# Patient Record
Sex: FEMALE | Race: WHITE | NOT HISPANIC OR LATINO | Employment: OTHER | URBAN - METROPOLITAN AREA
[De-identification: names, ages, dates, MRNs, and addresses within clinical notes are randomized per-mention and may not be internally consistent; named-entity substitution may affect disease eponyms.]

---

## 2017-01-23 DIAGNOSIS — N95.1 SYMPTOMATIC MENOPAUSAL OR FEMALE CLIMACTERIC STATES: Primary | ICD-10-CM

## 2017-01-23 NOTE — TELEPHONE ENCOUNTER
Rx was denied for below medication/s:    Med Prescribed:  PREMPRO 28'S  Reason:  PLAN DOES NOT COVER MED  Recommendations from Insurance:  PA  Insurance Plan:     Patient ID:  [id number]  BIN/RX#:  [id number]  Phone:  [phone]  Fax:       On current med list:  YES    Would you like to change Rx or start PA. If you would like to start PA please include any pertinent information as to why it is needed, other medications taken and reasons why other medication were discontinued.      Please forward to your MA pool.      Thank you,  Salud Anaya RT (R)(M)

## 2017-01-25 NOTE — TELEPHONE ENCOUNTER
"Please review with Dr. James.  Prempro was started 7/2015 \"for short term use only.\"  Is this medication still appropriate? And should a PA be started or is there alternate therapy options?    "

## 2017-01-26 NOTE — TELEPHONE ENCOUNTER
Would like Catie to start tapering down this spring, to the lower dose 0.3/ prempro dose.  She may remain on this dose another 3 months.  Then taper to lower dose; which she can be on for ~6 months.  Dr. James

## 2017-01-27 NOTE — TELEPHONE ENCOUNTER
Triage nurse discussed with patient. Informed of the taper plan.  Due to the fact that the PA will not be completed on this Friday afternoon and patient is out of her medication from tomorrow she will ask pharmacist to give her some tablets to carry over until the PA is ready.      PA request sent to team.  Salud Elizalde RN

## 2017-01-30 NOTE — TELEPHONE ENCOUNTER
[facility] esteban sent PA paperwork. Filled out and faxed back.   Hortensia Boone RN-BSN

## 2017-02-01 ENCOUNTER — CARE COORDINATION (OUTPATIENT)
Dept: CARE COORDINATION | Facility: CLINIC | Age: 52
End: 2017-02-01

## 2017-02-01 RX ORDER — NORETHINDRONE ACETATE AND ETHINYL ESTRADIOL .5; 2.5 MG/1; UG/1
1 TABLET ORAL DAILY
Qty: 28 TABLET | Refills: 0 | Status: SHIPPED | OUTPATIENT
Start: 2017-02-01 | End: 2017-02-01

## 2017-02-01 RX ORDER — NORETHINDRONE ACETATE AND ETHINYL ESTRADIOL .5; 2.5 MG/1; UG/1
1 TABLET ORAL DAILY
Qty: 84 TABLET | Refills: 0 | Status: SHIPPED | OUTPATIENT
Start: 2017-02-01 | End: 2017-03-07 | Stop reason: DRUGHIGH

## 2017-02-01 NOTE — PROGRESS NOTES
Clinic Care Coordination Contact  Care Team Conversations      Dr James  RN CC assisted Catie in getting the PA for her medication pushed up to the pharmacy medical director who should be able to give an opinion regarding her HRT that she is currently on  [facility] PA requires a step therapy program now and her current hrt is not on the formulary  She would need a new order for one of the formulary listed meds shown here or a written letter for appeal which would be listed in the denial if they will not cover the current HRT  RN CC spent approx 60mins with several outreach calls to [facility], Direct [facility] PA dept and associated Pharmacist Frankie Haddad is currently experiencing hot flashes, nausea, irritability, wt loss, as she has been off the medication for 10 days      ESTROGEN/PROGESTINS   Oral EE/norethindrone acetate  FEMHRT    EE/norethindrone acetate - Jinteli    estradiol/norethindrone acetate  ACTIVELLA        If PA fails and she tries and fails the formulary approved med this is the process :  Per criteria she would have to fail all 3, BUT if she has an adverse reaction/side effect that we can attribute specifically to the Norethindrone acetate then she would only have to try 1 ( as all 3 alternatives have this progesterone).  If failure was due to lack of efficacy then this logic would not apply and she would need to try/fail all 3. Does this make sense?       Bert Benitez ?Pharmacist, Medicaid Prior Authorization  p [phone]  [facility] [place] ?[address] [place] [address], Mail Code 001      Please have Catie notified of any approval or denial    Thank you    Nathaly Rousseau RN Clinic Care Coordinator  [facility] [phone]

## 2017-02-01 NOTE — TELEPHONE ENCOUNTER
Spoke with pt on the phone. Explained to her that I spoke to someone at [facility] this morning. I re-faxed the paperwork marked Urgent. I informed her that I was told that it was in process and the lady I spoke with this morning said that it could take 24-48 hours to process it fully. I gave her the phone number to [facility] to call with other questions. I informed her as soon as we had an answer, we would let her know.   Hortensia Boone, RN-BSN

## 2017-02-01 NOTE — TELEPHONE ENCOUNTER
Pt called back. Informed her the PA was denied and LT sent a prescription for a different medication. Pt paid for the Prempro out of pocket since she was having side effects. Pt had many questions regarding the different medication sent to pharmacy. Dr James spoke to patient and answered questions.   Hortensia Boone, RN-BSN

## 2017-02-01 NOTE — TELEPHONE ENCOUNTER
Please inform patient that Rx has been sent to her pharmacy.  She may take one today and one tomorrow morning and night, then daily.   If she prefers this pill we can continue it through the next 6 months.  Dr. James

## 2017-02-01 NOTE — TELEPHONE ENCOUNTER
Spoke with Nathaly Rousseau RN Clinic Care Coordinator, at . The PA should be faxed back to us by 6pm today. If it is not approved, Dr James needs to send one of the following that is covered by her formulary:  Femhrt, Activella, or Jinteli to the [facility] on [address] (teed up). Pt is symptomatic with nausea, hot flashes, insomnia, hot flashes. Has been without meds since 1/22/17.   Hortensia Boone RN-BSN

## 2017-02-01 NOTE — TELEPHONE ENCOUNTER
"TC to [facility] to check on PA. It is \"pending\" so should have an answer within 24-48 hours. TC to patient. Left detailed message with this information.   Hortensia Boone RN-BSN    "

## 2017-02-01 NOTE — TELEPHONE ENCOUNTER
"PA received. The PA was denied. \"This service is denied because: formulary alternatives have not been tried. You must try similar drugs that are on your health plan formulary before we can approve this drug.\" Discussed with Dr James. She would like patient to try the prescription she sent to the pharmacy. If pt cannot see improvement after 3 weeks, we can send a new prescription for one of the medications listed below.   Hortensia Boone RN-BSN    "

## 2017-02-07 ENCOUNTER — TRANSFERRED RECORDS (OUTPATIENT)
Dept: HEALTH INFORMATION MANAGEMENT | Facility: CLINIC | Age: 52
End: 2017-02-07

## 2017-02-22 ENCOUNTER — TELEPHONE (OUTPATIENT)
Dept: OBGYN | Facility: CLINIC | Age: 52
End: 2017-02-22

## 2017-02-22 DIAGNOSIS — N95.1 SYMPTOMATIC MENOPAUSAL OR FEMALE CLIMACTERIC STATES: ICD-10-CM

## 2017-02-22 DIAGNOSIS — F41.1 GENERALIZED ANXIETY DISORDER: ICD-10-CM

## 2017-02-22 NOTE — TELEPHONE ENCOUNTER
Reason for Call:  Medication or medication refill:    Do you use a [facility]?  Name of the pharmacy and phone number for the current request:  [facility] [address]    Name of the medication requested: Xanax    Other request: Pt scheduled appt for 3/6 with Yasmin Velazquez. Pt requesting a refill so they do not run out of medication prior to this appt.    Can we leave a detailed message on this number? YES    Phone number patient can be reached at: Home number on file [phone] (home)    Best Time: Anytime    Call taken on 2/22/2017 at 3:03 PM by Maribeth Thakur

## 2017-02-22 NOTE — TELEPHONE ENCOUNTER
Pt called in stating that you had mentioned starting her at the lowest dose of the estradiol and then increasing the dose if it does not seem to be helping her symptoms. She states that it is not helping with her symptoms. Her pharmacy is cued up. Pt is aware that you will be back in clinic on 02/23/2017. Please advise. Nery Cortez RN

## 2017-02-23 RX ORDER — NORETHINDRONE ACETATE AND ETHINYL ESTRADIOL 1; 5 MG/1; UG/1
1 TABLET ORAL DAILY
Qty: 30 TABLET | Refills: 0 | Status: SHIPPED | OUTPATIENT
Start: 2017-02-23 | End: 2017-04-24

## 2017-02-23 NOTE — TELEPHONE ENCOUNTER
Routing refill request to provider for review/approval because:  Drug not on the [facility] refill protocol   She has a follow up appt on 3/6/2017

## 2017-02-23 NOTE — TELEPHONE ENCOUNTER
Please inform Catie that I have changed her Femhrt prescription to the higher dose.  She should not take the prempro or the lower femhrt with this.    She should follow up with me in one month in clinic.    Dr. James

## 2017-02-24 RX ORDER — ALPRAZOLAM 0.5 MG
0.5 TABLET ORAL 2 TIMES DAILY PRN
Qty: 10 TABLET | Refills: 0 | Status: SHIPPED | OUTPATIENT
Start: 2017-02-24 | End: 2017-03-07

## 2017-02-24 NOTE — TELEPHONE ENCOUNTER
Patient called back checking the status on this and concerned as Yasmin is out next week    Please advise

## 2017-02-24 NOTE — TELEPHONE ENCOUNTER
This is an early request. Her next prescription is due March 7.  I approved a 10 tablet supply to bridge her to return appointment with me. No additional refills until a face-to-face appointment are done.  Rx printed, signed, and forwarded to nursing.

## 2017-03-07 ENCOUNTER — OFFICE VISIT (OUTPATIENT)
Dept: FAMILY MEDICINE | Facility: CLINIC | Age: 52
End: 2017-03-07
Payer: COMMERCIAL

## 2017-03-07 VITALS
TEMPERATURE: 98.5 F | HEART RATE: 61 BPM | OXYGEN SATURATION: 98 % | SYSTOLIC BLOOD PRESSURE: 115 MMHG | BODY MASS INDEX: 18.33 KG/M2 | WEIGHT: 110 LBS | RESPIRATION RATE: 16 BRPM | DIASTOLIC BLOOD PRESSURE: 73 MMHG | HEIGHT: 65 IN

## 2017-03-07 DIAGNOSIS — F41.1 GENERALIZED ANXIETY DISORDER: ICD-10-CM

## 2017-03-07 DIAGNOSIS — G43.009 MIGRAINE WITHOUT AURA AND WITHOUT STATUS MIGRAINOSUS, NOT INTRACTABLE: ICD-10-CM

## 2017-03-07 PROCEDURE — 99214 OFFICE O/P EST MOD 30 MIN: CPT | Performed by: NURSE PRACTITIONER

## 2017-03-07 RX ORDER — SERTRALINE HYDROCHLORIDE 100 MG/1
150 TABLET, FILM COATED ORAL DAILY
Qty: 135 TABLET | Refills: 1 | Status: SHIPPED | OUTPATIENT
Start: 2017-03-07 | End: 2017-05-19

## 2017-03-07 RX ORDER — ALPRAZOLAM 0.5 MG
0.5 TABLET ORAL 2 TIMES DAILY PRN
Qty: 10 TABLET | Refills: 0 | Status: CANCELLED | OUTPATIENT
Start: 2017-03-07

## 2017-03-07 RX ORDER — TRAMADOL HYDROCHLORIDE 50 MG/1
50-100 TABLET ORAL EVERY 8 HOURS PRN
Refills: 0 | COMMUNITY
Start: 2017-03-07 | End: 2017-05-08

## 2017-03-07 RX ORDER — ALPRAZOLAM 0.5 MG
0.5 TABLET ORAL 2 TIMES DAILY PRN
Qty: 40 TABLET | Refills: 2 | Status: SHIPPED | OUTPATIENT
Start: 2017-03-07 | End: 2017-05-19

## 2017-03-07 NOTE — MR AVS SNAPSHOT
After Visit Summary   3/7/2017    Catie Nuñez    MRN: [id number]           Patient Information     Date Of Birth          1965        Visit Information        Provider Department      3/7/2017 7:40 AM Yasmin Velazquez APRN CNP [facility]        Today's Diagnoses     Generalized anxiety disorder        Migraine without aura and without status migrainosus, not intractable          Care Instructions    Continue your care with specialty.  Take good care of yourself!    Xanax: take sparingly, only as needed.  Return to the clinic to see me in 3 months for additional refills.          Follow-ups after your visit        Who to contact     If you have questions or need follow up information about today's clinic visit or your schedule please contact [facility] directly at [phone].  Normal or non-critical lab and imaging results will be communicated to you by MyChart, letter or phone within 4 business days after the clinic has received the results. If you do not hear from us within 7 days, please contact the clinic through [employer] or phone. If you have a critical or abnormal lab result, we will notify you by phone as soon as possible.  Submit refill requests through [employer] or call your pharmacy and they will forward the refill request to us. Please allow 3 business days for your refill to be completed.          Additional Information About Your Visit        MyChart Information     [employer] gives you secure access to your electronic health record. If you see a primary care provider, you can also send messages to your care team and make appointments. If you have questions, please call your primary care clinic.  If you do not have a primary care provider, please call [phone] and they will assist you.        Care EveryWhere ID     This is your Care EveryWhere ID. This could be used by other organizations to access your [facility]  "records  [id number]        Your Vitals Were     Pulse Temperature Respirations Height Last Period Pulse Oximetry    61 98.5  F (36.9  C) (Oral) 16 5' 4.5\" (1.638 m) 09/01/2016 98%    Breastfeeding? BMI (Body Mass Index)                No 18.59 kg/m2           Blood Pressure from Last 3 Encounters:   03/07/17 115/73   12/07/16 118/64   10/06/16 110/78    Weight from Last 3 Encounters:   03/07/17 110 lb (49.9 kg)   12/07/16 108 lb 12.8 oz (49.4 kg)   10/06/16 110 lb (49.9 kg)              Today, you had the following     No orders found for display         Today's Medication Changes          These changes are accurate as of: 3/7/17  8:29 AM.  If you have any questions, ask your nurse or doctor.               Start taking these medicines.        Dose/Directions    ALPRAZolam 0.5 MG tablet   Commonly known as:  XANAX   Used for:  Generalized anxiety disorder   Started by:  Yasmin Velazquez APRN CNP        Dose:  0.5 mg   Take 1 tablet (0.5 mg) by mouth 2 times daily as needed for anxiety 40 tablets to last 30 days.   Quantity:  40 tablet   Refills:  2         These medicines have changed or have updated prescriptions.        Dose/Directions    norethindrone-ethinyl estradiol 1-5 MG-MCG per tablet   Commonly known as:  FEMHRT 1/5   This may have changed:  Another medication with the same name was removed. Continue taking this medication, and follow the directions you see here.   Used for:  Symptomatic menopausal or female climacteric states   Changed by:  Emmie James MD        Dose:  1 tablet   Take 1 tablet by mouth daily   Quantity:  30 tablet   Refills:  0       traMADol 50 MG tablet   Commonly known as:  ULTRAM   This may have changed:  additional instructions   Used for:  Migraine without aura and without status migrainosus, not intractable   Changed by:  Yasmin Velazquez APRN CNP        Dose:   mg   Take 1-2 tablets ( mg) by mouth every 8 hours as needed for moderate pain . " Managed by Dr. Jennings, [facility]   Refills:  0         Stop taking these medicines if you haven't already. Please contact your care team if you have questions.     estrogen conj-medroxyPROGESTERone 0.45-1.5 MG per tablet   Commonly known as:  Prempro   Stopped by:  Yasmin Velazquez APRN CNP           order for DME   Stopped by:  Yasmin Velazquez APRN CNP                Where to get your medicines      These medications were sent to [facility] [address] [place] [address] AT [address] [place] [address] & [address] [id number]    Hours:  24-hours Phone:  [phone]     sertraline 100 MG tablet         Some of these will need a paper prescription and others can be bought over the counter.  Ask your nurse if you have questions.     Bring a paper prescription for each of these medications     ALPRAZolam 0.5 MG tablet                Primary Care Provider Office Phone # Fax #    JEREMY Gonzalez [phone]       [facility] [address] [place] [address]        Thank you!     Thank you for choosing [facility]  for your care. Our goal is always to provide you with excellent care. Hearing back from our patients is one way we can continue to improve our services. Please take a few minutes to complete the written survey that you may receive in the mail after your visit with us. Thank you!             Your Updated Medication List - Protect others around you: Learn how to safely use, store and throw away your medicines at www.disposemymeds.org.          This list is accurate as of: 3/7/17  8:29 AM.  Always use your most recent med list.                   Brand Name Dispense Instructions for use    ALPRAZolam 0.5 MG tablet    XANAX    40 tablet    Take 1 tablet (0.5 mg) by mouth 2 times daily as needed for anxiety 40 tablets to last 30 days.       EXCEDRIN PO      Take by mouth  as needed       fluticasone 50 MCG/ACT spray    FLONASE    16 g    Spray 1-2 sprays into both nostrils daily       MULTIVITAMIN PO          norethindrone-ethinyl estradiol 1-5 MG-MCG per tablet    FEMHRT 1/5    30 tablet    Take 1 tablet by mouth daily       sertraline 100 MG tablet    ZOLOFT    135 tablet    Take 1.5 tablets (150 mg) by mouth daily       traMADol 50 MG tablet    ULTRAM     Take 1-2 tablets ( mg) by mouth every 8 hours as needed for moderate pain . Managed by Dr. Jennings, [facility]

## 2017-03-07 NOTE — PATIENT INSTRUCTIONS
Continue your care with specialty.  Take good care of yourself!    Xanax: take sparingly, only as needed.  Return to the clinic to see me in 3 months for additional refills.

## 2017-03-07 NOTE — PROGRESS NOTES
"  SUBJECTIVE:                                                    Catie Nuñez is a 51 year old female who presents to clinic today for the following health issues:    History of Present Illness   Depression & Anxiety Follow-up:     Depression/Anxiety:  Depression & Anxiety    Status since last visit::  Stable    Other associated symptoms of depression and anxiety::  None    Significant life event::  No    Current substance use::  None    [facility] services, DBT with her daughter    Taking care of herself.  Exercising sometimes.  Eating healthy.  Walking often.    Sertraline:  Seems to be helping.  Taking her meds as prescribed.    Tramadol   Intermittently for lower back and hips, migraines.  Dr. Geronimo, for migraine management.   Dr. Jennings, [facility], for back pain.  Tramadol taking 3 tablets per day.  Scripts currently coming from Dr. Jennings.        Problem list and histories reviewed & adjusted, as indicated.  Additional history: as documented      Patient Active Problem List   Diagnosis     CARDIOVASCULAR SCREENING; LDL GOAL LESS THAN 160     Adhesive capsulitis of shoulder     Scoliosis     Shoulder impingement     Generalized anxiety disorder     Pulmonary nodule     Allergic rhinitis     Migraine without aura and without status migrainosus, not intractable     Controlled substance agreement signed     ASCUS Pap + high risk HPV, + 16, + \"other\"      History reviewed. No pertinent past surgical history.    Social History   Substance Use Topics     Smoking status: Never Smoker     Smokeless tobacco: Never Used     Alcohol use No     Family History   Problem Relation Age of Onset     HEART DISEASE Father      Asthma No family hx of      DIABETES No family hx of      Hypertension No family hx of      CEREBROVASCULAR DISEASE No family hx of      Breast Cancer No family hx of          Current Outpatient Prescriptions   Medication Sig Dispense Refill     ALPRAZolam (XANAX) 0.5 MG tablet Take 1 " "tablet (0.5 mg) by mouth 2 times daily as needed for anxiety 40 tablets to last 30 days. 10 tablet 0     norethindrone-ethinyl estradiol (FEMHRT 1/5) 1-5 MG-MCG per tablet Take 1 tablet by mouth daily 30 tablet 0     sertraline (ZOLOFT) 100 MG tablet Take 1.5 tablets (150 mg) by mouth daily 135 tablet 1     fluticasone (FLONASE) 50 MCG/ACT nasal spray Spray 1-2 sprays into both nostrils daily 16 g 11     traMADol (ULTRAM) 50 MG tablet Take 1-2 tablets ( mg) by mouth every 8 hours as needed for moderate pain 10 tablet 0     Multiple Vitamins-Minerals (MULTIVITAMIN PO)        Aspirin-Acetaminophen-Caffeine (EXCEDRIN PO) Take by mouth as needed       norethindrone-eth estradiol 0.5-2.5 MG-MCG TABS Take 1 tablet by mouth daily (Patient not taking: Reported on 3/7/2017) 84 tablet 0     No Known Allergies  Recent Labs   Lab Test  09/14/16   1102  02/19/15   1029  01/06/15   1120   LDL  122*   --   94   HDL  73   --   75   TRIG  111   --   63   ALT   --   20  14   CR   --   0.52  0.60   GFRESTIMATED   --   >90  Non  GFR Calc    >90  Non  GFR Calc     GFRESTBLACK   --   >90   GFR Calc    >90   GFR Calc     POTASSIUM   --   3.9  4.2   TSH   --    --   3.60      BP Readings from Last 3 Encounters:   03/07/17 115/73   12/07/16 118/64   10/06/16 110/78    Wt Readings from Last 3 Encounters:   03/07/17 110 lb (49.9 kg)   12/07/16 108 lb 12.8 oz (49.4 kg)   10/06/16 110 lb (49.9 kg)              Labs reviewed in EPIC    ROS:  Constitutional, HEENT, cardiovascular, pulmonary, gi and gu systems are negative, except as otherwise noted.    OBJECTIVE:                                                    /73  Pulse 61  Temp 98.5  F (36.9  C) (Oral)  Resp 16  Ht 5' 4.5\" (1.638 m)  Wt 110 lb (49.9 kg)  LMP 09/01/2016  SpO2 98%  Breastfeeding? No  BMI 18.59 kg/m2  Body mass index is 18.59 kg/(m^2).  GENERAL APPEARANCE: healthy, alert and no distress. Smiling. "   SKIN: warm and dry  PSYCH: mentation appears normal and affect normal/bright.  Good eye contact.       ASSESSMENT/PLAN:                                                      (F41.1) Generalized anxiety disorder  Comment:   Plan: sertraline (ZOLOFT) 100 MG tablet, ALPRAZolam         (XANAX) 0.5 MG tablet            (G43.009) Migraine without aura and without status migrainosus, not intractable  Comment:   Plan: traMADol (ULTRAM) 50 MG tablet    I discussed with krystin the appropriate use of tramadol and the current recommendations to take tramadol for short term only.  This has been managed by ortho for low back pain and she also takes it for headaches occasionally.  I did tell her that if she wants me to take over this rx, what that would mean with appointments, transfer of records, etc.  She verbalizes understanding.              Patient Instructions   Continue your care with specialty.  Take good care of yourself!    Xanax: take sparingly, only as needed.  Return to the clinic to see me in 3 months for additional refills.          Total: 30 min spent with the patient, >50% time spent face to face counseling regarding anxiety, zoloft, xanax, and controlled substances/controlled substance transfer of management of tramadol.       JEREMY Kent [facility]

## 2017-03-07 NOTE — NURSING NOTE
"Chief Complaint   Patient presents with     Recheck Medication       Initial /73  Pulse 61  Temp 98.5  F (36.9  C) (Oral)  Resp 16  Ht 5' 4.5\" (1.638 m)  Wt 110 lb (49.9 kg)  LMP 09/01/2016  SpO2 98%  Breastfeeding? No  BMI 18.59 kg/m2 Estimated body mass index is 18.59 kg/(m^2) as calculated from the following:    Height as of this encounter: 5' 4.5\" (1.638 m).    Weight as of this encounter: 110 lb (49.9 kg).  Medication Reconciliation: complete       Shoaib Martinez MA       "

## 2017-03-31 ENCOUNTER — TELEPHONE (OUTPATIENT)
Dept: FAMILY MEDICINE | Facility: CLINIC | Age: 52
End: 2017-03-31

## 2017-03-31 ENCOUNTER — OFFICE VISIT (OUTPATIENT)
Dept: URGENT CARE | Facility: URGENT CARE | Age: 52
End: 2017-03-31
Payer: COMMERCIAL

## 2017-03-31 VITALS
HEIGHT: 64 IN | WEIGHT: 108.5 LBS | SYSTOLIC BLOOD PRESSURE: 136 MMHG | BODY MASS INDEX: 18.52 KG/M2 | TEMPERATURE: 100.7 F | OXYGEN SATURATION: 98 % | DIASTOLIC BLOOD PRESSURE: 86 MMHG | HEART RATE: 91 BPM

## 2017-03-31 DIAGNOSIS — R05.9 COUGH: ICD-10-CM

## 2017-03-31 DIAGNOSIS — J10.1 INFLUENZA B: Primary | ICD-10-CM

## 2017-03-31 LAB
FLUAV+FLUBV AG SPEC QL: ABNORMAL
FLUAV+FLUBV AG SPEC QL: NEGATIVE
SPECIMEN SOURCE: ABNORMAL

## 2017-03-31 PROCEDURE — 99213 OFFICE O/P EST LOW 20 MIN: CPT | Performed by: FAMILY MEDICINE

## 2017-03-31 PROCEDURE — 87804 INFLUENZA ASSAY W/OPTIC: CPT | Performed by: FAMILY MEDICINE

## 2017-03-31 RX ORDER — AZITHROMYCIN 250 MG/1
TABLET, FILM COATED ORAL
Qty: 6 TABLET | Refills: 0 | Status: SHIPPED | OUTPATIENT
Start: 2017-03-31 | End: 2017-05-18

## 2017-03-31 RX ORDER — CODEINE PHOSPHATE AND GUAIFENESIN 10; 100 MG/5ML; MG/5ML
1-2 SOLUTION ORAL EVERY 4 HOURS PRN
Qty: 120 ML | Refills: 0 | Status: SHIPPED | OUTPATIENT
Start: 2017-03-31 | End: 2017-05-18

## 2017-03-31 NOTE — MR AVS SNAPSHOT
After Visit Summary   3/31/2017    Catie Nuñez    MRN: [id number]           Patient Information     Date Of Birth          1965        Visit Information        Provider Department      3/31/2017 7:30 PM Lauren Villagran DO [facility]        Today's Diagnoses     Influenza-like illness    -  1    Cough          Care Instructions    Start the antibiotic tonight.   If any fevers ( a temperature of 100.5 or higher) persist after you've been on the antibiotic more than 48 hours, recheck with your primary provider.      Do not drive, take at work, or drink alcohol when you are taking the codeine cough suppressant.           Follow-ups after your visit        Who to contact     If you have questions or need follow up information about today's clinic visit or your schedule please contact [facility] directly at [phone].  Normal or non-critical lab and imaging results will be communicated to you by MusicNowhart, letter or phone within 4 business days after the clinic has received the results. If you do not hear from us within 7 days, please contact the clinic through MusicNowhart or phone. If you have a critical or abnormal lab result, we will notify you by phone as soon as possible.  Submit refill requests through Rem[employer] or call your pharmacy and they will forward the refill request to us. Please allow 3 business days for your refill to be completed.          Additional Information About Your Visit        MyChart Information     Remedify gives you secure access to your electronic health record. If you see a primary care provider, you can also send messages to your care team and make appointments. If you have questions, please call your primary care clinic.  If you do not have a primary care provider, please call [phone] and they will assist you.        Care EveryWhere ID     This is your Care EveryWhere ID. This could be used by other organizations to  "access your [place] medical records  [id number]        Your Vitals Were     Pulse Temperature Height Last Period Pulse Oximetry BMI (Body Mass Index)    91 100.7  F (38.2  C) (Oral) 5' 4\" (1.626 m) 09/01/2016 98% 18.62 kg/m2       Blood Pressure from Last 3 Encounters:   03/31/17 136/86   03/07/17 115/73   12/07/16 118/64    Weight from Last 3 Encounters:   03/31/17 108 lb 8 oz (49.2 kg)   03/07/17 110 lb (49.9 kg)   12/07/16 108 lb 12.8 oz (49.4 kg)              We Performed the Following     Influenza A/B antigen          Today's Medication Changes          These changes are accurate as of: 3/31/17  8:56 PM.  If you have any questions, ask your nurse or doctor.               Start taking these medicines.        Dose/Directions    azithromycin 250 MG tablet   Commonly known as:  ZITHROMAX   Used for:  Influenza-like illness   Started by:  Lauren Villagran, DO        Two tablets first day, then one tablet daily for four days.   Quantity:  6 tablet   Refills:  0       guaiFENesin-codeine 100-10 MG/5ML Soln solution   Commonly known as:  ROBITUSSIN AC   Used for:  Cough   Started by:  Lauren Villagran DO        Dose:  1-2 tsp.   Take 5-10 mLs by mouth every 4 hours as needed   Quantity:  120 mL   Refills:  0            Where to get your medicines      [facility] [address] [place] [address] AT [address] [address]    Hours:  24-hours Phone:  [phone]     azithromycin 250 MG tablet         Some of these will need a paper prescription and others can be bought over the counter.  Ask your nurse if you have questions.     Bring a paper prescription for each of these medications     guaiFENesin-codeine 100-10 MG/5ML Soln solution                Primary Care Provider Office Phone # Fax #    JEREMY Gonzalez [phone]       [address]" [place]  [place] [address]        Thank you!     Thank you for choosing [facility]  for your care. Our goal is always to provide you with excellent care. Hearing back from our patients is one way we can continue to improve our services. Please take a few minutes to complete the written survey that you may receive in the mail after your visit with us. Thank you!             Your Updated Medication List - Protect others around you: Learn how to safely use, store and throw away your medicines at www.disposemymeds.org.          This list is accurate as of: 3/31/17  8:56 PM.  Always use your most recent med list.                   Brand Name Dispense Instructions for use    ALPRAZolam 0.5 MG tablet    XANAX    40 tablet    Take 1 tablet (0.5 mg) by mouth 2 times daily as needed for anxiety 40 tablets to last 30 days.       azithromycin 250 MG tablet    ZITHROMAX    6 tablet    Two tablets first day, then one tablet daily for four days.       EXCEDRIN PO      Take by mouth as needed       fluticasone 50 MCG/ACT spray    FLONASE    16 g    Spray 1-2 sprays into both nostrils daily       guaiFENesin-codeine 100-10 MG/5ML Soln solution    ROBITUSSIN AC    120 mL    Take 5-10 mLs by mouth every 4 hours as needed       MULTIVITAMIN PO          norethindrone-ethinyl estradiol 1-5 MG-MCG per tablet    FEMHRT 1/5    30 tablet    Take 1 tablet by mouth daily       sertraline 100 MG tablet    ZOLOFT    135 tablet    Take 1.5 tablets (150 mg) by mouth daily       traMADol 50 MG tablet    ULTRAM     Take 1-2 tablets ( mg) by mouth every 8 hours as needed for moderate pain . Managed by Dr. Jennings, [facility]

## 2017-03-31 NOTE — TELEPHONE ENCOUNTER
When did the symptoms start? 6 days ago    Where are the symptoms?  sore throat, congestion, post nasal drip, non productive cough, productive cough, achiness, chills    Other symptoms: none    Is this affecting your ADL's?  - Able to sleep ok? No  - Able to eat and drink ok? No    Have you been exposed to someone else who is ill? unknown    Aggravating factors? Cough, feeling fatigued    Relieving factors? none    Pt advised to call back or come in if symptoms increase or worsen, or follow up PRN or if not better in 72 hours.     No Known Allergies  Current Outpatient Prescriptions   Medication Sig Dispense Refill     sertraline (ZOLOFT) 100 MG tablet Take 1.5 tablets (150 mg) by mouth daily 135 tablet 1     traMADol (ULTRAM) 50 MG tablet Take 1-2 tablets ( mg) by mouth every 8 hours as needed for moderate pain . Managed by Dr. Jennings, [facility]  0     ALPRAZolam (XANAX) 0.5 MG tablet Take 1 tablet (0.5 mg) by mouth 2 times daily as needed for anxiety 40 tablets to last 30 days. 40 tablet 2     norethindrone-ethinyl estradiol (FEMHRT 1/5) 1-5 MG-MCG per tablet Take 1 tablet by mouth daily 30 tablet 0     fluticasone (FLONASE) 50 MCG/ACT nasal spray Spray 1-2 sprays into both nostrils daily 16 g 11     Multiple Vitamins-Minerals (MULTIVITAMIN PO)        Aspirin-Acetaminophen-Caffeine (EXCEDRIN PO) Take by mouth as needed          [facility]  [facility] [address] [place], [place] - [address] [facility] [address] [place], [place] - [address] AT [facility] & [address] [place] [address] Remedy Recommendations:  Hot liquids to sooth sore throat, Cool mist humidifier, Avoid smoke/allergens, Decrease intake of milk products , Wash hands frequently, Tylenol or Ibuprofen based on weight, Saline Nasal drops/spray, OTC cough that contains dextromethorphan based on weight, Decongestant if has post-nasal drip and Get plenty of rest.    Mandi Ely  RN

## 2017-04-01 NOTE — NURSING NOTE
"Chief Complaint   Patient presents with     Urgent Care     Breathing Problem     Sunday started to have chills, aching, and headache, chest congestion.  Now has nasal congestion, reports burning from her sinuses to her lungs.  Feels short of breath.     Initial /86 (BP Location: Right arm, Cuff Size: Adult Small)  Pulse 91  Temp 100.7  F (38.2  C) (Oral)  Ht 5' 4\" (1.626 m)  Wt 108 lb 8 oz (49.2 kg)  LMP 09/01/2016  SpO2 98%  BMI 18.62 kg/m2 Estimated body mass index is 18.62 kg/(m^2) as calculated from the following:    Height as of this encounter: 5' 4\" (1.626 m).    Weight as of this encounter: 108 lb 8 oz (49.2 kg)..  BP completed using cuff size: small regular  LILLY Rosenthal  "

## 2017-04-01 NOTE — PROGRESS NOTES
"SUBJECTIVE:   Catie Nuñez is a 51 year old female presenting with a chief complaint of Respiratory/ENT symptoms:  Symptoms started 3/25 with body aches.   Symptoms include: fevers, cough , headache, myalgias and malaise.  Chest congestion.  Symptoms feel like they have been worsening since onset.  Continuing to have fevers daily.    Predisposing factors include:  Non smoker but exposed to smoke in the household, no h/o asthma.   Would like a night time cough suppressant.    ROS:  5-Point Review of Systems Negative-- Except as stated above.    OBJECTIVE  /86 (BP Location: Right arm, Cuff Size: Adult Small)  Pulse 91  Temp 100.7  F (38.2  C) (Oral)  Ht 5' 4\" (1.626 m)  Wt 108 lb 8 oz (49.2 kg)  LMP 09/01/2016  SpO2 98%  BMI 18.62 kg/m2  GENERAL:  Awake, alert and interactive. No acute distress.  HEENT:   NC/AT, EOMI, clear conjunctiva.  Clear nasal discharge.  Oropharynx moist and clear.  TM's and EAC's benign.  NECK: supple and free of adenopathy  CHEST:  Lungs are clear, no rhonchi, wheezing or rales. Normal symmetric air entry throughout both lung fields.   HEART:  S1 and S2 normal, no murmurs, clicks, gallops or rubs. Regular rate and rhythm.    ASSESSMENT/PLAN    ICD-10-CM    1. Influenza B J10.1 Influenza A/B antigen     azithromycin (ZITHROMAX) 250 MG tablet   2. Cough R05 guaiFENesin-codeine (ROBITUSSIN AC) 100-10 MG/5ML SOLN solution     1 week into illness, no tamiflu started.  Discussed with patient.   Fevers persisting on day 7 and feeling worse, will cover for possible pneumonia today.   We discussed the expected course and symptomatic cares in detail, including return to care if symptoms not improving as expected, do not resolve completely, or if any new or worsening symptoms develop.  Patient Instructions   Start the antibiotic tonight.   If any fevers ( a temperature of 100.5 or higher) persist after you've been on the antibiotic more than 48 hours, recheck with your primary " provider.      Do not drive, take at work, or drink alcohol when you are taking the codeine cough suppressant.

## 2017-04-01 NOTE — PATIENT INSTRUCTIONS
Start the antibiotic tonight.   If any fevers ( a temperature of 100.5 or higher) persist after you've been on the antibiotic more than 48 hours, recheck with your primary provider.      Do not drive, take at work, or drink alcohol when you are taking the codeine cough suppressant.

## 2017-04-24 DIAGNOSIS — N95.1 SYMPTOMATIC MENOPAUSAL OR FEMALE CLIMACTERIC STATES: ICD-10-CM

## 2017-04-24 RX ORDER — NORETHINDRONE ACETATE AND ETHINYL ESTRADIOL 1; 5 MG/1; UG/1
1 TABLET ORAL DAILY
Qty: 30 TABLET | Refills: 0 | Status: SHIPPED | OUTPATIENT
Start: 2017-04-24 | End: 2017-05-18

## 2017-04-24 NOTE — TELEPHONE ENCOUNTER
Pt calling to get refill on Femhrt. Per note on 2/22, pt was to f/u in one month with LT. Scheduled appt on 5/10. Refill sent to pharmacy to bridge until appointment.   Hortensia Boone RN-BSN

## 2017-05-08 DIAGNOSIS — G43.009 MIGRAINE WITHOUT AURA AND WITHOUT STATUS MIGRAINOSUS, NOT INTRACTABLE: ICD-10-CM

## 2017-05-08 DIAGNOSIS — M75.00 ADHESIVE CAPSULITIS OF SHOULDER, UNSPECIFIED LATERALITY: Primary | ICD-10-CM

## 2017-05-08 NOTE — TELEPHONE ENCOUNTER
Routing refill request to provider for review/approval because:  Drug not on the [facility] refill protocol       Yasmin-Please review.  Would you like patient to schedule office visit?    Thank you!  ABIOLA SosaN, RN

## 2017-05-08 NOTE — TELEPHONE ENCOUNTER
Reason for Call:  Medication or medication refill:    Do you use a [facility]?  Name of the pharmacy and phone number for the current request:  [facility] [id number] [place] [address]    Name of the medication requested: traMADol (ULTRAM) 50 MG tablet    Other request: Patient is unsure who she can get this refill from. States that her orthopedics & Yasmin have both refilled it for her. She requested for a refill a week ago from her orthopedics but the denied it & just told her today. She ran out a few days ago and is requesting for a refill right away if possible. Please follow up, thank you!    Can we leave a detailed message on this number? YES    Phone number patient can be reached at: Home number on file [phone] (home)    Best Time: anytime    Call taken on 5/8/2017 at 2:19 PM by Hermelinda Reynolds

## 2017-05-10 RX ORDER — TRAMADOL HYDROCHLORIDE 50 MG/1
50-100 TABLET ORAL EVERY 8 HOURS PRN
Qty: 10 TABLET | Refills: 0 | Status: SHIPPED | OUTPATIENT
Start: 2017-05-10 | End: 2017-05-19

## 2017-05-10 NOTE — TELEPHONE ENCOUNTER
After the previous note was sent, I found a document in my consultation folder from Dr. Jennings.  His documentation and tramadol prescription information is on the record.  He approved 3 tablets of tramadol per day for this patient for chronic pain but wants her to follow up with primary care to continue this prescription.  Per my previous documentation, I do not feel comfortable with continuing tramadol for her.  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  She still needs an appointment with primary care for additional discussion regarding chronic pain management and additional care.  The document was forwarded to abstracting.

## 2017-05-10 NOTE — TELEPHONE ENCOUNTER
Patient calling to inquire about the status of the request below as she has heard no determination from PCP. Please follow up. Thanks!

## 2017-05-10 NOTE — TELEPHONE ENCOUNTER
Her ortho doctor said to get the tramadol from you, she has been out for 2 days now and is feeling it. She is taking 3 daily. Please sign off med

## 2017-05-10 NOTE — TELEPHONE ENCOUNTER
That is, unfortunately, an inappropriate determination for the orthopedist to prescribe/recommend tramadol 3 tablets per day and then push that forward to family practice.  I have had this conversation with Catie before.  I am sorry she is having pain, but the best I can do is okay 10 tramadol to get her through to an appointment where she can discuss continuing tramadol.  If she takes 3 per day, she would need to be a controlled substance contract for that as well as have a provider and documentation from her orthopedist that supports the necessity of chronic narcotic use.    Rx printed, signed, and forwarded to nursing.

## 2017-05-10 NOTE — TELEPHONE ENCOUNTER
Controlled Substance Refill Request for traMADol (ULTRAM) 50 MG tablet (Discontinued)  Problem List Complete:  Yes    Last Written Prescription Date:  6/29/2016 - another prescription is written 3/7/2017 - no dispense amount shown  Last Fill Quantity: 10,   # refills: 0    Last Office Visit with [facility] primary care provider: 3/7/2017    Clinic visit frequency required: unspecified     Future Office visit:   Next 5 appointments (look out 90 days)     May 18, 2017  2:00 PM CDT   SHORT with Emmie James MD   [facility] ([facility])    [address] [place] [id number] [phone]                  Controlled substance agreement on file: Yes:  Date 9/24/2015.     Processing:  may be called or faxed to pharmacy   checked in past 6 months?  No, route to RN Chronic pain not on problem list - please see office visit notes 3/7/2017 - how often do you want patient seen face to face?    Caroline - office visit notes 3/7/2017  (G43.009) Migraine without aura and without status migrainosus, not intractable  Comment:   Plan: traMADol (ULTRAM) 50 MG tablet     I discussed with krystin the appropriate use of tramadol and the current recommendations to take tramadol for short term only.  This has been managed by ortho for low back pain and she also takes it for headaches occasionally.  I did tell her that if she wants me to take over this rx, what that would mean with appointments, transfer of records, etc.  She verbalizes understanding.      Thank you,  Artemio Davidson RN

## 2017-05-11 NOTE — TELEPHONE ENCOUNTER
Faxed rx to pharmacy indicated in first message below  Left message on voicemail with msg's from Yasmin Velazquez CNP below and that rx faxed in.  Call back with questions or can call back and speak with a triage nurse.Clari Isbell RN

## 2017-05-18 ENCOUNTER — OFFICE VISIT (OUTPATIENT)
Dept: OBGYN | Facility: CLINIC | Age: 52
End: 2017-05-18
Payer: COMMERCIAL

## 2017-05-18 VITALS
TEMPERATURE: 98.7 F | BODY MASS INDEX: 19.05 KG/M2 | SYSTOLIC BLOOD PRESSURE: 125 MMHG | DIASTOLIC BLOOD PRESSURE: 88 MMHG | HEART RATE: 90 BPM | WEIGHT: 111 LBS

## 2017-05-18 DIAGNOSIS — N95.1 SYMPTOMATIC MENOPAUSAL OR FEMALE CLIMACTERIC STATES: Primary | ICD-10-CM

## 2017-05-18 PROCEDURE — 99213 OFFICE O/P EST LOW 20 MIN: CPT | Performed by: OBSTETRICS & GYNECOLOGY

## 2017-05-18 RX ORDER — NORETHINDRONE ACETATE AND ETHINYL ESTRADIOL 1; 5 MG/1; UG/1
1 TABLET ORAL DAILY
Qty: 90 TABLET | Refills: 3 | Status: SHIPPED | OUTPATIENT
Start: 2017-05-18 | End: 2017-12-19

## 2017-05-18 NOTE — MR AVS SNAPSHOT
After Visit Summary   5/18/2017    Catie Nuñez    MRN: [id number]           Patient Information     Date Of Birth          1965        Visit Information        Provider Department      5/18/2017 2:00 PM Emmie James MD [facility]        Today's Diagnoses     Symptomatic menopausal or female climacteric states    -  1       Follow-ups after your visit        Follow-up notes from your care team     Return in about 1 year (around 5/18/2018).      Your next 10 appointments already scheduled     May 19, 2017  2:20 PM CDT   Office Visit with JEREMY Gonzalez CNP   [facility] ([facility]    [address] [facility] [address] [phone]           Bring a current list of meds and any records pertaining to this visit.  For Physicals, please bring immunization records and any forms needing to be filled out.  Please arrive 10 minutes early to complete paperwork.              Who to contact     If you have questions or need follow up information about today's clinic visit or your schedule please contact [facility] directly at [phone].  Normal or non-critical lab and imaging results will be communicated to you by MyChart, letter or phone within 4 business days after the clinic has received the results. If you do not hear from us within 7 days, please contact the clinic through Stix Gameshart or phone. If you have a critical or abnormal lab result, we will notify you by phone as soon as possible.  Submit refill requests through [employer] or call your pharmacy and they will forward the refill request to us. Please allow 3 business days for your refill to be completed.          Additional Information About Your Visit        MyChart Information     [employer] gives you secure access to your electronic health record. If you see a primary care provider, you can also send messages to your care team and make appointments. If  you have questions, please call your primary care clinic.  If you do not have a primary care provider, please call [phone] and they will assist you.        Care EveryWhere ID     This is your Care EveryWhere ID. This could be used by other organizations to access your [place] medical records  [id number]        Your Vitals Were     Pulse Temperature Last Period BMI (Body Mass Index)          90 98.7  F (37.1  C) (Oral) 09/01/2016 19.05 kg/m2         Blood Pressure from Last 3 Encounters:   05/18/17 125/88   03/31/17 136/86   03/07/17 115/73    Weight from Last 3 Encounters:   05/18/17 111 lb (50.3 kg)   03/31/17 108 lb 8 oz (49.2 kg)   03/07/17 110 lb (49.9 kg)              Today, you had the following     No orders found for display         Today's Medication Changes          These changes are accurate as of: 5/18/17  4:46 PM.  If you have any questions, ask your nurse or doctor.               Start taking these medicines.        Dose/Directions    norethindrone-ethinyl estradiol 1-5 MG-MCG per tablet   Commonly known as:  FEMHRT 1/5   Used for:  Symptomatic menopausal or female climacteric states   Started by:  Emmie James MD        Dose:  1 tablet   Take 1 tablet by mouth daily   Quantity:  90 tablet   Refills:  3            Where to get your medicines      These medications were sent to [facility] [address] [place] [address] & [facility]  [address] [place] [address]     Phone:  [phone]     norethindrone-ethinyl estradiol 1-5 MG-MCG per tablet                Primary Care Provider Office Phone # Fax #    JEREMY Gonzalez [phone]       [facility] [address] [place] [address]        Thank you!     Thank you for choosing [facility]  for your care. Our goal is always to provide you with excellent care. Hearing back from our patients is one way we can continue to improve our  services. Please take a few minutes to complete the written survey that you may receive in the mail after your visit with us. Thank you!             Your Updated Medication List - Protect others around you: Learn how to safely use, store and throw away your medicines at www.disposemymeds.org.          This list is accurate as of: 5/18/17  4:46 PM.  Always use your most recent med list.                   Brand Name Dispense Instructions for use    ALPRAZolam 0.5 MG tablet    XANAX    40 tablet    Take 1 tablet (0.5 mg) by mouth 2 times daily as needed for anxiety 40 tablets to last 30 days.       EXCEDRIN PO      Take by mouth as needed       fluticasone 50 MCG/ACT spray    FLONASE    16 g    Spray 1-2 sprays into both nostrils daily       MULTIVITAMIN PO          norethindrone-ethinyl estradiol 1-5 MG-MCG per tablet    FEMHRT 1/5    90 tablet    Take 1 tablet by mouth daily       sertraline 100 MG tablet    ZOLOFT    135 tablet    Take 1.5 tablets (150 mg) by mouth daily       traMADol 50 MG tablet    ULTRAM    10 tablet    Take 1-2 tablets ( mg) by mouth every 8 hours as needed for moderate pain

## 2017-05-18 NOTE — NURSING NOTE
"Chief Complaint   Patient presents with     RECHECK       Initial /88  Pulse 90  Temp 98.7  F (37.1  C) (Oral)  Wt 111 lb (50.3 kg)  LMP 2016  BMI 19.05 kg/m2 Estimated body mass index is 19.05 kg/(m^2) as calculated from the following:    Height as of 3/31/17: 5' 4\" (1.626 m).    Weight as of this encounter: 111 lb (50.3 kg).  BP completed using cuff size: regular        The following HM Due: NONE      The following patient reported/Care Every where data was sent to:  P ABSTRACT QUALITY INITIATIVES [62663]       N/a    Antionette Pedraza MA               "

## 2017-05-18 NOTE — PROGRESS NOTES
"Catie Nuñez is a 51 year old    woman who presents for hormone replacement therapy follow up.  She's been on HRT for 2 years.  She was started for hot flashes, insomnia, vague but strong malaise.  She is much better.  She hasn't had a painful headache since December.  She is on a lower HRT dose now.  No vaginal bleeding.  She has seen a neurologist about HAs.  Since they start in the neck and are viselike, bilateral behind eyes, without visual disturbance (but with pain, sometimes nausea) they are not thought to be \"migrane\".     She is a single mother of 3 teenagers and has stress.  Doing well currently.      Patient Active Problem List   Diagnosis     CARDIOVASCULAR SCREENING; LDL GOAL LESS THAN 160     Adhesive capsulitis of shoulder     Scoliosis     Shoulder impingement     Generalized anxiety disorder     Pulmonary nodule     Allergic rhinitis     Migraine without aura and without status migrainosus, not intractable     Controlled substance agreement signed     ASCUS Pap + high risk HPV, + 16, + \"other\"      Past Medical History:   Diagnosis Date     Anxiety      ASCUS with positive high risk HPV 2015,16    atypia on colp, 16: ASCUS + HR HPV 16 and other.      Depression      Fracture of great toe 2014     History of scoliosis      Hx of colposcopy with cervical biopsy 10/06/16    10/06/16: [place] [facility] NIL.     MVP (mitral valve prolapse)      Unexplained endometrial cells on cervical Pap smear 2015    thin endometrium on US     Family History   Problem Relation Age of Onset     HEART DISEASE Father      Asthma No family hx of      DIABETES No family hx of      Hypertension No family hx of      CEREBROVASCULAR DISEASE No family hx of      Breast Cancer No family hx of      No past surgical history on file.  Social History   Substance Use Topics     Smoking status: Never Smoker     Smokeless tobacco: Never Used     Alcohol use No     Sulfa drugs      Current Outpatient Prescriptions: "      norethindrone-ethinyl estradiol (FEMHRT 1/5) 1-5 MG-MCG per tablet, Take 1 tablet by mouth daily, Disp: 90 tablet, Rfl: 3     traMADol (ULTRAM) 50 MG tablet, Take 1-2 tablets ( mg) by mouth every 8 hours as needed for moderate pain, Disp: 10 tablet, Rfl: 0     [DISCONTINUED] norethindrone-ethinyl estradiol (FEMHRT 1/5) 1-5 MG-MCG per tablet, Take 1 tablet by mouth daily, Disp: 30 tablet, Rfl: 0     sertraline (ZOLOFT) 100 MG tablet, Take 1.5 tablets (150 mg) by mouth daily, Disp: 135 tablet, Rfl: 1     ALPRAZolam (XANAX) 0.5 MG tablet, Take 1 tablet (0.5 mg) by mouth 2 times daily as needed for anxiety 40 tablets to last 30 days., Disp: 40 tablet, Rfl: 2     fluticasone (FLONASE) 50 MCG/ACT nasal spray, Spray 1-2 sprays into both nostrils daily, Disp: 16 g, Rfl: 11     Multiple Vitamins-Minerals (MULTIVITAMIN PO), , Disp: , Rfl:      Aspirin-Acetaminophen-Caffeine (EXCEDRIN PO), Take by mouth as needed, Disp: , Rfl:      ROS:    C: NEGATIVE for fever, chills, change in weight  I: NEGATIVE for worrisome rashes, moles or lesions  R: NEGATIVE for significant cough or SOB  B: NEGATIVE for masses, tenderness or discharge  CV: NEGATIVE for chest pain, palpitations or peripheral edema  GI: NEGATIVE for nausea, abdominal pain, heartburn, or change in bowel habits  : NEGATIVE for frequency, dysuria, or hematuria   female:no symptoms; she HPI   MUSCULOSKELETAL: no complaints  E: NEGATIVE for temperature intolerance, skin/hair changes  Neuro:  Negative for paresthesias, headaches  PSYCHIATRIC: no insomnia or mood complaints     OBJECTIVE:   /88  Pulse 90  Temp 98.7  F (37.1  C) (Oral)  Wt 111 lb (50.3 kg)  LMP 09/01/2016  BMI 19.05 kg/m2   BMI: Body mass index is 19.05 kg/(m^2).     EXAM:    Well developed, well-nourished woman who appears her stated age.  Mood, affect are normal, bright.          Office Visit on 03/31/2017   Component Date Value Ref Range Status     Influenza A/B Agn Specimen  03/31/2017 Nasopharyngeal   Final     Influenza A 03/31/2017 Negative  NEG Final     Influenza B 03/31/2017 * NEG Final                    Value:Positive   Test results must be correlated with clinical data. If necessary, results   should be confirmed by a molecular assay or viral culture.         Assessment: :  Catie Nuñez is a 51 year old female who returns for hormone replacement therapy follow up.    Chief Complaint   Patient presents with     RECHECK        Plan:      ICD-10-CM    1. Symptomatic menopausal or female climacteric states N95.1 norethindrone-ethinyl estradiol (FEMHRT 1/5) 1-5 MG-MCG per tablet       We will continue for 1 year, then stop.  Discussed risks and benefits. Discussed precautions, should she have a severe or unusual headache she should stop HRT immediately and notify clinic.    All of the patients questions were answered to her apparent satisfaction.  20 minutes spent with patient, all in face to face consultation of the risks and benefits of HRT and plan of management, precautions.

## 2017-05-19 ENCOUNTER — OFFICE VISIT (OUTPATIENT)
Dept: FAMILY MEDICINE | Facility: CLINIC | Age: 52
End: 2017-05-19
Payer: COMMERCIAL

## 2017-05-19 VITALS
WEIGHT: 112 LBS | HEART RATE: 67 BPM | SYSTOLIC BLOOD PRESSURE: 121 MMHG | OXYGEN SATURATION: 100 % | BODY MASS INDEX: 19.12 KG/M2 | DIASTOLIC BLOOD PRESSURE: 79 MMHG | HEIGHT: 64 IN | TEMPERATURE: 98 F

## 2017-05-19 DIAGNOSIS — Z02.9 ADMINISTRATIVE ENCOUNTER: ICD-10-CM

## 2017-05-19 DIAGNOSIS — F41.1 GENERALIZED ANXIETY DISORDER: ICD-10-CM

## 2017-05-19 DIAGNOSIS — M54.50 MIDLINE LOW BACK PAIN WITHOUT SCIATICA, UNSPECIFIED CHRONICITY: Primary | ICD-10-CM

## 2017-05-19 DIAGNOSIS — G43.009 MIGRAINE WITHOUT AURA AND WITHOUT STATUS MIGRAINOSUS, NOT INTRACTABLE: ICD-10-CM

## 2017-05-19 DIAGNOSIS — M75.00 ADHESIVE CAPSULITIS OF SHOULDER, UNSPECIFIED LATERALITY: ICD-10-CM

## 2017-05-19 PROCEDURE — 86765 RUBEOLA ANTIBODY: CPT | Performed by: NURSE PRACTITIONER

## 2017-05-19 PROCEDURE — 99214 OFFICE O/P EST MOD 30 MIN: CPT | Performed by: NURSE PRACTITIONER

## 2017-05-19 PROCEDURE — 86762 RUBELLA ANTIBODY: CPT | Performed by: NURSE PRACTITIONER

## 2017-05-19 PROCEDURE — 36415 COLL VENOUS BLD VENIPUNCTURE: CPT | Performed by: NURSE PRACTITIONER

## 2017-05-19 PROCEDURE — 86735 MUMPS ANTIBODY: CPT | Performed by: NURSE PRACTITIONER

## 2017-05-19 RX ORDER — TRAMADOL HYDROCHLORIDE 50 MG/1
TABLET ORAL
Qty: 60 TABLET | Refills: 0 | Status: SHIPPED | OUTPATIENT
Start: 2017-06-19 | End: 2017-06-27

## 2017-05-19 RX ORDER — ALPRAZOLAM 0.5 MG
0.5 TABLET ORAL 2 TIMES DAILY PRN
Qty: 40 TABLET | Refills: 2 | Status: SHIPPED | OUTPATIENT
Start: 2017-05-19 | End: 2017-07-24

## 2017-05-19 RX ORDER — SERTRALINE HYDROCHLORIDE 100 MG/1
150 TABLET, FILM COATED ORAL DAILY
Qty: 135 TABLET | Refills: 1 | Status: SHIPPED | OUTPATIENT
Start: 2017-05-19 | End: 2017-10-17

## 2017-05-19 RX ORDER — TRAMADOL HYDROCHLORIDE 50 MG/1
TABLET ORAL
Qty: 75 TABLET | Refills: 0 | Status: SHIPPED | OUTPATIENT
Start: 2017-05-19 | End: 2017-06-27

## 2017-05-19 ASSESSMENT — ANXIETY QUESTIONNAIRES
2. NOT BEING ABLE TO STOP OR CONTROL WORRYING: MORE THAN HALF THE DAYS
3. WORRYING TOO MUCH ABOUT DIFFERENT THINGS: NEARLY EVERY DAY
1. FEELING NERVOUS, ANXIOUS, OR ON EDGE: NEARLY EVERY DAY
5. BEING SO RESTLESS THAT IT IS HARD TO SIT STILL: NOT AT ALL
IF YOU CHECKED OFF ANY PROBLEMS ON THIS QUESTIONNAIRE, HOW DIFFICULT HAVE THESE PROBLEMS MADE IT FOR YOU TO DO YOUR WORK, TAKE CARE OF THINGS AT HOME, OR GET ALONG WITH OTHER PEOPLE: NOT DIFFICULT AT ALL
6. BECOMING EASILY ANNOYED OR IRRITABLE: MORE THAN HALF THE DAYS
7. FEELING AFRAID AS IF SOMETHING AWFUL MIGHT HAPPEN: NOT AT ALL
GAD7 TOTAL SCORE: 12

## 2017-05-19 ASSESSMENT — PATIENT HEALTH QUESTIONNAIRE - PHQ9: 5. POOR APPETITE OR OVEREATING: MORE THAN HALF THE DAYS

## 2017-05-19 NOTE — PROGRESS NOTES
"  SUBJECTIVE:                                                    Catie Nuñez is a 51 year old female who presents to clinic today for the following health issues:      Depression   Depression & Anxiety Follow-up:     Depression/Anxiety:  Depression & Anxiety    Status since last visit::  Stable    Other associated symptoms of depression and anxiety::  None    Significant life event::  YES    Current substance use::  None  History of Present Illness   Depression & Anxiety Follow-up:     Depression/Anxiety:  Depression & Anxiety    Status since last visit::  Stable    Other associated symptoms of depression and anxiety::  None    Significant life event::  YES    Current substance use::  None    Self care:  Walking 1 mile per day.    In DBT and family therapy.    Pain management:  Dr. Jennings has been prescribing tramadol 50mg tablets 3 per day for the last 1-2 years.  Catie works a retail job. After a long day of work her back pain is worse.  She has been taking her tramadol routinely.  She has felt that this is a very easy medications with no interactions and is fairly easy for her.  She has been getting these refills from her orthopedist who has now referred her back to me for refills.  Today, her low back pain is about the same.  She is not taking any other medications for the pain.  She denies radiation of pain to her buttock or lower extremities.  The pain is localized in her low back.    Problem list and histories reviewed & adjusted, as indicated.  Additional history: as documented    Patient Active Problem List   Diagnosis     CARDIOVASCULAR SCREENING; LDL GOAL LESS THAN 160     Adhesive capsulitis of shoulder     Scoliosis     Shoulder impingement     Generalized anxiety disorder     Pulmonary nodule     Allergic rhinitis     Migraine without aura and without status migrainosus, not intractable     Controlled substance agreement signed     ASCUS Pap + high risk HPV, + 16, + \"other\"      History " reviewed. No pertinent surgical history.    Social History   Substance Use Topics     Smoking status: Never Smoker     Smokeless tobacco: Never Used     Alcohol use No     Family History   Problem Relation Age of Onset     HEART DISEASE Father      Asthma No family hx of      DIABETES No family hx of      Hypertension No family hx of      CEREBROVASCULAR DISEASE No family hx of      Breast Cancer No family hx of          Current Outpatient Prescriptions   Medication Sig Dispense Refill     norethindrone-ethinyl estradiol (FEMHRT 1/5) 1-5 MG-MCG per tablet Take 1 tablet by mouth daily 90 tablet 3     traMADol (ULTRAM) 50 MG tablet Take 1-2 tablets ( mg) by mouth every 8 hours as needed for moderate pain 10 tablet 0     sertraline (ZOLOFT) 100 MG tablet Take 1.5 tablets (150 mg) by mouth daily 135 tablet 1     ALPRAZolam (XANAX) 0.5 MG tablet Take 1 tablet (0.5 mg) by mouth 2 times daily as needed for anxiety 40 tablets to last 30 days. 40 tablet 2     fluticasone (FLONASE) 50 MCG/ACT nasal spray Spray 1-2 sprays into both nostrils daily 16 g 11     Multiple Vitamins-Minerals (MULTIVITAMIN PO)        Aspirin-Acetaminophen-Caffeine (EXCEDRIN PO) Take by mouth as needed       Allergies   Allergen Reactions     Sulfa Drugs      Rash       Recent Labs   Lab Test  09/14/16   1102  02/19/15   1029  01/06/15   1120   LDL  122*   --   94   HDL  73   --   75   TRIG  111   --   63   ALT   --   20  14   CR   --   0.52  0.60   GFRESTIMATED   --   >90  Non  GFR Calc    >90  Non  GFR Calc     GFRESTBLACK   --   >90   GFR Calc    >90   GFR Calc     POTASSIUM   --   3.9  4.2   TSH   --    --   3.60      BP Readings from Last 3 Encounters:   05/19/17 121/79   05/18/17 125/88   03/31/17 136/86    Wt Readings from Last 3 Encounters:   05/19/17 112 lb (50.8 kg)   05/18/17 111 lb (50.3 kg)   03/31/17 108 lb 8 oz (49.2 kg)                  Labs reviewed in  "EPIC    ROS:  Constitutional, HEENT, cardiovascular, pulmonary, gi and gu systems are negative, except as otherwise noted.    OBJECTIVE:                                                    /79  Pulse 67  Temp 98  F (36.7  C) (Oral)  Ht 5' 4\" (1.626 m)  Wt 112 lb (50.8 kg)  LMP 09/01/2016  SpO2 100%  BMI 19.22 kg/m2  Body mass index is 19.22 kg/(m^2).  GENERAL APPEARANCE: healthy, alert and no distress. Smiling.   SKIN: warm and dry  MS: Ambulatory with a steady gait  PSYCH: mentation appears normal and affect normal/bright.  Good eye contact.       ASSESSMENT/PLAN:                                                    (M54.5) Midline low back pain without sciatica, unspecified chronicity  (primary encounter diagnosis)  Comment:   Plan: traMADol (ULTRAM) 50 MG tablet  I discussed with the patient my concern about her being on long-term tramadol.  As the patient stated before, she has felt that this is a very easy medication and she is curious as to why there is some much confusion about writing the prescriptions.  I discussed with Galina and reviewed with her the up-to-date information about tramadol.  We reviewed all the box warnings including addiction, abuse, misuse, respiratory suppression, etc.  Galina states she is shocked by these black box warnings.        I asked her to go ahead and start titrating down with the goal of titrating off of the tramadol.  Galina is willing to do this.  We will start slow.  I reduced her dosage to 2 and half tablets per day for the next month. After that, she will take 2 tablets per day.  I did discuss with her that she can take 1/2-1 tablet at a time but staying within her limits of the prescriptions.  She is to maintain other pain management such as ice or heat to her back, gentle stretching and range of motion, and regular exercise.  She is to return to the clinic for a face-to-face appointment in 2 months to evaluate how she is doing on her tramadol titration " down and off and to prescribe her next set of prescriptions. I anticipate that the next prescriptions would go down to 1-1/2 tablets per day, then 1 tablet per day, and then one half tablet per day, and then off each over 2-4 weeks timeframe.  I did tell her also that is okay to take ibuprofen or Tylenol intermittently/sparingly for breakthrough pain. She is to monitor for worsening symptoms and she is to follow-up with me sooner as needed.        (F41.1) Generalized anxiety disorder  Comment: controlled  Plan: ALPRAZolam (XANAX) 0.5 MG tablet, sertraline         (ZOLOFT) 100 MG tablet        Continue to use  alprazolam sparingly. I did review with her the interaction of alprazolam with tramadol. She is aware of the respiratory suppression potential problem and she has never had this problem before.    (G43.009) Migraine without aura and without status migrainosus, not intractable  Comment:   Plan: traMADol (ULTRAM) 50 MG tablet        As above    (M75.00) Adhesive capsulitis of shoulder, unspecified laterality  Comment:   Plan: traMADol (ULTRAM) 50 MG tablet            (Z02.9) Administrative encounter  Comment:   Plan: Rubeola Antibody IgG, Mumps Antibody IgG,         Rubella Antibody IgG Quantitative       Drawn per patient request      JEREMY Kent [facility]

## 2017-05-19 NOTE — MR AVS SNAPSHOT
After Visit Summary   5/19/2017    Catie Nuñez    MRN: [id number]           Patient Information     Date Of Birth          1965        Visit Information        Provider Department      5/19/2017 2:20 PM Yasmin Velazquez APRN CNP [facility]        Today's Diagnoses     Midline low back pain without sciatica, unspecified chronicity    -  1    Generalized anxiety disorder        Migraine without aura and without status migrainosus, not intractable        Adhesive capsulitis of shoulder, unspecified laterality        Administrative encounter           Follow-ups after your visit        Who to contact     If you have questions or need follow up information about today's clinic visit or your schedule please contact [facility] directly at [phone].  Normal or non-critical lab and imaging results will be communicated to you by MyChart, letter or phone within 4 business days after the clinic has received the results. If you do not hear from us within 7 days, please contact the clinic through [email or web address] or phone. If you have a critical or abnormal lab result, we will notify you by phone as soon as possible.  Submit refill requests through [employer] or call your pharmacy and they will forward the refill request to us. Please allow 3 business days for your refill to be completed.          Additional Information About Your Visit        MyChart Information     [employer] gives you secure access to your electronic health record. If you see a primary care provider, you can also send messages to your care team and make appointments. If you have questions, please call your primary care clinic.  If you do not have a primary care provider, please call [phone] and they will assist you.        Care EveryWhere ID     This is your Care EveryWhere ID. This could be used by other organizations to access your [facility] medical records  [id number]        Your Vitals Were   "   Pulse Temperature Height Last Period Pulse Oximetry BMI (Body Mass Index)    67 98  F (36.7  C) (Oral) 5' 4\" (1.626 m) 09/01/2016 100% 19.22 kg/m2       Blood Pressure from Last 3 Encounters:   05/19/17 121/79   05/18/17 125/88   03/31/17 136/86    Weight from Last 3 Encounters:   05/19/17 112 lb (50.8 kg)   05/18/17 111 lb (50.3 kg)   03/31/17 108 lb 8 oz (49.2 kg)              We Performed the Following     Mumps Antibody IgG     Rubella Antibody IgG Quantitative     Rubeola Antibody IgG          Today's Medication Changes          These changes are accurate as of: 5/19/17  3:15 PM.  If you have any questions, ask your nurse or doctor.               These medicines have changed or have updated prescriptions.        Dose/Directions    * traMADol 50 MG tablet   Commonly known as:  ULTRAM   This may have changed:    - how much to take  - how to take this  - when to take this  - reasons to take this  - additional instructions   Used for:  Migraine without aura and without status migrainosus, not intractable, Adhesive capsulitis of shoulder, unspecified laterality   Changed by:  Yasmin Velazquez APRN CNP        0.5-1 tablet 3 times a day, total max of 2.5 tablets per day.   Quantity:  75 tablet   Refills:  0       * traMADol 50 MG tablet   Commonly known as:  ULTRAM   This may have changed:  You were already taking a medication with the same name, and this prescription was added. Make sure you understand how and when to take each.   Used for:  Midline low back pain without sciatica, unspecified chronicity   Changed by:  Yasmin Velazquez APRN CNP        Start taking on:  6/19/2017   Take 1/2-1 tablet 3 times a day as needed for pain, maximum 2 tablets per day.   Quantity:  60 tablet   Refills:  0       * Notice:  This list has 2 medication(s) that are the same as other medications prescribed for you. Read the directions carefully, and ask your doctor or other care provider to review them with you.    "      Where to get your medicines      These medications were sent to [id number] - [facility] [address] AT [address]  [address] [address]     Phone:  [phone]     sertraline 100 MG tablet         Some of these will need a paper prescription and others can be bought over the counter.  Ask your nurse if you have questions.     Bring a paper prescription for each of these medications     ALPRAZolam 0.5 MG tablet    traMADol 50 MG tablet    traMADol 50 MG tablet                Primary Care Provider Office Phone # Fax #    Yasmin GRUBER JEREMY Velazquez [phone]       [facility] [address]  [place] [address]        Thank you!     Thank you for choosing [facility]  for your care. Our goal is always to provide you with excellent care. Hearing back from our patients is one way we can continue to improve our services. Please take a few minutes to complete the written survey that you may receive in the mail after your visit with us. Thank you!             Your Updated Medication List - Protect others around you: Learn how to safely use, store and throw away your medicines at www.disposemymeds.org.          This list is accurate as of: 5/19/17  3:15 PM.  Always use your most recent med list.                   Brand Name Dispense Instructions for use    ALPRAZolam 0.5 MG tablet    XANAX    40 tablet    Take 1 tablet (0.5 mg) by mouth 2 times daily as needed for anxiety 40 tablets to last 30 days.       EXCEDRIN PO      Take by mouth as needed       fluticasone 50 MCG/ACT spray    FLONASE    16 g    Spray 1-2 sprays into both nostrils daily       MULTIVITAMIN PO          norethindrone-ethinyl estradiol 1-5 MG-MCG per tablet    FEMHRT 1/5    90 tablet    Take 1 tablet by mouth daily       sertraline 100 MG tablet    ZOLOFT    135 tablet    Take 1.5 tablets (150 mg) by mouth daily       * traMADol 50 MG  tablet    ULTRAM    75 tablet    0.5-1 tablet 3 times a day, total max of 2.5 tablets per day.       * traMADol 50 MG tablet   Start taking on:  6/19/2017    ULTRAM    60 tablet    Take 1/2-1 tablet 3 times a day as needed for pain, maximum 2 tablets per day.       * Notice:  This list has 2 medication(s) that are the same as other medications prescribed for you. Read the directions carefully, and ask your doctor or other care provider to review them with you.

## 2017-05-19 NOTE — NURSING NOTE
"Chief Complaint   Patient presents with     Depression       Initial /79  Pulse 67  Temp 98  F (36.7  C) (Oral)  Ht 5' 4\" (1.626 m)  Wt 112 lb (50.8 kg)  LMP 09/01/2016  SpO2 100%  BMI 19.22 kg/m2 Estimated body mass index is 19.22 kg/(m^2) as calculated from the following:    Height as of this encounter: 5' 4\" (1.626 m).    Weight as of this encounter: 112 lb (50.8 kg).  Medication Reconciliation: complete         Shoaib Martinez MA       "

## 2017-05-20 ASSESSMENT — ANXIETY QUESTIONNAIRES: GAD7 TOTAL SCORE: 12

## 2017-05-20 ASSESSMENT — PATIENT HEALTH QUESTIONNAIRE - PHQ9: SUM OF ALL RESPONSES TO PHQ QUESTIONS 1-9: 5

## 2017-05-22 LAB
MEV IGG SER QL IA: 1.2 AI (ref 0–0.8)
MUV IGG SER QL IA: NORMAL AI (ref 0–0.8)
RUBV IGG SERPL IA-ACNC: 46 IU/ML

## 2017-05-23 NOTE — PROGRESS NOTES
Kade Haddad,    Here are the results of your MMR titre blood tests.    The tests came back showing that you are immune to measles and rubella. You are NOT immune to mumps. I would recommend that you come into the clinic for a MMR booster at your earliest convenience.    Yasmin LUNA CNP

## 2017-05-24 ENCOUNTER — TELEPHONE (OUTPATIENT)
Dept: FAMILY MEDICINE | Facility: CLINIC | Age: 52
End: 2017-05-24

## 2017-05-24 NOTE — TELEPHONE ENCOUNTER
I did call [facility].   They state she has come too soon for the prescription and she has a pattern of coming early consistently for the alprazolam.    She did  a 30 day supply on May 1. So if it is filled now for the rx written on 5/19 she is really too early as her rx then from May 1 did not last 30 days. They state they cannot fill it until 5/28.     Pharmacy wants to know if  Instructions should be changed , eliminating that it is to last 30 days.     What do you advise?  Consuelo Boone, RN

## 2017-05-24 NOTE — TELEPHONE ENCOUNTER
Reason for Call:  Other call back    Detailed comments: Patient states she was unable to fill her rx for ALPRAZolam (XANAX) 0.5 MG tablet. Pharmacy states they need a fax or verbal ok for her to receive this refill. Please advise, thank you!    Phone Number Patient can be reached at: Home number on file [phone] (home)    Best Time: anytime    Can we leave a detailed message on this number? YES    Call taken on 5/24/2017 at 1:47 PM by Hermelinda Reynolds

## 2017-05-25 NOTE — TELEPHONE ENCOUNTER
Called patient unable to leave message - no answer - called [facility] - advised pharmacist - Briana - no early refills    Artemio Davidson RN

## 2017-05-25 NOTE — TELEPHONE ENCOUNTER
The 30 day rule is not to be changed.  If krystin goes into the pharmacy early and they fill it early, [facility] is violating the rules of the prescription.  Krystin and I have the agreement for every 30 day fills.

## 2017-06-06 ENCOUNTER — TRANSFERRED RECORDS (OUTPATIENT)
Dept: HEALTH INFORMATION MANAGEMENT | Facility: CLINIC | Age: 52
End: 2017-06-06

## 2017-06-13 ENCOUNTER — TELEPHONE (OUTPATIENT)
Dept: FAMILY MEDICINE | Facility: CLINIC | Age: 52
End: 2017-06-13

## 2017-06-13 NOTE — TELEPHONE ENCOUNTER
I had given her a tramadol rx for June.  She may fill it early.  And then she should come into the clinic for her next prescription refill need.

## 2017-06-13 NOTE — TELEPHONE ENCOUNTER
Writer ADELIA regarding the response of provider Yasmin Velazquez CNP, below. No further action needed at this time, closing communication.     Thanks! Kera Salazar RN

## 2017-06-13 NOTE — TELEPHONE ENCOUNTER
"Patient called requesting Tramadol due to return of migraines for the last 2 weeks. Migraines have been painful. Orthopedist referred patient to [facility]io Geronimo MD and nerve block given to patient to manage pain.  Since nerve block patient has had continued naeck and back pain. Patient states she has \"not asked speicalist or orthopedist for pain medication. Patient states she wanted to talk to Yasmin Velazquez CNP first. Please advise.    Thanks! Kera Salazar RN    "

## 2017-06-14 NOTE — TELEPHONE ENCOUNTER
The patient called to request that Yasmin call her pharmacy with the approval for an early refill.  [facility] .

## 2017-06-14 NOTE — TELEPHONE ENCOUNTER
Below phone number had full mailbox.   I called [facility] at [phone] and relayed ok by Yasmin Velazquez CNP to fill the June 19 rx for tramadol today ( 6/14/2017)    ,Consuelo Boone RN, BSN

## 2017-06-21 DIAGNOSIS — N95.1 SYMPTOMATIC MENOPAUSAL OR FEMALE CLIMACTERIC STATES: Primary | ICD-10-CM

## 2017-06-21 NOTE — TELEPHONE ENCOUNTER
Started a PA for this pt for her estradiol. Received notification from her pharmacy that one is required. Faxed PA on 06/21/2017 to [facility]. Nery Cortez RN

## 2017-06-26 NOTE — TELEPHONE ENCOUNTER
[employer] insurance sent a fax requesting further information for the pt's PA for her Estradiol, as to what else the pt has tried in the past, such as generic estradiol or estopipate. TC to the pt to discuss, as it does not appear from her chart that she has. LMTC. Nery Cortez RN

## 2017-06-27 ENCOUNTER — OFFICE VISIT (OUTPATIENT)
Dept: FAMILY MEDICINE | Facility: CLINIC | Age: 52
End: 2017-06-27
Payer: COMMERCIAL

## 2017-06-27 ENCOUNTER — TELEPHONE (OUTPATIENT)
Dept: FAMILY MEDICINE | Facility: CLINIC | Age: 52
End: 2017-06-27

## 2017-06-27 VITALS
OXYGEN SATURATION: 99 % | DIASTOLIC BLOOD PRESSURE: 93 MMHG | HEART RATE: 79 BPM | TEMPERATURE: 98.6 F | BODY MASS INDEX: 19.02 KG/M2 | WEIGHT: 110.8 LBS | SYSTOLIC BLOOD PRESSURE: 138 MMHG | RESPIRATION RATE: 18 BRPM

## 2017-06-27 DIAGNOSIS — G89.29 CHRONIC LOW BACK PAIN, UNSPECIFIED BACK PAIN LATERALITY, WITH SCIATICA PRESENCE UNSPECIFIED: ICD-10-CM

## 2017-06-27 DIAGNOSIS — M54.5 CHRONIC LOW BACK PAIN, UNSPECIFIED BACK PAIN LATERALITY, WITH SCIATICA PRESENCE UNSPECIFIED: ICD-10-CM

## 2017-06-27 DIAGNOSIS — G43.009 MIGRAINE WITHOUT AURA AND WITHOUT STATUS MIGRAINOSUS, NOT INTRACTABLE: Primary | ICD-10-CM

## 2017-06-27 PROCEDURE — 99214 OFFICE O/P EST MOD 30 MIN: CPT | Performed by: NURSE PRACTITIONER

## 2017-06-27 RX ORDER — PROPRANOLOL HYDROCHLORIDE 20 MG/1
20 TABLET ORAL 2 TIMES DAILY
Qty: 60 TABLET | Refills: 5 | Status: SHIPPED | OUTPATIENT
Start: 2017-06-27 | End: 2017-10-17

## 2017-06-27 RX ORDER — TRAMADOL HYDROCHLORIDE 50 MG/1
50 TABLET ORAL EVERY 8 HOURS PRN
Qty: 90 TABLET | Refills: 0 | Status: SHIPPED | OUTPATIENT
Start: 2017-06-27 | End: 2017-07-24

## 2017-06-27 NOTE — TELEPHONE ENCOUNTER
Triage called pharmacist. Gave permission for them to fill the RX.(tramadol)  Must only take 3 a day or less and not more.   Called and left message with the patient.  Salud Elizalde RN

## 2017-06-27 NOTE — MR AVS SNAPSHOT
After Visit Summary   6/27/2017    Catie Nuñez    MRN: [id number]           Patient Information     Date Of Birth          1965        Visit Information        Provider Department      6/27/2017 10:30 AM Laurie Avelar NP [facility]        Today's Diagnoses     Migraine without aura and without status migrainosus, not intractable    -  1    Chronic low back pain, unspecified back pain laterality, with sciatica presence unspecified          Care Instructions    Start Propranolol twice daily.  Start Riboflavin and magnesium.   Schedule an appointment with Dr. Campbell (neurology).           Follow-ups after your visit        Additional Services     NEUROLOGY ADULT REFERRAL       Your provider has referred you to: [facility]: [facility] [phone]   [email or web address]    Reason for Referral: Consult    Please be aware that coverage of these services is subject to the terms and limitations of your health insurance plan.  Call member services at your health plan with any benefit or coverage questions.      Please bring the following with you to your appointment:    (1) Any X-Rays, CTs or MRIs which have been performed.  Contact the facility where they were done to arrange for  prior to your scheduled appointment.    (2) List of current medications  (3) This referral request   (4) Any documents/labs given to you for this referral                  Who to contact     If you have questions or need follow up information about today's clinic visit or your schedule please contact [facility] directly at [phone].  Normal or non-critical lab and imaging results will be communicated to you by MyChart, letter or phone within 4 business days after the clinic has received the results. If you do not hear from us within 7 days, please contact the clinic through MyChart or phone. If you have a  critical or abnormal lab result, we will notify you by phone as soon as possible.  Submit refill requests through Kynded or call your pharmacy and they will forward the refill request to us. Please allow 3 business days for your refill to be completed.          Additional Information About Your Visit        BestVendorhart Information     Kynded gives you secure access to your electronic health record. If you see a primary care provider, you can also send messages to your care team and make appointments. If you have questions, please call your primary care clinic.  If you do not have a primary care provider, please call [phone] and they will assist you.        Care EveryWhere ID     This is your Care EveryWhere ID. This could be used by other organizations to access your [place] medical records  [id number]        Your Vitals Were     Pulse Temperature Respirations Last Period Pulse Oximetry BMI (Body Mass Index)    79 98.6  F (37  C) (Oral) 18 09/01/2016 99% 19.02 kg/m2       Blood Pressure from Last 3 Encounters:   06/27/17 (!) 138/93   05/19/17 121/79   05/18/17 125/88    Weight from Last 3 Encounters:   06/27/17 110 lb 12.8 oz (50.3 kg)   05/19/17 112 lb (50.8 kg)   05/18/17 111 lb (50.3 kg)              We Performed the Following     NEUROLOGY ADULT REFERRAL          Today's Medication Changes          These changes are accurate as of: 6/27/17 11:42 AM.  If you have any questions, ask your nurse or doctor.               Start taking these medicines.        Dose/Directions    propranolol 20 MG tablet   Commonly known as:  INDERAL   Used for:  Migraine without aura and without status migrainosus, not intractable   Started by:  Laurie Avelar NP        Dose:  20 mg   Take 1 tablet (20 mg) by mouth 2 times daily   Quantity:  60 tablet   Refills:  5       traMADol 50 MG tablet   Commonly known as:  ULTRAM   Used for:  Migraine without aura and without status migrainosus, not intractable, Chronic low back pain,  unspecified back pain laterality, with sciatica presence unspecified   Started by:  Laurie Avelar NP        Dose:  50 mg   Take 1 tablet (50 mg) by mouth every 8 hours as needed for pain   Quantity:  90 tablet   Refills:  0            Where to get your medicines      These medications were sent to [facility] [address] [place], [place] - [address] AT [facility] [address]  [address] [address]     Phone:  [phone]     propranolol 20 MG tablet         Some of these will need a paper prescription and others can be bought over the counter.  Ask your nurse if you have questions.     Bring a paper prescription for each of these medications     traMADol 50 MG tablet                Primary Care Provider Office Phone # Fax #    JEREMY Gonzalez [phone]       [facility] [address] [place] [address]        Equal Access to Services     SHANNON SANTANA AH: Hadii aad ku hadasho Soomaali, waaxda luqadaha, qaybta kaalmada adeegyada, waxay idiin haysharin uzair grajeda . So [facility] [phone].    ATENCIÓN: Si habla español, tiene a weinberg disposición servicios gratuitos de asistencia lingüística. Jacque al [phone].    We comply with applicable federal civil rights laws and [place] laws. We do not discriminate on the basis of race, color, national origin, age, disability sex, sexual orientation or gender identity.            Thank you!     Thank you for choosing [facility]  for your care. Our goal is always to provide you with excellent care. Hearing back from our patients is one way we can continue to improve our services. Please take a few minutes to complete the written survey that you may receive in the mail after your visit with us. Thank you!             Your Updated Medication List - Protect others around you: Learn how to safely use, store and throw away your medicines at www.disposemymeds.org.           This list is accurate as of: 6/27/17 11:42 AM.  Always use your most recent med list.                   Brand Name Dispense Instructions for use Diagnosis    ALPRAZolam 0.5 MG tablet    XANAX    40 tablet    Take 1 tablet (0.5 mg) by mouth 2 times daily as needed for anxiety 40 tablets to last 30 days.    Generalized anxiety disorder       EXCEDRIN PO      Take by mouth as needed        fluticasone 50 MCG/ACT spray    FLONASE    16 g    Spray 1-2 sprays into both nostrils daily    Allergic rhinitis, unspecified allergic rhinitis type       MULTIVITAMIN PO           norethindrone-ethinyl estradiol 1-5 MG-MCG per tablet    FEMHRT 1/5    90 tablet    Take 1 tablet by mouth daily    Symptomatic menopausal or female climacteric states       propranolol 20 MG tablet    INDERAL    60 tablet    Take 1 tablet (20 mg) by mouth 2 times daily    Migraine without aura and without status migrainosus, not intractable       sertraline 100 MG tablet    ZOLOFT    135 tablet    Take 1.5 tablets (150 mg) by mouth daily    Generalized anxiety disorder       traMADol 50 MG tablet    ULTRAM    90 tablet    Take 1 tablet (50 mg) by mouth every 8 hours as needed for pain    Migraine without aura and without status migrainosus, not intractable, Chronic low back pain, unspecified back pain laterality, with sciatica presence unspecified

## 2017-06-27 NOTE — PATIENT INSTRUCTIONS
Start Propranolol twice daily.  Start Riboflavin and magnesium.   Schedule an appointment with Dr. Campbell (neurology).

## 2017-06-27 NOTE — TELEPHONE ENCOUNTER
She did tell me she was taking up to 5 per day.  I gave her a refill of a quantity that would be 3 per day, as discussed (I told her I was not comfortable with her taking 5 per day).

## 2017-06-27 NOTE — TELEPHONE ENCOUNTER
Brittni --     Pharmacy says patient is early on filling this because she takes more than prescribed, and pt says provider is aware. Pt says she takes 5 a day, and says this was discussed in appointment with you today.     Last several scripts do not reflect this dosing.    Please clarify -- did you intend to increase Tramadol dose?    Thank you  Rosemarie Turpin, ABIOLAN, RN  [facility]

## 2017-06-27 NOTE — NURSING NOTE
"Chief Complaint   Patient presents with     Recheck Medication       Initial BP (!) 138/93 (BP Location: Left arm, Patient Position: Chair, Cuff Size: Adult Regular)  Pulse 79  Temp 98.6  F (37  C) (Oral)  Resp 18  Wt 110 lb 12.8 oz (50.3 kg)  LMP 09/01/2016  SpO2 99%  BMI 19.02 kg/m2 Estimated body mass index is 19.02 kg/(m^2) as calculated from the following:    Height as of 5/19/17: 5' 4\" (1.626 m).    Weight as of this encounter: 110 lb 12.8 oz (50.3 kg).  Medication Reconciliation: unable or not appropriate to perform         Elver Del Toro MA      "

## 2017-06-27 NOTE — TELEPHONE ENCOUNTER
Patient called explaining she is at the pharmacy and they are unable to fill the Tramadol unless the directions are changed per insurance    The pharmacy need us to call them to clarification on the quantity stating patient takes up to 5 a day when has a migraine    Please call [phone] [facility]

## 2017-06-27 NOTE — PROGRESS NOTES
"  SUBJECTIVE:                                                    Catie Nuñez is a 52 year old female who presents to clinic today for the following health issues:      Medication Followup of tramadol     Taking Medication as prescribed: yes    Side Effects:  Drowsy     Medication Helping Symptoms:  Yes, not doing much but still better than nothing      Dr. Geronimo did a cervical nerve block recently.  She has a follow up appointment with him tomorrow.    She has a history of chronic low back pain and hip pain and was getting Tramadol in the past from her orthopedist, who then wanted her to get refills from her PCP.  She saw Yasmin recently and the plan was to try to taper off of Tramadol.  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  She has had three migraines since starting this new job and each one will last for at least 3 days.  Her migraines are \"debilitating\".  She has tried acupuncture, cupping, massage, chiropractic, has seen a neurologist.  She has not tried a preventive medication or Botox.  She took more of the Tramadol to try to manage the migraine pain.          Problem list and histories reviewed & adjusted, as indicated.  Additional history: as documented        Reviewed and updated as needed this visit by clinical staff       Reviewed and updated as needed this visit by Provider         ROS:  C: NEGATIVE for fever, chills, change in weight  E/M: NEGATIVE for ear, mouth and throat problems  R: NEGATIVE for significant cough or SOB  CV: NEGATIVE for chest pain, palpitations or peripheral edema  GI: NEGATIVE for nausea, abdominal pain, heartburn, or change in bowel habits  MUSCULOSKELETAL: neck pain  NEURO: NEGATIVE for weakness, dizziness or paresthesias  PSYCHIATRIC: NEGATIVE for changes in mood or affect    OBJECTIVE:     BP (!) 138/93 (BP Location: Left arm, Patient " Position: Chair, Cuff Size: Adult Regular)  Pulse 79  Temp 98.6  F (37  C) (Oral)  Resp 18  Wt 110 lb 12.8 oz (50.3 kg)  LMP 09/01/2016  SpO2 99%  BMI 19.02 kg/m2  Body mass index is 19.02 kg/(m^2).  GENERAL: healthy, alert and no distress  RESP: lungs clear to auscultation - no rales, rhonchi or wheezes  CV: regular rate and rhythm, normal S1 S2, no S3 or S4, no murmur, click or rub, no peripheral edema and peripheral pulses strong  MS: reduced ROM of the cervical spine  NEURO: Normal strength and tone, mentation intact and speech normal  PSYCH: mentation appears normal, affect teary at time        ASSESSMENT/PLAN:             1. Migraine without aura and without status migrainosus, not intractable  Patient Instructions   Start Propranolol twice daily.  Start Riboflavin and magnesium.   Schedule an appointment with Dr. Campbell (neurology).     Keep a headache diary.  See Dr. Geronimo tomorrow, ask about Botox as an option.  I did give her a refill of Tramadol until Yasmin is back in clinic and she can follow up with her.   - traMADol (ULTRAM) 50 MG tablet; Take 1 tablet (50 mg) by mouth every 8 hours as needed for pain  Dispense: 90 tablet; Refill: 0  - NEUROLOGY ADULT REFERRAL  - propranolol (INDERAL) 20 MG tablet; Take 1 tablet (20 mg) by mouth 2 times daily  Dispense: 60 tablet; Refill: 5    2. Chronic low back pain, unspecified back pain laterality, with sciatica presence unspecified    - traMADol (ULTRAM) 50 MG tablet; Take 1 tablet (50 mg) by mouth every 8 hours as needed for pain  Dispense: 90 tablet; Refill: 0        Laurie Avealr NP  [facility]

## 2017-06-29 ENCOUNTER — OFFICE VISIT (OUTPATIENT)
Dept: NEUROLOGY | Facility: CLINIC | Age: 52
End: 2017-06-29
Payer: COMMERCIAL

## 2017-06-29 VITALS
HEART RATE: 91 BPM | DIASTOLIC BLOOD PRESSURE: 76 MMHG | RESPIRATION RATE: 18 BRPM | HEIGHT: 64 IN | OXYGEN SATURATION: 97 % | WEIGHT: 110 LBS | BODY MASS INDEX: 18.78 KG/M2 | SYSTOLIC BLOOD PRESSURE: 124 MMHG

## 2017-06-29 DIAGNOSIS — G43.009 MIGRAINE WITHOUT AURA AND WITHOUT STATUS MIGRAINOSUS, NOT INTRACTABLE: Primary | ICD-10-CM

## 2017-06-29 DIAGNOSIS — G44.86 CERVICOGENIC HEADACHE: ICD-10-CM

## 2017-06-29 DIAGNOSIS — G44.209 TENSION HEADACHE: ICD-10-CM

## 2017-06-29 DIAGNOSIS — G44.40 MEDICATION OVERUSE HEADACHE: ICD-10-CM

## 2017-06-29 PROCEDURE — 99205 OFFICE O/P NEW HI 60 MIN: CPT | Performed by: PSYCHIATRY & NEUROLOGY

## 2017-06-29 NOTE — MR AVS SNAPSHOT
After Visit Summary   6/29/2017    Catie Nuñez    MRN: [id number]           Patient Information     Date Of Birth          1965        Visit Information        Provider Department      6/29/2017 3:30 PM Gelacio Campbell MD [facility]        Today's Diagnoses     Migraine without aura and without status migrainosus, not intractable    -  1    Tension headache        Cervicogenic headache        Medication overuse headache          Care Instructions    AFTER VISIT SUMMARY (AVS):    At today's visit we discussed various diagnostic possibilities for your symptoms, that are likely related to multifactorial headache disorder, including migraine, tension type, cervicogenic headaches, and medication overuse headaches. We also reviewed different available prophylactic and acute therapy options.    The following medications were recommended:  1. Riboflavin 400 MG CAPS: Take 400 mg by mouth every morning for the next 3 months for headache prevention.  2. Botox injection could also be tried, as we discussed.  3. For acute therapy you could continue using Excedrin Migraine or try Naprosyn 500 mg at the onset of the headache. Please, limite use of analgesics to less than 15 days per month. Take Naprosyn with food to avoid stomach irritation.    Discussed non-pharmacological measures include proper sleep hygiene, adequate hydration, avoidance of triggers, regular meals, and stress reduction techniques.    Please keep headache diary and bring it to that in next follow-up visit.    Preventive Neurology: Encouraged to stay physically and mentally active with particular emphasis on daily mentally stimulating activities of your choice (such as crosswords, puzzles, sudoku, etc.), stretching exercises, walking, and healthy eating.    Next follow-up appointment is in next 3 months or earlier if needed.    Please do not hesitate to call me with any questions or  "concerns.    Thanks.            Follow-ups after your visit        Follow-up notes from your care team     Return in about 3 months (around 9/29/2017).      Your next 10 appointments already scheduled     Sep 29, 2017  3:00 PM CDT   Return Visit with Gelacio Campbell MD   [facility] ([facility]    [address] [id number] [phone]              Who to contact     If you have questions or need follow up information about today's clinic visit or your schedule please contact [facility] directly at [phone].  Normal or non-critical lab and imaging results will be communicated to you by MyChart, letter or phone within 4 business days after the clinic has received the results. If you do not hear from us within 7 days, please contact the clinic through Bukupet or phone. If you have a critical or abnormal lab result, we will notify you by phone as soon as possible.  Submit refill requests through Piqqual or call your pharmacy and they will forward the refill request to us. Please allow 3 business days for your refill to be completed.          Additional Information About Your Visit        [employer] Information     Piqqual gives you secure access to your electronic health record. If you see a primary care provider, you can also send messages to your care team and make appointments. If you have questions, please call your primary care clinic.  If you do not have a primary care provider, please call [phone] and they will assist you.        Care EveryWhere ID     This is your Care EveryWhere ID. This could be used by other organizations to access your [place] medical records  [id number]        Your Vitals Were     Pulse Respirations Height Last Period Pulse Oximetry BMI (Body Mass Index)    91 18 1.626 m (5' 4\") 09/01/2016 97% 18.88 kg/m2       Blood Pressure from Last 3 Encounters:   06/29/17 124/76   06/27/17 (!) 138/93 "   05/19/17 121/79    Weight from Last 3 Encounters:   06/29/17 49.9 kg (110 lb)   06/27/17 50.3 kg (110 lb 12.8 oz)   05/19/17 50.8 kg (112 lb)              Today, you had the following     No orders found for display         Today's Medication Changes          These changes are accurate as of: 6/29/17  4:51 PM.  If you have any questions, ask your nurse or doctor.               Start taking these medicines.        Dose/Directions    riboflavin 400 MG Caps   Used for:  Migraine without aura and without status migrainosus, not intractable   Started by:  Gelacio Campbell MD        Dose:  400 mg   Take 400 mg by mouth every morning   Quantity:  30 capsule   Refills:  0            Where to get your medicines      Some of these will need a paper prescription and others can be bought over the counter.  Ask your nurse if you have questions.     You don't need a prescription for these medications     riboflavin 400 MG Caps                Primary Care Provider Office Phone # Fax #    JEREMY Gonzalez [facility] [phone]       [facility] [address] [place] [address]        Equal Access to Services     SHANNON SANTANA AH: Hadii aad ku hadasho Somirtaali, waaxda luqadaha, qaybta kaalmada adeegyada, vickey grajeda ah. So [facility] [phone].    ATENCIÓN: Si habla español, tiene a weinberg disposición servicios gratuitos de asistencia lingüística. Llame al [phone].    We comply with applicable federal civil rights laws and [place] laws. We do not discriminate on the basis of race, color, national origin, age, disability sex, sexual orientation or gender identity.            Thank you!     Thank you for choosing [facility]  for your care. Our goal is always to provide you with excellent care. Hearing back from our patients is one way we can continue to improve our services. Please take a few minutes to complete the written survey  that you may receive in the mail after your visit with us. Thank you!             Your Updated Medication List - Protect others around you: Learn how to safely use, store and throw away your medicines at www.disposemymeds.org.          This list is accurate as of: 6/29/17  4:51 PM.  Always use your most recent med list.                   Brand Name Dispense Instructions for use Diagnosis    ALPRAZolam 0.5 MG tablet    XANAX    40 tablet    Take 1 tablet (0.5 mg) by mouth 2 times daily as needed for anxiety 40 tablets to last 30 days.    Generalized anxiety disorder       EXCEDRIN PO      Take by mouth as needed        fluticasone 50 MCG/ACT spray    FLONASE    16 g    Spray 1-2 sprays into both nostrils daily    Allergic rhinitis, unspecified allergic rhinitis type       MULTIVITAMIN PO           norethindrone-ethinyl estradiol 1-5 MG-MCG per tablet    FEMHRT 1/5    90 tablet    Take 1 tablet by mouth daily    Symptomatic menopausal or female climacteric states       propranolol 20 MG tablet    INDERAL    60 tablet    Take 1 tablet (20 mg) by mouth 2 times daily    Migraine without aura and without status migrainosus, not intractable       riboflavin 400 MG Caps     30 capsule    Take 400 mg by mouth every morning    Migraine without aura and without status migrainosus, not intractable       sertraline 100 MG tablet    ZOLOFT    135 tablet    Take 1.5 tablets (150 mg) by mouth daily    Generalized anxiety disorder       traMADol 50 MG tablet    ULTRAM    90 tablet    Take 1 tablet (50 mg) by mouth every 8 hours as needed for pain    Migraine without aura and without status migrainosus, not intractable, Chronic low back pain, unspecified back pain laterality, with sciatica presence unspecified

## 2017-06-29 NOTE — NURSING NOTE
"COGNITIVE SCREEN  1) Repeat 3 items (Banana, Sunrise, Chair)    2) Clock draw: NORMAL  3) 3 item recall: Recalls 2 objects   Results: 2 items recalled, normal clock    Mini-CogTM Copyright S Thu. Licensed by the author for use in [facility]; reprinted with permission (chantal@[facility]). All rights reserved.        Chief Complaint   Patient presents with     Consult     migraine       Initial /76 (BP Location: Left arm, Patient Position: Chair, Cuff Size: Adult Small)  Pulse 91  Resp 18  Ht 1.626 m (5' 4\")  Wt 49.9 kg (110 lb)  LMP 09/01/2016  SpO2 97%  BMI 18.88 kg/m2 Estimated body mass index is 18.88 kg/(m^2) as calculated from the following:    Height as of this encounter: 1.626 m (5' 4\").    Weight as of this encounter: 49.9 kg (110 lb).  Medication Reconciliation: complete     Lisa Barrera CMA      "

## 2017-06-29 NOTE — TELEPHONE ENCOUNTER
Second attempt to reach pt. Insurance has sent multiple faxes requesting to know if the pt has tried generic estradiol or estropipate in the past. Does not appear as though she has in her chart? LMTC. Nery Cortez RN

## 2017-06-29 NOTE — PROGRESS NOTES
INITIAL NEUROLOGY CONSULTATION    DATE OF VISIT: 6/29/2017  CLINIC LOCATION: [facility]  MRN: [id number]  PATIENT NAME: Catie Nuñez  YOB: 1965    PRIMARY CARE PROVIDER: JEREMY Kent CNP     REASON FOR VISIT:   Chief Complaint   Patient presents with     Consult     migraine     HISTORY OF PRESENT ILLNESS:                                                    Ms. Catie Nuñez is 52 year old right handed female patient with past medical history of mitral valve prolapse, depression, anxiety, and migraine, who was seen in consultation today requested by JEREMY Mijares CNP, for chronic headache worsening.    Per patient's report and the review of her medical record, she has a long-standing history of headaches/migraines since age 12. Frequency and severity fluctuated throughout the years. Headaches significantly worsened over the last 2 years. They are more intense and have longer duration, but otherwise not significantly changed from her baseline headaches.    Currently, she has intermittent pounding/throbbing headache, that originates bilaterally at the base of her skull and spreads throughout entire head. Her right side is more affected/worse. Headache could reach up to 9/10 intensity and lasts for up to 3 days. Over the last 1.5 months she had 3 debilitating headaches. Each of them lasted for 3 days impairing her ability to function. She missed several days of work. Often, she wakes up with headache, if she has poor sleep during the night. Otherwise, headache could occur at any time.    Other associated symptoms include nausea, emesis, light and sound sensitivity, shoulders and neck muscle tightness, and intolerance of physical activity. She denies any associated autonomic and focal neurological symptoms. She also denies any preceding aura.    Headaches can be triggered by movements, increased stress, bright lights, and loud sounds. Currently, she reports  "increased level of stress due to her work. She was recently moved to a different department with different requirements, which was very stressful. She is planning to take some time off work to help her headaches. Raising 3 teenage children (as a single mom) is also a source of significant continuous stress.    She finds that retreating to the dark quiet room to rest and sleep is helpful. She also uses ice packs.    In the past, she tried acupuncture, cupping, massage, chiropractor care, nerve blocks and physical therapy.  Her last cervical block was performed 2-3 weeks ago by Dr. Geronimo. She did not try Botox injections.    Over the years, she used Imitrex, and possibly other triptans, but was not able to tolerate them due to severe nausea and other side effects. She also tried Topamax, Neurontin, Lyrica, and baclofen. None of these medications were helpful.    Currently, she takes Excedrin Migraine (over the last 1.5 months she used a bottle of it) and tramadol (up to 4-6 pills per day), though does not find them helpful much. In the past, she took Fioricet, which was very effective. The patient takes Xanax every day (\"to help me calm my nerves\"). She also uses Unisom for nausea.    She was recently seen in her primary care office. Riboflavin, magnesium, and propranolol were recommended as headache preventive therapies. She picked up propanolol, but did not start it yet due to concerns of the possible side effects (because she is very sensitive to medications). Has vitamin B complex pills at home. She is not aware of riboflavin dose in them. She did not get magnesium yet.    Over the years, she was seen by several neurologists in the past. Brain imaging was done several years ago, while she was still in [place] and reportedly was normal. She was also seen at [facility] by Dr. Mcleod in 2004 and Dr. Parker on 07/29/2016. I was able to review the latest neurology note. Tizanidine was recommended.    She " denies a history of recent head injury. Prior neurological history: negative for stroke, brain neoplasms, seizure disorders, multiple sclerosis, major head injuries, neurosurgical procedures, and CNS infections. 12 years ago, the patient had concussion without loss of consciousness. She developed postconcussion syndrome, that lasted for several months and eventually resolved.    Has positive history of migraine/headaches on both sides of her family.    Neurologic Review of Systems - no amaurosis, diplopia, abnormal speech, unilateral numbness or weakness, except mild intermittent right upper arm numbness/tingling, that she developed 2-3 weeks ago after the cervical nerve block. She will let me know later, if she wants any additional evaluation performed. She also endorses multiple chronic complaints on 14-point comprehensive review of systems of the Patient Health History Form, that were reviewed and will be scanned into her electronic medical record at the end of this encounter. Her primary care provider is aware and addressing all of them.    PAST MEDICAL/SURGICAL HISTORY:                                                    I personally reviewed patient's past medical and surgical history with the patient at today's visit.  Past Medical History:   Diagnosis Date     Anxiety      ASCUS with positive high risk HPV 6/2015,09/14/16    atypia on colp, 09/14/16: ASCUS + HR HPV 16 and other.      Depression      Fracture of great toe 2/20/2014     History of scoliosis      Hx of colposcopy with cervical biopsy 10/06/16    10/06/16: Anabel Bx NIL.     MVP (mitral valve prolapse)      Unexplained endometrial cells on cervical Pap smear 6/2015    thin endometrium on US     History reviewed. No pertinent surgical history.  MEDICATIONS:                                                    I personally reviewed patient's medications and allergies with the patient at today's visit.  Current Outpatient Prescriptions on File Prior to  Visit:  ALPRAZolam (XANAX) 0.5 MG tablet Take 1 tablet (0.5 mg) by mouth 2 times daily as needed for anxiety 40 tablets to last 30 days.   sertraline (ZOLOFT) 100 MG tablet Take 1.5 tablets (150 mg) by mouth daily   fluticasone (FLONASE) 50 MCG/ACT nasal spray Spray 1-2 sprays into both nostrils daily   Multiple Vitamins-Minerals (MULTIVITAMIN PO)    Aspirin-Acetaminophen-Caffeine (EXCEDRIN PO) Take by mouth as needed   traMADol (ULTRAM) 50 MG tablet Take 1 tablet (50 mg) by mouth every 8 hours as needed for pain   propranolol (INDERAL) 20 MG tablet Take 1 tablet (20 mg) by mouth 2 times daily (Patient not taking: Reported on 6/29/2017)   norethindrone-ethinyl estradiol (FEMHRT 1/5) 1-5 MG-MCG per tablet Take 1 tablet by mouth daily (Patient not taking: Reported on 6/29/2017)     ALLERGIES:                                                      Allergies   Allergen Reactions     Sulfa Drugs      Rash       FAMILY/SOCIAL HISTORY:                                                    Family and social history was reviewed with the patient at today's visit. Patient's mother, paternal and maternal grandmothers had history of migraine/headaches. No other family history of neurological disorders.   Problem (# of Occurrences) Relation (Name,Age of Onset)    HEART DISEASE (1) Father       Negative family history of: Asthma, DIABETES, Hypertension, CEREBROVASCULAR DISEASE, Breast Cancer        , raises 3 teenage children. Denies tobacco, alcohol, and recreational drug use. 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  Social History   Substance Use Topics     Smoking status: Never Smoker     Smokeless tobacco: Never Used     Alcohol use No     REVIEW OF SYSTEMS:                                                    Patient has completed a [facility] Patient Health History, including a 14-system review which was personally reviewed, and pertinent positives are listed in HPI. She denies any additional  "problems on the further questioning.    EXAM:                                                    VITAL SIGNS:   /76 (BP Location: Left arm, Patient Position: Chair, Cuff Size: Adult Small)  Pulse 91  Resp 18  Ht 1.626 m (5' 4\")  Wt 49.9 kg (110 lb)  LMP 09/01/2016  SpO2 97%  BMI 18.88 kg/m2    General: pt is in NAD, cooperative.  Skin: normal turgor, moist mucous membranes, no lesions/rashes noticed.  HEENT: ATNC, EOMI, PERRL, white sclera, normal conjunctiva, no nystagmus or ptosis. No carotid bruits bilaterally.  Respiratory: lung sounds clear to auscultation bilaterally, no crackles, wheezes, rhonchi. Symmetric lung excursion, no accessory respiratory muscle use.  Cardiovascular: normal S1/S2, no murmurs/rubs/gallops.   Abdomen: Not distended.  : deferred.    Neurological:  Mental: alert, follows commands, mini-cog is 4/5 with 2/3 on memory recall, no aphasia or dysarthria. Fund of knowledge is appropriate for age.  Cranial Nerves:  CN II: visual acuity - able to accurately count fingers with each eye. Visual fields intact, fundi: discs sharp, no papilledema and normal vessels bilaterally. The patient was told that she has b/l drusen. I was not able to see it today.  CN III, IV, VI: EOM intact, pupils equal and reactive  CN V: facial sensation nl  CN VII: face symmetric, no facial droop  CN VIII: hearing normal  CN IX: palate elevation symmetric, uvula at midline  CN XI SCM normal, shoulder shrug nl  CN XII: tongue midline  Motor: Strength: 5/5 in all major groups of all extremities. Normal tone. No abnormal movements. No pronator drift b/l.  Reflexes: Triceps, biceps, brachioradialis, patellar, and achilles reflexes normal and symmetric. No clonus noted. Toes are down-going b/l.   Sensory: temperature, light touch, pinprick, and vibration intact. Romberg: negative.  Coordination: FNF and heel-shin tests intact b/l. No dysdiadochokinesia with rapid alternating movements.  Gait:  Normal, able " tandem, toe, heel walk.    DATA:     LABS: I personally reviewed the following labs:  Office Visit on 05/19/2017   Component Date Value Ref Range Status     Rubeola (Measles) Antibody IgG 05/19/2017 1.2* 0.0 - 0.8 AI Final    Comment: Positive, suggests prev. exposure and probable immunity   Antibody index (AI) values reflect qualitative changes in antibody   concentration that cannot be directly associated with clinical condition or   disease state.       Mumps Antibody IgG 05/19/2017   0.0 - 0.8 AI Final                    Value:<0.2  Negative, suggests no immunologic exposure.   Antibody index (AI) values reflect qualitative changes in antibody   concentration that cannot be directly associated with clinical condition or   disease state.       Rubella Antibody IgG Quantitative 05/19/2017 46  IU/mL Final    Comment: Positive.  Suggests previous exposure or immunization and probable immunity   Reference Range:     Unvaccinated Negative 0-7 IU/mL   Vaccinated or previous exposure Positive 10 IU/ml or greater     Office Visit on 03/31/2017   Component Date Value Ref Range Status     Influenza A/B Agn Specimen 03/31/2017 Nasopharyngeal   Final     Influenza A 03/31/2017 Negative  NEG Final     Influenza B 03/31/2017 * NEG Final                    Value:Positive   Test results must be correlated with clinical data. If necessary, results   should be confirmed by a molecular assay or viral culture.       IMAGING:  The patient reports prior brain imaging, performed several years ago while in [place], which was normal. There are no scanned reports to review.  OTHER STUDIES: I reviewed scanned neurology note from [facility] from 07/29/2016 and other recent notes.    ASSESSMENT and PLAN:      ASSESSMENT: Catie Nuñez is a 52 year old female patient with past medical history of mitral valve prolapse, depression, anxiety, and migraine, who presents with worsening of her chronic headaches over the last 2 years.  There are no red flags to repeat brain imaging at this time.     Her clinical presentation is likely consistent with multifactorial chronic headache disorder. Main potential contributors include chronic migraines, tension type headaches, cervicogenic headaches, and medication overuse headaches.    We discussed in detail non-pharmacological and pharmacological headache treatment options. She was hesitant to start propranolol at the present time. I recommended to try riboflavin alone instead. We could add magnesium later. I also recommended the patient to gradually reduce her analgesics and  Tramadol use to address rebound headaches.    DIAGNOSES:    ICD-10-CM    1. Migraine without aura and without status migrainosus, not intractable G43.009 riboflavin 400 MG CAPS   2. Tension headache G44.209    3. Cervicogenic headache R51    4. Medication overuse headache G44.40        PLAN: At today's visit we thoroughly discussed various diagnostic possibilities for her symptoms, that are likely related to multifactorial headache disorder, including migraine, tension type, cervicogenic headaches, and medication overuse headaches. We also reviewed different available prophylactic and acute therapy options.    The following medications were recommended:  1. Riboflavin 400 MG CAPS: Take 400 mg by mouth every morning for the next 3 months for headache prevention.  2. Botox injection could also be tried, as we discussed.  3. For acute therapy I would continue Excedrin Migraine or try Naprosyn 500 mg at the onset of the headache. I counseled the patient to limit the use of analgesics to less than 15 days per month and to take Naprosyn with food to avoid stomach irritation.    Discussed non-pharmacological measures include proper sleep hygiene, adequate hydration, avoidance of triggers, regular meals, and stress reduction techniques.    I advised the patient to keep headache diary and bring it to the next follow-up visit.    Preventive  Neurology: Encouraged to stay physically and mentally active with particular emphasis on daily mentally stimulating activities of her choice (such as crosswords, puzzles, sudoku, etc.), stretching exercises, walking, and healthy eating.    Next follow-up appointment is in next 3 months or earlier if needed.    I encouraged the patient to call me with any questions or concerns.    Total Time:  85 minutes with > 50% spent counseling the patient on stated above assessment and recommendations, including nature of the diagnosis, needed w/u, proposed plan of treatment, and prognosis. Extra time was used to answer multiple appropriate questions and review the treatment options/plan.    Gelacio Campbell MD  / Neurology  [place]  (Chart documentation was completed in part with Dragon voice-recognition software. Even though reviewed, some grammatical, spelling, and word errors may remain.)

## 2017-06-29 NOTE — PATIENT INSTRUCTIONS
AFTER VISIT SUMMARY (AVS):    At today's visit we discussed various diagnostic possibilities for your symptoms, that are likely related to multifactorial headache disorder, including migraine, tension type, cervicogenic headaches, and medication overuse headaches. We also reviewed different available prophylactic and acute therapy options.    The following medications were recommended:  1. Riboflavin 400 MG CAPS: Take 400 mg by mouth every morning for the next 3 months for headache prevention.  2. Botox injection could also be tried, as we discussed.  3. For acute therapy you could continue using Excedrin Migraine or try Naprosyn 500 mg at the onset of the headache. Please, limit the use of analgesics to less than 15 days per month. Take Naprosyn with food to avoid stomach irritation.    Discussed non-pharmacological measures include proper sleep hygiene, adequate hydration, avoidance of triggers, regular meals, and stress reduction techniques.    Please keep headache diary and bring it to the next follow-up visit.    Preventive Neurology: Encouraged to stay physically and mentally active with particular emphasis on daily mentally stimulating activities of your choice (such as crosswords, puzzles, sudoku, etc.), stretching exercises, walking, and healthy eating.    Next follow-up appointment is in next 3 months or earlier if needed.    Please do not hesitate to call me with any questions or concerns.    Thanks.

## 2017-06-30 RX ORDER — ESTRADIOL 1 MG/1
0.5 TABLET ORAL DAILY
Qty: 90 TABLET | Refills: 1 | Status: SHIPPED | OUTPATIENT
Start: 2017-06-30 | End: 2018-03-29

## 2017-06-30 NOTE — TELEPHONE ENCOUNTER
Estradiol queued up.  Please complete rx and send to pt's pharmacy.  Thanks.  Mikayla Garcia RN

## 2017-06-30 NOTE — TELEPHONE ENCOUNTER
Pt's current medication is not on her pharmacy's formulary.  Do you want her to try a generic estradiol or estropipate?  Please order and send to pt's pharmacy. If there is a reason why she cannot try one of those medications, please let us know so we can let the insurance company know.  Mikayla Garcia RN

## 2017-07-24 ENCOUNTER — OFFICE VISIT (OUTPATIENT)
Dept: FAMILY MEDICINE | Facility: CLINIC | Age: 52
End: 2017-07-24
Payer: COMMERCIAL

## 2017-07-24 VITALS
SYSTOLIC BLOOD PRESSURE: 105 MMHG | TEMPERATURE: 98 F | OXYGEN SATURATION: 99 % | RESPIRATION RATE: 16 BRPM | HEART RATE: 70 BPM | HEIGHT: 64 IN | BODY MASS INDEX: 18.61 KG/M2 | WEIGHT: 109 LBS | DIASTOLIC BLOOD PRESSURE: 76 MMHG

## 2017-07-24 DIAGNOSIS — G89.29 CHRONIC LOW BACK PAIN, UNSPECIFIED BACK PAIN LATERALITY, WITH SCIATICA PRESENCE UNSPECIFIED: ICD-10-CM

## 2017-07-24 DIAGNOSIS — F41.1 GENERALIZED ANXIETY DISORDER: ICD-10-CM

## 2017-07-24 DIAGNOSIS — G43.009 MIGRAINE WITHOUT AURA AND WITHOUT STATUS MIGRAINOSUS, NOT INTRACTABLE: Primary | ICD-10-CM

## 2017-07-24 DIAGNOSIS — M54.5 CHRONIC LOW BACK PAIN, UNSPECIFIED BACK PAIN LATERALITY, WITH SCIATICA PRESENCE UNSPECIFIED: ICD-10-CM

## 2017-07-24 PROCEDURE — 80307 DRUG TEST PRSMV CHEM ANLYZR: CPT | Mod: 90 | Performed by: NURSE PRACTITIONER

## 2017-07-24 PROCEDURE — 99000 SPECIMEN HANDLING OFFICE-LAB: CPT | Performed by: NURSE PRACTITIONER

## 2017-07-24 PROCEDURE — 99214 OFFICE O/P EST MOD 30 MIN: CPT | Performed by: NURSE PRACTITIONER

## 2017-07-24 RX ORDER — TRAMADOL HYDROCHLORIDE 50 MG/1
50 TABLET ORAL EVERY 8 HOURS PRN
Qty: 75 TABLET | Refills: 0 | Status: SHIPPED | OUTPATIENT
Start: 2017-08-24 | End: 2017-07-24

## 2017-07-24 RX ORDER — TRAMADOL HYDROCHLORIDE 50 MG/1
50 TABLET ORAL EVERY 8 HOURS PRN
Qty: 90 TABLET | Refills: 0 | Status: SHIPPED | OUTPATIENT
Start: 2017-07-24 | End: 2017-10-24

## 2017-07-24 RX ORDER — TRAMADOL HYDROCHLORIDE 50 MG/1
50 TABLET ORAL EVERY 8 HOURS PRN
Qty: 75 TABLET | Refills: 0 | Status: SHIPPED | OUTPATIENT
Start: 2017-09-24 | End: 2018-01-09

## 2017-07-24 RX ORDER — TRAMADOL HYDROCHLORIDE 50 MG/1
50 TABLET ORAL EVERY 8 HOURS PRN
Qty: 75 TABLET | Refills: 0 | Status: SHIPPED | OUTPATIENT
Start: 2017-07-24 | End: 2017-10-17

## 2017-07-24 RX ORDER — ALPRAZOLAM 0.5 MG
0.5 TABLET ORAL 2 TIMES DAILY PRN
Qty: 40 TABLET | Refills: 2 | Status: SHIPPED | OUTPATIENT
Start: 2017-08-18 | End: 2017-10-17

## 2017-07-24 NOTE — PROGRESS NOTES
"  SUBJECTIVE:                                                    Catie Nuñez is a 52 year old female who presents to clinic today for the following health issues:      Medication Followup of tramadol     Taking Medication as prescribed: yes and no     Side Effects:  None    Medication Helping Symptoms:  Yes- helps a little      Migraines.  \"Chronic.\"  Having a lot of problems with pain and stiffness in her neck and upper shoulders as well.  Evaluated by 3 different neurologists.  When the migraines hit, \"They last for 3 days.\"  After the migraine resolves, \"it takes days to recover.\"    Recent evaluated by Dr. Montiel here at [facility].  He started her on vitamin B complexes.  Also: regular exercise, deep breathing.     Catie is started PT tomorrow.    She is considering a Botox injection.     Tramadol:  She is taking the tramadol 1-2 tablets per day for back and neck pain.  Will also increase the number of tablets to every 4 hours when her headaches are ramping up.      Going to a therapist regulalry.     Tramadol: last dose yesterday/last night.    Problem list and histories reviewed & adjusted, as indicated.  Additional history: as documented    Patient Active Problem List   Diagnosis     CARDIOVASCULAR SCREENING; LDL GOAL LESS THAN 160     Adhesive capsulitis of shoulder     Scoliosis     Shoulder impingement     Generalized anxiety disorder     Pulmonary nodule     Allergic rhinitis     Migraine without aura and without status migrainosus, not intractable     Controlled substance agreement signed     ASCUS Pap + high risk HPV, + 16, + \"other\"      History reviewed. No pertinent surgical history.    Social History   Substance Use Topics     Smoking status: Never Smoker     Smokeless tobacco: Never Used     Alcohol use No     Family History   Problem Relation Age of Onset     HEART DISEASE Father      Asthma No family hx of      DIABETES No family hx of      Hypertension No family hx of      CEREBROVASCULAR " DISEASE No family hx of      Breast Cancer No family hx of          Current Outpatient Prescriptions   Medication Sig Dispense Refill     vitamin B complex with vitamin C (VITAMIN  B COMPLEX) TABS tablet Take 1 tablet by mouth daily       estradiol (ESTRACE) 1 MG tablet Take 0.5 tablets (0.5 mg) by mouth daily 90 tablet 1     riboflavin 400 MG CAPS Take 400 mg by mouth every morning 30 capsule 0     traMADol (ULTRAM) 50 MG tablet Take 1 tablet (50 mg) by mouth every 8 hours as needed for pain 90 tablet 0     propranolol (INDERAL) 20 MG tablet Take 1 tablet (20 mg) by mouth 2 times daily 60 tablet 5     ALPRAZolam (XANAX) 0.5 MG tablet Take 1 tablet (0.5 mg) by mouth 2 times daily as needed for anxiety 40 tablets to last 30 days. 40 tablet 2     sertraline (ZOLOFT) 100 MG tablet Take 1.5 tablets (150 mg) by mouth daily 135 tablet 1     norethindrone-ethinyl estradiol (FEMHRT 1/5) 1-5 MG-MCG per tablet Take 1 tablet by mouth daily 90 tablet 3     fluticasone (FLONASE) 50 MCG/ACT nasal spray Spray 1-2 sprays into both nostrils daily 16 g 11     Multiple Vitamins-Minerals (MULTIVITAMIN PO)        Aspirin-Acetaminophen-Caffeine (EXCEDRIN PO) Take by mouth as needed       Allergies   Allergen Reactions     Sulfa Drugs      Rash       Recent Labs   Lab Test  09/14/16   1102  02/19/15   1029  01/06/15   1120   LDL  122*   --   94   HDL  73   --   75   TRIG  111   --   63   ALT   --   20  14   CR   --   0.52  0.60   GFRESTIMATED   --   >90  Non  GFR Calc    >90  Non  GFR Calc     GFRESTBLACK   --   >90   GFR Calc    >90   GFR Calc     POTASSIUM   --   3.9  4.2   TSH   --    --   3.60      BP Readings from Last 3 Encounters:   07/24/17 105/76   06/29/17 124/76   06/27/17 (!) 138/93    Wt Readings from Last 3 Encounters:   07/24/17 109 lb (49.4 kg)   06/29/17 110 lb (49.9 kg)   06/27/17 110 lb 12.8 oz (50.3 kg)         Labs reviewed in EPIC    Reviewed and updated  "as needed this visit by clinical staffTobacco  Allergies  Med Hx  Surg Hx  Fam Hx  Soc Hx      Reviewed and updated as needed this visit by Provider       ROS:  Constitutional, HEENT, cardiovascular, pulmonary, GI, , musculoskeletal, neuro, skin, endocrine and psych systems are negative, except as otherwise noted.    OBJECTIVE:   /76  Pulse 70  Temp 98  F (36.7  C) (Oral)  Resp 16  Ht 5' 4\" (1.626 m)  Wt 109 lb (49.4 kg)  LMP 09/01/2016  SpO2 99%  BMI 18.71 kg/m2  Body mass index is 18.71 kg/(m^2).  GENERAL APPEARANCE: healthy, alert and no distress. Smiling.   SKIN: warm and dry  PSYCH: mentation appears normal and affect normal/bright.  Good eye contact.    ASSESSMENT/PLAN:     (G43.009) Migraine without aura and without status migrainosus, not intractable  (primary encounter diagnosis)  Comment:   Plan: traMADol (ULTRAM) 50 MG tablet, Drug  Screen         Comprehensive , Urine with Reported Meds         ([facility]) (Pain Care Package), traMADol (ULTRAM)        50 MG tablet, traMADol (ULTRAM) 50 MG tablet,         DISCONTINUED: traMADol (ULTRAM) 50 MG tablet            (M54.5,  G89.29) Chronic low back pain, unspecified back pain laterality, with sciatica presence unspecified  Comment:   Plan: traMADol (ULTRAM) 50 MG tablet, Drug  Screen         Comprehensive , Urine with Reported Meds         (Med[facility]ox) (Pain Care Package), traMADol (ULTRAM)        50 MG tablet, traMADol (ULTRAM) 50 MG tablet,         DISCONTINUED: traMADol (ULTRAM) 50 MG tablet            (F41.1) Generalized anxiety disorder  Comment:   Plan: ALPRAZolam (XANAX) 0.5 MG tablet            I discussed and reviewed with the patient chronic pain management, tramadol, and benzodiazepine/Xanax.  Previously, Catie had been prescribed tramadol and she describes that she had been told that this is considered a fairly \"benign\" medication because it is not a narcotic. I have reviewed with her that it is considered a controlled substance, " has addictive properties, and it is very important that she wean down/wean off of this medication as soon as possible.  She had been working down on her number of tablets per month, but due to her recent exacerbation of migraines, she did increase her usage again. For this month, I did approve a 90 tablet supply of the tramadol.  For the next 2 months, August and September, I did decrease his tablets to 75 tablets per month. She is to continue other pain management such as lifestyle management, ice or heat, range of motion and stretching etc. she is going to discuss further with the neurologist or referral for Botox injection for headache management.  She is also to use the alprazolam sparingly as she is also aware that this is a controlled substance and also has addictive properties.  A urine drug screen was obtained today. She has been recently using tramadol and alprazolam and I expect these to be on her urine drug screen.    She will follow-up in the clinic with me for face-to-face appointment in 2-3 months for refills, sooner if problems.     I spent a total of 30 min with the patient, >50% time spent face to face counseling regarding the above issues, reviewing the controlled substance agreement, refilling her medications,, establishing a plan of care, and coordinating follow up care.     JEREMY Kent [facility]

## 2017-07-24 NOTE — MR AVS SNAPSHOT
After Visit Summary   7/24/2017    Catie Nuñez    MRN: [id number]           Patient Information     Date Of Birth          1965        Visit Information        Provider Department      7/24/2017 2:20 PM Yasmin Velazquez APRN CNP [facility]        Today's Diagnoses     Migraine without aura and without status migrainosus, not intractable    -  1    Chronic low back pain, unspecified back pain laterality, with sciatica presence unspecified        Generalized anxiety disorder           Follow-ups after your visit        Your next 10 appointments already scheduled     Sep 29, 2017  3:00 PM CDT   [facility] Campbell [facility]   [facility]    [address] [place] [address] [phone]              Who to contact     If you have questions or need follow up information about today's clinic visit or your schedule please contact [facility] directly at [phone].  Normal or non-critical lab and imaging results will be communicated to you by Trulioohart, letter or phone within 4 business days after the clinic has received the results. If you do not hear from us within 7 days, please contact the clinic through PicPrizest or phone. If you have a critical or abnormal lab result, we will notify you by phone as soon as possible.  Submit refill requests through AppLearn or call your pharmacy and they will forward the refill request to us. Please allow 3 business days for your refill to be completed.          Additional Information About Your Visit        Trulioohart Information     AppLearn gives you secure access to your electronic health record. If you see a primary care provider, you can also send messages to your care team and make appointments. If you have questions, please call your primary care clinic.  If you do not have a primary care provider, please call [phone] and they  "will assist you.        Care EveryWhere ID     This is your Care EveryWhere ID. This could be used by other organizations to access your [facility] medical records  [id number]        Your Vitals Were     Pulse Temperature Respirations Height Last Period Pulse Oximetry    70 98  F (36.7  C) (Oral) 16 5' 4\" (1.626 m) 09/01/2016 99%    BMI (Body Mass Index)                   18.71 kg/m2            Blood Pressure from Last 3 Encounters:   07/24/17 105/76   06/29/17 124/76   06/27/17 (!) 138/93    Weight from Last 3 Encounters:   07/24/17 109 lb (49.4 kg)   06/29/17 110 lb (49.9 kg)   06/27/17 110 lb 12.8 oz (50.3 kg)              We Performed the Following     Drug  Screen Comprehensive , Urine with Reported Meds (MedTox) (Pain Care Package)          Today's Medication Changes          These changes are accurate as of: 7/24/17 11:59 PM.  If you have any questions, ask your nurse or doctor.               These medicines have changed or have updated prescriptions.        Dose/Directions    * traMADol 50 MG tablet   Commonly known as:  ULTRAM   This may have changed:    - reasons to take this  - additional instructions   Used for:  Migraine without aura and without status migrainosus, not intractable, Chronic low back pain, unspecified back pain laterality, with sciatica presence unspecified   Changed by:  Yasmin Velazquez, JEREMY CNP        Dose:  50 mg   Take 1 tablet (50 mg) by mouth every 8 hours as needed for severe pain or pain . Limit 90 tablets per month.   Quantity:  90 tablet   Refills:  0       * traMADol 50 MG tablet   Commonly known as:  ULTRAM   This may have changed:  You were already taking a medication with the same name, and this prescription was added. Make sure you understand how and when to take each.   Used for:  Migraine without aura and without status migrainosus, not intractable, Chronic low back pain, unspecified back pain laterality, with sciatica presence unspecified   Changed by:  " Yasmin Velazquez APRN CNP        Dose:  50 mg   Take 1 tablet (50 mg) by mouth every 8 hours as needed for severe pain or pain . 75 tablets to last 30 days.   Quantity:  75 tablet   Refills:  0       * traMADol 50 MG tablet   Commonly known as:  ULTRAM   This may have changed:  You were already taking a medication with the same name, and this prescription was added. Make sure you understand how and when to take each.   Used for:  Migraine without aura and without status migrainosus, not intractable, Chronic low back pain, unspecified back pain laterality, with sciatica presence unspecified   Changed by:  Yasmin Velazquez APRN CNP        Dose:  50 mg   Start taking on:  9/24/2017   Take 1 tablet (50 mg) by mouth every 8 hours as needed for severe pain or pain . Limit 75 tablets to last 30 days.   Quantity:  75 tablet   Refills:  0       * Notice:  This list has 3 medication(s) that are the same as other medications prescribed for you. Read the directions carefully, and ask your doctor or other care provider to review them with you.         Where to get your medicines      Some of these will need a paper prescription and others can be bought over the counter.  Ask your nurse if you have questions.     Bring a paper prescription for each of these medications     ALPRAZolam 0.5 MG tablet    traMADol 50 MG tablet    traMADol 50 MG tablet    traMADol 50 MG tablet                Primary Care Provider Office Phone # Fax #    JEREMY Gonzalez [phone]       [facility] [address] [place] [address]        Equal Access to Services     [facility] AH: Hadii marcie campbell hadasho Soomaali, waaxda luqadaha, qaybta kaalmada adeegyada, waxay ricki kincaid. So [facility] [phone].    ATENCIÓN: Si habla español, tiene a weinberg disposición servicios gratuitos de asistencia lingüística. Llame al [phone].    We comply with applicable [facility] civil  rights laws and [place] laws. We do not discriminate on the basis of race, color, national origin, age, disability sex, sexual orientation or gender identity.            Thank you!     Thank you for choosing [facility]  for your care. Our goal is always to provide you with excellent care. Hearing back from our patients is one way we can continue to improve our services. Please take a few minutes to complete the written survey that you may receive in the mail after your visit with us. Thank you!             Your Updated Medication List - Protect others around you: Learn how to safely use, store and throw away your medicines at www.disposemymeds.org.          This list is accurate as of: 7/24/17 11:59 PM.  Always use your most recent med list.                   Brand Name Dispense Instructions for use Diagnosis    ALPRAZolam 0.5 MG tablet   Start taking on:  8/18/2017    XANAX    40 tablet    Take 1 tablet (0.5 mg) by mouth 2 times daily as needed for anxiety 40 tablets to last 30 days.    Generalized anxiety disorder       estradiol 1 MG tablet    ESTRACE    90 tablet    Take 0.5 tablets (0.5 mg) by mouth daily    Symptomatic menopausal or female climacteric states       EXCEDRIN PO      Take by mouth as needed        fluticasone 50 MCG/ACT spray    FLONASE    16 g    Spray 1-2 sprays into both nostrils daily    Allergic rhinitis, unspecified allergic rhinitis type       MULTIVITAMIN PO           norethindrone-ethinyl estradiol 1-5 MG-MCG per tablet    FEMHRT 1/5    90 tablet    Take 1 tablet by mouth daily    Symptomatic menopausal or female climacteric states       propranolol 20 MG tablet    INDERAL    60 tablet    Take 1 tablet (20 mg) by mouth 2 times daily    Migraine without aura and without status migrainosus, not intractable       riboflavin 400 MG Caps     30 capsule    Take 400 mg by mouth every morning    Migraine without aura and without status migrainosus, not intractable        sertraline 100 MG tablet    ZOLOFT    135 tablet    Take 1.5 tablets (150 mg) by mouth daily    Generalized anxiety disorder       * traMADol 50 MG tablet    ULTRAM    90 tablet    Take 1 tablet (50 mg) by mouth every 8 hours as needed for severe pain or pain . Limit 90 tablets per month.    Migraine without aura and without status migrainosus, not intractable, Chronic low back pain, unspecified back pain laterality, with sciatica presence unspecified       * traMADol 50 MG tablet    ULTRAM    75 tablet    Take 1 tablet (50 mg) by mouth every 8 hours as needed for severe pain or pain . 75 tablets to last 30 days.    Migraine without aura and without status migrainosus, not intractable, Chronic low back pain, unspecified back pain laterality, with sciatica presence unspecified       * traMADol 50 MG tablet   Start taking on:  9/24/2017    ULTRAM    75 tablet    Take 1 tablet (50 mg) by mouth every 8 hours as needed for severe pain or pain . Limit 75 tablets to last 30 days.    Migraine without aura and without status migrainosus, not intractable, Chronic low back pain, unspecified back pain laterality, with sciatica presence unspecified       vitamin B complex with vitamin C Tabs tablet      Take 1 tablet by mouth daily        * Notice:  This list has 3 medication(s) that are the same as other medications prescribed for you. Read the directions carefully, and ask your doctor or other care provider to review them with you.

## 2017-07-24 NOTE — NURSING NOTE
"Chief Complaint   Patient presents with     Recheck Medication       Initial /76  Pulse 70  Temp 98  F (36.7  C) (Oral)  Resp 16  Ht 5' 4\" (1.626 m)  Wt 109 lb (49.4 kg)  LMP 09/01/2016  SpO2 99%  BMI 18.71 kg/m2 Estimated body mass index is 18.71 kg/(m^2) as calculated from the following:    Height as of this encounter: 5' 4\" (1.626 m).    Weight as of this encounter: 109 lb (49.4 kg).  Medication Reconciliation: complete       Shoaib Martinez MA       "

## 2017-08-02 LAB — PAIN DRUG SCR UR W RPTD MEDS: NORMAL

## 2017-08-02 NOTE — PROGRESS NOTES
Kade Haddad,    Here is the result of your urine drug screen. Let me know if you have any questions.    Yasmin LUNA CNP

## 2017-10-17 ENCOUNTER — OFFICE VISIT (OUTPATIENT)
Dept: PALLIATIVE MEDICINE | Facility: CLINIC | Age: 52
End: 2017-10-17
Payer: COMMERCIAL

## 2017-10-17 ENCOUNTER — OFFICE VISIT (OUTPATIENT)
Dept: FAMILY MEDICINE | Facility: CLINIC | Age: 52
End: 2017-10-17
Payer: COMMERCIAL

## 2017-10-17 VITALS
HEIGHT: 64 IN | WEIGHT: 114 LBS | RESPIRATION RATE: 16 BRPM | TEMPERATURE: 98 F | DIASTOLIC BLOOD PRESSURE: 82 MMHG | SYSTOLIC BLOOD PRESSURE: 130 MMHG | BODY MASS INDEX: 19.46 KG/M2 | HEART RATE: 70 BPM

## 2017-10-17 VITALS
BODY MASS INDEX: 19.74 KG/M2 | WEIGHT: 115 LBS | OXYGEN SATURATION: 98 % | RESPIRATION RATE: 15 BRPM | SYSTOLIC BLOOD PRESSURE: 120 MMHG | DIASTOLIC BLOOD PRESSURE: 79 MMHG | HEART RATE: 77 BPM

## 2017-10-17 DIAGNOSIS — J31.0 CHRONIC RHINITIS, UNSPECIFIED TYPE: ICD-10-CM

## 2017-10-17 DIAGNOSIS — G43.009 MIGRAINE WITHOUT AURA AND WITHOUT STATUS MIGRAINOSUS, NOT INTRACTABLE: ICD-10-CM

## 2017-10-17 DIAGNOSIS — Z12.11 COLON CANCER SCREENING: ICD-10-CM

## 2017-10-17 DIAGNOSIS — G89.29 CHRONIC LOW BACK PAIN, UNSPECIFIED BACK PAIN LATERALITY, WITH SCIATICA PRESENCE UNSPECIFIED: Primary | ICD-10-CM

## 2017-10-17 DIAGNOSIS — M54.5 CHRONIC LOW BACK PAIN, UNSPECIFIED BACK PAIN LATERALITY, WITH SCIATICA PRESENCE UNSPECIFIED: Primary | ICD-10-CM

## 2017-10-17 DIAGNOSIS — M75.00 ADHESIVE CAPSULITIS OF SHOULDER, UNSPECIFIED LATERALITY: ICD-10-CM

## 2017-10-17 DIAGNOSIS — M54.50 MIDLINE LOW BACK PAIN WITHOUT SCIATICA, UNSPECIFIED CHRONICITY: ICD-10-CM

## 2017-10-17 DIAGNOSIS — F41.1 GENERALIZED ANXIETY DISORDER: ICD-10-CM

## 2017-10-17 DIAGNOSIS — G43.019 INTRACTABLE MIGRAINE WITHOUT AURA AND WITHOUT STATUS MIGRAINOSUS: Primary | ICD-10-CM

## 2017-10-17 PROCEDURE — 99214 OFFICE O/P EST MOD 30 MIN: CPT | Performed by: NURSE PRACTITIONER

## 2017-10-17 PROCEDURE — 99243 OFF/OP CNSLTJ NEW/EST LOW 30: CPT | Performed by: PSYCHIATRY & NEUROLOGY

## 2017-10-17 RX ORDER — SERTRALINE HYDROCHLORIDE 100 MG/1
150 TABLET, FILM COATED ORAL DAILY
Qty: 135 TABLET | Refills: 1 | Status: SHIPPED | OUTPATIENT
Start: 2017-10-17 | End: 2018-01-09

## 2017-10-17 RX ORDER — TRAMADOL HYDROCHLORIDE 50 MG/1
50 TABLET ORAL EVERY 8 HOURS PRN
Qty: 75 TABLET | Refills: 0 | Status: SHIPPED | OUTPATIENT
Start: 2017-11-17 | End: 2018-01-09

## 2017-10-17 RX ORDER — TRAMADOL HYDROCHLORIDE 50 MG/1
50 TABLET ORAL EVERY 8 HOURS PRN
Qty: 75 TABLET | Refills: 0 | Status: SHIPPED | OUTPATIENT
Start: 2017-10-17 | End: 2018-01-09

## 2017-10-17 RX ORDER — TRAMADOL HYDROCHLORIDE 50 MG/1
50 TABLET ORAL EVERY 8 HOURS PRN
Qty: 75 TABLET | Refills: 0 | Status: SHIPPED | OUTPATIENT
Start: 2017-12-17 | End: 2017-12-12

## 2017-10-17 RX ORDER — FLUTICASONE PROPIONATE 50 MCG
1-2 SPRAY, SUSPENSION (ML) NASAL DAILY
Qty: 16 G | Refills: 11 | Status: SHIPPED | OUTPATIENT
Start: 2017-10-17 | End: 2018-01-09

## 2017-10-17 RX ORDER — ALPRAZOLAM 0.5 MG
0.5 TABLET ORAL 2 TIMES DAILY PRN
Qty: 40 TABLET | Refills: 2 | Status: SHIPPED | OUTPATIENT
Start: 2017-10-17 | End: 2018-01-09

## 2017-10-17 ASSESSMENT — PAIN SCALES - GENERAL: PAINLEVEL: NO PAIN (0)

## 2017-10-17 NOTE — MR AVS SNAPSHOT
After Visit Summary   10/17/2017    Catie Nuñez    MRN: [id number]           Patient Information     Date Of Birth          1965        Visit Information        Provider Department      10/17/2017 3:00 PM Yumiko Singh MD [facility]        Today's Diagnoses     Intractable migraine without aura and without status migrainosus    -  1       Follow-ups after your visit        Your next 10 appointments already scheduled     Jan 05, 2018 11:00 AM CST   Office Visit with Emmie James MD   [address] [address]   [phone]           Bring a current list of meds and any records pertaining to this visit. For Physicals, please bring immunization records and any forms needing to be filled out. Please arrive 10 minutes early to complete paperwork.            Jan 09, 2018  1:15 PM CST   Office Visit with Laurie Avelar NP   [address] [address] [phone]           Bring a current list of meds and any records pertaining to this visit. For Physicals, please bring immunization records and any forms needing to be filled out. Please arrive 10 minutes early to complete paperwork.              Who to contact     If you have questions or need follow up information about today's clinic visit or your schedule please contact [facility] directly at [phone].  Normal or non-critical lab and imaging results will be communicated to you by MyChart, letter or phone within 4 business days after the clinic has received the results. If you do not hear from us within 7 days, please contact the clinic through MyChart or phone. If you have a critical or abnormal lab result, we will notify you by phone as soon as possible.  Submit refill requests through Hammerhead Navigation or call your pharmacy and  they will forward the refill request to us. Please allow 3 business days for your refill to be completed.          Additional Information About Your Visit        Efficient Power ConversionharPhotetica Information     Linki gives you secure access to your electronic health record. If you see a primary care provider, you can also send messages to your care team and make appointments. If you have questions, please call your primary care clinic.  If you do not have a primary care provider, please call [phone] and they will assist you.        Care EveryWhere ID     This is your Care EveryWhere ID. This could be used by other organizations to access your [facility] [place] medical records  [id number]        Your Vitals Were     Pulse Respirations Last Period Pulse Oximetry BMI (Body Mass Index)       77 15 09/01/2016 98% 19.74 kg/m2        Blood Pressure from Last 3 Encounters:   12/19/17 98/72   12/10/17 116/60   10/17/17 120/79    Weight from Last 3 Encounters:   12/19/17 51.3 kg (113 lb)   12/10/17 52.2 kg (115 lb)   10/17/17 52.2 kg (115 lb)              Today, you had the following     No orders found for display         Today's Medication Changes          These changes are accurate as of: 10/17/17 11:59 PM.  If you have any questions, ask your nurse or doctor.               These medicines have changed or have updated prescriptions.        Dose/Directions    * traMADol 50 MG tablet   Commonly known as:  ULTRAM   This may have changed:  Another medication with the same name was added. Make sure you understand how and when to take each.   Used for:  Migraine without aura and without status migrainosus, not intractable, Chronic low back pain, unspecified back pain laterality, with sciatica presence unspecified   Changed by:  Yasmin Velazquez, APRN CNP        Dose:  50 mg   Take 1 tablet (50 mg) by mouth every 8 hours as needed for severe pain or pain . Limit 90 tablets per month.   Quantity:  90 tablet   Refills:  0       * traMADol 50 MG  tablet   Commonly known as:  ULTRAM   This may have changed:  Another medication with the same name was added. Make sure you understand how and when to take each.   Used for:  Migraine without aura and without status migrainosus, not intractable, Chronic low back pain, unspecified back pain laterality, with sciatica presence unspecified   Changed by:  Yasmin Velazquez APRN CNP        Dose:  50 mg   Take 1 tablet (50 mg) by mouth every 8 hours as needed for severe pain or pain . Limit 75 tablets to last 30 days.   Quantity:  75 tablet   Refills:  0       * traMADol 50 MG tablet   Commonly known as:  ULTRAM   This may have changed:  Another medication with the same name was added. Make sure you understand how and when to take each.   Used for:  Migraine without aura and without status migrainosus, not intractable, Chronic low back pain, unspecified back pain laterality, with sciatica presence unspecified   Changed by:  Yasmin Velazquez APRN CNP        Dose:  50 mg   Take 1 tablet (50 mg) by mouth every 8 hours as needed for severe pain or pain . 75 tablets to last 30 days.   Quantity:  75 tablet   Refills:  0       * traMADol 50 MG tablet   Commonly known as:  ULTRAM   This may have changed:  You were already taking a medication with the same name, and this prescription was added. Make sure you understand how and when to take each.   Used for:  Midline low back pain without sciatica, unspecified chronicity   Changed by:  Yasmin Velazquez APRN CNP        Dose:  50 mg   Take 1 tablet (50 mg) by mouth every 8 hours as needed for moderate pain 75 tablets to last 30 days.   Quantity:  75 tablet   Refills:  0       * traMADol 50 MG tablet   Commonly known as:  ULTRAM   This may have changed:  You were already taking a medication with the same name, and this prescription was added. Make sure you understand how and when to take each.   Used for:  Migraine without aura and without status migrainosus,  not intractable, Adhesive capsulitis of shoulder, unspecified laterality   Changed by:  Yasmin Velazquez APRN CNP        Dose:  50 mg   Take 1 tablet (50 mg) by mouth every 8 hours as needed for moderate pain 75 tablets to last 30 days.   Quantity:  75 tablet   Refills:  0       * Notice:  This list has 5 medication(s) that are the same as other medications prescribed for you. Read the directions carefully, and ask your doctor or other care provider to review them with you.      Stop taking these medicines if you haven't already. Please contact your care team if you have questions.     propranolol 20 MG tablet   Commonly known as:  INDERAL   Stopped by:  Yumiko Singh MD           vitamin B complex with vitamin C Tabs tablet   Stopped by:  Yumiko Singh MD                Where to get your medicines      These medications were sent to [facility] [address] [place] [address] AT [facility] & [address] [place] [address]     Phone:  [phone]     fluticasone 50 MCG/ACT spray    sertraline 100 MG tablet         Some of these will need a paper prescription and others can be bought over the counter.  Ask your nurse if you have questions.     Bring a paper prescription for each of these medications     ALPRAZolam 0.5 MG tablet    traMADol 50 MG tablet    traMADol 50 MG tablet    traMADol 50 MG tablet                Primary Care Provider Office Phone # Fax #    JEREMY Gonzalez [phone]       [facility] [address] [place] [address]        Equal Access to Services     [facility] AH: Hadii marcie campbell hadvalerianoo Sokofi, waaxda luqadaha, qaybta kaalmada adeegyapetr, vickey grajeda . So [facility] [phone].    ATENCIÓN: Si habla español, tiene a weinberg disposición servicios gratuitos de asistencia lingüística. Llame al [phone].    We comply with applicable federal civil rights laws and  [place] laws. We do not discriminate on the basis of race, color, national origin, age, disability, sex, sexual orientation, or gender identity.            Thank you!     Thank you for choosing [facility]  for your care. Our goal is always to provide you with excellent care. Hearing back from our patients is one way we can continue to improve our services. Please take a few minutes to complete the written survey that you may receive in the mail after your visit with us. Thank you!             Your Updated Medication List - Protect others around you: Learn how to safely use, store and throw away your medicines at www.disposemymeds.org.          This list is accurate as of: 10/17/17 11:59 PM.  Always use your most recent med list.                   Brand Name Dispense Instructions for use Diagnosis    ALPRAZolam 0.5 MG tablet    XANAX    40 tablet    Take 1 tablet (0.5 mg) by mouth 2 times daily as needed for anxiety 40 tablets to last 30 days.    Generalized anxiety disorder       estradiol 1 MG tablet    ESTRACE    90 tablet    Take 0.5 tablets (0.5 mg) by mouth daily    Symptomatic menopausal or female climacteric states       EXCEDRIN PO      Take by mouth as needed        fluticasone 50 MCG/ACT spray    FLONASE    16 g    Spray 1-2 sprays into both nostrils daily    Chronic rhinitis, unspecified type       MULTIVITAMIN PO           riboflavin 400 MG Caps     30 capsule    Take 400 mg by mouth every morning    Migraine without aura and without status migrainosus, not intractable       sertraline 100 MG tablet    ZOLOFT    135 tablet    Take 1.5 tablets (150 mg) by mouth daily    Generalized anxiety disorder       * traMADol 50 MG tablet    ULTRAM    90 tablet    Take 1 tablet (50 mg) by mouth every 8 hours as needed for severe pain or pain . Limit 90 tablets per month.    Migraine without aura and without status migrainosus, not intractable, Chronic low back pain, unspecified back pain  laterality, with sciatica presence unspecified       * traMADol 50 MG tablet    ULTRAM    75 tablet    Take 1 tablet (50 mg) by mouth every 8 hours as needed for severe pain or pain . Limit 75 tablets to last 30 days.    Migraine without aura and without status migrainosus, not intractable, Chronic low back pain, unspecified back pain laterality, with sciatica presence unspecified       * traMADol 50 MG tablet    ULTRAM    75 tablet    Take 1 tablet (50 mg) by mouth every 8 hours as needed for severe pain or pain . 75 tablets to last 30 days.    Migraine without aura and without status migrainosus, not intractable, Chronic low back pain, unspecified back pain laterality, with sciatica presence unspecified       * traMADol 50 MG tablet    ULTRAM    75 tablet    Take 1 tablet (50 mg) by mouth every 8 hours as needed for moderate pain 75 tablets to last 30 days.    Midline low back pain without sciatica, unspecified chronicity       * traMADol 50 MG tablet    ULTRAM    75 tablet    Take 1 tablet (50 mg) by mouth every 8 hours as needed for moderate pain 75 tablets to last 30 days.    Migraine without aura and without status migrainosus, not intractable, Adhesive capsulitis of shoulder, unspecified laterality       * Notice:  This list has 5 medication(s) that are the same as other medications prescribed for you. Read the directions carefully, and ask your doctor or other care provider to review them with you.

## 2017-10-17 NOTE — PROGRESS NOTES
[facility] Botox Evaluation    Date of visit: 10/17/2017    Reason for consultation:    Catie Nuñez is a 52 year old female who is seen in consultation today at the request of her provider, Dr. Campbell.     Primary Care Provider is Yasmin Velazquez  .    Please see the [facility] health questionnaire which the patient completed and reviewed with me in detail.    Chief Complaint:    Chief Complaint   Patient presents with     Headache       Pain history:  Catie Nuñez is a 52 year old female who first started having problems with headaches when she was young.  She had a severe headache at age 12, and with her menses she would get headaches associated with that.    She has headaches on a daily basis, which often is aching in nature.  A few times per month, she will wake up with a more severe headache, that includes nausea.  This can be managed often by Excedrin but does last for several hours.  She will also get migraines that last for 3 days.  These progress over the course of the day, and they start in the lower neck, doing up into the head with trigger points, and these headaches include nausea, inability to sleep. She has sensitivity to lights and sounds. She has wanted to go to the ER on multiple times.  She feels that stressors include anxiety and lack of sleep.  She typically needs to go in a dark room for a few days to get them to go away, and she is tired and drained for a couple of days.  She will use Excedrin, xanax, unisom, and tramadol.  She will hydrate herself and stay hydrated.  No auras.  When working at a normal level, she was having 9-10 days per month.    Migraines run in the family.    She also has lower back and hip pain, as well as history of coccydynia.    Pain rating: 3/10 today- good day.      Current treatments include:  Tramadol- uses for arthritis for hips, headache.  #75/month  Excedrin- using up to  "daily  riboflavin    Previous medication treatments included:  imitrex- side effects     Other treatments have included:  Catie Nuñez has not been seen at a pain clinic in the past.    PT: no  Acupuncture  Injections: ONB  Massage, cupping, chiropractor    Past Medical History:  Past Medical History:   Diagnosis Date     Anxiety      ASCUS with positive high risk HPV 6/2015,09/14/16    atypia on colp, 09/14/16: ASCUS + HR HPV 16 and other.      Depression      Fracture of great toe 2/20/2014     History of scoliosis      Hx of colposcopy with cervical biopsy 10/06/16    10/06/16: [facility] Bx NIL.     MVP (mitral valve prolapse)      Unexplained endometrial cells on cervical Pap smear 6/2015    thin endometrium on US     Patient Active Problem List    Diagnosis Date Noted     ASCUS Pap + high risk HPV, + 16, + \"other\"  10/06/2016     Priority: Medium     6/2015: ASCUS, + HPV 16 & other HR HPV with unexplained endometrial cells. Needs colp and endo bx, US also planned. Tracking started.  7/7/15: [facility] - atypia, no endo bx needed as US showed thin endometrium. Plan cotest pap & HPV in 1 year  09/14/16: ASCUS + HR HPV 16 and other. Plan [facility] per provider  10/06/16: [facility] Bx NIL. Plan Cotest in 1 year per provider.  12/19/17 [facility]IL, +HPV 16 & other HR type. Plan: [facility] per guidelines         Controlled substance agreement signed 09/23/2015     Priority: Medium     Patient is followed by JENN BLOCK for ongoing prescription of benzodiazepines.  All refills should be approved by this provider, or covering partner.    Medication(s): xanax and tramadol.   Maximum quantity per month: xanax 40 tablets per month and tramadol 20 tablets per 3 months.  Clinic visit frequency required: Q 3 months     Controlled substance agreement on file: Yes       Date(s): 9/2015      Last [facility] website verification:  done on 1/5/2016   [email or web address]    **Catie has been compliant with her clinic appointments for refills, " but I would recommend that appointments are REQUIRED for her refills.       Migraine without aura and without status migrainosus, not intractable 09/16/2014     Priority: Medium     Problem list name updated by automated process. Provider to review       Pulmonary nodule 02/18/2014     Priority: Medium     Allergic rhinitis 02/18/2014     Priority: Medium     Problem list name updated by automated process. Provider to review       Generalized anxiety disorder 10/28/2013     Priority: Medium     Diagnosis updated by automated process. Provider to review and confirm.       CARDIOVASCULAR SCREENING; LDL GOAL LESS THAN 160 10/23/2013     Priority: Medium     Adhesive capsulitis of shoulder 09/21/2013     Priority: Medium     Scoliosis 09/21/2013     Priority: Medium     Shoulder impingement 09/21/2013     Priority: Medium       Past Surgical History:  No past surgical history on file.  Medications:  Current Outpatient Prescriptions   Medication Sig Dispense Refill     traMADol (ULTRAM) 50 MG tablet Take 1 tablet (50 mg) by mouth every 8 hours as needed for severe pain or pain . 75 tablets to last 30 days. 75 tablet 0     ALPRAZolam (XANAX) 0.5 MG tablet Take 1 tablet (0.5 mg) by mouth 2 times daily as needed for anxiety 40 tablets to last 30 days. 40 tablet 2     fluticasone (FLONASE) 50 MCG/ACT spray Spray 1-2 sprays into both nostrils daily 16 g 11     sertraline (ZOLOFT) 100 MG tablet Take 1.5 tablets (150 mg) by mouth daily 135 tablet 1     traMADol (ULTRAM) 50 MG tablet Take 1 tablet (50 mg) by mouth every 8 hours as needed for moderate pain 75 tablets to last 30 days. 75 tablet 0     traMADol (ULTRAM) 50 MG tablet Take 1 tablet (50 mg) by mouth every 8 hours as needed for severe pain or pain . Limit 75 tablets to last 30 days. 75 tablet 0     estradiol (ESTRACE) 1 MG tablet Take 0.5 tablets (0.5 mg) by mouth daily 90 tablet 1     riboflavin 400 MG CAPS Take 400 mg by mouth every morning 30 capsule 0     Multiple  Vitamins-Minerals (MULTIVITAMIN PO)        Aspirin-Acetaminophen-Caffeine (EXCEDRIN PO) Take by mouth as needed       norethindrone-ethinyl estradiol (FEMHRT 1/5) 1-5 MG-MCG per tablet Take 1 tablet by mouth daily 90 tablet 3     traMADol (ULTRAM) 50 MG tablet Take 1 tablet (50 mg) by mouth every 8 hours as needed for moderate pain 75 tablets to last 30 days. 75 tablet 0     fluticasone (FLONASE) 50 MCG/ACT spray Spray 1-2 sprays into both nostrils daily 16 g 0     albuterol (PROAIR HFA/PROVENTIL HFA/VENTOLIN HFA) 108 (90 BASE) MCG/ACT Inhaler Inhale 1-2 puffs into the lungs every 4 hours as needed for shortness of breath / dyspnea or wheezing 1 Inhaler 0     Allergies:     Allergies   Allergen Reactions     Sulfa Drugs      Rash       Social History:  Social History   Substance Use Topics     Smoking status: Never Smoker     Smokeless tobacco: Never Used     Alcohol use No         Family history:  Family History   Problem Relation Age of Onset     HEART DISEASE Father      Asthma No family hx of      DIABETES No family hx of      Hypertension No family hx of      CEREBROVASCULAR DISEASE No family hx of      Breast Cancer No family hx of      Family history of headaches: yes    Review of Systems:  Skin: negative  Eyes: negative  Ears/Nose/Throat: negative  Respiratory: No shortness of breath, dyspnea on exertion, cough, or hemoptysis  Cardiovascular: negative  Gastrointestinal: negative  Genitourinary: negative  Musculoskeletal: negative  Neurologic: as above  Psychiatric: negative  Hematologic/Lymphatic/Immunologic: negative  Endocrine: negative    Physical Exam:  Vitals:    10/17/17 1504   BP: 120/79   Pulse: 77   Resp: 15   SpO2: 98%   Weight: 52.2 kg (115 lb)     Exam:  Constitutional: healthy, alert and no distress  Head: normocephalic. Atraumatic.   Eyes: no redness or jaundice noted   ENT: oropharnx normal.  MMM.  Neck supple.    Cardiovascular: RRR no m/g/r   Respiratory: clear   Gastrointestinal: soft,  non-tender, normoactive bowel sounds   : deferred  Skin: no suspicious lesions or rashes  Psychiatric: mentation appears normal and affect normal/bright    Musculoskeletal exam:  Gait/Station/Posture: normal  Cervical spine:    Flex:  45 degrees   Ext: 45 degrees   Rotation to right: 45 degrees   Rotation to left: 45 degrees   No pain with extension/rotation     Thoracic spine:  Normal     Neurologic exam:  CN:  Cranial nerves 2-12 are normal  Motor:  5/5 UE and LE strength  Reflexes:     Symmetric and normal  Sensory:  (upper and lower extremities):   Light touch: normal    Allodynia: absent    Dysethesia: absent    Hyperalgesia: absent     Diagnostic tests:  No brain imaging available for review.  Per neurology note, was done in [place] and normal.      [place] Prescription Monitoring Program reviewe      Assessment:  1. Migraine headache, at times meeting criteria for chronic migraine      Plan:  Diagnosis reviewed, treatment option addressed, and risk/benefits discussed.  Self-care instructions given.    1. Discussed with patient that other preventative treatments have not been tried, she may need to use these in order to be covered for botox.  May be denied without trying these other options.  Will look at insurance options.        Total time spent was 30 minutes, and more than 50% of face to face time was spent in counseling and/or coordination of care regarding principles of medication management, and botox.      Brenda Singh MD

## 2017-10-17 NOTE — PROGRESS NOTES
SUBJECTIVE:   Catie Nuñez is a 52 year old female who presents to clinic today for the following health issues:  Chief Complaint   Patient presents with     Recheck Medication     Headache         Anxiety Follow-Up    Status since last visit: Worsened.    Other associated symptoms:None    Complicating factors:   Significant life event: Yes-  Work stress    Current substance abuse: None- wine one bottle per month   Depression symptoms: Yes-  Feels down-   DAYLIN-7 SCORE 9/23/2015 12/7/2016 5/19/2017   Total Score - - -   Total Score - - -   Total Score 10 8 12     Migraine Follow-Up    Headaches symptoms:  Improved - feels much better controlled. She is going to [facility] after this appointment today for Botox, which is helping.     Frequency:  Sparse     Duration of headaches:     Able to do normal daily activities/work with migraines: No -     Preventative medication:     Neurologic complications: No new stroke-like symptoms, loss of vision or speech, numbness or weakness    In the past 4 weeks, how often have you gone to Urgent Care or the emergency room because of your headaches?  0    Insomnia:  Getting worse.  Difficulty falling asleep.  Most of the nights she will doze intermittently.  Rarely she feels like she gets a full night of sleep.  She hs tried melatonin, unisom.  Still taking her zoloft 150mg per day.    Tramadol:  She has reduced her usage significantly.  Her low back and hip pains continue.    Xanax/alprazolam:  She continues to try to take her alprazolam less for her anxiety.  She is currently taking 1-2 tablets per day.  She is keeping within the 40 tablets per 30 day restrictions.  Her anxiety levels continue to escalate mostly related to her current money issues and feeling out of control.    Counseling:  Intermittently in her life.  Not recently.    Psychiatry:  She has not seen a psychiatrist in years.    Yesterday Catie was told that for her last 2 weeks of work she can't  "be paid because her payor doesn't have money.  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    She is currently feeling vulnerable, betrayed, a \"myriad of emotions right now.\"      Current Outpatient Prescriptions   Medication Sig Dispense Refill     vitamin B complex with vitamin C (VITAMIN  B COMPLEX) TABS tablet Take 1 tablet by mouth daily       traMADol (ULTRAM) 50 MG tablet Take 1 tablet (50 mg) by mouth every 8 hours as needed for severe pain or pain . Limit 90 tablets per month. 90 tablet 0     ALPRAZolam (XANAX) 0.5 MG tablet Take 1 tablet (0.5 mg) by mouth 2 times daily as needed for anxiety 40 tablets to last 30 days. 40 tablet 2     traMADol (ULTRAM) 50 MG tablet Take 1 tablet (50 mg) by mouth every 8 hours as needed for severe pain or pain . 75 tablets to last 30 days. 75 tablet 0     traMADol (ULTRAM) 50 MG tablet Take 1 tablet (50 mg) by mouth every 8 hours as needed for severe pain or pain . Limit 75 tablets to last 30 days. 75 tablet 0     estradiol (ESTRACE) 1 MG tablet Take 0.5 tablets (0.5 mg) by mouth daily 90 tablet 1     sertraline (ZOLOFT) 100 MG tablet Take 1.5 tablets (150 mg) by mouth daily 135 tablet 1     fluticasone (FLONASE) 50 MCG/ACT nasal spray Spray 1-2 sprays into both nostrils daily 16 g 11     Multiple Vitamins-Minerals (MULTIVITAMIN PO)        Aspirin-Acetaminophen-Caffeine (EXCEDRIN PO) Take by mouth as needed       riboflavin 400 MG CAPS Take 400 mg by mouth every morning (Patient not taking: Reported on 10/17/2017) 30 capsule 0     propranolol (INDERAL) 20 MG tablet Take 1 tablet (20 mg) by mouth 2 times daily (Patient not taking: Reported on 10/17/2017) 60 tablet 5     norethindrone-ethinyl estradiol (FEMHRT 1/5) 1-5 MG-MCG per tablet Take 1 tablet by mouth daily (Patient not taking: Reported on 10/17/2017) 90 tablet 3      Allergies   Allergen Reactions     Sulfa Drugs      Rash          OBJECTIVE:   Vital signs:/82  Pulse 70  " "Temp 98  F (36.7  C) (Oral)  Resp 16  Ht 5' 4\" (1.626 m)  Wt 114 lb (51.7 kg)  LMP 09/01/2016  BMI 19.57 kg/m2   GENERAL APPEARANCE: healthy, alert and no distress. Smiling.   SKIN: warm and dry  PSYCH: mentation appears normal and affect normal/bright.  Good eye contact.    ASSESSMENT:   (M54.5,  G89.29) Chronic low back pain, unspecified back pain laterality, with sciatica presence unspecified  (primary encounter diagnosis)  Comment: Chronic  Plan: traMADol (ULTRAM) 50 MG tablet        Refills were given today.  She is to continue her treatment plan.  I did discuss or review with her the importance of maintaining clinic face-to-face appointment for her refills. I reviewed the chronic pain agreement.    (G43.009) Migraine without aura and without status migrainosus, not intractable  Comment: Improved  Plan: traMADol (ULTRAM) 50 MG tablet, traMADol         (ULTRAM) 50 MG tablet        She is to continue care with neuro.    (F41.1) Generalized anxiety disorder  Comment: Worse  Plan: ALPRAZolam (XANAX) 0.5 MG tablet, sertraline         (ZOLOFT) 100 MG tablet, MENTAL HEALTH REFERRAL        I did go ahead and refill her Zoloft today.  She can use the alprazolam sparingly only as prescribed. Since her mental health status is worsening, I did encourage her to follow-up with specialty. She agrees and understands.    (J31.0) Chronic rhinitis, unspecified type  Comment: Controlled  Plan: fluticasone (FLONASE) 50 MCG/ACT spray        Refills given    (Z12.11) Colon cancer screening  Comment: Routine  Plan: Fecal colorectal cancer screen (FIT)        FIT at earliest convenience.  If negative for blood, next FIT test in one year.  If positive for blood, referral for colonoscopy.  Patient verbalizes understanding.    (M75.00) Adhesive capsulitis of shoulder, unspecified laterality  Comment: Chronic  Plan: traMADol (ULTRAM) 50 MG tablet        As above    (M54.5) Midline low back pain without sciatica, unspecified " chronicity  Comment: Chronic  Plan: traMADol (ULTRAM) 50 MG tablet        As above    I spent a total of 30 min with the patient, >50% time spent face to face counseling regarding the above issues, establishing a plan of care, and coordinating follow up care.

## 2017-10-17 NOTE — MR AVS SNAPSHOT
After Visit Summary   10/17/2017    Catie Nuñez    MRN: [id number]           Patient Information     Date Of Birth          1965        Visit Information        Provider Department      10/17/2017 1:00 PM Yasmin Velazquez APRN [facility]        Today's Diagnoses     Chronic low back pain, unspecified back pain laterality, with sciatica presence unspecified    -  1    Migraine without aura and without status migrainosus, not intractable        Generalized anxiety disorder        Chronic rhinitis, unspecified type        Colon cancer screening        Adhesive capsulitis of shoulder, unspecified laterality        Midline low back pain without sciatica, unspecified chronicity           Follow-ups after your visit        Additional Services     MENTAL HEALTH REFERRAL       Your provider has referred you to: [facility]: Psychiatry Clinic [facility] [phone]   [email or web address]    All scheduling is subject to the client's specific insurance plan & benefits, provider/location availability, and provider clinical specialities.  Please arrive 15 minutes early for your first appointment and bring your completed paperwork.    Please be aware that coverage of these services is subject to the terms and limitations of your health insurance plan.  Call member services at your health plan with any benefit or coverage questions.                  Your next 10 appointments already scheduled     Oct 17, 2017  3:00 PM CDT   PROCEDURE with Yumiko Singh MD   [facility] ([facility])    [address]  [address]  [place] [id number] [phone]              Future tests that were ordered for you today     Open Future Orders        Priority Expected Expires Ordered    Fecal colorectal cancer screen (FIT) Routine 11/7/2017 1/9/2018 10/17/2017            Who to contact     If you have questions or need  "follow up information about today's clinic visit or your schedule please contact [facility] directly at [phone].  Normal or non-critical lab and imaging results will be communicated to you by [employer], letter or phone within 4 business days after the clinic has received the results. If you do not hear from us within 7 days, please contact the clinic through [employer] or phone. If you have a critical or abnormal lab result, we will notify you by phone as soon as possible.  Submit refill requests through [employer] or call your pharmacy and they will forward the refill request to us. Please allow 3 business days for your refill to be completed.          Additional Information About Your Visit        [employer] Information     [employer] gives you secure access to your electronic health record. If you see a primary care provider, you can also send messages to your care team and make appointments. If you have questions, please call your primary care clinic.  If you do not have a primary care provider, please call [phone] and they will assist you.        Care EveryWhere ID     This is your Care EveryWhere ID. This could be used by other organizations to access your [place] medical records  [id number]        Your Vitals Were     Pulse Temperature Respirations Height Last Period BMI (Body Mass Index)    70 98  F (36.7  C) (Oral) 16 5' 4\" (1.626 m) 09/01/2016 19.57 kg/m2       Blood Pressure from Last 3 Encounters:   10/17/17 130/82   07/24/17 105/76   06/29/17 124/76    Weight from Last 3 Encounters:   10/17/17 114 lb (51.7 kg)   07/24/17 109 lb (49.4 kg)   06/29/17 110 lb (49.9 kg)              We Performed the Following     MENTAL HEALTH REFERRAL          Today's Medication Changes          These changes are accurate as of: 10/17/17  2:00 PM.  If you have any questions, ask your nurse or doctor.               These medicines have changed or have updated prescriptions.        Dose/Directions    * " traMADol 50 MG tablet   Commonly known as:  ULTRAM   This may have changed:  Another medication with the same name was added. Make sure you understand how and when to take each.   Used for:  Migraine without aura and without status migrainosus, not intractable, Chronic low back pain, unspecified back pain laterality, with sciatica presence unspecified   Changed by:  Yasmin Velazquez APRN CNP        Dose:  50 mg   Take 1 tablet (50 mg) by mouth every 8 hours as needed for severe pain or pain . Limit 90 tablets per month.   Quantity:  90 tablet   Refills:  0       * traMADol 50 MG tablet   Commonly known as:  ULTRAM   This may have changed:  Another medication with the same name was added. Make sure you understand how and when to take each.   Used for:  Migraine without aura and without status migrainosus, not intractable, Chronic low back pain, unspecified back pain laterality, with sciatica presence unspecified   Changed by:  Yasmin Velazquez APRN CNP        Dose:  50 mg   Take 1 tablet (50 mg) by mouth every 8 hours as needed for severe pain or pain . Limit 75 tablets to last 30 days.   Quantity:  75 tablet   Refills:  0       * traMADol 50 MG tablet   Commonly known as:  ULTRAM   This may have changed:  Another medication with the same name was added. Make sure you understand how and when to take each.   Used for:  Migraine without aura and without status migrainosus, not intractable, Chronic low back pain, unspecified back pain laterality, with sciatica presence unspecified   Changed by:  Yasmin Velazquez APRN CNP        Dose:  50 mg   Take 1 tablet (50 mg) by mouth every 8 hours as needed for severe pain or pain . 75 tablets to last 30 days.   Quantity:  75 tablet   Refills:  0       * traMADol 50 MG tablet   Commonly known as:  ULTRAM   This may have changed:  You were already taking a medication with the same name, and this prescription was added. Make sure you understand how and when  to take each.   Used for:  Midline low back pain without sciatica, unspecified chronicity   Changed by:  Yasmin Velazquez APRN CNP        Dose:  50 mg   Start taking on:  11/17/2017   Take 1 tablet (50 mg) by mouth every 8 hours as needed for moderate pain 75 tablets to last 30 days.   Quantity:  75 tablet   Refills:  0       * traMADol 50 MG tablet   Commonly known as:  ULTRAM   This may have changed:  You were already taking a medication with the same name, and this prescription was added. Make sure you understand how and when to take each.   Used for:  Migraine without aura and without status migrainosus, not intractable, Adhesive capsulitis of shoulder, unspecified laterality   Changed by:  Yasmin Velazquez APRN CNP        Dose:  50 mg   Start taking on:  12/17/2017   Take 1 tablet (50 mg) by mouth every 8 hours as needed for moderate pain 75 tablets to last 30 days.   Quantity:  75 tablet   Refills:  0       * Notice:  This list has 5 medication(s) that are the same as other medications prescribed for you. Read the directions carefully, and ask your doctor or other care provider to review them with you.         Where to get your medicines      These medications were sent to [facility] [address] [place] [address] AT [address] [address]    Hours:  24-hours Phone:  [phone]     fluticasone 50 MCG/ACT spray    sertraline 100 MG tablet         Some of these will need a paper prescription and others can be bought over the counter.  Ask your nurse if you have questions.     Bring a paper prescription for each of these medications     ALPRAZolam 0.5 MG tablet    traMADol 50 MG tablet    traMADol 50 MG tablet    traMADol 50 MG tablet                Primary Care Provider Office Phone # Fax #    JEREMY Gonzalez [phone] [address] [place] [address] [employer]  Access to Services     [facility]: Hadii aad ku hadvalerianojoshua Darrickali, wajudda luqadaha, qaybta kaalmavickey leone. So [facility] [phone].    ATENCIÓN: Si alexisla ranjit, tiene a weinberg disposición servicios gratuitos de asistencia lingüística. Llame al [phone].    We comply with applicable federal civil rights laws and [place] laws. We do not discriminate on the basis of race, color, national origin, age, disability, sex, sexual orientation, or gender identity.            Thank you!     Thank you for choosing [facility]  for your care. Our goal is always to provide you with excellent care. Hearing back from our patients is one way we can continue to improve our services. Please take a few minutes to complete the written survey that you may receive in the mail after your visit with us. Thank you!             Your Updated Medication List - Protect others around you: Learn how to safely use, store and throw away your medicines at www.disposemymeds.org.          This list is accurate as of: 10/17/17  2:00 PM.  Always use your most recent med list.                   Brand Name Dispense Instructions for use Diagnosis    ALPRAZolam 0.5 MG tablet    XANAX    40 tablet    Take 1 tablet (0.5 mg) by mouth 2 times daily as needed for anxiety 40 tablets to last 30 days.    Generalized anxiety disorder       estradiol 1 MG tablet    ESTRACE    90 tablet    Take 0.5 tablets (0.5 mg) by mouth daily    Symptomatic menopausal or female climacteric states       EXCEDRIN PO      Take by mouth as needed        fluticasone 50 MCG/ACT spray    FLONASE    16 g    Spray 1-2 sprays into both nostrils daily    Chronic rhinitis, unspecified type       MULTIVITAMIN PO           norethindrone-ethinyl estradiol 1-5 MG-MCG per tablet    FEMHRT 1/5    90 tablet    Take 1 tablet by mouth daily    Symptomatic menopausal or female climacteric states       propranolol 20 MG tablet    INDERAL     60 tablet    Take 1 tablet (20 mg) by mouth 2 times daily    Migraine without aura and without status migrainosus, not intractable       riboflavin 400 MG Caps     30 capsule    Take 400 mg by mouth every morning    Migraine without aura and without status migrainosus, not intractable       sertraline 100 MG tablet    ZOLOFT    135 tablet    Take 1.5 tablets (150 mg) by mouth daily    Generalized anxiety disorder       * traMADol 50 MG tablet    ULTRAM    90 tablet    Take 1 tablet (50 mg) by mouth every 8 hours as needed for severe pain or pain . Limit 90 tablets per month.    Migraine without aura and without status migrainosus, not intractable, Chronic low back pain, unspecified back pain laterality, with sciatica presence unspecified       * traMADol 50 MG tablet    ULTRAM    75 tablet    Take 1 tablet (50 mg) by mouth every 8 hours as needed for severe pain or pain . Limit 75 tablets to last 30 days.    Migraine without aura and without status migrainosus, not intractable, Chronic low back pain, unspecified back pain laterality, with sciatica presence unspecified       * traMADol 50 MG tablet    ULTRAM    75 tablet    Take 1 tablet (50 mg) by mouth every 8 hours as needed for severe pain or pain . 75 tablets to last 30 days.    Migraine without aura and without status migrainosus, not intractable, Chronic low back pain, unspecified back pain laterality, with sciatica presence unspecified       * traMADol 50 MG tablet   Start taking on:  11/17/2017    ULTRAM    75 tablet    Take 1 tablet (50 mg) by mouth every 8 hours as needed for moderate pain 75 tablets to last 30 days.    Midline low back pain without sciatica, unspecified chronicity       * traMADol 50 MG tablet   Start taking on:  12/17/2017    ULTRAM    75 tablet    Take 1 tablet (50 mg) by mouth every 8 hours as needed for moderate pain 75 tablets to last 30 days.    Migraine without aura and without status migrainosus, not intractable, Adhesive  capsulitis of shoulder, unspecified laterality       vitamin B complex with vitamin C Tabs tablet      Take 1 tablet by mouth daily        * Notice:  This list has 5 medication(s) that are the same as other medications prescribed for you. Read the directions carefully, and ask your doctor or other care provider to review them with you.

## 2017-11-11 ENCOUNTER — HEALTH MAINTENANCE LETTER (OUTPATIENT)
Age: 52
End: 2017-11-11

## 2017-12-07 DIAGNOSIS — M75.00 ADHESIVE CAPSULITIS OF SHOULDER, UNSPECIFIED LATERALITY: ICD-10-CM

## 2017-12-07 DIAGNOSIS — G43.009 MIGRAINE WITHOUT AURA AND WITHOUT STATUS MIGRAINOSUS, NOT INTRACTABLE: ICD-10-CM

## 2017-12-07 DIAGNOSIS — M54.5 CHRONIC LOW BACK PAIN, UNSPECIFIED BACK PAIN LATERALITY, WITH SCIATICA PRESENCE UNSPECIFIED: ICD-10-CM

## 2017-12-07 DIAGNOSIS — G89.29 CHRONIC LOW BACK PAIN, UNSPECIFIED BACK PAIN LATERALITY, WITH SCIATICA PRESENCE UNSPECIFIED: ICD-10-CM

## 2017-12-07 NOTE — TELEPHONE ENCOUNTER
Reason for Call:  Other prescription    Detailed comments: Pt is calling stating she lost her paper RX for tramadol and is hoping to get a duplicate. She stated this has never happened before. Can RN's check .    Phone Number Patient can be reached at: Home number on file [phone] (home)    Best Time: anytime    Can we leave a detailed message on this number? YES    Call taken on 12/7/2017 at 4:19 PM by Anisa Potts

## 2017-12-08 RX ORDER — TRAMADOL HYDROCHLORIDE 50 MG/1
50 TABLET ORAL EVERY 8 HOURS PRN
Qty: 75 TABLET | Refills: 0 | Status: CANCELLED | OUTPATIENT
Start: 2017-12-08

## 2017-12-08 NOTE — TELEPHONE ENCOUNTER
Per  last refill was 11/17/2017, are you willing to rewrite this script? It looks like she is not due for a refill until 12/17/2017

## 2017-12-10 ENCOUNTER — OFFICE VISIT (OUTPATIENT)
Dept: URGENT CARE | Facility: URGENT CARE | Age: 52
End: 2017-12-10
Payer: COMMERCIAL

## 2017-12-10 VITALS
DIASTOLIC BLOOD PRESSURE: 60 MMHG | HEIGHT: 64 IN | TEMPERATURE: 97.9 F | HEART RATE: 84 BPM | SYSTOLIC BLOOD PRESSURE: 116 MMHG | WEIGHT: 115 LBS | RESPIRATION RATE: 16 BRPM | BODY MASS INDEX: 19.63 KG/M2

## 2017-12-10 DIAGNOSIS — J20.9 ACUTE BRONCHITIS WITH COEXISTING CONDITION REQUIRING PROPHYLACTIC TREATMENT: Primary | ICD-10-CM

## 2017-12-10 DIAGNOSIS — J01.90 ACUTE SINUSITIS WITH COEXISTING CONDITION REQUIRING PROPHYLACTIC TREATMENT: ICD-10-CM

## 2017-12-10 PROCEDURE — 99214 OFFICE O/P EST MOD 30 MIN: CPT | Performed by: FAMILY MEDICINE

## 2017-12-10 RX ORDER — FLUTICASONE PROPIONATE 50 MCG
1-2 SPRAY, SUSPENSION (ML) NASAL DAILY
Qty: 16 G | Refills: 0 | Status: SHIPPED | OUTPATIENT
Start: 2017-12-10 | End: 2018-01-12

## 2017-12-10 RX ORDER — ALBUTEROL SULFATE 90 UG/1
1-2 AEROSOL, METERED RESPIRATORY (INHALATION) EVERY 4 HOURS PRN
Qty: 1 INHALER | Refills: 0 | Status: SHIPPED | OUTPATIENT
Start: 2017-12-10 | End: 2018-01-09

## 2017-12-10 RX ORDER — DOXYCYCLINE 100 MG/1
100 CAPSULE ORAL 2 TIMES DAILY
Qty: 20 CAPSULE | Refills: 0 | Status: SHIPPED | OUTPATIENT
Start: 2017-12-10 | End: 2017-12-20

## 2017-12-10 NOTE — PATIENT INSTRUCTIONS
Acute Bronchitis  Your healthcare provider has told you that you have acute bronchitis. Bronchitis is infection or inflammation of the bronchial tubes (airways in the lungs). Normally, air moves easily in and out of the airways. Bronchitis narrows the airways, making it harder for air to flow in and out of the lungs. This causes symptoms such as shortness of breath, coughing up yellow or green mucus, and wheezing. Bronchitis can be acute or chronic. Acute means the condition comes on quickly and goes away in a short time, usually within 3 to 10 days. Chronic means a condition lasts a long time and often comes back.    What causes acute bronchitis?  Acute bronchitis almost always starts as a viral respiratory infection, such as a cold or the flu. Certain factors make it more likely for a cold or flu to turn into bronchitis. These include being very young, being elderly, having a heart or lung problem, or having a weak immune system. Cigarette smoking also makes bronchitis more likely.  When bronchitis develops, the airways become swollen. The airways may also become infected with bacteria. This is known as a secondary infection.  Diagnosing acute bronchitis  Your healthcare provider will examine you and ask about your symptoms and health history. You may also have a sputum culture to test the fluid in your lungs. Chest X-rays may be done to look for infection in the lungs.  Treating acute bronchitis  Bronchitis usually clears up as the cold or flu goes away. You can help feel better faster by doing the following:    Take medicine as directed. You may be told to take ibuprofen or other over-the-counter medicines. These help relieve inflammation in your bronchial tubes. Your healthcare provider may prescribe an inhaler to help open up the bronchial tubes. Most of the time, acute bronchitis is caused by a viral infection. Antibiotics are usually not prescribed for viral infections.    Drink plenty of fluids, such as  water, juice, or warm soup. Fluids loosen mucus so that you can cough it up. This helps you breathe more easily. Fluids also prevent dehydration.    Make sure you get plenty of rest.    Do not smoke. Do not allow anyone else to smoke in your home.  Recovery and follow-up  Follow up with your doctor as you are told. You will likely feel better in a week or two. But a dry cough can linger beyond that time. Let your doctor know if you still have symptoms (other than a dry cough) after 2 weeks, or if you re prone to getting bronchial infections. Take steps to protect yourself from future infections. These steps include stopping smoking and avoiding tobacco smoke, washing your hands often, and getting a yearly flu shot.  When to call your healthcare provider  Call the healthcare provider if you have any of the following:    Fever of 100.4 F (38.0 C) or higher, or as advised    Symptoms that get worse, or new symptoms    Trouble breathing    Symptoms that don t start to improve within a week, or within 3 days of taking antibiotics   Date Last Reviewed: 12/1/2016 2000-2017 [employer]. [address] [place] [address]. All rights reserved. This information is not intended as a substitute for professional medical care. Always follow your healthcare professional's instructions.        Sinusitis (Antibiotic Treatment)    The sinuses are air-filled spaces within the bones of the face. They connect to the inside of the nose. Sinusitis is an inflammation of the tissue lining the sinus cavity. Sinus inflammation can occur during a cold. It can also be due to allergies to pollens and other particles in the air. Sinusitis can cause symptoms of sinus congestion and fullness. A sinus infection causes fever, headache and facial pain. There is often green or yellow drainage from the nose or into the back of the throat (post-nasal drip). You have been given antibiotics to treat this condition.  Home  care:    Take the full course of antibiotics as instructed. Do not stop taking them, even if you feel better.    Drink plenty of water, hot tea, and other liquids. This may help thin mucus. It also may promote sinus drainage.    Heat may help soothe painful areas of the face. Use a towel soaked in hot water. Or,  the shower and direct the hot spray onto your face. Using a vaporizer along with a menthol rub at night may also help.     An expectorant containing guaifenesin may help thin the mucus and promote drainage from the sinuses.    Over-the-counter decongestants may be used unless a similar medicine was prescribed. Nasal sprays work the fastest. Use one that contains phenylephrine or oxymetazoline. First blow the nose gently. Then use the spray. Do not use these medicines more often than directed on the label or symptoms may get worse. You may also use tablets containing pseudoephedrine. Avoid products that combine ingredients, because side effects may be increased. Read labels. You can also ask the pharmacist for help. (NOTE: Persons with high blood pressure should not use decongestants. They can raise blood pressure.)    Over-the-counter antihistamines may help if allergies contributed to your sinusitis.      Do not use nasal rinses or irrigation during an acute sinus infection, unless told to by your health care provider. Rinsing may spread the infection to other sinuses.    Use acetaminophen or ibuprofen to control pain, unless another pain medicine was prescribed. (If you have chronic liver or kidney disease or ever had a stomach ulcer, talk with your doctor before using these medicines. Aspirin should never be used in anyone under 18 years of age who is ill with a fever. It may cause severe liver damage.)    Don't smoke. This can worsen symptoms.  Follow-up care  Follow up with your healthcare provider or our staff if you are not improving within the next week.  When to seek medical advice  Call  your healthcare provider if any of these occur:    Facial pain or headache becoming more severe    Stiff neck    Unusual drowsiness or confusion    Swelling of the forehead or eyelids    Vision problems, including blurred or double vision    Fever of 100.4 F (38 C) or higher, or as directed by your healthcare provider    Seizure    Breathing problems    Symptoms not resolving within 10 days  Date Last Reviewed: 4/13/2015 2000-2017 [employer]. [address] [place] [address], [place] [address]. All rights reserved. This information is not intended as a substitute for professional medical care. Always follow your healthcare professional's instructions.

## 2017-12-10 NOTE — MR AVS SNAPSHOT
After Visit Summary   12/10/2017    Catie Nuñez    MRN: [id number]           Patient Information     Date Of Birth          1965        Visit Information        Provider Department      12/10/2017 5:05 PM Dena Nevarez MD [facility]        Today's Diagnoses     Acute bronchitis with coexisting condition requiring prophylactic treatment    -  1    Acute sinusitis with coexisting condition requiring prophylactic treatment          Care Instructions      Acute Bronchitis  Your healthcare provider has told you that you have acute bronchitis. Bronchitis is infection or inflammation of the bronchial tubes (airways in the lungs). Normally, air moves easily in and out of the airways. Bronchitis narrows the airways, making it harder for air to flow in and out of the lungs. This causes symptoms such as shortness of breath, coughing up yellow or green mucus, and wheezing. Bronchitis can be acute or chronic. Acute means the condition comes on quickly and goes away in a short time, usually within 3 to 10 days. Chronic means a condition lasts a long time and often comes back.    What causes acute bronchitis?  Acute bronchitis almost always starts as a viral respiratory infection, such as a cold or the flu. Certain factors make it more likely for a cold or flu to turn into bronchitis. These include being very young, being elderly, having a heart or lung problem, or having a weak immune system. Cigarette smoking also makes bronchitis more likely.  When bronchitis develops, the airways become swollen. The airways may also become infected with bacteria. This is known as a secondary infection.  Diagnosing acute bronchitis  Your healthcare provider will examine you and ask about your symptoms and health history. You may also have a sputum culture to test the fluid in your lungs. Chest X-rays may be done to look for infection in the lungs.  Treating acute bronchitis  Bronchitis  usually clears up as the cold or flu goes away. You can help feel better faster by doing the following:    Take medicine as directed. You may be told to take ibuprofen or other over-the-counter medicines. These help relieve inflammation in your bronchial tubes. Your healthcare provider may prescribe an inhaler to help open up the bronchial tubes. Most of the time, acute bronchitis is caused by a viral infection. Antibiotics are usually not prescribed for viral infections.    Drink plenty of fluids, such as water, juice, or warm soup. Fluids loosen mucus so that you can cough it up. This helps you breathe more easily. Fluids also prevent dehydration.    Make sure you get plenty of rest.    Do not smoke. Do not allow anyone else to smoke in your home.  Recovery and follow-up  Follow up with your doctor as you are told. You will likely feel better in a week or two. But a dry cough can linger beyond that time. Let your doctor know if you still have symptoms (other than a dry cough) after 2 weeks, or if you re prone to getting bronchial infections. Take steps to protect yourself from future infections. These steps include stopping smoking and avoiding tobacco smoke, washing your hands often, and getting a yearly flu shot.  When to call your healthcare provider  Call the healthcare provider if you have any of the following:    Fever of 100.4 F (38.0 C) or higher, or as advised    Symptoms that get worse, or new symptoms    Trouble breathing    Symptoms that don t start to improve within a week, or within 3 days of taking antibiotics   Date Last Reviewed: 12/1/2016 2000-2017 [employer]. [address] [place] [address], [place] [address]. All rights reserved. This information is not intended as a substitute for professional medical care. Always follow your healthcare professional's instructions.        Sinusitis (Antibiotic Treatment)    The sinuses are air-filled spaces within the bones of the face. They connect  to the inside of the nose. Sinusitis is an inflammation of the tissue lining the sinus cavity. Sinus inflammation can occur during a cold. It can also be due to allergies to pollens and other particles in the air. Sinusitis can cause symptoms of sinus congestion and fullness. A sinus infection causes fever, headache and facial pain. There is often green or yellow drainage from the nose or into the back of the throat (post-nasal drip). You have been given antibiotics to treat this condition.  Home care:    Take the full course of antibiotics as instructed. Do not stop taking them, even if you feel better.    Drink plenty of water, hot tea, and other liquids. This may help thin mucus. It also may promote sinus drainage.    Heat may help soothe painful areas of the face. Use a towel soaked in hot water. Or,  the shower and direct the hot spray onto your face. Using a vaporizer along with a menthol rub at night may also help.     An expectorant containing guaifenesin may help thin the mucus and promote drainage from the sinuses.    Over-the-counter decongestants may be used unless a similar medicine was prescribed. Nasal sprays work the fastest. Use one that contains phenylephrine or oxymetazoline. First blow the nose gently. Then use the spray. Do not use these medicines more often than directed on the label or symptoms may get worse. You may also use tablets containing pseudoephedrine. Avoid products that combine ingredients, because side effects may be increased. Read labels. You can also ask the pharmacist for help. (NOTE: Persons with high blood pressure should not use decongestants. They can raise blood pressure.)    Over-the-counter antihistamines may help if allergies contributed to your sinusitis.      Do not use nasal rinses or irrigation during an acute sinus infection, unless told to by your health care provider. Rinsing may spread the infection to other sinuses.    Use acetaminophen or ibuprofen to  control pain, unless another pain medicine was prescribed. (If you have chronic liver or kidney disease or ever had a stomach ulcer, talk with your doctor before using these medicines. Aspirin should never be used in anyone under 18 years of age who is ill with a fever. It may cause severe liver damage.)    Don't smoke. This can worsen symptoms.  Follow-up care  Follow up with your healthcare provider or our staff if you are not improving within the next week.  When to seek medical advice  Call your healthcare provider if any of these occur:    Facial pain or headache becoming more severe    Stiff neck    Unusual drowsiness or confusion    Swelling of the forehead or eyelids    Vision problems, including blurred or double vision    Fever of 100.4 F (38 C) or higher, or as directed by your healthcare provider    Seizure    Breathing problems    Symptoms not resolving within 10 days  Date Last Reviewed: 4/13/2015 2000-2017 [employer]. [address] [place] [address]. All rights reserved. This information is not intended as a substitute for professional medical care. Always follow your healthcare professional's instructions.                Follow-ups after your visit        Your next 10 appointments already scheduled     Dec 19, 2017  2:45 PM CST   SHORT with Emmie James MD   [facility] ([facility]    [address] [place] [id number] [phone]              Who to contact     If you have questions or need follow up information about today's clinic visit or your schedule please contact [facility] directly at [phone].  Normal or non-critical lab and imaging results will be communicated to you by MyChart, letter or phone within 4 business days after the clinic has received the results. If you do not hear from us within 7 days, please contact the clinic through MyChart or phone. If you have a critical or  "abnormal lab result, we will notify you by phone as soon as possible.  Submit refill requests through [employer] or call your pharmacy and they will forward the refill request to us. Please allow 3 business days for your refill to be completed.          Additional Information About Your Visit        [employer] Information     [employer] gives you secure access to your electronic health record. If you see a primary care provider, you can also send messages to your care team and make appointments. If you have questions, please call your primary care clinic.  If you do not have a primary care provider, please call [phone] and they will assist you.        Care EveryWhere ID     This is your Care EveryWhere ID. This could be used by other organizations to access your [place] medical records  [id number]        Your Vitals Were     Pulse Temperature Respirations Height Last Period BMI (Body Mass Index)    84 97.9  F (36.6  C) (Oral) 16 5' 4\" (1.626 m) 09/01/2016 19.74 kg/m2       Blood Pressure from Last 3 Encounters:   12/10/17 116/60   10/17/17 120/79   10/17/17 130/82    Weight from Last 3 Encounters:   12/10/17 115 lb (52.2 kg)   10/17/17 115 lb (52.2 kg)   10/17/17 114 lb (51.7 kg)              Today, you had the following     No orders found for display         Today's Medication Changes          These changes are accurate as of: 12/10/17  5:42 PM.  If you have any questions, ask your nurse or doctor.               Start taking these medicines.        Dose/Directions    albuterol 108 (90 BASE) MCG/ACT Inhaler   Commonly known as:  PROAIR HFA/PROVENTIL HFA/VENTOLIN HFA   Used for:  Acute bronchitis with coexisting condition requiring prophylactic treatment   Started by:  Dena Nevarez MD        Dose:  1-2 puff   Inhale 1-2 puffs into the lungs every 4 hours as needed for shortness of breath / dyspnea or wheezing   Quantity:  1 Inhaler   Refills:  0       doxycycline 100 MG capsule   Commonly known as:  VIBRAMYCIN "   Used for:  Acute bronchitis with coexisting condition requiring prophylactic treatment, Acute sinusitis with coexisting condition requiring prophylactic treatment   Started by:  Dena Nevarez MD        Dose:  100 mg   Take 1 capsule (100 mg) by mouth 2 times daily for 10 days   Quantity:  20 capsule   Refills:  0         These medicines have changed or have updated prescriptions.        Dose/Directions    * fluticasone 50 MCG/ACT spray   Commonly known as:  FLONASE   This may have changed:  Another medication with the same name was added. Make sure you understand how and when to take each.   Used for:  Chronic rhinitis, unspecified type   Changed by:  Yasmin Velazquez APRN CNP        Dose:  1-2 spray   Spray 1-2 sprays into both nostrils daily   Quantity:  16 g   Refills:  11       * fluticasone 50 MCG/ACT spray   Commonly known as:  FLONASE   This may have changed:  You were already taking a medication with the same name, and this prescription was added. Make sure you understand how and when to take each.   Used for:  Acute sinusitis with coexisting condition requiring prophylactic treatment, Acute bronchitis with coexisting condition requiring prophylactic treatment   Changed by:  Dena Nevarez MD        Dose:  1-2 spray   Spray 1-2 sprays into both nostrils daily   Quantity:  16 g   Refills:  0       * Notice:  This list has 2 medication(s) that are the same as other medications prescribed for you. Read the directions carefully, and ask your doctor or other care provider to review them with you.         Where to get your medicines      Some of these will need a paper prescription and others can be bought over the counter.  Ask your nurse if you have questions.     Bring a paper prescription for each of these medications     albuterol 108 (90 BASE) MCG/ACT Inhaler    doxycycline 100 MG capsule    fluticasone 50 MCG/ACT spray                Primary Care Provider Office Phone # Fax #    Yasmin  YASMANI Velazquez, APRN [phone]       [address] [place] [address]        Equal Access to Services     SHANNON SANTANA : Hadii aad ku hadvalerianojoshua Sokofi, waaxda luqadaha, qaybta kaalmada adeedmundo, vickey coryin hayaaalvino dunne brandon taras kincaid. So [facility] [phone].    ATENCIÓN: Si habla español, tiene a weinberg disposición servicios gratuitos de asistencia lingüística. Llame al [phone].    We comply with applicable federal civil rights laws and [place] laws. We do not discriminate on the basis of race, color, national origin, age, disability, sex, sexual orientation, or gender identity.            Thank you!     Thank you for choosing [facility]  for your care. Our goal is always to provide you with excellent care. Hearing back from our patients is one way we can continue to improve our services. Please take a few minutes to complete the written survey that you may receive in the mail after your visit with us. Thank you!             Your Updated Medication List - Protect others around you: Learn how to safely use, store and throw away your medicines at www.disposemymeds.org.          This list is accurate as of: 12/10/17  5:42 PM.  Always use your most recent med list.                   Brand Name Dispense Instructions for use Diagnosis    albuterol 108 (90 BASE) MCG/ACT Inhaler    PROAIR HFA/PROVENTIL HFA/VENTOLIN HFA    1 Inhaler    Inhale 1-2 puffs into the lungs every 4 hours as needed for shortness of breath / dyspnea or wheezing    Acute bronchitis with coexisting condition requiring prophylactic treatment       ALPRAZolam 0.5 MG tablet    XANAX    40 tablet    Take 1 tablet (0.5 mg) by mouth 2 times daily as needed for anxiety 40 tablets to last 30 days.    Generalized anxiety disorder       doxycycline 100 MG capsule    VIBRAMYCIN    20 capsule    Take 1 capsule (100 mg) by mouth 2 times daily for 10 days    Acute bronchitis with coexisting condition  requiring prophylactic treatment, Acute sinusitis with coexisting condition requiring prophylactic treatment       estradiol 1 MG tablet    ESTRACE    90 tablet    Take 0.5 tablets (0.5 mg) by mouth daily    Symptomatic menopausal or female climacteric states       EXCEDRIN PO      Take by mouth as needed        * fluticasone 50 MCG/ACT spray    FLONASE    16 g    Spray 1-2 sprays into both nostrils daily    Chronic rhinitis, unspecified type       * fluticasone 50 MCG/ACT spray    FLONASE    16 g    Spray 1-2 sprays into both nostrils daily    Acute sinusitis with coexisting condition requiring prophylactic treatment, Acute bronchitis with coexisting condition requiring prophylactic treatment       MULTIVITAMIN PO           norethindrone-ethinyl estradiol 1-5 MG-MCG per tablet    FEMHRT 1/5    90 tablet    Take 1 tablet by mouth daily    Symptomatic menopausal or female climacteric states       riboflavin 400 MG Caps     30 capsule    Take 400 mg by mouth every morning    Migraine without aura and without status migrainosus, not intractable       sertraline 100 MG tablet    ZOLOFT    135 tablet    Take 1.5 tablets (150 mg) by mouth daily    Generalized anxiety disorder       * traMADol 50 MG tablet    ULTRAM    75 tablet    Take 1 tablet (50 mg) by mouth every 8 hours as needed for severe pain or pain . Limit 75 tablets to last 30 days.    Migraine without aura and without status migrainosus, not intractable, Chronic low back pain, unspecified back pain laterality, with sciatica presence unspecified       * traMADol 50 MG tablet    ULTRAM    75 tablet    Take 1 tablet (50 mg) by mouth every 8 hours as needed for severe pain or pain . 75 tablets to last 30 days.    Migraine without aura and without status migrainosus, not intractable, Chronic low back pain, unspecified back pain laterality, with sciatica presence unspecified       * traMADol 50 MG tablet    ULTRAM    75 tablet    Take 1 tablet (50 mg) by mouth every  8 hours as needed for moderate pain 75 tablets to last 30 days.    Midline low back pain without sciatica, unspecified chronicity       * traMADol 50 MG tablet   Start taking on:  12/17/2017    ULTRAM    75 tablet    Take 1 tablet (50 mg) by mouth every 8 hours as needed for moderate pain 75 tablets to last 30 days.    Migraine without aura and without status migrainosus, not intractable, Adhesive capsulitis of shoulder, unspecified laterality       * Notice:  This list has 6 medication(s) that are the same as other medications prescribed for you. Read the directions carefully, and ask your doctor or other care provider to review them with you.

## 2017-12-10 NOTE — PROGRESS NOTES
SUBJECTIVE:  Chief Complaint   Patient presents with     Urgent Care     URI     sick x 8 days. aches, nasty cold symptoms. got worse last night, fever and chills. loss of voice and short of breath     Catie Nuñez is a 52 year old female here with concerns about  cough , shortness of breath. and central chest pain. and sinus pressure and pain.    Patient denies pleuritic chest pain and wheezing.   She states onset of symptoms were 1 week(s) ago.  Her cough is described as persistent, daytime, nightime and productive of yellow sputum.    The patient's respiratory symptoms are moderate and worsening.      The patient's respiratory symptoms are exacerbated by exercise    She has had maxillary, frontal pressure.   Course of the sinus symptoms  is worsening and the severity is moderate  Predisposing factors for developing sinus symptoms include recent illness.  Current and Associated symptoms:  nasal congestion, ear pain bilateral, facial pain/pressure and headache  Recent treatment has included: None      Past Medical History:   Diagnosis Date     Anxiety      ASCUS with positive high risk HPV 6/2015,09/14/16    atypia on colp, 09/14/16: ASCUS + HR HPV 16 and other.      Depression      Fracture of great toe 2/20/2014     History of scoliosis      Hx of colposcopy with cervical biopsy 10/06/16    10/06/16: [facility] NIL.     MVP (mitral valve prolapse)      Unexplained endometrial cells on cervical Pap smear 6/2015    thin endometrium on US       ALLERGIES:  Sulfa drugs      Current Outpatient Prescriptions on File Prior to Visit:  sertraline (ZOLOFT) 100 MG tablet Take 1.5 tablets (150 mg) by mouth daily   traMADol (ULTRAM) 50 MG tablet Take 1 tablet (50 mg) by mouth every 8 hours as needed for severe pain or pain . Limit 75 tablets to last 30 days.   estradiol (ESTRACE) 1 MG tablet Take 0.5 tablets (0.5 mg) by mouth daily   traMADol (ULTRAM) 50 MG tablet Take 1 tablet (50 mg) by mouth every 8 hours as needed for  "severe pain or pain . 75 tablets to last 30 days.   ALPRAZolam (XANAX) 0.5 MG tablet Take 1 tablet (0.5 mg) by mouth 2 times daily as needed for anxiety 40 tablets to last 30 days.   fluticasone (FLONASE) 50 MCG/ACT spray Spray 1-2 sprays into both nostrils daily   [START ON 12/17/2017] traMADol (ULTRAM) 50 MG tablet Take 1 tablet (50 mg) by mouth every 8 hours as needed for moderate pain 75 tablets to last 30 days.   traMADol (ULTRAM) 50 MG tablet Take 1 tablet (50 mg) by mouth every 8 hours as needed for moderate pain 75 tablets to last 30 days.   riboflavin 400 MG CAPS Take 400 mg by mouth every morning   norethindrone-ethinyl estradiol (FEMHRT 1/5) 1-5 MG-MCG per tablet Take 1 tablet by mouth daily   Multiple Vitamins-Minerals (MULTIVITAMIN PO)    Aspirin-Acetaminophen-Caffeine (EXCEDRIN PO) Take by mouth as needed     No current facility-administered medications on file prior to visit.     Social History   Substance Use Topics     Smoking status: Never Smoker     Smokeless tobacco: Never Used     Alcohol use No       Family History   Problem Relation Age of Onset     HEART DISEASE Father      Asthma No family hx of      DIABETES No family hx of      Hypertension No family hx of      CEREBROVASCULAR DISEASE No family hx of      Breast Cancer No family hx of        ROS:  CONSTITUTIONAL:NEGATIVE for fever, chills, change in weight  INTEGUMENTARY/SKIN: NEGATIVE for worrisome rashes, moles or lesions  EYES: NEGATIVE for vision changes or irritation  GI: NEGATIVE for nausea, abdominal pain, heartburn, or change in bowel habits    OBJECTIVE:  /60  Pulse 84  Temp 97.9  F (36.6  C) (Oral)  Resp 16  Ht 5' 4\" (1.626 m)  Wt 115 lb (52.2 kg)  LMP 09/01/2016  BMI 19.74 kg/m2  Exam:GENERAL APPEARANCE: alert, moderate distress and cooperative  Frequent congested cough  EYES: EOMI,  PERRL, conjunctiva clear  HENT: ear canals and TM's normal.  Nose and mouth without ulcers, erythema or lesions  HENT: frontal sinus " tenderness  and maxillary sinus tenderness   NECK: supple, nontender, no lymphadenopathy  RESP: rhonchi few bilateral  CV: regular rates and rhythm, normal S1 S2, no murmur noted  NEURO: Normal strength and tone, sensory exam grossly normal,  normal speech and mentation  SKIN: no suspicious lesions or rashes       ASSESSMENT:  Acute bronchitis with coexisting condition requiring prophylactic treatment     - doxycycline (VIBRAMYCIN) 100 MG capsule; Take 1 capsule (100 mg) by mouth 2 times daily for 10 days     - albuterol (PROAIR HFA/PROVENTIL HFA/VENTOLIN HFA) 108 (90 BASE) MCG/ACT Inhaler; Inhale 1-2 puffs into the lungs every 4 hours as needed for shortness of breath / dyspnea or wheezing         Symptomatic measures for cough and chest congestion are encouraged, humidified air, plenty of fluids.  Patient may consider OTC expectorant and/or cough suppressant to treat symptoms.         Acute sinusitis with coexisting condition requiring prophylactic treatment     - doxycycline (VIBRAMYCIN) 100 MG capsule; Take 1 capsule (100 mg) by mouth 2 times daily for 10 days  - fluticasone (FLONASE) 50 MCG/ACT spray; Spray 1-2 sprays into both nostrils daily         We discussed the primary importance of home cares to promote drainage and ventilation of the sinuses to decrease symptoms of sinus pressure and to eliminate infectious drainage from the sinuses.  I encouraged the use of saline nasal spray as needed to promote cleaning of the nasal passages and to promote drainage of the sinuses.  Allergy medications and steroid nasal spray help reduce swelling within the nasal tissue and may help open drainage/ ventilation passages to the sinuses.  Expectorants are recommended rather than decongestants to help promote sinus drainage.        Follow up with primary clinic if not improving

## 2017-12-10 NOTE — NURSING NOTE
"Chief Complaint   Patient presents with     Urgent Care     URI     sick x 8 days. aches, nasty cold symptoms. got worse last night, fever and chills. loss of voice and short of breath       Initial /60  Temp 97.9  F (36.6  C) (Oral)  Resp 16  Ht 5' 4\" (1.626 m)  Wt 115 lb (52.2 kg)  LMP 09/01/2016  BMI 19.74 kg/m2 Estimated body mass index is 19.74 kg/(m^2) as calculated from the following:    Height as of this encounter: 5' 4\" (1.626 m).    Weight as of this encounter: 115 lb (52.2 kg).  Medication Reconciliation: complete  "

## 2017-12-12 RX ORDER — TRAMADOL HYDROCHLORIDE 50 MG/1
50 TABLET ORAL EVERY 8 HOURS PRN
Qty: 75 TABLET | Refills: 0 | Status: SHIPPED | OUTPATIENT
Start: 2017-12-17 | End: 2018-01-09

## 2017-12-13 NOTE — TELEPHONE ENCOUNTER
Notified patient. She said someone called her and she is going to pick this up at .  It did not get documented yet.  Salud Elizalde RN

## 2017-12-13 NOTE — TELEPHONE ENCOUNTER
I called pt and she will  script at the  later this week. I placed Rx at the  for pick-up.     Theresa Barfield MA

## 2017-12-16 ENCOUNTER — HEALTH MAINTENANCE LETTER (OUTPATIENT)
Age: 52
End: 2017-12-16

## 2017-12-19 ENCOUNTER — OFFICE VISIT (OUTPATIENT)
Dept: OBGYN | Facility: CLINIC | Age: 52
End: 2017-12-19
Payer: COMMERCIAL

## 2017-12-19 VITALS — SYSTOLIC BLOOD PRESSURE: 98 MMHG | BODY MASS INDEX: 19.4 KG/M2 | DIASTOLIC BLOOD PRESSURE: 72 MMHG | WEIGHT: 113 LBS

## 2017-12-19 DIAGNOSIS — N95.1 SYMPTOMATIC MENOPAUSAL OR FEMALE CLIMACTERIC STATES: ICD-10-CM

## 2017-12-19 DIAGNOSIS — R87.610 PAPANICOLAOU SMEAR OF CERVIX WITH ATYPICAL SQUAMOUS CELLS OF UNDETERMINED SIGNIFICANCE (ASC-US): ICD-10-CM

## 2017-12-19 PROCEDURE — 99213 OFFICE O/P EST LOW 20 MIN: CPT | Performed by: OBSTETRICS & GYNECOLOGY

## 2017-12-19 PROCEDURE — 88141 CYTOPATH C/V INTERPRET: CPT | Performed by: OBSTETRICS & GYNECOLOGY

## 2017-12-19 PROCEDURE — 88175 CYTOPATH C/V AUTO FLUID REDO: CPT | Performed by: OBSTETRICS & GYNECOLOGY

## 2017-12-19 PROCEDURE — G0476 HPV COMBO ASSAY CA SCREEN: HCPCS | Performed by: OBSTETRICS & GYNECOLOGY

## 2017-12-19 RX ORDER — NORETHINDRONE ACETATE AND ETHINYL ESTRADIOL 1; 5 MG/1; UG/1
1 TABLET ORAL DAILY
Qty: 90 TABLET | Refills: 3 | Status: SHIPPED | OUTPATIENT
Start: 2017-12-19 | End: 2018-01-09

## 2017-12-19 NOTE — MR AVS SNAPSHOT
After Visit Summary   12/19/2017    Catie Nuñez    MRN: [id number]           Patient Information     Date Of Birth          1965        Visit Information        Provider Department      12/19/2017 2:45 PM Emmie James MD [facility]        Today's Diagnoses     Papanicolaou smear of cervix with atypical squamous cells of undetermined significance (ASC-US)        Symptomatic menopausal or female climacteric states           Follow-ups after your visit        Who to contact     If you have questions or need follow up information about today's clinic visit or your schedule please contact [facility] directly at [phone].  Normal or non-critical lab and imaging results will be communicated to you by End[employer] For [employer], letter or phone within 4 business days after the clinic has received the results. If you do not hear from us within 7 days, please contact the clinic through End[employer] or phone. If you have a critical or abnormal lab result, we will notify you by phone as soon as possible.  Submit refill requests through MakuCell or call your pharmacy and they will forward the refill request to us. Please allow 3 business days for your refill to be completed.          Additional Information About Your Visit        MyChart Information     MakuCell gives you secure access to your electronic health record. If you see a primary care provider, you can also send messages to your care team and make appointments. If you have questions, please call your primary care clinic.  If you do not have a primary care provider, please call [phone] and they will assist you.        Care EveryWhere ID     This is your Care EveryWhere ID. This could be used by other organizations to access your [facility] medical records  [id number]        Your Vitals Were     Last Period BMI (Body Mass Index)                09/01/2016 19.4 kg/m2           Blood Pressure from Last 3 Encounters:    12/19/17 98/72   12/10/17 116/60   10/17/17 120/79    Weight from Last 3 Encounters:   12/19/17 113 lb (51.3 kg)   12/10/17 115 lb (52.2 kg)   10/17/17 115 lb (52.2 kg)              We Performed the Following     HPV High Risk Types DNA Cervical     Pap imaged thin layer screen with HPV - recommended age 30 - 65 years (select HPV order below)          Where to get your medicines      These medications were sent to [facility] [address] [facility] [address] [address]     Phone:  [phone]     norethindrone-ethinyl estradiol 1-5 MG-MCG per tablet          Primary Care Provider Office Phone # Fax #    Yasmin JEREMY Schumacher [facility] [phone] [address] [place] [address]        Equal Access to Services     SHANNON SANTANA : Hadii marcie ku hadasho Soomaali, waaxda luqadaha, qaybta kaalmada adeegyada, waxay coryin haymyriam grajeda . So [facility] [phone].    ATENCIÓN: Si habla español, tiene a weinberg disposición servicios gratuitos de asistencia lingüística. Llame al [phone].    We comply with applicable federal civil rights laws and [place] laws. We do not discriminate on the basis of race, color, national origin, age, disability, sex, sexual orientation, or gender identity.            Thank you!     Thank you for choosing [facility]  for your care. Our goal is always to provide you with excellent care. Hearing back from our patients is one way we can continue to improve our services. Please take a few minutes to complete the written survey that you may receive in the mail after your visit with us. Thank you!             Your Updated Medication List - Protect others around you: Learn how to safely use, store and throw away your medicines at www.disposemymeds.org.          This list is accurate as of: 12/19/17  4:27 PM.  Always use your most recent med list.                    Brand Name Dispense Instructions for use Diagnosis    albuterol 108 (90 BASE) MCG/ACT Inhaler    PROAIR HFA/PROVENTIL HFA/VENTOLIN HFA    1 Inhaler    Inhale 1-2 puffs into the lungs every 4 hours as needed for shortness of breath / dyspnea or wheezing    Acute bronchitis with coexisting condition requiring prophylactic treatment       ALPRAZolam 0.5 MG tablet    XANAX    40 tablet    Take 1 tablet (0.5 mg) by mouth 2 times daily as needed for anxiety 40 tablets to last 30 days.    Generalized anxiety disorder       doxycycline 100 MG capsule    VIBRAMYCIN    20 capsule    Take 1 capsule (100 mg) by mouth 2 times daily for 10 days    Acute bronchitis with coexisting condition requiring prophylactic treatment, Acute sinusitis with coexisting condition requiring prophylactic treatment       estradiol 1 MG tablet    ESTRACE    90 tablet    Take 0.5 tablets (0.5 mg) by mouth daily    Symptomatic menopausal or female climacteric states       EXCEDRIN PO      Take by mouth as needed        * fluticasone 50 MCG/ACT spray    FLONASE    16 g    Spray 1-2 sprays into both nostrils daily    Chronic rhinitis, unspecified type       * fluticasone 50 MCG/ACT spray    FLONASE    16 g    Spray 1-2 sprays into both nostrils daily    Acute sinusitis with coexisting condition requiring prophylactic treatment, Acute bronchitis with coexisting condition requiring prophylactic treatment       MULTIVITAMIN PO           norethindrone-ethinyl estradiol 1-5 MG-MCG per tablet    FEMHRT 1/5    90 tablet    Take 1 tablet by mouth daily    Symptomatic menopausal or female climacteric states       riboflavin 400 MG Caps     30 capsule    Take 400 mg by mouth every morning    Migraine without aura and without status migrainosus, not intractable       sertraline 100 MG tablet    ZOLOFT    135 tablet    Take 1.5 tablets (150 mg) by mouth daily    Generalized anxiety disorder       * traMADol 50 MG tablet    ULTRAM    75 tablet    Take  1 tablet (50 mg) by mouth every 8 hours as needed for severe pain or pain . Limit 75 tablets to last 30 days.    Migraine without aura and without status migrainosus, not intractable, Chronic low back pain, unspecified back pain laterality, with sciatica presence unspecified       * traMADol 50 MG tablet    ULTRAM    75 tablet    Take 1 tablet (50 mg) by mouth every 8 hours as needed for severe pain or pain . 75 tablets to last 30 days.    Migraine without aura and without status migrainosus, not intractable, Chronic low back pain, unspecified back pain laterality, with sciatica presence unspecified       * traMADol 50 MG tablet    ULTRAM    75 tablet    Take 1 tablet (50 mg) by mouth every 8 hours as needed for moderate pain 75 tablets to last 30 days.    Midline low back pain without sciatica, unspecified chronicity       * traMADol 50 MG tablet    ULTRAM    75 tablet    Take 1 tablet (50 mg) by mouth every 8 hours as needed for moderate pain 75 tablets to last 30 days.    Migraine without aura and without status migrainosus, not intractable, Adhesive capsulitis of shoulder, unspecified laterality       * Notice:  This list has 6 medication(s) that are the same as other medications prescribed for you. Read the directions carefully, and ask your doctor or other care provider to review them with you.

## 2017-12-19 NOTE — NURSING NOTE
"Chief Complaint   Patient presents with     Consult     pap        Initial BP 98/72  Wt 113 lb (51.3 kg)  LMP 2016  BMI 19.4 kg/m2 Estimated body mass index is 19.4 kg/(m^2) as calculated from the following:    Height as of 12/10/17: 5' 4\" (1.626 m).    Weight as of this encounter: 113 lb (51.3 kg).  BP completed using cuff size: regular        The following HM Due: pap smear      The following patient reported/Care Every where data was sent to:  P ABSTRACT QUALITY INITIATIVES [[id number]]        N/a    Antionette Pedraza MA                "

## 2017-12-19 NOTE — PROGRESS NOTES
"Catie Nuñez is a 52 year old    woman who presents for Pap smear and management of symptomatic menopause (hot flashes).  She is much better (fewer, milder hot flushes, insomnia)  on HRT, FemHrt, norethindrone, estradiol.  She is noting no vaginal bleeding or discharge.          Patient Active Problem List   Diagnosis     CARDIOVASCULAR SCREENING; LDL GOAL LESS THAN 160     Adhesive capsulitis of shoulder     Scoliosis     Shoulder impingement     Generalized anxiety disorder     Pulmonary nodule     Allergic rhinitis     Migraine without aura and without status migrainosus, not intractable     Controlled substance agreement signed     ASCUS Pap + high risk HPV, + 16, + \"other\"      Past Medical History:   Diagnosis Date     Anxiety      ASCUS with positive high risk HPV 2015,16    atypia on colp, 16: ASCUS + HR HPV 16 and other.      Depression      Fracture of great toe 2014     History of scoliosis      Hx of colposcopy with cervical biopsy 10/06/16    10/06/16: [facility] NIL.     MVP (mitral valve prolapse)      Unexplained endometrial cells on cervical Pap smear 2015    thin endometrium on US     Family History   Problem Relation Age of Onset     HEART DISEASE Father      Asthma No family hx of      DIABETES No family hx of      Hypertension No family hx of      CEREBROVASCULAR DISEASE No family hx of      Breast Cancer No family hx of      No past surgical history on file.  Social History   Substance Use Topics     Smoking status: Never Smoker     Smokeless tobacco: Never Used     Alcohol use No     Sulfa drugs      Current Outpatient Prescriptions:      norethindrone-ethinyl estradiol (FEMHRT ) 1-5 MG-MCG per tablet, Take 1 tablet by mouth daily, Disp: 90 tablet, Rfl: 3     traMADol (ULTRAM) 50 MG tablet, Take 1 tablet (50 mg) by mouth every 8 hours as needed for moderate pain 75 tablets to last 30 days., Disp: 75 tablet, Rfl: 0     doxycycline (VIBRAMYCIN) 100 MG capsule, Take " 1 capsule (100 mg) by mouth 2 times daily for 10 days, Disp: 20 capsule, Rfl: 0     fluticasone (FLONASE) 50 MCG/ACT spray, Spray 1-2 sprays into both nostrils daily, Disp: 16 g, Rfl: 0     albuterol (PROAIR HFA/PROVENTIL HFA/VENTOLIN HFA) 108 (90 BASE) MCG/ACT Inhaler, Inhale 1-2 puffs into the lungs every 4 hours as needed for shortness of breath / dyspnea or wheezing, Disp: 1 Inhaler, Rfl: 0     traMADol (ULTRAM) 50 MG tablet, Take 1 tablet (50 mg) by mouth every 8 hours as needed for severe pain or pain . 75 tablets to last 30 days., Disp: 75 tablet, Rfl: 0     ALPRAZolam (XANAX) 0.5 MG tablet, Take 1 tablet (0.5 mg) by mouth 2 times daily as needed for anxiety 40 tablets to last 30 days., Disp: 40 tablet, Rfl: 2     fluticasone (FLONASE) 50 MCG/ACT spray, Spray 1-2 sprays into both nostrils daily, Disp: 16 g, Rfl: 11     sertraline (ZOLOFT) 100 MG tablet, Take 1.5 tablets (150 mg) by mouth daily, Disp: 135 tablet, Rfl: 1     traMADol (ULTRAM) 50 MG tablet, Take 1 tablet (50 mg) by mouth every 8 hours as needed for moderate pain 75 tablets to last 30 days., Disp: 75 tablet, Rfl: 0     traMADol (ULTRAM) 50 MG tablet, Take 1 tablet (50 mg) by mouth every 8 hours as needed for severe pain or pain . Limit 75 tablets to last 30 days., Disp: 75 tablet, Rfl: 0     estradiol (ESTRACE) 1 MG tablet, Take 0.5 tablets (0.5 mg) by mouth daily, Disp: 90 tablet, Rfl: 1     riboflavin 400 MG CAPS, Take 400 mg by mouth every morning, Disp: 30 capsule, Rfl: 0     [DISCONTINUED] norethindrone-ethinyl estradiol (FEMHRT 1/5) 1-5 MG-MCG per tablet, Take 1 tablet by mouth daily, Disp: 90 tablet, Rfl: 3     Multiple Vitamins-Minerals (MULTIVITAMIN PO), , Disp: , Rfl:      Aspirin-Acetaminophen-Caffeine (EXCEDRIN PO), Take by mouth as needed, Disp: , Rfl:      ROS:    C: NEGATIVE for fever, chills, change in weight  I: NEGATIVE for worrisome rashes, moles or lesions  R: NEGATIVE for significant cough or SOB  B: NEGATIVE for masses,  tenderness or discharge  CV: NEGATIVE for chest pain, palpitations or peripheral edema  GI: NEGATIVE for nausea, abdominal pain, heartburn, or change in bowel habits  : NEGATIVE for frequency, dysuria, or hematuria   female: no concerns   MUSCULOSKELETAL: no complaints  E: NEGATIVE for temperature intolerance, skin/hair changes  Neuro:  Negative for paresthesias, headaches  PSYCHIATRIC: no insomnia or mood complaints     OBJECTIVE:   BP 98/72  Wt 113 lb (51.3 kg)  LMP 09/01/2016  BMI 19.4 kg/m2   BMI: Body mass index is 19.4 kg/(m^2).     EXAM:    Well developed, well-nourished woman who appears her stated age.  Neck:  Thyroid normal, no adenopathy.    Abdomen: Abdomen is soft, non tender.  No masses or organomegaly.   Pelvic:  Normal external genitalia and BSU. Vagina normal;  no discharge is noted.  Cervix normal.    Uterus mobile, normal in size and shape without tenderness.  Adnexa normal in size without masses or tenderness.     Office Visit on 07/24/2017   Component Date Value Ref Range Status     Pain Drug SCR UR W RPTD Meds 07/24/2017    Final                    Value:FINAL  (Note)  ====================================================================  [facility] COMP DRUG ANALYSIS,UR  ====================================================================  Test                             Result       Flag       Units    Drug Present   Alpha-hydroxyalprazolam        318                     ng/mg creat    Alpha-hydroxyalprazolam is an expected metabolite of alprazolam.    Source of alprazolam is a scheduled prescription medication.     Tramadol                       PRESENT   O-Desmethyltramadol            PRESENT   N-Desmethyltramadol            PRESENT    Source of tramadol is a prescription medication.    O-desmethyltramadol and N-desmethyltramadol are expected    metabolites of tramadol.     Sertraline                     PRESENT   Desmethylsertraline            PRESENT    Desmethylsertraline is an  expected metabolite of sertraline.     Acetaminophen                  PRESENT   Salicylate                     PRESENT   Doxylamine                     [place]                          ESENT  ====================================================================  Test                      Result    Flag   Units      Ref Range   Creatinine              44               mg/dL      >=20  ====================================================================  Declared Medications:  Medication list was not provided.  ====================================================================  For clinical consultation, please call [phone].  ====================================================================  Analysis performed by [facility] [place] [address]         Assessment: :  Catie Nuñez is a 52 year old female who presents for f/u Pap smear and surveillance of HRT.    Chief Complaint   Patient presents with     Consult     pap         Plan:      ICD-10-CM    1. Papanicolaou smear of cervix with atypical squamous cells of undetermined significance (ASC-US) R87.610 Pap imaged thin layer screen with HPV - recommended age 30 - 65 years (select HPV order below)     HPV High Risk Types DNA Cervical   2. Symptomatic menopausal or female climacteric states N95.1 norethindrone-ethinyl estradiol (FEMHRT 1/5) 1-5 MG-MCG per tablet       We discussed risks and benefits of HRT.  Benefit outweighs risk in the patient.  She will call with any vaginal bleeding.  May continue on HRT for ~ 2 years total.   All of the patients questions were answered to her apparent satisfaction

## 2017-12-22 LAB
COPATH REPORT: ABNORMAL
PAP: ABNORMAL

## 2017-12-22 PROCEDURE — 82274 ASSAY TEST FOR BLOOD FECAL: CPT | Performed by: NURSE PRACTITIONER

## 2017-12-24 DIAGNOSIS — Z12.11 COLON CANCER SCREENING: ICD-10-CM

## 2017-12-24 LAB — HEMOCCULT STL QL IA: NEGATIVE

## 2017-12-27 LAB
FINAL DIAGNOSIS: ABNORMAL
HPV HR 12 DNA CVX QL NAA+PROBE: POSITIVE
HPV16 DNA SPEC QL NAA+PROBE: POSITIVE
HPV18 DNA SPEC QL NAA+PROBE: NEGATIVE
SPECIMEN DESCRIPTION: ABNORMAL

## 2018-01-05 ENCOUNTER — OFFICE VISIT (OUTPATIENT)
Dept: OBGYN | Facility: CLINIC | Age: 53
End: 2018-01-05
Payer: COMMERCIAL

## 2018-01-05 VITALS
DIASTOLIC BLOOD PRESSURE: 89 MMHG | SYSTOLIC BLOOD PRESSURE: 129 MMHG | WEIGHT: 112 LBS | BODY MASS INDEX: 19.22 KG/M2 | TEMPERATURE: 97.5 F | HEART RATE: 84 BPM

## 2018-01-05 DIAGNOSIS — R87.612 PAPANICOLAOU SMEAR OF CERVIX WITH LOW GRADE SQUAMOUS INTRAEPITHELIAL LESION (LGSIL): Primary | ICD-10-CM

## 2018-01-05 PROCEDURE — 57456 ENDOCERV CURETTAGE W/SCOPE: CPT | Performed by: OBSTETRICS & GYNECOLOGY

## 2018-01-05 PROCEDURE — 88305 TISSUE EXAM BY PATHOLOGIST: CPT | Performed by: OBSTETRICS & GYNECOLOGY

## 2018-01-05 NOTE — MR AVS SNAPSHOT
After Visit Summary   1/5/2018    Catie Nuñez    MRN: [id number]           Patient Information     Date Of Birth          1965        Visit Information        Provider Department      1/5/2018 11:00 AM Emmie James MD [facility]        Today's Diagnoses     Papanicolaou smear of cervix with low grade squamous intraepithelial lesion (LGSIL)    -  1       Follow-ups after your visit        Your next 10 appointments already scheduled     Jan 09, 2018  1:15 PM CST   Office Visit with Laurie Avelar NP   [facility]    [address] [place] [address] [id number] [phone]           Bring a current list of meds and any records pertaining to this visit. For Physicals, please bring immunization records and any forms needing to be filled out. Please arrive 10 minutes early to complete paperwork.              Who to contact     If you have questions or need follow up information about today's clinic visit or your schedule please contact [facility] directly at [phone].  Normal or non-critical lab and imaging results will be communicated to you by ReFlow Medicalhart, letter or phone within 4 business days after the clinic has received the results. If you do not hear from us within 7 days, please contact the clinic through ReFlow Medicalhart or phone. If you have a critical or abnormal lab result, we will notify you by phone as soon as possible.  Submit refill requests through ITegris or call your pharmacy and they will forward the refill request to us. Please allow 3 business days for your refill to be completed.          Additional Information About Your Visit        MyChart Information     ITegris gives you secure access to your electronic health record. If you see a primary care provider, you can also send messages to your care team and make appointments. If you have questions, please call your primary care clinic.   If you do not have a primary care provider, please call [phone] and they will assist you.        Care EveryWhere ID     This is your Care EveryWhere ID. This could be used by other organizations to access your [facility] medical records  [id number]        Your Vitals Were     Pulse Temperature Last Period BMI (Body Mass Index)          84 97.5  F (36.4  C) (Oral) 09/01/2016 19.22 kg/m2         Blood Pressure from Last 3 Encounters:   01/05/18 129/89   12/19/17 98/72   12/10/17 116/60    Weight from Last 3 Encounters:   01/05/18 112 lb (50.8 kg)   12/19/17 113 lb (51.3 kg)   12/10/17 115 lb (52.2 kg)              We Performed the Following     COLP CERVIX/UPPER VAGINA W ENDOCERV CURETT     Surgical pathology exam        Primary Care Provider Office Phone # Fax #    Yasmin GRUBER Caroline, APRN [phone]       [address] [place] [address]        Equal Access to Services     [facility]: Hadii aad ku hadasho Soomaali, waaxda luqadaha, qaybta kaalmada adeegyada, waxay idiin haymyriam grajeda . So [facility] [phone].    ATENCIÓN: Si habla español, tiene a weinberg disposición servicios gratuitos de asistencia lingüística. Llame al [phone].    We comply with applicable federal civil rights laws and [place] laws. We do not discriminate on the basis of race, color, national origin, age, disability, sex, sexual orientation, or gender identity.            Thank you!     Thank you for choosing [facility]  for your care. Our goal is always to provide you with excellent care. Hearing back from our patients is one way we can continue to improve our services. Please take a few minutes to complete the written survey that you may receive in the mail after your visit with us. Thank you!             Your Updated Medication List - Protect others around you: Learn how to safely use, store and throw away your medicines at www.disposemymeds.org.          This list  is accurate as of: 1/5/18 11:48 AM.  Always use your most recent med list.                   Brand Name Dispense Instructions for use Diagnosis    albuterol 108 (90 BASE) MCG/ACT Inhaler    PROAIR HFA/PROVENTIL HFA/VENTOLIN HFA    1 Inhaler    Inhale 1-2 puffs into the lungs every 4 hours as needed for shortness of breath / dyspnea or wheezing    Acute bronchitis with coexisting condition requiring prophylactic treatment       ALPRAZolam 0.5 MG tablet    XANAX    40 tablet    Take 1 tablet (0.5 mg) by mouth 2 times daily as needed for anxiety 40 tablets to last 30 days.    Generalized anxiety disorder       estradiol 1 MG tablet    ESTRACE    90 tablet    Take 0.5 tablets (0.5 mg) by mouth daily    Symptomatic menopausal or female climacteric states       EXCEDRIN PO      Take by mouth as needed        * fluticasone 50 MCG/ACT spray    FLONASE    16 g    Spray 1-2 sprays into both nostrils daily    Chronic rhinitis, unspecified type       * fluticasone 50 MCG/ACT spray    FLONASE    16 g    Spray 1-2 sprays into both nostrils daily    Acute sinusitis with coexisting condition requiring prophylactic treatment, Acute bronchitis with coexisting condition requiring prophylactic treatment       MULTIVITAMIN PO           norethindrone-ethinyl estradiol 1-5 MG-MCG per tablet    FEMHRT 1/5    90 tablet    Take 1 tablet by mouth daily    Symptomatic menopausal or female climacteric states       riboflavin 400 MG Caps     30 capsule    Take 400 mg by mouth every morning    Migraine without aura and without status migrainosus, not intractable       sertraline 100 MG tablet    ZOLOFT    135 tablet    Take 1.5 tablets (150 mg) by mouth daily    Generalized anxiety disorder       * traMADol 50 MG tablet    ULTRAM    75 tablet    Take 1 tablet (50 mg) by mouth every 8 hours as needed for severe pain or pain . Limit 75 tablets to last 30 days.    Migraine without aura and without status migrainosus, not intractable, Chronic low  back pain, unspecified back pain laterality, with sciatica presence unspecified       * traMADol 50 MG tablet    ULTRAM    75 tablet    Take 1 tablet (50 mg) by mouth every 8 hours as needed for severe pain or pain . 75 tablets to last 30 days.    Migraine without aura and without status migrainosus, not intractable, Chronic low back pain, unspecified back pain laterality, with sciatica presence unspecified       * traMADol 50 MG tablet    ULTRAM    75 tablet    Take 1 tablet (50 mg) by mouth every 8 hours as needed for moderate pain 75 tablets to last 30 days.    Midline low back pain without sciatica, unspecified chronicity       * traMADol 50 MG tablet    ULTRAM    75 tablet    Take 1 tablet (50 mg) by mouth every 8 hours as needed for moderate pain 75 tablets to last 30 days.    Migraine without aura and without status migrainosus, not intractable, Adhesive capsulitis of shoulder, unspecified laterality       * Notice:  This list has 6 medication(s) that are the same as other medications prescribed for you. Read the directions carefully, and ask your doctor or other care provider to review them with you.

## 2018-01-05 NOTE — PROGRESS NOTES
Catie Nuñez is a 52 year old female  who presents for a repeat colposcopy, referred by IVONNE Velazquez. Pap smear 1 months ago showed: LGSIL and with high risk HPV present: 16 and other. The prior pap showed ASCUS and with high risk HPV present: 16 and other.     Patient's last menstrual period was 2016.  UPT today is not done as patient is postmenopausal   Patient does not smoke  Type of contraception: none  Age at first sexual intercourse: 32  Number of sexual partners (lifetime): more than 6  Past GYN history: No STD history and HPV  Prior cervical/vaginal disease: Normal exam without visible pathology.  Prior cervical treatment: no treatment.    PROCEDURE:  Before the procedure, it was ensured that the patient was educated regarding the nature of her findings to date, the implications, and what was to be done. She has been made aware of the role of HPV, the natural history of infection, ways to minimize her future risk, the effect of HPV on the cervix, and treatment options available should they be indicated. The details of the   colposcopic procedure were reviewed. All questions were answered before proceeding, and informed consent was therefore obtained.  Speculum placed in vagina and excellent visualization of cervix   acheived, cervix swabbed x 3 with acetic acid solution.    FINDINGS:  Cervix: no visible lesions  Please refer to images section for details.  Pap repeated?: No  SCJ seen?: no   ECC done?: Yes  ECC performed with Kevorkian curette; good tissue and mucus obtained and sent per standard fashion   Lugol's solution used?: Yes  No nonstaining areas on cervix   Satisfactory examination?: no  ASSESSMENT: Normal exam without visible pathology.  PLAN: specimen labelled and sent to Pathology.  If no high grade dysplasia is found, per ASCCP guidelines plan is Pap and HPV in one year.   All of the patients questions were answered to her apparent satisfaction      Emmie James  MD

## 2018-01-05 NOTE — NURSING NOTE
"Chief Complaint   Patient presents with     Colposcopy       Initial /89  Pulse 84  Temp 97.5  F (36.4  C) (Oral)  Wt 112 lb (50.8 kg)  LMP 2016  BMI 19.22 kg/m2 Estimated body mass index is 19.22 kg/(m^2) as calculated from the following:    Height as of 12/10/17: 5' 4\" (1.626 m).    Weight as of this encounter: 112 lb (50.8 kg).  BP completed using cuff size: regular        The following HM Due: NONE      The following patient reported/Care Every where data was sent to:  P ABSTRACT QUALITY INITIATIVES [21804]        N/a      Antionette Pedraza MA                "

## 2018-01-08 ENCOUNTER — MYC MEDICAL ADVICE (OUTPATIENT)
Dept: PALLIATIVE MEDICINE | Facility: CLINIC | Age: 53
End: 2018-01-08

## 2018-01-08 LAB — COPATH REPORT: NORMAL

## 2018-01-09 ENCOUNTER — OFFICE VISIT (OUTPATIENT)
Dept: FAMILY MEDICINE | Facility: CLINIC | Age: 53
End: 2018-01-09
Payer: COMMERCIAL

## 2018-01-09 VITALS
DIASTOLIC BLOOD PRESSURE: 78 MMHG | SYSTOLIC BLOOD PRESSURE: 96 MMHG | OXYGEN SATURATION: 98 % | HEART RATE: 79 BPM | RESPIRATION RATE: 18 BRPM | WEIGHT: 110 LBS | TEMPERATURE: 98.5 F | BODY MASS INDEX: 18.78 KG/M2 | HEIGHT: 64 IN

## 2018-01-09 DIAGNOSIS — F41.1 GENERALIZED ANXIETY DISORDER: Primary | ICD-10-CM

## 2018-01-09 DIAGNOSIS — G43.009 MIGRAINE WITHOUT AURA AND WITHOUT STATUS MIGRAINOSUS, NOT INTRACTABLE: ICD-10-CM

## 2018-01-09 PROCEDURE — 99214 OFFICE O/P EST MOD 30 MIN: CPT | Performed by: NURSE PRACTITIONER

## 2018-01-09 RX ORDER — SERTRALINE HYDROCHLORIDE 100 MG/1
150 TABLET, FILM COATED ORAL DAILY
Qty: 135 TABLET | Refills: 1 | Status: SHIPPED | OUTPATIENT
Start: 2018-01-09 | End: 2018-03-29

## 2018-01-09 RX ORDER — ALPRAZOLAM 0.5 MG
0.5 TABLET ORAL 2 TIMES DAILY PRN
Qty: 40 TABLET | Refills: 2 | Status: SHIPPED | OUTPATIENT
Start: 2018-01-09 | End: 2018-03-29

## 2018-01-09 RX ORDER — TRAMADOL HYDROCHLORIDE 50 MG/1
50 TABLET ORAL EVERY 8 HOURS PRN
Qty: 75 TABLET | Refills: 2 | Status: SHIPPED | OUTPATIENT
Start: 2018-01-09 | End: 2018-03-29

## 2018-01-09 ASSESSMENT — PATIENT HEALTH QUESTIONNAIRE - PHQ9: SUM OF ALL RESPONSES TO PHQ QUESTIONS 1-9: 5

## 2018-01-09 NOTE — PROGRESS NOTES
"SUBJECTIVE:  Catie Nuñez, a 52 year old female scheduled an appointment to discuss the following issues:     Generalized anxiety disorder  Migraine without aura and without status migrainosus, not intractable     Sees Yasmin Velazquez who is off on an CLINT.  She is here for refills of her Tramadol for migraines.  She has tried acupuncture, cupping, massage, chiropractic, has seen a neurologist.  Was recently denied Botox injections due to not trying two classes of preventive medications.  She is on an antidepressant.  Propranolol was prescribed last summer.    Feels that her mood is very stable.  She is on Zoloft 150 mg daily.  She does take Xanax - one tablet daily.  She has been on this combination for years.  She has seen a therapist in the past and is thinking about going back again.  She is currently participating in a support group.        Medical, social, surgical, and family histories reviewed.    ROS:  C: NEGATIVE for fever, chills  E: NEGATIVE for vision changes   R: NEGATIVE for significant cough or SOB  CV: NEGATIVE for chest pain, palpitations   GI: NEGATIVE for nausea, abdominal pain, heartburn, or change in bowel habits  M: NEGATIVE for significant arthralgias or myalgia  NEURO: see HPI  P: NEGATIVE for changes in mood or affect    OBJECTIVE:  BP 96/78  Pulse 79  Temp 98.5  F (36.9  C) (Oral)  Resp 18  Ht 5' 3.5\" (1.613 m)  Wt 110 lb (49.9 kg)  LMP 09/01/2016  SpO2 98%  BMI 19.18 kg/m2  EXAM:  GENERAL APPEARANCE: healthy, alert and no distress  RESP: lungs clear to auscultation - no rales, rhonchi or wheezes  CV: regular rates and rhythm, normal S1 S2, no S3 or S4 and no murmur, click or rub -  NEURO: Normal strength and tone, sensory exam grossly normal, mentation intact and speech normal  PSYCH: mentation appears normal and affect normal/bright    ASSESSMENT/PLAN:  (F41.1) Generalized anxiety disorder  (primary encounter diagnosis)  Comment:   Plan: ALPRAZolam (XANAX) 0.5 MG tablet, " sertraline         (ZOLOFT) 100 MG tablet        Continue on current medications.  Encouraged her to follow up with a therapist.  She will follow up with Yasmin in 3 months.      (G43.009) Migraine without aura and without status migrainosus, not intractable  Comment:   Plan: PAIN MANAGEMENT REFERRAL, traMADol (ULTRAM) 50         MG tablet        Refill of Tramadol given.   She will re-try Propranolol.  She is very interested in Botox injections.  Will refer to [facility].

## 2018-01-09 NOTE — MR AVS SNAPSHOT
After Visit Summary   1/9/2018    Catie Nuñez    MRN: [id number]           Patient Information     Date Of Birth          1965        Visit Information        Provider Department      1/9/2018 1:15 PM Laurie Avelra NP [facility]        Today's Diagnoses     Generalized anxiety disorder    -  1    Migraine without aura and without status migrainosus, not intractable        Adhesive capsulitis of shoulder, unspecified laterality           Follow-ups after your visit        Additional Services     PAIN MANAGEMENT REFERRAL       Your provider has referred you to: N: [facility] [phone]   [email or web address]  - consult for Botox    Please call clinic directly to schedule appointment.    **ANY DIAGNOSTIC TESTS THAT ARE NOT IN EPIC SHOULD BE SENT TO THE PAIN CENTER**    REGARDING OPIOID MEDICATIONS:  We will always address appropriateness of opioid pain medications, but we generally will not automatically take on a prescribing role. When we do take on prescribing of opioids for chronic pain, it is in collaboration with the referring physician for an intermediate period of time (months), with an expectation that the primary physician or provider will assume the prescribing role if medications are effective at stable doses with demonstrated compliance.  Therefore, please do not assume that your prescribing responsibilities end on the day of pain clinic consultation.  Is this agreeable to you? YES    Please be aware that coverage of these services is subject to the terms and limitations of your health insurance plan.  Call member services at your health plan with any benefit or coverage questions.      Please bring the following with you to your appointment:    (1) Any X-Rays, CTs or MRIs which have been performed.  Contact the facility where they were done to  "arrange for  prior to your scheduled appointment.    (2) List of current medications   (3) This referral request   (4) Any documents/labs given to you for this referral                  Who to contact     If you have questions or need follow up information about today's clinic visit or your schedule please contact [facility] directly at [phone].  Normal or non-critical lab and imaging results will be communicated to you by MyChart, letter or phone within 4 business days after the clinic has received the results. If you do not hear from us within 7 days, please contact the clinic through [facility] or phone. If you have a critical or abnormal lab result, we will notify you by phone as soon as possible.  Submit refill requests through [employer] or call your pharmacy and they will forward the refill request to us. Please allow 3 business days for your refill to be completed.          Additional Information About Your Visit        [email or web address] Information     [employer] gives you secure access to your electronic health record. If you see a primary care provider, you can also send messages to your care team and make appointments. If you have questions, please call your primary care clinic.  If you do not have a primary care provider, please call [phone] and they will assist you.        Care EveryWhere ID     This is your Care EveryWhere ID. This could be used by other organizations to access your [place] medical records  [id number]        Your Vitals Were     Pulse Temperature Respirations Height Last Period Pulse Oximetry    79 98.5  F (36.9  C) (Oral) 18 5' 3.5\" (1.613 m) 09/01/2016 98%    BMI (Body Mass Index)                   19.18 kg/m2            Blood Pressure from Last 3 Encounters:   01/09/18 96/78   01/05/18 129/89   12/19/17 98/72    Weight from Last 3 Encounters:   01/09/18 110 lb (49.9 kg)   01/05/18 112 lb (50.8 kg)   12/19/17 113 lb (51.3 kg)              We Performed the " Following     PAIN MANAGEMENT REFERRAL          Where to get your medicines      These medications were sent to [facility] [id number] [place], [place] [address] AT [facility] & [address] [place], [place] [address] [address]     Phone:  [phone]     sertraline 100 MG tablet         Some of these will need a paper prescription and others can be bought over the counter.  Ask your nurse if you have questions.     Bring a paper prescription for each of these medications     ALPRAZolam 0.5 MG tablet    traMADol 50 MG tablet          Primary Care Provider Office Phone # Fax #    Yasmin GRUBER Caroline, APRN [facility] [phone] [address] [place] [address]        Equal Access to Services     SHANNON SANTANA : Stefan masono Sokofi, waaxda luqadaha, qaybta kaalmada adeegyada, vickey grajeda . So [facility] [phone].    ATENCIÓN: Si habla español, tiene a weinberg disposición servicios gratuitos de asistencia lingüística. Jacque al [phone].    We comply with applicable federal civil rights laws and [place] laws. We do not discriminate on the basis of race, color, national origin, age, disability, sex, sexual orientation, or gender identity.            Thank you!     Thank you for choosing [facility]  for your care. Our goal is always to provide you with excellent care. Hearing back from our patients is one way we can continue to improve our services. Please take a few minutes to complete the written survey that you may receive in the mail after your visit with us. Thank you!             Your Updated Medication List - Protect others around you: Learn how to safely use, store and throw away your medicines at www.disposemymeds.org.          This list is accurate as of: 1/9/18  2:17 PM.  Always use your most recent med list.                   Brand Name Dispense Instructions for use Diagnosis    ALPRAZolam 0.5 MG  tablet    XANAX    40 tablet    Take 1 tablet (0.5 mg) by mouth 2 times daily as needed for anxiety 40 tablets to last 30 days.    Generalized anxiety disorder       estradiol 1 MG tablet    ESTRACE    90 tablet    Take 0.5 tablets (0.5 mg) by mouth daily    Symptomatic menopausal or female climacteric states       EXCEDRIN PO      Take by mouth as needed        fluticasone 50 MCG/ACT spray    FLONASE    16 g    Spray 1-2 sprays into both nostrils daily    Acute sinusitis with coexisting condition requiring prophylactic treatment, Acute bronchitis with coexisting condition requiring prophylactic treatment       MULTIVITAMIN PO           riboflavin 400 MG Caps     30 capsule    Take 400 mg by mouth every morning    Migraine without aura and without status migrainosus, not intractable       sertraline 100 MG tablet    ZOLOFT    135 tablet    Take 1.5 tablets (150 mg) by mouth daily    Generalized anxiety disorder       traMADol 50 MG tablet    ULTRAM    75 tablet    Take 1 tablet (50 mg) by mouth every 8 hours as needed for moderate pain 75 tablets to last 30 days.    Migraine without aura and without status migrainosus, not intractable, Adhesive capsulitis of shoulder, unspecified laterality

## 2018-01-12 DIAGNOSIS — J01.90 ACUTE SINUSITIS WITH COEXISTING CONDITION REQUIRING PROPHYLACTIC TREATMENT: ICD-10-CM

## 2018-01-12 DIAGNOSIS — J20.9 ACUTE BRONCHITIS WITH COEXISTING CONDITION REQUIRING PROPHYLACTIC TREATMENT: ICD-10-CM

## 2018-01-15 RX ORDER — FLUTICASONE PROPIONATE 50 MCG
1-2 SPRAY, SUSPENSION (ML) NASAL DAILY
Qty: 16 G | Refills: 0 | Status: SHIPPED | OUTPATIENT
Start: 2018-01-15 | End: 2018-03-29

## 2018-01-17 RX ORDER — IBUPROFEN 600 MG/1
600 TABLET, FILM COATED ORAL
COMMUNITY
Start: 2014-08-03 | End: 2021-11-01

## 2018-01-17 RX ORDER — CODEINE PHOSPHATE AND GUAIFENESIN 10; 100 MG/5ML; MG/5ML
LIQUID ORAL
Refills: 0 | COMMUNITY
Start: 2017-03-31 | End: 2018-03-29

## 2018-01-17 RX ORDER — HYDROCODONE BITARTRATE AND ACETAMINOPHEN 5; 325 MG/1; MG/1
1 TABLET ORAL
COMMUNITY
Start: 2015-02-02 | End: 2018-03-29

## 2018-01-17 RX ORDER — TRAZODONE HYDROCHLORIDE 50 MG/1
50 TABLET, FILM COATED ORAL
COMMUNITY
Start: 2013-09-11 | End: 2018-03-29

## 2018-01-17 RX ORDER — HYDROCODONE BITARTRATE AND ACETAMINOPHEN 5; 325 MG/1; MG/1
TABLET ORAL
COMMUNITY
Start: 2013-09-18 | End: 2018-03-29

## 2018-01-17 RX ORDER — PROPRANOLOL HYDROCHLORIDE 20 MG/1
TABLET ORAL
Refills: 5 | COMMUNITY
Start: 2017-06-27 | End: 2018-03-29

## 2018-01-17 RX ORDER — HYDROCODONE BITARTRATE AND ACETAMINOPHEN 5; 325 MG/1; MG/1
1 TABLET ORAL
COMMUNITY
Start: 2014-08-03 | End: 2018-03-29

## 2018-01-17 RX ORDER — NORETHINDRONE ACETATE AND ETHINYL ESTRADIOL 1; 5 MG/1; UG/1
TABLET ORAL
Refills: 3 | COMMUNITY
Start: 2017-05-19 | End: 2018-09-11

## 2018-01-17 RX ORDER — AZITHROMYCIN 250 MG/1
TABLET, FILM COATED ORAL
Refills: 0 | COMMUNITY
Start: 2017-03-31 | End: 2018-03-29

## 2018-02-02 ENCOUNTER — DOCUMENTATION ONLY (OUTPATIENT)
Dept: FAMILY MEDICINE | Facility: OTHER | Age: 53
End: 2018-02-02

## 2018-03-29 ENCOUNTER — OFFICE VISIT (OUTPATIENT)
Dept: FAMILY MEDICINE | Facility: CLINIC | Age: 53
End: 2018-03-29
Payer: COMMERCIAL

## 2018-03-29 VITALS
OXYGEN SATURATION: 97 % | DIASTOLIC BLOOD PRESSURE: 71 MMHG | TEMPERATURE: 98.7 F | SYSTOLIC BLOOD PRESSURE: 112 MMHG | BODY MASS INDEX: 20.09 KG/M2 | HEART RATE: 72 BPM | WEIGHT: 115.2 LBS | RESPIRATION RATE: 19 BRPM

## 2018-03-29 DIAGNOSIS — F41.1 GENERALIZED ANXIETY DISORDER: Primary | ICD-10-CM

## 2018-03-29 DIAGNOSIS — J30.2 ACUTE SEASONAL ALLERGIC RHINITIS, UNSPECIFIED TRIGGER: ICD-10-CM

## 2018-03-29 DIAGNOSIS — G43.009 MIGRAINE WITHOUT AURA AND WITHOUT STATUS MIGRAINOSUS, NOT INTRACTABLE: ICD-10-CM

## 2018-03-29 PROCEDURE — 99214 OFFICE O/P EST MOD 30 MIN: CPT | Performed by: NURSE PRACTITIONER

## 2018-03-29 RX ORDER — SERTRALINE HYDROCHLORIDE 100 MG/1
150 TABLET, FILM COATED ORAL DAILY
Qty: 135 TABLET | Refills: 1 | Status: SHIPPED | OUTPATIENT
Start: 2018-03-29 | End: 2018-09-25

## 2018-03-29 RX ORDER — ALPRAZOLAM 0.5 MG
0.5 TABLET ORAL 2 TIMES DAILY PRN
Qty: 40 TABLET | Refills: 2 | Status: SHIPPED | OUTPATIENT
Start: 2018-03-29 | End: 2018-05-18

## 2018-03-29 RX ORDER — FLUTICASONE PROPIONATE 50 MCG
1-2 SPRAY, SUSPENSION (ML) NASAL DAILY
Qty: 3 BOTTLE | Refills: 3 | Status: SHIPPED | OUTPATIENT
Start: 2018-03-29 | End: 2018-06-18

## 2018-03-29 RX ORDER — TRAMADOL HYDROCHLORIDE 50 MG/1
50 TABLET ORAL 2 TIMES DAILY PRN
Qty: 60 TABLET | Refills: 2 | Status: SHIPPED | OUTPATIENT
Start: 2018-03-29 | End: 2018-06-18

## 2018-03-29 ASSESSMENT — PATIENT HEALTH QUESTIONNAIRE - PHQ9: 5. POOR APPETITE OR OVEREATING: NEARLY EVERY DAY

## 2018-03-29 ASSESSMENT — ANXIETY QUESTIONNAIRES
6. BECOMING EASILY ANNOYED OR IRRITABLE: MORE THAN HALF THE DAYS
7. FEELING AFRAID AS IF SOMETHING AWFUL MIGHT HAPPEN: NOT AT ALL
5. BEING SO RESTLESS THAT IT IS HARD TO SIT STILL: NOT AT ALL
3. WORRYING TOO MUCH ABOUT DIFFERENT THINGS: NEARLY EVERY DAY
1. FEELING NERVOUS, ANXIOUS, OR ON EDGE: NEARLY EVERY DAY
GAD7 TOTAL SCORE: 11
IF YOU CHECKED OFF ANY PROBLEMS ON THIS QUESTIONNAIRE, HOW DIFFICULT HAVE THESE PROBLEMS MADE IT FOR YOU TO DO YOUR WORK, TAKE CARE OF THINGS AT HOME, OR GET ALONG WITH OTHER PEOPLE: SOMEWHAT DIFFICULT
2. NOT BEING ABLE TO STOP OR CONTROL WORRYING: NOT AT ALL

## 2018-03-29 NOTE — PROGRESS NOTES
"  SUBJECTIVE:   Catie Nuñez is a 52 year old female who presents to clinic today for the following health issues:  Chief Complaint   Patient presents with     Recheck Medication       Medication Followup of All active medication     Taking Medication as prescribed: yes    Side Effects:  None    Medication Helping Symptoms:  yes     Mood:  Kimmie has a long-standing history of anxiety.  She also has a history of trauma in her life.  She is in the clinic today feeling hopeful about her anxiety status.  She continues to take her Zoloft as prescribed.  She plans to proceed with both psychology for talk therapy and psychiatry for med management.  She feels that her traumatic history is the biggest reason for her anxiety.  She is sleeping okay at night.  She is now going to Yoga.  That is making her feel better.  She states that her home life is going okay right now.    Tramadol:  She has been taking 2-3 tramadol tablets per day for quite a while.  She has a history of chronic pain.  These have been helpful, but she is ready to start cutting down on that dosing as well.  Migraines have been more manageable and less frequent.    Flonase.  For seasonal allergies.  It is helpful.  She is requesting a refill today.    Problem list and histories reviewed & adjusted, as indicated.  Additional history: as documented    Patient Active Problem List   Diagnosis     CARDIOVASCULAR SCREENING; LDL GOAL LESS THAN 160     Adhesive capsulitis of shoulder     Scoliosis     Shoulder impingement     Generalized anxiety disorder     Pulmonary nodule     Allergic rhinitis     Migraine without aura and without status migrainosus, not intractable     Controlled substance agreement signed     ASCUS Pap + high risk HPV, + 16, + \"other\"      Papanicolaou smear of cervix with low grade squamous intraepithelial lesion (LGSIL)     History reviewed. No pertinent surgical history.    Social History   Substance Use Topics     Smoking status: Never " Smoker     Smokeless tobacco: Never Used     Alcohol use No     Family History   Problem Relation Age of Onset     HEART DISEASE Father      Asthma No family hx of      DIABETES No family hx of      Hypertension No family hx of      CEREBROVASCULAR DISEASE No family hx of      Breast Cancer No family hx of          Current Outpatient Prescriptions   Medication Sig Dispense Refill     ibuprofen (ADVIL/MOTRIN) 600 MG tablet Take 600 mg by mouth Take 1 tablet by mouth 4 times daily if needed. Maximum of 3200 mg in 24 hours.       norethindrone-ethinyl estradiol (FEMHRT 1/5) 1-5 MG-MCG per tablet TK 1 T PO D  3     JEVANTIQUE LO 0.5-2.5 MG-MCG TABS TK 1 T PO D  0     fluticasone (FLONASE) 50 MCG/ACT spray Spray 1-2 sprays into both nostrils daily 16 g 0     traMADol (ULTRAM) 50 MG tablet Take 1 tablet (50 mg) by mouth every 8 hours as needed for moderate pain 75 tablets to last 30 days. 75 tablet 2     ALPRAZolam (XANAX) 0.5 MG tablet Take 1 tablet (0.5 mg) by mouth 2 times daily as needed for anxiety 40 tablets to last 30 days. 40 tablet 2     sertraline (ZOLOFT) 100 MG tablet Take 1.5 tablets (150 mg) by mouth daily 135 tablet 1     riboflavin 400 MG CAPS Take 400 mg by mouth every morning 30 capsule 0     Multiple Vitamins-Minerals (MULTIVITAMIN PO)        Aspirin-Acetaminophen-Caffeine (EXCEDRIN PO) Take by mouth as needed       Allergies   Allergen Reactions     Ondansetron Nausea and Vomiting     Sulfa Drugs Hives     Rash       Recent Labs   Lab Test  09/14/16   1102  02/19/15   1029  01/06/15   1120   LDL  122*   --   94   HDL  73   --   75   TRIG  111   --   63   ALT   --   20  14   CR   --   0.52  0.60   GFRESTIMATED   --   >90  Non  GFR Calc    >90  Non  GFR Calc     GFRESTBLACK   --   >90   GFR Calc    >90   GFR Calc     POTASSIUM   --   3.9  4.2   TSH   --    --   3.60      BP Readings from Last 3 Encounters:   03/29/18 112/71   01/09/18 96/78    01/05/18 129/89    Wt Readings from Last 3 Encounters:   03/29/18 115 lb 3.2 oz (52.3 kg)   01/09/18 110 lb (49.9 kg)   01/05/18 112 lb (50.8 kg)                  Labs reviewed in EPIC    Reviewed and updated as needed this visit by clinical staff  Tobacco  Allergies  Med Hx  Surg Hx  Fam Hx  Soc Hx      Reviewed and updated as needed this visit by Provider         ROS:  Constitutional, HEENT, cardiovascular, pulmonary, gi and gu systems are negative, except as otherwise noted.    OBJECTIVE:     /71  Pulse 72  Temp 98.7  F (37.1  C) (Oral)  Resp 19  Wt 115 lb 3.2 oz (52.3 kg)  LMP 09/01/2016  SpO2 97%  BMI 20.09 kg/m2  Body mass index is 20.09 kg/(m^2).  GENERAL APPEARANCE: healthy, alert and no distress. Smiling.   SKIN: warm and dry  PSYCH: mentation appears normal and affect normal/bright.  Good eye contact.    ASSESSMENT/PLAN:     (F41.1) Generalized anxiety disorder  (primary encounter diagnosis)  Comment: Stable  Plan: sertraline (ZOLOFT) 100 MG tablet, ALPRAZolam         (XANAX) 0.5 MG tablet            (G43.009) Migraine without aura and without status migrainosus, not intractable  Comment:   Plan: traMADol (ULTRAM) 50 MG tablet            (J30.2) Acute seasonal allergic rhinitis, unspecified trigger  Comment:   Plan: fluticasone (FLONASE) 50 MCG/ACT spray          I recommended to Galina that she proceed with psychotherapy and psychiatry evaluation.  In the meantime she is to take her medications as prescribed.  We did review her tramadol. She has been on 2-3 tablets a day for a while. I did cut this down today to 1 tablet twice a day as needed. I gave her a 3 month supply. I would recommend that after the 3 months/which returns the clinic, I would like to cut this down even further. She agrees and understands.  She has been compliant with her alprazolam in taking it only as prescribed.  She was appreciative.    I spent a total of 30 min with the patient, >50% time spent face to face  counseling regarding the above issues, establishing a plan of care, and coordinating follow up care.       JEREMY Kent [facility]

## 2018-03-29 NOTE — MR AVS SNAPSHOT
After Visit Summary   3/29/2018    Catie Nuñez    MRN: [id number]           Patient Information     Date Of Birth          1965        Visit Information        Provider Department      3/29/2018 1:40 PM Yasmin Velazquez APRN CNP [facility]        Today's Diagnoses     Generalized anxiety disorder    -  1    Migraine without aura and without status migrainosus, not intractable        Acute seasonal allergic rhinitis, unspecified trigger           Follow-ups after your visit        Who to contact     If you have questions or need follow up information about today's clinic visit or your schedule please contact [facility] directly at [phone].  Normal or non-critical lab and imaging results will be communicated to you by MyChart, letter or phone within 4 business days after the clinic has received the results. If you do not hear from us within 7 days, please contact the clinic through [employer] or phone. If you have a critical or abnormal lab result, we will notify you by phone as soon as possible.  Submit refill requests through [facility] or call your pharmacy and they will forward the refill request to us. Please allow 3 business days for your refill to be completed.          Additional Information About Your Visit        MyChart Information     [facility] gives you secure access to your electronic health record. If you see a primary care provider, you can also send messages to your care team and make appointments. If you have questions, please call your primary care clinic.  If you do not have a primary care provider, please call [phone] and they will assist you.        Care EveryWhere ID     This is your Care EveryWhere ID. This could be used by other organizations to access your [place] medical records  [id number]        Your Vitals Were     Pulse Temperature Respirations Last Period Pulse Oximetry BMI (Body Mass Index)    72 98.7  F (37.1   C) (Oral) 19 09/01/2016 97% 20.09 kg/m2       Blood Pressure from Last 3 Encounters:   03/29/18 112/71   01/09/18 96/78   01/05/18 129/89    Weight from Last 3 Encounters:   03/29/18 115 lb 3.2 oz (52.3 kg)   01/09/18 110 lb (49.9 kg)   01/05/18 112 lb (50.8 kg)              Today, you had the following     No orders found for display         Today's Medication Changes          These changes are accurate as of 3/29/18  4:02 PM.  If you have any questions, ask your nurse or doctor.               These medicines have changed or have updated prescriptions.        Dose/Directions    traMADol 50 MG tablet   Commonly known as:  ULTRAM   This may have changed:    - when to take this  - additional instructions   Used for:  Migraine without aura and without status migrainosus, not intractable   Changed by:  Yasmin Velazquez APRN CNP        Dose:  50 mg   Take 1 tablet (50 mg) by mouth 2 times daily as needed for moderate pain 60 tablets to last 30 days.   Quantity:  60 tablet   Refills:  2            Where to get your medicines      [facility] [address] [place] [address] AT [address] [address]     Phone:  [phone]     fluticasone 50 MCG/ACT spray    sertraline 100 MG tablet         Some of these will need a paper prescription and others can be bought over the counter.  Ask your nurse if you have questions.     Bring a paper prescription for each of these medications     ALPRAZolam 0.5 MG tablet    traMADol 50 MG tablet                Primary Care Provider Office Phone # Fax #    JEREMY Gonzalez [phone]       [facility] [place] [address]        Equal Access to Services     SHANNON SANTANA AH: Stefan carpenter Sokofi, waaxda luqadaha, qaybta kaalmada adeegyada, vickey kincaid. So [facility] [phone].    ATENCIÓN: Alisha lainez  español, tiene a weinberg disposición servicios gratuitos de asistencia lingüística. [facility] [phone].    We comply with applicable federal civil rights laws and [place] laws. We do not discriminate on the basis of race, color, national origin, age, disability, sex, sexual orientation, or gender identity.            Thank you!     Thank you for choosing [facility]  for your care. Our goal is always to provide you with excellent care. Hearing back from our patients is one way we can continue to improve our services. Please take a few minutes to complete the written survey that you may receive in the mail after your visit with us. Thank you!             Your Updated Medication List - Protect others around you: Learn how to safely use, store and throw away your medicines at www.disposemymeds.org.          This list is accurate as of 3/29/18  4:02 PM.  Always use your most recent med list.                   Brand Name Dispense Instructions for use Diagnosis    ALPRAZolam 0.5 MG tablet    XANAX    40 tablet    Take 1 tablet (0.5 mg) by mouth 2 times daily as needed for anxiety 40 tablets to last 30 days.    Generalized anxiety disorder       EXCEDRIN PO      Take by mouth as needed        fluticasone 50 MCG/ACT spray    FLONASE    3 Bottle    Spray 1-2 sprays into both nostrils daily    Acute seasonal allergic rhinitis, unspecified trigger       ibuprofen 600 MG tablet    ADVIL/MOTRIN     Take 600 mg by mouth Take 1 tablet by mouth 4 times daily if needed. Maximum of 3200 mg in 24 hours.        MULTIVITAMIN PO           norethindrone-ethinyl estradiol 1-5 MG-MCG per tablet    FEMHRT 1/5     TK 1 T PO D        riboflavin 400 MG Caps     30 capsule    Take 400 mg by mouth every morning    Migraine without aura and without status migrainosus, not intractable       sertraline 100 MG tablet    ZOLOFT    135 tablet    Take 1.5 tablets (150 mg) by mouth daily    Generalized anxiety disorder       traMADol  50 MG tablet    ULTRAM    60 tablet    Take 1 tablet (50 mg) by mouth 2 times daily as needed for moderate pain 60 tablets to last 30 days.    Migraine without aura and without status migrainosus, not intractable

## 2018-03-30 ASSESSMENT — ANXIETY QUESTIONNAIRES: GAD7 TOTAL SCORE: 11

## 2018-04-19 ENCOUNTER — TELEPHONE (OUTPATIENT)
Dept: FAMILY MEDICINE | Facility: CLINIC | Age: 53
End: 2018-04-19

## 2018-04-19 ENCOUNTER — E-VISIT (OUTPATIENT)
Dept: FAMILY MEDICINE | Facility: CLINIC | Age: 53
End: 2018-04-19
Payer: COMMERCIAL

## 2018-04-19 DIAGNOSIS — G44.209 TENSION HEADACHE: ICD-10-CM

## 2018-04-19 DIAGNOSIS — G43.009 MIGRAINE WITHOUT AURA AND WITHOUT STATUS MIGRAINOSUS, NOT INTRACTABLE: Primary | ICD-10-CM

## 2018-04-19 DIAGNOSIS — Z86.59 HISTORY OF ANXIETY: ICD-10-CM

## 2018-04-19 DIAGNOSIS — M54.81 BILATERAL OCCIPITAL NEURALGIA: Primary | ICD-10-CM

## 2018-04-19 PROCEDURE — 98969 ZZC NONPHYSICIAN ONLINE ASSESSMENT AND MANAGEMENT: CPT | Performed by: NURSE PRACTITIONER

## 2018-04-19 NOTE — TELEPHONE ENCOUNTER
Caroline  1. Dr. Geronimo - Orthopedic records placed on your desk  2. Patient wants a referral to see if she can get in for an appointment to get a nerve block at another orthopedics location - she called [facility] orthopedics and they do not have providers at that location that perform this procedure    Thank you,  Artemio Davidson RN

## 2018-04-19 NOTE — TELEPHONE ENCOUNTER
Pt advised of message below and given numbers to call. She also wanted stronger pain meds for her pain  Advised to do an evisit.  Pt in agreement with the plan.  Clari Isbell RN

## 2018-04-19 NOTE — TELEPHONE ENCOUNTER
I cannot order for her nerve block.  I can refer her to orthopedics for a consultation to determine if nerve block is appropriate and the orthopedist will then order the nerve block.  I do not see any medical records in her chart from [facility] orthopedics.  Could you please get her old records for my review so that I can link her current referral request to her previous referral?    What does she want me to do about her current migraine?

## 2018-04-19 NOTE — TELEPHONE ENCOUNTER
"Routing to provider - Caroline - please review and advise as appropriate  1. Referral Request:  Nerve Block - Orthopedic - are you aware of a specific location?  What diagnosis would you like to use?  2. Migraine - 4 days - has a hx - she describes it as - \"pinched occipital nerve\" - having shooting migraine pain from base of skull - has been ongoing for 4 years, nausea - speaking in full sentences, no one sided weakness, denies worst headache of life, vomiting, fainting, weakness, fever  3. Previously has seen Dr. Geronimo - [facility] - no other providers offer this service at this location  4. Last office visit 3/29/2018 - writer notes tramadol cut down to 1 tablet twice a day as needed    Thank you,  Artemio Davidson RN        "

## 2018-04-19 NOTE — TELEPHONE ENCOUNTER
Karyn with Caroline LANGLEY for referral - diagnostic code per Dr. Geronimo's office visit notes - try [facility] [place] location or neurology Dr. Campbell in office today    Hudzeny with Neuro MA - provider schedule full this week - patient has not been seen - will not be able to fit patient into schedule till next week    Writer placed orthopedics referral -     Patient has a Hold on her account - reason unknown - needs to speak with business department -     [facility] [phone] - [place]   Fax #: [phone]  Scheduling #:  [phone]    Multiple attempts to call patient - no answer - voicemail box full       Thank you,  Artemio Davidson RN

## 2018-04-19 NOTE — TELEPHONE ENCOUNTER
Clinic Action Needed: Please call patient with information regarding a new referral for neurology for patient.  Per patient, the neurologist she was referred to does not do the injections.  She is still in pain.  Routed to: Care Team    Yessenia Mireles RN  [facility]  [phone]

## 2018-04-20 NOTE — TELEPHONE ENCOUNTER
I advised her to go to the ER yesterday.  This is too confusing having this encounter going along with the evisit.  Please see the evisit for additional information from me.

## 2018-04-20 NOTE — TELEPHONE ENCOUNTER
Yasmin Velazquez CNP    I dont see that she ever went to the ER    Also, the referral that was placed, per pt, they donot do injections. Please advise on new referral.    Thanks!     Clari Isbell RN

## 2018-04-20 NOTE — TELEPHONE ENCOUNTER
Morena review:  This message is related to an E visit that you are doing and seems to still be in progress.      Let triage know what we can do.  Salud Elizalde RN

## 2018-04-24 NOTE — TELEPHONE ENCOUNTER
Please refer to the Evisit dated 4/19/2018 as so much of this information is overlapping and getting confused with the evisit.  Thank you.

## 2018-04-25 RX ORDER — TRAMADOL HYDROCHLORIDE 50 MG/1
50 TABLET ORAL EVERY 8 HOURS
Qty: 15 TABLET | Refills: 0 | Status: SHIPPED | OUTPATIENT
Start: 2018-04-25 | End: 2018-06-18

## 2018-05-16 ENCOUNTER — TELEPHONE (OUTPATIENT)
Dept: FAMILY MEDICINE | Facility: CLINIC | Age: 53
End: 2018-05-16

## 2018-05-16 ENCOUNTER — OFFICE VISIT (OUTPATIENT)
Dept: NEUROLOGY | Facility: CLINIC | Age: 53
End: 2018-05-16
Payer: COMMERCIAL

## 2018-05-16 VITALS
DIASTOLIC BLOOD PRESSURE: 66 MMHG | TEMPERATURE: 98.6 F | WEIGHT: 117.5 LBS | SYSTOLIC BLOOD PRESSURE: 106 MMHG | RESPIRATION RATE: 16 BRPM | BODY MASS INDEX: 20.49 KG/M2 | HEART RATE: 78 BPM

## 2018-05-16 DIAGNOSIS — G43.009 MIGRAINE WITHOUT AURA AND WITHOUT STATUS MIGRAINOSUS, NOT INTRACTABLE: ICD-10-CM

## 2018-05-16 DIAGNOSIS — M54.81 OCCIPITAL NEURALGIA OF RIGHT SIDE: Primary | ICD-10-CM

## 2018-05-16 DIAGNOSIS — G44.86 CERVICOGENIC HEADACHE: ICD-10-CM

## 2018-05-16 DIAGNOSIS — G44.40 MEDICATION OVERUSE HEADACHE: ICD-10-CM

## 2018-05-16 DIAGNOSIS — G44.229 CHRONIC TENSION-TYPE HEADACHE, NOT INTRACTABLE: ICD-10-CM

## 2018-05-16 PROCEDURE — 99215 OFFICE O/P EST HI 40 MIN: CPT | Performed by: PSYCHIATRY & NEUROLOGY

## 2018-05-16 NOTE — MR AVS SNAPSHOT
After Visit Summary   5/16/2018    Catie Nuñez    MRN: [id number]           Patient Information     Date Of Birth          1965        Visit Information        Provider Department      5/16/2018 3:30 PM Gelacio Campbell MD [facility]        Today's Diagnoses     Occipital neuralgia of right side    -  1    Cervicogenic headache        Migraine without aura and without status migrainosus, not intractable        Medication overuse headache        Chronic tension-type headache, not intractable          Care Instructions    AFTER VISIT SUMMARY (AVS):    At today's visit we discussed current symptoms, available treatment options, and the plan, which includes:  Orders Placed This Encounter   Procedures     PAIN MANAGEMENT REFERRAL     The following medications were discussed:  For headache prevention:  1.  Continue riboflavin without changes.  2.  Discuss with your psychiatrist if your Zoloft could be changed to Celexa, Effexor, or nortriptyline, as if these medications could be used for headache prevention.  3.  Please review the information about Cefaly.   For acute therapy:  1.  Excedrin could be used but should be limited to less than 15 days per month.  2.  Please taper off tramadol as it could be a source of rebound headaches.  3.  Right occipital nerve block (pain clinic).    Please keep the headache diary and bring it to the next follow-up visit.    Next follow-up appointment is in the next 4 weeks or earlier if needed.    Please do not hesitate to call me with any questions or concerns.    Thanks.            Follow-ups after your visit        Additional Services     PAIN MANAGEMENT REFERRAL       Your provider has referred you to: [facility]: [facility] -    Reason for Referral: Procedure Order Peripheral nerve block: Occipital      What is your diagnosis for the patient's pain?  Right occipital neuralgia/cervicogenic headache      For any  questions, contact the [facility] at [phone].     **ANY DIAGNOSTIC TESTS THAT ARE NOT IN EPIC SHOULD BE SENT TO THE PAIN CENTER**    REGARDING OPIOID MEDICATIONS:  The discussion of opioids management, appropriateness of therapy, and dosing will be discussed in patients being seen for evaluation.  The pain management clinics are not long-term prescribing clinics, with transition of prescribing of medications ultimately going back to the referring provider/PCP.  If prescribing is taken over at the pain clinic, it is in actively involved patients whom are appropriate for opioids, urine drug screening is completed, and long-term prescribing plan has been determined.  Therefore, we will not be automatically taking over prescribing at the patient's first visit.       Please be aware that coverage of these services is subject to the terms and limitations of your health insurance plan.  Call member services at your health plan with any benefit or coverage questions.      Please bring the following with you to your appointment:    (1) Any X-Rays, CTs or MRIs which have been performed.  Contact the facility where they were done to arrange for  prior to your scheduled appointment.    (2) List of current medications   (3) This referral request   (4) Any documents/labs given to you for this referral                  Follow-up notes from your care team     Return in about 4 weeks (around 6/13/2018).      Your next 10 appointments already scheduled     May 18, 2018 10:45 AM CDT   Adult General Eval with JEREMY Vargas CNS   Psychiatry Clinic ([facility])    [address]  [address] [address]   [phone]            Jun 08, 2018  3:30 PM CDT   Return Visit with Gelacio Campbell MD   [facility] ([facility]    [address] [place] [id number] [phone]               Who to contact     If you have questions or need follow up information about today's clinic visit or your schedule please contact [facility] directly at [phone].  Normal or non-critical lab and imaging results will be communicated to you by MyChart, letter or phone within 4 business days after the clinic has received the results. If you do not hear from us within 7 days, please contact the clinic through MyChart or phone. If you have a critical or abnormal lab result, we will notify you by phone as soon as possible.  Submit refill requests through [employer] or call your pharmacy and they will forward the refill request to us. Please allow 3 business days for your refill to be completed.          Additional Information About Your Visit        [employer] Information     [employer] gives you secure access to your electronic health record. If you see a primary care provider, you can also send messages to your care team and make appointments. If you have questions, please call your primary care clinic.  If you do not have a primary care provider, please call [phone] and they will assist you.        Care EveryWhere ID     This is your Care EveryWhere ID. This could be used by other organizations to access your [place] medical records  [id number]        Your Vitals Were     Pulse Temperature Respirations Last Period BMI (Body Mass Index)       78 98.6  F (37  C) (Oral) 16 09/01/2016 20.49 kg/m2        Blood Pressure from Last 3 Encounters:   05/16/18 106/66   03/29/18 112/71   01/09/18 96/78    Weight from Last 3 Encounters:   05/16/18 53.3 kg (117 lb 8 oz)   03/29/18 52.3 kg (115 lb 3.2 oz)   01/09/18 49.9 kg (110 lb)              We Performed the Following     PAIN MANAGEMENT REFERRAL        Primary Care Provider Office Phone # Fax #    JEREMY Gonzalez [facility] [phone] [address] [place] [address]        Equal Access to Services     SHANNON SANTANA  AH: Hadii marcie hodgesneha Hollingsworthali, waaxda luqadaha, qaybta kaalmada rolo, vickey coryin hayaaalvino brinkderian taylor taras kincaid. So [facility] [phone].    ATENCIÓN: Si alexisla ranjit, tiene a weinberg disposición servicios gratuitos de asistencia lingüística. Llame al [phone].    We comply with applicable federal civil rights laws and [place] laws. We do not discriminate on the basis of race, color, national origin, age, disability, sex, sexual orientation, or gender identity.            Thank you!     Thank you for choosing [facility]  for your care. Our goal is always to provide you with excellent care. Hearing back from our patients is one way we can continue to improve our services. Please take a few minutes to complete the written survey that you may receive in the mail after your visit with us. Thank you!             Your Updated Medication List - Protect others around you: Learn how to safely use, store and throw away your medicines at www.disposemymeds.org.          This list is accurate as of 5/16/18  4:28 PM.  Always use your most recent med list.                   Brand Name Dispense Instructions for use Diagnosis    ALPRAZolam 0.5 MG tablet    XANAX    40 tablet    Take 1 tablet (0.5 mg) by mouth 2 times daily as needed for anxiety 40 tablets to last 30 days.    Generalized anxiety disorder       EXCEDRIN PO      Take by mouth as needed        fluticasone 50 MCG/ACT spray    FLONASE    3 Bottle    Spray 1-2 sprays into both nostrils daily    Acute seasonal allergic rhinitis, unspecified trigger       ibuprofen 600 MG tablet    ADVIL/MOTRIN     Take 600 mg by mouth Take 1 tablet by mouth 4 times daily if needed. Maximum of 3200 mg in 24 hours.        MULTIVITAMIN PO           norethindrone-ethinyl estradiol 1-5 MG-MCG per tablet    FEMHRT 1/5     TK 1 T PO D        riboflavin 400 MG Caps     30 capsule    Take 400 mg by mouth every morning    Migraine without aura and without status migrainosus,  not intractable       sertraline 100 MG tablet    ZOLOFT    135 tablet    Take 1.5 tablets (150 mg) by mouth daily    Generalized anxiety disorder       * traMADol 50 MG tablet    ULTRAM    60 tablet    Take 1 tablet (50 mg) by mouth 2 times daily as needed for moderate pain 60 tablets to last 30 days.    Migraine without aura and without status migrainosus, not intractable       * traMADol 50 MG tablet    ULTRAM    15 tablet    Take 1 tablet (50 mg) by mouth every 8 hours . Okay to fill today.    Migraine without aura and without status migrainosus, not intractable       * Notice:  This list has 2 medication(s) that are the same as other medications prescribed for you. Read the directions carefully, and ask your doctor or other care provider to review them with you.

## 2018-05-16 NOTE — NURSING NOTE
/66 (BP Location: Left arm, Patient Position: Sitting, Cuff Size: Adult Regular)  Pulse 78  Temp 98.6  F (37  C) (Oral)  Resp 16  Wt 53.3 kg (117 lb 8 oz)  LMP 09/01/2016  BMI 20.49 kg/m2       Ata Del Toro MA

## 2018-05-16 NOTE — PROGRESS NOTES
ESTABLISHED PATIENT NEUROLOGY NOTE    DATE OF VISIT: 5/16/2018  CLINIC LOCATION: [facility]  MRN: [id number]  PATIENT NAME: Catie Nuñez  YOB: 1965    PCP: JEREMY Kent CNP    REASON FOR VISIT:   Chief Complaint   Patient presents with     Follow Up For     Migraine F/U     SUBJECTIVE:                                                      HISTORY OF PRESENT ILLNESS: Patient is here for follow up regarding multifactorial headache. Please refer to my initial note for further information.    Since the last visit, the patient reports some interval worsening of her headaches (increased in intensity).  She did not keep the diary as recommended.  She experiences daily aching dull headaches.  In addition, approximately once per month she has pounding/throbbing headache that originates in the right occipital area and spreads up to her scalp, at times reaching to her right eye.  Also, she experiences stabbing, electric shock-like sensations in the same area.  It could last up to 3 days impairing her ability to function/work.  She tried Imitrex in the past, but it was not effective.  Excedrin works most of the time.  She also uses tramadol.  She tried naproxen, but it did not help headache and caused upset stomach.  Currently, she takes Excedrin and tramadol every day.  She is on preventative riboflavin without noticeable effect on her headaches.  Her insurance denied Botox injections.  She denies interval development of new focal neurological symptoms.    On review of systems, patient endorses worsening of the anxiety (will see psychiatrist this Friday), but no new active complaints. Medications, allergies, family and social history were also reviewed. There are no changes reported by patient.    CURRENT MEDICATIONS:   Current Outpatient Prescriptions   Medication     ALPRAZolam (XANAX) 0.5 MG tablet     Aspirin-Acetaminophen-Caffeine (EXCEDRIN PO)     fluticasone (FLONASE) 50  MCG/ACT spray     ibuprofen (ADVIL/MOTRIN) 600 MG tablet     Multiple Vitamins-Minerals (MULTIVITAMIN PO)     norethindrone-ethinyl estradiol (FEMHRT 1/5) 1-5 MG-MCG per tablet     riboflavin 400 MG CAPS     sertraline (ZOLOFT) 100 MG tablet     traMADol (ULTRAM) 50 MG tablet     traMADol (ULTRAM) 50 MG tablet     REVIEW OF SYSTEMS:                                                    10-system review was completed. Pertinent positives are included in HPI. The remainder of ROS is negative.  EXAM:                                                    Physical Exam:   Vitals: /66 (BP Location: Left arm, Patient Position: Sitting, Cuff Size: Adult Regular)  Pulse 78  Temp 98.6  F (37  C) (Oral)  Resp 16  Wt 53.3 kg (117 lb 8 oz)  LMP 09/01/2016  BMI 20.49 kg/m2    General: pt is in NAD, cooperative.  Skin: normal turgor, moist mucous membranes, no lesions/rashes noticed.  HEENT: ATNC, white sclera, normal conjunctiva.  Respiratory: Symmetric lung excursion, no accessory respiratory muscle use.  Abdomen: Non distended.  Neurological: awake, cooperative, follows commands, no aphasia or dysarthria noted, cranial nerves II-XII: no ptosis, extraocular motility is full, face is symmetric, tongue is midline, strength is 5 out of 5 in all major groups of all extremities, light touch sensation is intact bilaterally, no dysmetria bilaterally, casual gait is normal.  There is tenderness to palpation over the right occipital nerves.  ASSESSMENT and PLAN:                                                    Assessment: 52-year-old female patient with chronic multifactorial headaches presents for follow-up.  She reports worsening of headache intensity without additional changes of headache characteristics.  She continues to use Excedrin and tramadol every day for acute therapy.  She is on preventative riboflavin without significant impact on her headaches.  She tried several other prophylactic options in the past.  Was hesitant  to try propranolol due to concerns of hypotension side effects.  Botox was denied by her insurance.    We had a detailed discussion with the patient regarding her current symptoms.  The most prominent current headache component is likely related to right occipital neuralgia.  I ordered right occipital nerve block via pain clinic for acute treatment.  We also discussed that continued excessive use of acute medications is a source of medication overuse headache, and gradual taper of them is required to eliminate this component.  Imitrex was tried in the past.  Other triptans could also be considered, once she is done with the taper.  The rest of our prolonged discussion and the plan are summarized below.    Diagnoses:    ICD-10-CM    1. Occipital neuralgia of right side M54.81 PAIN MANAGEMENT REFERRAL   2. Cervicogenic headache R51    3. Migraine without aura and without status migrainosus, not intractable G43.009    4. Medication overuse headache G44.40    5. Chronic tension-type headache, not intractable G44.229      Plan: At today's visit we thoroughly discussed current symptoms, available treatment options, and the plan, which includes:  Orders Placed This Encounter   Procedures     PAIN MANAGEMENT REFERRAL     The following medications were discussed:  For headache prevention:  1.  I advised the patient to continue riboflavin without changes.  2.  I recommended the patient to discuss with her psychiatrist if her Zoloft could be changed to Celexa, Effexor, or nortriptyline, as these medications could be used for headache prevention.  3.  She will review the information about Cefaly, neurostimulator used for migraine prophylaxis.   For acute therapy:  1.  Excedrin could be used but should be limited to less than 15 days per month.  2.  I recommended the patient to taper off tramadol as it could be a source of rebound headaches.  3.  Right occipital nerve block (via pain clinic).    I instructed the patient to keep  the headache diary and bring it to the next follow-up visit.    Next follow-up appointment is in the next 4 weeks or earlier if needed.    I advised the patient to call me with any questions or concerns.    Total Time: 42 minutes with > 50% spent counseling the patient on stated above assessment and recommendations, including nature of the diagnosis, review of current symptoms, and proposed plan of treatment.  Additional time was needed to answer numerous patient's questions regarding her symptoms and the plan.    Gelacio Campbell MD  / Neurology

## 2018-05-16 NOTE — PATIENT INSTRUCTIONS
AFTER VISIT SUMMARY (AVS):    At today's visit we discussed current symptoms, available treatment options, and the plan, which includes:  Orders Placed This Encounter   Procedures     PAIN MANAGEMENT REFERRAL     The following medications were discussed:  For headache prevention:  1.  Continue riboflavin without changes.  2.  Discuss with your psychiatrist if your Zoloft could be changed to Celexa, Effexor, or nortriptyline, as these medications could be used for headache prevention.  3.  Please review the information about Cefaly.   For acute therapy:  1.  Excedrin could be used but should be limited to less than 15 days per month.  2.  Please taper off tramadol as it could be a source of rebound headaches.  3.  Right occipital nerve block (pain clinic).    Please keep the headache diary and bring it to the next follow-up visit.    Next follow-up appointment is in the next 4 weeks or earlier if needed.    Please do not hesitate to call me with any questions or concerns.    Thanks.

## 2018-05-16 NOTE — TELEPHONE ENCOUNTER
Yasmin Velazquez CNP  Please review pt message below and advise if evisit or office visit is needed for this increase in xanax  Thanks!     Clari Isbell RN

## 2018-05-16 NOTE — TELEPHONE ENCOUNTER
Reason for Call:  Medication or medication refill:    Do you use a [facility]?  Name of the pharmacy and phone number for the current request:  [facility] [id number] [place], [place] [address] AVE AT [address]    Name of the medication requested: ALPRAZolam (XANAX) 0.5 MG tablet    Other request: Patient is requesting an increased amount of the medication above. States she takes 2 x day which doesn't quite allow her a month supply and with her increased migraines comes increased anxiety and she will need more than what was prescribed to last her the rest of the month. Please assist. Thanks!    Can we leave a detailed message on this number? YES    Phone number patient can be reached at: Home number on file [phone] (home)    Best Time: Any    Call taken on 5/16/2018 at 5:00 PM by Rosetta Greene

## 2018-05-17 ENCOUNTER — TELEPHONE (OUTPATIENT)
Dept: PALLIATIVE MEDICINE | Facility: CLINIC | Age: 53
End: 2018-05-17

## 2018-05-17 ENCOUNTER — MYC MEDICAL ADVICE (OUTPATIENT)
Dept: FAMILY MEDICINE | Facility: CLINIC | Age: 53
End: 2018-05-17

## 2018-05-17 DIAGNOSIS — G43.009 MIGRAINE WITHOUT AURA AND WITHOUT STATUS MIGRAINOSUS, NOT INTRACTABLE: Primary | ICD-10-CM

## 2018-05-17 DIAGNOSIS — F41.1 GENERALIZED ANXIETY DISORDER: ICD-10-CM

## 2018-05-17 NOTE — TELEPHONE ENCOUNTER
I sent the patient a message asking for additional information regarding this request. See the [employer] messge 5/17/2018.

## 2018-05-18 RX ORDER — ALPRAZOLAM 0.5 MG
0.5 TABLET ORAL 2 TIMES DAILY PRN
Qty: 50 TABLET | Refills: 0 | Status: SHIPPED | OUTPATIENT
Start: 2018-05-18 | End: 2018-06-18

## 2018-05-18 NOTE — TELEPHONE ENCOUNTER
VM is full, [employer] message has been sent.      Emely BOLTON    [facility]

## 2018-05-23 ENCOUNTER — MYC MEDICAL ADVICE (OUTPATIENT)
Dept: FAMILY MEDICINE | Facility: CLINIC | Age: 53
End: 2018-05-23

## 2018-05-23 DIAGNOSIS — G43.009 MIGRAINE WITHOUT AURA AND WITHOUT STATUS MIGRAINOSUS, NOT INTRACTABLE: ICD-10-CM

## 2018-05-24 RX ORDER — TRAMADOL HYDROCHLORIDE 50 MG/1
50 TABLET ORAL 2 TIMES DAILY PRN
Qty: 60 TABLET | Refills: 0 | Status: CANCELLED | OUTPATIENT
Start: 2018-05-24

## 2018-05-24 NOTE — TELEPHONE ENCOUNTER
[facility] is where the RX currently is.  Triage spoke with pharmacist and they will plan for an early refill on Saturday kelly 5/26 as OK'd by SYBIL Velazquez.  Salud Elizalde RN

## 2018-05-24 NOTE — TELEPHONE ENCOUNTER
I called the pharmacy and the last script sent 4/25/2018 was already filled. And I asked about the refills on the 3/29/2018 script and they said they do not have a script from that date. You will need to sign off on a new script.

## 2018-06-12 ENCOUNTER — OFFICE VISIT (OUTPATIENT)
Dept: PALLIATIVE MEDICINE | Facility: CLINIC | Age: 53
End: 2018-06-12
Payer: COMMERCIAL

## 2018-06-12 VITALS
OXYGEN SATURATION: 100 % | SYSTOLIC BLOOD PRESSURE: 117 MMHG | WEIGHT: 118 LBS | RESPIRATION RATE: 16 BRPM | HEART RATE: 77 BPM | DIASTOLIC BLOOD PRESSURE: 76 MMHG | BODY MASS INDEX: 20.57 KG/M2

## 2018-06-12 DIAGNOSIS — M54.81 BILATERAL OCCIPITAL NEURALGIA: Primary | ICD-10-CM

## 2018-06-12 PROCEDURE — 64405 NJX AA&/STRD GR OCPL NRV: CPT | Mod: 50 | Performed by: PSYCHIATRY & NEUROLOGY

## 2018-06-12 ASSESSMENT — PAIN SCALES - GENERAL: PAINLEVEL: MODERATE PAIN (4)

## 2018-06-12 NOTE — PROGRESS NOTES
Pre procedure Diagnosis: occipital neuralgia   Post procedure Diagnosis: Same  Procedure performed: bilateral occipital nerve blocks  Anesthesia: none  Complications: none  Operators: Brenda Singh MD     Indications:   Catie Nuñez is a 52 year old female with a history of migraine headaches, as well as posterior head pain.  Exam shows bilateral greater occipital nerve tenderness  and they have tried conservative treatment including medications.    Options/alternatives, benefits and risks were discussed with the patient including bleeding, infection, hematoma, nerve damage, stroke, and spinal cord injury.  Questions were answered to her satisfaction and she agrees to proceed. Voluntary informed consent was obtained and signed.     Vitals were reviewed: Yes  Allergies were reviewed:  Yes   Medications were reviewed:  Yes   Pre-procedure pain score: 4/10    Procedure:  After getting informed consent, a Pause for the Cause was performed.    The occipital ridge, occipital protuberance, and mastoid process were palpated on the right, then left sides.  The location of maximal tenderness which was consistent with the location of the greater occipital nerve was palpated.  The area was cleaned.    Palpation for a pulse was completed, with no pulse noted at the site of the injection.  A 25G, 1.5 inch needle was introduced, aimed cephalad, in the superficial tissues at this area of tenderness.  The injection was completed at this location, fanning in 2 different directions.  Aspiration for heme was negative before all injections.    In total, 2.5ml of 0.5% bupivacaine, 2.5ml of 1% lidocaine was injected.     The patient tolerated the procedure well, hemostasis was achieved.      Post-procedure pain score: 4/10  Follow-up includes:   -f/u with me  -schedule prn, can be double booked    Brenda Singh MD  [facility]

## 2018-06-12 NOTE — MR AVS SNAPSHOT
After Visit Summary   6/12/2018    Catie Nuñez    MRN: [id number]           Patient Information     Date Of Birth          1965        Visit Information        Provider Department      6/12/2018 1:30 PM Yumiko Singh MD [facility]        Care Instructions    [facility] [place]   Post Procedure Instructions    Today you had:   occipital nerve block      Medications used:  lidocaine   bupivicaine           Go to the emergency room if you develop any shortness of breath    Monitor the injection sites for signs and symptoms of infection-fever, chills, redness, swelling, warmth, or drainage to areas.    You may have soreness at injection sites for up to 24 hours.    You may apply ice to the painful areas to help minimize the discomfort of the needle pokes.    Do not apply heat to sites for at least 12 hours.    You may use anti-inflammatory medications or Tylenol for pain control if necessary    Pain Clinic phone number during work hours Monday-Friday:  [phone]    After hours provider line: [phone]                Follow-ups after your visit        Your next 10 appointments already scheduled     Jun 28, 2018  3:30 PM CDT   Adult General Eval with JEREMY An [facility]   Psychiatry Clinic ([facility])    [place] [id number] [address] [phone]              Who to contact     If you have questions or need follow up information about today's clinic visit or your schedule please contact [facility] directly at [phone].  Normal or non-critical lab and imaging results will be communicated to you by MyChart, letter or phone within 4 business days after the clinic has received the results. If you do not hear from us within 7 days, please contact the clinic through MyChart or phone. If you have a critical or abnormal lab result, we will notify you by phone  as soon as possible.  Submit refill requests through [employer] or call your pharmacy and they will forward the refill request to us. Please allow 3 business days for your refill to be completed.          Additional Information About Your Visit        [employer] Information     [employer] gives you secure access to your electronic health record. If you see a primary care provider, you can also send messages to your care team and make appointments. If you have questions, please call your primary care clinic.  If you do not have a primary care provider, please call [phone] and they will assist you.        Care EveryWhere ID     This is your Care EveryWhere ID. This could be used by other organizations to access your [place] medical records  [id number]        Your Vitals Were     Pulse Respirations Last Period Pulse Oximetry BMI (Body Mass Index)       77 16 09/01/2016 100% 20.57 kg/m2        Blood Pressure from Last 3 Encounters:   06/12/18 117/76   05/16/18 106/66   03/29/18 112/71    Weight from Last 3 Encounters:   06/12/18 53.5 kg (118 lb)   05/16/18 53.3 kg (117 lb 8 oz)   03/29/18 52.3 kg (115 lb 3.2 oz)              Today, you had the following     No orders found for display       Primary Care Provider Office Phone # Fax #    JEREMY Gonzalez [phone] [address] [place] [address]        Equal Access to Services     [facility]: Hadii aad ku hadasho Soomaali, waaxda luqadaha, qaybta kaalmada adeegyada, vickey grajeda . So [facility] [phone].    ATENCIÓN: Si habla español, tiene a weinberg disposición servicios gratuitos de asistencia lingüística. Llame al [phone].    We comply with applicable federal civil rights laws and [place] laws. We do not discriminate on the basis of race, color, national origin, age, disability, sex, sexual orientation, or gender identity.            Thank you!     Thank you for choosing [facility]  for your care. Our goal is always to provide you with excellent care. Hearing back from our patients is one way we can continue to improve our services. Please take a few minutes to complete the written survey that you may receive in the mail after your visit with us. Thank you!             Your Updated Medication List - Protect others around you: Learn how to safely use, store and throw away your medicines at www.disposemymeds.org.          This list is accurate as of 6/12/18  2:00 PM.  Always use your most recent med list.                   Brand Name Dispense Instructions for use Diagnosis    ALPRAZolam 0.5 MG tablet    XANAX    50 tablet    Take 1 tablet (0.5 mg) by mouth 2 times daily as needed for anxiety . 50 tablets to last 30 days.    Generalized anxiety disorder       EXCEDRIN PO      Take by mouth as needed        fluticasone 50 MCG/ACT spray    FLONASE    3 Bottle    Spray 1-2 sprays into both nostrils daily    Acute seasonal allergic rhinitis, unspecified trigger       ibuprofen 600 MG tablet    ADVIL/MOTRIN     Take 600 mg by mouth Take 1 tablet by mouth 4 times daily if needed. Maximum of 3200 mg in 24 hours.        MULTIVITAMIN PO           norethindrone-ethinyl estradiol 1-5 MG-MCG per tablet    FEMHRT 1/5     TK 1 T PO D        riboflavin 400 MG Caps     30 capsule    Take 400 mg by mouth every morning    Migraine without aura and without status migrainosus, not intractable       sertraline 100 MG tablet    ZOLOFT    135 tablet    Take 1.5 tablets (150 mg) by mouth daily    Generalized anxiety disorder       * traMADol 50 MG tablet    ULTRAM    60 tablet    Take 1 tablet (50 mg) by mouth 2 times daily as needed for moderate pain 60 tablets to last 30 days.    Migraine without aura and without status migrainosus, not intractable       * traMADol 50 MG tablet    ULTRAM    15 tablet    Take 1 tablet (50 mg) by mouth every 8 hours . Okay to fill today.    Migraine without aura and  without status migrainosus, not intractable       * Notice:  This list has 2 medication(s) that are the same as other medications prescribed for you. Read the directions carefully, and ask your doctor or other care provider to review them with you.

## 2018-06-12 NOTE — PATIENT INSTRUCTIONS
[facility]   Post Procedure Instructions    Today you had:   occipital nerve block      Medications used:  lidocaine   bupivicaine           Go to the emergency room if you develop any shortness of breath    Monitor the injection sites for signs and symptoms of infection-fever, chills, redness, swelling, warmth, or drainage to areas.    You may have soreness at injection sites for up to 24 hours.    You may apply ice to the painful areas to help minimize the discomfort of the needle pokes.    Do not apply heat to sites for at least 12 hours.    You may use anti-inflammatory medications or Tylenol for pain control if necessary    Pain Clinic phone number during work hours Monday-Friday:  [phone]    After hours provider line: [phone]

## 2018-06-18 ENCOUNTER — OFFICE VISIT (OUTPATIENT)
Dept: FAMILY MEDICINE | Facility: CLINIC | Age: 53
End: 2018-06-18
Payer: COMMERCIAL

## 2018-06-18 VITALS
SYSTOLIC BLOOD PRESSURE: 129 MMHG | RESPIRATION RATE: 16 BRPM | OXYGEN SATURATION: 97 % | TEMPERATURE: 97.6 F | DIASTOLIC BLOOD PRESSURE: 75 MMHG | HEART RATE: 59 BPM | BODY MASS INDEX: 20.44 KG/M2 | WEIGHT: 117.2 LBS

## 2018-06-18 DIAGNOSIS — M25.551 HIP PAIN, RIGHT: Primary | ICD-10-CM

## 2018-06-18 DIAGNOSIS — J30.2 ACUTE SEASONAL ALLERGIC RHINITIS, UNSPECIFIED TRIGGER: ICD-10-CM

## 2018-06-18 DIAGNOSIS — F41.1 GENERALIZED ANXIETY DISORDER: ICD-10-CM

## 2018-06-18 DIAGNOSIS — M54.50 MIDLINE LOW BACK PAIN WITHOUT SCIATICA, UNSPECIFIED CHRONICITY: ICD-10-CM

## 2018-06-18 DIAGNOSIS — G89.29 CHRONIC INTRACTABLE HEADACHE, UNSPECIFIED HEADACHE TYPE: ICD-10-CM

## 2018-06-18 DIAGNOSIS — G43.009 MIGRAINE WITHOUT AURA AND WITHOUT STATUS MIGRAINOSUS, NOT INTRACTABLE: ICD-10-CM

## 2018-06-18 DIAGNOSIS — R51.9 CHRONIC INTRACTABLE HEADACHE, UNSPECIFIED HEADACHE TYPE: ICD-10-CM

## 2018-06-18 LAB
CRP SERPL-MCNC: <2.9 MG/L (ref 0–8)
ERYTHROCYTE [SEDIMENTATION RATE] IN BLOOD BY WESTERGREN METHOD: 8 MM/H (ref 0–30)

## 2018-06-18 PROCEDURE — 86039 ANTINUCLEAR ANTIBODIES (ANA): CPT | Performed by: FAMILY MEDICINE

## 2018-06-18 PROCEDURE — 85652 RBC SED RATE AUTOMATED: CPT | Performed by: FAMILY MEDICINE

## 2018-06-18 PROCEDURE — 86618 LYME DISEASE ANTIBODY: CPT | Performed by: FAMILY MEDICINE

## 2018-06-18 PROCEDURE — 86431 RHEUMATOID FACTOR QUANT: CPT | Performed by: FAMILY MEDICINE

## 2018-06-18 PROCEDURE — 99214 OFFICE O/P EST MOD 30 MIN: CPT | Performed by: FAMILY MEDICINE

## 2018-06-18 PROCEDURE — 86038 ANTINUCLEAR ANTIBODIES: CPT | Performed by: FAMILY MEDICINE

## 2018-06-18 PROCEDURE — 36415 COLL VENOUS BLD VENIPUNCTURE: CPT | Performed by: FAMILY MEDICINE

## 2018-06-18 PROCEDURE — 80053 COMPREHEN METABOLIC PANEL: CPT | Performed by: FAMILY MEDICINE

## 2018-06-18 PROCEDURE — 86140 C-REACTIVE PROTEIN: CPT | Performed by: FAMILY MEDICINE

## 2018-06-18 RX ORDER — FLUTICASONE PROPIONATE 50 MCG
1-2 SPRAY, SUSPENSION (ML) NASAL DAILY
Qty: 3 BOTTLE | Refills: 3 | Status: SHIPPED | OUTPATIENT
Start: 2018-06-18 | End: 2019-04-25

## 2018-06-18 RX ORDER — ALPRAZOLAM 0.5 MG
0.5 TABLET ORAL 2 TIMES DAILY PRN
Qty: 50 TABLET | Refills: 0 | Status: SHIPPED | OUTPATIENT
Start: 2018-06-18 | End: 2018-07-11

## 2018-06-18 RX ORDER — TRAMADOL HYDROCHLORIDE 50 MG/1
50 TABLET ORAL 2 TIMES DAILY PRN
Qty: 60 TABLET | Refills: 0 | Status: SHIPPED | OUTPATIENT
Start: 2018-06-18 | End: 2018-07-23

## 2018-06-18 ASSESSMENT — PAIN SCALES - GENERAL: PAINLEVEL: MODERATE PAIN (4)

## 2018-06-18 NOTE — MR AVS SNAPSHOT
After Visit Summary   6/18/2018    Catie Nuñez    MRN: [id number]           Patient Information     Date Of Birth          1965        Visit Information        Provider Department      6/18/2018 4:00 PM Camacho Alfonso MD [facility]        Today's Diagnoses     Hip pain, right    -  1    Generalized anxiety disorder        Acute seasonal allergic rhinitis, unspecified trigger        Migraine without aura and without status migrainosus, not intractable        Midline low back pain without sciatica, unspecified chronicity        Chronic intractable headache, unspecified headache type          Care Instructions    May consider treatment for lyme's disease pending lab results and symptoms. I feel it is even reasonable at this point to consider empiric therapy, but prefer to have some more information for test results first.    May consider a trial off of flonase. OK to continue or restart as needed.    OK to continue alprazolam, but I am supportive of the idea of checking in with psychiatry to explore other treatments. Generally I recommend not using this medication more than 3-4 times in a week. They can help manage decision regarding zoloft also.          Follow-ups after your visit        Your next 10 appointments already scheduled     Jun 28, 2018  3:30 PM CDT   Adult General Eval with JEREMY An CNP   Psychiatry Clinic ([facility])    [place] [id number] [place] [id number] [phone]              Who to contact     If you have questions or need follow up information about today's clinic visit or your schedule please contact [facility] directly at [phone].  Normal or non-critical lab and imaging results will be communicated to you by MyChart, letter or phone within 4 business days after the clinic has received the results. If you do not hear from us within 7 days,  please contact the clinic through [employer] or phone. If you have a critical or abnormal lab result, we will notify you by phone as soon as possible.  Submit refill requests through [employer] or call your pharmacy and they will forward the refill request to us. Please allow 3 business days for your refill to be completed.          Additional Information About Your Visit        [employer] Information     [employer] gives you secure access to your electronic health record. If you see a primary care provider, you can also send messages to your care team and make appointments. If you have questions, please call your primary care clinic.  If you do not have a primary care provider, please call [phone] and they will assist you.        Care EveryWhere ID     This is your Care EveryWhere ID. This could be used by other organizations to access your [place] medical records  [id number]        Your Vitals Were     Pulse Temperature Respirations Last Period Pulse Oximetry BMI (Body Mass Index)    59 97.6  F (36.4  C) (Tympanic) 16 09/01/2016 97% 20.44 kg/m2       Blood Pressure from Last 3 Encounters:   06/18/18 129/75   06/12/18 117/76   05/16/18 106/66    Weight from Last 3 Encounters:   06/18/18 117 lb 3.2 oz (53.2 kg)   06/12/18 118 lb (53.5 kg)   05/16/18 117 lb 8 oz (53.3 kg)              We Performed the Following     Anti Nuclear Viviana IgG by IFA with Reflex     Comprehensive metabolic panel (BMP + Alb, Alk Phos, ALT, AST, Total. Bili, TP)     CRP, inflammation     ESR: Erythrocyte sedimentation rate     Lyme Disease Viviana with reflex to WB Serum     Rheumatoid factor          Where to get your medicines      These medications were sent to [facility] [address] [facility] [address] AT [address] [address]    Hours:  24-hours Phone:  [phone]     fluticasone 50 MCG/ACT spray         Some of these will need a paper prescription and others can be  bought over the counter.  Ask your nurse if you have questions.     Bring a paper prescription for each of these medications     ALPRAZolam 0.5 MG tablet    traMADol 50 MG tablet         Information about OPIOIDS     PRESCRIPTION OPIOIDS: WHAT YOU NEED TO KNOW   We gave you an opioid (narcotic) pain medicine. It is important to manage your pain, but opioids are not always the best choice. You should first try all the other options your care team gave you. Take this medicine for as short a time (and as few doses) as possible.     These medicines have risks:    DO NOT drive when on new or higher doses of pain medicine. These medicines can affect your alertness and reaction times, and you could be arrested for driving under the influence (DUI). If you need to use opioids long-term, talk to your care team about driving.    DO NOT operate heave machinery    DO NOT do any other dangerous activities while taking these medicines.     DO NOT drink any alcohol while taking these medicines.      If the opioid prescribed includes acetaminophen, DO NOT take with any other medicines that contain acetaminophen. Read all labels carefully. Look for the word  acetaminophen  or  Tylenol.  Ask your pharmacist if you have questions or are unsure.    You can get addicted to pain medicines, especially if you have a history of addiction (chemical, alcohol or substance dependence). Talk to your care team about ways to reduce this risk.    Store your pills in a secure place, locked if possible. We will not replace any lost or stolen medicine. If you don t finish your medicine, please throw away (dispose) as directed by your pharmacist. The [place] [employer]ution Control Agency has more information about safe disposal: https://www.pca.state.[place]/living-green/managing-unwanted-medications.     All opioids tend to cause constipation. Drink plenty of water and eat foods that have a lot of fiber, such as fruits, vegetables, prune juice, apple juice  and high-fiber cereal. Take a laxative (Miralax, milk of magnesia, Colace, Senna) if you don t move your bowels at least every other day.          Primary Care Provider Office Phone # Fax #    Yasmin GRUBER JEREMY Velazquez [phone] [address] [place] [address]        Equal Access to Services     SHANNON SANTANA : Hadii aad ku hadasho Soomaali, waaxda luqadaha, qaybta kaalmada adeegyada, waxay idiin hayaan adeeg kharash la'aan . So [facility] [phone].    ATENCIÓN: Si habla ranjit, tiene a weinberg disposición servicios gratuitos de asistencia lingüística. Llame al [phone].    We comply with applicable federal civil rights laws and [place] laws. We do not discriminate on the basis of race, color, national origin, age, disability, sex, sexual orientation, or gender identity.            Thank you!     Thank you for choosing [facility]  for your care. Our goal is always to provide you with excellent care. Hearing back from our patients is one way we can continue to improve our services. Please take a few minutes to complete the written survey that you may receive in the mail after your visit with us. Thank you!             Your Updated Medication List - Protect others around you: Learn how to safely use, store and throw away your medicines at www.disposemymeds.org.          This list is accurate as of 6/18/18  4:50 PM.  Always use your most recent med list.                   Brand Name Dispense Instructions for use Diagnosis    ALPRAZolam 0.5 MG tablet    XANAX    50 tablet    Take 1 tablet (0.5 mg) by mouth 2 times daily as needed for anxiety . 50 tablets to last 30 days.    Generalized anxiety disorder       EXCEDRIN PO      Take by mouth as needed        fluticasone 50 MCG/ACT spray    FLONASE    3 Bottle    Spray 1-2 sprays into both nostrils daily    Acute seasonal allergic rhinitis, unspecified trigger       ibuprofen 600 MG tablet    ADVIL/MOTRIN      Take 600 mg by mouth Take 1 tablet by mouth 4 times daily if needed. Maximum of 3200 mg in 24 hours.        MULTIVITAMIN PO           norethindrone-ethinyl estradiol 1-5 MG-MCG per tablet    FEMHRT 1/5     TK 1 T PO D        riboflavin 400 MG Caps     30 capsule    Take 400 mg by mouth every morning    Migraine without aura and without status migrainosus, not intractable       sertraline 100 MG tablet    ZOLOFT    135 tablet    Take 1.5 tablets (150 mg) by mouth daily    Generalized anxiety disorder       traMADol 50 MG tablet    ULTRAM    60 tablet    Take 1 tablet (50 mg) by mouth 2 times daily as needed for moderate pain 60 tablets to last 30 days.    Hip pain, right, Midline low back pain without sciatica, unspecified chronicity

## 2018-06-18 NOTE — PATIENT INSTRUCTIONS
May consider treatment for lyme's disease pending lab results and symptoms. I feel it is even reasonable at this point to consider empiric therapy, but prefer to have some more information for test results first.    May consider a trial off of flonase. OK to continue or restart as needed.    OK to continue alprazolam, but I am supportive of the idea of checking in with psychiatry to explore other treatments. Generally I recommend not using this medication more than 3-4 times in a week. They can help manage decision regarding zoloft also.

## 2018-06-18 NOTE — PROGRESS NOTES
SUBJECTIVE:   Catie Nuñez is a 53 year old female who presents to clinic today for the following health issues:    Medication Followup of Xanax, Flonase, Tramadol    Taking Medication as prescribed: yes    Side Effects:  None    Medication Helping Symptoms:  Yes    Needs refills soon           She is going to see a psychiatrist at the end of this week. She is most interested in finding an alternative medication for her anxiety.    She notes swelling around the occipital nerve/neuralgia. They did try a nerve block. This was done by pain management. She has had steroid which helped in the short term.    She takes tramadol for lower back pain and headache/occipital nerve pain/neuralgia. She takes this now for both of these. She does take it one pill twice daily. Occasionally just once a day. Between shoulder, neck pain, and lower back pain she really feels it. She also takes excedrin and ibuprofen as needed. Symptoms have been for the past 3 years.     Symptoms began in the fall of 2013.    Also had right hip pain that began around that time.    Takes flonase for seasonal allergies - symptoms include nasal congestion.    Running low on tramadol as well. Takes this for back and neck pain.    ROS  No aura with headaches - different than headaches as a kid  Notes fatigue and general malaise for several days when symptoms occur    EXAM:  /75 (BP Location: Left arm, Patient Position: Chair, Cuff Size: Adult Regular)  Pulse 59  Temp 97.6  F (36.4  C) (Tympanic)  Resp 16  Wt 117 lb 3.2 oz (53.2 kg)  LMP 09/01/2016  SpO2 97%  BMI 20.44 kg/m2  Constitutional: anxious  Cardiovascular: RRR. No murmurs   Respiratory: Clear to auscultation   Musculoskeletal: good shoulder ROM, some discomfort at base of neck/right SCM.    ASSESSMENT    ICD-10-CM    1. Hip pain, right M25.551 ESR: Erythrocyte sedimentation rate     CRP, inflammation     Lyme Disease Viviana with reflex to WB Serum     Comprehensive metabolic panel  (BMP + Alb, Alk Phos, ALT, AST, Total. Bili, TP)     Rheumatoid factor     Anti Nuclear Viviana IgG by IFA with Reflex     traMADol (ULTRAM) 50 MG tablet   2. Generalized anxiety disorder F41.1 ALPRAZolam (XANAX) 0.5 MG tablet   3. Acute seasonal allergic rhinitis, unspecified trigger J30.2 fluticasone (FLONASE) 50 MCG/ACT spray   4. Migraine without aura and without status migrainosus, not intractable G43.009    5. Midline low back pain without sciatica, unspecified chronicity M54.5 ESR: Erythrocyte sedimentation rate     CRP, inflammation     Lyme Disease Viviana with reflex to WB Serum     Comprehensive metabolic panel (BMP + Alb, Alk Phos, ALT, AST, Total. Bili, TP)     Rheumatoid factor     Anti Nuclear Viviana IgG by IFA with Reflex     traMADol (ULTRAM) 50 MG tablet   6. Chronic intractable headache, unspecified headache type R51 ESR: Erythrocyte sedimentation rate     CRP, inflammation     Lyme Disease Viviana with reflex to WB Serum     Comprehensive metabolic panel (BMP + Alb, Alk Phos, ALT, AST, Total. Bili, TP)     Rheumatoid factor     Anti Nuclear Viviana IgG by IFA with Reflex      Plan:  Patient Instructions   May consider treatment for lyme's disease pending lab results and symptoms. I feel it is even reasonable at this point to consider empiric therapy, but prefer to have some more information for test results first.    May consider a trial off of flonase. OK to continue or restart as needed.    OK to continue alprazolam, but I am supportive of the idea of checking in with psychiatry to explore other treatments. Generally I recommend not using this medication more than 3-4 times in a week. They can help manage decision regarding zoloft also.     Camacho Ang MD  Family Medicine Physician

## 2018-06-19 LAB
ALBUMIN SERPL-MCNC: 4.3 G/DL (ref 3.4–5)
ALP SERPL-CCNC: 67 U/L (ref 40–150)
ALT SERPL W P-5'-P-CCNC: 21 U/L (ref 0–50)
ANION GAP SERPL CALCULATED.3IONS-SCNC: 8 MMOL/L (ref 3–14)
AST SERPL W P-5'-P-CCNC: 26 U/L (ref 0–45)
BILIRUB SERPL-MCNC: 0.3 MG/DL (ref 0.2–1.3)
BUN SERPL-MCNC: 8 MG/DL (ref 7–30)
CALCIUM SERPL-MCNC: 9.5 MG/DL (ref 8.5–10.1)
CHLORIDE SERPL-SCNC: 107 MMOL/L (ref 94–109)
CO2 SERPL-SCNC: 27 MMOL/L (ref 20–32)
CREAT SERPL-MCNC: 0.64 MG/DL (ref 0.52–1.04)
GFR SERPL CREATININE-BSD FRML MDRD: >90 ML/MIN/1.7M2
GLUCOSE SERPL-MCNC: 83 MG/DL (ref 70–99)
POTASSIUM SERPL-SCNC: 4.5 MMOL/L (ref 3.4–5.3)
PROT SERPL-MCNC: 7.5 G/DL (ref 6.8–8.8)
SODIUM SERPL-SCNC: 142 MMOL/L (ref 133–144)

## 2018-06-20 LAB
ANA PAT SER IF-IMP: ABNORMAL
ANA SER QL IF: ABNORMAL
ANA TITR SER IF: ABNORMAL {TITER}
B BURGDOR IGG+IGM SER QL: 0.1 (ref 0–0.89)
RHEUMATOID FACT SER NEPH-ACNC: <20 IU/ML (ref 0–20)

## 2018-07-08 ENCOUNTER — MYC MEDICAL ADVICE (OUTPATIENT)
Dept: FAMILY MEDICINE | Facility: CLINIC | Age: 53
End: 2018-07-08

## 2018-07-08 DIAGNOSIS — F41.1 GENERALIZED ANXIETY DISORDER: ICD-10-CM

## 2018-07-09 NOTE — TELEPHONE ENCOUNTER
Requested Prescriptions   Pending Prescriptions Disp Refills   ALPRAZOLAM 0.5MG TABLETS        Last Written Prescription Date:  6/18/2018  Last Fill Quantity: 50 tablet,   # refills: 0  Last Office Visit: 6/18/2018 [facility]  Future Office visit:       Routing refill request to provider for review/approval because:  Drug not on the [facility], P or [facility] refill protocol or controlled substance

## 2018-07-10 RX ORDER — ALPRAZOLAM 0.5 MG
TABLET ORAL
Qty: 50 TABLET | Refills: 0 | OUTPATIENT
Start: 2018-07-10

## 2018-07-10 NOTE — TELEPHONE ENCOUNTER
Since Dr. Solis Ang saw her last, I will ask him to consider a refill at this time (I am uncertain how that appointment went but it appears that their agreement was 50 alprazolam tablets to last 30 days).  If necessary, he may request an evisit.

## 2018-07-10 NOTE — TELEPHONE ENCOUNTER
This is the 2nd open request for the same medication refill.  Please see the mychart encounter from 7/8/2018.   This encounter will be closed to alleviate duplicate request.

## 2018-07-11 RX ORDER — ALPRAZOLAM 0.5 MG
0.5 TABLET ORAL 2 TIMES DAILY PRN
Qty: 50 TABLET | Refills: 0 | Status: SHIPPED | OUTPATIENT
Start: 2018-07-11 | End: 2018-08-01

## 2018-07-11 NOTE — TELEPHONE ENCOUNTER
Yasmin, please come talk to me if you are not comfortable responding to her request.    Camacho Ang MD  Family Medicine Physician

## 2018-07-11 NOTE — TELEPHONE ENCOUNTER
Patient called to inquire about the status of this request as she has heard nothing further. States PCP usually fills for 3 months. Patient is currently out. Please follow up. Thanks!

## 2018-07-12 ENCOUNTER — TELEPHONE (OUTPATIENT)
Dept: FAMILY MEDICINE | Facility: CLINIC | Age: 53
End: 2018-07-12

## 2018-07-12 ENCOUNTER — NURSE TRIAGE (OUTPATIENT)
Dept: NURSING | Facility: CLINIC | Age: 53
End: 2018-07-12

## 2018-07-12 NOTE — TELEPHONE ENCOUNTER
Clinic Action Needed:  FNA Triage Call  Presenting Problem:    Pharmacist from [facility] in [place] off of [address] is requesting a call from Yasmin LUNA CNP verifying that she is aware that patient picked up her last xanax prescription on 6/21/18 and that it is ok to fill patient's current Xanax prescription a week early. Please call pharmacist at [phone].      Routed to:LILLY Vázquez RN/[facility]

## 2018-07-12 NOTE — TELEPHONE ENCOUNTER
"Patient called reporting that the [facility] will not fill patient's prescription for Xanax (see below) without verification from someone from [facility] that it is a valid prescription due to it being too early to fill from previous prescription. This writer reported she will call the pharmacy and verify prescription. Patient thanked this writer and did not have any further questions.    This writer called [facility] to verify that the prescribing provider is aware that the prescription is being filled early (Per note in Epic). The pharmacist reported that she needs the prescribing provider to call them and report that she is aware that patient last picked up her prescription for Xanax on 6/21/18 and that the prescription is being filled a week early. This writer reported she will send a message to the provider.    This writer attempted to call patient x2 to inform her of what the pharmacist said. Unable to leave a message due to her mailbox being full.      ALPRAZolam (XANAX) 0.5 MG tablet 50 tablet 0 7/11/2018  No   Sig - Route: Take 1 tablet (0.5 mg) by mouth 2 times daily as needed for anxiety . 50 tablets to last 30 days. - Oral      \"Kade Haddad,     When you were in the clinic to see Dr. Solis Ang in June, he prescribed for you 50 ativan tablets with the instructions that they should last 30 days. According to that, your refill request is more than a week early. Nonetheless, I will approve the 50 tablet prescription and again tell you to make it last 30 days. I hope this helps!     Yasmin\"Mckenzie Vázquez RN/[facility]    Additional Information    Negative: Drug overdose and nurse unable to answer question    Negative: Caller requesting information not related to medicine    Negative: Caller requesting a prescription for Strep throat and has a positive culture result    Negative: Rash while taking a medication or within 3 days of stopping it    Negative: Immunization reaction " "suspected    Negative: [1] Asthma and [2] having symptoms of asthma (cough, wheezing, etc)    Negative: MORE THAN A DOUBLE DOSE of a prescription or over-the-counter (OTC) drug    Negative: [1] DOUBLE DOSE (an extra dose or lesser amount) of over-the-counter (OTC) drug AND [2] any symptoms (e.g., dizziness, nausea, pain, sleepiness)    Negative: [1] DOUBLE DOSE (an extra dose or lesser amount) of prescription drug AND [2] any symptoms (e.g., dizziness, nausea, pain, sleepiness)    Negative: Took another person's prescription drug    Negative: [1] DOUBLE DOSE (an extra dose or lesser amount) of prescription drug AND [2] NO symptoms (Exception: a double dose of antibiotics)    Negative: Diabetes drug error or overdose (e.g., insulin or extra dose)    Negative: [1] Request for URGENT new prescription or refill of \"essential\" medication (i.e., likelihood of harm to patient if not taken) AND [2] triager unable to fill per unit policy    Negative: [1] Prescription not at pharmacy AND [2] was prescribed today by PCP    Negative: Pharmacy calling with prescription questions and triager unable to answer question    Negative: Caller has URGENT medication question about med that PCP prescribed and triager unable to answer question    Negative: Caller has NON-URGENT medication question about med that PCP prescribed and triager unable to answer question    Negative: Caller requesting a NON-URGENT new prescription or refill and triager unable to refill per unit policy    Negative: Caller has medication question about med not prescribed by PCP and triager unable to answer question (e.g., compatibility with other med, storage)    Negative: [1] DOUBLE DOSE (an extra dose or lesser amount) of over-the-counter (OTC) drug AND [2] NO symptoms (all triage questions negative)    Negative: [1] DOUBLE DOSE (an extra dose or lesser amount) of antibiotic drug AND [2] NO symptoms (all triage questions negative)    Caller has medication " question only, adult not sick, and triager answers question    Protocols used: MEDICATION QUESTION CALL-ADULT-AH

## 2018-07-12 NOTE — TELEPHONE ENCOUNTER
Sent mychart message to pt. What pharmacy should we send to?      In [facility] nurse basket.    Shoaib Martinez MA

## 2018-07-13 NOTE — TELEPHONE ENCOUNTER
[facility] calling to confirm Rx can be filled. Reviewed following note:  Yasmin Velazquez APRN CNP   to Catie Nuñez           4:27 PM   Oops--that was 50 alprazolam tablets. :)      Last read by Catie Nuñez at 5:06 PM on 7/13/2018.    Yasmin Velazquez APRN CNP   to Catie Nuñez           4:26 PM   Kade Haddad,     When you were in the clinic to see Dr. Solis Ang in June, he prescribed for you 50 ativan tablets with the instructions that they should last 30 days. According to that, your refill request is more than a week early. Nonetheless, I will approve the 50 tablet prescription and again tell you to make it last 30 days. I hope this helps!     Yasmin      Last read by Catie Nuñez at 5:08 PM on 7/13/2018.

## 2018-07-13 NOTE — TELEPHONE ENCOUNTER
Patient called back requesting Yasmin Velazquez to actually call the [facility] off [facility] to verify Xanax prescription due to patient not being close to the [facility] off [facility]. That [facility] number is [phone].

## 2018-07-13 NOTE — TELEPHONE ENCOUNTER
Verbal order called to [facility] on [address]  Also called and cancelled at [facility]  Tried to notify pt but her vm was full.   Thanks!     Clari Isbell RN

## 2018-07-23 DIAGNOSIS — N95.1 SYMPTOMATIC MENOPAUSAL OR FEMALE CLIMACTERIC STATES: ICD-10-CM

## 2018-07-23 DIAGNOSIS — M54.50 MIDLINE LOW BACK PAIN WITHOUT SCIATICA, UNSPECIFIED CHRONICITY: ICD-10-CM

## 2018-07-23 DIAGNOSIS — M25.551 HIP PAIN, RIGHT: ICD-10-CM

## 2018-07-23 RX ORDER — ESTRADIOL 1 MG/1
0.5 TABLET ORAL DAILY
Qty: 90 TABLET | Refills: 1 | OUTPATIENT
Start: 2018-07-23

## 2018-07-23 NOTE — TELEPHONE ENCOUNTER
Pending Prescriptions:                       Disp   Refills    estradiol (ESTRACE) 1 MG tablet           90 tab*1            Sig: Take 0.5 tablets (0.5 mg) by mouth daily    Rx was discontinued. Last seen 1/2018. Okay to refill?

## 2018-07-23 NOTE — TELEPHONE ENCOUNTER
Patient has uterus so should not take unopposed estrogen.  She now has a more recent prescription for FemHrt (estrogen/progestin).  Dated 12/2017.

## 2018-07-24 RX ORDER — TRAMADOL HYDROCHLORIDE 50 MG/1
50 TABLET ORAL 2 TIMES DAILY PRN
Qty: 50 TABLET | Refills: 0 | Status: SHIPPED | OUTPATIENT
Start: 2018-07-24 | End: 2018-08-17

## 2018-07-24 NOTE — TELEPHONE ENCOUNTER
Prescription printed, signed, and forwarded to nursing for 50 tablets for 1 month supply.  Please asked the patient to schedule a face-to-face appointment with me for her next refills.

## 2018-07-25 NOTE — TELEPHONE ENCOUNTER
Unable to locate script. Called Pharm, and left refill prescription info on vm.       Shoaib Martinez MA

## 2018-08-01 ENCOUNTER — OFFICE VISIT (OUTPATIENT)
Dept: PSYCHIATRY | Facility: CLINIC | Age: 53
End: 2018-08-01
Attending: NURSE PRACTITIONER
Payer: COMMERCIAL

## 2018-08-01 VITALS
SYSTOLIC BLOOD PRESSURE: 116 MMHG | DIASTOLIC BLOOD PRESSURE: 79 MMHG | WEIGHT: 117.8 LBS | BODY MASS INDEX: 20.54 KG/M2 | HEART RATE: 67 BPM

## 2018-08-01 DIAGNOSIS — F41.1 GENERALIZED ANXIETY DISORDER: ICD-10-CM

## 2018-08-01 PROCEDURE — G0463 HOSPITAL OUTPT CLINIC VISIT: HCPCS | Mod: ZF

## 2018-08-01 RX ORDER — ALPRAZOLAM 0.5 MG
TABLET ORAL
Qty: 45 TABLET | Refills: 0 | Status: SHIPPED | OUTPATIENT
Start: 2018-08-01 | End: 2018-08-29

## 2018-08-01 RX ORDER — PROPRANOLOL HYDROCHLORIDE 10 MG/1
TABLET ORAL
Qty: 90 TABLET | Refills: 0 | Status: SHIPPED | OUTPATIENT
Start: 2018-08-01 | End: 2018-08-29

## 2018-08-01 RX ORDER — BUSPIRONE HYDROCHLORIDE 15 MG/1
TABLET ORAL
Qty: 60 TABLET | Refills: 0 | Status: SHIPPED | OUTPATIENT
Start: 2018-08-01 | End: 2018-08-29

## 2018-08-01 ASSESSMENT — PAIN SCALES - GENERAL: PAINLEVEL: MODERATE PAIN (5)

## 2018-08-01 NOTE — MR AVS SNAPSHOT
After Visit Summary   8/1/2018    Catie Nuñez    MRN: [id number]           Patient Information     Date Of Birth          1965        Visit Information        Provider Department      8/1/2018 10:00 AM Nicholas Zee APRN CNP Psychiatry Clinic        Today's Diagnoses     Generalized anxiety disorder           Follow-ups after your visit        Your next 10 appointments already scheduled     Aug 29, 2018  2:30 PM CDT   Adult Med Follow UP with JEREMY An CNP   Psychiatry Clinic ([facility])    [address]  [address] [id number] [phone]              Who to contact     Please call your clinic at [phone] to:    Ask questions about your health    Make or cancel appointments    Discuss your medicines    Learn about your test results    Speak to your doctor            Additional Information About Your Visit        MyChart Information     [employer] gives you secure access to your electronic health record. If you see a primary care provider, you can also send messages to your care team and make appointments. If you have questions, please call your primary care clinic.  If you do not have a primary care provider, please call [phone] and they will assist you.      [employer] is an electronic gateway that provides easy, online access to your medical records. With [employer], you can request a clinic appointment, read your test results, renew a prescription or communicate with your care team.     To access your existing account, please contact your [facility] or call [phone] for assistance.        Care EveryWhere ID     This is your Care EveryWhere ID. This could be used by other organizations to access your [facility] medical records  [id number]        Your Vitals Were     Pulse Last Period BMI (Body Mass Index)             67 09/01/2016 20.54 kg/m2          Blood Pressure from Last 3  Encounters:   08/17/18 108/67   08/01/18 116/79   06/18/18 129/75    Weight from Last 3 Encounters:   08/17/18 52.5 kg (115 lb 12.8 oz)   08/01/18 53.4 kg (117 lb 12.8 oz)   06/18/18 53.2 kg (117 lb 3.2 oz)              Today, you had the following     No orders found for display         Today's Medication Changes          These changes are accurate as of 8/1/18 11:59 PM.  If you have any questions, ask your nurse or doctor.               Start taking these medicines.        Dose/Directions    busPIRone 15 MG tablet   Commonly known as:  BUSPAR   Used for:  Generalized anxiety disorder   Started by:  Nicholas Zee APRN CNP        Take 1/2 tablet (7.5mg) twice per day for 1 week then increase to 1 tablet (15mg) twice per day   Quantity:  60 tablet   Refills:  0       propranolol 10 MG tablet   Commonly known as:  INDERAL   Used for:  Generalized anxiety disorder   Started by:  Nicholas Zee APRN CNP        Take 1 tablet (10mg) up to three times per day as needed for anxiety   Quantity:  90 tablet   Refills:  0         These medicines have changed or have updated prescriptions.        Dose/Directions    ALPRAZolam 0.5 MG tablet   Commonly known as:  XANAX   This may have changed:    - how much to take  - how to take this  - when to take this  - reasons to take this  - additional instructions   Used for:  Generalized anxiety disorder   Changed by:  Nicholas Zee APRN CNP        Take 1 (0.5mg) in the morning and 1/2 tablet (0.25mg) in the evening   Quantity:  45 tablet   Refills:  0            Where to get your medicines      These medications were sent to [facility] [id number] [place] [address] [address] [place] [address] AT [address] [address]    Hours:  24-hours Phone:  [phone]     busPIRone 15 MG tablet    propranolol 10 MG tablet         Some of these will need a paper prescription and others can be bought over the counter.  Ask your nurse if you  have questions.     Bring a paper prescription for each of these medications     ALPRAZolam 0.5 MG tablet                Primary Care Provider Office Phone # Fax #    JEREMY Gonzalez [facility] [phone] [address] [place] [address]        Equal Access to Services     SHANNON SANTANA : Hadii aad ku hadasho Soomaali, waaxda luqadaha, qaybta kaalmada adeegyada, waxay idiin hayaan adeeg hollyfransicosh taras . So [facility] [phone].    ATENCIÓN: Si habla español, tiene a weinberg disposición servicios gratuitos de asistencia lingüística. [facility] [phone].    We comply with applicable federal civil rights laws and [place] laws. We do not discriminate on the basis of race, color, national origin, age, disability, sex, sexual orientation, or gender identity.            Thank you!     Thank you for choosing PSYCHIATRY CLINIC  for your care. Our goal is always to provide you with excellent care. Hearing back from our patients is one way we can continue to improve our services. Please take a few minutes to complete the written survey that you may receive in the mail after your visit with us. Thank you!             Your Updated Medication List - Protect others around you: Learn how to safely use, store and throw away your medicines at www.disposemymeds.org.          This list is accurate as of 8/1/18 11:59 PM.  Always use your most recent med list.                   Brand Name Dispense Instructions for use Diagnosis    ALPRAZolam 0.5 MG tablet    XANAX    45 tablet    Take 1 (0.5mg) in the morning and 1/2 tablet (0.25mg) in the evening    Generalized anxiety disorder       busPIRone 15 MG tablet    BUSPAR    60 tablet    Take 1/2 tablet (7.5mg) twice per day for 1 week then increase to 1 tablet (15mg) twice per day    Generalized anxiety disorder       EXCEDRIN PO      Take by mouth as needed        fluticasone 50 MCG/ACT spray    FLONASE    3 Bottle    Spray 1-2 sprays into both nostrils  daily    Acute seasonal allergic rhinitis, unspecified trigger       ibuprofen 600 MG tablet    ADVIL/MOTRIN     Take 600 mg by mouth Take 1 tablet by mouth 4 times daily if needed. Maximum of 3200 mg in 24 hours.        MULTIVITAMIN PO           norethindrone-ethinyl estradiol 1-5 MG-MCG per tablet    FEMHRT 1/5     TK 1 T PO D        propranolol 10 MG tablet    INDERAL    90 tablet    Take 1 tablet (10mg) up to three times per day as needed for anxiety    Generalized anxiety disorder       riboflavin 400 MG Caps     30 capsule    Take 400 mg by mouth every morning    Migraine without aura and without status migrainosus, not intractable       sertraline 100 MG tablet    ZOLOFT    135 tablet    Take 1.5 tablets (150 mg) by mouth daily    Generalized anxiety disorder
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
## 2018-08-01 NOTE — NURSING NOTE
Chief Complaint   Patient presents with     Eval/Assessment     Generalized anxiety disorder

## 2018-08-04 ENCOUNTER — TELEPHONE (OUTPATIENT)
Dept: BEHAVIORAL HEALTH | Facility: CLINIC | Age: 53
End: 2018-08-04

## 2018-08-04 NOTE — TELEPHONE ENCOUNTER
S: 6:33pm Pt requesting call back from resident in regards to not being able to fill a prescription that she needs.    R:6:40pm Resident given call back number for pt [phone]

## 2018-08-05 NOTE — TELEPHONE ENCOUNTER
-----------------------------------------------------------------------------------------------------------  Brief Psychiatry Phone note      Catie Nuñez MRN# [id number]   Age: 53 year old   Clinic Provider: Mr. Zee YOB: 1965   PCP: Yasmin Velazquez            Phone note:   Writer was notified that Catie Nuñez has called and requested to speak with a resident regarding refill of Xanax. Writer called Catie Nuñez at this number : [phone] at 8:45 PM. She reported that she was out of Xanax and that her paper refill was not allowed to fill until 8/10. Her provider, Mr. Zee gave her a paper script on 8/1. Her last dose was yesterday evening.     Called her pharmacy to try to assist ([facility], [phone]). They said that they are unable to fill her medication due to her using more than her prescribed amount. She was prescribed a 30 day supply on 7/14 that she should still have if she was using it as prescribed. Patient admits she has been using more than prescribed due to migraines and is in agreement with the plan to taper.    Informed patient that will be unable to fill at this time. Explained withdrawal signs of Xanax and that she should go to the ED if her symptoms are severe enough. She is on a low enough dose with enough still in her system that she should be able to make it until Monday without difficulty. Explained how symptoms will of anxiety will likely rebound off of medication.    Patient was alert and conversational. Speech was clear with regular rate and rhythm. Thought process was logical and linear. Thought content was negative for safety concerns. Fund of knowledge was appropriate. Memory and attention were without gross deficit. Insight and judgment intact.          Assessment and Plan:   Catie Nuñez is a 53 year old female with a history of depression, anxiety, and migraines who contacted us regarding refill of Xanax. Unable to fill script until  8/10 due to using too much of her previous prescription. Patient informed to call provider on Monday to determine if there is any way around this pharmacy preclusion to prevent withdrawal.      Patient is not currently suicidal or homicidal. She is aware to call 911 or go to the nearest ER if safety concern or urgent symptoms arise.    - Patient to wait until Monday or 8/10 for refill of Xanax. None provided at this time with    pharmacy called and not allowing until 8/10 due to previous prescription supposed to      last until this point.  - Go to ER if have symptoms of severe withdrawal, although not anticipated     =============================================================================  Manny Woodward DO, MA  PGY-2 Psychiatry Resident  Pager: [phone]

## 2018-08-05 NOTE — TELEPHONE ENCOUNTER
Pt called requesting to speak to on call resident regarding Alprazolam prescription; states she is out and would like to inquire about getting this medication refilled (pt is followed by the [facility])  Spoke with Manny, on call resident, who will follow up with pt at

## 2018-08-17 ENCOUNTER — OFFICE VISIT (OUTPATIENT)
Dept: FAMILY MEDICINE | Facility: CLINIC | Age: 53
End: 2018-08-17
Payer: COMMERCIAL

## 2018-08-17 VITALS
SYSTOLIC BLOOD PRESSURE: 108 MMHG | OXYGEN SATURATION: 98 % | WEIGHT: 115.8 LBS | DIASTOLIC BLOOD PRESSURE: 67 MMHG | HEART RATE: 67 BPM | TEMPERATURE: 98 F | BODY MASS INDEX: 19.77 KG/M2 | HEIGHT: 64 IN

## 2018-08-17 DIAGNOSIS — M54.50 MIDLINE LOW BACK PAIN WITHOUT SCIATICA, UNSPECIFIED CHRONICITY: Primary | ICD-10-CM

## 2018-08-17 DIAGNOSIS — M25.551 HIP PAIN, RIGHT: ICD-10-CM

## 2018-08-17 DIAGNOSIS — G43.009 MIGRAINE WITHOUT AURA AND WITHOUT STATUS MIGRAINOSUS, NOT INTRACTABLE: ICD-10-CM

## 2018-08-17 PROCEDURE — 99214 OFFICE O/P EST MOD 30 MIN: CPT | Performed by: NURSE PRACTITIONER

## 2018-08-17 RX ORDER — TRAMADOL HYDROCHLORIDE 50 MG/1
50 TABLET ORAL 2 TIMES DAILY PRN
Qty: 45 TABLET | Refills: 0 | Status: SHIPPED | OUTPATIENT
Start: 2018-10-17 | End: 2018-10-11

## 2018-08-17 RX ORDER — TRAMADOL HYDROCHLORIDE 50 MG/1
50 TABLET ORAL 2 TIMES DAILY PRN
Qty: 45 TABLET | Refills: 0 | Status: SHIPPED | OUTPATIENT
Start: 2018-08-17 | End: 2019-01-02

## 2018-08-17 RX ORDER — TRAMADOL HYDROCHLORIDE 50 MG/1
50 TABLET ORAL 2 TIMES DAILY PRN
Qty: 45 TABLET | Status: SHIPPED | OUTPATIENT
Start: 2018-08-17 | End: 2018-08-17

## 2018-08-17 RX ORDER — TRAMADOL HYDROCHLORIDE 50 MG/1
50 TABLET ORAL 2 TIMES DAILY PRN
Qty: 45 TABLET | Refills: 0 | Status: SHIPPED | OUTPATIENT
Start: 2018-09-17 | End: 2018-10-11

## 2018-08-17 NOTE — PROGRESS NOTES
"  SUBJECTIVE:   Catie Nuñez is a 53 year old female who presents to clinic today for the following health issues:    Medication Followup of Tramadol     Taking Medication as prescribed: yes    Side Effects:  None    Medication Helping Symptoms:  Yes, She is actually having a lot more issues in her back at the moment instead of her neck but overall it is helping.     Migraines:  \"nothing else.\"  She does take tramadol, which will take the edge off.  \"I have pain every day.\"  Neck pain every day.       Needs:  Chiropractor  Exercise    Tramadol:  50 tablets per month.  She is ready \"to wean down.\"    Alprazolam:  Taking 1/2 tablet twice a day as needed.  45 tablets will last a month.  Working with psychiatrist, Rocco Zee.    Propranolol:   was not helpful.        Problem list and histories reviewed & adjusted, as indicated.  Additional history: as documented    Patient Active Problem List   Diagnosis     CARDIOVASCULAR SCREENING; LDL GOAL LESS THAN 160     Adhesive capsulitis of shoulder     Scoliosis     Shoulder impingement     Generalized anxiety disorder     Pulmonary nodule     Migraine without aura and without status migrainosus, not intractable     Controlled substance agreement signed     ASCUS Pap + high risk HPV, + 16, + \"other\"      Papanicolaou smear of cervix with low grade squamous intraepithelial lesion (LGSIL)     Acute seasonal allergic rhinitis, unspecified trigger     History reviewed. No pertinent surgical history.    Social History   Substance Use Topics     Smoking status: Never Smoker     Smokeless tobacco: Never Used     Alcohol use 0.0 oz/week     0 Standard drinks or equivalent per week      Comment: occ, rare      Family History   Problem Relation Age of Onset     HEART DISEASE Father      Asthma No family hx of      Diabetes No family hx of      Hypertension No family hx of      Cerebrovascular Disease No family hx of      Breast Cancer No family hx of          Current Outpatient " Prescriptions   Medication Sig Dispense Refill     ALPRAZolam (XANAX) 0.5 MG tablet Take 1 (0.5mg) in the morning and 1/2 tablet (0.25mg) in the evening 45 tablet 0     Aspirin-Acetaminophen-Caffeine (EXCEDRIN PO) Take by mouth as needed       busPIRone (BUSPAR) 15 MG tablet Take 1/2 tablet (7.5mg) twice per day for 1 week then increase to 1 tablet (15mg) twice per day 60 tablet 0     fluticasone (FLONASE) 50 MCG/ACT spray Spray 1-2 sprays into both nostrils daily 3 Bottle 3     ibuprofen (ADVIL/MOTRIN) 600 MG tablet Take 600 mg by mouth Take 1 tablet by mouth 4 times daily if needed. Maximum of 3200 mg in 24 hours.       Multiple Vitamins-Minerals (MULTIVITAMIN PO)        norethindrone-ethinyl estradiol (FEMHRT 1/5) 1-5 MG-MCG per tablet TK 1 T PO D  3     riboflavin 400 MG CAPS Take 400 mg by mouth every morning 30 capsule 0     sertraline (ZOLOFT) 100 MG tablet Take 1.5 tablets (150 mg) by mouth daily 135 tablet 1     traMADol (ULTRAM) 50 MG tablet Take 1 tablet (50 mg) by mouth 2 times daily as needed for severe pain . 50 tablets to last 30 days. 50 tablet 0     propranolol (INDERAL) 10 MG tablet Take 1 tablet (10mg) up to three times per day as needed for anxiety (Patient not taking: Reported on 8/17/2018) 90 tablet 0     Allergies   Allergen Reactions     Ondansetron Nausea and Vomiting     Sulfa Drugs Hives     Rash       Recent Labs   Lab Test  06/18/18   1656  09/14/16   1102  02/19/15   1029  01/06/15   1120   LDL   --   122*   --   94   HDL   --   73   --   75   TRIG   --   111   --   63   ALT  21   --   20  14   CR  0.64   --   0.52  0.60   GFRESTIMATED  >90   --   >90  Non  GFR Calc    >90  Non  GFR Calc     GFRESTBLACK  >90   --   >90   GFR Calc    >90   GFR Calc     POTASSIUM  4.5   --   3.9  4.2   TSH   --    --    --   3.60      BP Readings from Last 3 Encounters:   08/17/18 108/67   08/01/18 116/79   06/18/18 129/75    Wt Readings from  "Last 3 Encounters:   08/17/18 115 lb 12.8 oz (52.5 kg)   08/01/18 117 lb 12.8 oz (53.4 kg)   06/18/18 117 lb 3.2 oz (53.2 kg)            Labs reviewed in EPIC    Reviewed and updated as needed this visit by clinical staff       Reviewed and updated as needed this visit by Provider         ROS:  Constitutional, HEENT, cardiovascular, pulmonary, gi and gu systems are negative, except as otherwise noted.    OBJECTIVE:     /67  Pulse 67  Temp 98  F (36.7  C) (Oral)  Ht 5' 3.5\" (1.613 m)  Wt 115 lb 12.8 oz (52.5 kg)  LMP 09/01/2016  SpO2 98%  BMI 20.19 kg/m2  Body mass index is 20.19 kg/(m^2).  GENERAL APPEARANCE: healthy, alert and no distress. Smiling.  MS: ROM of neck intact. Ambulatory with a steady gait.    SKIN: warm and dry  PSYCH: mentation appears normal and affect normal/bright.  Good eye contact.      ASSESSMENT/PLAN:     (M54.5) Midline low back pain without sciatica, unspecified chronicity  (primary encounter diagnosis)  Comment:   Plan: traMADol (ULTRAM) 50 MG tablet, traMADol         (ULTRAM) 50 MG tablet, traMADol (ULTRAM) 50 MG         tablet, DISCONTINUED: traMADol (ULTRAM) 50 MG         tablet        Today, did go ahead and cut her tramadol down from 50 tablets a month to 45.  We will plan that she will take 45 tablets per month for the next 3 months and at that time she will return to the clinic for follow-up appointment.  I did tell her that I would like her to continue to wean down with the goal of weaning off tramadol completely.  I do think this would have the trickle down effect of actually helping her chronic pain issues.  She agrees and understands and will return to clinic to see me in 3 months.    Continue care with psychiatry for alprazolam prescriptions.      (M25.551) Hip pain, right  Comment:   Plan: DISCONTINUED: traMADol (ULTRAM) 50 MG tablet            (G43.009) Migraine without aura and without status migrainosus, not intractable  Comment:   Plan: Continue care with " neurology.          JEREMY Kent [facility]

## 2018-08-17 NOTE — MR AVS SNAPSHOT
After Visit Summary   8/17/2018    Catie Nuñez    MRN: [id number]           Patient Information     Date Of Birth          1965        Visit Information        Provider Department      8/17/2018 2:20 PM Yasmin Velazquez APRN CNP [facility]        Today's Diagnoses     Midline low back pain without sciatica, unspecified chronicity    -  1    Hip pain, right        Migraine without aura and without status migrainosus, not intractable           Follow-ups after your visit        Your next 10 appointments already scheduled     Aug 29, 2018  2:30 PM CDT   Adult Med Follow UP with JEREMY An CNP   Psychiatry Clinic ([facility])    [place] [id number] [address] [address] [phone]              Who to contact     If you have questions or need follow up information about today's clinic visit or your schedule please contact [facility] directly at [phone].  Normal or non-critical lab and imaging results will be communicated to you by [facility] [employer], letter or phone within 4 business days after the clinic has received the results. If you do not hear from us within 7 days, please contact the clinic through [employer] or phone. If you have a critical or abnormal lab result, we will notify you by phone as soon as possible.  Submit refill requests through [employer] or call your pharmacy and they will forward the refill request to us. Please allow 3 business days for your refill to be completed.          Additional Information About Your Visit        [facility] [employer] Information     [employer] gives you secure access to your electronic health record. If you see a primary care provider, you can also send messages to your care team and make appointments. If you have questions, please call your primary care clinic.  If you do not have a primary care provider, please call [phone] and they will assist you.       "  Care EveryWhere ID     This is your Care EveryWhere ID. This could be used by other organizations to access your [facility] medical records  [id number]        Your Vitals Were     Pulse Temperature Height Last Period Pulse Oximetry BMI (Body Mass Index)    67 98  F (36.7  C) (Oral) 5' 3.5\" (1.613 m) 09/01/2016 98% 20.19 kg/m2       Blood Pressure from Last 3 Encounters:   08/17/18 108/67   08/01/18 116/79   06/18/18 129/75    Weight from Last 3 Encounters:   08/17/18 115 lb 12.8 oz (52.5 kg)   08/01/18 117 lb 12.8 oz (53.4 kg)   06/18/18 117 lb 3.2 oz (53.2 kg)              Today, you had the following     No orders found for display         Today's Medication Changes          These changes are accurate as of 8/17/18  3:02 PM.  If you have any questions, ask your nurse or doctor.               These medicines have changed or have updated prescriptions.        Dose/Directions    * traMADol 50 MG tablet   Commonly known as:  ULTRAM   This may have changed:  additional instructions   Used for:  Midline low back pain without sciatica, unspecified chronicity   Changed by:  Yasmin Velazquez APRN CNP        Dose:  50 mg   Take 1 tablet (50 mg) by mouth 2 times daily as needed for severe pain . 45 tablets to las 30 days.   Quantity:  45 tablet   Refills:  0       * traMADol 50 MG tablet   Commonly known as:  ULTRAM   This may have changed:  You were already taking a medication with the same name, and this prescription was added. Make sure you understand how and when to take each.   Used for:  Midline low back pain without sciatica, unspecified chronicity   Changed by:  Yasmin Velazquez APRN CNP        Dose:  50 mg   Start taking on:  9/17/2018   Take 1 tablet (50 mg) by mouth 2 times daily as needed for severe pain . 45 tablets to last 30 days.   Quantity:  45 tablet   Refills:  0       * traMADol 50 MG tablet   Commonly known as:  ULTRAM   This may have changed:  You were already taking a medication with " the same name, and this prescription was added. Make sure you understand how and when to take each.   Used for:  Midline low back pain without sciatica, unspecified chronicity   Changed by:  Yasmin Velazquez APRN CNP        Dose:  50 mg   Start taking on:  10/17/2018   Take 1 tablet (50 mg) by mouth 2 times daily as needed for severe pain . 45 tablets to last 30 days.   Quantity:  45 tablet   Refills:  0       * Notice:  This list has 3 medication(s) that are the same as other medications prescribed for you. Read the directions carefully, and ask your doctor or other care provider to review them with you.         Where to get your medicines      Some of these will need a paper prescription and others can be bought over the counter.  Ask your nurse if you have questions.     Bring a paper prescription for each of these medications     traMADol 50 MG tablet    traMADol 50 MG tablet    traMADol 50 MG tablet               Information about OPIOIDS     PRESCRIPTION OPIOIDS: WHAT YOU NEED TO KNOW   We gave you an opioid (narcotic) pain medicine. It is important to manage your pain, but opioids are not always the best choice. You should first try all the other options your care team gave you. Take this medicine for as short a time (and as few doses) as possible.    Some activities can increase your pain, such as bandage changes or therapy sessions. It may help to take your pain medicine 30 to 60 minutes before these activities. Reduce your stress by getting enough sleep, working on hobbies you enjoy and practicing relaxation or meditation. Talk to your care team about ways to manage your pain beyond prescription opioids.    These medicines have risks:    DO NOT drive when on new or higher doses of pain medicine. These medicines can affect your alertness and reaction times, and you could be arrested for driving under the influence (DUI). If you need to use opioids long-term, talk to your care team about  driving.    DO NOT operate heavy machinery    DO NOT do any other dangerous activities while taking these medicines.    DO NOT drink any alcohol while taking these medicines.     If the opioid prescribed includes acetaminophen, DO NOT take with any other medicines that contain acetaminophen. Read all labels carefully. Look for the word  acetaminophen  or  Tylenol.  Ask your pharmacist if you have questions or are unsure.    You can get addicted to pain medicines, especially if you have a history of addiction (chemical, alcohol or substance dependence). Talk to your care team about ways to reduce this risk.    All opioids tend to cause constipation. Drink plenty of water and eat foods that have a lot of fiber, such as fruits, vegetables, prune juice, apple juice and high-fiber cereal. Take a laxative (Miralax, milk of magnesia, Colace, Senna) if you don t move your bowels at least every other day. Other side effects include upset stomach, sleepiness, dizziness, throwing up, tolerance (needing more of the medicine to have the same effect), physical dependence and slowed breathing.    Store your pills in a secure place, locked if possible. We will not replace any lost or stolen medicine. If you don t finish your medicine, please throw away (dispose) as directed by your pharmacist. The [place] [employer]ution [employer] has more information about safe disposal: [email or web address]://www.pca.[email or web address]/[email or web address]-medications         Primary Care Provider Office Phone # Fax #    JEREMY Gonzalez [facility] [phone] [address] [place] [address]        Equal Access to Services     SHANNON SANTANA : Hadii aad ku hadasho Soomaali, waaxda luqadaha, qaybta kaalmada adeegyada, vickey grajeda . So [facility] [phone].    ATENCIÓN: Si habla español, tiene a weinberg disposición servicios gratuitos de asistencia lingüística. Llame al [phone].    We comply with  applicable federal civil rights laws and [place] laws. We do not discriminate on the basis of race, color, national origin, age, disability, sex, sexual orientation, or gender identity.            Thank you!     Thank you for choosing [facility]  for your care. Our goal is always to provide you with excellent care. Hearing back from our patients is one way we can continue to improve our services. Please take a few minutes to complete the written survey that you may receive in the mail after your visit with us. Thank you!             Your Updated Medication List - Protect others around you: Learn how to safely use, store and throw away your medicines at www.disposemymeds.org.          This list is accurate as of 8/17/18  3:02 PM.  Always use your most recent med list.                   Brand Name Dispense Instructions for use Diagnosis    ALPRAZolam 0.5 MG tablet    XANAX    45 tablet    Take 1 (0.5mg) in the morning and 1/2 tablet (0.25mg) in the evening    Generalized anxiety disorder       busPIRone 15 MG tablet    BUSPAR    60 tablet    Take 1/2 tablet (7.5mg) twice per day for 1 week then increase to 1 tablet (15mg) twice per day    Generalized anxiety disorder       EXCEDRIN PO      Take by mouth as needed        fluticasone 50 MCG/ACT spray    FLONASE    3 Bottle    Spray 1-2 sprays into both nostrils daily    Acute seasonal allergic rhinitis, unspecified trigger       ibuprofen 600 MG tablet    ADVIL/MOTRIN     Take 600 mg by mouth Take 1 tablet by mouth 4 times daily if needed. Maximum of 3200 mg in 24 hours.        MULTIVITAMIN PO           norethindrone-ethinyl estradiol 1-5 MG-MCG per tablet    FEMHRT 1/5     TK 1 T PO D        propranolol 10 MG tablet    INDERAL    90 tablet    Take 1 tablet (10mg) up to three times per day as needed for anxiety    Generalized anxiety disorder       riboflavin 400 MG Caps     30 capsule    Take 400 mg by mouth every morning    Migraine without aura  and without status migrainosus, not intractable       sertraline 100 MG tablet    ZOLOFT    135 tablet    Take 1.5 tablets (150 mg) by mouth daily    Generalized anxiety disorder       * traMADol 50 MG tablet    ULTRAM    45 tablet    Take 1 tablet (50 mg) by mouth 2 times daily as needed for severe pain . 45 tablets to las 30 days.    Midline low back pain without sciatica, unspecified chronicity       * traMADol 50 MG tablet   Start taking on:  9/17/2018    ULTRAM    45 tablet    Take 1 tablet (50 mg) by mouth 2 times daily as needed for severe pain . 45 tablets to last 30 days.    Midline low back pain without sciatica, unspecified chronicity       * traMADol 50 MG tablet   Start taking on:  10/17/2018    ULTRAM    45 tablet    Take 1 tablet (50 mg) by mouth 2 times daily as needed for severe pain . 45 tablets to last 30 days.    Midline low back pain without sciatica, unspecified chronicity       * Notice:  This list has 3 medication(s) that are the same as other medications prescribed for you. Read the directions carefully, and ask your doctor or other care provider to review them with you.

## 2018-08-29 ENCOUNTER — TELEPHONE (OUTPATIENT)
Dept: PSYCHIATRY | Facility: CLINIC | Age: 53
End: 2018-08-29

## 2018-08-29 ENCOUNTER — OFFICE VISIT (OUTPATIENT)
Dept: PSYCHIATRY | Facility: CLINIC | Age: 53
End: 2018-08-29
Attending: NURSE PRACTITIONER
Payer: COMMERCIAL

## 2018-08-29 VITALS
BODY MASS INDEX: 20.12 KG/M2 | DIASTOLIC BLOOD PRESSURE: 71 MMHG | SYSTOLIC BLOOD PRESSURE: 107 MMHG | HEART RATE: 68 BPM | WEIGHT: 115.4 LBS

## 2018-08-29 DIAGNOSIS — F41.1 GENERALIZED ANXIETY DISORDER: ICD-10-CM

## 2018-08-29 PROCEDURE — G0463 HOSPITAL OUTPT CLINIC VISIT: HCPCS | Mod: ZF

## 2018-08-29 RX ORDER — ALPRAZOLAM 0.5 MG
TABLET ORAL
Qty: 30 TABLET | Refills: 0 | Status: SHIPPED | OUTPATIENT
Start: 2018-08-29 | End: 2018-08-29

## 2018-08-29 RX ORDER — ALPRAZOLAM 0.5 MG
TABLET ORAL
Qty: 30 TABLET | Refills: 0 | Status: SHIPPED | OUTPATIENT
Start: 2018-08-29 | End: 2018-09-25

## 2018-08-29 RX ORDER — GABAPENTIN 100 MG/1
100 CAPSULE ORAL 3 TIMES DAILY
Qty: 90 CAPSULE | Refills: 0 | Status: SHIPPED | OUTPATIENT
Start: 2018-08-29 | End: 2018-10-19

## 2018-08-29 ASSESSMENT — PAIN SCALES - GENERAL: PAINLEVEL: MODERATE PAIN (4)

## 2018-08-29 NOTE — MR AVS SNAPSHOT
After Visit Summary   8/29/2018    Catie Nuñez    MRN: [id number]           Patient Information     Date Of Birth          1965        Visit Information        Provider Department      8/29/2018 2:30 PM Nicholas Zee APRN CNP Psychiatry Clinic        Today's Diagnoses     Generalized anxiety disorder           Follow-ups after your visit        Your next 10 appointments already scheduled     Sep 25, 2018  3:00 PM CDT   Adult Med Follow UP with JEREMY An CNP   Psychiatry Clinic ([facility])    [place] [id number]  [address] [id number] [phone]              Who to contact     Please call your clinic at [phone] to:    Ask questions about your health    Make or cancel appointments    Discuss your medicines    Learn about your test results    Speak to your doctor            Additional Information About Your Visit        MyChart Information     [facility] gives you secure access to your electronic health record. If you see a primary care provider, you can also send messages to your care team and make appointments. If you have questions, please call your primary care clinic.  If you do not have a primary care provider, please call [phone] and they will assist you.      [facility] is an electronic gateway that provides easy, online access to your medical records. With [facility], you can request a clinic appointment, read your test results, renew a prescription or communicate with your care team.     To access your existing account, please contact your [facility] or call [phone] for assistance.        Care EveryWhere ID     This is your Care EveryWhere ID. This could be used by other organizations to access your [facility] medical records  [id number]        Your Vitals Were     Pulse Last Period BMI (Body Mass Index)             68 09/01/2016 20.12 kg/m2          Blood Pressure from Last 3  Encounters:   08/29/18 107/71   08/17/18 108/67   08/01/18 116/79    Weight from Last 3 Encounters:   08/29/18 52.3 kg (115 lb 6.4 oz)   08/17/18 52.5 kg (115 lb 12.8 oz)   08/01/18 53.4 kg (117 lb 12.8 oz)              Today, you had the following     No orders found for display         Today's Medication Changes          These changes are accurate as of 8/29/18 11:59 PM.  If you have any questions, ask your nurse or doctor.               Start taking these medicines.        Dose/Directions    ALPRAZolam 0.5 MG tablet   Commonly known as:  XANAX   Used for:  Generalized anxiety disorder   Started by:  Alix Carpenter RN        Take 1/2 tablet (0.25mg) in the morning and 1/2 tablet (0.25mg) in the evening   Quantity:  30 tablet   Refills:  0       gabapentin 100 MG capsule   Commonly known as:  NEURONTIN   Used for:  Generalized anxiety disorder   Started by:  Nicholas Zee APRN CNP        Dose:  100 mg   Take 1 capsule (100 mg) by mouth 3 times daily   Quantity:  90 capsule   Refills:  0            Where to get your medicines      These medications were sent to [facility] [address] [place] [id number]    Hours:  24-hours Phone:  [phone]     gabapentin 100 MG capsule         Some of these will need a paper prescription and others can be bought over the counter.  Ask your nurse if you have questions.     Bring a paper prescription for each of these medications     ALPRAZolam 0.5 MG tablet                Primary Care Provider Office Phone # Fax #    JEREMY Gonzalez [phone] [address] [place] [address]        Equal Access to Services     [facility] MAX AH: Stefan Weber, wajanak luqarielle, qaybta kaalmada rolo, vickey kincaid. [facility] [phone].    ATENCIÓN: Si habla español, tiene a weinberg disposición servicios  mitzy de asistencia lingüística. [facility] [phone].    We comply with applicable federal civil rights laws and [place] laws. We do not discriminate on the basis of race, color, national origin, age, disability, sex, sexual orientation, or gender identity.            Thank you!     Thank you for choosing PSYCHIATRY CLINIC  for your care. Our goal is always to provide you with excellent care. Hearing back from our patients is one way we can continue to improve our services. Please take a few minutes to complete the written survey that you may receive in the mail after your visit with us. Thank you!             Your Updated Medication List - Protect others around you: Learn how to safely use, store and throw away your medicines at www.disposemymeds.org.          This list is accurate as of 8/29/18 11:59 PM.  Always use your most recent med list.                   Brand Name Dispense Instructions for use Diagnosis    ALPRAZolam 0.5 MG tablet    XANAX    30 tablet    Take 1/2 tablet (0.25mg) in the morning and 1/2 tablet (0.25mg) in the evening    Generalized anxiety disorder       EXCEDRIN PO      Take by mouth as needed        fluticasone 50 MCG/ACT spray    FLONASE    3 Bottle    Spray 1-2 sprays into both nostrils daily    Acute seasonal allergic rhinitis, unspecified trigger       gabapentin 100 MG capsule    NEURONTIN    90 capsule    Take 1 capsule (100 mg) by mouth 3 times daily    Generalized anxiety disorder       ibuprofen 600 MG tablet    ADVIL/MOTRIN     Take 600 mg by mouth Take 1 tablet by mouth 4 times daily if needed. Maximum of 3200 mg in 24 hours.        MULTIVITAMIN PO           riboflavin 400 MG Caps     30 capsule    Take 400 mg by mouth every morning    Migraine without aura and without status migrainosus, not intractable       sertraline 100 MG tablet    ZOLOFT    135 tablet    Take 1.5 tablets (150 mg) by mouth daily    Generalized anxiety disorder       * traMADol 50 MG tablet     ULTRAM    45 tablet    Take 1 tablet (50 mg) by mouth 2 times daily as needed for severe pain . 45 tablets to las 30 days.    Midline low back pain without sciatica, unspecified chronicity       * traMADol 50 MG tablet    ULTRAM    45 tablet    Take 1 tablet (50 mg) by mouth 2 times daily as needed for severe pain . 45 tablets to last 30 days.    Midline low back pain without sciatica, unspecified chronicity       * traMADol 50 MG tablet   Start taking on:  10/17/2018    ULTRAM    45 tablet    Take 1 tablet (50 mg) by mouth 2 times daily as needed for severe pain . 45 tablets to last 30 days.    Midline low back pain without sciatica, unspecified chronicity       * Notice:  This list has 3 medication(s) that are the same as other medications prescribed for you. Read the directions carefully, and ask your doctor or other care provider to review them with you.

## 2018-08-29 NOTE — TELEPHONE ENCOUNTER
-Provider requested that writer print of script for Xanax 0.5 mg. Script printed and given to provider to give to the pt.

## 2018-08-29 NOTE — PROGRESS NOTES
"  Psychiatry Clinic Progress Note                                                                   Catie Nuñez is a 53 year old female who returns to the clinic for follow-up care  Therapist: None  PCP: Yasmin Velazquez  Other Providers: None    Pertinent Background:  See previous notes.  Psych critical item history includes trauma hx and using Xanax for past 4 years.     Interim History                                                                                                        4, 4     The patient is a fair historian, reports good treatment adherence and was last seen 8/1/18.  Since the last visit Catie stopped taking Buspar due to nausea, lightheadedness, and feeling \"foggy/medicated.\"  Propranolol was stopped due to being ineffective.  Both trials lasted approximately a week. Not interested in higher dose.  Catie reports her generalized anxiety is well managed with Sertraline.  Her periods of intense anxiety stems from headaches and occipital nerve neuralgia.  No concerns with depression.      Catie reports she is exercising regularly, doing yoga, and focusing on diet as means to manage anxiety.      Does not need reorder of Sertraline today    Recent Symptoms:   Depression:  insomnia  Elevated:  none  Psychosis:  none  Anxiety:  excessive worry, feeling fearful and nervous/overwhelmed  Panic Attack:  none  Trauma Related:  intrusive memories, nightmares, avoidance, trauma trigger psychological / physiological response, negative beliefs / emotions, startle response and hypervigilance     Recent Substance Use:  No changes reported        Social/ Family History                                  [per patient report]                                 1ea,1ea   FINANCIAL SUPPORT- working     Artist (illustrator, lopez) and retail  FEELS SAFE AT HOME- Yes    Medical / Surgical History                                                                                                              " "    Patient Active Problem List   Diagnosis     CARDIOVASCULAR SCREENING; LDL GOAL LESS THAN 160     Adhesive capsulitis of shoulder     Scoliosis     Shoulder impingement     Generalized anxiety disorder     Pulmonary nodule     Migraine without aura and without status migrainosus, not intractable     Controlled substance agreement signed     ASCUS Pap + high risk HPV, + 16, + \"other\"      Papanicolaou smear of cervix with low grade squamous intraepithelial lesion (LGSIL)     Acute seasonal allergic rhinitis, unspecified trigger       No past surgical history on file.     Medical Review of Systems                                                                                                    2,10   The remainder of the review of systems is noncontributory  Migraine pain  Allergy                                Ondansetron and Sulfa drugs  Current Medications                                                                                                       Current Outpatient Prescriptions   Medication Sig Dispense Refill     ALPRAZolam (XANAX) 0.5 MG tablet Take 1 (0.5mg) in the morning and 1/2 tablet (0.25mg) in the evening 45 tablet 0     Aspirin-Acetaminophen-Caffeine (EXCEDRIN PO) Take by mouth as needed       fluticasone (FLONASE) 50 MCG/ACT spray Spray 1-2 sprays into both nostrils daily 3 Bottle 3     ibuprofen (ADVIL/MOTRIN) 600 MG tablet Take 600 mg by mouth Take 1 tablet by mouth 4 times daily if needed. Maximum of 3200 mg in 24 hours.       Multiple Vitamins-Minerals (MULTIVITAMIN PO)        norethindrone-ethinyl estradiol (FEMHRT 1/5) 1-5 MG-MCG per tablet TK 1 T PO D  3     riboflavin 400 MG CAPS Take 400 mg by mouth every morning 30 capsule 0     sertraline (ZOLOFT) 100 MG tablet Take 1.5 tablets (150 mg) by mouth daily 135 tablet 1     traMADol (ULTRAM) 50 MG tablet Take 1 tablet (50 mg) by mouth 2 times daily as needed for severe pain . 45 tablets to las 30 days. 45 tablet 0     [START ON " "9/17/2018] traMADol (ULTRAM) 50 MG tablet Take 1 tablet (50 mg) by mouth 2 times daily as needed for severe pain . 45 tablets to last 30 days. 45 tablet 0     [START ON 10/17/2018] traMADol (ULTRAM) 50 MG tablet Take 1 tablet (50 mg) by mouth 2 times daily as needed for severe pain . 45 tablets to last 30 days. 45 tablet 0     busPIRone (BUSPAR) 15 MG tablet Take 1/2 tablet (7.5mg) twice per day for 1 week then increase to 1 tablet (15mg) twice per day (Patient not taking: Reported on 8/29/2018) 60 tablet 0     propranolol (INDERAL) 10 MG tablet Take 1 tablet (10mg) up to three times per day as needed for anxiety (Patient not taking: Reported on 8/17/2018) 90 tablet 0     Vitals                                                                                                                       3, 3   /71  Pulse 68  Wt 52.3 kg (115 lb 6.4 oz)  LMP 09/01/2016  BMI 20.12 kg/m2   Mental Status Exam                                                                                    9, 14 cog gs     Alertness: alert  and oriented  Appearance: casually groomed  Behavior/Demeanor: cooperative, pleasant and calm, with good  eye contact   Speech: regular rate and rhythm  Language: no obvious problem  Psychomotor: normal or unremarkable  Mood: \"ok\"  Affect: appropriate; was congruent to mood; was congruent to content  Thought Process/Associations: unremarkable  Thought Content:  Reports none;  Denies suicidal ideation and violent ideation  Perception:  Reports none;  Denies auditory hallucinations and visual hallucinations  Insight: adequate  Judgment: adequate for safety  Cognition: (6) does  appear grossly intact; formal cognitive testing was not done  Gait/Station and/or Muscle Strength/Tone: unremarkable    Labs and Data                                                                                                                 Rating Scales:    PHQ9    PHQ9 Today:  Unable to score due to form being " incomplete  PHQ-9 SCORE 5/19/2017 1/9/2018 8/1/2018   Total Score 5 5 8         Diagnosis and Assessment                                                                             m2, h3     Today the following issues were addressed:    1) Generalized Anxiety Disorder  2) PTSD     [place] Prescription Monitoring Program [] was checked today:  indicates filling both Xanax and Tramadol on monthly basis for past year.  At times it does appear that she is filling earlier than 30 days..     PSYCHOTROPIC DRUG INTERACTIONS: Tramadol-Xanax: Concurrent use of TRAMADOL and CNS DEPRESSANTS may result in increased risk of respiratory and CNS depression.  Buspar-Tramadol:  Concurrent use of TRAMADOL and SEROTONERGIC CNS DEPRESSANTS may result in increased risk of serotonin syndrome; increased risk of respiratory and CNS depression. FEMHRT-Xanax: Concurrent use of ALPRAZOLAM and CONTRACEPTIVES, COMBINATION may result in an increased risk of alprazolam toxicity (CNS depression, hypotension). .    Plan                                                                                                                    m2, h3      1) Medication Management  Continue Xanax taper:  Take 0.25mg in the morning and 0.25mg in the evening  Continue Sertraline 150mg daily  Start Gabapentin 100mg TID  Discontinue Buspar  Discontinue Propranolol    2) Therapy  Individual psychotherapy focusing on past trauma and current anxiety is highly recommended      RTC: 1 month    CRISIS NUMBERS:   Provided routinely in AVS.    Treatment Risk Statement:  The patient understands the risks, benefits, adverse effects and alternatives. Agrees to treatment with the capacity to do so. No medical contraindications to treatment. Agrees to call clinic for any problems. The patient understands to call 911 or go to the nearest ED if life threatening or urgent symptoms occur.      PROVIDER:  JEREMY Ibarra CNP

## 2018-08-30 ASSESSMENT — PATIENT HEALTH QUESTIONNAIRE - PHQ9: SUM OF ALL RESPONSES TO PHQ QUESTIONS 1-9: 8

## 2018-09-11 ENCOUNTER — TELEPHONE (OUTPATIENT)
Dept: OBGYN | Facility: CLINIC | Age: 53
End: 2018-09-11

## 2018-09-11 DIAGNOSIS — N95.1 SYMPTOMATIC MENOPAUSAL OR FEMALE CLIMACTERIC STATES: ICD-10-CM

## 2018-09-11 DIAGNOSIS — N95.1 SYMPTOMATIC MENOPAUSAL OR FEMALE CLIMACTERIC STATES: Primary | ICD-10-CM

## 2018-09-11 RX ORDER — ESTRADIOL 1 MG/1
0.5 TABLET ORAL DAILY
Qty: 90 TABLET | Refills: 1 | Status: SHIPPED | OUTPATIENT
Start: 2018-09-11 | End: 2019-02-12

## 2018-09-11 NOTE — TELEPHONE ENCOUNTER
Pt is calling for refill of estradiol.  She's been on this medication since last summer when her insurance was no longer covering the femhrt.  Based on the notes from her refill request in July, it appears that she should not only be taking estradiol.  Do you want to order something in addition to the estradiol for her to take?  Mikayla Garcia RN      Kimmie should start Provera 2.5 mg po daily to protect the endometrium; as long as she is taking estradiol.  I sent a Rx to her pharmacy.   Please notify patient.     LT

## 2018-09-12 NOTE — TELEPHONE ENCOUNTER
"Requested Prescriptions   Pending Prescriptions Disp Refills     norethindrone-ethinyl estradiol (FEMHRT 1/5) 1-5 MG-MCG per tablet 28 tablet 3    Hormone Replacement Therapy Failed    9/11/2018  4:28 PM       Failed - Patient has mammogram in past 2 years on file if age 50-75       Passed - Blood pressure under 140/90 in past 12 months    BP Readings from Last 3 Encounters:   08/17/18 108/67   06/18/18 129/75   06/12/18 117/76                Passed - Recent (12 mo) or future (30 days) visit within the authorizing provider's specialty    Patient had office visit in the last 12 months or has a visit in the next 30 days with authorizing provider or within the authorizing provider's specialty.  See \"Patient Info\" tab in inbasket, or \"Choose Columns\" in Meds & Orders section of the refill encounter.           Passed - Patient is 18 years of age or older       Passed - No active pregnancy on record       Passed - No positive pregnancy test on record in past 12 months        Routing refill request to provider for review/approval because:  Failed protocol d/t mammogram recommendation. Last completed on 9/2016    Medication is reported/historical    Britney GAMA RN, BSN, PHN  Rogerio Quiroz RN        "

## 2018-09-13 RX ORDER — MEDROXYPROGESTERONE ACETATE 2.5 MG/1
2.5 TABLET ORAL DAILY
Qty: 90 TABLET | Refills: 3 | Status: SHIPPED | OUTPATIENT
Start: 2018-09-13 | End: 2019-09-19

## 2018-09-14 RX ORDER — NORETHINDRONE ACETATE AND ETHINYL ESTRADIOL 1; 5 MG/1; UG/1
TABLET ORAL
Qty: 28 TABLET | Refills: 1 | Status: SHIPPED | OUTPATIENT
Start: 2018-09-14 | End: 2018-09-14 | Stop reason: ALTCHOICE

## 2018-09-14 NOTE — TELEPHONE ENCOUNTER
Pt is not taking this medication.  She is taking provera (recently started) and estrace.  Femhrt was not covered by her insurance so it was changed last year.  Mikayla Garcia RN

## 2018-09-25 ENCOUNTER — OFFICE VISIT (OUTPATIENT)
Dept: PSYCHIATRY | Facility: CLINIC | Age: 53
End: 2018-09-25
Attending: NURSE PRACTITIONER
Payer: COMMERCIAL

## 2018-09-25 VITALS
BODY MASS INDEX: 20.02 KG/M2 | SYSTOLIC BLOOD PRESSURE: 124 MMHG | DIASTOLIC BLOOD PRESSURE: 85 MMHG | HEART RATE: 84 BPM | WEIGHT: 114.8 LBS

## 2018-09-25 DIAGNOSIS — F41.1 GENERALIZED ANXIETY DISORDER: ICD-10-CM

## 2018-09-25 RX ORDER — SERTRALINE HYDROCHLORIDE 100 MG/1
150 TABLET, FILM COATED ORAL DAILY
Qty: 90 TABLET | Refills: 0 | Status: SHIPPED | OUTPATIENT
Start: 2018-09-25 | End: 2018-10-19

## 2018-09-25 RX ORDER — ALPRAZOLAM 0.5 MG
TABLET ORAL
Qty: 30 TABLET | Refills: 0 | Status: SHIPPED | OUTPATIENT
Start: 2018-09-25 | End: 2018-10-19

## 2018-09-25 ASSESSMENT — PAIN SCALES - GENERAL: PAINLEVEL: MODERATE PAIN (5)

## 2018-09-25 NOTE — MR AVS SNAPSHOT
After Visit Summary   9/25/2018    Catie Nuñez    MRN: [id number]           Patient Information     Date Of Birth          1965        Visit Information        Provider Department      9/25/2018 3:00 PM Nicholas Zee APRN CNP Psychiatry Clinic        Today's Diagnoses     Generalized anxiety disorder           Follow-ups after your visit        Your next 10 appointments already scheduled     Oct 23, 2018  3:00 PM CDT   Adult Med Follow UP with JEREMY An CNP   Psychiatry Clinic ([facility])    [place] [id number]  [address] [id number] [phone]              Who to contact     Please call your clinic at [phone] to:    Ask questions about your health    Make or cancel appointments    Discuss your medicines    Learn about your test results    Speak to your doctor            Additional Information About Your Visit        MyChart Information     [employer] gives you secure access to your electronic health record. If you see a primary care provider, you can also send messages to your care team and make appointments. If you have questions, please call your primary care clinic.  If you do not have a primary care provider, please call [phone] and they will assist you.      [employer] is an electronic gateway that provides easy, online access to your medical records. With [employer], you can request a clinic appointment, read your test results, renew a prescription or communicate with your care team.     To access your existing account, please contact your [facility] or call [phone] for assistance.        Care EveryWhere ID     This is your Care EveryWhere ID. This could be used by other organizations to access your [facility] [place] medical records  [id number]        Your Vitals Were     Pulse Last Period BMI (Body Mass Index)             84 09/01/2016 20.02 kg/m2          Blood Pressure from Last 3  Encounters:   10/11/18 123/82   09/25/18 124/85   08/29/18 107/71    Weight from Last 3 Encounters:   10/11/18 51.3 kg (113 lb)   09/25/18 52.1 kg (114 lb 12.8 oz)   08/29/18 52.3 kg (115 lb 6.4 oz)              Today, you had the following     No orders found for display         Where to get your medicines      These medications were sent to [facility] [address] [place], [place] [address]A AVE AT [address] [address]     Phone:  [phone]     sertraline 100 MG tablet         Some of these will need a paper prescription and others can be bought over the counter.  Ask your nurse if you have questions.     Bring a paper prescription for each of these medications     ALPRAZolam 0.5 MG tablet          Primary Care Provider Office Phone # Fax #    Yasmin Velazquez, JEREMY [facility] [phone] [address] [place] [address]        Equal Access to Services     MAHSA SANTANA : Hadii marcie ku hadasho Soomaali, waaxda luqadaha, qaybta kaalmada adeegyada, vickey grajeda . So [facility] [place] [phone].    ATENCIÓN: Si habla español, tiene a weinberg disposición servicios gratuitos de asistencia lingüística. [facility] [phone].    We comply with applicable federal civil rights laws and [place] laws. We do not discriminate on the basis of race, color, national origin, age, disability, sex, sexual orientation, or gender identity.            Thank you!     Thank you for choosing PSYCHIATRY CLINIC  for your care. Our goal is always to provide you with excellent care. Hearing back from our patients is one way we can continue to improve our services. Please take a few minutes to complete the written survey that you may receive in the mail after your visit with us. Thank you!             Your Updated Medication List - Protect others around you: Learn how to safely use, store and throw away your medicines at  www.disposemymeds.org.          This list is accurate as of 9/25/18 11:59 PM.  Always use your most recent med list.                   Brand Name Dispense Instructions for use Diagnosis    ALPRAZolam 0.5 MG tablet    XANAX    30 tablet    Take 1/2 tablet (0.25mg) in the morning and 1/2 tablet (0.25mg) in the evening    Generalized anxiety disorder       estradiol 1 MG tablet    ESTRACE    90 tablet    Take 0.5 tablets (0.5 mg) by mouth daily    Symptomatic menopausal or female climacteric states       EXCEDRIN PO      Take by mouth as needed        fluticasone 50 MCG/ACT spray    FLONASE    3 Bottle    Spray 1-2 sprays into both nostrils daily    Acute seasonal allergic rhinitis, unspecified trigger       gabapentin 100 MG capsule    NEURONTIN    90 capsule    Take 1 capsule (100 mg) by mouth 3 times daily    Generalized anxiety disorder       ibuprofen 600 MG tablet    ADVIL/MOTRIN     Take 600 mg by mouth Take 1 tablet by mouth 4 times daily if needed. Maximum of 3200 mg in 24 hours.        medroxyPROGESTERone 2.5 MG tablet    PROVERA    90 tablet    Take 1 tablet (2.5 mg) by mouth daily    Symptomatic menopausal or female climacteric states       MULTIVITAMIN PO           riboflavin 400 MG Caps     30 capsule    Take 400 mg by mouth every morning    Migraine without aura and without status migrainosus, not intractable       sertraline 100 MG tablet    ZOLOFT    90 tablet    Take 1.5 tablets (150 mg) by mouth daily    Generalized anxiety disorder       traMADol 50 MG tablet    ULTRAM    45 tablet    Take 1 tablet (50 mg) by mouth 2 times daily as needed for severe pain . 45 tablets to las 30 days.    Midline low back pain without sciatica, unspecified chronicity

## 2018-10-11 ENCOUNTER — RADIANT APPOINTMENT (OUTPATIENT)
Dept: GENERAL RADIOLOGY | Facility: CLINIC | Age: 53
End: 2018-10-11
Attending: NURSE PRACTITIONER
Payer: COMMERCIAL

## 2018-10-11 ENCOUNTER — OFFICE VISIT (OUTPATIENT)
Dept: FAMILY MEDICINE | Facility: CLINIC | Age: 53
End: 2018-10-11
Payer: COMMERCIAL

## 2018-10-11 VITALS
DIASTOLIC BLOOD PRESSURE: 82 MMHG | HEART RATE: 68 BPM | RESPIRATION RATE: 20 BRPM | WEIGHT: 113 LBS | SYSTOLIC BLOOD PRESSURE: 123 MMHG | TEMPERATURE: 98 F | BODY MASS INDEX: 19.7 KG/M2 | OXYGEN SATURATION: 99 %

## 2018-10-11 DIAGNOSIS — M54.50 MIDLINE LOW BACK PAIN WITHOUT SCIATICA, UNSPECIFIED CHRONICITY: ICD-10-CM

## 2018-10-11 DIAGNOSIS — M54.2 NECK PAIN: ICD-10-CM

## 2018-10-11 DIAGNOSIS — M54.2 NECK PAIN: Primary | ICD-10-CM

## 2018-10-11 PROCEDURE — 99213 OFFICE O/P EST LOW 20 MIN: CPT | Performed by: NURSE PRACTITIONER

## 2018-10-11 PROCEDURE — 72040 X-RAY EXAM NECK SPINE 2-3 VW: CPT

## 2018-10-11 RX ORDER — TRAMADOL HYDROCHLORIDE 50 MG/1
50 TABLET ORAL 2 TIMES DAILY PRN
Qty: 45 TABLET | Refills: 0 | Status: SHIPPED | OUTPATIENT
Start: 2018-10-11 | End: 2019-01-02

## 2018-10-11 RX ORDER — TRAMADOL HYDROCHLORIDE 50 MG/1
50 TABLET ORAL 2 TIMES DAILY PRN
Qty: 45 TABLET | Refills: 0 | Status: SHIPPED | OUTPATIENT
Start: 2018-11-11 | End: 2018-11-16

## 2018-10-11 NOTE — MR AVS SNAPSHOT
After Visit Summary   10/11/2018    Catie Nuñez    MRN: [id number]           Patient Information     Date Of Birth          1965        Visit Information        Provider Department      10/11/2018 3:40 PM Yasmin Velazquez APRN CNP [facility]        Today's Diagnoses     Neck pain    -  1    Midline low back pain without sciatica, unspecified chronicity           Follow-ups after your visit        Additional Services     ORTHO  REFERRAL       [facility] is referring you to the Orthopedic  Services at [facility].       The  Representative will assist you in the coordination of your Orthopedic and Musculoskeletal Care as prescribed by your physician.    The  Representative will call you within 1 business day to help schedule your appointment, or you may contact the [facility] Representative at:    All areas ~ [phone]     Type of Referral : Spine: Cervical / Thoracic: Medical Spine/Non-Surgical Specialist        Timeframe requested: Routine  Coverage of these services is subject to the terms and limitations of your health insurance plan.  Please call member services at your health plan with any benefit or coverage questions.      If X-rays, CT or MRI's have been performed, please contact the facility where they were done to arrange for , prior to your scheduled appointment.  Please bring this referral request to your appointment and present it to your specialist.                  Your next 10 appointments already scheduled     Oct 23, 2018  3:00 PM CDT   Adult Med Follow UP with JEREMY An CNP   Psychiatry Clinic ([facility])    [id number] [address] [phone]              Who to contact     If you have questions or need follow up information about today's clinic visit or your schedule please  contact [facility] directly at [phone].  Normal or non-critical lab and imaging results will be communicated to you by MyChart, letter or phone within 4 business days after the clinic has received the results. If you do not hear from us within 7 days, please contact the clinic through Custorahart or phone. If you have a critical or abnormal lab result, we will notify you by phone as soon as possible.  Submit refill requests through [employer]ageIn or call your pharmacy and they will forward the refill request to us. Please allow 3 business days for your refill to be completed.          Additional Information About Your Visit        [facility]ustorahar[facility]ak Information     G[employer]In gives you secure access to your electronic health record. If you see a primary care provider, you can also send messages to your care team and make appointments. If you have questions, please call your primary care clinic.  If you do not have a primary care provider, please call [phone] and they will assist you.        Care EveryWhere ID     This is your Care EveryWhere ID. This could be used by other organizations to access your [place] medical records  [id number]        Your Vitals Were     Pulse Temperature Respirations Last Period Pulse Oximetry BMI (Body Mass Index)    68 98  F (36.7  C) (Oral) 20 09/01/2016 99% 19.7 kg/m2       Blood Pressure from Last 3 Encounters:   10/11/18 123/82   09/25/18 124/85   08/29/18 107/71    Weight from Last 3 Encounters:   10/11/18 113 lb (51.3 kg)   09/25/18 114 lb 12.8 oz (52.1 kg)   08/29/18 115 lb 6.4 oz (52.3 kg)              We Performed the Following     ORTHO  REFERRAL          Today's Medication Changes          These changes are accurate as of 10/11/18  4:36 PM.  If you have any questions, ask your nurse or doctor.               These medicines have changed or have updated prescriptions.        Dose/Directions    * traMADol 50 MG tablet   Commonly known as:  ULTRAM   This may have  changed:  Another medication with the same name was changed. Make sure you understand how and when to take each.   Used for:  Midline low back pain without sciatica, unspecified chronicity   Changed by:  Yasmin Velazquez APRN CNP        Dose:  50 mg   Take 1 tablet (50 mg) by mouth 2 times daily as needed for severe pain . 45 tablets to las 30 days.   Quantity:  45 tablet   Refills:  0       * traMADol 50 MG tablet   Commonly known as:  ULTRAM   This may have changed:  additional instructions   Used for:  Midline low back pain without sciatica, unspecified chronicity   Changed by:  Yasmin Velazquez APRN CNP        Dose:  50 mg   Take 1 tablet (50 mg) by mouth 2 times daily as needed for severe pain . 45 tablets to last 30 days. Okay to fill today.   Quantity:  45 tablet   Refills:  0       * traMADol 50 MG tablet   Commonly known as:  ULTRAM   This may have changed:  These instructions start on 11/11/2018. If you are unsure what to do until then, ask your doctor or other care provider.   Used for:  Midline low back pain without sciatica, unspecified chronicity   Changed by:  Yasmin Velazquez APRN CNP        Dose:  50 mg   Start taking on:  11/11/2018   Take 1 tablet (50 mg) by mouth 2 times daily as needed for severe pain . 45 tablets to last 30 days.   Quantity:  45 tablet   Refills:  0       * Notice:  This list has 3 medication(s) that are the same as other medications prescribed for you. Read the directions carefully, and ask your doctor or other care provider to review them with you.         Where to get your medicines      Some of these will need a paper prescription and others can be bought over the counter.  Ask your nurse if you have questions.     Bring a paper prescription for each of these medications     traMADol 50 MG tablet    traMADol 50 MG tablet               Information about OPIOIDS     PRESCRIPTION OPIOIDS: WHAT YOU NEED TO KNOW   We gave you an opioid (narcotic) pain  medicine. It is important to manage your pain, but opioids are not always the best choice. You should first try all the other options your care team gave you. Take this medicine for as short a time (and as few doses) as possible.    Some activities can increase your pain, such as bandage changes or therapy sessions. It may help to take your pain medicine 30 to 60 minutes before these activities. Reduce your stress by getting enough sleep, working on hobbies you enjoy and practicing relaxation or meditation. Talk to your care team about ways to manage your pain beyond prescription opioids.    These medicines have risks:    DO NOT drive when on new or higher doses of pain medicine. These medicines can affect your alertness and reaction times, and you could be arrested for driving under the influence (DUI). If you need to use opioids long-term, talk to your care team about driving.    DO NOT operate heavy machinery    DO NOT do any other dangerous activities while taking these medicines.    DO NOT drink any alcohol while taking these medicines.     If the opioid prescribed includes acetaminophen, DO NOT take with any other medicines that contain acetaminophen. Read all labels carefully. Look for the word  acetaminophen  or  Tylenol.  Ask your pharmacist if you have questions or are unsure.    You can get addicted to pain medicines, especially if you have a history of addiction (chemical, alcohol or substance dependence). Talk to your care team about ways to reduce this risk.    All opioids tend to cause constipation. Drink plenty of water and eat foods that have a lot of fiber, such as fruits, vegetables, prune juice, apple juice and high-fiber cereal. Take a laxative (Miralax, milk of magnesia, Colace, Senna) if you don t move your bowels at least every other day. Other side effects include upset stomach, sleepiness, dizziness, throwing up, tolerance (needing more of the medicine to have the same effect), physical  dependence and slowed breathing.    Store your pills in a secure place, locked if possible. We will not replace any lost or stolen medicine. If you don t finish your medicine, please throw away (dispose) as directed by your pharmacist. The [place] Pollution Control Agency has more information about safe disposal: https://www.pca.[facility].[place].us/living-green/managing-unwanted-medications         Primary Care Provider Office Phone # Fax #    Yasmin GRUBER Caroline, JEREMY [facility] [phone] [address] [place] [address]        Equal Access to Services     [facility] CenterSANDIP : Hadii aad ku hadasho Soomaali, waaxda luqadaha, qaybta kaalmada adeegyada, vickey grajeda . So [facility] [phone].    ATENCIÓN: Si habla español, tiene a weinberg disposición servicios gratuitos de asistencia lingüística. Jacque al [phone].    We comply with applicable federal civil rights laws and [place] laws. We do not discriminate on the basis of race, color, national origin, age, disability, sex, sexual orientation, or gender identity.            Thank you!     Thank you for choosing [facility]  for your care. Our goal is always to provide you with excellent care. Hearing back from our patients is one way we can continue to improve our services. Please take a few minutes to complete the written survey that you may receive in the mail after your visit with us. Thank you!             Your Updated Medication List - Protect others around you: Learn how to safely use, store and throw away your medicines at www.disposemymeds.org.          This list is accurate as of 10/11/18  4:36 PM.  Always use your most recent med list.                   Brand Name Dispense Instructions for use Diagnosis    ALPRAZolam 0.5 MG tablet    XANAX    30 tablet    Take 1/2 tablet (0.25mg) in the morning and 1/2 tablet (0.25mg) in the evening    Generalized anxiety disorder       estradiol 1 MG tablet     ESTRACE    90 tablet    Take 0.5 tablets (0.5 mg) by mouth daily    Symptomatic menopausal or female climacteric states       EXCEDRIN PO      Take by mouth as needed        fluticasone 50 MCG/ACT spray    FLONASE    3 Bottle    Spray 1-2 sprays into both nostrils daily    Acute seasonal allergic rhinitis, unspecified trigger       gabapentin 100 MG capsule    NEURONTIN    90 capsule    Take 1 capsule (100 mg) by mouth 3 times daily    Generalized anxiety disorder       ibuprofen 600 MG tablet    ADVIL/MOTRIN     Take 600 mg by mouth Take 1 tablet by mouth 4 times daily if needed. Maximum of 3200 mg in 24 hours.        medroxyPROGESTERone 2.5 MG tablet    PROVERA    90 tablet    Take 1 tablet (2.5 mg) by mouth daily    Symptomatic menopausal or female climacteric states       MULTIVITAMIN PO           riboflavin 400 MG Caps     30 capsule    Take 400 mg by mouth every morning    Migraine without aura and without status migrainosus, not intractable       sertraline 100 MG tablet    ZOLOFT    90 tablet    Take 1.5 tablets (150 mg) by mouth daily    Generalized anxiety disorder       * traMADol 50 MG tablet    ULTRAM    45 tablet    Take 1 tablet (50 mg) by mouth 2 times daily as needed for severe pain . 45 tablets to las 30 days.    Midline low back pain without sciatica, unspecified chronicity       * traMADol 50 MG tablet    ULTRAM    45 tablet    Take 1 tablet (50 mg) by mouth 2 times daily as needed for severe pain . 45 tablets to last 30 days. Okay to fill today.    Midline low back pain without sciatica, unspecified chronicity       * traMADol 50 MG tablet   Start taking on:  11/11/2018    ULTRAM    45 tablet    Take 1 tablet (50 mg) by mouth 2 times daily as needed for severe pain . 45 tablets to last 30 days.    Midline low back pain without sciatica, unspecified chronicity       * Notice:  This list has 3 medication(s) that are the same as other medications prescribed for you. Read the directions carefully,  and ask your doctor or other care provider to review them with you.

## 2018-10-15 ENCOUNTER — TELEPHONE (OUTPATIENT)
Dept: PALLIATIVE MEDICINE | Facility: CLINIC | Age: 53
End: 2018-10-15

## 2018-10-15 NOTE — TELEPHONE ENCOUNTER
After speaking with provider, patient allowed to double book for 1:30 pm tomorrow at [facility].   LM for patient to schedule.         Yenifer SANTOYO    [facility] [place]

## 2018-10-15 NOTE — TELEPHONE ENCOUNTER
Patient is requesting to have a repeat ONB done (PRN per last OV note).   Patient is in too much pain to drive to [place] and would like to be seen ASAP.     Routing to care team for review.       Yenifer SANTOYO    [facility] [place]

## 2018-10-16 ASSESSMENT — PATIENT HEALTH QUESTIONNAIRE - PHQ9: SUM OF ALL RESPONSES TO PHQ QUESTIONS 1-9: 2

## 2018-10-19 ENCOUNTER — OFFICE VISIT (OUTPATIENT)
Dept: PSYCHIATRY | Facility: CLINIC | Age: 53
End: 2018-10-19
Attending: NURSE PRACTITIONER
Payer: COMMERCIAL

## 2018-10-19 VITALS
BODY MASS INDEX: 20.26 KG/M2 | SYSTOLIC BLOOD PRESSURE: 114 MMHG | DIASTOLIC BLOOD PRESSURE: 77 MMHG | HEART RATE: 71 BPM | WEIGHT: 116.2 LBS

## 2018-10-19 DIAGNOSIS — F41.1 GENERALIZED ANXIETY DISORDER: ICD-10-CM

## 2018-10-19 PROCEDURE — G0463 HOSPITAL OUTPT CLINIC VISIT: HCPCS | Mod: ZF

## 2018-10-19 RX ORDER — SERTRALINE HYDROCHLORIDE 100 MG/1
200 TABLET, FILM COATED ORAL DAILY
Qty: 60 TABLET | Refills: 1 | Status: SHIPPED | OUTPATIENT
Start: 2018-10-19 | End: 2018-11-16

## 2018-10-19 RX ORDER — GABAPENTIN 100 MG/1
100 CAPSULE ORAL 3 TIMES DAILY
Qty: 90 CAPSULE | Refills: 1 | Status: SHIPPED | OUTPATIENT
Start: 2018-10-19 | End: 2018-11-16

## 2018-10-19 RX ORDER — ALPRAZOLAM 0.5 MG
TABLET ORAL
Qty: 30 TABLET | Refills: 0 | Status: SHIPPED | OUTPATIENT
Start: 2018-10-25 | End: 2018-11-16

## 2018-10-19 RX ORDER — ALPRAZOLAM 0.5 MG
TABLET ORAL
Qty: 30 TABLET | Refills: 0 | Status: SHIPPED | OUTPATIENT
Start: 2018-10-19 | End: 2018-10-19

## 2018-10-19 ASSESSMENT — PAIN SCALES - GENERAL: PAINLEVEL: MODERATE PAIN (4)

## 2018-10-19 NOTE — PROGRESS NOTES
"  Psychiatry Clinic Progress Note                                                                   Catie Nuñez is a 53 year old female who returns to the clinic for follow-up care.  Patient was 15 minutes late for appointment.    Therapist: None  PCP: Yasmin Velazquez  Other Providers: None    Pertinent Background:  See previous notes.  Psych critical item history includes trauma hx and using Xanax for past 4 years.     Interim History                                                                                                        4, 4     The patient is a fair historian, reports poor treatment adherence and was last seen 9/25/18.  Since the last visit, Kimmie reports having a significant migraine that lasted 3 days with several days of recuperation.   She also met with PCP recently and it seems Kimmie is more willing to try other treatments to better manage pain including physical therapy and acupuncture.  Appointment on 10/29 for further assessment and treatment options for pain management.      Periodic anxiety continues.  Kimmie states \"some days are better than others.\"  Reports stressors stemming from work, raising children as single parent, and financial concerns.      Continues to endorse some trauma triggers most of which happen in the morning.  Describes the triggers as \"random.\"      Reports she has taken Gabapentin for past 10 days.  Was initially concerned about size of dose but informed her that it is typical starting dose.  Does report some initial issues with sedation.  It appears that Kimmie is not being honest in regards to taking Gabapentin.   According to [place]  which was checked today, she has not filled prescription as of 10/19/18.  Provider called pharmacy and confirmed that medication was never filled.      9/25/18: Catie reports she continues to experience migraine and neck pain.  She reports she is concerned that there is not a plan in place to help her manage her chronic pain.  She " "stated she did not take Gabapentin since last appointment due misunderstanding indication of medication.  Did successfully titrate Xanax to 0.25mg BID without significant increase in anxiety.  Continues to report need for xanax is due to panic she experiences in regards to possible future pain or headaches.  Notified Catie that being late to appointments does not allow for more in-depth conversation of possible pharmacological and non-pharmacological interventions    8/29/18:  Catie stopped taking Buspar due to nausea, lightheadedness, and feeling \"foggy/medicated.\"  Propranolol was stopped due to being ineffective.  Both trials lasted approximately a week. Not interested in higher dose.  Catie reports her generalized anxiety is well managed with Sertraline.  Her periods of intense anxiety stems from headaches and occipital nerve neuralgia.  No concerns with depression.      Catie reports she is exercising regularly, doing yoga, and focusing on diet as means to manage anxiety.      Does not need reorder of Sertraline today    Recent Symptoms:   Depression:  depressed mood, low energy and insomnia.  Elevated:  none  Psychosis:  none  Anxiety:  excessive worry, feeling fearful and nervous/overwhelmed  Panic Attack:  none  Trauma Related:  intrusive memories, nightmares, avoidance, trauma trigger psychological / physiological response, negative beliefs / emotions, startle response and hypervigilance     Recent Substance Use:  No changes reported        Social/ Family History                                  [per patient report]                                 1ea,1ea   FINANCIAL SUPPORT- working     Artist (illustrator, lopez) and retail  FEELS SAFE AT HOME- Yes    Medical / Surgical History                                                                                                                  Patient Active Problem List   Diagnosis     CARDIOVASCULAR SCREENING; LDL GOAL LESS THAN 160     Adhesive " "capsulitis of shoulder     Scoliosis     Shoulder impingement     Generalized anxiety disorder     Pulmonary nodule     Migraine without aura and without status migrainosus, not intractable     Controlled substance agreement signed     ASCUS Pap + high risk HPV, + 16, + \"other\"      Papanicolaou smear of cervix with low grade squamous intraepithelial lesion (LGSIL)     Acute seasonal allergic rhinitis, unspecified trigger       No past surgical history on file.     Medical Review of Systems                                                                                                    2,10   The remainder of the review of systems is noncontributory  Migraine pain  Allergy                                Ondansetron and Sulfa drugs  Current Medications                                                                                                       Current Outpatient Prescriptions   Medication Sig Dispense Refill     ALPRAZolam (XANAX) 0.5 MG tablet Take 1/2 tablet (0.25mg) in the morning and 1/2 tablet (0.25mg) in the evening 30 tablet 0     Aspirin-Acetaminophen-Caffeine (EXCEDRIN PO) Take by mouth as needed       estradiol (ESTRACE) 1 MG tablet Take 0.5 tablets (0.5 mg) by mouth daily 90 tablet 1     fluticasone (FLONASE) 50 MCG/ACT spray Spray 1-2 sprays into both nostrils daily 3 Bottle 3     gabapentin (NEURONTIN) 100 MG capsule Take 1 capsule (100 mg) by mouth 3 times daily 90 capsule 0     ibuprofen (ADVIL/MOTRIN) 600 MG tablet Take 600 mg by mouth Take 1 tablet by mouth 4 times daily if needed. Maximum of 3200 mg in 24 hours.       medroxyPROGESTERone (PROVERA) 2.5 MG tablet Take 1 tablet (2.5 mg) by mouth daily 90 tablet 3     Multiple Vitamins-Minerals (MULTIVITAMIN PO)        riboflavin 400 MG CAPS Take 400 mg by mouth every morning 30 capsule 0     sertraline (ZOLOFT) 100 MG tablet Take 1.5 tablets (150 mg) by mouth daily 90 tablet 0     traMADol (ULTRAM) 50 MG tablet Take 1 tablet (50 mg) by mouth " "2 times daily as needed for severe pain . 45 tablets to last 30 days. Okay to fill today. 45 tablet 0     [START ON 11/11/2018] traMADol (ULTRAM) 50 MG tablet Take 1 tablet (50 mg) by mouth 2 times daily as needed for severe pain . 45 tablets to last 30 days. 45 tablet 0     traMADol (ULTRAM) 50 MG tablet Take 1 tablet (50 mg) by mouth 2 times daily as needed for severe pain . 45 tablets to las 30 days. 45 tablet 0     Vitals                                                                                                                       3, 3   /77  Pulse 71  Wt 52.7 kg (116 lb 3.2 oz)  LMP 09/01/2016  BMI 20.26 kg/m2   Mental Status Exam                                                                                    9, 14 cog gs     Alertness: alert  and oriented  Appearance: casually groomed  Behavior/Demeanor: cooperative, pleasant and calm, with good  eye contact   Speech: regular rate and rhythm  Language: no obvious problem  Psychomotor: normal or unremarkable  Mood: \"ok\"  Affect: appropriate; was congruent to mood; was congruent to content  Thought Process/Associations: unremarkable  Thought Content:  Reports none;  Denies suicidal ideation and violent ideation  Perception:  Reports none;  Denies auditory hallucinations and visual hallucinations  Insight: adequate  Judgment: fair  Cognition: (6) does  appear grossly intact; formal cognitive testing was not done  Gait/Station and/or Muscle Strength/Tone: unremarkable    Labs and Data                                                                                                                 Rating Scales:    PHQ9    PHQ9 Today:  3  PHQ-9 SCORE 1/9/2018 8/1/2018 9/25/2018   Total Score 5 8 2         Diagnosis and Assessment                                                                             m2, h3     Today the following issues were addressed:    1) Generalized Anxiety Disorder  2) PTSD     [place] Prescription Monitoring Program [] " was checked today:  indicates filling both Xanax and Tramadol on monthly basis for past year.  At times it does appear that she is filling earlier than 30 days..     PSYCHOTROPIC DRUG INTERACTIONS: Tramadol-Xanax: Concurrent use of TRAMADOL and CNS DEPRESSANTS may result in increased risk of respiratory and CNS depression.  Buspar-Tramadol:  Concurrent use of TRAMADOL and SEROTONERGIC CNS DEPRESSANTS may result in increased risk of serotonin syndrome; increased risk of respiratory and CNS depression. FEMHRT-Xanax: Concurrent use of ALPRAZOLAM and CONTRACEPTIVES, COMBINATION may result in an increased risk of alprazolam toxicity (CNS depression, hypotension). .    Plan                                                                                                                    m2, h3      1) Medication Management  Continue Xanax taper:  Take 0.25mg in the morning and 0.25mg in the evening  Continue Sertraline 150mg daily  Start Gabapentin 100mg TID    Kimmie reports she is taking Gabapentin but according to pharmacy, prescription has yet to be filled.  Will discuss with Kimmie that in order for future refills of Xanax she must follow recommendations. Will communicate concern with PCP      2) Therapy  Individual psychotherapy focusing on past trauma and current anxiety is highly recommended      RTC: 1 month    CRISIS NUMBERS:   Provided routinely in AVS.    Treatment Risk Statement:  The patient understands the risks, benefits, adverse effects and alternatives. Agrees to treatment with the capacity to do so. No medical contraindications to treatment. Agrees to call clinic for any problems. The patient understands to call 911 or go to the nearest ED if life threatening or urgent symptoms occur.      PROVIDER:  JEREMY Ibarra CNP

## 2018-10-19 NOTE — MR AVS SNAPSHOT
After Visit Summary   10/19/2018    Catie Nuñez    MRN: [id number]           Patient Information     Date Of Birth          1965        Visit Information        Provider Department      10/19/2018 12:30 PM Nicholas Zee, JEREMY [facility]        Today's Diagnoses     Generalized anxiety disorder           Follow-ups after your visit        Who to contact     Please call your clinic at [phone] to:    Ask questions about your health    Make or cancel appointments    Discuss your medicines    Learn about your test results    Speak to your doctor            Additional Information About Your Visit        MyChart Information     [employer] gives you secure access to your electronic health record. If you see a primary care provider, you can also send messages to your care team and make appointments. If you have questions, please call your primary care clinic.  If you do not have a primary care provider, please call [phone] and they will assist you.      [employer] is an electronic gateway that provides easy, online access to your medical records. With [employer], you can request a clinic appointment, read your test results, renew a prescription or communicate with your care team.     To access your existing account, please contact your [facility] or call [phone] for assistance.        Care EveryWhere ID     This is your Care EveryWhere ID. This could be used by other organizations to access your [facility] medical records  [id number]        Your Vitals Were     Pulse Last Period BMI (Body Mass Index)             71 09/01/2016 20.26 kg/m2          Blood Pressure from Last 3 Encounters:   11/08/18 121/89   10/29/18 117/81   10/19/18 114/77    Weight from Last 3 Encounters:   11/08/18 52.2 kg (115 lb)   10/29/18 52.3 kg (115 lb 3.2 oz)   10/19/18 52.7 kg (116 lb 3.2 oz)              Today, you had the following     No orders found for display          Today's Medication Changes          These changes are accurate as of 10/19/18 11:59 PM.  If you have any questions, ask your nurse or doctor.               Start taking these medicines.        Dose/Directions    ALPRAZolam 0.5 MG tablet   Commonly known as:  XANAX   Used for:  Generalized anxiety disorder   Started by:  Nicholas Zee APRN CNP        Take 1/2 tablet (0.25mg) in the morning and 1/2 tablet (0.25mg) in the evening   Quantity:  30 tablet   Refills:  0         These medicines have changed or have updated prescriptions.        Dose/Directions    sertraline 100 MG tablet   Commonly known as:  ZOLOFT   This may have changed:  how much to take   Used for:  Generalized anxiety disorder   Changed by:  Nicholas Zee APRN CNP        Dose:  200 mg   Take 2 tablets (200 mg) by mouth daily   Quantity:  60 tablet   Refills:  1            Where to get your medicines      These medications were sent to [facility] [address] [place] [id number]     Phone:  [phone]     gabapentin 100 MG capsule    sertraline 100 MG tablet         Some of these will need a paper prescription and others can be bought over the counter.  Ask your nurse if you have questions.     Bring a paper prescription for each of these medications     ALPRAZolam 0.5 MG tablet                Primary Care Provider Office Phone # Fax #    JEREMY Gonzalez [phone]       [facility] [address] [place] [address]        Equal Access to Services     MAHSA SANTANA : Hadii marcie masono Sokofi, waaxda luqadaha, qaybta kaalmada adeegyada, vickey kincaid. So [facility] [phone].    ATENCIÓN: Si habla español, tiene a weinberg disposición servicios gratuitos de asistencia lingüística. Llame al [phone].    We comply with applicable federal civil rights laws and [place] laws. We do not  discriminate on the basis of race, color, national origin, age, disability, sex, sexual orientation, or gender identity.            Thank you!     Thank you for choosing [facility]  for your care. Our goal is always to provide you with excellent care. Hearing back from our patients is one way we can continue to improve our services. Please take a few minutes to complete the written survey that you may receive in the mail after your visit with us. Thank you!             Your Updated Medication List - Protect others around you: Learn how to safely use, store and throw away your medicines at www.disposemymeds.org.          This list is accurate as of 10/19/18 11:59 PM.  Always use your most recent med list.                   Brand Name Dispense Instructions for use Diagnosis    ALPRAZolam 0.5 MG tablet    XANAX    30 tablet    Take 1/2 tablet (0.25mg) in the morning and 1/2 tablet (0.25mg) in the evening    Generalized anxiety disorder       estradiol 1 MG tablet    ESTRACE    90 tablet    Take 0.5 tablets (0.5 mg) by mouth daily    Symptomatic menopausal or female climacteric states       EXCEDRIN PO      Take by mouth as needed        fluticasone 50 MCG/ACT spray    FLONASE    3 Bottle    Spray 1-2 sprays into both nostrils daily    Acute seasonal allergic rhinitis, unspecified trigger       gabapentin 100 MG capsule    NEURONTIN    90 capsule    Take 1 capsule (100 mg) by mouth 3 times daily    Generalized anxiety disorder       ibuprofen 600 MG tablet    ADVIL/MOTRIN     Take 600 mg by mouth Take 1 tablet by mouth 4 times daily if needed. Maximum of 3200 mg in 24 hours.        medroxyPROGESTERone 2.5 MG tablet    PROVERA    90 tablet    Take 1 tablet (2.5 mg) by mouth daily    Symptomatic menopausal or female climacteric states       MULTIVITAMIN PO           riboflavin 400 MG Caps     30 capsule    Take 400 mg by mouth every morning    Migraine without aura and without status migrainosus, not intractable        sertraline 100 MG tablet    ZOLOFT    60 tablet    Take 2 tablets (200 mg) by mouth daily    Generalized anxiety disorder       * traMADol 50 MG tablet    ULTRAM    45 tablet    Take 1 tablet (50 mg) by mouth 2 times daily as needed for severe pain . 45 tablets to las 30 days.    Midline low back pain without sciatica, unspecified chronicity       * traMADol 50 MG tablet    ULTRAM    45 tablet    Take 1 tablet (50 mg) by mouth 2 times daily as needed for severe pain . 45 tablets to last 30 days. Okay to fill today.    Midline low back pain without sciatica, unspecified chronicity       * traMADol 50 MG tablet    ULTRAM    45 tablet    Take 1 tablet (50 mg) by mouth 2 times daily as needed for severe pain . 45 tablets to last 30 days.    Midline low back pain without sciatica, unspecified chronicity       * Notice:  This list has 3 medication(s) that are the same as other medications prescribed for you. Read the directions carefully, and ask your doctor or other care provider to review them with you.

## 2018-10-19 NOTE — PROGRESS NOTES
Kade Haddad,    This note is to let you know that the radiologist's final review of your neck x-ray show that you do have degenerative changes  In the lower levels of your neck spine, C5-C7. Please take care of yourself and let me know if there is anything else I can do for you.    Yasmin LUNA CNP

## 2018-10-22 ENCOUNTER — MYC MEDICAL ADVICE (OUTPATIENT)
Dept: FAMILY MEDICINE | Facility: CLINIC | Age: 53
End: 2018-10-22

## 2018-10-29 ENCOUNTER — OFFICE VISIT (OUTPATIENT)
Dept: NEUROSURGERY | Facility: CLINIC | Age: 53
End: 2018-10-29
Attending: NEUROLOGICAL SURGERY
Payer: COMMERCIAL

## 2018-10-29 VITALS
SYSTOLIC BLOOD PRESSURE: 117 MMHG | HEART RATE: 75 BPM | WEIGHT: 115.2 LBS | OXYGEN SATURATION: 100 % | HEIGHT: 63 IN | DIASTOLIC BLOOD PRESSURE: 81 MMHG | BODY MASS INDEX: 20.41 KG/M2

## 2018-10-29 DIAGNOSIS — R51.9 OCCIPITAL HEADACHE: ICD-10-CM

## 2018-10-29 DIAGNOSIS — M54.2 CERVICALGIA: Primary | ICD-10-CM

## 2018-10-29 PROCEDURE — G0463 HOSPITAL OUTPT CLINIC VISIT: HCPCS

## 2018-10-29 PROCEDURE — 99243 OFF/OP CNSLTJ NEW/EST LOW 30: CPT | Performed by: PHYSICIAN ASSISTANT

## 2018-10-29 RX ORDER — DIAZEPAM 10 MG
10 TABLET ORAL EVERY 6 HOURS PRN
Qty: 2 TABLET | Refills: 0 | Status: SHIPPED | OUTPATIENT
Start: 2018-10-29 | End: 2018-11-16

## 2018-10-29 ASSESSMENT — PAIN SCALES - GENERAL: PAINLEVEL: MODERATE PAIN (4)

## 2018-10-29 NOTE — MR AVS SNAPSHOT
After Visit Summary   10/29/2018    Catie Nuñez    MRN: [id number]           Patient Information     Date Of Birth          1965        Visit Information        Provider Department      10/29/2018 1:00 PM Samantha Samaniego PA-C [facility]        Today's Diagnoses     Cervicalgia    -  1    Occipital headache          Care Instructions    Cervical MRI ordered - I will call you with results    Physical therapy ordered - they will call you to schedule          Follow-ups after your visit        Additional Services     [facility] PT, HAND, AND CHIROPRACTIC REFERRAL       Physical Therapy, Hand Therapy and Chiropractic Care are available through:  *[facility]  *Hand Therapy (Occupational Therapy or Physical Therapy)  *[facility]    Call one number to schedule at any of the above locations: [phone].    Physical therapy, Hand therapy and/or Chiropractic care has been recommended by your physician as an excellent treatment option to reduce pain and help people return to normal activities, including sports.  Therapy and/or chiropractic care services are a great complement or alternative to expensive and invasive surgery, injections, or long-term use of prescription medications. The primary goal is to identify the underlying problem and provide you the tools to manage your condition on your own.     Please be aware that coverage of these services is subject to the terms and limitations of your health insurance plan.  Call member services at your health plan with any benefit or coverage questions.      Please bring the following to your appointment:  *Your personal calendar for scheduling future appointments  *Comfortable clothing                  Future tests that were ordered for you today     Open Future Orders        Priority Expected Expires Ordered    MR Cervical Spine w/o Contrast Routine  10/29/2019 10/29/2018    [facility] PT,  "HAND, AND CHIROPRACTIC REFERRAL Routine  10/29/2019 10/29/2018            Who to contact     If you have questions or need follow up information about today's clinic visit or your schedule please contact [facility] directly at [phone].  Normal or non-critical lab and imaging results will be communicated to you by [employer], letter or phone within 4 business days after the clinic has received the results. If you do not hear from us within 7 days, please contact the clinic through [employer]opl[employer] or phone. If you have a critical or abnormal lab result, we will notify you by phone as soon as possible.  Submit refill requests through [employer] or call your pharmacy and they will forward the refill request to us. Please allow 3 business days for your refill to be completed.          Additional Information About Your Visit        [employer] Information     [employer] gives you secure access to your electronic health record. If you see a primary care provider, you can also send messages to your care team and make appointments. If you have questions, please call your primary care clinic.  If you do not have a primary care provider, please call [phone] and they will assist you.        Care EveryWhere ID     This is your Care EveryWhere ID. This could be used by other organizations to access your [facility] medical records  [id number]        Your Vitals Were     Pulse Height Last Period Pulse Oximetry BMI (Body Mass Index)       75 5' 3.39\" (1.61 m) 09/01/2016 100% 20.16 kg/m2        Blood Pressure from Last 3 Encounters:   10/29/18 117/81   10/11/18 123/82   08/17/18 108/67    Weight from Last 3 Encounters:   10/29/18 115 lb 3.2 oz (52.3 kg)   10/11/18 113 lb (51.3 kg)   08/17/18 115 lb 12.8 oz (52.5 kg)                 Today's Medication Changes          These changes are accurate as of 10/29/18  1:47 PM.  If you have any questions, ask your nurse or doctor.               Start taking these medicines.  "       Dose/Directions    diazepam 10 MG tablet   Commonly known as:  VALIUM   Used for:  Cervicalgia, Occipital headache   Started by:  Samantha Samaniego PA-C        Dose:  10 mg   Take 1 tablet (10 mg) by mouth every 6 hours as needed for anxiety Take 30-60 minutes before procedure.  Do not operate a vehicle after taking this medication.   Quantity:  2 tablet   Refills:  0            Where to get your medicines      Some of these will need a paper prescription and others can be bought over the counter.  Ask your nurse if you have questions.     Bring a paper prescription for each of these medications     diazepam 10 MG tablet                Primary Care Provider Office Phone # Fax #    Yasmin GRUBER Caroline, JEREMY [facility] [phone] [address] [place] [address]        Equal Access to Services     SHANNON SANTANA : Stefan carpenter Sokofi, waaxda luqadaha, qaybta kaalmada adeegyada, vickey grajeda . So [facility] [phone].    ATENCIÓN: Si habla español, tiene a weinberg disposición servicios gratuitos de asistencia lingüística. Llame al [phone].    We comply with applicable federal civil rights laws and [place] laws. We do not discriminate on the basis of race, color, national origin, age, disability, sex, sexual orientation, or gender identity.            Thank you!     Thank you for choosing [facility]  for your care. Our goal is always to provide you with excellent care. Hearing back from our patients is one way we can continue to improve our services. Please take a few minutes to complete the written survey that you may receive in the mail after your visit with us. Thank you!             Your Updated Medication List - Protect others around you: Learn how to safely use, store and throw away your medicines at www.disposemymeds.org.          This list is accurate as of 10/29/18  1:47 PM.  Always use your most recent med list.                    Brand Name Dispense Instructions for use Diagnosis    ALPRAZolam 0.5 MG tablet    XANAX    30 tablet    Take 1/2 tablet (0.25mg) in the morning and 1/2 tablet (0.25mg) in the evening    Generalized anxiety disorder       diazepam 10 MG tablet    VALIUM    2 tablet    Take 1 tablet (10 mg) by mouth every 6 hours as needed for anxiety Take 30-60 minutes before procedure.  Do not operate a vehicle after taking this medication.    Cervicalgia, Occipital headache       estradiol 1 MG tablet    ESTRACE    90 tablet    Take 0.5 tablets (0.5 mg) by mouth daily    Symptomatic menopausal or female climacteric states       EXCEDRIN PO      Take by mouth as needed        fluticasone 50 MCG/ACT spray    FLONASE    3 Bottle    Spray 1-2 sprays into both nostrils daily    Acute seasonal allergic rhinitis, unspecified trigger       gabapentin 100 MG capsule    NEURONTIN    90 capsule    Take 1 capsule (100 mg) by mouth 3 times daily    Generalized anxiety disorder       ibuprofen 600 MG tablet    ADVIL/MOTRIN     Take 600 mg by mouth Take 1 tablet by mouth 4 times daily if needed. Maximum of 3200 mg in 24 hours.        medroxyPROGESTERone 2.5 MG tablet    PROVERA    90 tablet    Take 1 tablet (2.5 mg) by mouth daily    Symptomatic menopausal or female climacteric states       MULTIVITAMIN PO           riboflavin 400 MG Caps     30 capsule    Take 400 mg by mouth every morning    Migraine without aura and without status migrainosus, not intractable       sertraline 100 MG tablet    ZOLOFT    60 tablet    Take 2 tablets (200 mg) by mouth daily    Generalized anxiety disorder       * traMADol 50 MG tablet    ULTRAM    45 tablet    Take 1 tablet (50 mg) by mouth 2 times daily as needed for severe pain . 45 tablets to las 30 days.    Midline low back pain without sciatica, unspecified chronicity       * traMADol 50 MG tablet    ULTRAM    45 tablet    Take 1 tablet (50 mg) by mouth 2 times daily as needed for severe  pain . 45 tablets to last 30 days. Okay to fill today.    Midline low back pain without sciatica, unspecified chronicity       * traMADol 50 MG tablet   Start taking on:  11/11/2018    ULTRAM    45 tablet    Take 1 tablet (50 mg) by mouth 2 times daily as needed for severe pain . 45 tablets to last 30 days.    Midline low back pain without sciatica, unspecified chronicity       * Notice:  This list has 3 medication(s) that are the same as other medications prescribed for you. Read the directions carefully, and ask your doctor or other care provider to review them with you.

## 2018-10-29 NOTE — PATIENT INSTRUCTIONS
Cervical MRI ordered - I will call you with results    Physical therapy ordered - they will call you to schedule

## 2018-10-29 NOTE — NURSING NOTE
"Catie Nuñez is a 53 year old female who presents for:  Chief Complaint   Patient presents with     Neurologic Problem     Neck and the base of the skull hurting all the time        Vitals:    Vitals:    10/29/18 1327   BP: 117/81   BP Location: Right arm   Patient Position: Sitting   Cuff Size: Adult Regular   Pulse: 75   SpO2: 100%   Weight: 115 lb 3.2 oz (52.3 kg)   Height: 5' 3.39\" (1.61 m)       BMI:  Estimated body mass index is 20.16 kg/(m^2) as calculated from the following:    Height as of this encounter: 5' 3.39\" (1.61 m).    Weight as of this encounter: 115 lb 3.2 oz (52.3 kg).    Pain Score:  Moderate Pain (4)      Do you feel safe in your environment?  Yes      Cynthia Leonard          "

## 2018-10-29 NOTE — PROGRESS NOTES
"Dr. Fuad Chavira  [facility]  Neurosurgery Clinic Visit      CC: Neck pain    Primary care Provider: Yasmin Velazquez      Reason For Visit:   I was asked by IVONNE Velazquez  to consult on the patient for neck pain.      HPI: Catie Nuñez is a 53 year old female who presents for evaluation of neck pain x 3 years. Pain is located in bilateral neck and radiates up to occipital region \"behind eye\" R>L. Describes the pain as a constant achy, sharp, throbbing. Pain is worsened with her increased anxiety and tension. Pain is alleviated with ice and alprazolam when she is realxed. She has been taking tramadol and excedrin, without relief. No recent physical therapy. She has had nerve blocks in the past with Dr. Geronimo and through pain management, which did not provide lasting relief. She states she does have known history of scoliosis since she was 12 and has been seeing a chiropractor regularly since this time. She has seen 3 different neurologists which have been trying to treat her neck/headaches. No recent MRI. Denies radicular pain to the extremities, paresthesias, or weakness.    Current pain: 4/10 At worst: 10/10    Past Medical History:   Diagnosis Date     Anxiety      ASCUS with positive high risk HPV 6/2015,09/14/16    atypia on colp, 09/14/16: ASCUS + HR HPV 16 and other.      Depression      Fracture of great toe 2/20/2014     History of scoliosis      Hx of colposcopy with cervical biopsy 10/06/16    10/06/16: [facility] NIL.     MVP (mitral valve prolapse)      Unexplained endometrial cells on cervical Pap smear 6/2015    thin endometrium on US       Past Medical History reviewed with patient during visit.    No past surgical history on file.  Past Surgical History reviewed with patient during visit.    Current Outpatient Prescriptions   Medication     ALPRAZolam (XANAX) 0.5 MG tablet     Aspirin-Acetaminophen-Caffeine (EXCEDRIN PO)     estradiol (ESTRACE) 1 MG tablet     " fluticasone (FLONASE) 50 MCG/ACT spray     gabapentin (NEURONTIN) 100 MG capsule     ibuprofen (ADVIL/MOTRIN) 600 MG tablet     medroxyPROGESTERone (PROVERA) 2.5 MG tablet     Multiple Vitamins-Minerals (MULTIVITAMIN PO)     riboflavin 400 MG CAPS     sertraline (ZOLOFT) 100 MG tablet     traMADol (ULTRAM) 50 MG tablet     [START ON 11/11/2018] traMADol (ULTRAM) 50 MG tablet     traMADol (ULTRAM) 50 MG tablet     No current facility-administered medications for this visit.        Allergies   Allergen Reactions     Ondansetron Nausea and Vomiting     Sulfa Drugs Hives     Rash         Social History     Social History     Marital status: Single     Spouse name: N/A     Number of children: N/A     Years of education: N/A     Occupational History     retail sales [employer]     Social History Main Topics     Smoking status: Never Smoker     Smokeless tobacco: Never Used     Alcohol use 0.0 oz/week     0 Standard drinks or equivalent per week      Comment: occ, rare      Drug use: No     Sexual activity: Yes     Partners: Male     Other Topics Concern     Parent/Sibling W/ Cabg, Mi Or Angioplasty Before 65f 55m? Yes     Social History Narrative    ** Merged History Encounter **            Family History   Problem Relation Age of Onset     HEART DISEASE Father      Asthma No family hx of      Diabetes No family hx of      Hypertension No family hx of      Cerebrovascular Disease No family hx of      Breast Cancer No family hx of           ROS: 10 point ROS neg other than the symptoms noted above in the HPI.    Vital Signs: [facility] 09/01/2016    Examination:  Constitutional:  Alert, well nourished, NAD.  HEENT: Normocephalic, atraumatic.   Pulmonary:  Without shortness of breath, normal effort.   Lymph: no lymphadenopathy to low back or LE.   Integumentary: Skin is free of rashes or lesions.   Cardiovascular:  No pitting edema of BLE.      Neurological:  Awake  Alert  Oriented x 3  Speech clear  Cranial nerves II -  "XII grossly intact  PERRL  EOMI  Face symmetric  Tongue midline  Motor exam   Shoulder Abduction:  Right:  5/5   Left:  5/5  Biceps:                      Right:  5/5   Left:  5/5  Triceps:                     Right:  5/5   Left:  5/5  Wrist Extensors:       Right:  5/5   Left:  5/5  Wrist Flexors:           Right:  5/5   Left:  5/5  Intrinsics:                   Right:  5/5   Left:  5/5     Sensation normal to bilateral upper and lower extremities.    Reflexes are 2+ in the brachial radialis and triceps. Negative Genesis sign bilaterally.  Musculoskeletal:  Gait: Able to stand from a seated position. Normal non-antalgic, non-myelopathic gait.  Able to heel/toe walk without loss of balance  Cervical examination reveals good range of motion, but patient does endorse \"grinding sensation\".  Mild tenderness to palpation of the cervical spine and paraspinous muscles bilaterally.    Imaging:   XR of the cervical spine from 10/11/2018 was reviewed in the office today. Reveals degenerative changes at C5-7. No fracture or other acute changes.    Assessment/Plan:   Catei Nuñez is a 53 year old female who presents for evaluation of neck pain x 3 years. Pain is located in bilateral neck and radiates up to occipital region \"behind eye\" R>L. Describes the pain as a constant achy, sharp, throbbing. XR reviewed in office today. Given her ongoing radicular symptoms occipital region and shoulders, failure with alleviation in pain with nerve blocks, and multiple pain/migraine medication, I have ordered her a cervical MRI for further evaluation and will call her with the results. I have also placed referral for physical therapy. Patient voiced understanding and agreement.          Samantha Samaniego PA-C  [facility]  [address] [address]    Tel [phone]  Pager [phone]    "

## 2018-10-29 NOTE — LETTER
"    10/29/2018         RE: Catie Nuñez  [address]  [facility] [address]        Dear Colleague,    Thank you for referring your patient, Catie Nuñez, to the [facility]. Please see a copy of my visit note below.    Dr. Fuad Chavira  [facility]  Neurosurgery Clinic Visit      CC: Neck pain    Primary care Provider: Yasmin Velazquez      Reason For Visit:   I was asked by IVONNE Velazquez  to consult on the patient for neck pain.      HPI: Catie Nuñez is a 53 year old female who presents for evaluation of neck pain x 3 years. Pain is located in bilateral neck and radiates up to occipital region \"behind eye\" R>L. Describes the pain as a constant achy, sharp, throbbing. Pain is worsened with her increased anxiety and tension. Pain is alleviated with ice and alprazolam when she is realxed. She has been taking tramadol and excedrin, without relief. No recent physical therapy. She has had nerve blocks in the past with Dr. Geronimo and through pain management, which did not provide lasting relief. She states she does have known history of scoliosis since she was 12 and has been seeing a chiropractor regularly since this time. She has seen 3 different neurologists which have been trying to treat her neck/headaches. No recent MRI. Denies radicular pain to the extremities, paresthesias, or weakness.    Current pain: 4/10 At worst: 10/10    Past Medical History:   Diagnosis Date     Anxiety      ASCUS with positive high risk HPV 6/2015,09/14/16    atypia on colp, 09/14/16: ASCUS + HR HPV 16 and other.      Depression      Fracture of great toe 2/20/2014     History of scoliosis      Hx of colposcopy with cervical biopsy 10/06/16    10/06/16: [facility] NIL.     MVP (mitral valve prolapse)      Unexplained endometrial cells on cervical Pap smear 6/2015    thin endometrium on US       Past Medical History reviewed with patient during visit.    No " past surgical history on file.  Past Surgical History reviewed with patient during visit.    Current Outpatient Prescriptions   Medication     ALPRAZolam (XANAX) 0.5 MG tablet     Aspirin-Acetaminophen-Caffeine (EXCEDRIN PO)     estradiol (ESTRACE) 1 MG tablet     fluticasone (FLONASE) 50 MCG/ACT spray     gabapentin (NEURONTIN) 100 MG capsule     ibuprofen (ADVIL/MOTRIN) 600 MG tablet     medroxyPROGESTERone (PROVERA) 2.5 MG tablet     Multiple Vitamins-Minerals (MULTIVITAMIN PO)     riboflavin 400 MG CAPS     sertraline (ZOLOFT) 100 MG tablet     traMADol (ULTRAM) 50 MG tablet     [START ON 11/11/2018] traMADol (ULTRAM) 50 MG tablet     traMADol (ULTRAM) 50 MG tablet     No current facility-administered medications for this visit.        Allergies   Allergen Reactions     Ondansetron Nausea and Vomiting     Sulfa Drugs Hives     Rash         Social History     Social History     Marital status: Single     Spouse name: N/A     Number of children: N/A     Years of education: N/A     Occupational History     retail sales [employer]     Social History Main Topics     Smoking status: Never Smoker     Smokeless tobacco: Never Used     Alcohol use 0.0 oz/week     0 Standard drinks or equivalent per week      Comment: occ, rare      Drug use: No     Sexual activity: Yes     Partners: Male     Other Topics Concern     Parent/Sibling W/ Cabg, Mi Or Angioplasty Before 65f 55m? Yes     Social History Narrative    ** Merged History Encounter **            Family History   Problem Relation Age of Onset     HEART DISEASE Father      Asthma No family hx of      Diabetes No family hx of      Hypertension No family hx of      Cerebrovascular Disease No family hx of      Breast Cancer No family hx of           ROS: 10 point ROS neg other than the symptoms noted above in the HPI.    Vital Signs: LMP 09/01/2016    Examination:  Constitutional:  Alert, well nourished, NAD.  HEENT: Normocephalic, atraumatic.   Pulmonary:   "Without shortness of breath, normal effort.   Lymph: no lymphadenopathy to low back or LE.   Integumentary: Skin is free of rashes or lesions.   Cardiovascular:  No pitting edema of BLE.      Neurological:  Awake  Alert  Oriented x 3  Speech clear  Cranial nerves II - XII grossly intact  PERRL  EOMI  Face symmetric  Tongue midline  Motor exam   Shoulder Abduction:  Right:  5/5   Left:  5/5  Biceps:                      Right:  5/5   Left:  5/5  Triceps:                     Right:  5/5   Left:  5/5  Wrist Extensors:       Right:  5/5   Left:  5/5  Wrist Flexors:           Right:  5/5   Left:  5/5  Intrinsics:                   Right:  5/5   Left:  5/5     Sensation normal to bilateral upper and lower extremities.    Reflexes are 2+ in the brachial radialis and triceps. Negative Genesis sign bilaterally.  Musculoskeletal:  Gait: Able to stand from a seated position. Normal non-antalgic, non-myelopathic gait.  Able to heel/toe walk without loss of balance  Cervical examination reveals good range of motion, but patient does endorse \"grinding sensation\".  Mild tenderness to palpation of the cervical spine and paraspinous muscles bilaterally.    Imaging:   XR of the cervical spine from 10/11/2018 was reviewed in the office today. Reveals degenerative changes at C5-7. No fracture or other acute changes.    Assessment/Plan:   Catie Nuñez is a 53 year old female who presents for evaluation of neck pain x 3 years. Pain is located in bilateral neck and radiates up to occipital region \"behind eye\" R>L. Describes the pain as a constant achy, sharp, throbbing. XR reviewed in office today. Given her ongoing radicular symptoms occipital region and shoulders, failure with alleviation in pain with nerve blocks, and multiple pain/migraine medication, I have ordered her a cervical MRI for further evaluation and will call her with the results. I have also placed referral for physical therapy. Patient voiced understanding and " agreement.          Samantha Samaniego PA-C  [facility]  [address] [address]    Tel [phone]  Pager [phone]      Again, thank you for allowing me to participate in the care of your patient.        Sincerely,        Samantha Samaniego PA-C

## 2018-11-08 ENCOUNTER — OFFICE VISIT (OUTPATIENT)
Dept: URGENT CARE | Facility: URGENT CARE | Age: 53
End: 2018-11-08
Payer: COMMERCIAL

## 2018-11-08 VITALS
WEIGHT: 115 LBS | SYSTOLIC BLOOD PRESSURE: 121 MMHG | BODY MASS INDEX: 20.38 KG/M2 | TEMPERATURE: 98.9 F | OXYGEN SATURATION: 97 % | HEART RATE: 69 BPM | HEIGHT: 63 IN | DIASTOLIC BLOOD PRESSURE: 89 MMHG

## 2018-11-08 DIAGNOSIS — R82.90 NONSPECIFIC FINDING ON EXAMINATION OF URINE: ICD-10-CM

## 2018-11-08 DIAGNOSIS — N30.00 ACUTE CYSTITIS WITHOUT HEMATURIA: Primary | ICD-10-CM

## 2018-11-08 DIAGNOSIS — R30.0 DYSURIA: ICD-10-CM

## 2018-11-08 LAB
ALBUMIN UR-MCNC: 30 MG/DL
APPEARANCE UR: ABNORMAL
BACTERIA #/AREA URNS HPF: ABNORMAL /HPF
BILIRUB UR QL STRIP: NEGATIVE
COLOR UR AUTO: YELLOW
GLUCOSE UR STRIP-MCNC: NEGATIVE MG/DL
HGB UR QL STRIP: ABNORMAL
KETONES UR STRIP-MCNC: NEGATIVE MG/DL
LEUKOCYTE ESTERASE UR QL STRIP: ABNORMAL
NITRATE UR QL: NEGATIVE
PH UR STRIP: 7 PH (ref 5–7)
RBC #/AREA URNS AUTO: ABNORMAL /HPF
SOURCE: ABNORMAL
SP GR UR STRIP: 1.01 (ref 1–1.03)
UROBILINOGEN UR STRIP-ACNC: 0.2 EU/DL (ref 0.2–1)
WBC #/AREA URNS AUTO: >100 /HPF

## 2018-11-08 PROCEDURE — 87088 URINE BACTERIA CULTURE: CPT | Performed by: INTERNAL MEDICINE

## 2018-11-08 PROCEDURE — 99213 OFFICE O/P EST LOW 20 MIN: CPT | Performed by: INTERNAL MEDICINE

## 2018-11-08 PROCEDURE — 81001 URINALYSIS AUTO W/SCOPE: CPT | Performed by: INTERNAL MEDICINE

## 2018-11-08 PROCEDURE — 87086 URINE CULTURE/COLONY COUNT: CPT | Performed by: INTERNAL MEDICINE

## 2018-11-08 PROCEDURE — 87186 SC STD MICRODIL/AGAR DIL: CPT | Performed by: INTERNAL MEDICINE

## 2018-11-08 RX ORDER — CIPROFLOXACIN 250 MG/1
250 TABLET, FILM COATED ORAL 2 TIMES DAILY
Qty: 10 TABLET | Refills: 0 | Status: SHIPPED | OUTPATIENT
Start: 2018-11-08 | End: 2019-02-10

## 2018-11-08 NOTE — MR AVS SNAPSHOT
"              After Visit Summary   11/8/2018    Caite Nuñez    MRN: [id number]           Patient Information     Date Of Birth          1965        Visit Information        Provider Department      11/8/2018 5:00 PM Diana Bose MD [facility]        Today's Diagnoses     Acute cystitis without hematuria    -  1    Dysuria           Follow-ups after your visit        Who to contact     If you have questions or need follow up information about today's clinic visit or your schedule please contact [facility] directly at [phone].  Normal or non-critical lab and imaging results will be communicated to you by Rackwisehart, letter or phone within 4 business days after the clinic has received the results. If you do not hear from us within 7 days, please contact the clinic through Rackwisehart or phone. If you have a critical or abnormal lab result, we will notify you by phone as soon as possible.  Submit refill requests through Triptrotting or call your pharmacy and they will forward the refill request to us. Please allow 3 business days for your refill to be completed.          Additional Information About Your Visit        MyChart Information     Triptrotting gives you secure access to your electronic health record. If you see a primary care provider, you can also send messages to your care team and make appointments. If you have questions, please call your primary care clinic.  If you do not have a primary care provider, please call [phone] and they will assist you.        Care EveryWhere ID     This is your Care EveryWhere ID. This could be used by other organizations to access your [place] medical records  [id number]        Your Vitals Were     Pulse Temperature Height Last Period Pulse Oximetry BMI (Body Mass Index)    69 98.9  F (37.2  C) (Temporal) 5' 3.39\" (1.61 m) 09/01/2016 97% 20.12 kg/m2       Blood Pressure from Last 3 Encounters:   11/08/18 121/89 "   10/29/18 117/81   10/11/18 123/82    Weight from Last 3 Encounters:   11/08/18 115 lb (52.2 kg)   10/29/18 115 lb 3.2 oz (52.3 kg)   10/11/18 113 lb (51.3 kg)              We Performed the Following     *UA reflex to Microscopic and Culture ([place] and [facility] (except [place] and [place])     Urine Microscopic          Today's Medication Changes          These changes are accurate as of 11/8/18  6:15 PM.  If you have any questions, ask your nurse or doctor.               Start taking these medicines.        Dose/Directions    ciprofloxacin 250 MG tablet   Commonly known as:  CIPRO   Used for:  Acute cystitis without hematuria   Started by:  Diana Bose MD        Dose:  250 mg   Take 1 tablet (250 mg) by mouth 2 times daily for 5 days   Quantity:  10 tablet   Refills:  0            Where to get your medicines      These medications were sent to [facility] [address] [place] [address] AT [address] [address]     Phone:  [phone]     ciprofloxacin 250 MG tablet                Primary Care Provider Office Phone # Fax #    JEREMY Gonzalez [facility] [phone]       [facility] [place] [address]        Equal Access to Services     SHANNON SANTANA AH: Hadii marcie ku hadasho Soomaali, waaxda luqadaha, qaybta kaalmada adeegyada, vickey kincaid. So [facility] [phone].    ATENCIÓN: Si habla español, tiene a weinberg disposición servicios gratuitos de asistencia lingüística. Jacque al [phone].    We comply with applicable federal civil rights laws and [place] laws. We do not discriminate on the basis of race, color, national origin, age, disability, sex, sexual orientation, or gender identity.            Thank you!     Thank you for choosing [facility]  for your care. Our goal is always to provide you with excellent care. Hearing back from  our patients is one way we can continue to improve our services. Please take a few minutes to complete the written survey that you may receive in the mail after your visit with us. Thank you!             Your Updated Medication List - Protect others around you: Learn how to safely use, store and throw away your medicines at www.disposemymeds.org.          This list is accurate as of 11/8/18  6:15 PM.  Always use your most recent med list.                   Brand Name Dispense Instructions for use Diagnosis    ALPRAZolam 0.5 MG tablet    XANAX    30 tablet    Take 1/2 tablet (0.25mg) in the morning and 1/2 tablet (0.25mg) in the evening    Generalized anxiety disorder       ciprofloxacin 250 MG tablet    CIPRO    10 tablet    Take 1 tablet (250 mg) by mouth 2 times daily for 5 days    Acute cystitis without hematuria       diazepam 10 MG tablet    VALIUM    2 tablet    Take 1 tablet (10 mg) by mouth every 6 hours as needed for anxiety Take 30-60 minutes before procedure.  Do not operate a vehicle after taking this medication.    Cervicalgia, Occipital headache       estradiol 1 MG tablet    ESTRACE    90 tablet    Take 0.5 tablets (0.5 mg) by mouth daily    Symptomatic menopausal or female climacteric states       EXCEDRIN PO      Take by mouth as needed        fluticasone 50 MCG/ACT spray    FLONASE    3 Bottle    Spray 1-2 sprays into both nostrils daily    Acute seasonal allergic rhinitis, unspecified trigger       gabapentin 100 MG capsule    NEURONTIN    90 capsule    Take 1 capsule (100 mg) by mouth 3 times daily    Generalized anxiety disorder       ibuprofen 600 MG tablet    ADVIL/MOTRIN     Take 600 mg by mouth Take 1 tablet by mouth 4 times daily if needed. Maximum of 3200 mg in 24 hours.        medroxyPROGESTERone 2.5 MG tablet    PROVERA    90 tablet    Take 1 tablet (2.5 mg) by mouth daily    Symptomatic menopausal or female climacteric states       MULTIVITAMIN PO           riboflavin 400 MG Caps     30  capsule    Take 400 mg by mouth every morning    Migraine without aura and without status migrainosus, not intractable       sertraline 100 MG tablet    ZOLOFT    60 tablet    Take 2 tablets (200 mg) by mouth daily    Generalized anxiety disorder       * traMADol 50 MG tablet    ULTRAM    45 tablet    Take 1 tablet (50 mg) by mouth 2 times daily as needed for severe pain . 45 tablets to las 30 days.    Midline low back pain without sciatica, unspecified chronicity       * traMADol 50 MG tablet    ULTRAM    45 tablet    Take 1 tablet (50 mg) by mouth 2 times daily as needed for severe pain . 45 tablets to last 30 days. Okay to fill today.    Midline low back pain without sciatica, unspecified chronicity       * traMADol 50 MG tablet   Start taking on:  11/11/2018    ULTRAM    45 tablet    Take 1 tablet (50 mg) by mouth 2 times daily as needed for severe pain . 45 tablets to last 30 days.    Midline low back pain without sciatica, unspecified chronicity       * Notice:  This list has 3 medication(s) that are the same as other medications prescribed for you. Read the directions carefully, and ask your doctor or other care provider to review them with you.

## 2018-11-08 NOTE — PROGRESS NOTES
SUBJECTIVE:   Catie Nuñez is a 53 year old female presenting with a chief complaint of   Chief Complaint   Patient presents with     Urgent Care     UTI     c/o dysuria for 1 day       She is an established patient of [facility].    UTI    Onset of symptoms was 1day(s).    Current and associated symptoms frequency, urgency, burning and suprapubic pain and pressure  Treatment and measures tried None  Predisposing factors include history of frequent UTI's  Patient denies flank pain and temperature > 101 degrees F.    Hx pyelo in past    In [place], swam in red tide (high bacteria count)    Review of Systems    Past Medical History:   Diagnosis Date     Anxiety      ASCUS with positive high risk HPV 6/2015,09/14/16    atypia on colp, 09/14/16: ASCUS + HR HPV 16 and other.      Depression      Fracture of great toe 2/20/2014     History of scoliosis      Hx of colposcopy with cervical biopsy 10/06/16    10/06/16: [facility] Bx NIL.     MVP (mitral valve prolapse)      Unexplained endometrial cells on cervical Pap smear 6/2015    thin endometrium on US     Family History   Problem Relation Age of Onset     HEART DISEASE Father      Asthma No family hx of      Diabetes No family hx of      Hypertension No family hx of      Cerebrovascular Disease No family hx of      Breast Cancer No family hx of      Current Outpatient Prescriptions   Medication Sig Dispense Refill     ALPRAZolam (XANAX) 0.5 MG tablet Take 1/2 tablet (0.25mg) in the morning and 1/2 tablet (0.25mg) in the evening 30 tablet 0     Aspirin-Acetaminophen-Caffeine (EXCEDRIN PO) Take by mouth as needed       ciprofloxacin (CIPRO) 250 MG tablet Take 1 tablet (250 mg) by mouth 2 times daily for 5 days 10 tablet 0     diazepam (VALIUM) 10 MG tablet Take 1 tablet (10 mg) by mouth every 6 hours as needed for anxiety Take 30-60 minutes before procedure.  Do not operate a vehicle after taking this medication. 2 tablet 0     estradiol (ESTRACE) 1 MG tablet Take 0.5 tablets  "(0.5 mg) by mouth daily 90 tablet 1     fluticasone (FLONASE) 50 MCG/ACT spray Spray 1-2 sprays into both nostrils daily 3 Bottle 3     gabapentin (NEURONTIN) 100 MG capsule Take 1 capsule (100 mg) by mouth 3 times daily 90 capsule 1     ibuprofen (ADVIL/MOTRIN) 600 MG tablet Take 600 mg by mouth Take 1 tablet by mouth 4 times daily if needed. Maximum of 3200 mg in 24 hours.       medroxyPROGESTERone (PROVERA) 2.5 MG tablet Take 1 tablet (2.5 mg) by mouth daily 90 tablet 3     Multiple Vitamins-Minerals (MULTIVITAMIN PO)        riboflavin 400 MG CAPS Take 400 mg by mouth every morning 30 capsule 0     sertraline (ZOLOFT) 100 MG tablet Take 2 tablets (200 mg) by mouth daily 60 tablet 1     traMADol (ULTRAM) 50 MG tablet Take 1 tablet (50 mg) by mouth 2 times daily as needed for severe pain . 45 tablets to last 30 days. Okay to fill today. 45 tablet 0     [START ON 11/11/2018] traMADol (ULTRAM) 50 MG tablet Take 1 tablet (50 mg) by mouth 2 times daily as needed for severe pain . 45 tablets to last 30 days. 45 tablet 0     traMADol (ULTRAM) 50 MG tablet Take 1 tablet (50 mg) by mouth 2 times daily as needed for severe pain . 45 tablets to las 30 days. 45 tablet 0     Social History   Substance Use Topics     Smoking status: Never Smoker     Smokeless tobacco: Never Used     Alcohol use 0.0 oz/week     0 Standard drinks or equivalent per week      Comment: occ, rare        OBJECTIVE  /89  Pulse 69  Temp 98.9  F (37.2  C) (Temporal)  Ht 5' 3.39\" (1.61 m)  Wt 115 lb (52.2 kg)  LMP 09/01/2016  SpO2 97%  BMI 20.12 kg/m2    Physical Exam   Constitutional: She appears well-developed and well-nourished.   Abdominal: Soft.   Mild suprapubic tenderness   Musculoskeletal:   No CVA tenderness   Vitals reviewed.      Labs:  Results for orders placed or performed in visit on 11/08/18 (from the past 24 hour(s))   *UA reflex to Microscopic and Culture ([place] [facility] (except [place] and [place])   Result " Value Ref Range    Color Urine Yellow     Appearance Urine Cloudy     Glucose Urine Negative NEG^Negative mg/dL    Bilirubin Urine Negative NEG^Negative    Ketones Urine Negative NEG^Negative mg/dL    Specific Gravity Urine 1.015 1.003 - 1.035    Blood Urine Large (A) NEG^Negative    pH Urine 7.0 5.0 - 7.0 pH    Protein Albumin Urine 30 (A) NEG^Negative mg/dL    Urobilinogen Urine 0.2 0.2 - 1.0 EU/dL    Nitrite Urine Negative NEG^Negative    Leukocyte Esterase Urine Large (A) NEG^Negative    Source Midstream Urine            ASSESSMENT:      ICD-10-CM    1. Acute cystitis without hematuria N30.00 ciprofloxacin (CIPRO) 250 MG tablet   2. Dysuria R30.0 *UA reflex to Microscopic and Culture ([place] [facility] (except [place] and [place])     Urine Microscopic        Medical Decision Making:      PLAN:    UTI Adult:  SEPTRA (SULFA) ALLERGY:    Ciprofloxacin 250mg twice daily for 5 days    Followup:    If not improving or if condition worsens, follow up with your Primary Care Provider

## 2018-11-10 LAB
BACTERIA SPEC CULT: ABNORMAL
SPECIMEN SOURCE: ABNORMAL

## 2018-11-12 ASSESSMENT — PATIENT HEALTH QUESTIONNAIRE - PHQ9: SUM OF ALL RESPONSES TO PHQ QUESTIONS 1-9: 3

## 2018-11-15 ENCOUNTER — HOSPITAL ENCOUNTER (OUTPATIENT)
Dept: MRI IMAGING | Facility: CLINIC | Age: 53
Discharge: HOME | End: 2018-11-15
Attending: PHYSICIAN ASSISTANT | Admitting: PHYSICIAN ASSISTANT
Payer: COMMERCIAL

## 2018-11-15 DIAGNOSIS — R51.9 OCCIPITAL HEADACHE: ICD-10-CM

## 2018-11-15 DIAGNOSIS — M54.2 CERVICALGIA: ICD-10-CM

## 2018-11-15 PROCEDURE — 72141 MRI NECK SPINE W/O DYE: CPT

## 2018-11-16 ENCOUNTER — OFFICE VISIT (OUTPATIENT)
Dept: FAMILY MEDICINE | Facility: CLINIC | Age: 53
End: 2018-11-16
Payer: COMMERCIAL

## 2018-11-16 VITALS
SYSTOLIC BLOOD PRESSURE: 120 MMHG | OXYGEN SATURATION: 100 % | BODY MASS INDEX: 20.4 KG/M2 | HEART RATE: 60 BPM | TEMPERATURE: 98.1 F | WEIGHT: 116.6 LBS | RESPIRATION RATE: 16 BRPM | DIASTOLIC BLOOD PRESSURE: 83 MMHG

## 2018-11-16 DIAGNOSIS — F41.1 GENERALIZED ANXIETY DISORDER: ICD-10-CM

## 2018-11-16 DIAGNOSIS — M54.50 MIDLINE LOW BACK PAIN WITHOUT SCIATICA, UNSPECIFIED CHRONICITY: ICD-10-CM

## 2018-11-16 DIAGNOSIS — G89.4 CHRONIC PAIN SYNDROME: Primary | ICD-10-CM

## 2018-11-16 PROCEDURE — 80307 DRUG TEST PRSMV CHEM ANLYZR: CPT | Mod: 90 | Performed by: NURSE PRACTITIONER

## 2018-11-16 PROCEDURE — 99000 SPECIMEN HANDLING OFFICE-LAB: CPT | Performed by: NURSE PRACTITIONER

## 2018-11-16 PROCEDURE — 99214 OFFICE O/P EST MOD 30 MIN: CPT | Performed by: NURSE PRACTITIONER

## 2018-11-16 RX ORDER — ALPRAZOLAM 0.5 MG
TABLET ORAL
Qty: 30 TABLET | Refills: 0 | Status: SHIPPED | OUTPATIENT
Start: 2018-11-25 | End: 2019-01-02

## 2018-11-16 RX ORDER — TRAMADOL HYDROCHLORIDE 50 MG/1
50 TABLET ORAL 2 TIMES DAILY PRN
Qty: 45 TABLET | Refills: 0 | Status: SHIPPED | OUTPATIENT
Start: 2018-11-16 | End: 2018-11-16

## 2018-11-16 RX ORDER — SERTRALINE HYDROCHLORIDE 100 MG/1
200 TABLET, FILM COATED ORAL DAILY
Qty: 60 TABLET | Refills: 1 | Status: SHIPPED | OUTPATIENT
Start: 2018-11-16 | End: 2019-01-02

## 2018-11-16 RX ORDER — ALPRAZOLAM 0.5 MG
TABLET ORAL
Qty: 30 TABLET | Refills: 0 | Status: SHIPPED | OUTPATIENT
Start: 2018-11-16 | End: 2018-11-16

## 2018-11-16 RX ORDER — ALPRAZOLAM 0.5 MG
TABLET ORAL
Qty: 30 TABLET | Refills: 0 | Status: SHIPPED | OUTPATIENT
Start: 2018-12-25 | End: 2019-01-02

## 2018-11-16 RX ORDER — TRAMADOL HYDROCHLORIDE 50 MG/1
50 TABLET ORAL 2 TIMES DAILY PRN
Qty: 45 TABLET | Refills: 0 | Status: SHIPPED | OUTPATIENT
Start: 2018-12-11 | End: 2019-01-02

## 2018-11-16 ASSESSMENT — ANXIETY QUESTIONNAIRES
GAD7 TOTAL SCORE: 7
7. FEELING AFRAID AS IF SOMETHING AWFUL MIGHT HAPPEN: NOT AT ALL
3. WORRYING TOO MUCH ABOUT DIFFERENT THINGS: MORE THAN HALF THE DAYS
1. FEELING NERVOUS, ANXIOUS, OR ON EDGE: MORE THAN HALF THE DAYS
2. NOT BEING ABLE TO STOP OR CONTROL WORRYING: SEVERAL DAYS
6. BECOMING EASILY ANNOYED OR IRRITABLE: SEVERAL DAYS
5. BEING SO RESTLESS THAT IT IS HARD TO SIT STILL: NOT AT ALL

## 2018-11-16 ASSESSMENT — PATIENT HEALTH QUESTIONNAIRE - PHQ9: 5. POOR APPETITE OR OVEREATING: SEVERAL DAYS

## 2018-11-16 NOTE — PATIENT INSTRUCTIONS
Tramadol:  Continue to take the tramadol per our agreement, sparingly, 45 tablets to last 30 days.  Wean down/off if possible.  Since the last rx was prescribed 11/11/2018, I have given you a prescription for 12/11/2018.     Alprazolam:  Take sparingly.  Continue to cut down/titrate down on usage.    Urine drug screen:  Results pending.    Return to the clinic in 2 months for a faxe to face appointment for next refills.

## 2018-11-16 NOTE — PROGRESS NOTES
"  SUBJECTIVE:   Catie Nuñez is a 53 year old female who presents to clinic today for the following health issues:        Anxiety Follow-Up    Status since last visit: No change    Other associated symptoms:None    Complicating factors:   Significant life event: No   Current substance abuse: None  Depression symptoms: No  DAYLIN-7 SCORE 12/7/2016 5/19/2017 3/29/2018   Total Score - - -   Total Score - - -   Total Score 8 12 11     She reports her anxiety stays about the same.  Her person life is improving.  Dating, stable, going well.  Starting a small business.  The anxiety is manageable.  \"things are okay. Things are going in a good direction.\"      Psychiatry/xanax:  Last appointment with Nicholas Yayo was 9/25/2018.  Her zoloft was increased at that time.    Neck MRI:  Done yesterday.  Ordered by Samantha HENDERSON and Dr. Cain's ofice at [facility].  Constant pain in right neck, grating sensation with movement.  She will be going back to the specialist for the MRI results. Her plan is to talk with neuro about pain management (get off of tramadol) options.     Meds:    Xanax:  Currently she gets 30 tablets per month.  She usually will take 1/2 a tablet 1-2 times a day as needed.  She has had days when she has been able to skip it.  \"I'm not ready to go down more.\"  Last dose 1/2 tablet yesterday afternoon.    Tramadol:  45 tablets to last 30 days.  \"i'm taking it sparingly when I need it.\"  \"I do experience a lot of pain.\"  Neck, shoulder, head pain.  Sometimes the pain will be shooting.  Last dose last night.     Gabapentin:  \"those didn't work for me.\"  She felt claustrophobic.  She felt like physically it was slowing her down but her brain was still going full speed.  \"it wasn't a comfortable feeling.\"  Last dose a few weeks ago.      Problem list and histories reviewed & adjusted, as indicated.  Additional history: as documented    Patient Active Problem List   Diagnosis     CARDIOVASCULAR " "SCREENING; LDL GOAL LESS THAN 160     Adhesive capsulitis of shoulder     Scoliosis     Shoulder impingement     Generalized anxiety disorder     Pulmonary nodule     Migraine without aura and without status migrainosus, not intractable     Controlled substance agreement signed     ASCUS Pap + high risk HPV, + 16, + \"other\"      Papanicolaou smear of cervix with low grade squamous intraepithelial lesion (LGSIL)     Acute seasonal allergic rhinitis, unspecified trigger     History reviewed. No pertinent surgical history.    Social History   Substance Use Topics     Smoking status: Never Smoker     Smokeless tobacco: Never Used     Alcohol use 0.0 oz/week     0 Standard drinks or equivalent per week      Comment: occ, rare      Family History   Problem Relation Age of Onset     HEART DISEASE Father      Asthma No family hx of      Diabetes No family hx of      Hypertension No family hx of      Cerebrovascular Disease No family hx of      Breast Cancer No family hx of          Current Outpatient Prescriptions   Medication Sig Dispense Refill     ALPRAZolam (XANAX) 0.5 MG tablet Take 1/2 tablet (0.25mg) in the morning and 1/2 tablet (0.25mg) in the evening 30 tablet 0     Aspirin-Acetaminophen-Caffeine (EXCEDRIN PO) Take by mouth as needed       estradiol (ESTRACE) 1 MG tablet Take 0.5 tablets (0.5 mg) by mouth daily 90 tablet 1     fluticasone (FLONASE) 50 MCG/ACT spray Spray 1-2 sprays into both nostrils daily 3 Bottle 3     gabapentin (NEURONTIN) 100 MG capsule Take 1 capsule (100 mg) by mouth 3 times daily 90 capsule 1     ibuprofen (ADVIL/MOTRIN) 600 MG tablet Take 600 mg by mouth Take 1 tablet by mouth 4 times daily if needed. Maximum of 3200 mg in 24 hours.       medroxyPROGESTERone (PROVERA) 2.5 MG tablet Take 1 tablet (2.5 mg) by mouth daily 90 tablet 3     Multiple Vitamins-Minerals (MULTIVITAMIN PO)        riboflavin 400 MG CAPS Take 400 mg by mouth every morning 30 capsule 0     sertraline (ZOLOFT) 100 MG " tablet Take 2 tablets (200 mg) by mouth daily 60 tablet 1     traMADol (ULTRAM) 50 MG tablet Take 1 tablet (50 mg) by mouth 2 times daily as needed for severe pain . 45 tablets to last 30 days. Okay to fill today. 45 tablet 0     traMADol (ULTRAM) 50 MG tablet Take 1 tablet (50 mg) by mouth 2 times daily as needed for severe pain . 45 tablets to last 30 days. (Patient not taking: Reported on 11/16/2018) 45 tablet 0     traMADol (ULTRAM) 50 MG tablet Take 1 tablet (50 mg) by mouth 2 times daily as needed for severe pain . 45 tablets to las 30 days. 45 tablet 0     Allergies   Allergen Reactions     Ondansetron Nausea and Vomiting     Sulfa Drugs Hives     Rash       Recent Labs   Lab Test  06/18/18   1656  09/14/16   1102  02/19/15   1029  01/06/15   1120   LDL   --   122*   --   94   HDL   --   73   --   75   TRIG   --   111   --   63   ALT  21   --   20  14   CR  0.64   --   0.52  0.60   GFRESTIMATED  >90   --   >90  Non  GFR Calc    >90  Non  GFR Calc     GFRESTBLACK  >90   --   >90   GFR Calc    >90   GFR Calc     POTASSIUM  4.5   --   3.9  4.2   TSH   --    --    --   3.60      BP Readings from Last 3 Encounters:   11/16/18 120/83   11/08/18 121/89   10/29/18 117/81    Wt Readings from Last 3 Encounters:   11/16/18 116 lb 9.6 oz (52.9 kg)   11/08/18 115 lb (52.2 kg)   10/29/18 115 lb 3.2 oz (52.3 kg)                  Labs reviewed in EPIC    Reviewed and updated as needed this visit by clinical staff       Reviewed and updated as needed this visit by Provider         ROS:  Constitutional, HEENT, cardiovascular, pulmonary, gi and gu systems are negative, except as otherwise noted.    OBJECTIVE:     /83  Pulse 60  Temp 98.1  F (36.7  C) (Oral)  Resp 16  Wt 116 lb 9.6 oz (52.9 kg)  LMP 09/01/2016  SpO2 100%  BMI 20.4 kg/m2  Body mass index is 20.4 kg/(m^2).  GENERAL APPEARANCE: healthy, alert and no distress. Smiling.   SKIN: warm and  dry  PSYCH: mentation appears normal and affect normal/bright.  Good eye contact.      ASSESSMENT/PLAN:     (G89.4) Chronic pain syndrome  (primary encounter diagnosis)  Comment: chronic  Plan: controlled substance agreement re-signed today.    (F41.1) Generalized anxiety disorder  Comment:  Plan: sertraline (ZOLOFT) 100 MG tablet, Pain Drug         Scr UR W Rptd Meds, ALPRAZolam (XANAX) 0.5 MG         tablet, ALPRAZolam (XANAX) 0.5 MG tablet,         DISCONTINUED: ALPRAZolam (XANAX) 0.5 MG tablet        Return to the clinic in 2-3 months for a face to face appointment for refills.     (M54.5) Midline low back pain without sciatica, unspecified chronicity  Comment:  Plan: Pain Drug Scr UR W Rptd Meds, traMADol (ULTRAM)        50 MG tablet, DISCONTINUED: traMADol (ULTRAM)         50 MG tablet        Urine drug screen pending.  I anticipate the meds on her drug screen will be only the medications on her med list today.    Patient Instructions   Tramadol:  Continue to take the tramadol per our agreement, sparingly, 45 tablets to last 30 days.  Wean down/off if possible.  Since the last rx was prescribed 11/11/2018, I have given you a prescription for 12/11/2018.     Alprazolam:  Take sparingly.  Continue to cut down/titrate down on usage.    Urine drug screen:  Results pending.    Return to the clinic in 2 months for a faxe to face appointment for next refills.     I spent a total of 30 min with the patient, >50% time spent face to face counseling regarding the above issues, establishing a plan of care, and coordinating follow up care.   JEREMY Kent [facility]

## 2018-11-16 NOTE — LETTER
[facility]    11/16/18    Patient: Catie Nuñez  YOB: 1965  Medical Record Number: [id number]                                                                  Controlled Substance Agreement  I understand that my care provider has prescribed controlled substances (narcotics, tranquilizers, and/or stimulants) to help manage my condition(s).  I am taking this medicine to help me function or work.  I know that this is strong medicine.  It could have serious side effects and even cause a dependency on the drug.  If I stop these medicines suddenly, I could have severe withdrawal symptoms.    The risks, benefits, and side effects of these medication(s) were explained to me.  I agree that:  1. I will take part in other treatments as advised by my provider.  This may be psychiatry or counseling, physical therapy, behavioral therapy, group treatment, or a referral to a pain clinic.  I will reduce or stop my medicine when my provider tells me to do so.   2. I will take my medicines as prescribed.  I will not change the dose or schedule unless my provider tells me to.  There will be no refills if I  run out early.   I may be contacted at any time without warning and asked to complete a drug test or pill count.   3. I will keep all my appointments at the clinic.  If I miss appointments or fail to follow instructions, my provider may stop my medicine.  4. I will not ask other providers to prescribe controlled substances. And I will not accept controlled substances from other people. If I need another prescribed controlled substance for a new reason, I will notify my provider within one business day.  5. If I enroll in the [place] Medical Marijuana program, I will tell my provider.  I will also sign an agreement to share my medical records with my provider.  6. I will use one pharmacy to fill all of my controlled substance prescriptions.  If my prescription is mailed to my pharmacy, it may  take 5 to 7 days for my medicine to be ready.  7. I understand that my provider, clinic care team, and pharmacy can track controlled substance prescriptions from other providers through a central database (prescription monitoring program).  8. I will bring in my list of medications (or my medicine bottles) each time I come to the clinic.  [id number] REV-  07/2018                                                                                                                                   Page 1 of 2      [facility]    11/16/18    Patient: Catie Nuñez  YOB: 1965  Medical Record Number: [id number]    9. Refills of controlled substances will be made only during office hours.  It is up to me to make sure that I do not run out of my medicines on weekends or holidays.    10. I am responsible for my prescriptions.  If the medicine/prescription is lost or stolen, it will not be replaced.   I also agree not to share these medicines with anyone.  11. I agree to not use ANY illegal or recreational drugs.  This includes marijuana, cocaine, bath salts or other drugs.  I agree not to use alcohol unless my provider says I may.  I agree to give urine samples whenever asked.  If I fail to give a urine sample, the provider may stop my medicine.     12. I will tell my nurse or provider right away if I become pregnant or have a new medical problem treated outside of [facility].  13. I understand that this medicine can affect my thinking and judgment.  It may be unsafe for me to drive, use machinery and do dangerous tasks.  I will not do any of these things until I know how the medicine affects me.  If my dose changes, I will wait to see how it affects me.  I will contact my provider if I have concerns about medicine side effects.  I understand that if I do not follow any of the conditions above, my prescriptions or treatment may be stopped.    I agree that my provider, clinic care team, and  pharmacy may work with any city, state or federal law enforcement agency that investigates the misuse, sale, or other diversion of my controlled medicine. I will allow my provider to discuss my care with or share a copy of this agreement with any other treating provider, pharmacy or emergency room where I receive care.  I agree to give up (waive) any right of privacy or confidentiality with respect to these authorizations.   I have read this agreement and have asked questions about anything I did not understand.   ___________________________________    ___________________________  Patient Signature                                                           Date and Time  ___________________________________     ____________________________  Witness                                                                            Date and Time  ___________________________________  JEREMY Kent CNP  [id number] REV-  07/2018                                                                                                                                                   Page 2 of 2

## 2018-11-16 NOTE — MR AVS SNAPSHOT
After Visit Summary   11/16/2018    Catie Nuñez    MRN: [id number]           Patient Information     Date Of Birth          1965        Visit Information        Provider Department      11/16/2018 9:40 AM Yasmin Velazquez APRN CNP [facility]        Today's Diagnoses     Chronic pain syndrome    -  1    Generalized anxiety disorder        Midline low back pain without sciatica, unspecified chronicity          Care Instructions    Tramadol:  Continue to take the tramadol per our agreement, sparingly, 45 tablets to last 30 days.  Wean down/off if possible.  Since the last rx was prescribed 11/11/2018, I have given you a prescription for 12/11/2018.     Alprazolam:  Take sparingly.  Continue to cut down/titrate down on usage.    Urine drug screen:  Results pending.    Return to the clinic in 2 months for a faxe to face appointment for next refills.           Follow-ups after your visit        Who to contact     If you have questions or need follow up information about today's clinic visit or your schedule please contact [facility] directly at [phone].  Normal or non-critical lab and imaging results will be communicated to you by GeoGraffitihart, letter or phone within 4 business days after the clinic has received the results. If you do not hear from us within 7 days, please contact the clinic through Soteirat or phone. If you have a critical or abnormal lab result, we will notify you by phone as soon as possible.  Submit refill requests through Scancell or call your pharmacy and they will forward the refill request to us. Please allow 3 business days for your refill to be completed.          Additional Information About Your Visit        MyChart Information     Scancell gives you secure access to your electronic health record. If you see a primary care provider, you can also send messages to your care team and make appointments. If you have questions,  please call your primary care clinic.  If you do not have a primary care provider, please call [phone] and they will assist you.        Care EveryWhere ID     This is your Care EveryWhere ID. This could be used by other organizations to access your [facility] medical records  [id number]        Your Vitals Were     Pulse Temperature Respirations Last Period Pulse Oximetry BMI (Body Mass Index)    60 98.1  F (36.7  C) (Oral) 16 09/01/2016 100% 20.4 kg/m2       Blood Pressure from Last 3 Encounters:   11/16/18 120/83   11/08/18 121/89   10/29/18 117/81    Weight from Last 3 Encounters:   11/16/18 116 lb 9.6 oz (52.9 kg)   11/08/18 115 lb (52.2 kg)   10/29/18 115 lb 3.2 oz (52.3 kg)              We Performed the Following     Pain Drug Scr UR W Rptd Meds          Today's Medication Changes          These changes are accurate as of 11/16/18 11:59 PM.  If you have any questions, ask your nurse or doctor.               Start taking these medicines.        Dose/Directions    * ALPRAZolam 0.5 MG tablet   Commonly known as:  XANAX   Used for:  Generalized anxiety disorder   Started by:  Yasmin Velazquez APRN CNP        Start taking on:  11/25/2018   Take 1/2 tablet (0.25mg) in the morning and 1/2 tablet (0.25mg) in the evening prn. 30 tablets to last 30 days.   Quantity:  30 tablet   Refills:  0       * ALPRAZolam 0.5 MG tablet   Commonly known as:  XANAX   Used for:  Generalized anxiety disorder   Started by:  Yasmin Velazquez APRN CNP        Start taking on:  12/25/2018   Take 1/2 tablet (0.25mg) in the morning and 1/2 tablet (0.25mg) in the evening prn. 30 tablets to last 30 days.   Quantity:  30 tablet   Refills:  0       * Notice:  This list has 2 medication(s) that are the same as other medications prescribed for you. Read the directions carefully, and ask your doctor or other care provider to review them with you.      These medicines have changed or have updated prescriptions.         Dose/Directions    * traMADol 50 MG tablet   Commonly known as:  ULTRAM   This may have changed:  Another medication with the same name was added. Make sure you understand how and when to take each.   Used for:  Midline low back pain without sciatica, unspecified chronicity   Changed by:  Yasmin Velazquez APRN CNP        Dose:  50 mg   Take 1 tablet (50 mg) by mouth 2 times daily as needed for severe pain . 45 tablets to las 30 days.   Quantity:  45 tablet   Refills:  0       * traMADol 50 MG tablet   Commonly known as:  ULTRAM   This may have changed:  Another medication with the same name was added. Make sure you understand how and when to take each.   Used for:  Midline low back pain without sciatica, unspecified chronicity   Changed by:  Yasmin Velazquez APRN CNP        Dose:  50 mg   Take 1 tablet (50 mg) by mouth 2 times daily as needed for severe pain . 45 tablets to last 30 days. Okay to fill today.   Quantity:  45 tablet   Refills:  0       * traMADol 50 MG tablet   Commonly known as:  ULTRAM   This may have changed:  You were already taking a medication with the same name, and this prescription was added. Make sure you understand how and when to take each.   Used for:  Midline low back pain without sciatica, unspecified chronicity   Changed by:  Yasmin Velazquez APRN CNP        Dose:  50 mg   Start taking on:  12/11/2018   Take 1 tablet (50 mg) by mouth 2 times daily as needed for severe pain . 45 tablets to last 30 days.   Quantity:  45 tablet   Refills:  0       * Notice:  This list has 3 medication(s) that are the same as other medications prescribed for you. Read the directions carefully, and ask your doctor or other care provider to review them with you.         Where to get your medicines      [id number]  [place], [place] - [address] AT [address] [place] [address] & [facility]  [address] [place] [address] [place] [address]    Hours:  24-hours Phone:   [phone]     sertraline 100 MG tablet         Some of these will need a paper prescription and others can be bought over the counter.  Ask your nurse if you have questions.     Bring a paper prescription for each of these medications     ALPRAZolam 0.5 MG tablet    ALPRAZolam 0.5 MG tablet    traMADol 50 MG tablet               Information about OPIOIDS     PRESCRIPTION OPIOIDS: WHAT YOU NEED TO KNOW   We gave you an opioid (narcotic) pain medicine. It is important to manage your pain, but opioids are not always the best choice. You should first try all the other options your care team gave you. Take this medicine for as short a time (and as few doses) as possible.    Some activities can increase your pain, such as bandage changes or therapy sessions. It may help to take your pain medicine 30 to 60 minutes before these activities. Reduce your stress by getting enough sleep, working on hobbies you enjoy and practicing relaxation or meditation. Talk to your care team about ways to manage your pain beyond prescription opioids.    These medicines have risks:    DO NOT drive when on new or higher doses of pain medicine. These medicines can affect your alertness and reaction times, and you could be arrested for driving under the influence (DUI). If you need to use opioids long-term, talk to your care team about driving.    DO NOT operate heavy machinery    DO NOT do any other dangerous activities while taking these medicines.    DO NOT drink any alcohol while taking these medicines.     If the opioid prescribed includes acetaminophen, DO NOT take with any other medicines that contain acetaminophen. Read all labels carefully. Look for the word  acetaminophen  or  Tylenol.  Ask your pharmacist if you have questions or are unsure.    You can get addicted to pain medicines, especially if you have a history of addiction (chemical, alcohol or substance dependence). Talk to your care team about ways to reduce this  risk.    All opioids tend to cause constipation. Drink plenty of water and eat foods that have a lot of fiber, such as fruits, vegetables, prune juice, apple juice and high-fiber cereal. Take a laxative (Miralax, milk of magnesia, Colace, Senna) if you don t move your bowels at least every other day. Other side effects include upset stomach, sleepiness, dizziness, throwing up, tolerance (needing more of the medicine to have the same effect), physical dependence and slowed breathing.    Store your pills in a secure place, locked if possible. We will not replace any lost or stolen medicine. If you don t finish your medicine, please throw away (dispose) as directed by your pharmacist. The [place] Pollution Control Agency has more information about safe disposal: [email or web address]://[email or web address].[email or web address].us/living-green/managing-unwanted-medications         Primary Care Provider Office Phone # Fax #    JEREMY Gonzalez [facility] [phone]       [facility] [place] [address]        Equal Access to Services     [facility]: Hadii marcie ku hadasho Soomaali, waaxda luqadaha, qaybta kaalmada adederianyapetr, vickey grajeda . So [facility] [place] [phone].    ATENCIÓN: Si habla español, tiene a weinberg disposición servicios gratuitos de asistencia lingüística. Jacque al [phone].    We comply with applicable federal civil rights laws and [place] laws. We do not discriminate on the basis of race, color, national origin, age, disability, sex, sexual orientation, or gender identity.            Thank you!     Thank you for choosing [facility]  for your care. Our goal is always to provide you with excellent care. Hearing back from our patients is one way we can continue to improve our services. Please take a few minutes to complete the written survey that you may receive in the mail after your visit with us. Thank you!             Your Updated Medication List - Protect others  around you: Learn how to safely use, store and throw away your medicines at www.disposemymeds.org.          This list is accurate as of 11/16/18 11:59 PM.  Always use your most recent med list.                   Brand Name Dispense Instructions for use Diagnosis    * ALPRAZolam 0.5 MG tablet   Start taking on:  11/25/2018    XANAX    30 tablet    Take 1/2 tablet (0.25mg) in the morning and 1/2 tablet (0.25mg) in the evening prn. 30 tablets to last 30 days.    Generalized anxiety disorder       * ALPRAZolam 0.5 MG tablet   Start taking on:  12/25/2018    XANAX    30 tablet    Take 1/2 tablet (0.25mg) in the morning and 1/2 tablet (0.25mg) in the evening prn. 30 tablets to last 30 days.    Generalized anxiety disorder       estradiol 1 MG tablet    ESTRACE    90 tablet    Take 0.5 tablets (0.5 mg) by mouth daily    Symptomatic menopausal or female climacteric states       EXCEDRIN PO      Take by mouth as needed        fluticasone 50 MCG/ACT spray    FLONASE    3 Bottle    Spray 1-2 sprays into both nostrils daily    Acute seasonal allergic rhinitis, unspecified trigger       ibuprofen 600 MG tablet    ADVIL/MOTRIN     Take 600 mg by mouth Take 1 tablet by mouth 4 times daily if needed. Maximum of 3200 mg in 24 hours.        medroxyPROGESTERone 2.5 MG tablet    PROVERA    90 tablet    Take 1 tablet (2.5 mg) by mouth daily    Symptomatic menopausal or female climacteric states       MULTIVITAMIN PO           riboflavin 400 MG Caps     30 capsule    Take 400 mg by mouth every morning    Migraine without aura and without status migrainosus, not intractable       sertraline 100 MG tablet    ZOLOFT    60 tablet    Take 2 tablets (200 mg) by mouth daily    Generalized anxiety disorder       * traMADol 50 MG tablet    ULTRAM    45 tablet    Take 1 tablet (50 mg) by mouth 2 times daily as needed for severe pain . 45 tablets to las 30 days.    Midline low back pain without sciatica, unspecified chronicity       * traMADol 50  MG tablet    ULTRAM    45 tablet    Take 1 tablet (50 mg) by mouth 2 times daily as needed for severe pain . 45 tablets to last 30 days. Okay to fill today.    Midline low back pain without sciatica, unspecified chronicity       * traMADol 50 MG tablet   Start taking on:  12/11/2018    ULTRAM    45 tablet    Take 1 tablet (50 mg) by mouth 2 times daily as needed for severe pain . 45 tablets to last 30 days.    Midline low back pain without sciatica, unspecified chronicity       * Notice:  This list has 5 medication(s) that are the same as other medications prescribed for you. Read the directions carefully, and ask your doctor or other care provider to review them with you.

## 2018-11-17 ASSESSMENT — ANXIETY QUESTIONNAIRES: GAD7 TOTAL SCORE: 7

## 2018-11-20 LAB — PAIN DRUG SCR UR W RPTD MEDS: NORMAL

## 2018-11-30 NOTE — PROGRESS NOTES
Kade Haddad,    Here the results of your urine drug screen.  Let me know if you have any questions.    Yasmin LUNA CNP

## 2018-12-03 ENCOUNTER — TELEPHONE (OUTPATIENT)
Dept: NEUROSURGERY | Facility: CLINIC | Age: 53
End: 2018-12-03

## 2018-12-03 DIAGNOSIS — M54.2 CERVICALGIA: Primary | ICD-10-CM

## 2018-12-04 NOTE — TELEPHONE ENCOUNTER
Returned call to patient relayed below information. Patient states she didn't receive much benefit from the occipital nerve block in the past. She would like to proceed with PT and ELICIA, orders placed.

## 2018-12-04 NOTE — TELEPHONE ENCOUNTER
LVM requesting a return call to discuss MRI result     Per Samantha RAMSAY.  degenerative changes throughout. No surgery recommended right now. Looks like she has done occipital nerve blocks in the past, and can try these again if they have been helpful. If not, can try ELICIA and PT.

## 2018-12-29 ENCOUNTER — OFFICE VISIT (OUTPATIENT)
Dept: URGENT CARE | Facility: URGENT CARE | Age: 53
End: 2018-12-29
Payer: COMMERCIAL

## 2018-12-29 VITALS
HEART RATE: 73 BPM | WEIGHT: 115 LBS | OXYGEN SATURATION: 98 % | DIASTOLIC BLOOD PRESSURE: 80 MMHG | BODY MASS INDEX: 19.63 KG/M2 | HEIGHT: 64 IN | RESPIRATION RATE: 16 BRPM | SYSTOLIC BLOOD PRESSURE: 114 MMHG | TEMPERATURE: 98.9 F

## 2018-12-29 DIAGNOSIS — J01.00 ACUTE NON-RECURRENT MAXILLARY SINUSITIS: Primary | ICD-10-CM

## 2018-12-29 PROCEDURE — 99213 OFFICE O/P EST LOW 20 MIN: CPT | Performed by: PEDIATRICS

## 2018-12-29 RX ORDER — GUAIFENESIN 200 MG/10ML
200 LIQUID ORAL EVERY 4 HOURS PRN
Qty: 473 ML | Refills: 0 | Status: SHIPPED | OUTPATIENT
Start: 2018-12-29 | End: 2019-10-29

## 2018-12-29 ASSESSMENT — MIFFLIN-ST. JEOR: SCORE: 1103.7

## 2018-12-30 NOTE — PROGRESS NOTES
"SUBJECTIVE:  Catie Nuñez is a 53 year old female here with concerns about sinus infection.  She states onset of symptoms were 6 day(s) ago.  She has had maxillary pressure. Course of illness is worsening. Severity moderate  Current and Associated symptoms: nasal congestion, rhinorrhea, cough , facial pain/pressure and post-nasal drainage  Predisposing factors include none. Recent treatment has included: Flonase    Past Medical History:   Diagnosis Date     Anxiety      ASCUS with positive high risk HPV 6/2015,09/14/16    atypia on colp, 09/14/16: ASCUS + HR HPV 16 and other.      Depression      Fracture of great toe 2/20/2014     History of scoliosis      Hx of colposcopy with cervical biopsy 10/06/16    10/06/16: [place] [facility] NIL.     MVP (mitral valve prolapse)      Unexplained endometrial cells on cervical Pap smear 6/2015    thin endometrium on US     Social History     Tobacco Use     Smoking status: Never Smoker     Smokeless tobacco: Never Used   Substance Use Topics     Alcohol use: Yes     Alcohol/week: 0.0 oz     Comment: occ, rare        ROS:  INTEGUMENTARY/SKIN: NEGATIVE for worrisome rashes, moles or lesions  EYES: NEGATIVE for vision changes or irritation  CV: NEGATIVE for chest pain, palpitations or peripheral edema  GI: NEGATIVE for nausea, abdominal pain, heartburn, or change in bowel habits  MUSCULOSKELETAL: NEGATIVE for significant arthralgias or myalgia  NEURO: NEGATIVE for weakness, dizziness or paresthesias    OBJECTIVE:  /80   Pulse 73   Temp 98.9  F (37.2  C) (Oral)   Resp 16   Ht 1.613 m (5' 3.5\")   Wt 52.2 kg (115 lb)   LMP 09/01/2016   SpO2 98%   BMI 20.05 kg/m    Exam:GENERAL APPEARANCE: tired-appearing, well-developed, well-dressed  EYES: EOMI,  PERRL, conjunctiva clear  RESP: lungs clear to auscultation - no rales, rhonchi or wheezes  CV: regular rates and rhythm, normal S1 S2, no murmur noted  ABDOMEN:  soft, nontender, no HSM or masses and bowel sounds normal  NEURO: " Normal strength and tone, sensory exam grossly normal,  normal speech and mentation  SKIN: no suspicious lesions or rashes    ASSESSMENT:  Sinusitis  See diagnosis    PLAN:  See orders  Follow up with primary clinic if not improving

## 2019-01-02 ENCOUNTER — OFFICE VISIT (OUTPATIENT)
Dept: FAMILY MEDICINE | Facility: CLINIC | Age: 54
End: 2019-01-02
Payer: COMMERCIAL

## 2019-01-02 VITALS
DIASTOLIC BLOOD PRESSURE: 72 MMHG | OXYGEN SATURATION: 96 % | RESPIRATION RATE: 16 BRPM | HEIGHT: 64 IN | WEIGHT: 116 LBS | SYSTOLIC BLOOD PRESSURE: 110 MMHG | HEART RATE: 62 BPM | BODY MASS INDEX: 19.81 KG/M2

## 2019-01-02 DIAGNOSIS — F41.1 GENERALIZED ANXIETY DISORDER: ICD-10-CM

## 2019-01-02 DIAGNOSIS — M54.2 CERVICALGIA: ICD-10-CM

## 2019-01-02 DIAGNOSIS — Z12.39 SCREENING FOR BREAST CANCER: ICD-10-CM

## 2019-01-02 DIAGNOSIS — M54.50 MIDLINE LOW BACK PAIN WITHOUT SCIATICA, UNSPECIFIED CHRONICITY: ICD-10-CM

## 2019-01-02 DIAGNOSIS — G89.4 CHRONIC PAIN SYNDROME: Primary | ICD-10-CM

## 2019-01-02 PROCEDURE — 99214 OFFICE O/P EST MOD 30 MIN: CPT | Performed by: NURSE PRACTITIONER

## 2019-01-02 RX ORDER — TRAMADOL HYDROCHLORIDE 50 MG/1
50 TABLET ORAL 2 TIMES DAILY PRN
Qty: 22 TABLET | Refills: 0 | Status: SHIPPED | OUTPATIENT
Start: 2019-01-02 | End: 2019-02-12

## 2019-01-02 RX ORDER — ALPRAZOLAM 0.5 MG
TABLET ORAL
Qty: 30 TABLET | Refills: 0 | Status: SHIPPED | OUTPATIENT
Start: 2019-01-24 | End: 2019-02-24

## 2019-01-02 RX ORDER — SERTRALINE HYDROCHLORIDE 100 MG/1
200 TABLET, FILM COATED ORAL DAILY
Qty: 60 TABLET | Refills: 5 | Status: SHIPPED | OUTPATIENT
Start: 2019-01-02 | End: 2019-04-25

## 2019-01-02 RX ORDER — TRAMADOL HYDROCHLORIDE 50 MG/1
50 TABLET ORAL EVERY 8 HOURS PRN
Qty: 45 TABLET | Refills: 0 | Status: SHIPPED | OUTPATIENT
Start: 2019-01-24 | End: 2019-02-12

## 2019-01-02 ASSESSMENT — MIFFLIN-ST. JEOR: SCORE: 1108.23

## 2019-01-02 NOTE — PROGRESS NOTES
"  SUBJECTIVE:   Catie Nuñez is a 53 year old female who presents to clinic today for the following health issues:  Chief Complaint   Patient presents with     Medication Request     Kimmie is in the clinic today for refills of her medications.  She Would like to get tramadol and alprazolam scripts coordinated for refills.     Neck:   Going to Dr. Samaniego, neurosurgery.  MRi was done on her neck.   \"I need physical therapy and maybe an epidural nerve block.\"  \"I am tring to figure out how aggressive I want to go with all of this.\"  She is planning to start physical therapy soon.  \"I want to try acupuncture again.\"    Tramadol:  Using 45 tablets per month.  \"I need to find some other ways to deal with the pain.\"  Sometimes taking 1 tablet per day.  Other days she will take 3 tablets per day \"If I am having a hard day.\"      Alprazolam:  She continues to use the aprazolam daily.  she reports she is trying to cut down on her medication needs but as of right now she just cannot do it because of her chronic pain.  She denies overuse or misuse.        Problem list and histories reviewed & adjusted, as indicated.  Additional history: as documented    Patient Active Problem List   Diagnosis     CARDIOVASCULAR SCREENING; LDL GOAL LESS THAN 160     Adhesive capsulitis of shoulder     Scoliosis     Shoulder impingement     Generalized anxiety disorder     Pulmonary nodule     Migraine without aura and without status migrainosus, not intractable     Controlled substance agreement signed     ASCUS Pap + high risk HPV, + 16, + \"other\"      Papanicolaou smear of cervix with low grade squamous intraepithelial lesion (LGSIL)     Acute seasonal allergic rhinitis, unspecified trigger     Chronic pain syndrome     History reviewed. No pertinent surgical history.    Social History     Tobacco Use     Smoking status: Never Smoker     Smokeless tobacco: Never Used   Substance Use Topics     Alcohol use: Yes     Alcohol/week: 0.0 oz     " Comment: occ, rare      Family History   Problem Relation Age of Onset     Heart Disease Father      Asthma No family hx of      Diabetes No family hx of      Hypertension No family hx of      Cerebrovascular Disease No family hx of      Breast Cancer No family hx of          Current Outpatient Medications   Medication Sig Dispense Refill     ALPRAZolam (XANAX) 0.5 MG tablet Take 1/2 tablet (0.25mg) in the morning and 1/2 tablet (0.25mg) in the evening prn. 30 tablets to last 30 days. 30 tablet 0     amoxicillin-clavulanate (AUGMENTIN) 875-125 MG tablet Take 1 tablet by mouth 2 times daily for 10 days 20 tablet 0     Aspirin-Acetaminophen-Caffeine (EXCEDRIN PO) Take by mouth as needed       estradiol (ESTRACE) 1 MG tablet Take 0.5 tablets (0.5 mg) by mouth daily 90 tablet 1     fluticasone (FLONASE) 50 MCG/ACT spray Spray 1-2 sprays into both nostrils daily 3 Bottle 3     guaiFENesin (ROBITUSSIN) 100 MG/5ML liquid Take 10 mLs (200 mg) by mouth every 4 hours as needed for cough 473 mL 0     ibuprofen (ADVIL/MOTRIN) 600 MG tablet Take 600 mg by mouth Take 1 tablet by mouth 4 times daily if needed. Maximum of 3200 mg in 24 hours.       medroxyPROGESTERone (PROVERA) 2.5 MG tablet Take 1 tablet (2.5 mg) by mouth daily 90 tablet 3     Multiple Vitamins-Minerals (MULTIVITAMIN PO)        riboflavin 400 MG CAPS Take 400 mg by mouth every morning 30 capsule 0     sertraline (ZOLOFT) 100 MG tablet Take 2 tablets (200 mg) by mouth daily 60 tablet 1     traMADol (ULTRAM) 50 MG tablet Take 1 tablet (50 mg) by mouth 2 times daily as needed for severe pain . 45 tablets to last 30 days. Okay to fill today. 45 tablet 0     traMADol (ULTRAM) 50 MG tablet Take 1 tablet (50 mg) by mouth 2 times daily as needed for severe pain . 45 tablets to las 30 days. 45 tablet 0     ALPRAZolam (XANAX) 0.5 MG tablet Take 1/2 tablet (0.25mg) in the morning and 1/2 tablet (0.25mg) in the evening prn. 30 tablets to last 30 days. (Patient not taking:  "Reported on 1/2/2019) 30 tablet 0     traMADol (ULTRAM) 50 MG tablet Take 1 tablet (50 mg) by mouth 2 times daily as needed for severe pain . 45 tablets to last 30 days. (Patient not taking: Reported on 1/2/2019) 45 tablet 0     Allergies   Allergen Reactions     Ondansetron Nausea and Vomiting     Sulfa Drugs Hives     Rash       Recent Labs   Lab Test 06/18/18  1656 09/14/16  1102 02/19/15  1029 01/06/15  1120   LDL  --  122*  --  94   HDL  --  73  --  75   TRIG  --  111  --  63   ALT 21  --  20 14   CR 0.64  --  0.52 0.60   GFRESTIMATED >90  --  >90  Non  GFR Calc   >90  Non  GFR Calc     GFRESTBLACK >90  --  >90   GFR Calc   >90   GFR Calc     POTASSIUM 4.5  --  3.9 4.2   TSH  --   --   --  3.60      BP Readings from Last 3 Encounters:   01/02/19 110/72   12/29/18 114/80   11/16/18 120/83    Wt Readings from Last 3 Encounters:   01/02/19 52.6 kg (116 lb)   12/29/18 52.2 kg (115 lb)   11/16/18 52.9 kg (116 lb 9.6 oz)                  Labs reviewed in EPIC    Reviewed and updated as needed this visit by clinical staff  Tobacco  Allergies  Meds  Med Hx  Surg Hx  Fam Hx  Soc Hx      Reviewed and updated as needed this visit by Provider         ROS:  Constitutional, HEENT, cardiovascular, pulmonary, GI, , musculoskeletal, neuro, skin, endocrine and psych systems are negative, except as otherwise noted.    OBJECTIVE:     /72   Pulse 62   Resp 16   Ht 1.613 m (5' 3.5\")   Wt 52.6 kg (116 lb)   LMP 09/01/2016   SpO2 96%   BMI 20.23 kg/m    Body mass index is 20.23 kg/m .  GENERAL APPEARANCE: healthy, alert and no distress. Smiling.   SKIN: warm and dry  MS: Ambulatory with a steady gait.   PSYCH: mentation appears normal and affect normal/bright.  Good eye contact.    ASSESSMENT/PLAN:     (G89.4) Chronic pain syndrome  (primary encounter diagnosis)  Comment: Stable  Plan: I did go ahead and refill her tramadol and alprazolam today.  I " did bridge her with a small supply of tramadol to get her through to the next day that her alprazolam prescription is due to be refilled.  This should line up her tramadol and alprazolam prescriptions.  She is to return to the clinic to see me in approximately 2 months for her full physical with Pap smear.  At that time I will also  Refill her medications.  We discussed and reviewed the chronic pain package, making sure to take medications as prescribed, following up with me etc.  Questions were answered.      (M54.2) Cervicalgia  Comment: Chronic  Plan: Per the patient, she does not want to do any invasive treatments for her neck pain.  I did tell her that until her neck pain underlying issues are fixed, she is going to continue to have pain.  From my perspective, I would be better to fix the painful process versus just bridge the pain with pain pills.  I did tell her that I think she is more comfortable with pain pills and I do not want her to be addicted to pain pills or the alprazolam.  The patient verbalized understanding.  She will continue care with the specialist, therapy etc.    (F41.1) Generalized anxiety disorder  Comment:   Plan: sertraline (ZOLOFT) 100 MG tablet, ALPRAZolam         (XANAX) 0.5 MG tablet        As above    (M54.5) Midline low back pain without sciatica, unspecified chronicity  Comment:   Plan: traMADol (ULTRAM) 50 MG tablet, traMADol         (ULTRAM) 50 MG tablet        As above    (Z12.31) Screening for breast cancer  Comment: Due now  Plan: *MA Screening Digital Bilateral        Referral given to have a mammogram done at her earliest convenience.    I spent a total of 30 min with the patient, >50% time spent face to face counseling regarding the above issues, establishing a plan of care, and coordinating follow up care.         JEREMY Kent [facility]

## 2019-01-29 ENCOUNTER — TELEPHONE (OUTPATIENT)
Dept: FAMILY MEDICINE | Facility: CLINIC | Age: 54
End: 2019-01-29

## 2019-01-29 NOTE — TELEPHONE ENCOUNTER
LM notifying pt that she is due for a mammogram and provided pt with Central Scheduling [phone] and if she had a mammogram in the past 2 years, please contact us so that we can update your medical record.     Hermelinda Quesada

## 2019-02-10 ENCOUNTER — ANCILLARY PROCEDURE (OUTPATIENT)
Dept: GENERAL RADIOLOGY | Facility: CLINIC | Age: 54
End: 2019-02-10
Attending: PHYSICIAN ASSISTANT
Payer: COMMERCIAL

## 2019-02-10 ENCOUNTER — OFFICE VISIT (OUTPATIENT)
Dept: URGENT CARE | Facility: URGENT CARE | Age: 54
End: 2019-02-10
Payer: COMMERCIAL

## 2019-02-10 VITALS
HEIGHT: 64 IN | SYSTOLIC BLOOD PRESSURE: 118 MMHG | WEIGHT: 116 LBS | RESPIRATION RATE: 14 BRPM | BODY MASS INDEX: 19.81 KG/M2 | DIASTOLIC BLOOD PRESSURE: 66 MMHG | TEMPERATURE: 98.1 F | HEART RATE: 70 BPM

## 2019-02-10 DIAGNOSIS — M53.3 COCCYDYNIA: ICD-10-CM

## 2019-02-10 DIAGNOSIS — M53.3 COCCYDYNIA: Primary | ICD-10-CM

## 2019-02-10 PROCEDURE — 72220 X-RAY EXAM SACRUM TAILBONE: CPT

## 2019-02-10 PROCEDURE — 99214 OFFICE O/P EST MOD 30 MIN: CPT | Performed by: PHYSICIAN ASSISTANT

## 2019-02-10 RX ORDER — TRAMADOL HYDROCHLORIDE 50 MG/1
50 TABLET ORAL EVERY 6 HOURS PRN
Qty: 12 TABLET | Refills: 0 | Status: SHIPPED | OUTPATIENT
Start: 2019-02-10 | End: 2019-02-12

## 2019-02-10 ASSESSMENT — MIFFLIN-ST. JEOR: SCORE: 1108.23
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
## 2019-02-12 ENCOUNTER — OFFICE VISIT (OUTPATIENT)
Dept: FAMILY MEDICINE | Facility: CLINIC | Age: 54
End: 2019-02-12
Payer: COMMERCIAL

## 2019-02-12 VITALS
TEMPERATURE: 97 F | DIASTOLIC BLOOD PRESSURE: 74 MMHG | BODY MASS INDEX: 19.5 KG/M2 | HEIGHT: 64 IN | OXYGEN SATURATION: 98 % | SYSTOLIC BLOOD PRESSURE: 113 MMHG | RESPIRATION RATE: 18 BRPM | WEIGHT: 114.2 LBS | HEART RATE: 55 BPM

## 2019-02-12 DIAGNOSIS — N95.1 SYMPTOMATIC MENOPAUSAL OR FEMALE CLIMACTERIC STATES: ICD-10-CM

## 2019-02-12 DIAGNOSIS — M54.50 MIDLINE LOW BACK PAIN WITHOUT SCIATICA, UNSPECIFIED CHRONICITY: ICD-10-CM

## 2019-02-12 DIAGNOSIS — Z00.00 ROUTINE GENERAL MEDICAL EXAMINATION AT A HEALTH CARE FACILITY: Primary | ICD-10-CM

## 2019-02-12 DIAGNOSIS — R87.610 PAPANICOLAOU SMEAR OF CERVIX WITH ATYPICAL SQUAMOUS CELLS OF UNDETERMINED SIGNIFICANCE (ASC-US): ICD-10-CM

## 2019-02-12 DIAGNOSIS — F41.1 GENERALIZED ANXIETY DISORDER: ICD-10-CM

## 2019-02-12 LAB — HGB BLD-MCNC: 11.7 G/DL (ref 11.7–15.7)

## 2019-02-12 PROCEDURE — 99213 OFFICE O/P EST LOW 20 MIN: CPT | Mod: 25 | Performed by: NURSE PRACTITIONER

## 2019-02-12 PROCEDURE — G0476 HPV COMBO ASSAY CA SCREEN: HCPCS | Performed by: NURSE PRACTITIONER

## 2019-02-12 PROCEDURE — 88175 CYTOPATH C/V AUTO FLUID REDO: CPT | Performed by: NURSE PRACTITIONER

## 2019-02-12 PROCEDURE — 99396 PREV VISIT EST AGE 40-64: CPT | Performed by: NURSE PRACTITIONER

## 2019-02-12 PROCEDURE — 80061 LIPID PANEL: CPT | Performed by: NURSE PRACTITIONER

## 2019-02-12 PROCEDURE — 36415 COLL VENOUS BLD VENIPUNCTURE: CPT | Performed by: NURSE PRACTITIONER

## 2019-02-12 PROCEDURE — 82947 ASSAY GLUCOSE BLOOD QUANT: CPT | Performed by: NURSE PRACTITIONER

## 2019-02-12 PROCEDURE — 85018 HEMOGLOBIN: CPT | Performed by: NURSE PRACTITIONER

## 2019-02-12 RX ORDER — TRAMADOL HYDROCHLORIDE 50 MG/1
50 TABLET ORAL 2 TIMES DAILY PRN
Qty: 45 TABLET | Refills: 0 | Status: SHIPPED | OUTPATIENT
Start: 2019-03-12 | End: 2019-04-25

## 2019-02-12 RX ORDER — ESTRADIOL 1 MG/1
0.5 TABLET ORAL DAILY
Qty: 90 TABLET | Refills: 1 | Status: SHIPPED | OUTPATIENT
Start: 2019-02-12 | End: 2019-09-19

## 2019-02-12 RX ORDER — ALPRAZOLAM 0.5 MG
TABLET ORAL
Qty: 30 TABLET | Refills: 0 | Status: SHIPPED | OUTPATIENT
Start: 2019-02-12 | End: 2019-04-25

## 2019-02-12 RX ORDER — ALPRAZOLAM 0.5 MG
TABLET ORAL
Qty: 30 TABLET | Refills: 0 | Status: SHIPPED | OUTPATIENT
Start: 2019-03-12 | End: 2019-04-25

## 2019-02-12 RX ORDER — TRAMADOL HYDROCHLORIDE 50 MG/1
50 TABLET ORAL EVERY 8 HOURS PRN
Qty: 45 TABLET | Refills: 0 | Status: SHIPPED | OUTPATIENT
Start: 2019-04-12 | End: 2019-03-11

## 2019-02-12 RX ORDER — TRAMADOL HYDROCHLORIDE 50 MG/1
50 TABLET ORAL 2 TIMES DAILY PRN
Qty: 45 TABLET | Refills: 0 | Status: SHIPPED | OUTPATIENT
Start: 2019-02-12 | End: 2019-04-25

## 2019-02-12 RX ORDER — ALPRAZOLAM 0.5 MG
TABLET ORAL
Qty: 30 TABLET | Refills: 0 | Status: SHIPPED | OUTPATIENT
Start: 2019-04-12 | End: 2019-04-25

## 2019-02-12 ASSESSMENT — ENCOUNTER SYMPTOMS
HEARTBURN: 0
CONSTIPATION: 0
FREQUENCY: 1
COUGH: 0
DIZZINESS: 0
JOINT SWELLING: 0
PARESTHESIAS: 0
DIARRHEA: 0
FEVER: 0
WEAKNESS: 0
HEMATURIA: 0
NERVOUS/ANXIOUS: 1
EYE PAIN: 0
MYALGIAS: 1
SHORTNESS OF BREATH: 0
SORE THROAT: 0
CHILLS: 0
DYSURIA: 0
ARTHRALGIAS: 0
HEADACHES: 1
BREAST MASS: 0
NAUSEA: 0
PALPITATIONS: 0
ABDOMINAL PAIN: 0
HEMATOCHEZIA: 0

## 2019-02-12 ASSESSMENT — MIFFLIN-ST. JEOR: SCORE: 1100.07

## 2019-02-12 NOTE — PROGRESS NOTES
"   SUBJECTIVE:   CC: Catie Nuñez is an 53 year old woman who presents for preventive health visit.     Physical   Annual:     Getting at least 3 servings of Calcium per day:  NO    Bi-annual eye exam:  Yes    Dental care twice a year:  Yes    Sleep apnea or symptoms of sleep apnea:  None    Diet:  Regular (no restrictions)    Frequency of exercise:  None    Taking medications regularly:  Yes    Medication side effects:  None    Additional concerns today:  No    PHQ-2 Total Score: 1      Today's PHQ-2 Score:   PHQ-2 ( 1999 Pf[employer]) 2/12/2019   Q1: Little interest or pleasure in doing things 0   Q2: Feeling down, depressed or hopeless 0   PHQ-2 Score 0   Q1: Little interest or pleasure in doing things Several days   Q2: Feeling down, depressed or hopeless Not at all   PHQ-2 Score 1         Social History     Tobacco Use     Smoking status: Never Smoker     Smokeless tobacco: Never Used   Substance Use Topics     Alcohol use: Yes     Alcohol/week: 0.0 oz     Comment: occ, rare      Alcohol Use 2/12/2019   If you drink alcohol do you typically have greater than 3 drinks per day OR greater than 7 drinks per week? No   No flowsheet data found.    Reviewed orders with patient.  Reviewed health maintenance and updated orders accordingly - Yes  Labs reviewed in EPIC  BP Readings from Last 3 Encounters:   02/12/19 113/74   02/10/19 118/66   01/02/19 110/72    Wt Readings from Last 3 Encounters:   02/12/19 51.8 kg (114 lb 3.2 oz)   02/10/19 52.6 kg (116 lb)   01/02/19 52.6 kg (116 lb)                  Patient Active Problem List   Diagnosis     CARDIOVASCULAR SCREENING; LDL GOAL LESS THAN 160     Adhesive capsulitis of shoulder     Scoliosis     Shoulder impingement     Cervicalgia     Generalized anxiety disorder     Pulmonary nodule     Migraine without aura and without status migrainosus, not intractable     Controlled substance agreement signed     ASCUS Pap + high risk HPV, + 16, + \"other\"      Papanicolaou smear of " cervix with low grade squamous intraepithelial lesion (LGSIL)     Acute seasonal allergic rhinitis, unspecified trigger     Chronic pain syndrome     History reviewed. No pertinent surgical history.    Social History     Tobacco Use     Smoking status: Never Smoker     Smokeless tobacco: Never Used   Substance Use Topics     Alcohol use: Yes     Alcohol/week: 0.0 oz     Comment: occ, rare      Family History   Problem Relation Age of Onset     Heart Disease Father      Asthma No family hx of      Diabetes No family hx of      Hypertension No family hx of      Cerebrovascular Disease No family hx of      Breast Cancer No family hx of          Current Outpatient Medications   Medication Sig Dispense Refill     ALPRAZolam (XANAX) 0.5 MG tablet Take 1/2 tablet (0.25mg) in the morning and 1/2 tablet (0.25mg) in the evening prn. 30 tablets to last 30 days. 30 tablet 0     Aspirin-Acetaminophen-Caffeine (EXCEDRIN PO) Take by mouth as needed       estradiol (ESTRACE) 1 MG tablet Take 0.5 tablets (0.5 mg) by mouth daily 90 tablet 1     fluticasone (FLONASE) 50 MCG/ACT spray Spray 1-2 sprays into both nostrils daily 3 Bottle 3     guaiFENesin (ROBITUSSIN) 100 MG/5ML liquid Take 10 mLs (200 mg) by mouth every 4 hours as needed for cough 473 mL 0     ibuprofen (ADVIL/MOTRIN) 600 MG tablet Take 600 mg by mouth Take 1 tablet by mouth 4 times daily if needed. Maximum of 3200 mg in 24 hours.       medroxyPROGESTERone (PROVERA) 2.5 MG tablet Take 1 tablet (2.5 mg) by mouth daily 90 tablet 3     Multiple Vitamins-Minerals (MULTIVITAMIN PO)        order for [facility] 1 each 0     riboflavin 400 MG CAPS Take 400 mg by mouth every morning 30 capsule 0     sertraline (ZOLOFT) 100 MG tablet Take 2 tablets (200 mg) by mouth daily 60 tablet 5     traMADol (ULTRAM) 50 MG tablet Take 1 tablet (50 mg) by mouth every 6 hours as needed for severe pain 12 tablet 0     traMADol (ULTRAM) 50 MG tablet Take 1 tablet (50 mg) by mouth 2 times  daily as needed for severe pain Okay to fill today, 1/2/2019. 22 tablet 0     traMADol (ULTRAM) 50 MG tablet Take 1 tablet (50 mg) by mouth every 8 hours as needed for severe pain . 45 tablets to last 30 days. 45 tablet 0     Allergies   Allergen Reactions     Ondansetron Nausea and Vomiting     Sulfa Drugs Hives     Rash       Recent Labs   Lab Test 06/18/18  1656 09/14/16  1102 02/19/15  1029 01/06/15  1120   LDL  --  122*  --  94   HDL  --  73  --  75   TRIG  --  111  --  63   ALT 21  --  20 14   CR 0.64  --  0.52 0.60   GFRESTIMATED >90  --  >90  Non  GFR Calc   >90  Non  GFR Calc     GFRESTBLACK >90  --  >90   GFR Calc   >90   GFR Calc     POTASSIUM 4.5  --  3.9 4.2   TSH  --   --   --  3.60        Mammogram Screening: Patient over age 50, mutual decision to screen reflected in health maintenance.    Pertinent mammograms are reviewed under the imaging tab.  History of abnormal Pap smear:   Last 3 Pap and HPV Results:   PAP / HPV Latest Ref Rng & Units 12/19/2017 9/14/2016 6/17/2015   PAP - LSIL(A) ASC-US(A) ASC-US(A)   HPV 16 DNA NEG:Negative Positive(A) Positive(A) Positive(A)   HPV 18 DNA NEG:Negative Negative Negative Negative   OTHER HR HPV NEG:Negative Positive(A) Positive(A) Positive(A)     PAP / HPV Latest Ref Rng & Units 12/19/2017 9/14/2016 6/17/2015   PAP - LSIL(A) ASC-US(A) ASC-US(A)   HPV 16 DNA NEG:Negative Positive(A) Positive(A) Positive(A)   HPV 18 DNA NEG:Negative Negative Negative Negative   OTHER HR HPV NEG:Negative Positive(A) Positive(A) Positive(A)     Reviewed and updated as needed this visit by clinical staff  Tobacco  Allergies  Med Hx  Surg Hx  Fam Hx  Soc Hx        Reviewed and updated as needed this visit by Provider        Tramadol and alprazolam refills due today     Review of Systems   Constitutional: Negative for chills and fever.   HENT: Negative for congestion, ear pain, hearing loss and sore throat.    Eyes:  "Negative for pain and visual disturbance.   Respiratory: Negative for cough and shortness of breath.    Cardiovascular: Negative for chest pain, palpitations and peripheral edema.   Gastrointestinal: Negative for abdominal pain, constipation, diarrhea, heartburn, hematochezia and nausea.   Breasts:  Negative for tenderness, breast mass and discharge.   Genitourinary: Positive for frequency, pelvic pain and urgency. Negative for dysuria, genital sores, hematuria, vaginal bleeding and vaginal discharge.   Musculoskeletal: Positive for myalgias. Negative for arthralgias and joint swelling.   Skin: Negative for rash.   Neurological: Positive for headaches. Negative for dizziness, weakness and paresthesias.   Psychiatric/Behavioral: Negative for mood changes. The patient is nervous/anxious.         OBJECTIVE:   /74   Pulse 55   Temp 97  F (36.1  C) (Oral)   Resp 18   Ht 1.613 m (5' 3.5\")   Wt 51.8 kg (114 lb 3.2 oz)   LMP 09/01/2016   SpO2 98%   BMI 19.91 kg/m    Physical Exam  GENERAL: healthy, alert and no distress  EYES: Eyes grossly normal to inspection, PERRL and conjunctivae and sclerae normal  HENT: ear canals and TM's normal, nose and mouth without ulcers or lesions  NECK: no adenopathy, no asymmetry, masses, or scars and thyroid normal to palpation  RESP: lungs clear to auscultation - no rales, rhonchi or wheezes  BREAST: normal without masses, tenderness or nipple discharge and no palpable axillary masses or adenopathy  CV: regular rate and rhythm, normal S1 S2, no S3 or S4, no murmur, click or rub, no peripheral edema and peripheral pulses strong  ABDOMEN: soft, nontender, no hepatosplenomegaly, no masses and bowel sounds normal   (female): normal female external genitalia, normal urethral meatus, vaginal mucosa pink, moist, well rugated, and normal cervix/adnexa/uterus without masses or discharge  MS: no gross musculoskeletal defects noted, no edema  SKIN: no suspicious lesions or " "rashes  NEURO: Normal strength and tone, mentation intact and speech normal  PSYCH: mentation appears normal, affect normal/bright    Diagnostic Test Results:  Results pending     ASSESSMENT/PLAN:   (Z00.00) Routine general medical examination at a health care facility  (primary encounter diagnosis)  Comment:   Plan: Lipid panel reflex to direct LDL Fasting,         Hemoglobin, Glucose            (R87.610) Papanicolaou smear of cervix with atypical squamous cells of undetermined significance (ASC-US)  Comment:   Plan: HPV High Risk Types DNA Cervical, Pap imaged         thin layer diagnostic with HPV (select HPV         order below), CANCELED: Pap imaged thin layer         screen with HPV - recommended age 30 - 65 years        (select HPV order below)        Done today    (N95.1) Symptomatic menopausal or female climacteric states  Comment:   Plan: estradiol (ESTRACE) 1 MG tablet        Refills given.  Follow up with Dr. James for additional recommendations.     (M54.5) Midline low back pain without sciatica, unspecified chronicity  Comment:   Plan: traMADol (ULTRAM) 50 MG tablet, traMADol         (ULTRAM) 50 MG tablet, traMADol (ULTRAM) 50 MG         tablet, OFFICE/OUTPT VISIT,EST,LEVL III        Refills given.    (F41.1) Generalized anxiety disorder  Comment:   Plan: ALPRAZolam (XANAX) 0.5 MG tablet, ALPRAZolam         (XANAX) 0.5 MG tablet, ALPRAZolam (XANAX) 0.5         MG tablet, OFFICE/OUTPT VISIT,EST,LEVL III        Refills given.      COUNSELING:  Reviewed preventive health counseling, as reflected in patient instructions    BP Readings from Last 1 Encounters:   02/12/19 113/74     Estimated body mass index is 19.91 kg/m  as calculated from the following:    Height as of this encounter: 1.613 m (5' 3.5\").    Weight as of this encounter: 51.8 kg (114 lb 3.2 oz).           reports that  has never smoked. she has never used smokeless tobacco.      Counseling Resources:  ATP IV Guidelines  Pooled Cohorts " Equation Calculator  Breast Cancer Risk Calculator  FRAX Risk Assessment  ICSI Preventive Guidelines  Dietary Guidelines for Americans, 2010  USDA's MyPlate  ASA Prophylaxis  Lung CA Screening    JEREMY Kent CNP  [facility]

## 2019-02-13 LAB
CHOLEST SERPL-MCNC: 183 MG/DL
GLUCOSE SERPL-MCNC: 79 MG/DL (ref 70–99)
HDLC SERPL-MCNC: 58 MG/DL
LDLC SERPL CALC-MCNC: 112 MG/DL
NONHDLC SERPL-MCNC: 125 MG/DL
TRIGL SERPL-MCNC: 66 MG/DL

## 2019-02-13 NOTE — RESULT ENCOUNTER NOTE
Kade Burks,    This note is to let you know that all of your blood test results look great.  Let me know if you have any questions.    Yasmin LUNA CNP

## 2019-02-14 LAB
COPATH REPORT: NORMAL
PAP: NORMAL

## 2019-02-15 ENCOUNTER — NURSE TRIAGE (OUTPATIENT)
Dept: NURSING | Facility: CLINIC | Age: 54
End: 2019-02-15

## 2019-02-15 ENCOUNTER — TELEPHONE (OUTPATIENT)
Dept: FAMILY MEDICINE | Facility: CLINIC | Age: 54
End: 2019-02-15

## 2019-02-16 NOTE — TELEPHONE ENCOUNTER
Clinic Action Needed: Yes medication refill  FNA Triage Call  Presenting Problem    :Patient called called very frustrated about not being able fill her Tramadol prescription for the last 2 days. Her prescription is dated 2/12/19, but after calling the pharmcy it was not filled until 1/24/19.  FNA then called patient back to explain the paper copy for her medication and how it works, and that the Dr has written it is to last 30 days .  She has been taking 4 a day due to a fall. She was seen in urgent care and prescribed Tramadol for the pain.  Patient still in pain, FNA suggested she go to urgent care to be seen if she is still in pain, did not want triage. Will route this message to PCP .    Patients prescription is for 1 to 2 tablets daily but written to dispense 45 tablets to last 30 days  Not enough to get through the end of the month if taking 2.    Dayana Gandhi RN/ [facility]      Routed to:  [facility] Triage

## 2019-02-16 NOTE — TELEPHONE ENCOUNTER
Patient called called very frustrated about not being able fill her Tramadol prescription for the last 2 days. Her prescription is dated 2/12/19, but after calling the pharmcy it was not filled until 1/24/19.  [facility] then called patient back to explain the paper copy for her medication and how it works, and that the Dr has written it is to last 30 days .  She has been taking 4 a day due to a fall. She was seen in urgent care and prescribed Tramadol for the pain.  Patient still in pain, [facility] suggested she go to urgent care to be seen if she is still in pain, did not want triage. Will route this message to PCP .    Dayana Gandhi RN/ [facility]        Reason for Disposition    Caller requesting a NON-URGENT new prescription or refill and triager unable to refill per unit policy    Protocols used: MEDICATION QUESTION CALL-ADULT-

## 2019-02-18 ENCOUNTER — NURSE TRIAGE (OUTPATIENT)
Dept: NURSING | Facility: CLINIC | Age: 54
End: 2019-02-18

## 2019-02-18 LAB
FINAL DIAGNOSIS: ABNORMAL
HPV HR 12 DNA CVX QL NAA+PROBE: NEGATIVE
HPV16 DNA SPEC QL NAA+PROBE: POSITIVE
HPV18 DNA SPEC QL NAA+PROBE: NEGATIVE
SPECIMEN DESCRIPTION: ABNORMAL
SPECIMEN SOURCE CVX/VAG CYTO: ABNORMAL

## 2019-02-18 NOTE — TELEPHONE ENCOUNTER
"Covering providers please review message below - request for pain medication     S-(situation): Phone call to patient - she is without pain medication and frustrated     B-(background): patient states she typically takes tramadol for pain - she had a fall and was given another rx for tramadol by urgent care provider and now she is unable to fill the rx of tramadol written by JEREMY Kent CNP    A-(assessment):   Patient states she is having pain in her tailbone area   She \"knows that something is broken or something is very wrong\"   Wondering if she should see ortho   Currently out of all pain medication   Patient feels she should have a different pain medication as she typically takes tramadol for a different reason     R-(recommendations): advised that JEREMY Kent CNP is out of the office today - returning tomorrow   Patient would like this reviewed by another provider today - and advise on recommendations     Edis Aguilera   [facility]       "

## 2019-02-19 NOTE — TELEPHONE ENCOUNTER
When the patient came into the clinic I tried to line up all of her prescriptions so that they could be filled at the same time.  The tramadol rx dated 2/12/2019 Is okay to fill now.  Our controlled substance agreement is that a 45 tablet supply of tramdol is to last 30 days and she needs to stay to that plan.  If my agreement with Catie is not satisfactory for her regarding her tramadol/controlled substance prescriptions, she could find an alternate provider who may be able to satisfy her request.    She also is going to neurosurg, neuro, etc. She is to continue those follos up appointments.

## 2019-02-19 NOTE — TELEPHONE ENCOUNTER
Patient calls with two concerns.  Patient reports recent injury and says pain is worse.  Patient says she has been waiting for call back to see what her next step is for the injury.   FNA advised message has been routed and waiting on response from a provider.  Also, re: Tramadol, she has two prescriptions because each was for a different reason.  FNA advised she could be seen either in  tonight due to increase in pain or she can see ortho.  FNA advised she would have to check with her insurance to see if she needs a referral for ortho or not and then request Yasmin put in referral.  Patient says she will call [facility] tomorrow to see if they would see her.          Reason for Disposition    [1] Follow-up call from patient regarding patient's clinical status AND [2] information NON-URGENT    Additional Information    Negative: Lab calling with strep throat test results and triager can call in prescription    Negative: Lab calling with urinalysis test results and triager can call in prescription    Negative: Medication questions    Negative: ED call to PCP    Negative: Physician call to PCP    Negative: Call about patient who is currently hospitalized    Negative: Lab or radiology calling with CRITICAL test results    Negative: [1] Prescription not at pharmacy AND [2] was prescribed today by PCP    Negative: [1] Follow-up call from patient regarding patient's clinical status AND [2] information urgent    Negative: [1] Caller requests to speak ONLY to PCP AND [2] URGENT question    Negative: [1] Caller requests to speak to PCP now AND [2] won't tell us reason for call  (Exception: if 10 pm to 6 am, caller must first discuss reason for the call)    Negative: Notification of hospital admission    Negative: Notification of death    Negative: Lab or radiology calling with test results    Negative: Caller requesting lab results    Protocols used: PCP CALL - NO TRIAGE-ADULT-

## 2019-02-25 ENCOUNTER — TRANSFERRED RECORDS (OUTPATIENT)
Dept: HEALTH INFORMATION MANAGEMENT | Facility: CLINIC | Age: 54
End: 2019-02-25

## 2019-03-07 DIAGNOSIS — M54.50 MIDLINE LOW BACK PAIN WITHOUT SCIATICA, UNSPECIFIED CHRONICITY: ICD-10-CM

## 2019-03-07 NOTE — TELEPHONE ENCOUNTER
Pending Prescriptions:                       Disp   Refills    traMADol (ULTRAM) 50 MG tablet            45 tab*0            Sig: Take 1 tablet (50 mg) by mouth every 8 hours as           needed for severe pain . 45 tablets to last 30           days.    Will send to MD to refill is appropriate.  Aleshia Washington

## 2019-03-11 RX ORDER — TRAMADOL HYDROCHLORIDE 50 MG/1
50 TABLET ORAL EVERY 8 HOURS PRN
Qty: 45 TABLET | Refills: 0 | Status: SHIPPED | OUTPATIENT
Start: 2019-04-12 | End: 2019-04-25

## 2019-03-25 ENCOUNTER — TRANSFERRED RECORDS (OUTPATIENT)
Dept: HEALTH INFORMATION MANAGEMENT | Facility: CLINIC | Age: 54
End: 2019-03-25

## 2019-04-03 ENCOUNTER — OFFICE VISIT (OUTPATIENT)
Dept: OBGYN | Facility: CLINIC | Age: 54
End: 2019-04-03
Payer: COMMERCIAL

## 2019-04-03 VITALS
DIASTOLIC BLOOD PRESSURE: 77 MMHG | HEART RATE: 83 BPM | BODY MASS INDEX: 19.7 KG/M2 | SYSTOLIC BLOOD PRESSURE: 114 MMHG | WEIGHT: 113 LBS

## 2019-04-03 DIAGNOSIS — R87.810 CERVICAL HIGH RISK HUMAN PAPILLOMAVIRUS (HPV) DNA TEST POSITIVE: Primary | ICD-10-CM

## 2019-04-03 DIAGNOSIS — R87.610 PAPANICOLAOU SMEAR OF CERVIX WITH ATYPICAL SQUAMOUS CELLS OF UNDETERMINED SIGNIFICANCE (ASC-US): ICD-10-CM

## 2019-04-03 PROCEDURE — 57456 ENDOCERV CURETTAGE W/SCOPE: CPT | Performed by: OBSTETRICS & GYNECOLOGY

## 2019-04-03 PROCEDURE — 88305 TISSUE EXAM BY PATHOLOGIST: CPT | Performed by: OBSTETRICS & GYNECOLOGY

## 2019-04-03 NOTE — PROGRESS NOTES
Catie Nuñez is a 53 year old female  who presents for a repeat colposcopy, referred by Yasmin Velazquez CNP of [facility]. Pap smear 2 months ago showed: Normal, with high risk HPV present: 16. The prior pap showed LGSIL and with high risk HPV present:  16 and other .     Patient's last menstrual period was 2016.  UPT today is not done  Patient does not smoke  Type of contraception: none  Age at first sexual intercourse: 32  Number of sexual partners (lifetime): 6   Past GYN history: No STD history and HPV  Prior cervical/vaginal disease: Normal exam without visible pathology.  Prior cervical treatment: no treatment.    PROCEDURE:  Before the procedure, it was ensured that the patient was educated regarding the nature of her findings to date, the implications, and what was to be done. She has been made aware of the role of HPV, the natural history of infection, ways to minimize her future risk, the effect of HPV on the cervix, and treatment options available should they be indicated. The details of the   colposcopic procedure were reviewed. All questions were answered before proceeding, and informed consent was therefore obtained.  Speculum placed in vagina and excellent visualization of cervix   acheived, cervix swabbed x 3 with acetic acid solution.    FINDINGS:  Cervix: no visible lesions  Please refer to images section for details.  Pap repeated?: No  SCJ seen?: yes   ECC done?: Yes    ECC was performed using a sterile Kevorkian curette.  Good tissue was obtained with curettage and patient tolerated the procedure well.  Tissue collected with sterile ring forceps and sent in formalin.    Lugol's solution used?: Yes Portio stained completely.    Satisfactory examination?: yes  ASSESSMENT: Normal exam without visible pathology.  PLAN: specimen labelled and sent to Pathology  If negative for high grade lesion/VLADIMIR 2 or greater, she will RTC one year for Pap and HPV testing.     All of  the patients questions were answered to her apparent satisfaction      Emmie James MD

## 2019-04-03 NOTE — NURSING NOTE
"Chief Complaint   Patient presents with     Colposcopy       Initial /77   Pulse 83   Wt 51.3 kg (113 lb)   LMP 2016   BMI 19.70 kg/m   Estimated body mass index is 19.7 kg/m  as calculated from the following:    Height as of 19: 1.613 m (5' 3.5\").    Weight as of this encounter: 51.3 kg (113 lb).  BP completed using cuff size: regular    Questioned patient about current smoking habits.  Pt. has never smoked.          The following HM Due: NONE      The following patient reported/Care Every where data was sent to:  P ABSTRACT QUALITY INITIATIVES [[id number]]        N/a      Antionette Pedraza MA                "

## 2019-04-08 LAB — COPATH REPORT: NORMAL

## 2019-04-25 ENCOUNTER — OFFICE VISIT (OUTPATIENT)
Dept: FAMILY MEDICINE | Facility: CLINIC | Age: 54
End: 2019-04-25
Payer: COMMERCIAL

## 2019-04-25 VITALS
DIASTOLIC BLOOD PRESSURE: 81 MMHG | HEART RATE: 71 BPM | SYSTOLIC BLOOD PRESSURE: 114 MMHG | HEIGHT: 64 IN | OXYGEN SATURATION: 98 % | BODY MASS INDEX: 19.12 KG/M2 | WEIGHT: 112 LBS | RESPIRATION RATE: 16 BRPM | TEMPERATURE: 98.7 F

## 2019-04-25 DIAGNOSIS — J30.2 ACUTE SEASONAL ALLERGIC RHINITIS: ICD-10-CM

## 2019-04-25 DIAGNOSIS — F41.1 GENERALIZED ANXIETY DISORDER: Primary | ICD-10-CM

## 2019-04-25 DIAGNOSIS — Z12.11 SCREENING FOR COLON CANCER: ICD-10-CM

## 2019-04-25 DIAGNOSIS — M54.50 MIDLINE LOW BACK PAIN WITHOUT SCIATICA, UNSPECIFIED CHRONICITY: ICD-10-CM

## 2019-04-25 PROCEDURE — 99214 OFFICE O/P EST MOD 30 MIN: CPT | Performed by: NURSE PRACTITIONER

## 2019-04-25 RX ORDER — ALPRAZOLAM 0.5 MG
TABLET ORAL
Qty: 30 TABLET | Refills: 0 | Status: SHIPPED | OUTPATIENT
Start: 2019-06-25 | End: 2019-07-17

## 2019-04-25 RX ORDER — TRAMADOL HYDROCHLORIDE 50 MG/1
50 TABLET ORAL 2 TIMES DAILY PRN
Qty: 45 TABLET | Refills: 0 | Status: SHIPPED | OUTPATIENT
Start: 2019-05-25 | End: 2019-07-17

## 2019-04-25 RX ORDER — SERTRALINE HYDROCHLORIDE 100 MG/1
200 TABLET, FILM COATED ORAL DAILY
Qty: 60 TABLET | Refills: 11 | Status: SHIPPED | OUTPATIENT
Start: 2019-04-25 | End: 2020-04-29

## 2019-04-25 RX ORDER — TRAMADOL HYDROCHLORIDE 50 MG/1
50 TABLET ORAL EVERY 8 HOURS PRN
Qty: 45 TABLET | Refills: 0 | Status: SHIPPED | OUTPATIENT
Start: 2019-06-25 | End: 2019-07-04

## 2019-04-25 RX ORDER — ALPRAZOLAM 0.5 MG
TABLET ORAL
Qty: 30 TABLET | Refills: 0 | Status: SHIPPED | OUTPATIENT
Start: 2019-04-25 | End: 2019-07-17

## 2019-04-25 RX ORDER — TRAMADOL HYDROCHLORIDE 50 MG/1
50 TABLET ORAL 2 TIMES DAILY PRN
Qty: 45 TABLET | Refills: 0 | Status: SHIPPED | OUTPATIENT
Start: 2019-04-25 | End: 2019-07-17

## 2019-04-25 RX ORDER — ALPRAZOLAM 0.5 MG
TABLET ORAL
Qty: 30 TABLET | Refills: 0 | Status: SHIPPED | OUTPATIENT
Start: 2019-05-25 | End: 2019-07-17

## 2019-04-25 RX ORDER — FLUTICASONE PROPIONATE 50 MCG
1-2 SPRAY, SUSPENSION (ML) NASAL DAILY
Qty: 18 G | Refills: 11 | Status: SHIPPED | OUTPATIENT
Start: 2019-04-25 | End: 2020-04-29

## 2019-04-25 ASSESSMENT — ANXIETY QUESTIONNAIRES
1. FEELING NERVOUS, ANXIOUS, OR ON EDGE: NEARLY EVERY DAY
3. WORRYING TOO MUCH ABOUT DIFFERENT THINGS: SEVERAL DAYS
GAD7 TOTAL SCORE: 7
7. FEELING AFRAID AS IF SOMETHING AWFUL MIGHT HAPPEN: NOT AT ALL
IF YOU CHECKED OFF ANY PROBLEMS ON THIS QUESTIONNAIRE, HOW DIFFICULT HAVE THESE PROBLEMS MADE IT FOR YOU TO DO YOUR WORK, TAKE CARE OF THINGS AT HOME, OR GET ALONG WITH OTHER PEOPLE: SOMEWHAT DIFFICULT
5. BEING SO RESTLESS THAT IT IS HARD TO SIT STILL: NOT AT ALL
6. BECOMING EASILY ANNOYED OR IRRITABLE: SEVERAL DAYS
2. NOT BEING ABLE TO STOP OR CONTROL WORRYING: SEVERAL DAYS

## 2019-04-25 ASSESSMENT — PATIENT HEALTH QUESTIONNAIRE - PHQ9: 5. POOR APPETITE OR OVEREATING: SEVERAL DAYS

## 2019-04-25 ASSESSMENT — MIFFLIN-ST. JEOR: SCORE: 1090.09

## 2019-04-25 NOTE — PROGRESS NOTES
"    SUBJECTIVE:   Catie Nuñez is a 53 year old female who presents to clinic today for the following   health issues:      Chief Complaint   Patient presents with     Recheck Medication     Anxiety:  Has been a little worse lately.  Taking alprazolam 1/2 tablet twice a day or 1 tablet daily.  Needing refills today.  She has more anxiety lately and she plans to restart psychotherapy.    Chronic pain:  Taking tramadol intermittently.  45 tablets to last 30 days.   Requesting refills today.       Reviewed  and updated as needed this visit by clinical staff  Tobacco  Allergies  Meds  Med Hx  Surg Hx  Fam Hx  Soc Hx        Reviewed and updated as needed this visit by Provider         Patient Active Problem List   Diagnosis     CARDIOVASCULAR SCREENING; LDL GOAL LESS THAN 160     Adhesive capsulitis of shoulder     Scoliosis     Shoulder impingement     Cervicalgia     Generalized anxiety disorder     Pulmonary nodule     Migraine without aura and without status migrainosus, not intractable     Controlled substance agreement signed     ASCUS Pap + high risk HPV, + 16, + \"other\"      Papanicolaou smear of cervix with low grade squamous intraepithelial lesion (LGSIL)     Acute seasonal allergic rhinitis, unspecified trigger     Chronic pain syndrome     Cervical high risk human papillomavirus (HPV 16) DNA test positive     History reviewed. No pertinent surgical history.    Social History     Tobacco Use     Smoking status: Never Smoker     Smokeless tobacco: Never Used   Substance Use Topics     Alcohol use: Yes     Alcohol/week: 0.0 oz     Comment: occ, rare      Family History   Problem Relation Age of Onset     Heart Disease Father      Asthma No family hx of      Diabetes No family hx of      Hypertension No family hx of      Cerebrovascular Disease No family hx of      Breast Cancer No family hx of          Current Outpatient Medications   Medication Sig Dispense Refill     ALPRAZolam (XANAX) 0.5 MG tablet " Take 1/2 tablet (0.25mg) in the morning and 1/2 tablet (0.25mg) in the evening 30 tablet 0     ALPRAZolam (XANAX) 0.5 MG tablet Take 1/2 tablet (0.25mg) in the morning and 1/2 tablet (0.25mg) in the evening prn. 30 tablets to last 30 days. 30 tablet 0     ALPRAZolam (XANAX) 0.5 MG tablet Take 1/2 tablet (0.25mg) in the morning and 1/2 tablet (0.25mg) in the evening prn. 30 tablets to last 30 days. 30 tablet 0     Aspirin-Acetaminophen-Caffeine (EXCEDRIN PO) Take by mouth as needed       estradiol (ESTRACE) 1 MG tablet Take 0.5 tablets (0.5 mg) by mouth daily 90 tablet 1     fluticasone (FLONASE) 50 MCG/ACT spray Spray 1-2 sprays into both nostrils daily 3 Bottle 3     guaiFENesin (ROBITUSSIN) 100 MG/5ML liquid Take 10 mLs (200 mg) by mouth every 4 hours as needed for cough 473 mL 0     ibuprofen (ADVIL/MOTRIN) 600 MG tablet Take 600 mg by mouth Take 1 tablet by mouth 4 times daily if needed. Maximum of 3200 mg in 24 hours.       medroxyPROGESTERone (PROVERA) 2.5 MG tablet Take 1 tablet (2.5 mg) by mouth daily 90 tablet 3     Multiple Vitamins-Minerals (MULTIVITAMIN PO)        order for DME Seat Donut 1 each 0     riboflavin 400 MG CAPS Take 400 mg by mouth every morning 30 capsule 0     sertraline (ZOLOFT) 100 MG tablet Take 2 tablets (200 mg) by mouth daily 60 tablet 5     traMADol (ULTRAM) 50 MG tablet Take 1 tablet (50 mg) by mouth every 8 hours as needed for severe pain . 45 tablets to last 30 days. 45 tablet 0     traMADol (ULTRAM) 50 MG tablet Take 1 tablet (50 mg) by mouth 2 times daily as needed for severe pain . 45 tablets to last 30 days. 45 tablet 0     traMADol (ULTRAM) 50 MG tablet Take 1 tablet (50 mg) by mouth 2 times daily as needed for severe pain . 45 tablets to last 30 days. 45 tablet 0     Allergies   Allergen Reactions     Ondansetron Nausea and Vomiting     Sulfa Drugs Hives     Rash       Recent Labs   Lab Test 02/12/19  1642 06/18/18  1656 09/14/16  1102 02/19/15  1029 01/06/15  1120   LDL  "112*  --  122*  --  94   HDL 58  --  73  --  75   TRIG 66  --  111  --  63   ALT  --  21  --  20 14   CR  --  0.64  --  0.52 0.60   GFRESTIMATED  --  >90  --  >90  Non  GFR Calc   >90  Non  GFR Calc     GFRESTBLACK  --  >90  --  >90   GFR Calc   >90   GFR Calc     POTASSIUM  --  4.5  --  3.9 4.2   TSH  --   --   --   --  3.60      BP Readings from Last 3 Encounters:   04/25/19 114/81   04/03/19 114/77   02/12/19 113/74    Wt Readings from Last 3 Encounters:   04/25/19 50.8 kg (112 lb)   04/03/19 51.3 kg (113 lb)   02/12/19 51.8 kg (114 lb 3.2 oz)            Labs reviewed in EPIC    ROS:  Constitutional, HEENT, cardiovascular, pulmonary, GI, , musculoskeletal, neuro, skin, endocrine and psych systems are negative, except as otherwise noted.    OBJECTIVE:     /81 (BP Location: Right arm, Patient Position: Sitting, Cuff Size: Adult Regular)   Pulse 71   Temp 98.7  F (37.1  C) (Oral)   Resp 16   Ht 1.613 m (5' 3.5\")   Wt 50.8 kg (112 lb)   LMP 09/01/2016   SpO2 98%   BMI 19.53 kg/m    Body mass index is 19.53 kg/m .  GENERAL APPEARANCE: healthy, alert and no distress. Smiling.   SKIN: warm and dry  PSYCH: mentation appears normal and affect normal/bright.  Good eye contact.      ASSESSMENT/PLAN:     (F41.1) Generalized anxiety disorder  (primary encounter diagnosis)  Comment: Stable  Plan: ALPRAZolam (XANAX) 0.5 MG tablet, ALPRAZolam         (XANAX) 0.5 MG tablet, ALPRAZolam (XANAX) 0.5         MG tablet, MENTAL HEALTH REFERRAL  - Adult;         Outpatient Treatment;         Individual/Couples/Family/Group Therapy/Health         Psychology; [facility]: [facility]         [phone]; We will contact you to schedule        the appointment or please call with any         questions, sertraline (ZOLOFT) 100 MG tablet       I did go ahead and refill her alprazolam and sertraline today.  She is to take her medications as prescribed.  " I did review with her reducing cutting down on the alprazolam if possible.  She also is aware of the controlled substance policy, do not lose the prescriptions etc.  She is to return to the clinic to see me in 3 months for a follow-up/refills, sooner if problems.    (M54.5) Midline low back pain without sciatica, unspecified chronicity  Comment: Chronic/stable  Plan: traMADol (ULTRAM) 50 MG tablet, traMADol         (ULTRAM) 50 MG tablet, traMADol (ULTRAM) 50 MG         tablet        I did go ahead and refill her Ultram today.  She is to use her medication was prescribed.  She is to cut down and reduce if possible.  She is to return to clinic in 3 months for recheck, sooner if needed.  She is aware of the controlled substance policy, do not lose prescriptions, do not misuse of scription's etc.    (Z12.11) Screening for colon cancer  Comment: Routine  Plan: Fecal colorectal cancer screen (FIT)        FIT at earliest convenience.  If negative for blood, next FIT test in one year.  If positive for blood, referral for colonoscopy.  Patient verbalizes understanding.    (J30.2) Acute seasonal allergic rhinitis  Comment:   Plan: fluticasone (FLONASE) 50 MCG/ACT nasal spray        Refills given per the patient's request.    JEREMY Kent [facility]

## 2019-04-26 ASSESSMENT — ANXIETY QUESTIONNAIRES: GAD7 TOTAL SCORE: 7

## 2019-05-01 ENCOUNTER — TELEPHONE (OUTPATIENT)
Dept: FAMILY MEDICINE | Facility: CLINIC | Age: 54
End: 2019-05-01

## 2019-05-01 NOTE — TELEPHONE ENCOUNTER
Reason for Call:  Other call back    Detailed comments: Rabia states pt is requesting a early refill on ALPRAZolam (XANAX) 0.5 MG tablet, said it was last filled 4.9.19 please advise.    Phone Number Patient can be reached at: Home number on file [phone]    Best Time: asap    Can we leave a detailed message on this number? YES    Call taken on 5/1/2019 at 12:11 PM by Lia Alcaraz

## 2019-05-02 NOTE — TELEPHONE ENCOUNTER
Pt had a 4 day migraine- after her last office visit with you  She took a 1/2 a tab xanax every 8 hours for the 4 days with her ultram    Please advise on her early fill.Clari Isbell RN

## 2019-05-02 NOTE — TELEPHONE ENCOUNTER
Please call this patient.  Why is she taking more alprazolam than we have agreed and why does she need it early?

## 2019-05-02 NOTE — TELEPHONE ENCOUNTER
LMOM asking her to call back to discuss with nurse why she is needing this earlier.  Salud Elizalde RN

## 2019-05-03 NOTE — TELEPHONE ENCOUNTER
"Pt was told Yasmin's message. She did get the rx picked up early yesterday  She is going to save it for when \"I have the occipital pain and the anxiety\"    Consuelo Boone, RN, BSN       "

## 2019-06-09 ENCOUNTER — NURSE TRIAGE (OUTPATIENT)
Dept: NURSING | Facility: CLINIC | Age: 54
End: 2019-06-09

## 2019-06-09 NOTE — TELEPHONE ENCOUNTER
"Patient reports shooting throbbing tooth pain that started yesterday.  Patient says pain level 7-8/10.   FNA offered number to [facility] but patient says she is in too much pain to drive there.  FNA advised ED if she needs pain relief.  FNA advised to call back for list of sliding fee scale/low cost dental places after she takes care of the pain.  Caller verbalizes understanding.        Reason for Disposition    Toothache present > 24 hours    Additional Information    Negative: Shock suspected (e.g., cold/pale/clammy skin, too weak to stand, low BP, rapid pulse)    Negative: [1] Similar pain previously AND [2] it was from \"heart attack\"    Negative: [1] Similar pain previously AND [2] it was from \"angina\" AND [3] not relieved by nitroglycerin    Negative: Sounds like a life-threatening emergency to the triager    Negative: Chest pain    Negative: Toothache followed tooth injury    Negative: Toothache or mouth pain after tooth extraction    Negative: Canker sore (i.e., aphthous ulcer)    Negative: Tongue is very swollen and tender    Negative: [1] Face is swollen AND [2] fever    Negative: Patient sounds very sick or weak to the triager    Negative: [1] SEVERE pain (e.g., excruciating, unable to do any normal activities) AND [2] not improved 2 hours after pain medicine    Negative: Face is very swollen    Negative: Fever    Negative: [1] Face is swollen AND [2] no fever    Protocols used: TOOTHACHE-A-AH      "

## 2019-06-09 NOTE — TELEPHONE ENCOUNTER
Patient was seen at ED earlier this date for tooth pain.  Is requesting early refill of tramadol in case she needs prior to tomorrow.  [facility] paged on call provider, Dr. DARCIE Sena, via Smart Web at 1:42PM to contact [facility] at [phone].  Dr. Sena returned call and said if patient was given Toradol at ED she should be okay until tomorrow.  [facility] contacted patient with above and advised to follow discharge instructions from ED and to follow up with dentist.

## 2019-07-04 DIAGNOSIS — M54.50 MIDLINE LOW BACK PAIN WITHOUT SCIATICA, UNSPECIFIED CHRONICITY: ICD-10-CM

## 2019-07-05 ENCOUNTER — NURSE TRIAGE (OUTPATIENT)
Dept: NURSING | Facility: CLINIC | Age: 54
End: 2019-07-05

## 2019-07-05 RX ORDER — TRAMADOL HYDROCHLORIDE 50 MG/1
TABLET ORAL
Qty: 45 TABLET | Refills: 0 | Status: SHIPPED | OUTPATIENT
Start: 2019-07-05 | End: 2019-07-17

## 2019-07-05 NOTE — TELEPHONE ENCOUNTER
Routing refill request to provider for review/approval because:  Drug not on the [facility] refill protocol   Last filled 5/25/2019 #45 no refills  Last visit 4/25/2019

## 2019-07-05 NOTE — TELEPHONE ENCOUNTER
Patient calling about her Tramadol refill. Her last refill was filled June 9 so she will have to wait until next week to  her new prescription.     Judi Reno RN  [facility]      Reason for Disposition    Caller has NON-URGENT medication question about med that PCP prescribed and triager unable to answer question    Protocols used: MEDICATION QUESTION CALL-A-AH

## 2019-07-05 NOTE — TELEPHONE ENCOUNTER
The pre-written script was not approved due to the new opiod law.  Patient is out of this medication.  Needs it filled today.  Please call when done or if you have any questions.  OK to LM on VM.

## 2019-07-05 NOTE — TELEPHONE ENCOUNTER
traMADol (ULTRAM) 50 MG tablet      Last Written Prescription Date:  6-25-19  Last Fill Quantity: 45 tab,   # refills: 0  Last Office Visit: 4-25-19  Future Office visit:       Routing refill request to provider for review/approval because:  Drug not on the [facility], [facility] or [facility] refill protocol or controlled substance

## 2019-07-17 ENCOUNTER — OFFICE VISIT (OUTPATIENT)
Dept: FAMILY MEDICINE | Facility: CLINIC | Age: 54
End: 2019-07-17
Payer: MEDICAID

## 2019-07-17 VITALS
OXYGEN SATURATION: 100 % | WEIGHT: 114 LBS | SYSTOLIC BLOOD PRESSURE: 118 MMHG | DIASTOLIC BLOOD PRESSURE: 77 MMHG | BODY MASS INDEX: 19.88 KG/M2 | TEMPERATURE: 98.4 F | RESPIRATION RATE: 18 BRPM | HEART RATE: 82 BPM

## 2019-07-17 DIAGNOSIS — M54.50 MIDLINE LOW BACK PAIN WITHOUT SCIATICA, UNSPECIFIED CHRONICITY: ICD-10-CM

## 2019-07-17 DIAGNOSIS — F41.1 GENERALIZED ANXIETY DISORDER: ICD-10-CM

## 2019-07-17 PROCEDURE — 99213 OFFICE O/P EST LOW 20 MIN: CPT | Performed by: NURSE PRACTITIONER

## 2019-07-17 RX ORDER — ALPRAZOLAM 0.5 MG
TABLET ORAL
Qty: 30 TABLET | Refills: 0 | Status: SHIPPED | OUTPATIENT
Start: 2019-09-17 | End: 2019-10-29

## 2019-07-17 RX ORDER — ALPRAZOLAM 0.5 MG
TABLET ORAL
Qty: 30 TABLET | Refills: 0 | Status: SHIPPED | OUTPATIENT
Start: 2019-07-17 | End: 2019-10-01

## 2019-07-17 RX ORDER — TRAMADOL HYDROCHLORIDE 50 MG/1
50 TABLET ORAL 2 TIMES DAILY PRN
Qty: 45 TABLET | Refills: 0 | Status: SHIPPED | OUTPATIENT
Start: 2019-07-17 | End: 2019-08-29

## 2019-07-17 RX ORDER — ALPRAZOLAM 0.5 MG
TABLET ORAL
Qty: 30 TABLET | Refills: 0 | Status: SHIPPED | OUTPATIENT
Start: 2019-08-17 | End: 2019-08-29

## 2019-07-17 NOTE — PROGRESS NOTES
"Subjective     Catie Nuñez is a 54 year old female who presents to clinic today for the following health issues:    HPI   Medication Followup of All Medications    Taking Medication as prescribed: yes    Side Effects:  None    Medication Helping Symptoms:  yes     Having a good summer.  Feeling better.     Insurance problems.  Currently Kimmie does not have insurance.  She has been working on that and she hopes today that her insurance has been reinstated.    Alprazolam:  For anxiety.  1/2 tablet BID.  30 tablets lasts 30 days.  She is taking the medication only as prescribed and she tries to cut down when she is able to.    Tramadol:  45 tablets lasts 30 days.  It works well for her back pain and intermittent headaches.  She is requesting refills today.        Patient Active Problem List   Diagnosis     CARDIOVASCULAR SCREENING; LDL GOAL LESS THAN 160     Adhesive capsulitis of shoulder     Scoliosis     Shoulder impingement     Cervicalgia     Generalized anxiety disorder     Pulmonary nodule     Migraine without aura and without status migrainosus, not intractable     Controlled substance agreement signed     ASCUS Pap + high risk HPV, + 16, + \"other\"      Papanicolaou smear of cervix with low grade squamous intraepithelial lesion (LGSIL)     Acute seasonal allergic rhinitis, unspecified trigger     Chronic pain syndrome     Cervical high risk human papillomavirus (HPV 16) DNA test positive     History reviewed. No pertinent surgical history.    Social History     Tobacco Use     Smoking status: Never Smoker     Smokeless tobacco: Never Used   Substance Use Topics     Alcohol use: Yes     Alcohol/week: 0.0 oz     Comment: occ, rare      Family History   Problem Relation Age of Onset     Heart Disease Father      Asthma No family hx of      Diabetes No family hx of      Hypertension No family hx of      Cerebrovascular Disease No family hx of      Breast Cancer No family hx of          Current Outpatient " Medications   Medication Sig Dispense Refill     [START ON 9/17/2019] ALPRAZolam (XANAX) 0.5 MG tablet Take 1/2 tablet (0.25mg) in the morning and 1/2 tablet (0.25mg) in the evening 30 tablet 0     [START ON 8/17/2019] ALPRAZolam (XANAX) 0.5 MG tablet Take 1/2 tablet (0.25mg) in the morning and 1/2 tablet (0.25mg) in the evening prn. 30 tablets to last 30 days. 30 tablet 0     ALPRAZolam (XANAX) 0.5 MG tablet Take 1/2 tablet (0.25mg) in the morning and 1/2 tablet (0.25mg) in the evening prn. 30 tablets to last 30 days. 30 tablet 0     Aspirin-Acetaminophen-Caffeine (EXCEDRIN PO) Take by mouth as needed       estradiol (ESTRACE) 1 MG tablet Take 0.5 tablets (0.5 mg) by mouth daily 90 tablet 1     fluticasone (FLONASE) 50 MCG/ACT nasal spray Spray 1-2 sprays into both nostrils daily 18 g 11     guaiFENesin (ROBITUSSIN) 100 MG/5ML liquid Take 10 mLs (200 mg) by mouth every 4 hours as needed for cough 473 mL 0     ibuprofen (ADVIL/MOTRIN) 600 MG tablet Take 600 mg by mouth Take 1 tablet by mouth 4 times daily if needed. Maximum of 3200 mg in 24 hours.       medroxyPROGESTERone (PROVERA) 2.5 MG tablet Take 1 tablet (2.5 mg) by mouth daily 90 tablet 3     Multiple Vitamins-Minerals (MULTIVITAMIN PO)        order for [facility] Se[place] 1 each 0     riboflavin 400 MG CAPS Take 400 mg by mouth every morning 30 capsule 0     sertraline (ZOLOFT) 100 MG tablet Take 2 tablets (200 mg) by mouth daily 60 tablet 11     traMADol (ULTRAM) 50 MG tablet Take 1 tablet (50 mg) by mouth 2 times daily as needed for severe pain . 45 tablets to last 30 days. 45 tablet 0     Allergies   Allergen Reactions     Ondansetron Nausea and Vomiting     Sulfa Drugs Hives     Rash       Recent Labs   Lab Test 02/12/19  1642 06/18/18  1656 09/14/16  1102 02/19/15  1029 01/06/15  1120   *  --  122*  --  94   HDL 58  --  73  --  75   TRIG 66  --  111  --  63   ALT  --  21  --  20 14   CR  --  0.64  --  0.52 0.60   GFRESTIMATED  --  >90  --  >90  Non   GFR Calc   >90  Non  GFR Calc     GFRESTBLACK  --  >90  --  >90   GFR Calc   >90   GFR Calc     POTASSIUM  --  4.5  --  3.9 4.2   TSH  --   --   --   --  3.60      BP Readings from Last 3 Encounters:   07/17/19 118/77   04/25/19 114/81   04/03/19 114/77    Wt Readings from Last 3 Encounters:   07/17/19 51.7 kg (114 lb)   04/25/19 50.8 kg (112 lb)   04/03/19 51.3 kg (113 lb)               Reviewed and updated as needed this visit by Provider  Tobacco  Allergies  Meds  Problems  Med Hx  Surg Hx  Fam Hx         Review of Systems   ROS COMP: Constitutional, HEENT, cardiovascular, pulmonary, GI, , musculoskeletal, neuro, skin, endocrine and psych systems are negative, except as otherwise noted.      Objective    /77 (BP Location: Right arm, Patient Position: Sitting, Cuff Size: Adult Regular)   Pulse 82   Temp 98.4  F (36.9  C) (Oral)   Resp 18   Wt 51.7 kg (114 lb)   LMP 09/01/2016   SpO2 100%   BMI 19.88 kg/m    Body mass index is 19.88 kg/m .  Physical Exam   GENERAL APPEARANCE: healthy, alert and no distress. Smiling.   SKIN: warm and dry  PSYCH: mentation appears normal and affect normal/bright.  Good eye contact.          Assessment & Plan     (F41.1) Generalized anxiety disorder  Comment: Chronic  Plan: ALPRAZolam (XANAX) 0.5 MG tablet, ALPRAZolam         (XANAX) 0.5 MG tablet, ALPRAZolam (XANAX) 0.5         MG tablet        I did go ahead and refill her alprazolam today.  I discussed reviewed with her the controlled substance policy, my recommendation to cut down cut out as much of the alprazolam use that she can, use other self calming techniques etc.  She is to return to clinic to see me in 3 months for med check/refills, sooner problems.    (M54.5) Midline low back pain without sciatica, unspecified chronicity  Comment: Chronic  Plan: traMADol (ULTRAM) 50 MG tablet        I did give her a 30-day supply of her tramadol day.   I discussed with her that I am not able to print out a 3-month supply this medication anymore due to new laws.  She is to call the clinic in 1 month for her next month of refills as I will give her tramadol for August and then I will do the same and September.  She is to return to clinic to see me for a face-to-face appointment in October, sooner problems.  I did discuss with her and encouraged her to cut down/cut  out as much of the tramadol as possible.  She agrees and understands.       See Patient Instructions    Return in about 3 months (around 10/17/2019) for Medication follow up.    JEREMY Kent [facility]

## 2019-08-21 DIAGNOSIS — F41.1 GENERALIZED ANXIETY DISORDER: ICD-10-CM

## 2019-08-21 NOTE — TELEPHONE ENCOUNTER
"Requested Prescriptions   Pending Prescriptions Disp Refills     sertraline (ZOLOFT) 100 MG tablet [Pharmacy Med Name: SERTRALINE 100MG TABLETS] 60 tablet 0     Sig: TAKE 2 TABLETS(200 MG) BY MOUTH DAILY      Last Written Prescription Date:  4/25/2019  Last Fill Quantity: 60 tablet     ,  # refills: 11   Last Office Visit: 7/17/2019   Future Office Visit:    Next 5 appointments (look out 90 days)    Sep 18, 2019  2:00 PM CDT  [facility] [address] [place] [id number]  [phone]              SSRIs Protocol Passed - 8/21/2019 12:59 PM        Passed - Recent (12 mo) or future (30 days) visit within the authorizing provider's specialty     Patient had office visit in the last 12 months or has a visit in the next 30 days with authorizing provider or within the authorizing provider's specialty.  See \"Patient Info\" tab in inbasket, or \"Choose Columns\" in Meds & Orders section of the refill encounter.          Passed - Medication is active on med list        Passed - Patient is age 18 or older        Passed - No active pregnancy on record        Passed - No positive pregnancy test in last 12 months        PHQ-9 SCORE 5/19/2017 1/9/2018   PHQ-9 Total Score 5 5   Some encounter information is confidential and restricted. Go to Review Flowsheets activity to see all data.     DAYLIN-7 SCORE 3/29/2018 11/16/2018 4/25/2019   Total Score - - -   Total Score - - -   Total Score 11 7 7           "

## 2019-08-23 RX ORDER — SERTRALINE HYDROCHLORIDE 100 MG/1
TABLET, FILM COATED ORAL
Qty: 60 TABLET | Refills: 0 | OUTPATIENT
Start: 2019-08-23

## 2019-08-29 ENCOUNTER — MYC REFILL (OUTPATIENT)
Dept: FAMILY MEDICINE | Facility: CLINIC | Age: 54
End: 2019-08-29

## 2019-08-29 DIAGNOSIS — M54.50 MIDLINE LOW BACK PAIN WITHOUT SCIATICA, UNSPECIFIED CHRONICITY: ICD-10-CM

## 2019-08-29 DIAGNOSIS — F41.1 GENERALIZED ANXIETY DISORDER: ICD-10-CM

## 2019-09-03 NOTE — TELEPHONE ENCOUNTER
Has been waiting for a call back since last week.  Is now out of med.  Please call asap.  Going out of town ([place]) tomorrow around 12:00pm.  OK to LM on VM

## 2019-09-04 RX ORDER — ALPRAZOLAM 0.5 MG
TABLET ORAL
Qty: 30 TABLET | Refills: 0 | Status: SHIPPED | OUTPATIENT
Start: 2019-09-04 | End: 2019-10-01

## 2019-09-04 RX ORDER — TRAMADOL HYDROCHLORIDE 50 MG/1
50 TABLET ORAL 2 TIMES DAILY PRN
Qty: 45 TABLET | Refills: 0 | Status: SHIPPED | OUTPATIENT
Start: 2019-09-04 | End: 2019-10-01

## 2019-09-04 NOTE — TELEPHONE ENCOUNTER
Yasmin Velazquez CNP;  Patient called clinic requesting update on refill requests    Routing refill request to provider for review/approval because:  Drug not on the [facility] refill protocol     - ALPRAZolam (XANAX) 0.5 MG tablet   : last dispensed: 08/13/2019 #30/30 day supply prescribed by Yasmin Velazquez CNP.   Patient comment: I called Monday to see if I could be seen in clinic before Wed, Sept. 3rd, but there was no availability.  My alprazolam will be gone as of Sept. 3, which I am aware is 10 days early.  It was last filled on August 13.  Wed I'm flying out to say goodbye to my 92 yr old dad who is dying, and I've had serious panic attacks coping with the emotional aspect of the fact I haven't seen my dad in 10 yrs.     - traMADol (ULTRAM) 50 MG tablet  : lat dispensed: 08/06/2019 #45/30 day supply prescribed by Yasmin Velazquez CNP.     HP Reception: please call patient. Per chart review, patient has a prescription for xanax with 09/17/2019 start date. Does patient still have this Rx?    Thank You!  Susana Mejias, RN  Triage Nurse

## 2019-09-05 NOTE — TELEPHONE ENCOUNTER
Pt informed via VM and mychart about refills.       Yenifer SANTOYO     [facility]

## 2019-09-19 DIAGNOSIS — N95.1 SYMPTOMATIC MENOPAUSAL OR FEMALE CLIMACTERIC STATES: ICD-10-CM

## 2019-09-19 RX ORDER — ESTRADIOL 1 MG/1
0.5 TABLET ORAL DAILY
Qty: 90 TABLET | Refills: 0 | Status: SHIPPED | OUTPATIENT
Start: 2019-09-19 | End: 2019-11-07

## 2019-09-19 RX ORDER — MEDROXYPROGESTERONE ACETATE 2.5 MG/1
2.5 TABLET ORAL DAILY
Qty: 90 TABLET | Refills: 0 | Status: SHIPPED | OUTPATIENT
Start: 2019-09-19 | End: 2019-11-07

## 2019-09-19 NOTE — TELEPHONE ENCOUNTER
Pharmacy sent refill request for medroxyprogesterone.  Pt due for AE.  Pt has appt scheduled for 9/27/19 with Dr. Sanchez as Dr. James was her previous provider but she is no longer here.  Will send short refill per protocol.  Mikayla Garcia RN

## 2019-09-27 DIAGNOSIS — M54.50 MIDLINE LOW BACK PAIN WITHOUT SCIATICA, UNSPECIFIED CHRONICITY: ICD-10-CM

## 2019-09-27 NOTE — TELEPHONE ENCOUNTER
Routing refill request to provider for review/approval because:  Drug not on the [facility] refill protocol     Last visit 7/17/2019  Last filled 9/4/2019 # 45 no refills

## 2019-09-27 NOTE — TELEPHONE ENCOUNTER
Reason for Call:  Medication or medication refill:    Do you use a [facility]?  Name of the pharmacy and phone number for the current request:  [facility][id number] [address] AT [facility]     Name of the medication requested: traMADol (ULTRAM) 50 MG tablet    Other request: Patient is requesting for a refill. Please advise, thank you!    Can we leave a detailed message on this number? YES    Phone number patient can be reached at: Home number on file [phone] (home)    Best Time: anytime    Call taken on 9/27/2019 at 10:23 AM by Hermelinda Reynolds

## 2019-09-30 RX ORDER — TRAMADOL HYDROCHLORIDE 50 MG/1
50 TABLET ORAL 2 TIMES DAILY PRN
Qty: 45 TABLET | Refills: 0 | OUTPATIENT
Start: 2019-09-30

## 2019-10-01 RX ORDER — TRAMADOL HYDROCHLORIDE 50 MG/1
50 TABLET ORAL 2 TIMES DAILY PRN
Qty: 45 TABLET | Refills: 0 | Status: SHIPPED | OUTPATIENT
Start: 2019-10-01 | End: 2019-10-29

## 2019-10-03 ENCOUNTER — TELEPHONE (OUTPATIENT)
Dept: FAMILY MEDICINE | Facility: CLINIC | Age: 54
End: 2019-10-03

## 2019-10-03 NOTE — TELEPHONE ENCOUNTER
Reason for Call:  Other prescription    Detailed comments: Pt is wondering if care team can approve her Tramadol medication one day early since she states last month it was approved on 9/4. pharmacy needs approval from us first.     Phone Number Patient can be reached at: Home number on file [phone] (home)    Best Time: anytime    Can we leave a detailed message on this number? YES    Call taken on 10/3/2019 at 1:02 PM by Yenifer Warren

## 2019-10-04 NOTE — TELEPHONE ENCOUNTER
Left message on voicemail that ok to  rx  And spoke with Selina the pharmacist and gave verbal ok to fill.  Clari Isbell RN

## 2019-10-04 NOTE — TELEPHONE ENCOUNTER
Yasmin Velazquez review:  Situation with the TRAMADOL is that it needed to be sent in early last month due to her going out of town to visit ill father. You wrote it on 9/4. She is wanting this filled today as she is out of it.    The pharmacist said she picked up last refill on 9/9 so they do not want to  fill until 10/9.  A bit confusing.  Do we need to re write this?          Tramadol sent in 10/1/19. Calling pharmacy to discuss.  Salud Elzialde RN

## 2019-10-08 DIAGNOSIS — F41.1 GENERALIZED ANXIETY DISORDER: ICD-10-CM

## 2019-10-09 NOTE — TELEPHONE ENCOUNTER
ALPRAZolam (XANAX) 0.5 MG tablet      Last Written Prescription Date:  9-17-19  Last Fill Quantity: 30 tab,   # refills: 0  Last Office Visit: 7-17-19  Future Office visit:    Next 5 appointments (look out 90 days)    Oct 10, 2019  2:00 PM CDT  Office Visit with Chio Sanchez MD  [facility] ([facility] [address] [place] [id number]  [phone]   Oct 18, 2019 11:00 AM CDT  Office Visit with JEREMY Gonzalez CNP  [facility] ([facility]) [address] [place] [id number]  [phone]           Routing refill request to provider for review/approval because:  Drug not on the [facility], [facility] or  Health refill protocol or controlled substance

## 2019-10-11 RX ORDER — ALPRAZOLAM 0.5 MG
TABLET ORAL
Qty: 30 TABLET | Refills: 0 | Status: SHIPPED | OUTPATIENT
Start: 2019-10-11 | End: 2019-10-29

## 2019-10-29 ENCOUNTER — OFFICE VISIT (OUTPATIENT)
Dept: FAMILY MEDICINE | Facility: CLINIC | Age: 54
End: 2019-10-29
Payer: COMMERCIAL

## 2019-10-29 VITALS
RESPIRATION RATE: 16 BRPM | SYSTOLIC BLOOD PRESSURE: 108 MMHG | BODY MASS INDEX: 18.95 KG/M2 | WEIGHT: 111 LBS | DIASTOLIC BLOOD PRESSURE: 76 MMHG | HEART RATE: 88 BPM | HEIGHT: 64 IN

## 2019-10-29 DIAGNOSIS — N39.0 ACUTE UTI: Primary | ICD-10-CM

## 2019-10-29 DIAGNOSIS — B96.89 BACTERIAL VAGINOSIS: ICD-10-CM

## 2019-10-29 DIAGNOSIS — Z23 NEED FOR PROPHYLACTIC VACCINATION AND INOCULATION AGAINST INFLUENZA: ICD-10-CM

## 2019-10-29 DIAGNOSIS — R82.90 NONSPECIFIC FINDING ON EXAMINATION OF URINE: ICD-10-CM

## 2019-10-29 DIAGNOSIS — F41.1 GENERALIZED ANXIETY DISORDER: ICD-10-CM

## 2019-10-29 DIAGNOSIS — M54.50 MIDLINE LOW BACK PAIN WITHOUT SCIATICA, UNSPECIFIED CHRONICITY: ICD-10-CM

## 2019-10-29 DIAGNOSIS — N76.0 BACTERIAL VAGINOSIS: ICD-10-CM

## 2019-10-29 LAB
ALBUMIN UR-MCNC: NEGATIVE MG/DL
APPEARANCE UR: CLEAR
BACTERIA #/AREA URNS HPF: ABNORMAL /HPF
BILIRUB UR QL STRIP: NEGATIVE
COLOR UR AUTO: YELLOW
GLUCOSE UR STRIP-MCNC: NEGATIVE MG/DL
HGB UR QL STRIP: NEGATIVE
KETONES UR STRIP-MCNC: NEGATIVE MG/DL
LEUKOCYTE ESTERASE UR QL STRIP: ABNORMAL
NITRATE UR QL: POSITIVE
NON-SQ EPI CELLS #/AREA URNS LPF: ABNORMAL /LPF
PH UR STRIP: 7 PH (ref 5–7)
RBC #/AREA URNS AUTO: ABNORMAL /HPF
SOURCE: ABNORMAL
SP GR UR STRIP: 1.01 (ref 1–1.03)
SPECIMEN SOURCE: ABNORMAL
UROBILINOGEN UR STRIP-ACNC: 0.2 EU/DL (ref 0.2–1)
WBC #/AREA URNS AUTO: ABNORMAL /HPF
WET PREP SPEC: ABNORMAL

## 2019-10-29 PROCEDURE — 90682 RIV4 VACC RECOMBINANT DNA IM: CPT | Performed by: NURSE PRACTITIONER

## 2019-10-29 PROCEDURE — 87088 URINE BACTERIA CULTURE: CPT | Performed by: NURSE PRACTITIONER

## 2019-10-29 PROCEDURE — 99214 OFFICE O/P EST MOD 30 MIN: CPT | Mod: 25 | Performed by: NURSE PRACTITIONER

## 2019-10-29 PROCEDURE — 81001 URINALYSIS AUTO W/SCOPE: CPT | Performed by: NURSE PRACTITIONER

## 2019-10-29 PROCEDURE — 87210 SMEAR WET MOUNT SALINE/INK: CPT | Performed by: NURSE PRACTITIONER

## 2019-10-29 PROCEDURE — 87086 URINE CULTURE/COLONY COUNT: CPT | Performed by: NURSE PRACTITIONER

## 2019-10-29 PROCEDURE — 90471 IMMUNIZATION ADMIN: CPT | Performed by: NURSE PRACTITIONER

## 2019-10-29 PROCEDURE — 87186 SC STD MICRODIL/AGAR DIL: CPT | Performed by: NURSE PRACTITIONER

## 2019-10-29 RX ORDER — ALPRAZOLAM 0.5 MG
TABLET ORAL
Qty: 30 TABLET | Refills: 2 | Status: SHIPPED | OUTPATIENT
Start: 2019-10-29 | End: 2019-11-05

## 2019-10-29 RX ORDER — TRAMADOL HYDROCHLORIDE 50 MG/1
50 TABLET ORAL 2 TIMES DAILY PRN
Qty: 45 TABLET | Refills: 0 | Status: SHIPPED | OUTPATIENT
Start: 2019-10-29 | End: 2019-11-21

## 2019-10-29 RX ORDER — METRONIDAZOLE 500 MG/1
500 TABLET ORAL 2 TIMES DAILY
Qty: 14 TABLET | Refills: 0 | Status: SHIPPED | OUTPATIENT
Start: 2019-10-29 | End: 2020-02-07

## 2019-10-29 RX ORDER — CIPROFLOXACIN 250 MG/1
250 TABLET, FILM COATED ORAL 2 TIMES DAILY
Qty: 6 TABLET | Refills: 0 | Status: SHIPPED | OUTPATIENT
Start: 2019-10-29 | End: 2020-04-01

## 2019-10-29 ASSESSMENT — ANXIETY QUESTIONNAIRES
IF YOU CHECKED OFF ANY PROBLEMS ON THIS QUESTIONNAIRE, HOW DIFFICULT HAVE THESE PROBLEMS MADE IT FOR YOU TO DO YOUR WORK, TAKE CARE OF THINGS AT HOME, OR GET ALONG WITH OTHER PEOPLE: SOMEWHAT DIFFICULT
7. FEELING AFRAID AS IF SOMETHING AWFUL MIGHT HAPPEN: NOT AT ALL
2. NOT BEING ABLE TO STOP OR CONTROL WORRYING: SEVERAL DAYS
GAD7 TOTAL SCORE: 9
1. FEELING NERVOUS, ANXIOUS, OR ON EDGE: NEARLY EVERY DAY
3. WORRYING TOO MUCH ABOUT DIFFERENT THINGS: MORE THAN HALF THE DAYS
6. BECOMING EASILY ANNOYED OR IRRITABLE: SEVERAL DAYS
5. BEING SO RESTLESS THAT IT IS HARD TO SIT STILL: NOT AT ALL

## 2019-10-29 ASSESSMENT — MIFFLIN-ST. JEOR: SCORE: 1080.55

## 2019-10-29 ASSESSMENT — PATIENT HEALTH QUESTIONNAIRE - PHQ9
SUM OF ALL RESPONSES TO PHQ QUESTIONS 1-9: 2
5. POOR APPETITE OR OVEREATING: MORE THAN HALF THE DAYS

## 2019-10-29 NOTE — PROGRESS NOTES
"Subjective     Catie Nuñez is a 54 year old female who presents to clinic today for the following health issues:      Chief Complaint   Patient presents with     Pain     needing refills of tramadol     Anxiety     needing refills of alprazolam     UTI     X1 day     Imm/Inj     flu shot needed       Tramadol:  Taking it daily.  45 tablets is lasting 30 days.  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  She takes it regularly.  \"I can't cut down yet.\"    Alprazolam:  0.5mg tablets.  30 tablets will last her 30 days.  She is anxious and getting ready to cut down on the alprazolam.  She is due refills today.   She has been going to therapy but she needs a new therapist now.     Bladder symptoms:  Some frequency, odor, and cloudy urine.  No fever, chills.  Vaginal itching, discharge, odor.    In therapy.  In a relationship.        Patient Active Problem List   Diagnosis     CARDIOVASCULAR SCREENING; LDL GOAL LESS THAN 160     Adhesive capsulitis of shoulder     Scoliosis     Shoulder impingement     Cervicalgia     Generalized anxiety disorder     Pulmonary nodule     Migraine without aura and without status migrainosus, not intractable     Controlled substance agreement signed     ASCUS Pap + high risk HPV, + 16, + \"other\"      Papanicolaou smear of cervix with low grade squamous intraepithelial lesion (LGSIL)     Acute seasonal allergic rhinitis, unspecified trigger     Chronic pain syndrome     Cervical high risk human papillomavirus (HPV 16) DNA test positive     History reviewed. No pertinent surgical history.    Social History     Tobacco Use     Smoking status: Never Smoker     Smokeless tobacco: Never Used   Substance Use Topics     Alcohol use: Yes     Alcohol/week: 0.0 standard drinks     Comment: occ, rare      Family History   Problem Relation Age of Onset     Heart Disease Father      Asthma No family hx of      Diabetes No family hx of      Hypertension No family hx " of      Cerebrovascular Disease No family hx of      Breast Cancer No family hx of          Current Outpatient Medications   Medication Sig Dispense Refill     ALPRAZolam (XANAX) 0.5 MG tablet TAKE 1/2 TABLET BY MOUTH TWICE DAILY AS NEEDED 30 tablet 2     Aspirin-Acetaminophen-Caffeine (EXCEDRIN PO) Take by mouth as needed       ciprofloxacin (CIPRO) 250 MG tablet Take 1 tablet (250 mg) by mouth 2 times daily 6 tablet 0     estradiol (ESTRACE) 1 MG tablet Take 0.5 tablets (0.5 mg) by mouth daily 90 tablet 0     fluticasone (FLONASE) 50 MCG/ACT nasal spray Spray 1-2 sprays into both nostrils daily 18 g 11     ibuprofen (ADVIL/MOTRIN) 600 MG tablet Take 600 mg by mouth Take 1 tablet by mouth 4 times daily if needed. Maximum of 3200 mg in 24 hours.       medroxyPROGESTERone (PROVERA) 2.5 MG tablet Take 1 tablet (2.5 mg) by mouth daily 90 tablet 0     metroNIDAZOLE (FLAGYL) 500 MG tablet Take 1 tablet (500 mg) by mouth 2 times daily for 7 days 14 tablet 0     Multiple Vitamins-Minerals (MULTIVITAMIN PO)        sertraline (ZOLOFT) 100 MG tablet Take 2 tablets (200 mg) by mouth daily 60 tablet 11     traMADol (ULTRAM) 50 MG tablet Take 1 tablet (50 mg) by mouth 2 times daily as needed for severe pain . 45 tablets to last 30 days. 45 tablet 0     Allergies   Allergen Reactions     Ondansetron Nausea and Vomiting     Sulfa Drugs Hives     Rash       Recent Labs   Lab Test 02/12/19  1642 06/18/18  1656 09/14/16  1102 02/19/15  1029 01/06/15  1120   *  --  122*  --  94   HDL 58  --  73  --  75   TRIG 66  --  111  --  63   ALT  --  21  --  20 14   CR  --  0.64  --  0.52 0.60   GFRESTIMATED  --  >90  --  >90  Non  GFR Calc   >90  Non  GFR Calc     GFRESTBLACK  --  >90  --  >90   GFR Calc   >90   GFR Calc     POTASSIUM  --  4.5  --  3.9 4.2   TSH  --   --   --   --  3.60      BP Readings from Last 3 Encounters:   10/29/19 108/76   07/17/19 118/77   04/25/19  "114/81    Wt Readings from Last 3 Encounters:   10/29/19 50.3 kg (111 lb)   07/17/19 51.7 kg (114 lb)   04/25/19 50.8 kg (112 lb)         Reviewed and updated as needed this visit by Provider         Review of Systems   ROS COMP: Constitutional, HEENT, cardiovascular, pulmonary, GI, , musculoskeletal, neuro, skin, endocrine and psych systems are negative, except as otherwise noted.      Objective    /76   Pulse 88   Resp 16   Ht 1.613 m (5' 3.5\")   Wt 50.3 kg (111 lb)   LMP 09/01/2016   Breastfeeding? No   BMI 19.35 kg/m    Body mass index is 19.35 kg/m .  Physical Exam   GENERAL APPEARANCE: healthy, alert and no distress. Smiling.   SKIN: warm and dry  PSYCH: mentation appears normal and affect normal/bright.  Good eye contact.      Diagnostic Test Results:  Labs reviewed in Epic  Results for orders placed or performed in visit on 10/29/19   *UA reflex to Microscopic and Culture ([facility] (except [place] and [place])     Status: Abnormal   Result Value Ref Range    Color Urine Yellow     Appearance Urine Clear     Glucose Urine Negative NEG^Negative mg/dL    Bilirubin Urine Negative NEG^Negative    Ketones Urine Negative NEG^Negative mg/dL    Specific Gravity Urine 1.015 1.003 - 1.035    Blood Urine Negative NEG^Negative    pH Urine 7.0 5.0 - 7.0 pH    Protein Albumin Urine Negative NEG^Negative mg/dL    Urobilinogen Urine 0.2 0.2 - 1.0 EU/dL    Nitrite Urine Positive (A) NEG^Negative    Leukocyte Esterase Urine Moderate (A) NEG^Negative    Source Midstream Urine    Urine Microscopic     Status: Abnormal   Result Value Ref Range    WBC Urine 10-25 (A) OTO5^0 - 5 /HPF    RBC Urine O - 2 OTO2^O - 2 /HPF    Squamous Epithelial /LPF Urine Few FEW^Few /LPF    Bacteria Urine Many (A) NEG^Negative /HPF   Wet prep     Status: Abnormal   Result Value Ref Range    Specimen Description Vagina     Wet Prep No Trichomonas seen     Wet Prep Few  Clue cells seen   (A)     Wet Prep No yeast " seen     Wet Prep Few  WBC'S seen      Urine Culture Aerobic Bacterial     Status: Abnormal   Result Value Ref Range    Specimen Description Midstream Urine     Culture Micro >100,000 colonies/mL  Escherichia coli   (A)        Susceptibility    Escherichia coli - RON     AMPICILLIN >=32 Resistant ug/mL     CEFAZOLIN* 16 Intermediate ug/mL      * Cefazolin RON breakpoints are for the treatment of uncomplicated urinary tract infections.  For the treatment of systemic infections, please contact the laboratory for additional testing.     CEFOXITIN <=4 Sensitive ug/mL     CEFTAZIDIME <=1 Sensitive ug/mL     CEFTRIAXONE <=1 Sensitive ug/mL     CIPROFLOXACIN <=0.25 Sensitive ug/mL     GENTAMICIN <=1 Sensitive ug/mL     LEVOFLOXACIN <=0.12 Sensitive ug/mL     NITROFURANTOIN <=16 Sensitive ug/mL     TOBRAMYCIN <=1 Sensitive ug/mL     Trimethoprim/Sulfa <=1/19 Sensitive ug/mL     AMPICILLIN/SULBACTAM >=32 Resistant ug/mL     Piperacillin/Tazo <=4 Sensitive ug/mL     CEFEPIME <=1 Sensitive ug/mL             Assessment & Plan     (N39.0) Acute UTI  (primary encounter diagnosis)  Comment: acute  Plan: Wet prep, *UA reflex to Microscopic and Culture        ([facility] (except [place]        and [place]), Urine Microscopic, ciprofloxacin         (CIPRO) 250 MG tablet        Take antibiotics as prescribed. Maintain bladder hygiene: drink 64-80 oz water per day, frequent voiding, void before and after intercourse, drink a large glass of water after intercourse to ensure another void, after voiding/BMs wipe front to back, no bubble baths, cranberry juice okay.    (F41.1) Generalized anxiety disorder  Comment: chronic  Plan: ALPRAZolam (XANAX) 0.5 MG tablet        She has been copliant with face to face appointments for her refills.  I did remind her ot use the alprazolam as little as possible, cut down/cut out if able.  She will try but she will return to the clinic to see me in 3 months, sooner if problems.      (N76.0,  B96.89) Bacterial vaginosis  Comment: acute  Plan: metroNIDAZOLE (FLAGYL) 500 MG tablet        Take the flagyl/metronidazole as prescribed and abstain from all alcohol.    Anticipate steady resolution of symptoms.   Return to the clinic for a recheck if the sx do not resolve with the therapy or worsen in any way.    (M54.5) Midline low back pain without sciatica, unspecified chronicity  Comment: chronic  Plan: traMADol (ULTRAM) 50 MG tablet        I did give her refills of her tramadol today, 45 tablets to last 30 days.  I encouraged her to cut down the tramadol as much as possible.  She will mychart message in for refills for December and January and her next face to face appointment with me in 3 months for refills, sooner prn.     (R82.90) Nonspecific finding on examination of urine  Comment:   Plan: Urine Culture Aerobic Bacterial, ciprofloxacin         (CIPRO) 250 MG tablet            (Z23) Need for prophylactic vaccination and inoculation against influenza  Comment: routine  Plan: INFLUENZA QUAD, RECOMBINANT, P-FREE (RIV4)         (FLUBLOCK) [29429], Vaccine Administration,         Initial [87226]        Given today      Return in about 3 months (around 1/29/2020) for Medication follow up.    EJREMY [facility]

## 2019-10-29 NOTE — PATIENT INSTRUCTIONS
"Tramadol:  I have approved your tramadol today.  For your November and December Tramadol prescriptions, please MyChart message me and I will approve refills.  You will be due to return to the clinic to see me for a med check appointment in January.  Cut down on your use of this medication if possible.  Return sooner as needed.    Alprazolam:  I have approved a 30 day supply with refills.  You will be due to return to the clinic to see me for a med check appointment in January.  Cut down on your use of this medication if possible.  Return sooner as needed.    Patient Education     Bladder Infection, Female (Adult)    Urine is normally doesn't have any bacteria in it. But bacteria can get into the urinary tract from the skin around the rectum. Or they can travel in the blood from elsewhere in the body. Once they are in your urinary tract, they can cause infection in the urethra (urethritis), the bladder (cystitis), or the kidneys (pyelonephritis).  The most common place for an infection is in the bladder. This is called a bladder infection. This is one of the most common infections in women. Most bladder infections are easily treated. They are not serious unless the infection spreads to the kidney.  The phrases \"bladder infection,\" \"UTI,\" and \"cystitis\" are often used to describe the same thing. But they are not always the same. Cystitis is an inflammation of the bladder. The most common cause of cystitis is an infection.  Symptoms  The infection causes inflammation in the urethra and bladder. This causes many of the symptoms. The most common symptoms of a bladder infection are:    Pain or burning when urinating    Having to urinate more often than usual    Urgent need to urinate    Only a small amount of urine comes out    Blood in urine    Abdominal discomfort. This is usually in the lower abdomen above the pubic bone.    Cloudy urine    Strong- or bad-smelling urine    Unable to urinate (urinary " retention)    Unable to hold urine in (urinary incontinence)    Fever    Loss of appetite    Confusion (in older adults)  Causes  Bladder infections are not contagious. You can't get one from someone else, from a toilet seat, or from sharing a bath.  The most common cause of bladder infections is bacteria from the bowels. The bacteria get onto the skin around the opening of the urethra. From there, they can get into the urine and travel up to the bladder, causing inflammation and infection. This usually happens because of:    Wiping improperly after urinating. Always wipe from front to back.    Bowel incontinence    Pregnancy    Procedures such as having a catheter inserted    Older age    Not emptying your bladder. This can allow bacteria a chance to grow in your urine.    Dehydration    Constipation    Sex    Use of a diaphragm for birth control   Treatment  Bladder infections are diagnosed by a urine test. They are treated with antibiotics and usually clear up quickly without complications. Treatment helps prevent a more serious kidney infection.  Medicines  Medicines can help in the treatment of a bladder infection:    Take antibiotics until they are used up, even if you feel better. It is important to finish them to make sure the infection has cleared.    You can use acetaminophen or ibuprofen for pain, fever, or discomfort, unless another medicine was prescribed. If you have chronic liver or kidney disease, talk with your healthcare provider before using these medicines. Also talk with your provider if you've ever had a stomach ulcer or gastrointestinal bleeding, or are taking blood-thinner medicines.    If you are given phenazopydridine to reduce burning with urination, it will cause your urine to become a bright orange color. This can stain clothing.  Care and prevention  These self-care steps can help prevent future infections:    Drink plenty of fluids to prevent dehydration and flush out your bladder. Do  this unless you must restrict fluids for other health reasons, or your doctor told you not to.    Proper cleaning after going to the bathroom is important. Wipe from front to back after using the toilet to prevent the spread of bacteria.    Urinate more often. Don't try to hold urine in for a long time.    Wear loose-fitting clothes and cotton underwear. Avoid tight-fitting pants.    Improve your diet and prevent constipation. Eat more fresh fruit and vegetables, and fiber, and less junk and fatty foods.    Avoid sex until your symptoms are gone.    Avoid caffeine, alcohol, and spicy foods. These can irritate your bladder.    Urinate right after intercourse to flush out your bladder.    If you use birth control pills and have frequent bladder infections, discuss it with your doctor.  Follow-up care  Call your healthcare provider if all symptoms are not gone after 3 days of treatment. This is especially important if you have repeat infections.  If a culture was done, you will be told if your treatment needs to be changed. If directed, you can call to find out the results.  If X-rays were done, you will be told if the results will affect your treatment.  Call 911  Call 911 if any of the following occur:    Trouble breathing    Hard to wake up or confusion    Fainting or loss of consciousness    Rapid heart rate  When to seek medical advice  Call your healthcare provider right away if any of these occur:    Fever of 100.4 F (38.0 C) or higher, or as directed by your healthcare provider    Symptoms are not better by the third day of treatment    Back or belly (abdominal) pain that gets worse    Repeated vomiting, or unable to keep medicine down    Weakness or dizziness    Vaginal discharge    Pain, redness, or swelling in the outer vaginal area (labia)  Date Last Reviewed: 10/1/2016    4621-6183 [employer] [address] [place] [address], [place] [address]. All rights reserved. This information is not intended as  a substitute for professional medical care. Always follow your healthcare professional's instructions.

## 2019-10-30 ASSESSMENT — ANXIETY QUESTIONNAIRES: GAD7 TOTAL SCORE: 9

## 2019-10-31 LAB
BACTERIA SPEC CULT: ABNORMAL
SPECIMEN SOURCE: ABNORMAL

## 2019-11-01 ASSESSMENT — ANXIETY QUESTIONNAIRES
6. BECOMING EASILY ANNOYED OR IRRITABLE: SEVERAL DAYS
5. BEING SO RESTLESS THAT IT IS HARD TO SIT STILL: NOT AT ALL
3. WORRYING TOO MUCH ABOUT DIFFERENT THINGS: MORE THAN HALF THE DAYS
GAD7 TOTAL SCORE: 9
7. FEELING AFRAID AS IF SOMETHING AWFUL MIGHT HAPPEN: NOT AT ALL
1. FEELING NERVOUS, ANXIOUS, OR ON EDGE: NEARLY EVERY DAY
IF YOU CHECKED OFF ANY PROBLEMS ON THIS QUESTIONNAIRE, HOW DIFFICULT HAVE THESE PROBLEMS MADE IT FOR YOU TO DO YOUR WORK, TAKE CARE OF THINGS AT HOME, OR GET ALONG WITH OTHER PEOPLE: VERY DIFFICULT
2. NOT BEING ABLE TO STOP OR CONTROL WORRYING: SEVERAL DAYS

## 2019-11-01 ASSESSMENT — PATIENT HEALTH QUESTIONNAIRE - PHQ9: 5. POOR APPETITE OR OVEREATING: MORE THAN HALF THE DAYS

## 2019-11-07 ENCOUNTER — OFFICE VISIT (OUTPATIENT)
Dept: OBGYN | Facility: CLINIC | Age: 54
End: 2019-11-07
Payer: COMMERCIAL

## 2019-11-07 VITALS
HEART RATE: 73 BPM | DIASTOLIC BLOOD PRESSURE: 86 MMHG | BODY MASS INDEX: 20.05 KG/M2 | TEMPERATURE: 97.6 F | SYSTOLIC BLOOD PRESSURE: 131 MMHG | WEIGHT: 115 LBS

## 2019-11-07 DIAGNOSIS — R87.610 PAPANICOLAOU SMEAR OF CERVIX WITH ATYPICAL SQUAMOUS CELLS OF UNDETERMINED SIGNIFICANCE (ASC-US): ICD-10-CM

## 2019-11-07 DIAGNOSIS — Z12.31 ENCOUNTER FOR SCREENING MAMMOGRAM FOR BREAST CANCER: ICD-10-CM

## 2019-11-07 DIAGNOSIS — R87.810 CERVICAL HIGH RISK HUMAN PAPILLOMAVIRUS (HPV) DNA TEST POSITIVE: Primary | ICD-10-CM

## 2019-11-07 DIAGNOSIS — N95.1 SYMPTOMATIC MENOPAUSAL OR FEMALE CLIMACTERIC STATES: ICD-10-CM

## 2019-11-07 PROCEDURE — 99213 OFFICE O/P EST LOW 20 MIN: CPT | Performed by: OBSTETRICS & GYNECOLOGY

## 2019-11-07 PROCEDURE — G0476 HPV COMBO ASSAY CA SCREEN: HCPCS | Performed by: OBSTETRICS & GYNECOLOGY

## 2019-11-07 PROCEDURE — 88175 CYTOPATH C/V AUTO FLUID REDO: CPT | Performed by: OBSTETRICS & GYNECOLOGY

## 2019-11-07 PROCEDURE — 88141 CYTOPATH C/V INTERPRET: CPT | Performed by: OBSTETRICS & GYNECOLOGY

## 2019-11-07 RX ORDER — ESTRADIOL 1 MG/1
0.5 TABLET ORAL DAILY
Qty: 90 TABLET | Refills: 3 | Status: SHIPPED | OUTPATIENT
Start: 2019-11-07 | End: 2020-01-17

## 2019-11-07 RX ORDER — MEDROXYPROGESTERONE ACETATE 2.5 MG/1
2.5 TABLET ORAL DAILY
Qty: 90 TABLET | Refills: 3 | Status: SHIPPED | OUTPATIENT
Start: 2019-11-07 | End: 2020-01-17

## 2019-11-07 NOTE — PROGRESS NOTES
GYN CLINIC   2019  CC: hormone check, pap    HPI:  Catie Nuñez is a 54 year old  here for hormone check and pap smear.    Very stressed and while she is interested in tapering off of HRT does not think now is a good time, maybe in spring. Overally symptoms well controlled on current regimen. No vaginal bleeding. Currently taking estradiol 0.5mg PO daily and Provera 2.5mg PO daily.    Patient requests pap today.  Reviewed pap history.   2015: ASCUS, + HPV 16 & other HR HPV with unexplained endometrial cells. Needs colp and endo bx, US also planned. Tracking started.  7/7/15: [facility] - atypia, no endo bx needed as US showed thin endometrium. Plan cotest pap & HPV in 1 year  16: ASCUS + HR HPV 16 and other. Plan [facility] per provider  10/06/16: [facility] Bx NIL. Plan Cotest in 1 year per provider.  17 LSIL, +HPV 16 & other HR type. Plan: [facility] per guidelines.  18: [facility] ECC benign neg for dysplasia. Plan cotest in 1 year.   19 NIL pap, + HR HPV 16. Plan: colpo  04/3/19: [facility] ECC benign. Plan pap in 1 year.    Vitals:    19 1443   BP: 131/86   Pulse: 73   Temp: 97.6  F (36.4  C)   TempSrc: Oral   Weight: 52.2 kg (115 lb)     Gen: alert, oriented, no distress, very pleasant, appears stated age, well groomed  Abd: soft, nontender, nondistended, no masses or organomegaly  : normal external genitalia without lesions or erythema; normal, well supported urethra, normal Bartholins, normal Skenes; slightly atrophic vaginal mucosa, no lesions or abnormal discharge; medim cornelio speculum inserted without difficulty; normal appearing cervix without lesions, bleeding or discharge. Bimanual exam shows 6week sized uterus, mobile, no fundal tenderness, no CMT, no adnexal masses or tenderness.  Extr: warm, well perfused, nontender,  edema  Psych: affect bright, cooperative, responds appropriately      54 year old  postmenopausal female here for HRT refill and pelvic exam.    1. Symptomatic  menopausal or female climacteric states  - estradiol (ESTRACE) 1 MG tablet; Take 0.5 tablets (0.5 mg) by mouth daily  Dispense: 90 tablet; Refill: 3  - medroxyPROGESTERone (PROVERA) 2.5 MG tablet; Take 1 tablet (2.5 mg) by mouth daily  Dispense: 90 tablet; Refill: 3    2. Cervical high risk human papillomavirus (HPV 16) DNA test positive  Nervous about abnormal paps, requested repeat today.   - Pap imaged thin layer diagnostic with HPV (select HPV order below)  - HPV High Risk Types DNA Cervical    3. Encounter for screening mammogram for breast cancer  - *MA Screening Digital Bilateral; Future    Chio Sanchez MD

## 2019-11-14 LAB
COPATH REPORT: ABNORMAL
PAP: ABNORMAL

## 2019-11-24 ENCOUNTER — MYC REFILL (OUTPATIENT)
Dept: FAMILY MEDICINE | Facility: CLINIC | Age: 54
End: 2019-11-24

## 2019-11-24 DIAGNOSIS — M54.50 MIDLINE LOW BACK PAIN WITHOUT SCIATICA, UNSPECIFIED CHRONICITY: ICD-10-CM

## 2019-11-24 RX ORDER — TRAMADOL HYDROCHLORIDE 50 MG/1
50 TABLET ORAL 2 TIMES DAILY PRN
Qty: 45 TABLET | Refills: 0 | Status: CANCELLED | OUTPATIENT
Start: 2019-11-24

## 2019-11-25 DIAGNOSIS — M54.50 MIDLINE LOW BACK PAIN WITHOUT SCIATICA, UNSPECIFIED CHRONICITY: ICD-10-CM

## 2019-11-25 RX ORDER — TRAMADOL HYDROCHLORIDE 50 MG/1
50 TABLET ORAL 2 TIMES DAILY PRN
Qty: 45 TABLET | Refills: 0 | Status: CANCELLED | OUTPATIENT
Start: 2019-11-25

## 2019-11-25 NOTE — TELEPHONE ENCOUNTER
Requested Prescriptions   Pending Prescriptions Disp Refills     traMADol (ULTRAM) 50 MG tablet 45 tablet 0     Sig: Take 1 tablet (50 mg) by mouth 2 times daily as needed for severe pain . 45 tablets to last 30 days.       There is no refill protocol information for this order              Last Written Prescription Date:  10/29/19  Last Fill Quantity: 45,   # refills: 0  Last Office Visit: 10/29/19  Future Office visit:       Routing refill request to provider for review/approval because:  Drug not on the G, P or [facility] refill protocol or controlled substance

## 2019-11-25 NOTE — TELEPHONE ENCOUNTER
Routing refill request to provider for review/approval because:  Drug not on the [facility] refill protocol     Last filled 10/29/2019 #45 no refills    Last visit 10/29/2019

## 2019-11-25 NOTE — TELEPHONE ENCOUNTER
Patient called to inquire about the status of this request. Informed that all refill requests typically have up to 72 hours to be approved. Please assist. Thanks!

## 2019-11-25 NOTE — TELEPHONE ENCOUNTER
Reason for Call:  Other prescription    Detailed comments: Pt did a mychart refill and NP Caroline is not in today and pt would like a refill of her traMADol (ULTRAM) 50 MG tablet and is worried she may not get it before the holidays , [id number] [address] AT [facility] . Please Advise    Phone Number Patient can be reached at: Work number on file:  There is no work phone number on file. or Cell number on file:    Telephone Information:   Mobile [phone]       Best Time: ASAP    Can we leave a detailed message on this number? YES    Call taken on 11/25/2019 at 1:19 PM by JASON Dias  [place]   Patient Rep

## 2019-11-25 NOTE — TELEPHONE ENCOUNTER
Requested Prescriptions   Pending Prescriptions Disp Refills     traMADol (ULTRAM) 50 MG tablet 45 tablet 0     Sig: Take 1 tablet (50 mg) by mouth 2 times daily as needed for severe pain . 45 tablets to last 30 days.       There is no refill protocol information for this order              Last Written Prescription Date:  10/29/19  Last Fill Quantity: 45,   # refills: 0  Last Office Visit: 10/29/19  Future Office visit:       Routing refill request to provider for review/approval because:  Drug not on the G, P or [facility] refill protocol or controlled substance

## 2019-11-26 ENCOUNTER — TELEPHONE (OUTPATIENT)
Dept: FAMILY MEDICINE | Facility: CLINIC | Age: 54
End: 2019-11-26

## 2019-11-26 NOTE — TELEPHONE ENCOUNTER
Reason for Call:  Other call back    Detailed comments: PT states she knows her med is not due for another week but going through a lot of trauma and would like it refilled asap for her ALPRAZolam (XANAX) 0.5 MG tablet said she would like to discuss asap.     Phone Number Patient can be reached at: Cell number on file:    Telephone Information:   Mobile [phone]       Best Time: asap    Can we leave a detailed message on this number? YES    Call taken on 11/26/2019 at 3:02 PM by Lia Alcaraz

## 2019-11-26 NOTE — TELEPHONE ENCOUNTER
Yasmin Velazquez CNP  Please see pt msg below  Her last fill is 11/7/19    Thanks!     Clari Isbell RN

## 2019-12-16 ENCOUNTER — TELEPHONE (OUTPATIENT)
Dept: FAMILY MEDICINE | Facility: CLINIC | Age: 54
End: 2019-12-16

## 2019-12-16 ENCOUNTER — MYC REFILL (OUTPATIENT)
Dept: FAMILY MEDICINE | Facility: CLINIC | Age: 54
End: 2019-12-16

## 2019-12-16 DIAGNOSIS — F41.1 GENERALIZED ANXIETY DISORDER: ICD-10-CM

## 2019-12-16 DIAGNOSIS — M54.50 MIDLINE LOW BACK PAIN WITHOUT SCIATICA, UNSPECIFIED CHRONICITY: ICD-10-CM

## 2019-12-16 NOTE — TELEPHONE ENCOUNTER
Reason for Call:  Other prescription    Detailed comments: patient is leaving for [place] this Thursday for 10 days and but these prescription are to early to be filled. Patient states that there is still a refill left of her Alprazolam but to early to fill and the other no refills and to early to fill please advise and to what can she do     Phone Number Patient can be reached at: Home number on file [phone] (home)    Best Time: any    Can we leave a detailed message on this number? YES    Call taken on 12/16/2019 at 2:25 PM by Noemy Gooden

## 2019-12-16 NOTE — TELEPHONE ENCOUNTER
Jaycob Velazquez CNP  im unable to get into  so unable to do a med rec on this  By the looks of the xanax rx- pt filling much sooner than every 30 days  So not sure we need to worry about her getting a fill on this  Also, the tramadol, cant we just send it to a pharmacy in [place]- when pt is due?    Please advise- or lets huddle and figure this out.     Thanks!     Clari Isbell RN

## 2019-12-17 NOTE — TELEPHONE ENCOUNTER
Pt over on her fills for the year- see my chart note  Per julio with Yasmin Velazquez CNP-  no early refills-  Clari Isbell RN

## 2019-12-17 NOTE — TELEPHONE ENCOUNTER
[facility] has repeated asked for/gotten filled her alprazolam and tramadol prescriptions.    2019 controlled substance fills:  Alprazolam:  450 tablets (agreement of 360 tablets).    Tramadol:  724 tablets    Vicodin:  65 tablets    Tylenol #3:  20 tablets.    I can no longer approve early refills.  I would recommend (and I have repeatedly recommended) cutting down/cutting out her controlled substance usage.    At her 1/10/2020 appointment with me I will discuss with her the necessity to cut down on these meds with the understanding that I cannot continue to presribe these meds at these quantities for her.      The best I can do for her at this time is to approve a 15 day supply of her tramadol 23 tablets (okay to fill on 12/26/2019) and alprazom 15 tablets (12/27/2019).

## 2019-12-18 RX ORDER — TRAMADOL HYDROCHLORIDE 50 MG/1
50 TABLET ORAL 2 TIMES DAILY PRN
Qty: 45 TABLET | Refills: 0 | Status: CANCELLED | OUTPATIENT
Start: 2019-12-18

## 2019-12-18 RX ORDER — ALPRAZOLAM 0.5 MG
TABLET ORAL
Qty: 30 TABLET | Refills: 2 | Status: CANCELLED | OUTPATIENT
Start: 2019-12-18

## 2019-12-18 NOTE — TELEPHONE ENCOUNTER
Caroline - pharmacy pended - patient concerned she won't be able to [place] out of [place] with NP signature - see mychart

## 2019-12-19 RX ORDER — TRAMADOL HYDROCHLORIDE 50 MG/1
50 TABLET ORAL 2 TIMES DAILY PRN
Qty: 23 TABLET | Refills: 0 | Status: SHIPPED | OUTPATIENT
Start: 2019-12-19 | End: 2020-01-10

## 2019-12-19 RX ORDER — ALPRAZOLAM 0.5 MG
TABLET ORAL
Qty: 15 TABLET | Refills: 0 | Status: SHIPPED | OUTPATIENT
Start: 2019-12-19 | End: 2020-01-10

## 2019-12-19 NOTE — TELEPHONE ENCOUNTER
I will send in these prescriptions on 12/24/2019 for a 12/26 and 12/27 fill.        Artemio Davidson, RN   to Kimmie Nuñez A            1:51 PM   Kimmie Milner Yasmin has advised we can offer a 15 day supply of her tramadol 23 tablets (okay to fill on 12/26/2019) and alprazom 15 tablets (12/27/2019). She asks you follow up with her at your appointment 1/10/20 regarding future refills please     LILLY Luo      Last read by Catie Nuñez

## 2019-12-20 NOTE — TELEPHONE ENCOUNTER
This was taken care of/scripts sent.  I will see Kimmie in January to discuss medications in the future.

## 2020-01-10 ENCOUNTER — OFFICE VISIT (OUTPATIENT)
Dept: FAMILY MEDICINE | Facility: CLINIC | Age: 55
End: 2020-01-10
Payer: COMMERCIAL

## 2020-01-10 ENCOUNTER — TELEPHONE (OUTPATIENT)
Dept: FAMILY MEDICINE | Facility: CLINIC | Age: 55
End: 2020-01-10

## 2020-01-10 VITALS
BODY MASS INDEX: 19.16 KG/M2 | OXYGEN SATURATION: 96 % | HEART RATE: 82 BPM | SYSTOLIC BLOOD PRESSURE: 130 MMHG | TEMPERATURE: 98.9 F | HEIGHT: 65 IN | RESPIRATION RATE: 16 BRPM | WEIGHT: 115 LBS | DIASTOLIC BLOOD PRESSURE: 88 MMHG

## 2020-01-10 DIAGNOSIS — N39.0 ACUTE UTI: ICD-10-CM

## 2020-01-10 DIAGNOSIS — F41.1 GENERALIZED ANXIETY DISORDER: Primary | ICD-10-CM

## 2020-01-10 DIAGNOSIS — M54.50 MIDLINE LOW BACK PAIN WITHOUT SCIATICA, UNSPECIFIED CHRONICITY: ICD-10-CM

## 2020-01-10 DIAGNOSIS — J34.89 SINUS PAIN: ICD-10-CM

## 2020-01-10 LAB
ALBUMIN UR-MCNC: NEGATIVE MG/DL
APPEARANCE UR: CLEAR
BILIRUB UR QL STRIP: NEGATIVE
COLOR UR AUTO: YELLOW
GLUCOSE UR STRIP-MCNC: NEGATIVE MG/DL
HGB UR QL STRIP: NEGATIVE
KETONES UR STRIP-MCNC: NEGATIVE MG/DL
LEUKOCYTE ESTERASE UR QL STRIP: ABNORMAL
NITRATE UR QL: NEGATIVE
PH UR STRIP: 6.5 PH (ref 5–7)
RBC #/AREA URNS AUTO: NORMAL /HPF
SOURCE: ABNORMAL
SP GR UR STRIP: 1.02 (ref 1–1.03)
UROBILINOGEN UR STRIP-ACNC: 0.2 EU/DL (ref 0.2–1)
WBC #/AREA URNS AUTO: NORMAL /HPF

## 2020-01-10 PROCEDURE — 99215 OFFICE O/P EST HI 40 MIN: CPT | Performed by: NURSE PRACTITIONER

## 2020-01-10 PROCEDURE — 81001 URINALYSIS AUTO W/SCOPE: CPT | Mod: 59 | Performed by: NURSE PRACTITIONER

## 2020-01-10 PROCEDURE — 99000 SPECIMEN HANDLING OFFICE-LAB: CPT | Performed by: NURSE PRACTITIONER

## 2020-01-10 PROCEDURE — 80307 DRUG TEST PRSMV CHEM ANLYZR: CPT | Mod: 90 | Performed by: NURSE PRACTITIONER

## 2020-01-10 RX ORDER — ALPRAZOLAM 0.5 MG
TABLET ORAL
Qty: 30 TABLET | Refills: 2 | Status: SHIPPED | OUTPATIENT
Start: 2020-01-10 | End: 2020-04-01

## 2020-01-10 RX ORDER — CIPROFLOXACIN 250 MG/1
250 TABLET, FILM COATED ORAL 2 TIMES DAILY
Qty: 6 TABLET | Refills: 0 | Status: SHIPPED | OUTPATIENT
Start: 2020-01-10 | End: 2020-04-01

## 2020-01-10 RX ORDER — TRAMADOL HYDROCHLORIDE 50 MG/1
50 TABLET ORAL 2 TIMES DAILY PRN
Qty: 35 TABLET | Refills: 0 | Status: SHIPPED | OUTPATIENT
Start: 2020-01-10 | End: 2020-02-07

## 2020-01-10 ASSESSMENT — MIFFLIN-ST. JEOR: SCORE: 1122.52

## 2020-01-10 NOTE — TELEPHONE ENCOUNTER
"Pt called stating her pharmacy did not receive refill for Tramadol.   Spoke with PCP who states it is ok to give verbal for refill to pharmacy.   Pharmacy informed of the \"waiting for payor response\" status.           Yenifer SANTOYO     [facility]        "

## 2020-01-10 NOTE — TELEPHONE ENCOUNTER
Reason for Call:  Other prescription    Detailed comments: patient was just seen and her medication for tramadol the pharmacy did not get patient said's she needs this for the weekend why is this not going through    Phone Number Patient can be reached at: Home number on file [phone] (home)    Best Time: any    Can we leave a detailed message on this number? YES    Call taken on 1/10/2020 at 1:18 PM by Noemy Gooden

## 2020-01-10 NOTE — TELEPHONE ENCOUNTER
Let message that script went to [facility] and it said the pharmacy is waiting for: Prior authorization: Waiting for Payer Response
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
## 2020-01-15 LAB — PAIN DRUG SCR UR W RPTD MEDS: NORMAL

## 2020-01-16 ENCOUNTER — TELEPHONE (OUTPATIENT)
Dept: FAMILY MEDICINE | Facility: CLINIC | Age: 55
End: 2020-01-16

## 2020-01-16 DIAGNOSIS — N95.1 SYMPTOMATIC MENOPAUSAL OR FEMALE CLIMACTERIC STATES: ICD-10-CM

## 2020-01-16 NOTE — TELEPHONE ENCOUNTER
Yasmin Velazquez CNP    See pt's note  Both rxs were written on on 11/7/19 by Dr Daniel MAS for one year's worth    Consuelo Boone, RN, BSN

## 2020-01-16 NOTE — RESULT ENCOUNTER NOTE
Kade Burks,    Here are the results of your urine drug screen.  Everything looks like it is in order.  I will plan to talk to you about this further at your upcoming appointment with me.    Yasmin LUNA CNP

## 2020-01-16 NOTE — TELEPHONE ENCOUNTER
Patient states that she needs PCP to write scripts because she's a Restricted recipient . Patient needs rxs asap. Please advise, thank you!    Pharmacy loaded    medroxyPROGESTERone (PROVERA) 2.5 MG tablet  estradiol (ESTRACE) 1 MG tablet    Hermelinda Quesada

## 2020-01-17 RX ORDER — MEDROXYPROGESTERONE ACETATE 2.5 MG/1
2.5 TABLET ORAL DAILY
Qty: 90 TABLET | Refills: 3 | Status: SHIPPED | OUTPATIENT
Start: 2020-01-17 | End: 2021-02-16

## 2020-01-17 RX ORDER — ESTRADIOL 1 MG/1
0.5 TABLET ORAL DAILY
Qty: 90 TABLET | Refills: 3 | Status: SHIPPED | OUTPATIENT
Start: 2020-01-17 | End: 2021-02-16

## 2020-02-07 ENCOUNTER — OFFICE VISIT (OUTPATIENT)
Dept: FAMILY MEDICINE | Facility: CLINIC | Age: 55
End: 2020-02-07
Payer: COMMERCIAL

## 2020-02-07 VITALS
WEIGHT: 117 LBS | HEART RATE: 88 BPM | OXYGEN SATURATION: 98 % | RESPIRATION RATE: 16 BRPM | HEIGHT: 65 IN | TEMPERATURE: 98.7 F | BODY MASS INDEX: 19.49 KG/M2 | SYSTOLIC BLOOD PRESSURE: 126 MMHG | DIASTOLIC BLOOD PRESSURE: 60 MMHG

## 2020-02-07 DIAGNOSIS — G89.4 CHRONIC PAIN SYNDROME: Primary | ICD-10-CM

## 2020-02-07 DIAGNOSIS — F41.1 GENERALIZED ANXIETY DISORDER: ICD-10-CM

## 2020-02-07 DIAGNOSIS — G43.009 MIGRAINE WITHOUT AURA AND WITHOUT STATUS MIGRAINOSUS, NOT INTRACTABLE: ICD-10-CM

## 2020-02-07 DIAGNOSIS — M54.50 MIDLINE LOW BACK PAIN WITHOUT SCIATICA, UNSPECIFIED CHRONICITY: ICD-10-CM

## 2020-02-07 PROCEDURE — 99214 OFFICE O/P EST MOD 30 MIN: CPT | Performed by: NURSE PRACTITIONER

## 2020-02-07 RX ORDER — TRAMADOL HYDROCHLORIDE 50 MG/1
50 TABLET ORAL DAILY PRN
Qty: 30 TABLET | Refills: 0 | Status: SHIPPED | OUTPATIENT
Start: 2020-02-07 | End: 2020-03-04

## 2020-02-07 ASSESSMENT — ANXIETY QUESTIONNAIRES
6. BECOMING EASILY ANNOYED OR IRRITABLE: SEVERAL DAYS
7. FEELING AFRAID AS IF SOMETHING AWFUL MIGHT HAPPEN: NOT AT ALL
3. WORRYING TOO MUCH ABOUT DIFFERENT THINGS: SEVERAL DAYS
GAD7 TOTAL SCORE: 7
5. BEING SO RESTLESS THAT IT IS HARD TO SIT STILL: NOT AT ALL
2. NOT BEING ABLE TO STOP OR CONTROL WORRYING: SEVERAL DAYS
1. FEELING NERVOUS, ANXIOUS, OR ON EDGE: NEARLY EVERY DAY

## 2020-02-07 ASSESSMENT — PATIENT HEALTH QUESTIONNAIRE - PHQ9: 5. POOR APPETITE OR OVEREATING: SEVERAL DAYS

## 2020-02-07 ASSESSMENT — MIFFLIN-ST. JEOR: SCORE: 1131.59

## 2020-02-07 NOTE — PROGRESS NOTES
"Subjective     Catie Nuñez is a 54 year old female who presents to clinic today for the following health issues:    HPI   Chief Complaint   Patient presents with     Pain     Anxiety     Kimmie is in the clinic today for a recheck of her pain medication and additional discussion about her anxiety.    Sertraline:  She has been on sertraline for more than 5 years.  She is uncertain if the medication is doing any good for her any more.  The only other med she recalls every trying is buspar.  She is in clinic today asking if she can go down or off of her sertraline and consider a different medication/SSRI.  She denies feeling suicidal.  Her life is going better overall.      In therapy:  Couples therapy.  Individual therapy.  Therapy is being very helpful.    Tramadol:  \"I did well. I am proud of myself.\"  She is titrating down on her tramadol use.  Once she had a migraine that lasted a few days and she needed a little more tramadol at that time.  On non-migraine days she will take 1/2 tablet per day for musculoskeletal pain.  Today, she says she wants to cut down and cut out the tramadol and she wants to be off of pain relievers if possible.    She denies any new problems today.      Patient Active Problem List   Diagnosis     CARDIOVASCULAR SCREENING; LDL GOAL LESS THAN 160     Adhesive capsulitis of shoulder     Scoliosis     Shoulder impingement     Cervicalgia     Generalized anxiety disorder     Pulmonary nodule     Migraine without aura and without status migrainosus, not intractable     Controlled substance agreement signed     ASCUS Pap + high risk HPV, + 16, + \"other\"      Papanicolaou smear of cervix with low grade squamous intraepithelial lesion (LGSIL)     Acute seasonal allergic rhinitis, unspecified trigger     Chronic pain syndrome     Cervical high risk human papillomavirus (HPV 16) DNA test positive     History reviewed. No pertinent surgical history.    Social History     Tobacco Use     Smoking " status: Never Smoker     Smokeless tobacco: Never Used   Substance Use Topics     Alcohol use: Yes     Alcohol/week: 0.0 standard drinks     Comment: occ, rare      Family History   Problem Relation Age of Onset     Heart Disease Father      Asthma No family hx of      Diabetes No family hx of      Hypertension No family hx of      Cerebrovascular Disease No family hx of      Breast Cancer No family hx of          Current Outpatient Medications   Medication Sig Dispense Refill     ALPRAZolam (XANAX) 0.5 MG tablet TAKE 1/2 TABLET BY MOUTH TWICE DAILY AS NEEDED. 30 tablets to last 30 days. 30 tablet 2     Aspirin-Acetaminophen-Caffeine (EXCEDRIN PO) Take by mouth as needed       ciprofloxacin (CIPRO) 250 MG tablet Take 1 tablet (250 mg) by mouth 2 times daily 6 tablet 0     estradiol (ESTRACE) 1 MG tablet Take 0.5 tablets (0.5 mg) by mouth daily 90 tablet 3     fluticasone (FLONASE) 50 MCG/ACT nasal spray Spray 1-2 sprays into both nostrils daily 18 g 11     ibuprofen (ADVIL/MOTRIN) 600 MG tablet Take 600 mg by mouth Take 1 tablet by mouth 4 times daily if needed. Maximum of 3200 mg in 24 hours.       medroxyPROGESTERone (PROVERA) 2.5 MG tablet Take 1 tablet (2.5 mg) by mouth daily 90 tablet 3     Multiple Vitamins-Minerals (MULTIVITAMIN PO)        sertraline (ZOLOFT) 100 MG tablet Take 2 tablets (200 mg) by mouth daily 60 tablet 11     traMADol (ULTRAM) 50 MG tablet Take 1 tablet (50 mg) by mouth daily as needed for severe pain 30 tablets to last 30 days. 30 tablet 0     ciprofloxacin (CIPRO) 250 MG tablet Take 1 tablet (250 mg) by mouth 2 times daily (Patient not taking: Reported on 1/10/2020) 6 tablet 0     Allergies   Allergen Reactions     Ondansetron Nausea and Vomiting     Sulfa Drugs Hives     Rash       Recent Labs   Lab Test 02/12/19  1642 06/18/18  1656 09/14/16  1102 02/19/15  1029 01/06/15  1120   *  --  122*  --  94   HDL 58  --  73  --  75   TRIG 66  --  111  --  63   ALT  --  21  --  20 14   CR   "--  0.64  --  0.52 0.60   GFRESTIMATED  --  >90  --  >90  Non  GFR Calc   >90  Non  GFR Calc     GFRESTBLACK  --  >90  --  >90   GFR Calc   >90   GFR Calc     POTASSIUM  --  4.5  --  3.9 4.2   TSH  --   --   --   --  3.60      BP Readings from Last 3 Encounters:   02/07/20 126/60   01/10/20 130/88   11/07/19 131/86    Wt Readings from Last 3 Encounters:   02/07/20 53.1 kg (117 lb)   01/10/20 52.2 kg (115 lb)   11/07/19 52.2 kg (115 lb)               Reviewed and updated as needed this visit by Provider         Review of Systems   ROS COMP: Constitutional, HEENT, cardiovascular, pulmonary, GI, , musculoskeletal, neuro, skin, endocrine and psych systems are negative, except as otherwise noted.      Objective    /60 (BP Location: Right arm, Patient Position: Sitting, Cuff Size: Adult Regular)   Pulse 88   Temp 98.7  F (37.1  C) (Tympanic)   Resp 16   Ht 1.651 m (5' 5\")   Wt 53.1 kg (117 lb)   LMP 09/01/2016   SpO2 98%   BMI 19.47 kg/m    Body mass index is 19.47 kg/m .  Physical Exam   GENERAL APPEARANCE: healthy, alert and no distress. Smiling.   SKIN: warm and dry  PSYCH: mentation appears normal and affect normal/bright.  Good eye contact.      Diagnostic Test Results:  Labs reviewed in Epic        Assessment & Plan     (G89.4) Chronic pain syndrome  (primary encounter diagnosis)  Comment:   Plan: I told Kimmie today that I am very proud of her that she is titrating down on her tramadol use.  Today, I did give her a refill of the tramadol limiting her to 30 tablets (35 tablets last month) to last her the entire months.  She is to return to clinic to see me in 4 weeks for a med check/recheck appointment.  At that time, I will plan to titrate her down another 5 tablets for the month, with a refill at 25 tablets for the next month.  She agrees and understands and will return in 1 month.    (M54.5) Midline low back pain without sciatica, " unspecified chronicity  Comment:   Plan: traMADol (ULTRAM) 50 MG tablet        Continue symptomatic management, nonnarcotic pain relief measures, and return to clinic in 1 month for follow-up.    (G43.009) Migraine without aura and without status migrainosus, not intractable  Comment:   Plan: Continue symptomatic management, none narcotic pain relief measures, and return to clinic to see me in 1 month for follow-up.    (F41.1) Generalized anxiety disorder  Comment:   Plan: For today, I did cut her down on her sertraline.  See the AVS written today.  When she returns to clinic to see me in 1 month for her follow-up, I will cut her down even more and will consider adding a different SSRI at that time.        See Patient Instructions    Return in about 4 weeks (around 3/6/2020) for Medication follow up.    Patient Instructions   Sertraline:  Since you take 2 tablets per day now, here is my recommended way to titrate down on the medication:  Take 1 1/2 tablets per day for 2 weeks.  Then take 1 tablet per day for 2 weeks.  Then return to the clinic to see me for a follow up.  If your anxiety escalates, continue to take the dose of sertraline that you are on while taking care of yourself and monitor to see if the anxiety again improves.  If the anxiety does not improve or if it gets worse, let me know and I will help you with going back up on your sertraline.    Tramadol:  Continue to cut down on your tramadol.  Next goal: limit your use to 30 tablets to last 30 days.          JEREMY Kent [facility]

## 2020-02-07 NOTE — PATIENT INSTRUCTIONS
Sertraline:  Since you take 2 tablets per day now, here is my recommended way to titrate down on the medication:  Take 1 1/2 tablets per day for 2 weeks.  Then take 1 tablet per day for 2 weeks.  Then return to the clinic to see me for a follow up.  If your anxiety escalates, continue to take the dose of sertraline that you are on while taking care of yourself and monitor to see if the anxiety again improves.  If the anxiety does not improve or if it gets worse, let me know and I will help you with going back up on your sertraline.    Tramadol:  Continue to cut down on your tramadol.  Next goal: limit your use to 30 tablets to last 30 days.

## 2020-02-08 ASSESSMENT — ANXIETY QUESTIONNAIRES: GAD7 TOTAL SCORE: 7

## 2020-02-10 ENCOUNTER — HEALTH MAINTENANCE LETTER (OUTPATIENT)
Age: 55
End: 2020-02-10

## 2020-02-21 ENCOUNTER — HOSPITAL ENCOUNTER (OUTPATIENT)
Dept: MAMMOGRAPHY | Facility: CLINIC | Age: 55
Discharge: HOME | End: 2020-02-21
Attending: NURSE PRACTITIONER | Admitting: NURSE PRACTITIONER
Payer: COMMERCIAL

## 2020-02-21 DIAGNOSIS — Z12.31 ENCOUNTER FOR SCREENING MAMMOGRAM FOR BREAST CANCER: ICD-10-CM

## 2020-02-21 PROCEDURE — 77063 BREAST TOMOSYNTHESIS BI: CPT

## 2020-03-04 ENCOUNTER — TELEPHONE (OUTPATIENT)
Dept: FAMILY MEDICINE | Facility: CLINIC | Age: 55
End: 2020-03-04

## 2020-03-04 ENCOUNTER — OFFICE VISIT (OUTPATIENT)
Dept: FAMILY MEDICINE | Facility: CLINIC | Age: 55
End: 2020-03-04
Payer: COMMERCIAL

## 2020-03-04 VITALS
DIASTOLIC BLOOD PRESSURE: 70 MMHG | TEMPERATURE: 98.3 F | RESPIRATION RATE: 16 BRPM | BODY MASS INDEX: 19.33 KG/M2 | SYSTOLIC BLOOD PRESSURE: 100 MMHG | HEIGHT: 65 IN | WEIGHT: 116 LBS | OXYGEN SATURATION: 98 % | HEART RATE: 75 BPM

## 2020-03-04 DIAGNOSIS — Z23 NEED FOR SHINGLES VACCINE: ICD-10-CM

## 2020-03-04 DIAGNOSIS — F41.1 GENERALIZED ANXIETY DISORDER: ICD-10-CM

## 2020-03-04 DIAGNOSIS — M54.50 MIDLINE LOW BACK PAIN WITHOUT SCIATICA, UNSPECIFIED CHRONICITY: Primary | ICD-10-CM

## 2020-03-04 PROCEDURE — 99214 OFFICE O/P EST MOD 30 MIN: CPT | Performed by: NURSE PRACTITIONER

## 2020-03-04 RX ORDER — ALPRAZOLAM 0.5 MG
TABLET ORAL
Qty: 30 TABLET | Refills: 2 | Status: CANCELLED | OUTPATIENT
Start: 2020-03-04

## 2020-03-04 RX ORDER — TRAMADOL HYDROCHLORIDE 50 MG/1
50 TABLET ORAL DAILY PRN
Qty: 30 TABLET | Refills: 0 | Status: SHIPPED | OUTPATIENT
Start: 2020-03-04 | End: 2020-04-01

## 2020-03-04 ASSESSMENT — MIFFLIN-ST. JEOR: SCORE: 1127.05

## 2020-03-04 NOTE — TELEPHONE ENCOUNTER
"Reason for Call:  Other prescription    Detailed comments: Yasmin Velazquez, CNP  Wrote Rx for traMADol (ULTRAM) 50 MG tablet for today 3/4/20. [facility] on [address] states \"Refill To Soon\" not until 3/8/20 even though Yasmin's prescription today says 3/4/20. Pt took her last pill last night.    Pt would like Yasmin to call pharmacy [phone] to release script.     Phone Number Patient can be reached at: Home number on file [phone] (home)    Best Time: Any Time    Can we leave a detailed message on this number? YES    Call taken on 3/4/2020 at 3:11 PM by Tanya Asher    "

## 2020-03-04 NOTE — TELEPHONE ENCOUNTER
Spoke to pt and she was advised of the message below   She verbalized her understanding.   She said she would be in for her appointment in one month.   Clari Isbell RN

## 2020-03-04 NOTE — PROGRESS NOTES
"Chief Complaint   Patient presents with     Pain     Refills of tramadol     Anxiety     Refills of alprazolam     Subjective     Catie Nuñez is a 54 year old female who presents to clinic today for the following health issues:    DIONNE Burks is in clinic today to talk about her medications:    Sertraline:  She reduced her dosage to 150mg per day for 1-2 weeks and she found her mood significantly worsened.  She felt \"heavy,\" \"dark,\" teary, doom and gloom.  She did go back to 200mg per day and she found that she was better quickly.    Tramadol:  At her last appointment we reduced her tramadol to 30tablets for a month from 35.  She felt like that went well.  Today we were supposed to go down to 25 tablets for the next month but \"I'm not quite ready to do that.\"  She has had more stress and tension and nand back pain.  The tramadol helps.  Therapy helps with her stress management.  She is requesting refills of tramadol 30 tablets for the next month with a goal of eventually getting off of her tramadol.    Alprazolam:  She has been taking the alprazolam only as needed for panic and anxiety.    30 tablets has been lasting for 30 days.      Patient Active Problem List   Diagnosis     CARDIOVASCULAR SCREENING; LDL GOAL LESS THAN 160     Adhesive capsulitis of shoulder     Scoliosis     Shoulder impingement     Cervicalgia     Generalized anxiety disorder     Pulmonary nodule     Migraine without aura and without status migrainosus, not intractable     Controlled substance agreement signed     ASCUS Pap + high risk HPV, + 16, + \"other\"      Papanicolaou smear of cervix with low grade squamous intraepithelial lesion (LGSIL)     Acute seasonal allergic rhinitis, unspecified trigger     Chronic pain syndrome     Cervical high risk human papillomavirus (HPV 16) DNA test positive     History reviewed. No pertinent surgical history.    Social History     Tobacco Use     Smoking status: Never Smoker     Smokeless tobacco: " Never Used   Substance Use Topics     Alcohol use: Yes     Alcohol/week: 0.0 standard drinks     Comment: occ, rare      Family History   Problem Relation Age of Onset     Heart Disease Father      Asthma No family hx of      Diabetes No family hx of      Hypertension No family hx of      Cerebrovascular Disease No family hx of      Breast Cancer No family hx of          Current Outpatient Medications   Medication Sig Dispense Refill     ALPRAZolam (XANAX) 0.5 MG tablet TAKE 1/2 TABLET BY MOUTH TWICE DAILY AS NEEDED. 30 tablets to last 30 days. 30 tablet 2     Aspirin-Acetaminophen-Caffeine (EXCEDRIN PO) Take by mouth as needed       estradiol (ESTRACE) 1 MG tablet Take 0.5 tablets (0.5 mg) by mouth daily 90 tablet 3     fluticasone (FLONASE) 50 MCG/ACT nasal spray Spray 1-2 sprays into both nostrils daily 18 g 11     ibuprofen (ADVIL/MOTRIN) 600 MG tablet Take 600 mg by mouth Take 1 tablet by mouth 4 times daily if needed. Maximum of 3200 mg in 24 hours.       medroxyPROGESTERone (PROVERA) 2.5 MG tablet Take 1 tablet (2.5 mg) by mouth daily 90 tablet 3     Multiple Vitamins-Minerals (MULTIVITAMIN PO)        sertraline (ZOLOFT) 100 MG tablet Take 2 tablets (200 mg) by mouth daily 60 tablet 11     traMADol (ULTRAM) 50 MG tablet Take 1 tablet (50 mg) by mouth daily as needed for severe pain 30 tablets to last 30 days. 30 tablet 0     ciprofloxacin (CIPRO) 250 MG tablet Take 1 tablet (250 mg) by mouth 2 times daily (Patient not taking: Reported on 3/4/2020) 6 tablet 0     ciprofloxacin (CIPRO) 250 MG tablet Take 1 tablet (250 mg) by mouth 2 times daily (Patient not taking: Reported on 1/10/2020) 6 tablet 0     Allergies   Allergen Reactions     Ondansetron Nausea and Vomiting     Sulfa Drugs Hives     Rash       Recent Labs   Lab Test 02/12/19  1642 06/18/18  1656 09/14/16  1102 02/19/15  1029 01/06/15  1120   *  --  122*  --  94   HDL 58  --  73  --  75   TRIG 66  --  111  --  63   ALT  --  21  --  20 14   CR   "--  0.64  --  0.52 0.60   GFRESTIMATED  --  >90  --  >90  Non  GFR Calc   >90  Non  GFR Calc     GFRESTBLACK  --  >90  --  >90   GFR Calc   >90   GFR Calc     POTASSIUM  --  4.5  --  3.9 4.2   TSH  --   --   --   --  3.60      BP Readings from Last 3 Encounters:   03/04/20 100/70   02/07/20 126/60   01/10/20 130/88    Wt Readings from Last 3 Encounters:   03/04/20 52.6 kg (116 lb)   02/07/20 53.1 kg (117 lb)   01/10/20 52.2 kg (115 lb)            Reviewed and updated as needed this visit by Provider         Review of Systems   ROS COMP: Constitutional, HEENT, cardiovascular, pulmonary, GI, , musculoskeletal, neuro, skin, endocrine and psych systems are negative, except as otherwise noted.      Objective    /70 (BP Location: Right arm, Patient Position: Sitting, Cuff Size: Adult Regular)   Pulse 75   Temp 98.3  F (36.8  C) (Tympanic)   Resp 16   Ht 1.651 m (5' 5\")   Wt 52.6 kg (116 lb)   LMP 09/01/2016   SpO2 98%   BMI 19.30 kg/m    Body mass index is 19.3 kg/m .  Physical Exam   GENERAL APPEARANCE: healthy, alert and no distress. Smiling.   SKIN: warm and dry  PSYCH: mentation appears normal and affect normal/bright.  Good eye contact.      Diagnostic Test Results:  Labs reviewed in Epic        Assessment & Plan     (M54.5) Midline low back pain without sciatica, unspecified chronicity  (primary encounter diagnosis)  Comment: Chronic  Plan: traMADol (ULTRAM) 50 MG tablet        For today, I did go ahead and refill her tramadol, 30 tablets to last 30 days.  We talked together about continuing to work down on her need for the tramadol, with the eventual goal in 1 month of going down to 25 tablets for 1 month and then the end goal of not needing tramadol.  She will take the tramadol only as needed.  She is to return to clinic to see me for a face-to-face appointment in 1 month for her next refill, sooner she has any problems.  We also " talked about her sertraline and how she did not do well when she titrated down on the sertraline.  For now, we will plan to keep her 200 mg a day when she is eventually off of her tramadol, we will talk more about titrating down slower on the sertraline.  She agrees and understands.      (F41.1) Generalized anxiety disorder  Comment: Chronic  Plan: I encouraged her to continue psychotherapy and stress management.  She is to use her alprazolam only as needed for significant panic and anxiety.  When she returns to the clinic in 1 month for her next chronic pain appointment, I will talk about her anxiety and will refill her alprazolam that time.    (Z23) Need for shingles vaccine  Comment: Needed  Plan:  I discussed and reviewed the new shingles vaccine.  The patient is to expect side effects such as flu like symptoms, fatigue, rash, etc.  The patient was informed that in some people the side effects are significant enough that they feel ill and need to miss a day or two of work.  Instructed to monitor for problems.  As long as there are no signs of anaphylaxis or allergic reaction, then the patient is to return to the clinic in 2-6 months for the second and final vaccine.  The patient verbalizes understanding.  She is not ready to start the vaccine series today, but she will consider this for the future.         See Patient Instructions    Return in about 4 weeks (around 4/1/2020) for Medication follow up.    JEREMY Kent [facility]

## 2020-03-04 NOTE — TELEPHONE ENCOUNTER
Yasmin Velazquez CNP  Pt filled her ultram last month on 2/7 and the month before on 1/10  Respectively- that is #26 and #28 days  Pt should have 6 extra pills if she waited to fill this until 3/8 to fill  Please advise on the message below that pt took her last ultram last night.     Thanks!     Clari Isbell RN

## 2020-03-04 NOTE — TELEPHONE ENCOUNTER
Continue to request early refills on these medications.  She is already on high alert with the [place] prescription monitoring program, her insurance company, etc.  If she continues to ask for early refills, her medications can be completely cut off to her.  Please tell Kimmie to make sure her medication lasts a full 30 days and to stop asking for early refills.  I will plan to see her back in the clinic in 1 month per our agreement from today.

## 2020-04-01 ENCOUNTER — VIRTUAL VISIT (OUTPATIENT)
Dept: FAMILY MEDICINE | Facility: CLINIC | Age: 55
End: 2020-04-01
Payer: COMMERCIAL

## 2020-04-01 DIAGNOSIS — M54.50 MIDLINE LOW BACK PAIN WITHOUT SCIATICA, UNSPECIFIED CHRONICITY: ICD-10-CM

## 2020-04-01 DIAGNOSIS — F41.1 GENERALIZED ANXIETY DISORDER: ICD-10-CM

## 2020-04-01 PROCEDURE — 99442 ZZC PHYSICIAN TELEPHONE EVALUATION 11-20 MIN: CPT | Mod: TEL | Performed by: NURSE PRACTITIONER

## 2020-04-01 RX ORDER — TRAMADOL HYDROCHLORIDE 50 MG/1
50 TABLET ORAL DAILY PRN
Qty: 30 TABLET | Refills: 0 | Status: SHIPPED | OUTPATIENT
Start: 2020-04-01 | End: 2020-04-29

## 2020-04-01 RX ORDER — ALPRAZOLAM 0.5 MG
TABLET ORAL
Qty: 30 TABLET | Refills: 2 | Status: SHIPPED | OUTPATIENT
Start: 2020-04-01 | End: 2020-04-29

## 2020-04-01 RX ORDER — ALPRAZOLAM 0.5 MG
TABLET ORAL
Qty: 30 TABLET | OUTPATIENT
Start: 2020-04-01

## 2020-04-01 NOTE — PROGRESS NOTES
"Subjective     Catie Nuñez is a 54 year old female who is being evaluated via a billable telephone visit.      The patient has been notified of following:     \"This telephone visit will be conducted via a call between you and your physician/provider. We have found that certain health care needs can be provided without the need for a physical exam.  This service lets us provide the care you need with a short phone conversation.  If a prescription is necessary we can send it directly to your pharmacy.  If lab work is needed we can place an order for that and you can then stop by our lab to have the test done at a later time.    If during the course of the call the physician/provider feels a telephone visit is not appropriate, you will not be charged for this service.\"     Patient has given verbal consent for Telephone visit?  Yes    Catie Nuñez complains of   Chief Complaint   Patient presents with     Refill Request       ALLERGIES  Ondansetron and Sulfa drugs    Kimmie is doing a telephone visit for refills of her medication refills.  She feels like she is doing well at this time, especially considering the stress everyone is in right now (coronavirus pandemic in progress).  She is in therapy, currently doing telephone therapy appointments and that is going well.    Sertraline:  Taking 200mg per day.  Going well.    Tramadol:  She has been working down on how much she takes.  She has limiting how much she needs.  She currently is taking 30 tablets per month and that is going okay. \"Can we keep it there for now?\"  She is requesting refills of the tramadol today.    Alprazolam:  She will take 1/2-1 tablet approximately once a day as needed for panic/anxiety.  30 tablets is lasting 30 days.  \"I'm learning to limit my use to this and I think I am doing good.\"  She is requesting refills of her alprazolam today.       Patient Active Problem List   Diagnosis     CARDIOVASCULAR SCREENING; LDL GOAL LESS THAN 160     " "Adhesive capsulitis of shoulder     Scoliosis     Shoulder impingement     Cervicalgia     Generalized anxiety disorder     Pulmonary nodule     Migraine without aura and without status migrainosus, not intractable     Controlled substance agreement signed     ASCUS Pap + high risk HPV, + 16, + \"other\"      Papanicolaou smear of cervix with low grade squamous intraepithelial lesion (LGSIL)     Acute seasonal allergic rhinitis, unspecified trigger     Chronic pain syndrome     Cervical high risk human papillomavirus (HPV 16) DNA test positive     History reviewed. No pertinent surgical history.    Social History     Tobacco Use     Smoking status: Never Smoker     Smokeless tobacco: Never Used   Substance Use Topics     Alcohol use: Yes     Alcohol/week: 0.0 standard drinks     Comment: occ, rare      Family History   Problem Relation Age of Onset     Heart Disease Father      Asthma No family hx of      Diabetes No family hx of      Hypertension No family hx of      Cerebrovascular Disease No family hx of      Breast Cancer No family hx of          Current Outpatient Medications   Medication Sig Dispense Refill     ALPRAZolam (XANAX) 0.5 MG tablet TAKE 1/2 TABLET BY MOUTH TWICE DAILY AS NEEDED. 30 tablets to last 30 days. 30 tablet 2     Aspirin-Acetaminophen-Caffeine (EXCEDRIN PO) Take by mouth as needed       estradiol (ESTRACE) 1 MG tablet Take 0.5 tablets (0.5 mg) by mouth daily 90 tablet 3     fluticasone (FLONASE) 50 MCG/ACT nasal spray Spray 1-2 sprays into both nostrils daily 18 g 11     medroxyPROGESTERone (PROVERA) 2.5 MG tablet Take 1 tablet (2.5 mg) by mouth daily 90 tablet 3     Multiple Vitamins-Minerals (MULTIVITAMIN PO)        sertraline (ZOLOFT) 100 MG tablet Take 2 tablets (200 mg) by mouth daily 60 tablet 11     traMADol (ULTRAM) 50 MG tablet Take 1 tablet (50 mg) by mouth daily as needed for severe pain 30 tablets to last 30 days. 30 tablet 0     ibuprofen (ADVIL/MOTRIN) 600 MG tablet Take 600 mg " by mouth Take 1 tablet by mouth 4 times daily if needed. Maximum of 3200 mg in 24 hours.       Allergies   Allergen Reactions     Ondansetron Nausea and Vomiting     Sulfa Drugs Hives     Rash       Recent Labs   Lab Test 02/12/19  1642 06/18/18  1656 09/14/16  1102 02/19/15  1029 01/06/15  1120   *  --  122*  --  94   HDL 58  --  73  --  75   TRIG 66  --  111  --  63   ALT  --  21  --  20 14   CR  --  0.64  --  0.52 0.60   GFRESTIMATED  --  >90  --  >90  Non  GFR Calc   >90  Non  GFR Calc     GFRESTBLACK  --  >90  --  >90   GFR Calc   >90   GFR Calc     POTASSIUM  --  4.5  --  3.9 4.2   TSH  --   --   --   --  3.60      BP Readings from Last 3 Encounters:   03/04/20 100/70   02/07/20 126/60   01/10/20 130/88    Wt Readings from Last 3 Encounters:   03/04/20 52.6 kg (116 lb)   02/07/20 53.1 kg (117 lb)   01/10/20 52.2 kg (115 lb)           Reviewed and updated as needed this visit by Provider  Tobacco  Allergies  Meds  Problems  Med Hx  Surg Hx  Fam Hx         Review of Systems   ROS COMP: Constitutional, HEENT, cardiovascular, pulmonary, GI, , musculoskeletal, neuro, skin, endocrine and psych systems are negative, except as otherwise noted.       Objective   Reported vitals:  [facility] 09/01/2016    healthy, alert and no distress  Psych: Alert and oriented times 3; coherent speech, normal   rate and volume, able to articulate logical thoughts, able   to abstract reason, no tangential thoughts, no hallucinations   or delusions     Diagnostic Test Results:  Labs reviewed in Epic        Assessment/Plan:    (M54.5) Midline low back pain without sciatica, unspecified chronicity  Comment: chronic  Plan: traMADol (ULTRAM) 50 MG tablet          I had a long conversation with Kimmie today about her pain, narcotic and benzodiazepine use, alternative therapies (stretching, relaxation, regular aerobic activity etc).   She has been compliant with taking her  tramadol/limiting a 30 tablet supply to last her 30 days.  With today's prescription, she will likely not be able to pick this up until April 5 or so and she verbalized understanding of that.  She also is to continue to limit her use of alprazolam and a 30 tablet supply for 30 days was refilled with the intent to continue this for the next couple months.    Carson is planning to do another telephone encounter in 1 month for her next set of tramadol refills.  At that time, I told her I would like to consider cutting down on the tablets prescribed per month.  She agrees and thinks it will be a fine idea.  I also encouraged her to continue her sertraline and her regular therapy.  I will plan to follow-up with her in 1 month, sooner if she has concerns.    (F41.1) Generalized anxiety disorder  Comment:   Plan: ALPRAZolam (XANAX) 0.5 MG tablet        As above        Return in about 4 weeks (around 4/29/2020) for Medication follow up.      Phone call duration:  12 minutes    Yasmin LUNA CNP

## 2020-04-01 NOTE — TELEPHONE ENCOUNTER
Requested Prescriptions   Pending Prescriptions Disp Refills     ALPRAZolam (XANAX) 0.5 MG tablet [Pharmacy Med Name: ALPRAZOLAM 0.5MG TABLETS]  Last Written Prescription Date:  1-10-20  Last Fill Quantity: 30 tab,  # refills: 2   Last office visit: 3-4-20 with prescribing provider:  Yasmin Velazquez Office Visit:   Next 5 appointments (look out 90 days)    Apr 01, 2020  1:40 PM CDT  Telephone Visit with JEREMY Gonzalez CNP  [facility] ([facility] [address] [place] [address]  [phone]        30 tablet      Sig: TAKE 1/2 TABLET BY MOUTH TWICE DAILY AS NEEDED. MUST LAST 30 DAYS       There is no refill protocol information for this order

## 2020-04-29 ENCOUNTER — VIRTUAL VISIT (OUTPATIENT)
Dept: FAMILY MEDICINE | Facility: CLINIC | Age: 55
End: 2020-04-29
Payer: COMMERCIAL

## 2020-04-29 DIAGNOSIS — J30.2 ACUTE SEASONAL ALLERGIC RHINITIS: ICD-10-CM

## 2020-04-29 DIAGNOSIS — M54.50 MIDLINE LOW BACK PAIN WITHOUT SCIATICA, UNSPECIFIED CHRONICITY: ICD-10-CM

## 2020-04-29 DIAGNOSIS — F41.1 GENERALIZED ANXIETY DISORDER: ICD-10-CM

## 2020-04-29 PROCEDURE — 99213 OFFICE O/P EST LOW 20 MIN: CPT | Mod: 95 | Performed by: NURSE PRACTITIONER

## 2020-04-29 RX ORDER — ALPRAZOLAM 0.5 MG
TABLET ORAL
Qty: 15 TABLET | Refills: 2 | Status: SHIPPED | OUTPATIENT
Start: 2020-04-29 | End: 2020-06-03

## 2020-04-29 RX ORDER — SERTRALINE HYDROCHLORIDE 100 MG/1
200 TABLET, FILM COATED ORAL DAILY
Qty: 60 TABLET | Refills: 11 | Status: SHIPPED | OUTPATIENT
Start: 2020-04-29 | End: 2021-07-02

## 2020-04-29 RX ORDER — TRAMADOL HYDROCHLORIDE 50 MG/1
50 TABLET ORAL DAILY PRN
Qty: 30 TABLET | Refills: 0 | Status: SHIPPED | OUTPATIENT
Start: 2020-04-29 | End: 2020-05-26

## 2020-04-29 RX ORDER — FLUTICASONE PROPIONATE 50 MCG
1-2 SPRAY, SUSPENSION (ML) NASAL DAILY
Qty: 18 G | Refills: 11 | Status: SHIPPED | OUTPATIENT
Start: 2020-04-29 | End: 2021-05-28

## 2020-04-29 NOTE — PROGRESS NOTES
"Catie Nuñez is a 54 year old female who is being evaluated via a billable telephone visit.      The patient has been notified of following:     \"This telephone visit will be conducted via a call between you and your physician/provider. We have found that certain health care needs can be provided without the need for a physical exam.  This service lets us provide the care you need with a short phone conversation.  If a prescription is necessary we can send it directly to your pharmacy.  If lab work is needed we can place an order for that and you can then stop by our lab to have the test done at a later time.    Telephone visits are billed at different rates depending on your insurance coverage. During this emergency period, for some insurers they may be billed the same as an in-person visit.  Please reach out to your insurance provider with any questions.    If during the course of the call the physician/provider feels a telephone visit is not appropriate, you will not be charged for this service.\"    Patient has given verbal consent for Telephone visit?  Yes    How would you like to obtain your AVS? MyChart    Tyler     Catie Nuñez is a 54 year old female who is doing a telephone visit for the following health issues:  ,  Chief Complaint   Patient presents with     Medication Follow-up     tramadol and xanax       HPI  Medication Followup     Taking Medication as prescribed: yes    Side Effects:  None    Medication Helping Symptoms:  yes     Doing well overall.  In therapy, \"hard work there.\"  She is wanting to wean down/off of her xanax first and then will plan to go off of her narcotic.    Xanax currently:  Rx is 1/2-1 tablet as often as twice a day as needed.  She finds she takes xanax at least once a day.  She uses it for panic and anxiety as well as for muscle tension.  \"I feel like it is making me consistently numb and that is not okay.\"  Her plan is to only take the xanax as needed for panic and " "anxiety with the goal to wean down and wean off completely.    Narcotic/tramadol:   For chronic pain, headaches, etc .  She continues to follow our prescribing agreement.  She will take 30 tablets in a 30 day timeframe.  Her intent is to wean down/off the tramdol but she wants to wean off of the xanax first.        Patient Active Problem List   Diagnosis     CARDIOVASCULAR SCREENING; LDL GOAL LESS THAN 160     Adhesive capsulitis of shoulder     Scoliosis     Shoulder impingement     Cervicalgia     Generalized anxiety disorder     Pulmonary nodule     Migraine without aura and without status migrainosus, not intractable     Controlled substance agreement signed     ASCUS Pap + high risk HPV, + 16, + \"other\"      Papanicolaou smear of cervix with low grade squamous intraepithelial lesion (LGSIL)     Acute seasonal allergic rhinitis, unspecified trigger     Chronic pain syndrome     Cervical high risk human papillomavirus (HPV 16) DNA test positive     History reviewed. No pertinent surgical history.    Social History     Tobacco Use     Smoking status: Never Smoker     Smokeless tobacco: Never Used   Substance Use Topics     Alcohol use: Yes     Alcohol/week: 0.0 standard drinks     Comment: occ, rare      Family History   Problem Relation Age of Onset     Heart Disease Father      Asthma No family hx of      Diabetes No family hx of      Hypertension No family hx of      Cerebrovascular Disease No family hx of      Breast Cancer No family hx of          Current Outpatient Medications   Medication Sig Dispense Refill     ALPRAZolam (XANAX) 0.5 MG tablet TAKE 1/2 TABLET BY MOUTH DAILY AS NEEDED. 15 tablets to last 30 days. 15 tablet 2     Aspirin-Acetaminophen-Caffeine (EXCEDRIN PO) Take by mouth as needed       estradiol (ESTRACE) 1 MG tablet Take 0.5 tablets (0.5 mg) by mouth daily 90 tablet 3     fluticasone (FLONASE) 50 MCG/ACT nasal spray Spray 1-2 sprays into both nostrils daily 18 g 11     ibuprofen " (ADVIL/MOTRIN) 600 MG tablet Take 600 mg by mouth Take 1 tablet by mouth 4 times daily if needed. Maximum of 3200 mg in 24 hours.       medroxyPROGESTERone (PROVERA) 2.5 MG tablet Take 1 tablet (2.5 mg) by mouth daily 90 tablet 3     Multiple Vitamins-Minerals (MULTIVITAMIN PO)        sertraline (ZOLOFT) 100 MG tablet Take 2 tablets (200 mg) by mouth daily 60 tablet 11     traMADol (ULTRAM) 50 MG tablet Take 1 tablet (50 mg) by mouth daily as needed for severe pain 30 tablets to last 30 days. 30 tablet 0     Allergies   Allergen Reactions     Ondansetron Nausea and Vomiting     Sulfa Drugs Hives     Rash       Recent Labs   Lab Test 02/12/19  1642 06/18/18  1656 09/14/16  1102 02/19/15  1029 01/06/15  1120   *  --  122*  --  94   HDL 58  --  73  --  75   TRIG 66  --  111  --  63   ALT  --  21  --  20 14   CR  --  0.64  --  0.52 0.60   GFRESTIMATED  --  >90  --  >90  Non  GFR Calc   >90  Non  GFR Calc     GFRESTBLACK  --  >90  --  >90   GFR Calc   >90   GFR Calc     POTASSIUM  --  4.5  --  3.9 4.2   TSH  --   --   --   --  3.60      BP Readings from Last 3 Encounters:   03/04/20 100/70   02/07/20 126/60   01/10/20 130/88    Wt Readings from Last 3 Encounters:   03/04/20 52.6 kg (116 lb)   02/07/20 53.1 kg (117 lb)   01/10/20 52.2 kg (115 lb)              Reviewed and updated as needed this visit by Provider  Tobacco  Allergies  Meds  Problems  Med Hx  Surg Hx  Fam Hx         Review of Systems   ROS COMP: Constitutional, HEENT, cardiovascular, pulmonary, gi and gu systems are negative, except as otherwise noted.       Objective   Reported vitals:  LMP 09/01/2016    healthy, alert and no distress  PSYCH: Alert and oriented times 3; coherent speech, normal   rate and volume, able to articulate logical thoughts, able   to abstract reason, no tangential thoughts, no hallucinations   or delusions  Her affect is normal  RESP: No cough, no audible  wheezing, able to talk in full sentences  Remainder of exam unable to be completed due to telephone visits    Diagnostic Test Results:  Labs reviewed in Epic        Assessment/Plan:  1. Generalized anxiety disorder  - ALPRAZolam (XANAX) 0.5 MG tablet; TAKE 1/2 TABLET BY MOUTH DAILY AS NEEDED. 15 tablets to last 30 days.  Dispense: 15 tablet; Refill: 2  - sertraline (ZOLOFT) 100 MG tablet; Take 2 tablets (200 mg) by mouth daily  Dispense: 60 tablet; Refill: 11    See below    2. Midline low back pain without sciatica, unspecified chronicity    - traMADol (ULTRAM) 50 MG tablet; Take 1 tablet (50 mg) by mouth daily as needed for severe pain 30 tablets to last 30 days.  Dispense: 30 tablet; Refill: 0    3. Acute seasonal allergic rhinitis  See below  - fluticasone (FLONASE) 50 MCG/ACT nasal spray; Spray 1-2 sprays into both nostrils daily  Dispense: 18 g; Refill: 11      I agree with Kimmie and I am happy that she wants to wean down/off of her alprazolam.  She wants to stop cold turkey.  I did tell her that I want her to wean down/off of the alprazolam.  For now, she will take maximum 1/2 tablet per day for 1-2 weeks.  After that, she can stop daily alprazolam and take it As needed.  I did talk with her/review the s/s of going down/off too quickly (feeling shaky, worsening anxiety, etc).  She is to let me know if she has any problems.  She will continue her tramadol as needed for pain at this time with the goal to cut out daily use of alprazolam for now and then cut out daily use of tramadol.  She is to follow up with me in one month, sooner prn.      Return in about 4 weeks (around 5/27/2020) for Medication follow up.      Phone call duration:  14 minutes    JEREMY Kent CNP

## 2020-05-26 ENCOUNTER — VIRTUAL VISIT (OUTPATIENT)
Dept: FAMILY MEDICINE | Facility: CLINIC | Age: 55
End: 2020-05-26
Payer: COMMERCIAL

## 2020-05-26 VITALS — HEIGHT: 64 IN | BODY MASS INDEX: 19.63 KG/M2 | WEIGHT: 115 LBS

## 2020-05-26 DIAGNOSIS — M54.50 MIDLINE LOW BACK PAIN WITHOUT SCIATICA, UNSPECIFIED CHRONICITY: ICD-10-CM

## 2020-05-26 PROCEDURE — 99213 OFFICE O/P EST LOW 20 MIN: CPT | Mod: 95 | Performed by: NURSE PRACTITIONER

## 2020-05-26 RX ORDER — TRAMADOL HYDROCHLORIDE 50 MG/1
50 TABLET ORAL DAILY PRN
Qty: 30 TABLET | Refills: 0 | Status: SHIPPED | OUTPATIENT
Start: 2020-05-26 | End: 2020-06-03

## 2020-05-26 ASSESSMENT — MIFFLIN-ST. JEOR: SCORE: 1106.64

## 2020-05-26 NOTE — PROGRESS NOTES
"Catie Nuñez is a 54 year old female who is being evaluated via a billable telephone visit.      The patient has been notified of following:     \"This telephone visit will be conducted via a call between you and your physician/provider. We have found that certain health care needs can be provided without the need for a physical exam.  This service lets us provide the care you need with a short phone conversation.  If a prescription is necessary we can send it directly to your pharmacy.  If lab work is needed we can place an order for that and you can then stop by our lab to have the test done at a later time.    Telephone visits are billed at different rates depending on your insurance coverage. During this emergency period, for some insurers they may be billed the same as an in-person visit.  Please reach out to your insurance provider with any questions.    If during the course of the call the physician/provider feels a telephone visit is not appropriate, you will not be charged for this service.\"    Patient has given verbal consent for Telephone visit?  Yes    What phone number would you like to be contacted at? [phone]    How would you like to obtain your AVS? Macarenahart    Tyler     Catie Nuñez is a 54 year old female who presents via phone visit today for the following health issues:    HPI  Medication Followup of Tramadol    Taking Medication as prescribed: yes    Side Effects:  None    Medication Helping Symptoms:  yes     Tramadol refills:  Taking the tramadol/trying to limit dosing.  \"I had a couple pretty bad migraines this month and had to take the tramadol.\"  She is still trying to cut down on her tramadol.  She is using the alprazolam only as prescribed and she is trying to cut down on that as well.    No new issues or concerns today.     Patient Active Problem List   Diagnosis     CARDIOVASCULAR SCREENING; LDL GOAL LESS THAN 160     Adhesive capsulitis of shoulder     Scoliosis     " "Shoulder impingement     Cervicalgia     Generalized anxiety disorder     Pulmonary nodule     Migraine without aura and without status migrainosus, not intractable     Controlled substance agreement signed     ASCUS Pap + high risk HPV, + 16, + \"other\"      Papanicolaou smear of cervix with low grade squamous intraepithelial lesion (LGSIL)     Acute seasonal allergic rhinitis, unspecified trigger     Chronic pain syndrome     Cervical high risk human papillomavirus (HPV 16) DNA test positive     History reviewed. No pertinent surgical history.    Social History     Tobacco Use     Smoking status: Never Smoker     Smokeless tobacco: Never Used   Substance Use Topics     Alcohol use: Yes     Alcohol/week: 0.0 standard drinks     Comment: occ, rare      Family History   Problem Relation Age of Onset     Heart Disease Father      Asthma No family hx of      Diabetes No family hx of      Hypertension No family hx of      Cerebrovascular Disease No family hx of      Breast Cancer No family hx of          Current Outpatient Medications   Medication Sig Dispense Refill     ALPRAZolam (XANAX) 0.5 MG tablet TAKE 1/2 TABLET BY MOUTH DAILY AS NEEDED. 15 tablets to last 30 days. 15 tablet 2     Aspirin-Acetaminophen-Caffeine (EXCEDRIN PO) Take by mouth as needed       estradiol (ESTRACE) 1 MG tablet Take 0.5 tablets (0.5 mg) by mouth daily 90 tablet 3     fluticasone (FLONASE) 50 MCG/ACT nasal spray Spray 1-2 sprays into both nostrils daily 18 g 11     ibuprofen (ADVIL/MOTRIN) 600 MG tablet Take 600 mg by mouth Take 1 tablet by mouth 4 times daily if needed. Maximum of 3200 mg in 24 hours.       medroxyPROGESTERone (PROVERA) 2.5 MG tablet Take 1 tablet (2.5 mg) by mouth daily 90 tablet 3     Multiple Vitamins-Minerals (MULTIVITAMIN PO)        sertraline (ZOLOFT) 100 MG tablet Take 2 tablets (200 mg) by mouth daily 60 tablet 11     traMADol (ULTRAM) 50 MG tablet Take 1 tablet (50 mg) by mouth daily as needed for severe pain 30 " "tablets to last 30 days. 30 tablet 0     Allergies   Allergen Reactions     Ondansetron Nausea and Vomiting     Sulfa Drugs Hives     Rash       Recent Labs   Lab Test 02/12/19  1642 06/18/18  1656 09/14/16  1102 02/19/15  1029 01/06/15  1120   *  --  122*  --  94   HDL 58  --  73  --  75   TRIG 66  --  111  --  63   ALT  --  21  --  20 14   CR  --  0.64  --  0.52 0.60   GFRESTIMATED  --  >90  --  >90  Non  GFR Calc   >90  Non  GFR Calc     GFRESTBLACK  --  >90  --  >90   GFR Calc   >90   GFR Calc     POTASSIUM  --  4.5  --  3.9 4.2   TSH  --   --   --   --  3.60      BP Readings from Last 3 Encounters:   03/04/20 100/70   02/07/20 126/60   01/10/20 130/88    Wt Readings from Last 3 Encounters:   05/26/20 52.2 kg (115 lb)   03/04/20 52.6 kg (116 lb)   02/07/20 53.1 kg (117 lb)                    Reviewed and updated as needed this visit by Provider         Review of Systems   Constitutional, HEENT, cardiovascular, pulmonary, GI, , musculoskeletal, neuro, skin, endocrine and psych systems are negative, except as otherwise noted.       Objective   Reported vitals:  Ht 1.626 m (5' 4\")   Wt 52.2 kg (115 lb)   LMP 09/01/2016   BMI 19.74 kg/m     healthy, alert and no distress  PSYCH: Alert and oriented times 3; coherent speech, normal   rate and volume, able to articulate logical thoughts, able   to abstract reason, no tangential thoughts, no hallucinations   or delusions  Her affect is normal  RESP: No cough, no audible wheezing, able to talk in full sentences  Remainder of exam unable to be completed due to telephone visits    Diagnostic Test Results:  Labs reviewed in Epic        Assessment/Plan:  (M54.5) Midline low back pain without sciatica, unspecified chronicity  Comment: stabld  Plan: traMADol (ULTRAM) 50 MG tablet          For today, we cut down on her tramadol from 30 tablets for a 30 day supply for 28 tablets for a 30 day supply.  I " encouraged her to continue healthy self cares and her therapy.  Follow up in 1 month for next round of refills, sooner prn.         No follow-ups on file.      Phone call duration:  8 minutes    JEREMY Kent CNP

## 2020-06-02 ENCOUNTER — TELEPHONE (OUTPATIENT)
Dept: FAMILY MEDICINE | Facility: CLINIC | Age: 55
End: 2020-06-02

## 2020-06-02 NOTE — TELEPHONE ENCOUNTER
Reason for Call:  Other call back    Detailed comments: SABI FROM [facility] WOULD LIKE PT SCRIPES TO BE SENT TO , [facility]  [phone]    Phone Number Patient can be reached at: Other phone number:  [phone]    Best Time: ASAP    Can we leave a detailed message on this number? YES    Call taken on 6/2/2020 at 2:49 PM by JASON Dias  [place]   Patient Rep

## 2020-06-03 NOTE — TELEPHONE ENCOUNTER
This writer called Xin at [facility]  Left a voice mail to please call back and verify what meds need to be sent to the new pharmacy.  Kasandra Vides  [place]   Patient Rep

## 2020-06-12 ENCOUNTER — TELEPHONE (OUTPATIENT)
Dept: FAMILY MEDICINE | Facility: CLINIC | Age: 55
End: 2020-06-12

## 2020-06-12 DIAGNOSIS — F41.1 GENERALIZED ANXIETY DISORDER: ICD-10-CM
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
## 2020-06-16 RX ORDER — ALPRAZOLAM 0.5 MG
TABLET ORAL
Qty: 15 TABLET | Refills: 0 | Status: SHIPPED | OUTPATIENT
Start: 2020-06-16 | End: 2020-06-26

## 2020-06-16 NOTE — TELEPHONE ENCOUNTER
She has been getting 30 tablets of alprazolam to last her a month.  I am not sure what happened or why she was only given 15 tablets at her last refill.  I will give her an additional 15 tablets that will last her 2 more weeks.  She is due to see me for a virtual visit in 2 weeks for a follow-up and I will approve more of her tramadol and her alprazolam at that time.    Do tell her to schedule a virtual visit for follow-up of her medications before she runs out of her medications.

## 2020-06-26 ENCOUNTER — VIRTUAL VISIT (OUTPATIENT)
Dept: FAMILY MEDICINE | Facility: CLINIC | Age: 55
End: 2020-06-26
Payer: COMMERCIAL

## 2020-06-26 DIAGNOSIS — G43.009 MIGRAINE WITHOUT AURA AND WITHOUT STATUS MIGRAINOSUS, NOT INTRACTABLE: ICD-10-CM

## 2020-06-26 DIAGNOSIS — M54.50 MIDLINE LOW BACK PAIN WITHOUT SCIATICA, UNSPECIFIED CHRONICITY: ICD-10-CM

## 2020-06-26 DIAGNOSIS — F41.1 GENERALIZED ANXIETY DISORDER: Primary | ICD-10-CM

## 2020-06-26 PROCEDURE — 99214 OFFICE O/P EST MOD 30 MIN: CPT | Mod: 95 | Performed by: NURSE PRACTITIONER

## 2020-06-26 RX ORDER — ALPRAZOLAM 0.5 MG
TABLET ORAL
Qty: 30 TABLET | Refills: 2 | Status: SHIPPED | OUTPATIENT
Start: 2020-06-26 | End: 2020-07-08

## 2020-06-26 RX ORDER — TRAMADOL HYDROCHLORIDE 50 MG/1
50 TABLET ORAL DAILY PRN
Qty: 28 TABLET | Refills: 0 | Status: SHIPPED | OUTPATIENT
Start: 2020-06-26 | End: 2020-07-02

## 2020-06-26 NOTE — PROGRESS NOTES
"Catie Nuñez is a 55 year old female who is being evaluated via a billable telephone visit.      The patient has been notified of following:     \"This telephone visit will be conducted via a call between you and your physician/provider. We have found that certain health care needs can be provided without the need for a physical exam.  This service lets us provide the care you need with a short phone conversation.  If a prescription is necessary we can send it directly to your pharmacy.  If lab work is needed we can place an order for that and you can then stop by our lab to have the test done at a later time.    Telephone visits are billed at different rates depending on your insurance coverage. During this emergency period, for some insurers they may be billed the same as an in-person visit.  Please reach out to your insurance provider with any questions.    If during the course of the call the physician/provider feels a telephone visit is not appropriate, you will not be charged for this service.\"    Patient has given verbal consent for Telephone visit?  Yes    What phone number would you like to be contacted at? [phone]     How would you like to obtain your AVS? MyChart    Subjective     Catie Nuñez is a 55 year old female who presents via phone visit today for the following health issues:    HPI    Chief Complaint   Patient presents with     Medication Follow-up     Headache     migraines         Chronic pain:  Kimmie has been taking tramadol for chronic pain.  She is trying to take care of herself.  She has been trying to wean down on the tramadol and has reduced fro 60tablets per month to 28.  She is not ready to cut down more at this time and is requesting a refill for 28 tablets.    Xanax:30 tablets lasts 30 days for anxiety.  This is working well and she is requesting refills today.       Headaches:  Kimmie has had 3 3-day migraines over the last 6 weeks.  The migraines are terrible.  She has " "previously seen neurology and she is wondering if she should go back to neuro now.  She is wondering about additional/new treatments.    Patient Active Problem List   Diagnosis     CARDIOVASCULAR SCREENING; LDL GOAL LESS THAN 160     Adhesive capsulitis of shoulder     Scoliosis     Shoulder impingement     Cervicalgia     Generalized anxiety disorder     Pulmonary nodule     Migraine without aura and without status migrainosus, not intractable     Controlled substance agreement signed     ASCUS Pap + high risk HPV, + 16, + \"other\"      Papanicolaou smear of cervix with low grade squamous intraepithelial lesion (LGSIL)     Acute seasonal allergic rhinitis, unspecified trigger     Chronic pain syndrome     Cervical high risk human papillomavirus (HPV 16) DNA test positive     History reviewed. No pertinent surgical history.    Social History     Tobacco Use     Smoking status: Never Smoker     Smokeless tobacco: Never Used   Substance Use Topics     Alcohol use: Yes     Alcohol/week: 0.0 standard drinks     Comment: occ, rare      Family History   Problem Relation Age of Onset     Heart Disease Father      Asthma No family hx of      Diabetes No family hx of      Hypertension No family hx of      Cerebrovascular Disease No family hx of      Breast Cancer No family hx of          Current Outpatient Medications   Medication Sig Dispense Refill     ALPRAZolam (XANAX) 0.5 MG tablet TAKE 1/2 TABLET BY MOUTH DAILY AS NEEDED. 30 tablets to last 30 days. 30 tablet 2     Aspirin-Acetaminophen-Caffeine (EXCEDRIN PO) Take by mouth as needed       estradiol (ESTRACE) 1 MG tablet Take 0.5 tablets (0.5 mg) by mouth daily 90 tablet 3     fluticasone (FLONASE) 50 MCG/ACT nasal spray Spray 1-2 sprays into both nostrils daily 18 g 11     ibuprofen (ADVIL/MOTRIN) 600 MG tablet Take 600 mg by mouth Take 1 tablet by mouth 4 times daily if needed. Maximum of 3200 mg in 24 hours.       medroxyPROGESTERone (PROVERA) 2.5 MG tablet Take 1 " tablet (2.5 mg) by mouth daily 90 tablet 3     Multiple Vitamins-Minerals (MULTIVITAMIN PO)        sertraline (ZOLOFT) 100 MG tablet Take 2 tablets (200 mg) by mouth daily 60 tablet 11     traMADol (ULTRAM) 50 MG tablet Take 1 tablet (50 mg) by mouth daily as needed for severe pain 28 tablets to last 30 days. 28 tablet 0     Allergies   Allergen Reactions     Ondansetron Nausea and Vomiting     Sulfa Drugs Hives     Rash       Recent Labs   Lab Test 02/12/19  1642 06/18/18  1656 09/14/16  1102 02/19/15  1029 01/06/15  1120   *  --  122*  --  94   HDL 58  --  73  --  75   TRIG 66  --  111  --  63   ALT  --  21  --  20 14   CR  --  0.64  --  0.52 0.60   GFRESTIMATED  --  >90  --  >90  Non  GFR Calc   >90  Non  GFR Calc     GFRESTBLACK  --  >90  --  >90   GFR Calc   >90   GFR Calc     POTASSIUM  --  4.5  --  3.9 4.2   TSH  --   --   --   --  3.60      BP Readings from Last 3 Encounters:   03/04/20 100/70   02/07/20 126/60   01/10/20 130/88    Wt Readings from Last 3 Encounters:   05/26/20 52.2 kg (115 lb)   03/04/20 52.6 kg (116 lb)   02/07/20 53.1 kg (117 lb)                    Reviewed and updated as needed this visit by Provider  Tobacco  Allergies  Meds  Problems  Med Hx  Surg Hx  Fam Hx         Review of Systems   Constitutional, HEENT, cardiovascular, pulmonary, GI, , musculoskeletal, neuro, skin, endocrine and psych systems are negative, except as otherwise noted.       Objective   Reported vitals:  LMP 09/01/2016    healthy, alert and no distress  PSYCH: Alert and oriented times 3; coherent speech, normal   rate and volume, able to articulate logical thoughts, able   to abstract reason, no tangential thoughts, no hallucinations   or delusions  Her affect is normal  RESP: No cough, no audible wheezing, able to talk in full sentences  Remainder of exam unable to be completed due to telephone visits    Diagnostic Test Results:  Labs  reviewed in Epic        Assessment/Plan:  1. Generalized anxiety disorder  chronic  - ALPRAZolam (XANAX) 0.5 MG tablet; TAKE 1/2 TABLET BY MOUTH DAILY AS NEEDED. 30 tablets to last 30 days.  Dispense: 30 tablet; Refill: 2  She was reminded to take her xanax sparingly, continue other healthy self cares.     2. Midline low back pain without sciatica, unspecified chronicity  Chronic  - traMADol (ULTRAM) 50 MG tablet; Take 1 tablet (50 mg) by mouth daily as needed for severe pain 28 tablets to last 30 days.  Dispense: 28 tablet; Refill: 0  Refills were approved for 1 month.  Refill appointment in one month.    (G43.009) Migraine without aura and without status migrainosus, not intractable  Comment:   Plan:   Follow up with Dr. Montiel for additional migraine management.        Return in about 1 month (around 7/26/2020) for Medication follow up.      Phone call duration: 13 minutes    JEREMY Kent CNP

## 2020-07-02 DIAGNOSIS — M54.50 MIDLINE LOW BACK PAIN WITHOUT SCIATICA, UNSPECIFIED CHRONICITY: ICD-10-CM

## 2020-07-02 RX ORDER — TRAMADOL HYDROCHLORIDE 50 MG/1
50 TABLET ORAL DAILY PRN
Qty: 28 TABLET | Refills: 0 | Status: SHIPPED | OUTPATIENT
Start: 2020-07-02 | End: 2020-07-29

## 2020-07-02 NOTE — TELEPHONE ENCOUNTER
"\" Jocelyn\" hard to hear name  from [facility] pharmacy on [address] had questions about pts refill, said they saw something June 3rd about 28 tablets per 30 days, also restrictions etc, would like to discuss all the issues, pt wanted to get meds today but they have questions, please advise.   [phone]  "

## 2020-07-02 NOTE — TELEPHONE ENCOUNTER
DOD>>whoever can sign for CO(restricted pt)  Could you please resend the ultram- to the pharmacy ginny'd up  She was restricted to the [facility]- but they are in a trailer now- and insurance wont allow this

## 2020-07-29 ENCOUNTER — VIRTUAL VISIT (OUTPATIENT)
Dept: FAMILY MEDICINE | Facility: CLINIC | Age: 55
End: 2020-07-29
Payer: COMMERCIAL

## 2020-07-29 VITALS — HEIGHT: 65 IN | BODY MASS INDEX: 19.66 KG/M2 | WEIGHT: 118 LBS

## 2020-07-29 DIAGNOSIS — M54.50 MIDLINE LOW BACK PAIN WITHOUT SCIATICA, UNSPECIFIED CHRONICITY: ICD-10-CM

## 2020-07-29 PROCEDURE — 99213 OFFICE O/P EST LOW 20 MIN: CPT | Mod: 95 | Performed by: NURSE PRACTITIONER

## 2020-07-29 RX ORDER — TRAMADOL HYDROCHLORIDE 50 MG/1
50 TABLET ORAL DAILY PRN
Qty: 27 TABLET | Refills: 0 | Status: SHIPPED | OUTPATIENT
Start: 2020-07-29 | End: 2020-08-27

## 2020-07-29 ASSESSMENT — ANXIETY QUESTIONNAIRES
5. BEING SO RESTLESS THAT IT IS HARD TO SIT STILL: NOT AT ALL
6. BECOMING EASILY ANNOYED OR IRRITABLE: SEVERAL DAYS
3. WORRYING TOO MUCH ABOUT DIFFERENT THINGS: SEVERAL DAYS
2. NOT BEING ABLE TO STOP OR CONTROL WORRYING: SEVERAL DAYS
1. FEELING NERVOUS, ANXIOUS, OR ON EDGE: NEARLY EVERY DAY
GAD7 TOTAL SCORE: 7
IF YOU CHECKED OFF ANY PROBLEMS ON THIS QUESTIONNAIRE, HOW DIFFICULT HAVE THESE PROBLEMS MADE IT FOR YOU TO DO YOUR WORK, TAKE CARE OF THINGS AT HOME, OR GET ALONG WITH OTHER PEOPLE: NOT DIFFICULT AT ALL
7. FEELING AFRAID AS IF SOMETHING AWFUL MIGHT HAPPEN: NOT AT ALL

## 2020-07-29 ASSESSMENT — MIFFLIN-ST. JEOR: SCORE: 1131.12

## 2020-07-29 ASSESSMENT — PATIENT HEALTH QUESTIONNAIRE - PHQ9: 5. POOR APPETITE OR OVEREATING: SEVERAL DAYS

## 2020-07-30 ASSESSMENT — ANXIETY QUESTIONNAIRES: GAD7 TOTAL SCORE: 7

## 2020-08-05 PROBLEM — G44.209 TENSION HEADACHE: Status: ACTIVE | Noted: 2020-08-05

## 2020-08-05 PROBLEM — G44.40 MEDICATION OVERUSE HEADACHE: Status: ACTIVE | Noted: 2020-08-05

## 2020-08-27 ENCOUNTER — VIRTUAL VISIT (OUTPATIENT)
Dept: FAMILY MEDICINE | Facility: CLINIC | Age: 55
End: 2020-08-27
Payer: COMMERCIAL

## 2020-08-27 VITALS — BODY MASS INDEX: 19.49 KG/M2 | WEIGHT: 117 LBS | HEIGHT: 65 IN

## 2020-08-27 DIAGNOSIS — M54.50 MIDLINE LOW BACK PAIN WITHOUT SCIATICA, UNSPECIFIED CHRONICITY: ICD-10-CM

## 2020-08-27 PROCEDURE — 99213 OFFICE O/P EST LOW 20 MIN: CPT | Mod: 95 | Performed by: NURSE PRACTITIONER

## 2020-08-27 RX ORDER — TRAMADOL HYDROCHLORIDE 50 MG/1
50 TABLET ORAL DAILY PRN
Qty: 26 TABLET | Refills: 0 | Status: SHIPPED | OUTPATIENT
Start: 2020-08-27 | End: 2020-09-24

## 2020-08-27 ASSESSMENT — MIFFLIN-ST. JEOR: SCORE: 1126.59

## 2020-08-27 NOTE — PROGRESS NOTES
"Catie Nuñez is a 55 year old female who is being evaluated via a billable video visit.      The patient has been notified of following:     \"This video visit will be conducted via a call between you and your physician/provider. We have found that certain health care needs can be provided without the need for an in-person physical exam.  This service lets us provide the care you need with a video conversation.  If a prescription is necessary we can send it directly to your pharmacy.  If lab work is needed we can place an order for that and you can then stop by our lab to have the test done at a later time.    Video visits are billed at different rates depending on your insurance coverage.  Please reach out to your insurance provider with any questions.    If during the course of the call the physician/provider feels a video visit is not appropriate, you will not be charged for this service.\"    Patient has given verbal consent for Video visit? Yes  How would you like to obtain your AVS? MyChart  If you are dropped from the video visit, the video invite should be resent to: Text to cell phone: [phone]   Will anyone else be joining your video visit? No    Subjective     Catie Nuñez is a 55 year old female who presents today via video visit for the following health issues:    HPI    Chief Complaint   Patient presents with     Recheck Medication     tramadol       Due to technical difficulties, I was unable to connect via video with Kimmie. This encounter was done via telephone call with the patient's consent.  Telephone call start time: 9:33am.    Under a lot of stress.  Dog was attacked, she was \"touch and go\" for a while but she is going to make a full recovery.  Son has been ill, covid negative.    Acupuncture: weekly.  Is helping.  Migraines are much less.  Debilitating migraines have stopped with the acupuncture.  She has not been able to see dr. Montiel.  Plans to continue acupuncture.      Review of " Systems   Constitutional, HEENT, cardiovascular, pulmonary, GI, , musculoskeletal, neuro, skin, endocrine and psych systems are negative, except as otherwise noted.      Objective         Vitals:  No vitals were obtained today due to virtual visit.    Physical Exam     GENERAL APPEARANCE: in good spirits  SKIN: warm and dry  PSYCH: offers information freely.  She seems to have a good outlook for her future.      Diagnostics:  Reviewed in Epic        Assessment:    (M54.5) Midline low back pain without sciatica, unspecified chronicity  Comment: Improving  Plan: traMADol (ULTRAM) 50 MG tablet          We have been able to make strides reducing meds tramadol use.  She started 60 tablets a month and she is currently at 27 tablets/month.  She has a good outlook and desire to continue to cut down on her tramadol use.    For this month, I did give her a 26 tablet supply.  She is to take 26 tablets in 30 days only if needed.  She will do another virtual appointment with me in 1 month for her next refills and we will anticipate going down to 25 tablets for the 1 month supply at that time.  She is to continue her acupuncture and mental health management.  I encouraged her to eat a healthy diet and take good care of herself.  She is to follow-up with me sooner if any problems, questions or concerns.        Type of service:  Telephone call appointment, 11 minutes long.    Originating Location (pt. Location): Home    Distant Location (provider location):  [facility]     Platform used for Video Visit: Moises

## 2020-09-24 ENCOUNTER — VIRTUAL VISIT (OUTPATIENT)
Dept: FAMILY MEDICINE | Facility: CLINIC | Age: 55
End: 2020-09-24
Payer: COMMERCIAL

## 2020-09-24 DIAGNOSIS — M54.50 MIDLINE LOW BACK PAIN WITHOUT SCIATICA, UNSPECIFIED CHRONICITY: ICD-10-CM

## 2020-09-24 DIAGNOSIS — F41.1 GENERALIZED ANXIETY DISORDER: ICD-10-CM

## 2020-09-24 PROCEDURE — 99214 OFFICE O/P EST MOD 30 MIN: CPT | Mod: 95 | Performed by: NURSE PRACTITIONER

## 2020-09-24 RX ORDER — ALPRAZOLAM 0.5 MG
TABLET ORAL
Qty: 30 TABLET | Refills: 2 | Status: SHIPPED | OUTPATIENT
Start: 2020-09-24 | End: 2020-10-26

## 2020-09-24 RX ORDER — TRAMADOL HYDROCHLORIDE 50 MG/1
50 TABLET ORAL DAILY PRN
Qty: 25 TABLET | Refills: 0 | Status: SHIPPED | OUTPATIENT
Start: 2020-09-24 | End: 2020-10-15

## 2020-09-24 RX ORDER — TRAMADOL HYDROCHLORIDE 50 MG/1
50 TABLET ORAL DAILY PRN
Qty: 25 TABLET | Refills: 0 | Status: SHIPPED | OUTPATIENT
Start: 2020-09-24 | End: 2020-09-24

## 2020-09-24 NOTE — PROGRESS NOTES
"Catie Nuñez is a 55 year old female who is being evaluated via a billable video visit.      The patient has been notified of following:     \"This video visit will be conducted via a call between you and your physician/provider. We have found that certain health care needs can be provided without the need for an in-person physical exam.  This service lets us provide the care you need with a video conversation.  If a prescription is necessary we can send it directly to your pharmacy.  If lab work is needed we can place an order for that and you can then stop by our lab to have the test done at a later time.    Video visits are billed at different rates depending on your insurance coverage.  Please reach out to your insurance provider with any questions.    If during the course of the call the physician/provider feels a video visit is not appropriate, you will not be charged for this service.\"    Patient has given verbal consent for Video visit? Yes  How would you like to obtain your AVS? MyChart  If you are dropped from the video visit, the video invite should be resent to: Text to cell phone: [phone]   Will anyone else be joining your video visit? No    Subjective     Catie Nuñez is a 55 year old female who presents today via video visit for the following health issues:    HPI    Chief Complaint   Patient presents with     Refill Request       Kimmie is trying to take care of herself.  She is doing acupuncture and a few days ago she did cupping. She thinks these are helping her back, neck and migraine issues.    Tramadol:  \"I\"m doing okay.\"  She is titrating down on her medication.  She tries other therapies, stretching, etc before she reaches for her medication.  With her tramadol, 26 tablets lasted her the month.  She reports that that is controlled well and today she is ready to cut down even more.    Xanax:  She continues to take 1 alprazolam daily.  It helps when she takes it.  She is requesting to " "stay at her current level of 30 Xanax tablets per month.    Life stressors.  Difficult. Children with mental health issues.  Boyfriend relationship is going okay.  Her boyfriend is a good support system for Kimmie.      Vitamin D:  Taking 1000 units daily.    Anxiety and depression: controlled today.    Video Start Time: 12:59 PM--Due to technical difficulties, I was unable to connect via video with Kimmie. This encounter was done via telephone call with the patient's consent.      Review of Systems   Constitutional, HEENT, cardiovascular, pulmonary, GI, , musculoskeletal, neuro, skin, endocrine and psych systems are negative, except as otherwise noted.      Objective    Vitals - Patient Reported  Weight (Patient Reported): 52.2 kg (115 lb)  Height (Patient Reported): 165.1 cm (5' 5\")  BMI (Based on Pt Reported Ht/Wt): 19.14      Vitals:  No vitals were obtained today due to virtual visit.    Physical Exam     GENERAL: Healthy, alert and no distress  EYES: Eyes grossly normal to inspection.  No discharge or erythema, or obvious scleral/conjunctival abnormalities.  RESP: No audible wheeze, cough, or visible cyanosis.  No visible retractions or increased work of breathing.    SKIN: Visible skin clear. No significant rash, abnormal pigmentation or lesions.  NEURO: Cranial nerves grossly intact.  Mentation and speech appropriate for age.  PSYCH: Mentation appears normal, affect normal/bright, judgement and insight intact, normal speech and appearance well-groomed.      Diagnostics:  Reviewed in Epic        Assessment & Plan     (M54.5) Midline low back pain without sciatica, unspecified chronicity  Comment: Chronic/controlled  Plan: traMADol (ULTRAM) 50 MG tablet          For today, I did cut down her tramadol from 26 tablets from 1 month supply to 25 tablets for a one month supply.  She feels like this is an appropriate next step.  She will take the medications only as needed and prescribed.  I did remind her not to " overuse or misuse this medication.  She is to follow-up with me in 1 month for her next med check/recheck appointment.  She is to follow-up with me sooner with any problems, questions or concerns.    (F41.1) Generalized anxiety disorder  Comment: Chronic/stable  Plan: ALPRAZolam (XANAX) 0.5 MG tablet        Refills are given.  She will continue to use her alprazolam only as prescribed as needed.  I did give her 30 tablets supply to last for 30 days.  Follow-up with me in 1 month for a med check recheck appointment, virtual okay.  Follow-up sooner if problems.    See Patient Instructions    Return in about 4 weeks (around 10/22/2020) for Medication follow up.    JEREMY Kent CNP  [facility]      Video-Visit Details    Type of service:  Video Visit    Telephone call: 12 minutes long    Originating Location (pt. Location): Home    Distant Location (provider location):  [facility]     Platform usedDoximity

## 2020-09-29 ENCOUNTER — NURSE TRIAGE (OUTPATIENT)
Dept: NURSING | Facility: CLINIC | Age: 55
End: 2020-09-29

## 2020-09-29 NOTE — TELEPHONE ENCOUNTER
Pt called in states the Rx that was sent on 9/24/20 was not sent correctly.  Pt states he took 1 tablet of xanax per days and not 1/2 tablet per day.  Pt request the PCP to review the Rx and send the right prescription.      Please advise       Devante Beatty Nurse Advisor 9/29/2020 6:09 PM

## 2020-10-15 ENCOUNTER — VIRTUAL VISIT (OUTPATIENT)
Dept: FAMILY MEDICINE | Facility: CLINIC | Age: 55
End: 2020-10-15
Payer: COMMERCIAL

## 2020-10-15 DIAGNOSIS — M54.2 CERVICALGIA: Primary | ICD-10-CM

## 2020-10-15 DIAGNOSIS — M41.9 SCOLIOSIS OF THORACIC SPINE, UNSPECIFIED SCOLIOSIS TYPE: ICD-10-CM

## 2020-10-15 DIAGNOSIS — M54.50 MIDLINE LOW BACK PAIN WITHOUT SCIATICA, UNSPECIFIED CHRONICITY: ICD-10-CM

## 2020-10-15 PROCEDURE — 99214 OFFICE O/P EST MOD 30 MIN: CPT | Mod: 95 | Performed by: NURSE PRACTITIONER

## 2020-10-15 RX ORDER — TRAMADOL HYDROCHLORIDE 50 MG/1
50 TABLET ORAL DAILY PRN
Qty: 35 TABLET | Refills: 0 | Status: SHIPPED | OUTPATIENT
Start: 2020-10-15 | End: 2020-11-04

## 2020-10-15 NOTE — PROGRESS NOTES
"Catie Nuñez is a 55 year old female who is being evaluated via a billable telephone visit.      The patient has been notified of following:     \"This telephone visit will be conducted via a call between you and your physician/provider. We have found that certain health care needs can be provided without the need for a physical exam.  This service lets us provide the care you need with a short phone conversation.  If a prescription is necessary we can send it directly to your pharmacy.  If lab work is needed we can place an order for that and you can then stop by our lab to have the test done at a later time.    Telephone visits are billed at different rates depending on your insurance coverage. During this emergency period, for some insurers they may be billed the same as an in-person visit.  Please reach out to your insurance provider with any questions.    If during the course of the call the physician/provider feels a telephone visit is not appropriate, you will not be charged for this service.\"    Patient has given verbal consent for Telephone visit?  Yes    What phone number would you like to be contacted at? [phone]     How would you like to obtain your AVS? Macarenahart    Subjective     Catie Nuñez is a 55 year old female who presents via phone visit today for the following health issues:    HPI       Chief Complaint   Patient presents with     Referral     orthopedics     Medication Dosage Adjustment     increase for pain tramadol     Per Kimmie, since our last clinic appointment, her pain has escalated.  She is having difficulty getting pain relief  Upper shoulders, between her shoulder blades, neck.    She has been doing Stretches, acupuncture.  \"my acupuncturist thinks it is my spine that is causing issues now.\"  Her supportive therapies are not working.    [facility] has an appointment scheduled tomorrow to see an orthopedist at [facility].  She is requesting a referral to go to " "[facility].    Tramadol:  Kimmie has been titrating down on her tramadol use over the past 6+ months.  She had been taking 75 tablets per month and she has worked down to 25 tablets per month.  She is now having a significant amount more pain and she is requesting to increase the # of tablets she can have per month.  She will go to her [facility] appointment tomorrow as scheduled for additional treatment recommendations.  She is asking for a referral to [facility] for tomorrow's appointment.       Review of Systems   Constitutional, HEENT, cardiovascular, pulmonary, GI, , musculoskeletal, neuro, skin, endocrine and psych systems are negative, except as otherwise noted.       Objective   Vitals - Patient Reported  Weight (Patient Reported): 54.4 kg (120 lb)  Height (Patient Reported): 165.1 cm (5' 5\")  BMI (Based on Pt Reported Ht/Wt): 19.97      Vitals:  No vitals were obtained today due to virtual visit.    healthy, alert. She sounded like she was in pain.   PSYCH: Alert and oriented times 3; coherent speech, normal   rate and volume, able to articulate logical thoughts, able   to abstract reason, no tangential thoughts, no hallucinations   or delusions  Her affect is normal  RESP: No cough, no audible wheezing, able to talk in full sentences  Remainder of exam unable to be completed due to telephone visits    Diagnostics:  Reviewed in Epic.         Assessment/Plan:    Assessment & Plan     (M54.2) Cervicalgia  (primary encounter diagnosis)  Comment: Worse  Plan: Orthopedic & Spine  Referral        See below    (M54.5) Midline low back pain without sciatica, unspecified chronicity  Comment: Worse  Plan: traMADol (ULTRAM) 50 MG tablet, Orthopedic &         Spine  Referral        See below    (M41.9) Scoliosis of thoracic spine, unspecified scoliosis type  Comment: Uncertain  Plan: Orthopedic & Spine  Referral        See below    With our telephone call appointment today, we did sound very uncomfortable.  She " has been very compliant with me regarding her pain processes, tramadol use etc.  I do think it is a logical request today to go up on her tramadol tablets to 35 tablets for this month.  I do want her to proceed with her orthopedic consultation in hopes that other treatment modalities will be helpful.  She is to follow-up with me in 1 month for her next med check/recheck, and she is to follow-up sooner if any problems.  In the meantime, I will anticipate that she is doing okay and that she is following through with orthopedics and her acupuncture treatments.    She is appreciative.      See Patient Instructions    Return in about 4 weeks (around 11/12/2020) for Medication follow up.    JEREMY [facility]    Phone call duration:  13 minutes

## 2020-10-16 ENCOUNTER — TRANSFERRED RECORDS (OUTPATIENT)
Dept: HEALTH INFORMATION MANAGEMENT | Facility: CLINIC | Age: 55
End: 2020-10-16

## 2020-10-26 ENCOUNTER — TELEPHONE (OUTPATIENT)
Dept: FAMILY MEDICINE | Facility: CLINIC | Age: 55
End: 2020-10-26

## 2020-10-26 NOTE — TELEPHONE ENCOUNTER
Patient is calling requesting that he refill for Xanax be pushed up to today as her anxiety has been increased. Please call patient back at [phone]. Thank You    Irma Cuadra on 10/26/2020 at 2:12 PM

## 2020-10-27 ENCOUNTER — PATIENT OUTREACH (OUTPATIENT)
Dept: OBGYN | Facility: CLINIC | Age: 55
End: 2020-10-27

## 2020-10-27 DIAGNOSIS — R87.610 PAPANICOLAOU SMEAR OF CERVIX WITH ATYPICAL SQUAMOUS CELLS OF UNDETERMINED SIGNIFICANCE (ASC-US): ICD-10-CM

## 2020-10-30 ENCOUNTER — TRANSFERRED RECORDS (OUTPATIENT)
Dept: HEALTH INFORMATION MANAGEMENT | Facility: CLINIC | Age: 55
End: 2020-10-30

## 2020-11-04 ENCOUNTER — VIRTUAL VISIT (OUTPATIENT)
Dept: FAMILY MEDICINE | Facility: CLINIC | Age: 55
End: 2020-11-04
Payer: COMMERCIAL

## 2020-11-04 DIAGNOSIS — G44.86 CERVICOGENIC HEADACHE: ICD-10-CM

## 2020-11-04 DIAGNOSIS — M54.2 CERVICALGIA: Primary | ICD-10-CM

## 2020-11-04 DIAGNOSIS — G89.4 CHRONIC PAIN SYNDROME: ICD-10-CM

## 2020-11-04 PROCEDURE — 99214 OFFICE O/P EST MOD 30 MIN: CPT | Mod: 95 | Performed by: PHYSICIAN ASSISTANT

## 2020-11-04 RX ORDER — TRAMADOL HYDROCHLORIDE 50 MG/1
50 TABLET ORAL DAILY PRN
Qty: 35 TABLET | Refills: 0 | Status: SHIPPED | OUTPATIENT
Start: 2020-11-04 | End: 2020-11-17

## 2020-11-04 NOTE — PROGRESS NOTES
"Catie Nuñez is a 55 year old female who is being evaluated via a billable telephone visit.      The patient has been notified of following:     \"This telephone visit will be conducted via a call between you and your physician/provider. We have found that certain health care needs can be provided without the need for a physical exam.  This service lets us provide the care you need with a short phone conversation.  If a prescription is necessary we can send it directly to your pharmacy.  If lab work is needed we can place an order for that and you can then stop by our lab to have the test done at a later time.    Telephone visits are billed at different rates depending on your insurance coverage. During this emergency period, for some insurers they may be billed the same as an in-person visit.  Please reach out to your insurance provider with any questions.    If during the course of the call the physician/provider feels a telephone visit is not appropriate, you will not be charged for this service.\"    Patient has given verbal consent for Telephone visit?  Yes    What phone number would you like to be contacted at? [phone]    How would you like to obtain your AVS? MyChart    Subjective     Catie Nuñez is a 55 year old female who presents via phone visit today for the following health issues:    HPI     Chronic Pain Follow-Up    Where in your body do you have pain? Neck/ back pain  How has your pain affected your ability to work? Currently working shortened days from home  Which of these pain treatments have you tried since your last clinic visit? Acupuncture, Chiropractor and Other: cupping  How well are you sleeping? Poor  How has your mood been since your last visit? About the same  Have you had a significant life event? No  Other aggravating factors: prolonged sitting  Taking medication as directed? Yes, but has been going through faster recently due to worsening pain.     Past couple months major " "increase in pain  Taking a toll on her physically, emotionally, and spiritually  She follows with [facility]. Had xrays and MRI. 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 Pinching on nerves. She reports this neck pain is a trigger for her 3 day migraine headaches.    Tomorrow has a consult to see about epidural steroid injection.   Has 3 doses of Tramadol 50 mg remaining. Requesting early fill today while she determines next steps in pain management.       PHQ-9 SCORE 1/9/2018 10/29/2019 11/1/2019   PHQ-9 Total Score 5 2 2   Some encounter information is confidential and restricted. Go to Review Flowsheets activity to see all data.     DAYLIN-7 SCORE 11/1/2019 2/7/2020 7/29/2020   Total Score - - -   Total Score - - -   Total Score 9 7 7     No flowsheet data found.  Encounter-Level CSA - 09/23/2015:    Controlled Substance Agreement - Scan on 9/24/2015  2:10 PM: CONTROLLED SUBSTANCE AGREEMENT     Patient-Level CSA:    There are no patient-level csa.           Review of Systems   Constitutional, HEENT, cardiovascular, pulmonary, gi and gu systems are negative, except as otherwise noted.       Objective   Vitals - Patient Reported  Weight (Patient Reported): 54.4 kg (120 lb)  Height (Patient Reported): 162.6 cm (5' 4\")  BMI (Based on Pt Reported Ht/Wt): 20.6      Vitals:  No vitals were obtained today due to virtual visit.    healthy, alert and no distress  PSYCH: Alert and oriented times 3; coherent speech, normal   rate and volume, able to articulate logical thoughts, able   to abstract reason, no tangential thoughts, no hallucinations   or delusions  Her affect is normal and pleasant  RESP: No cough, no audible wheezing, able to talk in full sentences  Remainder of exam unable to be completed due to telephone visits          Assessment/Plan:    Assessment & Plan   Problem List Items Addressed This Visit        Nervous and Auditory    Cervicalgia - Primary    Relevant Medications    traMADol (ULTRAM) 50 MG " tablet    Cervicogenic headache    Relevant Medications    traMADol (ULTRAM) 50 MG tablet    Chronic pain syndrome    Relevant Medications    traMADol (ULTRAM) 50 MG tablet         55 year old female with a history of chronic pain syndrome, cervicalgia, cervicogenic headaches, migraines, depression, and anxiety presenting via telephone visit for pain control refill. Normally follows with [facility].     She is following with [facility] for her neck pain which has been worsening. She rates pain as severe. Resulting in regular migraine headaches.    She has consultation this week with [facility] to discuss epidural steroid injections. She is hoping this will help with long term pain control.     Refill provided today. OK to fill today. Recommend continued follow up with orthopedist and to consider epidural steroid injections if indicated. Patient in agreement with this plan.        Return for steroid injection consultation at [facility].    Adalberto Mercedes PA-C  [facility]    Phone call duration:  8 minutes

## 2020-11-05 ENCOUNTER — TRANSFERRED RECORDS (OUTPATIENT)
Dept: HEALTH INFORMATION MANAGEMENT | Facility: CLINIC | Age: 55
End: 2020-11-05

## 2020-11-06 ENCOUNTER — MYC MEDICAL ADVICE (OUTPATIENT)
Dept: FAMILY MEDICINE | Facility: CLINIC | Age: 55
End: 2020-11-06

## 2020-11-06 DIAGNOSIS — M54.50 MIDLINE LOW BACK PAIN WITHOUT SCIATICA, UNSPECIFIED CHRONICITY: Primary | ICD-10-CM

## 2020-11-09 ENCOUNTER — TELEPHONE (OUTPATIENT)
Dept: FAMILY MEDICINE | Facility: CLINIC | Age: 55
End: 2020-11-09

## 2020-11-09 NOTE — TELEPHONE ENCOUNTER
Forms from [facility]/[place]/ [place] medical referral for [employer] recipient enrollee  [place]  Prescribing privileges for PCP partners in clinic  Placed in providers form folder to complete

## 2020-11-10 NOTE — TELEPHONE ENCOUNTER
Yes, this clinic is in the [facility] network.    Pt is in the [employer] Restricted Recipient Program and requesting an Order for an epidural steroid injection at [facility] [address] [facility] [address] [place] [address] Telephone [phone] Fax [phone].    If Yasmin Velazquez CNP approves this procdure, please fax Order to [phone].    CHICHI Treviño [facility] Rep

## 2020-11-16 ENCOUNTER — HEALTH MAINTENANCE LETTER (OUTPATIENT)
Age: 55
End: 2020-11-16

## 2020-11-17 ENCOUNTER — MYC REFILL (OUTPATIENT)
Dept: FAMILY MEDICINE | Facility: CLINIC | Age: 55
End: 2020-11-17

## 2020-11-17 DIAGNOSIS — G89.4 CHRONIC PAIN SYNDROME: ICD-10-CM

## 2020-11-17 DIAGNOSIS — M54.2 CERVICALGIA: ICD-10-CM

## 2020-11-17 DIAGNOSIS — G44.86 CERVICOGENIC HEADACHE: ICD-10-CM

## 2020-11-18 RX ORDER — TRAMADOL HYDROCHLORIDE 50 MG/1
50 TABLET ORAL DAILY PRN
Qty: 35 TABLET | Refills: 0 | Status: SHIPPED | OUTPATIENT
Start: 2020-11-18 | End: 2020-11-30

## 2020-11-23 ENCOUNTER — OFFICE VISIT (OUTPATIENT)
Dept: URGENT CARE | Facility: URGENT CARE | Age: 55
End: 2020-11-23
Payer: COMMERCIAL

## 2020-11-23 VITALS
SYSTOLIC BLOOD PRESSURE: 130 MMHG | DIASTOLIC BLOOD PRESSURE: 80 MMHG | RESPIRATION RATE: 16 BRPM | HEART RATE: 74 BPM | OXYGEN SATURATION: 97 % | TEMPERATURE: 98.6 F

## 2020-11-23 DIAGNOSIS — M79.89 FINGER SWELLING: ICD-10-CM
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
## 2020-11-23 PROCEDURE — 99213 OFFICE O/P EST LOW 20 MIN: CPT | Mod: 25 | Performed by: PHYSICIAN ASSISTANT

## 2020-11-23 PROCEDURE — 96372 THER/PROPH/DIAG INJ SC/IM: CPT | Performed by: PHYSICIAN ASSISTANT

## 2020-11-23 PROCEDURE — 90714 TD VACC NO PRESV 7 YRS+ IM: CPT | Performed by: PHYSICIAN ASSISTANT

## 2020-11-23 PROCEDURE — 90471 IMMUNIZATION ADMIN: CPT | Performed by: PHYSICIAN ASSISTANT

## 2020-11-23 RX ORDER — CEFTRIAXONE SODIUM 1 G
1 VIAL (EA) INJECTION ONCE
Status: COMPLETED | OUTPATIENT
Start: 2020-11-23 | End: 2020-11-23

## 2020-11-23 RX ADMIN — Medication 1 G: at 17:05

## 2020-11-23 NOTE — PROGRESS NOTES
Clinic Administered Medication Documentation      Injectable Medication Documentation    Patient was given Ceftriaxone Sodium (Rocephin). Prior to medication administration, verified patients identity using patient s name and date of birth. Please see MAR and medication order for additional information. Patient instructed to remain in clinic for 15 minutes.      Was entire vial of medication used? Yes  Vial/Syringe: Single dose vial  Expiration Date:  05/2022  Was this medication supplied by the patient? No     Clinic Administered Medication Documentation      Injectable Medication Documentation    Patient was given Td. Prior to medication administration, verified patients identity using patient s name and date of birth. Please see MAR and medication order for additional information. Patient instructed to remain in clinic for 15 minutes.      Was entire vial of medication used? Yes  Vial/Syringe: Single dose vial  Expiration Date:  05/2022  Was this medication supplied by the patient? No

## 2020-11-23 NOTE — PATIENT INSTRUCTIONS
Patient Education     Animal Bite (General)  An animal bite can cause a wound deep enough to break the skin. In such cases, the wound is cleaned and then sometimes closed. If the wound is closed, it may not be closed completely. This is so that fluid can drain and prevent the wound from becoming infected. In addition to wound care, a tetanus shot (injection) may be given, if needed.     Home care    Care for the wound as directed. If a dressing was applied to the wound, change it as directed.    Wash your hands well with soap and warm water before and after caring for the wound. This helps lower the risk of infection.    If the wound bleeds, place a clean, soft cloth on the wound. Then firmly apply pressure until the bleeding stops. This may take up to 5 minutes. Don't release the pressure and look at the wound during this time.    Most skin wounds heal within 10 days. But an infection can occur even with correct treatment. So be sure to watch the wound for signs of infection (see below). Check the wound as often as directed by your healthcare provider.    Antibiotics may be prescribed. These help prevent or treat infection. If you re given antibiotics, take them as directed. Also be sure to complete the medicines.  Rabies prevention  Rabies is a virus that can be carried in certain animals. These can include domestic animals such as dogs and cats. Wild animals such as skunks, raccoons, foxes, and bats can also carry rabies. Pets fully vaccinated against rabies (2 shots) are at very low risk of infection. But because human rabies is almost always fatal, any biting pet should be confined for 10 days as an extra safety measure. In general, if there is a risk for rabies, the following steps may need to be taken:     If someone s pet dog or cat has bitten you, it should be kept in a secure area for the next 10 days to watch for signs of illness. (If the pet owner won t allow this, contact your local animal control  center.) Ask the pet owner for vaccination records if available. If the dog or cat becomes ill or dies during that time, contact your local animal control center at once so the animal may be tested for rabies. If the pet stays healthy for the next 10 days, there is no danger of rabies in the animal or you.    If a stray pet bit you, contact your local animal control center. They can give information on capture, quarantine, and animal rabies testing.    If you can t find the animal that bit you in the next 2 days, and if rabies exists in your region, you may need to receive the rabies vaccine series. Call your healthcare provider right away. Or return to the emergency department right away.    All animal bites should be reported to the local animal control center. If you were not given a form to fill out, you can report this yourself.  Follow-up care  Follow up with your healthcare provider, or as directed.  When to get medical advice  Get medical care right away if any of these occur:     Signs of infection:  ? Spreading redness or warmth from the wound  ? Increased pain or swelling  ? Fever of 100.4 F (38 C) or higher, or as directed by your healthcare provider  ? Colored fluid or pus draining from the wound    Signs of rabies infection:  ? Headache  ? Confusion  ? Strange behavior  ? Increased salivating and drooling  ? Seizure    Decreased ability to move any body part near the bite area    Bleeding that can't be stopped after 5 minutes of firm pressure  Michael last reviewed this educational content on 8/1/2019 2000-2020 The [employer]. [address] [place] [address]. All rights reserved. This information is not intended as a substitute for professional medical care. Always follow your healthcare professional's instructions.           Patient Education     Dog Bite  A dog bite can cause a wound deep enough to break the skin. In such cases, the wound is cleaned and sometimes closed.  Wounds would be closed if they are gaping open or in cosmetically important areas such as the face. If the wound is closed, it is usually not completely closed. This is so that fluid can drain if the wound becomes infected. Often, wounds will be left open to heal. In addition to wound care, a tetanus shot (injection) may be given, if needed.     Home care    Wash your hands well with soap and warm water before and after caring for the wound. This helps lower the risk of infection.    Care for the wound as directed. If a dressing was applied to the wound, be sure to change it as directed.    If the wound bleeds, place a clean, soft cloth on the wound. Then firmly apply pressure until the bleeding stops. This may take up to 5 minutes. Don't release the pressure and look at the wound during this time.    Most wounds heal within 10 days. But an infection can occur even with correct treatment. So be sure to check the wound daily for signs of infection (see below).    Antibiotics may be prescribed. These help prevent or treat infection. If you re given antibiotics, take them as directed. Also be sure to complete the medicines.  Rabies prevention  Rabies is a virus that can be carried in certain animals. These can include domestic animals such as dogs and cats. Pets fully vaccinated against rabies (2 shots) are at very low risk of infection. But because human rabies is almost always fatal, any biting pet should be confined for 10 days as an extra precaution. In general, if there is a risk for rabies, the following steps may need to be taken:     If someone s pet dog has bitten you, it should be kept in a secure area for the next 10 days to watch for signs of illness. (If the pet owner won t allow this, contact your local animal control center.) Ask to see the pet's vaccination history records. If the dog becomes ill or dies during that time, contact your local animal control center at once so the animal may be tested for  rabies. If the dog stays healthy for the next 10 days, there is no danger of rabies in the animal or you.  ? If a stray dog bit you, contact your local animal control center. They can give information on capture, quarantine, and animal rabies testing.  ? If you can t find the animal that bit you in the next 2 days, and if rabies exists in your area, you may need to receive the rabies vaccine series. Call your healthcare provider right away. Or return to the emergency department promptly.  ? All animal bites should be reported to the local animal control center. If you were not given a form to fill out, you can report this yourself.  Follow-up care  Follow up with your healthcare provider, or as directed.  When to get medical advice  Call your healthcare provider or get medical attention right away if any of these occur:     Signs of infection:  ? Spreading redness or warmth from the wound  ? Increased pain or swelling  ? Fever of 100.4 F (38 C) or higher, or as directed by your healthcare provider  ? Colored fluid or pus draining from the wound    Signs of rabies infection. Don't wait for any of these symptoms to occur! If you suspect that the dog that bit you is rabid, or if the dog is lost and can't be found, you should get the vaccine series.  ? Headache  ? Confusion  ? Strange behavior  ? Increased salivating and drooling  ? Seizure  ? Hallucination, anxiety, or agitation  ? Fever    Decreased ability to move any body part near the wound    Bleeding that can't be stopped after 5 minutes of firm pressure  [employer] last reviewed this educational content on 8/1/2019 2000-2020 The [employer], [employer] [address] [place] [address], [place] [address]. All rights reserved. This information is not intended as a substitute for professional medical care. Always follow your healthcare professional's instructions.           Patient Education     Dog Bite  A dog bite can cause a wound deep enough to break the skin. In such  cases, the wound is cleaned and sometimes closed. Wounds would be closed if they are gaping open or in cosmetically important areas such as the face. If the wound is closed, it is usually not completely closed. This is so that fluid can drain if the wound becomes infected. Often, wounds will be left open to heal. In addition to wound care, a tetanus shot (injection) may be given, if needed.     Home care    Wash your hands well with soap and warm water before and after caring for the wound. This helps lower the risk of infection.    Care for the wound as directed. If a dressing was applied to the wound, be sure to change it as directed.    If the wound bleeds, place a clean, soft cloth on the wound. Then firmly apply pressure until the bleeding stops. This may take up to 5 minutes. Don't release the pressure and look at the wound during this time.    Most wounds heal within 10 days. But an infection can occur even with correct treatment. So be sure to check the wound daily for signs of infection (see below).    Antibiotics may be prescribed. These help prevent or treat infection. If you re given antibiotics, take them as directed. Also be sure to complete the medicines.  Rabies prevention  Rabies is a virus that can be carried in certain animals. These can include domestic animals such as dogs and cats. Pets fully vaccinated against rabies (2 shots) are at very low risk of infection. But because human rabies is almost always fatal, any biting pet should be confined for 10 days as an extra precaution. In general, if there is a risk for rabies, the following steps may need to be taken:     If someone s pet dog has bitten you, it should be kept in a secure area for the next 10 days to watch for signs of illness. (If the pet owner won t allow this, contact your local animal control center.) Ask to see the pet's vaccination history records. If the dog becomes ill or dies during that time, contact your local animal  control center at once so the animal may be tested for rabies. If the dog stays healthy for the next 10 days, there is no danger of rabies in the animal or you.  ? If a stray dog bit you, contact your local animal control center. They can give information on capture, quarantine, and animal rabies testing.  ? If you can t find the animal that bit you in the next 2 days, and if rabies exists in your area, you may need to receive the rabies vaccine series. Call your healthcare provider right away. Or return to the emergency department promptly.  ? All animal bites should be reported to the local animal control center. If you were not given a form to fill out, you can report this yourself.  Follow-up care  Follow up with your healthcare provider, or as directed.  When to get medical advice  Call your healthcare provider or get medical attention right away if any of these occur:     Signs of infection:  ? Spreading redness or warmth from the wound  ? Increased pain or swelling  ? Fever of 100.4 F (38 C) or higher, or as directed by your healthcare provider  ? Colored fluid or pus draining from the wound    Signs of rabies infection. Don't wait for any of these symptoms to occur! If you suspect that the dog that bit you is rabid, or if the dog is lost and can't be found, you should get the vaccine series.  ? Headache  ? Confusion  ? Strange behavior  ? Increased salivating and drooling  ? Seizure  ? Hallucination, anxiety, or agitation  ? Fever    Decreased ability to move any body part near the wound    Bleeding that can't be stopped after 5 minutes of firm pressure  Michael last reviewed this educational content on 8/1/2019 2000-2020 [employer]. [address] [place] [address], [place] [address]. All rights reserved. This information is not intended as a substitute for professional medical care. Always follow your healthcare professional's instructions.

## 2020-11-28 NOTE — PROGRESS NOTES
SUBJECTIVE:  Chief Complaint   Patient presents with     Dog Bite     Pt states she got bit by her dog on R pointer finger that happened last night      Catie Nuñez is a 55 year old female presents with a chief complaint of right finger  second swelling, tenderness and redness.  The injury occurred 1 day(s) ago.   The injury happened while at home. How: dog bite.  The patient complained of mild pain  and has not had decreased ROM.  Pain exacerbated by repetitive motion.  Relieved by rest.  She treated it initially with no therapy. This is the first time this type of injury has occurred to this patient.     Past Medical History:   Diagnosis Date     Abnormal Pap smear of cervix 11/07/2019    See problem list.      Anxiety      Arthritis 3 yrs ago     ASCUS with positive high risk HPV 6/2015,09/14/16    atypia on colp, 09/14/16: ASCUS + HR HPV 16 and other.      Cervical high risk HPV (human papillomavirus) test positive 11/07/2019    See problem list.     Depression      Depressive disorder Age 12     Fracture of great toe 2/20/2014     History of scoliosis      Hx of colposcopy with cervical biopsy 10/06/16    10/06/16: [place] [facility] NIL.     MVP (mitral valve prolapse)      Unexplained endometrial cells on cervical Pap smear 6/2015    thin endometrium on US     Allergies   Allergen Reactions     Ondansetron Nausea and Vomiting     Sulfa Drugs Hives     Rash       Social History     Tobacco Use     Smoking status: Never Smoker     Smokeless tobacco: Never Used   Substance Use Topics     Alcohol use: Not Currently     Alcohol/week: 0.0 standard drinks     Comment: occ, rare        ROS:  CONSTITUTIONAL:NEGATIVE for fever, chills, change in weight  INTEGUMENTARY/SKIN: POSITIVE for swelling and erythema  MUSCULOSKELETAL: POSITIVE for tenderness of finger  NEURO: NEGATIVE for weakness, dizziness or paresthesias  ENDOCRINE: NEGATIVE for temperature intolerance, skin/hair changes  PSYCHIATRIC: NEGATIVE for changes in mood or  affect  VASC: NEGATIVE for cool extremity    EXAM:   /80   Pulse 74   Temp 98.6  F (37  C) (Temporal)   Resp 16   LMP 09/01/2016   SpO2 97%   Gen: healthy, alert, active and healthy,alert,no distress  Extremity: finger  second has erythema, swelling and point tenderness .   There is not compromise to the distal circulation.  Pulses are +2 and CRT is brisk  GENERAL APPEARANCE: healthy, alert and no distress  EXTREMITIES: peripheral pulses normal  MS:  tender to palpation of finger  SKIN: erythema, swelling  NEURO: Normal strength and tone, sensory exam grossly normal, mentation intact and speech normal      ASSESSMENT/PLAN:    ICD-10-CM    1. Dog bite, initial encounter  W54.0XXA TD PRESERV FREE, IM (7+ YRS)     CEFTRIAXONE NA INJ /250MG   2. Finger swelling  M79.89 amoxicillin-clavulanate (AUGMENTIN) 875-125 MG tablet     cefTRIAXone (ROCEPHIN) injection 1 g     CEFTRIAXONE NA INJ /250MG       Patient given rocephin 1 gram IM  Augmentin for dog bite  Motrin  Warm finger soaks  Follow up as needed

## 2020-11-30 ENCOUNTER — VIRTUAL VISIT (OUTPATIENT)
Dept: FAMILY MEDICINE | Facility: CLINIC | Age: 55
End: 2020-11-30
Payer: COMMERCIAL

## 2020-11-30 DIAGNOSIS — M54.2 CERVICALGIA: ICD-10-CM

## 2020-11-30 DIAGNOSIS — F41.1 GENERALIZED ANXIETY DISORDER: ICD-10-CM

## 2020-11-30 DIAGNOSIS — G44.86 CERVICOGENIC HEADACHE: ICD-10-CM

## 2020-11-30 DIAGNOSIS — G89.4 CHRONIC PAIN SYNDROME: ICD-10-CM

## 2020-11-30 PROCEDURE — 99214 OFFICE O/P EST MOD 30 MIN: CPT | Mod: 95 | Performed by: NURSE PRACTITIONER

## 2020-11-30 RX ORDER — TRAMADOL HYDROCHLORIDE 50 MG/1
50 TABLET ORAL 2 TIMES DAILY PRN
Qty: 50 TABLET | Refills: 0 | Status: SHIPPED | OUTPATIENT
Start: 2020-11-30 | End: 2020-12-30

## 2020-11-30 NOTE — PROGRESS NOTES
"Catie Nuñez is a 55 year old female who is being evaluated via a billable telephone visit.      The patient has been notified of following:     \"This telephone visit will be conducted via a call between you and your physician/provider. We have found that certain health care needs can be provided without the need for a physical exam.  This service lets us provide the care you need with a short phone conversation.  If a prescription is necessary we can send it directly to your pharmacy.  If lab work is needed we can place an order for that and you can then stop by our lab to have the test done at a later time.    Telephone visits are billed at different rates depending on your insurance coverage. During this emergency period, for some insurers they may be billed the same as an in-person visit.  Please reach out to your insurance provider with any questions.    If during the course of the call the physician/provider feels a telephone visit is not appropriate, you will not be charged for this service.\"    Patient has given verbal consent for Telephone visit?  Yes    What phone number would you like to be contacted at? [phone]    How would you like to obtain your AVS? Macarenahart    Subjective     Catie Nuñez is a 55 year old female who presents via phone visit today for the following health issues:    HPI          Chief Complaint   Patient presents with     Medication Problem     Dx with a \"pinched nerve on both sides of low back and it looks like the scoliosis is catching up with me.\"  Planning to go in for a steroid injection \"which makes me nervous.\"  She is planning to delay the injection due to the covid pandemic.  In acupuncture twice a week which helps.    Spine specialist:  [facility].    \"I am just coming out of a 3 day migraine.\"  This is happening once a month.     Narcotics:tramadol twice a day.  Today, she is requesting refills of the tramadol to give her 1 or 2 tablets/day for severe pain.    She will " plan to continue gentle stretching and exercises, nutrition etc.      Review of Systems   Constitutional, HEENT, cardiovascular, pulmonary, GI, , musculoskeletal, neuro, skin, endocrine and psych systems are negative, except as otherwise noted.       Objective          Vitals:  No vitals were obtained today due to virtual visit.    healthy, alert and no distress  PSYCH: Alert and oriented times 3; coherent speech, normal   rate and volume, able to articulate logical thoughts, able   to abstract reason, no tangential thoughts, no hallucinations   or delusions  Her affect is normal  RESP: No cough, no audible wheezing, able to talk in full sentences  Remainder of exam unable to be completed due to telephone visits    Diagnostics:  Reviewed in Epic.            Assessment & Plan     (M54.2) Cervicalgia  Comment: Chronic  Plan: traMADol (ULTRAM) 50 MG tablet        See below    (G89.4) Chronic pain syndrome  Comment: Chronic  Plan: traMADol (ULTRAM) 50 MG tablet       See below    (R51.9) Cervicogenic headache  Comment: Chronic  Plan: traMADol (ULTRAM) 50 MG tablet        See below    (F41.1) Generalized anxiety disorder  Comment: Chronic  Plan:       For today I had a long discussion with the patient regarding her chronic pain, narcotic use, and the fact that we have titrated down on her narcotics.  I do think she has pain but I also think she should proceed with the steroid injection ASAP.  I did reassure her that the facilities are taking huge precautions to keep everyone safe from Covid.  I told her that I do believe facilities will be shutting down there elective procedures soon as the Covid cases are escalating in the community.  I think she should err on the side of getting this injection done as to see if this will help improve her pain and would help reduce her need for narcotics.  I reminded her of the complicated narcotic refill policy, her history of overusing narcotics, and she will plan to follow-up with  me in 1 month for a recheck.  I will touch base with her at that time about her narcotic use, the injection etc.  I encouraged healthy self cares, questions were answered etc.    This note was created using the Dragon Medical Dictating Software and therefore should be read with the understanding of the potential for subtle errors in transcription.              See Patient Instructions    Return in about 4 weeks (around 12/28/2020) for Medication follow up.    JEREMY Kent [facility]    Phone call duration:  18 minutes

## 2020-12-14 ENCOUNTER — TELEPHONE (OUTPATIENT)
Dept: FAMILY MEDICINE | Facility: CLINIC | Age: 55
End: 2020-12-14

## 2020-12-15 NOTE — TELEPHONE ENCOUNTER
Patient insurance does not require referrals. I will call patient.     Tara Navarro  Referral Coordinator

## 2020-12-16 NOTE — TELEPHONE ENCOUNTER
Jaycob Bardales- This message was sent to me I did not realize she was a [place] patient and also a restricted patient. See needs a referral for [facility].  Tara Navarro  Referral Coordinator

## 2020-12-17 NOTE — TELEPHONE ENCOUNTER
A [facility] Restricted Recipient Referral form was faxed today [phone] for Pt to see BEATRIZ Esparza at [facility].    CHICHI Treviño [facility] Referral Rep

## 2020-12-28 NOTE — TELEPHONE ENCOUNTER
FYI to provider - Patient is lost to pap tracking follow-up. Attempts to contact pt have been made per reminder process and there has been no reply and/or no appt scheduled.

## 2020-12-30 ENCOUNTER — VIRTUAL VISIT (OUTPATIENT)
Dept: FAMILY MEDICINE | Facility: CLINIC | Age: 55
End: 2020-12-30
Payer: COMMERCIAL

## 2020-12-30 DIAGNOSIS — G44.86 CERVICOGENIC HEADACHE: ICD-10-CM

## 2020-12-30 DIAGNOSIS — M54.2 CERVICALGIA: ICD-10-CM

## 2020-12-30 DIAGNOSIS — G89.4 CHRONIC PAIN SYNDROME: ICD-10-CM

## 2020-12-30 PROCEDURE — 99214 OFFICE O/P EST MOD 30 MIN: CPT | Mod: 95 | Performed by: PHYSICIAN ASSISTANT

## 2020-12-30 RX ORDER — TRAMADOL HYDROCHLORIDE 50 MG/1
50 TABLET ORAL 2 TIMES DAILY PRN
Qty: 50 TABLET | Refills: 0 | Status: SHIPPED | OUTPATIENT
Start: 2020-12-30 | End: 2021-01-20

## 2020-12-30 ASSESSMENT — PATIENT HEALTH QUESTIONNAIRE - PHQ9: 5. POOR APPETITE OR OVEREATING: SEVERAL DAYS

## 2020-12-30 ASSESSMENT — ANXIETY QUESTIONNAIRES
GAD7 TOTAL SCORE: 3
7. FEELING AFRAID AS IF SOMETHING AWFUL MIGHT HAPPEN: NOT AT ALL
2. NOT BEING ABLE TO STOP OR CONTROL WORRYING: NOT AT ALL
6. BECOMING EASILY ANNOYED OR IRRITABLE: NOT AT ALL
5. BEING SO RESTLESS THAT IT IS HARD TO SIT STILL: NOT AT ALL
3. WORRYING TOO MUCH ABOUT DIFFERENT THINGS: SEVERAL DAYS
IF YOU CHECKED OFF ANY PROBLEMS ON THIS QUESTIONNAIRE, HOW DIFFICULT HAVE THESE PROBLEMS MADE IT FOR YOU TO DO YOUR WORK, TAKE CARE OF THINGS AT HOME, OR GET ALONG WITH OTHER PEOPLE: NOT DIFFICULT AT ALL
1. FEELING NERVOUS, ANXIOUS, OR ON EDGE: SEVERAL DAYS

## 2020-12-30 NOTE — PROGRESS NOTES
"Catie Nuñez is a 55 year old female who is being evaluated via a billable telephone visit.      The patient has been notified of following:     \"This telephone visit will be conducted via a call between you and your physician/provider. We have found that certain health care needs can be provided without the need for a physical exam.  This service lets us provide the care you need with a short phone conversation.  If a prescription is necessary we can send it directly to your pharmacy.  If lab work is needed we can place an order for that and you can then stop by our lab to have the test done at a later time.    Telephone visits are billed at different rates depending on your insurance coverage. During this emergency period, for some insurers they may be billed the same as an in-person visit.  Please reach out to your insurance provider with any questions.    If during the course of the call the physician/provider feels a telephone visit is not appropriate, you will not be charged for this service.\"    Patient has given verbal consent for Telephone visit?  Yes    What phone number would you like to be contacted at? [phone]    How would you like to obtain your AVS? MyChart    Subjective     Catie Nuñez is a 55 year old female who presents via phone visit today for the following health issues:    HPI     Medication Followup of Tramadol    Taking Medication as prescribed: yes    Side Effects:  None    Medication Helping Symptoms:  yes     History of cervicalgia, cervicogenic headaches, chronic pain  Following up with me today for refill of Tramadol as PCP is out of office   Has not had injection done yet  Worried about going in the hospital for anything elective right now   Tramadol is helping with pain  Still going to acupuncture and doing exercises/yoga      Review of Systems   Constitutional, HEENT, cardiovascular, pulmonary, gi and gu systems are negative, except as otherwise noted.       Objective    "       Vitals:  No vitals were obtained today due to virtual visit.    healthy, alert and no distress  PSYCH: Alert and oriented times 3; coherent speech, normal   rate and volume, able to articulate logical thoughts, able   to abstract reason, no tangential thoughts, no hallucinations   or delusions  Her affect is normal  RESP: No cough, no audible wheezing, able to talk in full sentences  Remainder of exam unable to be completed due to telephone visits          Assessment/Plan:    Assessment & Plan   Problem List Items Addressed This Visit        Nervous and Auditory    Cervicalgia    Relevant Medications    traMADol (ULTRAM) 50 MG tablet    Cervicogenic headache    Relevant Medications    traMADol (ULTRAM) 50 MG tablet    Chronic pain syndrome    Relevant Medications    traMADol (ULTRAM) 50 MG tablet          reviewed, has been consistent with monthly fills   Reviewed previous note from PCP on 11/30/2020 -- generally should follow with PCP for this but will fill for pt in PCP's absence and we are somewhat familiar having visited in the past   Encouraged Kimmie to follow up and schedule an injection  She is still reluctant to do this -- concerned with about exposure risk during pandemic   Continue acupuncture, yoga, stretching  Follow up with PCP in 1 month   She is in agreement with this plan       Return in about 4 weeks (around 1/27/2021) for PCP follow up.    Adalberto Mercedes PA-C  [facility]    Phone call duration:  8 minutes

## 2020-12-31 ASSESSMENT — ANXIETY QUESTIONNAIRES: GAD7 TOTAL SCORE: 3

## 2021-01-20 ENCOUNTER — VIRTUAL VISIT (OUTPATIENT)
Dept: FAMILY MEDICINE | Facility: CLINIC | Age: 56
End: 2021-01-20
Payer: COMMERCIAL

## 2021-01-20 DIAGNOSIS — G89.4 CHRONIC PAIN SYNDROME: ICD-10-CM

## 2021-01-20 DIAGNOSIS — F41.1 GENERALIZED ANXIETY DISORDER: ICD-10-CM

## 2021-01-20 DIAGNOSIS — M54.2 CERVICALGIA: ICD-10-CM

## 2021-01-20 DIAGNOSIS — R41.840 POOR CONCENTRATION: Primary | ICD-10-CM

## 2021-01-20 DIAGNOSIS — N95.1 SYMPTOMATIC MENOPAUSAL OR FEMALE CLIMACTERIC STATES: ICD-10-CM

## 2021-01-20 DIAGNOSIS — G44.86 CERVICOGENIC HEADACHE: ICD-10-CM

## 2021-01-20 PROCEDURE — 99214 OFFICE O/P EST MOD 30 MIN: CPT | Mod: 95 | Performed by: NURSE PRACTITIONER

## 2021-01-20 RX ORDER — ALPRAZOLAM 0.5 MG
TABLET ORAL
Qty: 30 TABLET | Refills: 0 | Status: SHIPPED | OUTPATIENT
Start: 2021-01-20 | End: 2021-02-09

## 2021-01-20 RX ORDER — TRAMADOL HYDROCHLORIDE 50 MG/1
50 TABLET ORAL 2 TIMES DAILY PRN
Qty: 50 TABLET | Refills: 0 | Status: SHIPPED | OUTPATIENT
Start: 2021-01-29 | End: 2021-02-09

## 2021-01-20 NOTE — PROGRESS NOTES
Kimmie is a 55 year old who is being evaluated via a billable telephone visit.      What phone number would you like to be contacted at?   How would you like to obtain your AVS?   Assessment & Plan     (R46.860) Poor concentration  (primary encounter diagnosis)  Comment: Uncertain  Plan: MENTAL HEALTH REFERRAL  - Adult; Assessments         and Testing; ADHD; [facility] [phone]; We will contact you to         schedule the appointment or please call with         any questions        For today, I told Kimmie that poor concentration and the possible diagnosis for an adult with ADD or ADHD needs to be decided by appropriate screening.  I did give her a referral to follow-up with an evaluation.  She is appreciative.    (M54.2) Cervicalgia  Comment: Chronic/worse  Plan: traMADol (ULTRAM) 50 MG tablet        I had a long conversation with Carson today about her history of taking controlled substances, tramadol, Xanax etc.  We had worked very hard to titrate down on her tramadol and she is now back to 50 tablets/month.  She states she is having a very difficult time with pain right now she has a very difficult month ahead.  I did agree to give her 1 more month of 50 tablets of tramadol and when I follow-up with her in 4 weeks, we will cut this down.  She agrees and understands.  No early refills would be approved.  Appointments are required for refills.      (G89.4) Chronic pain syndrome  Comment: Chronic  Plan: traMADol (ULTRAM) 50 MG tablet        See above    (R51.9) Cervicogenic headache  Comment: Chronic  Plan: traMADol (ULTRAM) 50 MG tablet        See above    (F41.1) Generalized anxiety disorder  Comment: Worse  Plan: ALPRAZolam (XANAX) 0.5 MG tablet        I did discuss with Kimmie her use of alprazolam.  I do want her to titrate down and off of this medication.  I had a long conversation about the addiction and possible withdrawals with this type of medication/benzodiazepines.  She agrees  "and understands.  For now, she will use her medication in a limited capacity and she will follow-up with me in 4 weeks.      See Patient Instructions    Return in about 4 weeks (around 2/17/2021) for Medication follow up.    JEREMY Kent CNP  [facility]    Tyler Burks is a 55 year old who is doing a telephone call/virtual appointment today for the following health issues     HPI       Medication Followup of Xanax and Tramadol     Taking Medication as prescribed: yes    Side Effects:  None    Medication Helping Symptoms:  yes     Working from home.  Working 12 hour days. \"work has been insane.\"  More migraines than she had been having.  Acupuncture is helpful.  Trying to keep things going.    Tramadol:  \"I have been using it because I have been needing it. It helps me get through the day.\"  This helps her migraines and other pain.  She had been titrating down on her May tablets per month she was getting and recently she went back up to 50 tablets/month.  She feels that this has been critical for her to be able to get through her day and month.  She is trying to maintain other pain relieving therapies    Possible ADD/ADHD.  This has been brought up to her by her children and SO.  Easily distracted.     She is wondering how she can be screened.         Review of Systems   Constitutional, HEENT, cardiovascular, pulmonary, GI, , musculoskeletal, neuro, skin, endocrine and psych systems are negative, except as otherwise noted.      Objective           Vitals:  No vitals were obtained today due to virtual visit.    Physical Exam   healthy, alert and no distress  PSYCH: Alert and oriented times 3; coherent speech, normal   rate and volume, able to articulate logical thoughts, able   to abstract reason, no tangential thoughts, no hallucinations   or delusions  Her affect is normal  RESP: No cough, no audible wheezing, able to talk in full sentences  Remainder of exam " unable to be completed due to telephone visits            Phone call duration: 15 minutes

## 2021-01-21 ENCOUNTER — MYC MEDICAL ADVICE (OUTPATIENT)
Dept: FAMILY MEDICINE | Facility: CLINIC | Age: 56
End: 2021-01-21

## 2021-01-21 RX ORDER — MEDROXYPROGESTERONE ACETATE 2.5 MG/1
TABLET ORAL
Qty: 90 TABLET | Refills: 3 | OUTPATIENT
Start: 2021-01-21

## 2021-01-21 RX ORDER — ALPRAZOLAM 0.5 MG
TABLET ORAL
Qty: 30 TABLET | OUTPATIENT
Start: 2021-01-21

## 2021-01-21 RX ORDER — ESTRADIOL 1 MG/1
TABLET ORAL
Qty: 90 TABLET | Refills: 3 | OUTPATIENT
Start: 2021-01-21

## 2021-01-21 NOTE — TELEPHONE ENCOUNTER
I approved this patient's alprazolam yesterday with her telephone call appointment.    The system will not let me delete this request.  Please delete the alprazolam.    As for her estrogen and progesterone, OB/GYN manages these medications.  The patient is in a restricted prescription program and she still needs to see OB/GYN once a year for approval of these meds.  Once OB/GYN says she can continue them in place of the order, I can cosign the OB/GYN's prescriptions.    Please notify the patient that she is to follow-up with OB/GYN for these prescriptions.

## 2021-01-28 ENCOUNTER — MYC MEDICAL ADVICE (OUTPATIENT)
Dept: FAMILY MEDICINE | Facility: CLINIC | Age: 56
End: 2021-01-28

## 2021-01-29 ENCOUNTER — TELEPHONE (OUTPATIENT)
Dept: FAMILY MEDICINE | Facility: CLINIC | Age: 56
End: 2021-01-29

## 2021-01-29 ENCOUNTER — OFFICE VISIT (OUTPATIENT)
Dept: FAMILY MEDICINE | Facility: CLINIC | Age: 56
End: 2021-01-29
Payer: COMMERCIAL

## 2021-01-29 VITALS
WEIGHT: 120 LBS | HEART RATE: 88 BPM | DIASTOLIC BLOOD PRESSURE: 70 MMHG | RESPIRATION RATE: 16 BRPM | SYSTOLIC BLOOD PRESSURE: 132 MMHG | TEMPERATURE: 98 F | OXYGEN SATURATION: 98 % | BODY MASS INDEX: 19.97 KG/M2

## 2021-01-29 DIAGNOSIS — R30.0 DYSURIA: ICD-10-CM

## 2021-01-29 DIAGNOSIS — N30.00 ACUTE CYSTITIS WITHOUT HEMATURIA: Primary | ICD-10-CM

## 2021-01-29 DIAGNOSIS — N89.8 VAGINAL ODOR: Primary | ICD-10-CM

## 2021-01-29 LAB
ALBUMIN UR-MCNC: NEGATIVE MG/DL
APPEARANCE UR: CLEAR
BACTERIA #/AREA URNS HPF: ABNORMAL /HPF
BILIRUB UR QL STRIP: NEGATIVE
COLOR UR AUTO: YELLOW
GLUCOSE UR STRIP-MCNC: NEGATIVE MG/DL
HGB UR QL STRIP: NEGATIVE
KETONES UR STRIP-MCNC: NEGATIVE MG/DL
LEUKOCYTE ESTERASE UR QL STRIP: ABNORMAL
NITRATE UR QL: NEGATIVE
NON-SQ EPI CELLS #/AREA URNS LPF: ABNORMAL /LPF
PH UR STRIP: 7 PH (ref 5–7)
RBC #/AREA URNS AUTO: ABNORMAL /HPF
SOURCE: ABNORMAL
SP GR UR STRIP: 1.01 (ref 1–1.03)
SPECIMEN SOURCE: NORMAL
UROBILINOGEN UR STRIP-ACNC: 0.2 EU/DL (ref 0.2–1)
WBC #/AREA URNS AUTO: ABNORMAL /HPF
WET PREP SPEC: NORMAL

## 2021-01-29 PROCEDURE — 99213 OFFICE O/P EST LOW 20 MIN: CPT | Performed by: NURSE PRACTITIONER

## 2021-01-29 PROCEDURE — 87210 SMEAR WET MOUNT SALINE/INK: CPT | Performed by: NURSE PRACTITIONER

## 2021-01-29 PROCEDURE — 81001 URINALYSIS AUTO W/SCOPE: CPT | Performed by: NURSE PRACTITIONER

## 2021-01-29 RX ORDER — NITROFURANTOIN 25; 75 MG/1; MG/1
100 CAPSULE ORAL 2 TIMES DAILY
Qty: 14 CAPSULE | Refills: 0 | Status: SHIPPED | OUTPATIENT
Start: 2021-01-29 | End: 2021-07-07

## 2021-01-29 NOTE — TELEPHONE ENCOUNTER
Jaycob Glover, pt I saw today for cloudy/odorous urine is restricted to you.  UA + trace LE, neg wet prep.  Lab is not obvious UA but we discussed trial of macrobid with close symptom monitoring.  Do you mind signing of on macrobid for her (sulfa allergy)?  Pharmacy loaded.

## 2021-01-29 NOTE — RESULT ENCOUNTER NOTE
Hi Kimmie,    Wet prep was normal, I will ask gennaro to send in the macrobid antibiotic, thanks.    Rita Lucio, CNP

## 2021-01-29 NOTE — PROGRESS NOTES
(N89.8) Vaginal odor  (primary encounter diagnosis)  Comment: notes odorous urine with foul odor,UA with trace LE, otherwise normal.  Wet prep negative  Plan: Wet prep        Discussed UA not overly suspicious for infection, however I do not have another explanation for her symptoms.  We agreed on a trial of macrobid and follow up if worsening or symptoms not improved by Monday.  Advised increase fluid intake.  Pt is restricted, will ask BEATRIZ Dalton to sign Rx.    (R30.0) Dysuria  Comment:   Plan: UA with Microscopic reflex to Culture        As above         Subjective     Kimmie is a 55 year old who presents to clinic today for the following health issues    HPI       Genitourinary - Female  Onset/Duration: 5 days  Description:   Painful urination (Dysuria): no           Frequency: YES  Blood in urine (Hematuria): no  Delay in urine (Hesitency): no  Intensity: moderate  Progression of Symptoms:  worsening and intermittent  Accompanying Signs & Symptoms:  Fever/chills: no  Flank pain: YES  Nausea and vomiting: no  Vaginal symptoms: none  Abdominal/Pelvic Pain: no  History:   History of frequent UTI s: YES  History of kidney stones: no  Sexually Active: YES  Possibility of pregnancy: No  Precipitating or alleviating factors: None  Therapies tried and outcome: Increase fluid intake    Urine is cloudy with odor x 5d.  No vaginal discharge or itching.  No dysuria, slight frequency.  No diet changes.  Hx of frequently UTI with kidney infection 8 years ago.  Denies STD concerns.    UA 1/10/20 normal.  Urine culture 10/2019 + e. coli    Follows with PCP Graciela Velazquez.  Hx chronic pain and anxiety, she is on tramadol and alprazalam.  Notes she is restricted to graciela and Adalberto Mercedes.    Review of Systems   Constitutional, HEENT, cardiovascular, pulmonary, gi and gu systems are negative, except as otherwise noted.      Objective    LMP 09/01/2016   There is no height or weight on file to calculate BMI.  Physical Exam    GENERAL APPEARANCE: healthy, alert and no distress  EYES: Eyes grossly normal to inspection and conjunctivae and sclerae normal  RESP: respirations nonlabored  PSYCH: mentation appears normal and affect normal/bright       Results for orders placed or performed in visit on 01/29/21   UA with Microscopic reflex to Culture     Status: Abnormal    Specimen: Midstream Urine   Result Value Ref Range    Color Urine Yellow     Appearance Urine Clear     Glucose Urine Negative NEG^Negative mg/dL    Bilirubin Urine Negative NEG^Negative    Ketones Urine Negative NEG^Negative mg/dL    Specific Gravity Urine 1.015 1.003 - 1.035    pH Urine 7.0 5.0 - 7.0 pH    Protein Albumin Urine Negative NEG^Negative mg/dL    Urobilinogen Urine 0.2 0.2 - 1.0 EU/dL    Nitrite Urine Negative NEG^Negative    Blood Urine Negative NEG^Negative    Leukocyte Esterase Urine Trace (A) NEG^Negative    Source Midstream Urine     WBC Urine 0 - 5 OTO5^0 - 5 /HPF    RBC Urine O - 2 OTO2^O - 2 /HPF    Squamous Epithelial /LPF Urine Few FEW^Few /LPF    Bacteria Urine Few (A) NEG^Negative /HPF   Wet prep     Status: None    Specimen: Vagina   Result Value Ref Range    Specimen Description Vagina     Wet Prep Few  WBC'S seen       Wet Prep No Trichomonas seen     Wet Prep No clue cells seen     Wet Prep No yeast seen

## 2021-02-09 ENCOUNTER — VIRTUAL VISIT (OUTPATIENT)
Dept: FAMILY MEDICINE | Facility: CLINIC | Age: 56
End: 2021-02-09
Payer: COMMERCIAL

## 2021-02-09 ENCOUNTER — MYC MEDICAL ADVICE (OUTPATIENT)
Dept: OBGYN | Facility: CLINIC | Age: 56
End: 2021-02-09

## 2021-02-09 DIAGNOSIS — G89.4 CHRONIC PAIN SYNDROME: ICD-10-CM

## 2021-02-09 DIAGNOSIS — N95.1 SYMPTOMATIC MENOPAUSAL OR FEMALE CLIMACTERIC STATES: ICD-10-CM

## 2021-02-09 DIAGNOSIS — G44.86 CERVICOGENIC HEADACHE: ICD-10-CM

## 2021-02-09 DIAGNOSIS — F41.1 GENERALIZED ANXIETY DISORDER: ICD-10-CM

## 2021-02-09 DIAGNOSIS — M54.2 CERVICALGIA: ICD-10-CM

## 2021-02-09 PROCEDURE — 99214 OFFICE O/P EST MOD 30 MIN: CPT | Mod: 95 | Performed by: NURSE PRACTITIONER

## 2021-02-09 RX ORDER — TRAMADOL HYDROCHLORIDE 50 MG/1
50 TABLET ORAL 2 TIMES DAILY PRN
Qty: 50 TABLET | Refills: 0 | Status: SHIPPED | OUTPATIENT
Start: 2021-02-26 | End: 2021-03-18

## 2021-02-09 RX ORDER — ALPRAZOLAM 0.5 MG
TABLET ORAL
Qty: 30 TABLET | Refills: 0 | Status: SHIPPED | OUTPATIENT
Start: 2021-02-17 | End: 2021-03-18

## 2021-02-09 ASSESSMENT — ANXIETY QUESTIONNAIRES
2. NOT BEING ABLE TO STOP OR CONTROL WORRYING: NOT AT ALL
GAD7 TOTAL SCORE: 3
7. FEELING AFRAID AS IF SOMETHING AWFUL MIGHT HAPPEN: NOT AT ALL
6. BECOMING EASILY ANNOYED OR IRRITABLE: NOT AT ALL
1. FEELING NERVOUS, ANXIOUS, OR ON EDGE: SEVERAL DAYS
3. WORRYING TOO MUCH ABOUT DIFFERENT THINGS: SEVERAL DAYS
5. BEING SO RESTLESS THAT IT IS HARD TO SIT STILL: NOT AT ALL
IF YOU CHECKED OFF ANY PROBLEMS ON THIS QUESTIONNAIRE, HOW DIFFICULT HAVE THESE PROBLEMS MADE IT FOR YOU TO DO YOUR WORK, TAKE CARE OF THINGS AT HOME, OR GET ALONG WITH OTHER PEOPLE: NOT DIFFICULT AT ALL

## 2021-02-09 ASSESSMENT — PATIENT HEALTH QUESTIONNAIRE - PHQ9
SUM OF ALL RESPONSES TO PHQ QUESTIONS 1-9: 2
5. POOR APPETITE OR OVEREATING: SEVERAL DAYS

## 2021-02-09 NOTE — PROGRESS NOTES
Kimmie is a 55 year old who is being evaluated via a billable telephone visit.      What phone number would you like to be contacted at? [phone]   How would you like to obtain your AVS? Rick    Assessment & Plan     (M54.2) Cervicalgia  Comment: Chronic  Plan: traMADol (ULTRAM) 50 MG tablet        I have had in agreement with Kimmie regarding her tramadol/controlled substance usage.  We had been working aggressively to titrate down on her tablets per month.  She had an escalation of pain several months ago and was increased to 50 tablets/month.  For today, I reminded her of her need to cut down/cut out her tramadol and find alternate methods to control her pain.  For today, she asked for 1 more month of 50 tablets for the month supply and then she will plan to cut down on her tramadol after that.  I did agree that that would be a valid request.  She was early with this refill appointment today per her misunderstanding and my upcoming vacation.  I did go ahead and forward date her current prescription.  She will take her medications only as prescribed and she will follow-up with me in 4 to 6 weeks for her next medication refill appointment.    (G89.4) Chronic pain syndrome  Comment: Chronic  Plan: traMADol (ULTRAM) 50 MG tablet        See above    (R51.9) Cervicogenic headache  Comment: Chronic  Plan: traMADol (ULTRAM) 50 MG tablet        See above    (F41.1) Generalized anxiety disorder  Comment: Chronic  Plan: ALPRAZolam (XANAX) 0.5 MG tablet        With today's medication refill appointment, I did remind medic of the importance of cutting down/cutting out on her alprazolam usage.  With this patient's history and her need for narcotics and this medication, I am hopeful that she will slowly cut down on her prescription tablets per month.  And I did discuss this with her.  Next medication refill appoint with me in 1 to 2 months, sooner if problems.  No controlled substance  prescriptions will be approved for this  "patient without an appointment.       FUTURE APPOINTMENTS:       - Follow-up visit in 1 month  See Patient Instructions    Return in about 4 weeks (around 3/9/2021) for Medication follow up.    JEREMY [facility]    Tyler Burks is a 55 year old who presents via telephone call appointment for the following health issues     HPI       Chief Complaint   Patient presents with     Anxiety     med refill     Refill Request     tramadol     Kimmie is taking 1-2 tramdol tablets per day \"depending on my pain level.\"  Acupuncture is helpful.=  She reports that she is taking her tramadol only as prescribed.  She is trying to cut down on how often she is taking this medication.    Alprazolam:  She is taking daily for anxiety and panic.  30 tablets will last 30 days.  She is also trying to cut down on how often she needs this as well.  She is under a lot of stress at this time and has been unable to cut down on the number of tablets per month.    For today, she is requesting refills of both of these medications.      Review of Systems   Constitutional, HEENT, cardiovascular, pulmonary, GI, , musculoskeletal, neuro, skin, endocrine and psych systems are negative, except as otherwise noted.      Objective           Vitals:  No vitals were obtained today due to virtual visit.    Physical Exam   healthy, alert and no distress  PSYCH: Alert and oriented times 3; coherent speech, normal   rate and volume, able to articulate logical thoughts, able   to abstract reason, no tangential thoughts, no hallucinations   or delusions  Her affect is normal and pleasant  RESP: No cough, no audible wheezing, able to talk in full sentences  Remainder of exam unable to be completed due to telephone visits    Diagnostics:  Reviewed in Epic.       Phone call duration: 12 minutes  "

## 2021-02-10 ASSESSMENT — ANXIETY QUESTIONNAIRES: GAD7 TOTAL SCORE: 3

## 2021-02-16 NOTE — TELEPHONE ENCOUNTER
Patient is requesting refills for her estradiol and provera. She is saying she is out of the medications. She is scheduled for a med check on 3/25. Routing to appropriate provider.  Meds queued. Hortensia Rosales RN

## 2021-02-17 ENCOUNTER — TELEPHONE (OUTPATIENT)
Dept: FAMILY MEDICINE | Facility: CLINIC | Age: 56
End: 2021-02-17

## 2021-02-17 DIAGNOSIS — N95.1 SYMPTOMATIC MENOPAUSAL OR FEMALE CLIMACTERIC STATES: ICD-10-CM

## 2021-02-17 RX ORDER — ESTRADIOL 1 MG/1
0.5 TABLET ORAL DAILY
Qty: 90 TABLET | Refills: 0 | Status: SHIPPED | OUTPATIENT
Start: 2021-02-17 | End: 2021-02-17

## 2021-02-17 RX ORDER — ESTRADIOL 1 MG/1
0.5 TABLET ORAL DAILY
Qty: 90 TABLET | Refills: 0 | Status: SHIPPED | OUTPATIENT
Start: 2021-02-17 | End: 2021-08-11

## 2021-02-17 RX ORDER — MEDROXYPROGESTERONE ACETATE 2.5 MG/1
2.5 TABLET ORAL DAILY
Qty: 90 TABLET | Refills: 0 | Status: SHIPPED | OUTPATIENT
Start: 2021-02-17 | End: 2021-05-28

## 2021-02-17 RX ORDER — MEDROXYPROGESTERONE ACETATE 2.5 MG/1
2.5 TABLET ORAL DAILY
Qty: 90 TABLET | Refills: 0 | Status: SHIPPED | OUTPATIENT
Start: 2021-02-17 | End: 2021-02-17

## 2021-02-17 NOTE — TELEPHONE ENCOUNTER
[facility] calling    Says that the medroxyprogesterone and the estradiol are not covered since the prescriptions were sent by Dr. Llanos.    Will cover if prescribed by [facility]    Can call [facility] for a verbal ok, or send in new Rx's w/[facility] sig.    Thank you,  Yenifer Stephenson RN

## 2021-03-11 ENCOUNTER — OFFICE VISIT (OUTPATIENT)
Dept: OBGYN | Facility: CLINIC | Age: 56
End: 2021-03-11
Payer: COMMERCIAL

## 2021-03-11 VITALS
BODY MASS INDEX: 20.16 KG/M2 | SYSTOLIC BLOOD PRESSURE: 108 MMHG | WEIGHT: 121 LBS | DIASTOLIC BLOOD PRESSURE: 68 MMHG | HEIGHT: 65 IN

## 2021-03-11 DIAGNOSIS — R87.612 LGSIL ON PAP SMEAR OF CERVIX: ICD-10-CM

## 2021-03-11 DIAGNOSIS — R63.5 WEIGHT GAIN: ICD-10-CM

## 2021-03-11 DIAGNOSIS — Z12.11 COLON CANCER SCREENING: ICD-10-CM

## 2021-03-11 DIAGNOSIS — Z01.419 ENCOUNTER FOR GYNECOLOGICAL EXAMINATION WITHOUT ABNORMAL FINDING: Primary | ICD-10-CM

## 2021-03-11 DIAGNOSIS — Z12.31 ENCOUNTER FOR SCREENING MAMMOGRAM FOR BREAST CANCER: ICD-10-CM

## 2021-03-11 PROCEDURE — 36415 COLL VENOUS BLD VENIPUNCTURE: CPT | Performed by: OBSTETRICS & GYNECOLOGY

## 2021-03-11 PROCEDURE — 84443 ASSAY THYROID STIM HORMONE: CPT | Performed by: OBSTETRICS & GYNECOLOGY

## 2021-03-11 PROCEDURE — 99396 PREV VISIT EST AGE 40-64: CPT | Performed by: OBSTETRICS & GYNECOLOGY

## 2021-03-11 PROCEDURE — 87624 HPV HI-RISK TYP POOLED RSLT: CPT | Performed by: OBSTETRICS & GYNECOLOGY

## 2021-03-11 PROCEDURE — 88141 CYTOPATH C/V INTERPRET: CPT | Performed by: PATHOLOGY

## 2021-03-11 SDOH — HEALTH STABILITY: MENTAL HEALTH: HOW OFTEN DO YOU HAVE 6 OR MORE DRINKS ON ONE OCCASION?: NOT ASKED

## 2021-03-11 SDOH — HEALTH STABILITY: MENTAL HEALTH: HOW OFTEN DO YOU HAVE A DRINK CONTAINING ALCOHOL?: MONTHLY OR LESS

## 2021-03-11 SDOH — HEALTH STABILITY: MENTAL HEALTH: HOW MANY STANDARD DRINKS CONTAINING ALCOHOL DO YOU HAVE ON A TYPICAL DAY?: 1 OR 2

## 2021-03-11 ASSESSMENT — MIFFLIN-ST. JEOR: SCORE: 1144.73
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
## 2021-03-12 LAB — TSH SERPL DL<=0.005 MIU/L-ACNC: 3.82 MU/L (ref 0.4–4)

## 2021-03-15 LAB
COPATH REPORT: ABNORMAL
PAP: ABNORMAL

## 2021-03-17 DIAGNOSIS — Z12.11 COLON CANCER SCREENING: ICD-10-CM

## 2021-03-17 PROCEDURE — 82274 ASSAY TEST FOR BLOOD FECAL: CPT | Performed by: OBSTETRICS & GYNECOLOGY

## 2021-03-17 NOTE — PROGRESS NOTES
Kimmie is a 55 year old who is being evaluated via a billable telephone visit.      What phone number would you like to be contacted at? [phone] (M)  How [facility]    Assessment & Plan     (F41.1) Generalized anxiety disorder  Comment: chronic  Plan: ALPRAZolam (XANAX) 0.5 MG tablet, ALPRAZolam         (XANAX) 0.5 MG tablet          I had a conversation with Kimmie today that it appears that her alprazolam and tramadol refills are cycling on different dates.  This is causing much confusion between the pharmacy  And me/[place] with the patient's request for early refills, delayed date prescriptions etc.  For today, I gave Iman 9 tablets supply o alprazolam, 1 tablet/day as needed  To get her through until March 26.  On March 26 she will be able to fill both her alprazolam and her tramadol for 30-day supply.  Kimmie did tell me that her current prescriptions are supposed to be filled on March 28.  She is going out of town for her kids spring break on March 26 and she did ask if her prescriptions could be filled on 26.  I told her that that would be okay and I did discuss this with the pharmacist.    Kimmie is to follow-up with me in 1 month for her next medication refill appointment, sooner if any problems.  I did discuss and remind her of the controlled substance policy.    I did call her [facility] pharmacy and talk with the pharmacist about this.  The pharmacist also agrees that this is a good idea.  Per the pharmacist, the prescription plan has a very strict limitations for mag.  She is not supposed to  prescriptions early and she is never allowed to pay cash for prescriptions.  I did tell the pharmacist that this is also what I know.      (M54.2) Cervicalgia  Comment: Chronic  Plan: SPINE CARE REFERRAL, traMADol (ULTRAM) 50 MG         tablet        Per the HPI, make is not certain what she can do to make her neck better.  Yes she also has not seen a specialist.  I did give her a  "referral to follow-up with [facility] orthopedics, spine.  Her family has seen that clinically other than that in the past and she thinks it will be a good fit for her.  I did ask her to schedule appointment with [facility] at her earliest convenience.  The goal here, and I reviewed this with Tanya, is to use less tramadol and get off the tramadol because she uses this for neck pain that radiates and causes her headaches.    (G89.4) Chronic pain syndrome  Comment: Chronic  Plan: SPINE CARE REFERRAL, traMADol (ULTRAM) 50 MG         tablet        See above    (R51.9) Cervicogenic headache  Comment: Chronic  Plan: traMADol (ULTRAM) 50 MG tablet        See above         See Patient Instructions    Return in about 4 weeks (around 4/15/2021) for Medication follow up.    JEREMY [facility]   Kimmie is a 55 year old who presents via telephone call appointment for the following health issues    HPI   Chief Complaint   Patient presents with     Pain     Tramadol refill     Anxiety     Alprazolam refill         Medication Followup of traMADol (ULTRAM) 50 MG tablet and ALPRAZolam (XANAX) 0.5 MG tablet    Taking Medication as prescribed: yes    Side Effects:  None    Medication Helping Symptoms:  yes     Kimmie reports that life is good.  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 \"it is rewarding. I am happy with that.\"    Alprazolam:  \"it seems to be fine.\"  She is currently taking 30 tablets every 30 days.  She states she is doing everything she can to skip days on this medication but overall she is taking at least 1/2 tablet/day.  She is now due a refill.    Tramadol:  For chronic pain, neck pain, headaches etc.  Last  was February 26th.  She is scheduled to  her next 50 tablets on March 28th.  She has not been able to wean down on her tramadol.  \"I had another horrific 3 day migraine and I needed the tramadol for that.\"  She is keeping a migraine log. \"this " "is a pinched nerve issue. I don't know what else I can do.\" degeneration, nerve impingement, scoliosis. \"how do I prevent this from getting worse?\"  She has seen neurology in the past.    Review of Systems   Constitutional, HEENT, cardiovascular, pulmonary, GI, , musculoskeletal, neuro, skin, endocrine and psych systems are negative, except as otherwise noted.      Objective           Vitals:  No vitals were obtained today due to virtual visit.    Physical Exam   healthy, alert and no distress  PSYCH: Alert and oriented times 3; coherent speech, normal   rate and volume, able to articulate logical thoughts, able   to abstract reason, no tangential thoughts, no hallucinations   or delusions  Her affect is normal  RESP: No cough, no audible wheezing, able to talk in full sentences  Remainder of exam unable to be completed due to telephone visits    Diagnsotics:  Reviewed in epic          Phone call duration: 18 minutes    "

## 2021-03-18 ENCOUNTER — TELEPHONE (OUTPATIENT)
Dept: FAMILY MEDICINE | Facility: CLINIC | Age: 56
End: 2021-03-18

## 2021-03-18 ENCOUNTER — VIRTUAL VISIT (OUTPATIENT)
Dept: FAMILY MEDICINE | Facility: CLINIC | Age: 56
End: 2021-03-18
Payer: COMMERCIAL

## 2021-03-18 ENCOUNTER — PATIENT OUTREACH (OUTPATIENT)
Dept: OBGYN | Facility: CLINIC | Age: 56
End: 2021-03-18

## 2021-03-18 DIAGNOSIS — F41.1 GENERALIZED ANXIETY DISORDER: ICD-10-CM

## 2021-03-18 DIAGNOSIS — M54.2 CERVICALGIA: ICD-10-CM

## 2021-03-18 DIAGNOSIS — G44.86 CERVICOGENIC HEADACHE: ICD-10-CM

## 2021-03-18 DIAGNOSIS — G89.4 CHRONIC PAIN SYNDROME: ICD-10-CM

## 2021-03-18 LAB
FINAL DIAGNOSIS: NORMAL
HPV HR 12 DNA CVX QL NAA+PROBE: NEGATIVE
HPV16 DNA SPEC QL NAA+PROBE: NEGATIVE
HPV18 DNA SPEC QL NAA+PROBE: NEGATIVE
SPECIMEN DESCRIPTION: NORMAL
SPECIMEN SOURCE CVX/VAG CYTO: NORMAL

## 2021-03-18 PROCEDURE — 99214 OFFICE O/P EST MOD 30 MIN: CPT | Mod: 95 | Performed by: NURSE PRACTITIONER

## 2021-03-18 RX ORDER — ALPRAZOLAM 0.5 MG
TABLET ORAL
Qty: 9 TABLET | Refills: 0 | Status: SHIPPED | OUTPATIENT
Start: 2021-03-18 | End: 2021-04-20

## 2021-03-18 RX ORDER — ALPRAZOLAM 0.5 MG
TABLET ORAL
Qty: 30 TABLET | Refills: 0 | Status: SHIPPED | OUTPATIENT
Start: 2021-03-26 | End: 2021-04-20

## 2021-03-18 RX ORDER — TRAMADOL HYDROCHLORIDE 50 MG/1
50 TABLET ORAL 2 TIMES DAILY PRN
Qty: 50 TABLET | Refills: 0 | Status: SHIPPED | OUTPATIENT
Start: 2021-03-26 | End: 2021-04-20

## 2021-03-18 NOTE — TELEPHONE ENCOUNTER
Per pharmacy they are having problems billing her insurance on the Alprazolam as patient is coming restricted and Yasmin Velazquez is not able to prescribe but is patient's listed PCP, can she be added to patient's provider list?  I am routing to provider and to Referrals.  Irasema Keller RN  [facility]

## 2021-03-18 NOTE — TELEPHONE ENCOUNTER
Pt calling . She was told by her [facility] pharmacist that she can't fill the alprazolam as they do not have Yasmin Velazquez CNP listed as her primary . They told her that they do not have anyone listed as her primary.     Her insurance is [employer] and     She has been the restricted recepient program for 1 1/2 years and it lasts for 2 years    Her care coordinator for the restricted recepient program is Xin Cerna , phone is [phone] . She has not been able to reach her    In the future she wants to change primary but not yet (as Yasmin hard to schedule with ) but she hadn't changed yet    She has not pills left  , her last pill was 2 days ago     I did speak to Kamila Lb :Problem is that Adalberto Mercedes is her primary as of today but he is not here until tomorrow.  Tanya Bardales will call [employer] and she will contact pt    Consuelo Boone, RN, BSN  [facility]

## 2021-03-18 NOTE — TELEPHONE ENCOUNTER
Spoke with Pt and confirmed she will not receive any medication until tomorrow.    Spoke with Regine Lauren  for Pt. Who states Pt filled out paper work to change PCP from Yasmin Velazquez CNP to Adalberto Mercedes PA-C effective 3/18/21. This is why Yasmin's Rx would not go through today.    Explained to Pt, Referral Rep will followu-up with Adalberto Mercedes, tomorrow to submit all the [facility] Restricted forms so Pt can receive her medication.    CHICHI Treviño [facility] Referral Rep

## 2021-03-18 NOTE — TELEPHONE ENCOUNTER
3/11/21 HSIL pap, LSIL also present, Neg HPV. Plan colp due bef 6/11/21. If pt prefers immediate LEEP without colpo first, provider is okay with that plan.

## 2021-03-19 ENCOUNTER — TELEPHONE (OUTPATIENT)
Dept: OBGYN | Facility: CLINIC | Age: 56
End: 2021-03-19

## 2021-03-19 LAB — HEMOCCULT STL QL IA: NEGATIVE

## 2021-03-19 NOTE — TELEPHONE ENCOUNTER
"The following [employer] Restricted Recipient Forms were faxed today:    1. Effective 3/18/21 Adalberto Mercedes PA-C is now Pt's PCP. A  form called \"[facility]\"  list the following Providers who have [employer] Prescribing Privileges:    Yasmin Velazquez, IVONNE Avelar, CNP  MD iMladis Hernandez MD Sarah Bartlett, CNP    2. Bill Watkins MD at [facility]  3. BEATRIZ Esparza at [facility]  4. Provider to be determine when Pt makes an appt with [facility] in [place] for Pain Management    CHICHI Treviño [facility] Referral Rep    "

## 2021-03-19 NOTE — TELEPHONE ENCOUNTER
Patient scheduled for colposcopy on 3/22 at 8:30 (procedure room is open at that time and assigned MA starts at 7:30). Patient is aware of appointment and location.  Aracelis Stewart, Surgery Coordinator

## 2021-03-19 NOTE — TELEPHONE ENCOUNTER
Can you tell if the colpo room is available Monday 3/22? I could see the patient at 830am on Monday if it is not being used and we have staff? Otherwise it would be okay to double book her on 3/25.  Thanks,  Chio Sanchez MD

## 2021-03-19 NOTE — TELEPHONE ENCOUNTER
Dr. Sanchez,    Pt called pap RN concerned about delaying colposcopy until mid-April (soonest availability with you) due to HSIL pap.    Is there a place you can work her in sooner than mid-April? She would like to get this done ASAP. She states she was not able to sleep last night as she was so scared. If not, she may want to see someone else who has sooner availability.    Please let her know if you could work her in sooner.  Ok to leave detailed message if you don't reach her: [phone]     Thanks!  Hamida Ordoñez, ABIOLAN, RN

## 2021-03-22 ENCOUNTER — OFFICE VISIT (OUTPATIENT)
Dept: OBGYN | Facility: CLINIC | Age: 56
End: 2021-03-22
Payer: COMMERCIAL

## 2021-03-22 VITALS
HEART RATE: 89 BPM | WEIGHT: 120 LBS | BODY MASS INDEX: 19.97 KG/M2 | TEMPERATURE: 98.4 F | DIASTOLIC BLOOD PRESSURE: 79 MMHG | SYSTOLIC BLOOD PRESSURE: 125 MMHG

## 2021-03-22 DIAGNOSIS — R87.613 HSIL (HIGH GRADE SQUAMOUS INTRAEPITHELIAL LESION) ON PAP SMEAR OF CERVIX: Primary | ICD-10-CM

## 2021-03-22 PROCEDURE — 57454 BX/CURETT OF CERVIX W/SCOPE: CPT | Performed by: OBSTETRICS & GYNECOLOGY

## 2021-03-22 PROCEDURE — 88305 TISSUE EXAM BY PATHOLOGIST: CPT | Performed by: PATHOLOGY

## 2021-03-22 PROCEDURE — 88342 IMHCHEM/IMCYTCHM 1ST ANTB: CPT | Performed by: PATHOLOGY

## 2021-03-22 NOTE — PROGRESS NOTES
"GYN CLINIC VISIT  3/22/2021  CC: colposcopy    HPI:  Catie Nuñez is a 55 year old female  who presents for repeat colposcopy. Pap smear on 2021 showed: with no high risk HPV detectable and HSIL. The prior pap showed with high risk HPV present: +16,+other and LSIL.     2015: ASCUS, + HPV 16 & other HR HPV with unexplained endometrial cells. Needs colp and endo bx, US also planned. Tracking started.  7/7/15: [place] - atypia, no endo bx needed as US showed thin endometrium. Plan cotest pap & HPV in 1 year  16: ASCUS + HR HPV 16 and other. Plan [place] per provider  10/06/16: [place] Bx NIL. Plan Cotest in 1 year per provider.  17 LSIL, +HPV 16 & other HR type. Plan: [place] per guidelines.  18: [facility] [place] ECC benign neg for dysplasia. Plan cotest in 1 year.   19 NIL pap, + HR HPV 16. Plan: colpo  04/3/19: [facility] [place] ECC benign. Plan pap in 1 year.   19: LSIL Pap, + HR HPV 16 result. Plan cotest in 1 year, if next one is abnormal [place] at that time.   10/27/20 Scutum reminder sent -- Read  20 Virtual FP Visit -- Note added  20 Lost to follow-up for pap tracking  3/11/21 HSIL pap, LSIL also present, Neg HPV. Plan colp due bef 21. If pt prefers immediate LEEP without colpo first, provider is okay with that plan. Pt changed mind to    3/18/21 Pt notified and wants to schedule colp first     Patient's last menstrual period was 2016.  Patient does not smoke  Type of contraception:   Age at first sexual intercourse:   Number of sexual partners (lifetime):   Past GYN history: HPV  Prior cervical/vaginal disease: atypia  Prior cervical treatment: no treatment.    Vitals:    21 0840   BP: 125/79   Pulse: 89   Temp: 98.4  F (36.9  C)   TempSrc: Oral   Weight: 54.4 kg (120 lb)     Estimated body mass index is 19.97 kg/m  as calculated from the following:    Height as of 3/11/21: 1.651 m (5' 5\").    Weight as of this encounter: 54.4 kg (120 lb).     PROCEDURE:      Before the " procedure, it was ensured that the patient was educated regarding the nature of her findings to date, the implications, and what was to be done. She has been made aware of the role of HPV, the natural history of infection, ways to minimize her future risk, the effect of HPV on the cervix, and treatment options available should they be indicated. The details of the colposcopic procedure were reviewed. All questions were answered before proceeding, and informed consent was therefore obtained.      Medium graves speculum placed in vagina and excellent visualization of cervix acheived, cervix swabbed x 3 with acetic acid solution. Moderate amount of redundant vaginal tissue.      FINDINGS:  Cervix: acetowhitening noted at 8 and 4 o'clock  SCJ seen?: yes, with manipulation  ECC done?: Yes   Satisfactory examination?: yes  Representative biopsies taken at 4, 8 and 10 o'clock. ECC also performed. Specimens labeled and sent to pathology. monsels and pressure applied to achieve hemostasis. Patient tolerated procedure very well.     ASSESSMENT: 55 year old  here for colposcopy for HSIL and LSIL, neg HR HPV pap. Colposcopic exam not consistent with HSIL. Colposcopic impression VLADIMIR 1. Multiple biopsies taken. If low grade or normal, recommend shorter interval follow up - pap and HPV test in 6 months due to discrepancy. Discussed LEEP procedure will be recommended if indicated pending path.    PLAN:   1. HSIL (high grade squamous intraepithelial lesion) on Pap smear of cervix  - Surgical pathology exam  - COLP CERVIX/UPPER VAGINA W BX CERVIX/ENDOCERV CURETT   specimens labelled and sent to Pathology, will base further treatment on Pathology findings, treatment options discussed with patient, post biopsy instructions given to patient and call to discuss Pathology results      Chio Sanchez MD

## 2021-03-25 LAB — COPATH REPORT: NORMAL

## 2021-03-29 ENCOUNTER — PATIENT OUTREACH (OUTPATIENT)
Dept: OBGYN | Facility: CLINIC | Age: 56
End: 2021-03-29

## 2021-04-06 ENCOUNTER — IMMUNIZATION (OUTPATIENT)
Dept: NURSING | Facility: CLINIC | Age: 56
End: 2021-04-06
Payer: COMMERCIAL

## 2021-04-06 PROCEDURE — 0001A PR COVID VAC PFIZER DIL RECON 30 MCG/0.3 ML IM: CPT

## 2021-04-06 PROCEDURE — 91300 PR COVID VAC PFIZER DIL RECON 30 MCG/0.3 ML IM: CPT

## 2021-04-20 ENCOUNTER — NURSE TRIAGE (OUTPATIENT)
Dept: NURSING | Facility: CLINIC | Age: 56
End: 2021-04-20

## 2021-04-20 ENCOUNTER — NURSE TRIAGE (OUTPATIENT)
Dept: FAMILY MEDICINE | Facility: CLINIC | Age: 56
End: 2021-04-20

## 2021-04-20 ENCOUNTER — VIRTUAL VISIT (OUTPATIENT)
Dept: FAMILY MEDICINE | Facility: CLINIC | Age: 56
End: 2021-04-20
Payer: COMMERCIAL

## 2021-04-20 DIAGNOSIS — G89.4 CHRONIC PAIN SYNDROME: ICD-10-CM

## 2021-04-20 DIAGNOSIS — M54.2 CERVICALGIA: ICD-10-CM

## 2021-04-20 DIAGNOSIS — F41.1 GENERALIZED ANXIETY DISORDER: Primary | ICD-10-CM

## 2021-04-20 DIAGNOSIS — G44.86 CERVICOGENIC HEADACHE: ICD-10-CM

## 2021-04-20 DIAGNOSIS — Z79.899 CHRONIC PRESCRIPTION BENZODIAZEPINE USE: ICD-10-CM

## 2021-04-20 PROCEDURE — 96127 BRIEF EMOTIONAL/BEHAV ASSMT: CPT | Mod: 59 | Performed by: NURSE PRACTITIONER

## 2021-04-20 PROCEDURE — 99214 OFFICE O/P EST MOD 30 MIN: CPT | Mod: 95 | Performed by: NURSE PRACTITIONER

## 2021-04-20 RX ORDER — TRAMADOL HYDROCHLORIDE 50 MG/1
50 TABLET ORAL 2 TIMES DAILY PRN
Qty: 50 TABLET | Refills: 0 | Status: SHIPPED | OUTPATIENT
Start: 2021-04-20 | End: 2021-05-14

## 2021-04-20 RX ORDER — ALPRAZOLAM 0.5 MG
TABLET ORAL
Qty: 30 TABLET | Refills: 0 | Status: SHIPPED | OUTPATIENT
Start: 2021-04-20 | End: 2021-04-21

## 2021-04-20 ASSESSMENT — ANXIETY QUESTIONNAIRES
2. NOT BEING ABLE TO STOP OR CONTROL WORRYING: NEARLY EVERY DAY
5. BEING SO RESTLESS THAT IT IS HARD TO SIT STILL: NOT AT ALL
3. WORRYING TOO MUCH ABOUT DIFFERENT THINGS: NEARLY EVERY DAY
IF YOU CHECKED OFF ANY PROBLEMS ON THIS QUESTIONNAIRE, HOW DIFFICULT HAVE THESE PROBLEMS MADE IT FOR YOU TO DO YOUR WORK, TAKE CARE OF THINGS AT HOME, OR GET ALONG WITH OTHER PEOPLE: EXTREMELY DIFFICULT
1. FEELING NERVOUS, ANXIOUS, OR ON EDGE: NEARLY EVERY DAY
6. BECOMING EASILY ANNOYED OR IRRITABLE: SEVERAL DAYS
GAD7 TOTAL SCORE: 14
7. FEELING AFRAID AS IF SOMETHING AWFUL MIGHT HAPPEN: NEARLY EVERY DAY

## 2021-04-20 ASSESSMENT — PATIENT HEALTH QUESTIONNAIRE - PHQ9
SUM OF ALL RESPONSES TO PHQ QUESTIONS 1-9: 4
5. POOR APPETITE OR OVEREATING: SEVERAL DAYS

## 2021-04-20 NOTE — TELEPHONE ENCOUNTER
"Patient had phone visit with PCP 4-20-21 and script for Alprazolam sent to pharmacy.  Patient says that the dispensed number is accurate, but the dose is not and does not match the dispense number?      Patient says her prescription has always been for \"0.5 mg tablet take half to one tablet daily as needed.\"  She is requesting a revised prescription.    Routed to Dr. Izabela Reno RN  [facility]        Reason for Disposition    Caller has NON-URGENT medication question about med that PCP prescribed and triager unable to answer question    Protocols used: MEDICATION QUESTION CALL-A-AH      "

## 2021-04-20 NOTE — PROGRESS NOTES
Kimmie is a 55 year old who is being evaluated via a billable telephone visit.      What phone number would you like to be contacted at? [phone]  How would you like to obtain your AVS? Macaren[facility]art    Assessment & Plan     (F41.1) Generalized anxiety disorder  (primary encounter diagnosis)  Comment: chronic  Plan: ALPRAZolam (XANAX) 0.5 MG tablet, DISCONTINUED:        ALPRAZolam (XANAX) 0.5 MG tablet          I did go ahead and refill Kimmie's tramadol and alprazolam today.  I reminded Kimmie of the importance of cutting down/cutting out these controlled substances.  She has had failed trials in the past.  At her last virtual appointment she was given a referral to follow up with ortho spine and she has not done that yet.  I reminded kimmie of the necessity of clinic appts for refills.  Do not request early refills--it will be denied without a clinic appointment.  Follow up with me or one of my partners in 4 weeks, sooner if problems.       (M54.2) Cervicalgia  Comment:   Plan: traMADol (ULTRAM) 50 MG tablet        As above    (G89.4) Chronic pain syndrome  Comment:   Plan: traMADol (ULTRAM) 50 MG tablet        As above    (R51.9) Cervicogenic headache  Comment:   Plan: traMADol (ULTRAM) 50 MG tablet        As above    (Z79.899) Chronic prescription benzodiazepine use  Comment:   Plan: as above           See Patient Instructions    Return in about 4 weeks (around 5/18/2021) for Medication follow up.    JEREMY [facility]   Kimmie is a 55 year old who presents via telephone call appointment for the following health issues   Chief Complaint   Patient presents with     Recheck Medication     Refill Request     xanax and tramadol        HPI     Anxiety Follow-Up    How are you doing with your anxiety since your last visit? Worsened only because of the vaccine     Are you having other symptoms that might be associated with anxiety? No    Have you had a significant  life event? Health Concerns     Are you feeling depressed? No    Do you have any concerns with your use of alcohol or other drugs? No    Social History     Tobacco Use     Smoking status: Never Smoker     Smokeless tobacco: Never Used   Substance Use Topics     Alcohol use: Yes     Frequency: Monthly or less     Drinks per session: 1 or 2     Comment: occ, rare      Drug use: Never     DAYLIN-7 SCORE 12/30/2020 2/9/2021 4/20/2021   Total Score - - -   Total Score - - -   Total Score 3 3 14     PHQ 11/1/2019 2/9/2021 4/20/2021   PHQ-9 Total Score 2 2 4   Q9: Thoughts of better off dead/self-harm past 2 weeks Not at all Not at all Not at all     Last PHQ-9 4/20/2021   1.  Little interest or pleasure in doing things 0   2.  Feeling down, depressed, or hopeless 0   3.  Trouble falling or staying asleep, or sleeping too much 3   4.  Feeling tired or having little energy 1   5.  Poor appetite or overeating 0   6.  Feeling bad about yourself 0   7.  Trouble concentrating 0   8.  Moving slowly or restless 0   Q9: Thoughts of better off dead/self-harm past 2 weeks 0   PHQ-9 Total Score 4   Difficulty at work, home, or with people Very difficult     DAYLIN-7  4/20/2021   1. Feeling nervous, anxious, or on edge 3   2. Not being able to stop or control worrying 3   3. Worrying too much about different things 3   4. Trouble relaxing 1   5. Being so restless that it is hard to sit still 0   6. Becoming easily annoyed or irritable 1   7. Feeling afraid, as if something awful might happen 3   DAYLIN-7 Total Score 14   If you checked any problems, how difficult have they made it for you to do your work, take care of things at home, or get along with other people? Extremely difficult         How many servings of fruits and vegetables do you eat daily?  2-3    On average, how many sweetened beverages do you drink each day (Examples: soda, juice, sweet tea, etc.  Do NOT count diet or artificially sweetened beverages)?   0    How many days per  week do you exercise enough to make your heart beat faster? 7    How many minutes a day do you exercise enough to make your heart beat faster? 10 - 19    How many days per week do you miss taking your medication? 0    Pfizer vaccine:  2 weeks ago.  Caused the worst migraine she has ever had in her life.  Headache lasted 7 days.  She is trying to get a handle on what is going on.      Medication use:  Kimmie is requesting refills of her alprazolam and tramadol today.    Head pain/migraine.  Neck pain.  Shoulder pain.  She has been taking tramadol for years for her pain management.  She was working hard to use alternative therapies to titrate down on how much tramadol she was taking and now she is back up to 50 tablets/month.  For today, she reports that there is nothing else that she can do.  Tramadol is the only medication that helps her.    Alprazolam:  For panic and anxiety.  She will take 1/2 to 1 tablet daily as needed for anxiety.  She will be able to skip days intermittently and then on alternate days she might need to use the full tablet.  She is limited on 30 tablets/month and she has been compliant with this.  She is trying to use self calming techniques and alternative therapies.  She is requesting a refill of her alprazolam today.    Review of Systems   Constitutional, HEENT, cardiovascular, pulmonary, GI, , musculoskeletal, neuro, skin, endocrine and psych systems are negative, except as otherwise noted.      Objective           Vitals:  No vitals were obtained today due to virtual visit.    Physical Exam   healthy, alert and no distress  PSYCH: Alert and oriented times 3; coherent speech, normal   rate and volume, able to articulate logical thoughts, able   to abstract reason, no tangential thoughts, no hallucinations   or delusions  Her affect is normal  RESP: No cough, no audible wheezing, able to talk in full sentences  Remainder of exam unable to be completed due to telephone  visits    Diagnostics:  Reviewed in epic            Phone call duration: 9 minutes

## 2021-04-20 NOTE — TELEPHONE ENCOUNTER
Pharmacy needs clarification on sig       ALPRAZolam (XANAX) 0.5 MG tablet 30 tablet 0 4/20/2021  No   Sig: TAKE 1/2 TABLET BY MOUTH DAILY AS NEEDED. 30 tablets to last 30 days. Okay to fill today.       Numbers are not lining up  Pharmacy can give #15    Would you like to change Rx?    Please advise.

## 2021-04-20 NOTE — TELEPHONE ENCOUNTER
"Patient had phone visit with PCP 4-20-21 and script for Alprazolam sent to pharmacy.  Patient says that the dispensed number is accurate, but the dose is not and does not match the dispense number?      Patient says her prescription has always been for \"0.5 mg tablet take half to one tablet daily as needed.\"  She is requesting a revised prescription.    Routed to Dr. Izabela Reno RN  [facility]      "

## 2021-04-21 RX ORDER — ALPRAZOLAM 0.5 MG
TABLET ORAL
Qty: 30 TABLET | Refills: 0 | Status: SHIPPED | OUTPATIENT
Start: 2021-04-21 | End: 2021-05-14

## 2021-04-21 ASSESSMENT — ANXIETY QUESTIONNAIRES: GAD7 TOTAL SCORE: 14

## 2021-04-21 NOTE — TELEPHONE ENCOUNTER
PT called back.  I reviewed the new rx with her.  She picked up the 15 tabs yesterday.  I instructed pt to call her pharmacy and ask when she can  the new 30 tab rx.  This  date may be directed by her insurance, but with the new rx directions, pt may be able to pick this up soon.  New rx is for 30 tabs with new instructions.  Pt agreed with plan.  LILLY Son

## 2021-04-21 NOTE — TELEPHONE ENCOUNTER
Called patient, no answer and unable to leave voicemail because voicemail box not set up.    ABIOLA MengN, RN  [facility] [place] [facility]

## 2021-04-28 ENCOUNTER — TELEPHONE (OUTPATIENT)
Dept: OBGYN | Facility: CLINIC | Age: 56
End: 2021-04-28

## 2021-04-28 NOTE — TELEPHONE ENCOUNTER
Heydi Sanchez,  04/28/21 Per clinic RN, Pt canceled her LEEP and will now come back for a cotest due by 09/22/21.

## 2021-04-28 NOTE — TELEPHONE ENCOUNTER
Pt called to discuss upcoming LEEP on 4/30, she has been reading about it and is worried about downtime and recovery and would like to just do 6 month PAP/HPV as initially recommended by Dr. Sanchez.  Cancelled LEEP, alerted Pap tracking RN so she can update her chart and send appropriate reminders.  Mikayla Garcia RN

## 2021-05-14 ENCOUNTER — VIRTUAL VISIT (OUTPATIENT)
Dept: FAMILY MEDICINE | Facility: CLINIC | Age: 56
End: 2021-05-14
Payer: COMMERCIAL

## 2021-05-14 DIAGNOSIS — M54.2 CERVICALGIA: ICD-10-CM

## 2021-05-14 DIAGNOSIS — F41.1 GENERALIZED ANXIETY DISORDER: ICD-10-CM

## 2021-05-14 DIAGNOSIS — G44.86 CERVICOGENIC HEADACHE: ICD-10-CM

## 2021-05-14 DIAGNOSIS — G43.009 MIGRAINE WITHOUT AURA AND WITHOUT STATUS MIGRAINOSUS, NOT INTRACTABLE: ICD-10-CM

## 2021-05-14 DIAGNOSIS — G89.4 CHRONIC PAIN SYNDROME: Primary | ICD-10-CM

## 2021-05-14 PROCEDURE — 99214 OFFICE O/P EST MOD 30 MIN: CPT | Mod: 95 | Performed by: PHYSICIAN ASSISTANT

## 2021-05-14 RX ORDER — TRAMADOL HYDROCHLORIDE 50 MG/1
50 TABLET ORAL 2 TIMES DAILY PRN
Qty: 50 TABLET | Refills: 0 | Status: SHIPPED | OUTPATIENT
Start: 2021-05-18 | End: 2021-05-28

## 2021-05-14 RX ORDER — SUMATRIPTAN 25 MG/1
25 TABLET, FILM COATED ORAL
Qty: 20 TABLET | Refills: 1 | Status: SHIPPED | OUTPATIENT
Start: 2021-05-14 | End: 2021-07-07 | Stop reason: DRUGHIGH

## 2021-05-14 RX ORDER — ALPRAZOLAM 0.5 MG
TABLET ORAL
Qty: 30 TABLET | Refills: 0 | Status: SHIPPED | OUTPATIENT
Start: 2021-05-27 | End: 2021-05-20

## 2021-05-14 NOTE — PROGRESS NOTES
Kimmie is a 55 year old who is being evaluated via a billable telephone visit.      What phone number would you like to be contacted at? [phone]  How would you like to obtain your AVS? MyChart    Assessment & Plan     Chronic pain syndrome  Cervicalgia  Cervicogenic headache  Generalized anxiety disorder  Kimmie is here for refills of medications for chronic pain and anxiety today. Her previous PCP Yasmin Velazquez is retiring and I will be taking over as Kimmie's primary and main prescriber of controlled substances. Kimmie relays to me today that despite the tramadol for pain and xanax and zoloft for mood she continues to be in pain and deal with significant anxiety on a daily basis. I think she would benefit from a comprehensive pain evaluation and from working with a psychiatrist towards tapering off zoloft and trying another daily medication for mood with long term goal of decreasing use of xanax. Kimmie agrees with this plan and will make arrangements for follow up with both pain and psychiatry. Once good pain control and mood stabilization I will gladly take over prescribing. Given there may be a delay in seeing both specialties I anticipate we will likely visit again for refills of the Tramadol and Xanax next month but I hope a better treatment plan will come in the future. Kimmie agrees with this plan.   Overall not well controlled right now despite treatment with Tramadol (50 tablets to last 30 days).   - PAIN MANAGEMENT REFERRAL; Future  - traMADol (ULTRAM) 50 MG tablet; Take 1 tablet (50 mg) by mouth 2 times daily as needed for severe pain . 50 tablets to last 30 days.  - MENTAL HEALTH REFERRAL  - [place]; Psychiatry[place] [facility] [place]Bridge to Long-Term Psychiatry as indicated ([phone]); Yes; Chronic Mental Health without improvement; Yes; We will contact you to schedule ...  - ALPRAZolam (XANAX) 0.5 MG tablet; TAKE 1/2-1 TABLET BY MOUTH DAILY AS NEEDED. 30 tablets to  last 30 days.    Migraine without aura and without status migrainosus, not intractable  Has never tried imitrex for headaches. Short course to see if provides any relief as abortive therapy. Return to care if new or worsening symptoms.   - SUMAtriptan (IMITREX) 25 MG tablet; Take 1 tablet (25 mg) by mouth at onset of headache for migraine May repeat in 2 hours. Max 8 tablets/24 hours.    35 minutes spent on the date of the encounter doing chart review, history and exam, documentation and further activities per the note      Return for pain evaluation and psychiatry.    CHAITANYA Howell [facility]    Tyler Burks is a 55 year old who presents for the following health issues     HPI   Medication Followup of Tramadol and Alprazolam    Taking Medication as prescribed: yes    Side Effects:  None    Medication Helping Symptoms:  yes     Migraines a couple times per month. Bad ones will last for up to 3 days. Associated with anxiety. Reports it is a vicious cycle. Dealing with headaches since around age 12.     Does go to acupuncture 1-3 times per week.   Reports seeing multiple neurologists and other specialists. Notes recent MRI showing spinal degeneration and pinched nerves in neck.   Has sought out botox injections for pain but reports it was denied by insurance.   Has never tried imitrex.     Tramadol - despite use finding self in pain most of the time. Skeletal and muscular pain in neck.     Zoloft - does not feel like helping. Does not think she has been on other medications for anxiety/depression.         Review of Systems   Constitutional, HEENT, cardiovascular, pulmonary, gi and gu systems are negative, except as otherwise noted.      Objective           Vitals:  No vitals were obtained today due to virtual visit.    Physical Exam   healthy, alert and no distress  PSYCH: Alert and oriented times 3; coherent speech, normal   rate and volume, able to articulate logical thoughts,  able   to abstract reason, no tangential thoughts, no hallucinations   or delusions  Her affect is normal and pleasant  RESP: No cough, no audible wheezing, able to talk in full sentences  Remainder of exam unable to be completed due to telephone visits            Phone call duration: 19 minutes

## 2021-05-18 ENCOUNTER — NURSE TRIAGE (OUTPATIENT)
Dept: FAMILY MEDICINE | Facility: CLINIC | Age: 56
End: 2021-05-18

## 2021-05-18 ENCOUNTER — MYC MEDICAL ADVICE (OUTPATIENT)
Dept: FAMILY MEDICINE | Facility: CLINIC | Age: 56
End: 2021-05-18

## 2021-05-18 NOTE — TELEPHONE ENCOUNTER
I called the pharmacy and they said the last 2 refills were early. She filled them on 3/26/2021 #50, 4/20/2021 #50 and now requesting refill today, 5/18/2021 #50. The pharmacy said she is not due until 5/21/2021 for the refill.    Thank you

## 2021-05-18 NOTE — TELEPHONE ENCOUNTER
Writer called pharmacy to relay verbal ok for refill today. Patient informed. Patient verbalized understanding of follow-up expectation with psych and pain clinic.    Thanks! Kera Salazar RN

## 2021-05-18 NOTE — TELEPHONE ENCOUNTER
Writer spoke with patient. Patient states she does not have any Tramadol left. Patient has been having migraines and has used the medication for that purpose.  Please advise.    Thanks! Kera Salazar RN

## 2021-05-18 NOTE — TELEPHONE ENCOUNTER
I can give verbal OK for fill today.   At our last visit Kimmie did agree to follow up with pain referral and psych referral as both tramadol and xanax do not seem to be sufficiently helping with symptoms. Please encourage her to follow up on this.   Let me know if I need to call the pharmacy myself to give auth.    Thanks!

## 2021-05-18 NOTE — TELEPHONE ENCOUNTER
Thanks for the update.   I had dated the rx for 5/18/21 but see how with those previous fill dates she might not need meds just yet.   Can you reach out to Kimmie and check that she has enough to get to 21st?   Thanks!

## 2021-05-18 NOTE — TELEPHONE ENCOUNTER
Patient calling back, says that the pharmacy needs a verbal ok to fill the tramadol. Did not see below message from Adalberto Mercedes, until after hung up with pt, must of had chart open at same time.    Please contact patient.    Thank you,  Yenifer Stephenson RN

## 2021-05-23 ENCOUNTER — HEALTH MAINTENANCE LETTER (OUTPATIENT)
Age: 56
End: 2021-05-23

## 2021-05-28 ENCOUNTER — OFFICE VISIT (OUTPATIENT)
Dept: FAMILY MEDICINE | Facility: CLINIC | Age: 56
End: 2021-05-28
Payer: COMMERCIAL

## 2021-05-28 VITALS
WEIGHT: 116.2 LBS | OXYGEN SATURATION: 98 % | DIASTOLIC BLOOD PRESSURE: 83 MMHG | BODY MASS INDEX: 19.34 KG/M2 | SYSTOLIC BLOOD PRESSURE: 125 MMHG | HEART RATE: 92 BPM | TEMPERATURE: 99.4 F | RESPIRATION RATE: 16 BRPM

## 2021-05-28 DIAGNOSIS — Z79.899 CONTROLLED SUBSTANCE AGREEMENT SIGNED: Primary | ICD-10-CM

## 2021-05-28 DIAGNOSIS — N95.1 SYMPTOMATIC MENOPAUSAL OR FEMALE CLIMACTERIC STATES: ICD-10-CM

## 2021-05-28 DIAGNOSIS — M54.2 CERVICALGIA: ICD-10-CM

## 2021-05-28 DIAGNOSIS — G89.4 CHRONIC PAIN SYNDROME: ICD-10-CM

## 2021-05-28 DIAGNOSIS — F41.1 GENERALIZED ANXIETY DISORDER: ICD-10-CM

## 2021-05-28 DIAGNOSIS — G44.86 CERVICOGENIC HEADACHE: ICD-10-CM

## 2021-05-28 DIAGNOSIS — J30.2 ACUTE SEASONAL ALLERGIC RHINITIS: ICD-10-CM

## 2021-05-28 LAB
AMPHETAMINES UR QL: NOT DETECTED NG/ML
BARBITURATES UR QL SCN: NOT DETECTED NG/ML
BENZODIAZ UR QL SCN: NOT DETECTED NG/ML
BUPRENORPHINE UR QL: NOT DETECTED NG/ML
CANNABINOIDS UR QL: ABNORMAL NG/ML
COCAINE UR QL SCN: NOT DETECTED NG/ML
D-METHAMPHET UR QL: NOT DETECTED NG/ML
METHADONE UR QL SCN: NOT DETECTED NG/ML
OPIATES UR QL SCN: NOT DETECTED NG/ML
OXYCODONE UR QL SCN: NOT DETECTED NG/ML
PCP UR QL SCN: NOT DETECTED NG/ML
PROPOXYPH UR QL: NOT DETECTED NG/ML
TRICYCLICS UR QL SCN: NOT DETECTED NG/ML

## 2021-05-28 PROCEDURE — 80306 DRUG TEST PRSMV INSTRMNT: CPT | Performed by: PHYSICIAN ASSISTANT

## 2021-05-28 PROCEDURE — 99215 OFFICE O/P EST HI 40 MIN: CPT | Performed by: PHYSICIAN ASSISTANT

## 2021-05-28 PROCEDURE — 96127 BRIEF EMOTIONAL/BEHAV ASSMT: CPT | Mod: 59 | Performed by: PHYSICIAN ASSISTANT

## 2021-05-28 PROCEDURE — 80307 DRUG TEST PRSMV CHEM ANLYZR: CPT | Performed by: PHYSICIAN ASSISTANT

## 2021-05-28 RX ORDER — TRAMADOL HYDROCHLORIDE 50 MG/1
50 TABLET ORAL 2 TIMES DAILY PRN
Qty: 50 TABLET | Refills: 0 | Status: SHIPPED | OUTPATIENT
Start: 2021-06-17 | End: 2021-07-07

## 2021-05-28 RX ORDER — MEDROXYPROGESTERONE ACETATE 2.5 MG/1
TABLET ORAL
Qty: 90 TABLET | Refills: 0 | Status: SHIPPED | OUTPATIENT
Start: 2021-05-28 | End: 2021-08-11

## 2021-05-28 RX ORDER — ALPRAZOLAM 0.5 MG
TABLET ORAL
Qty: 20 TABLET | Refills: 0 | Status: SHIPPED | OUTPATIENT
Start: 2021-05-28 | End: 2021-08-11

## 2021-05-28 RX ORDER — ALPRAZOLAM 0.5 MG
TABLET ORAL
Qty: 30 TABLET | Refills: 0 | Status: SHIPPED | OUTPATIENT
Start: 2021-06-17 | End: 2021-07-07

## 2021-05-28 RX ORDER — ALPRAZOLAM 0.5 MG
TABLET ORAL
COMMUNITY
Start: 2021-04-29 | End: 2021-05-28

## 2021-05-28 RX ORDER — FLUTICASONE PROPIONATE 50 MCG
SPRAY, SUSPENSION (ML) NASAL
Qty: 16 G | Refills: 11 | Status: SHIPPED | OUTPATIENT
Start: 2021-05-28 | End: 2022-06-04

## 2021-05-28 ASSESSMENT — ANXIETY QUESTIONNAIRES
7. FEELING AFRAID AS IF SOMETHING AWFUL MIGHT HAPPEN: NOT AT ALL
1. FEELING NERVOUS, ANXIOUS, OR ON EDGE: MORE THAN HALF THE DAYS
2. NOT BEING ABLE TO STOP OR CONTROL WORRYING: SEVERAL DAYS
GAD7 TOTAL SCORE: 6
4. TROUBLE RELAXING: SEVERAL DAYS
GAD7 TOTAL SCORE: 6
6. BECOMING EASILY ANNOYED OR IRRITABLE: SEVERAL DAYS
7. FEELING AFRAID AS IF SOMETHING AWFUL MIGHT HAPPEN: NOT AT ALL
GAD7 TOTAL SCORE: 6
3. WORRYING TOO MUCH ABOUT DIFFERENT THINGS: SEVERAL DAYS
5. BEING SO RESTLESS THAT IT IS HARD TO SIT STILL: NOT AT ALL

## 2021-05-28 ASSESSMENT — PATIENT HEALTH QUESTIONNAIRE - PHQ9
10. IF YOU CHECKED OFF ANY PROBLEMS, HOW DIFFICULT HAVE THESE PROBLEMS MADE IT FOR YOU TO DO YOUR WORK, TAKE CARE OF THINGS AT HOME, OR GET ALONG WITH OTHER PEOPLE: SOMEWHAT DIFFICULT
SUM OF ALL RESPONSES TO PHQ QUESTIONS 1-9: 2
SUM OF ALL RESPONSES TO PHQ QUESTIONS 1-9: 2

## 2021-05-28 NOTE — PROGRESS NOTES
Answers for HPI/ROS submitted by the patient on 5/28/2021   If you checked off any problems, how difficult have these problems made it for you to do your work, take care of things at home, or get along with other people?: Somewhat difficult  PHQ9 TOTAL SCORE: 2  DAYLIN 7 TOTAL SCORE: 6      Assessment & Plan     Controlled substance agreement signed  Since her previous PCP is retiring I will be assuming care as PCP. We reviewed CSA and signed the agreement today. I advised Kimmie to not take more than prescribed amount of alprazolam or tramadol moving forward as it will result in refill refusal by her care team. She understands. We both agree that finding a better plan for pain control and anxiety is necessary. At our last visit I referred her to psychiatry and pain clinic. She has follow up with pain clinic scheduled for 6/11/21. Needs to schedule psychiatry follow up. She has been on Zoloft for a number of years (since 2013 per my review) and it might be worthwhile to consider alternative possibly in SNRI or TCA class but will defer to psych and/or pain clinic on this.   - Drug Abuse Screen Panel 13, Urine (Care Map)  - ALPRAZolam (XANAX) 0.5 MG tablet; TAKE 1/2 TO 1 TABLET BY MOUTH DAILY AS NEEDED (to last until 6/17/21)  - traMADol (ULTRAM) 50 MG tablet; Take 1 tablet (50 mg) by mouth 2 times daily as needed for severe pain . 50 tablets to last 30 days.  - Drug Confirmation Panel Urine with Creat (Care Map)    Cervicalgia  Chronic pain syndrome  Cervicogenic headache  - traMADol (ULTRAM) 50 MG tablet; Take 1 tablet (50 mg) by mouth 2 times daily as needed for severe pain . 50 tablets to last 30 days.    Generalized anxiety disorder  Advised to maintain strict adherence to taking how prescribed. Short course today to get to next regular refill interval. Follow up with pain and psychiatry.   - ALPRAZolam (XANAX) 0.5 MG tablet; TAKE 1/2 TO 1 TABLET BY MOUTH DAILY AS NEEDED (to last until 6/17/21)  - ALPRAZolam (XANAX)  0.5 MG tablet; TAKE 1/2 TO 1 TABLET BY MOUTH DAILY AS NEEDED (30 tablets to last 30 days)    60 minutes spent on the date of the encounter doing chart review, history and exam, documentation and further activities per the note      Return for pain clinic, psychiatry.    CHAITANYA [facility]    Tyler Burks is a 55 year old who presents for the following health issues     HPI   Chronic pain and anxiety follow up  Kimmie is a 55 year old female with a history of mitral valve prolapse, depression, anxiety, chronic pain, and migraine presenting for medication check.    Kimmie has been experiencing headaches since childhood. Reports these occur on daily basis but on occasion will wake up with more severe headaches. Sometimes will experience migraine headaches that last for 3-4 days. The pain will often involve lower neck. Associated symptoms include nausea, insomnia, photophobia. For chronic pain related to headaches and neck pain she has been taking Tramadol regularly since 2017. Prior to that time had been prescribed it intermittently for shoulder pain, neck pain, and low back pain since 2013.     The chronic nature of Kimmie's pain has contributed to worsening anxiety for which she has been taking alprazolam regularly since 2013. She also takes zoloft 200 mg daily which does not seem to help.     Despite the tramadol and alprazolam Kimmie continues to struggle with both ongoing pain and anxiety. When suffering from prolonged headache for 3-4 days will sometimes take more than allotted alprazolam to help with sleep and associated anxiety. She recently ran out of the alprazolam and refill was refused as our staff counted up recent prescriptions and it was found she should have enough on hand to last until 6/11/21. Kimmie reports she is out and due to anxiety and that last night she did end up taking one of her partner's ativan tablets. She also reports using a CBD product to help with pain.  Wanting to be forthcoming with this information and wanting to figure out a better plan for ongoing pain and anxiety both of which do not appear well controlled at this time.     In the past she has tried acupuncture, massage, cupping, chiropractic care, nerve blocks, and physical therapy. At our last visit I did prescribe Imitrex which she was unsure if she had tried previously. Reports this has helped some with headache severity but not entirely. She has followed with neurology 2017 through 2018 and was ultimately referred for pain evaluation in 2018 by her neurologist. She visited with Dr. Singh in 2018 for bilateral occipital nerve block. She reports seeking botox injections but being denied by insurance.       Review of Systems   Constitutional, HEENT, cardiovascular, pulmonary, gi and gu systems are negative, except as otherwise noted.      Objective    /83 (BP Location: Right arm, Patient Position: Sitting, Cuff Size: Adult Regular)   Pulse 92   Temp 99.4  F (37.4  C) (Tympanic)   Resp 16   Wt 52.7 kg (116 lb 3.2 oz)   LMP 09/01/2016   SpO2 98%   BMI 19.34 kg/m    Body mass index is 19.34 kg/m .  Physical Exam  Vitals signs and nursing note reviewed.   Constitutional:       Appearance: Normal appearance.   HENT:      Head: Normocephalic.   Pulmonary:      Effort: Pulmonary effort is normal.   Neurological:      General: No focal deficit present.      Mental Status: She is alert.   Psychiatric:         Mood and Affect: Mood normal.         Behavior: Behavior normal.            Results for orders placed or performed in visit on 05/28/21   Drug Abuse Screen Panel 13, Urine (Care Map)     Status: Abnormal   Result Value Ref Range    Cannabinoids (90-pfb-1-carboxy-9-THC) Detected, Abnormal Result (A) NDET^Not Detected ng/mL    Phencyclidine (Phencyclidine) Not Detected NDET^Not Detected ng/mL    Cocaine (Benzoylecgonine) Not Detected NDET^Not Detected ng/mL    Methamphetamine (d-Methamphetamine) Not  Detected NDET^Not Detected ng/mL    Opiates (Morphine) Not Detected NDET^Not Detected ng/mL    Amphetamine (d-Amphetamine) Not Detected NDET^Not Detected ng/mL    Benzodiazepines (Nordiazepam) Not Detected NDET^Not Detected ng/mL    Tricyclic Antidepressants (Desipramine) Not Detected NDET^Not Detected ng/mL    Methadone (Methadone) Not Detected NDET^Not Detected ng/mL    Barbiturates (Butalbital) Not Detected NDET^Not Detected ng/mL    Oxycodone (Oxycodone) Not Detected NDET^Not Detected ng/mL    Propoxyphene (Norpropoxyphene) Not Detected NDET^Not Detected ng/mL    Buprenorphine (Buprenorphine) Not Detected NDET^Not Detected ng/mL   Drug Confirmation Panel Urine with Creat (Care Map)     Status: Abnormal   Result Value Ref Range    Creatinine Urine 52 mg/dL    O-Desmethyl Tramadol ng/mL 4,060 (HH) <50 ng/mL    O-Desmethyl Tramadol 7,808 (A) ABS^Absent ng/mg[creat]    Tramadol ng/mL 1,860 (HH) <50 ng/mL    Tramadol 3,577 (A) ABS^Absent ng/mg[creat]    Lorazepam Present (AA) ABS^Absent    Comp Urine Drug Confirmation Comments (Note)

## 2021-05-29 ASSESSMENT — ANXIETY QUESTIONNAIRES: GAD7 TOTAL SCORE: 6

## 2021-05-29 ASSESSMENT — PATIENT HEALTH QUESTIONNAIRE - PHQ9: SUM OF ALL RESPONSES TO PHQ QUESTIONS 1-9: 2

## 2021-06-02 LAB
CREAT UR-MCNC: 52 MG/DL
LORAZEPAM UR QL CFM: PRESENT
N-NORTRAMADOL/CREAT UR CFM: 7808 NG/MG{CREAT}
O-NORTRAMADOL UR CFM-MCNC: 4060 NG/ML
RPT COMMENT: ABNORMAL
TRAMADOL CTO UR CFM-MCNC: 1860 NG/ML
TRAMADOL/CREAT UR: 3577 NG/MG{CREAT}

## 2021-06-11 ENCOUNTER — OFFICE VISIT (OUTPATIENT)
Dept: ANESTHESIOLOGY | Facility: CLINIC | Age: 56
End: 2021-06-11
Attending: PHYSICIAN ASSISTANT
Payer: COMMERCIAL

## 2021-06-11 VITALS
TEMPERATURE: 98.1 F | SYSTOLIC BLOOD PRESSURE: 127 MMHG | DIASTOLIC BLOOD PRESSURE: 82 MMHG | HEART RATE: 71 BPM | OXYGEN SATURATION: 99 %

## 2021-06-11 DIAGNOSIS — G44.86 CERVICOGENIC HEADACHE: ICD-10-CM

## 2021-06-11 DIAGNOSIS — G89.4 CHRONIC PAIN SYNDROME: ICD-10-CM

## 2021-06-11 DIAGNOSIS — M54.2 CERVICALGIA: ICD-10-CM

## 2021-06-11 DIAGNOSIS — Z11.59 ENCOUNTER FOR SCREENING FOR OTHER VIRAL DISEASES: ICD-10-CM

## 2021-06-11 PROCEDURE — 99203 OFFICE O/P NEW LOW 30 MIN: CPT

## 2021-06-11 RX ORDER — ZINC GLUCONATE 50 MG
50 TABLET ORAL DAILY
COMMUNITY
End: 2021-08-11

## 2021-06-11 RX ORDER — ASCORBIC ACID 100 MG
TABLET,CHEWABLE ORAL
COMMUNITY
End: 2023-08-02

## 2021-06-11 ASSESSMENT — PAIN SCALES - GENERAL: PAINLEVEL: MODERATE PAIN (4)

## 2021-06-11 NOTE — LETTER
6/11/2021       RE: Catie Nuñez  [address] [address]     Dear Colleague,    Thank you for referring your patient, Catie Nuñez, to the [facility]. Please see a copy of my visit note below.    Catie Nuñez is a 55 year old female with a past medical history significant for ostearthritis and anxiety  who presents with a chief complaint of headaches and neck pain.  She denies any radicular component, weakness or bowel/bladder incontinence.     The pain has been present for 7 years .    The pain is Moderate Pain (4) in severity.    The pain is described as throbbing, aching, and nausea.   The pain is alleviated by lying, rest and stretching.    It is exacerbated by stress and lack of sleep.    Modalities that have been utilized in the past which were helpful include trigger point injections,tramadol,acupuncture,gabapentin.    Things that were not helpful, but tried ,include chiropractic care.    The patient has never tried TENS therapy or CBD oil/cream.      Current Outpatient Medications   Medication     ALPRAZolam (XANAX) 0.5 MG tablet     [START ON 6/17/2021] ALPRAZolam (XANAX) 0.5 MG tablet     Ascorbic Acid (VITAMIN C) 100 MG CHEW     Aspirin-Acetaminophen-Caffeine (EXCEDRIN PO)     estradiol (ESTRACE) 1 MG tablet     fluticasone (FLONASE) 50 MCG/ACT nasal spray     medroxyPROGESTERone (PROVERA) 2.5 MG tablet     sertraline (ZOLOFT) 100 MG tablet     SUMAtriptan (IMITREX) 25 MG tablet     [START ON 6/17/2021] traMADol (ULTRAM) 50 MG tablet     zinc gluconate 50 MG tablet     ibuprofen (ADVIL/MOTRIN) 600 MG tablet     Multiple Vitamins-Minerals (MULTIVITAMIN PO)     nitroFURantoin macrocrystal-monohydrate (MACROBID) 100 MG capsule     No current facility-administered medications for this visit.      Allergies   Allergen Reactions     Ondansetron Nausea and Vomiting      Sulfa Drugs Hives     Rash        Past Medical History:   Diagnosis Date     Abnormal Pap smear of cervix 11/07/2019    3/11/21     Anxiety      Arthritis 3 yrs ago     ASCUS with positive high risk HPV 6/2015,09/14/16    atypia on colp, 09/14/16: ASCUS + HR HPV 16 and other.      Cervical high risk HPV (human papillomavirus) test positive 11/07/2019    See problem list.     Depression      Depressive disorder Age 12     Fracture of great toe 02/20/2014     History of scoliosis      Hx of colposcopy with cervical biopsy 10/06/2016    10/06/16: [facility] NIL.     MVP (mitral valve prolapse)      Unexplained endometrial cells on cervical Pap smear 06/2015    thin endometrium on US     No past surgical history on file.  Family History   Problem Relation Age of Onset     Other Cancer Mother         AML     Osteoporosis Mother      Heart Disease Father      Hypertension Father      Diabetes Other         Developed in older age     Cerebrovascular Disease Maternal Grandmother      Hypertension Maternal Grandmother      Asthma No family hx of      Breast Cancer No family hx of      Social History     Socioeconomic History     Marital status: Single     Spouse name: None     Number of children: None     Years of education: None     Highest education level: None   Occupational History     Occupation: retail sales     Employer: [employer]     Comment: [employer]   Social Needs     Financial resource strain: None     Food insecurity     Worry: None     Inability: None     Transportation needs     Medical: None     Non-medical: None   Tobacco Use     Smoking status: Never Smoker     Smokeless tobacco: Never Used   Substance and Sexual Activity     Alcohol use: Yes     Frequency: Monthly or less     Drinks per session: 1 or 2     Comment: occ, rare      Drug use: Never     Sexual activity: Yes     Partners: Male     Birth control/protection: Post-menopausal   Lifestyle     Physical activity     Days per week: None     Minutes per  session: None     Stress: None   Relationships     Social connections     Talks on phone: None     Gets together: None     Attends Orthodox service: None     Active member of club or organization: None     Attends meetings of clubs or organizations: None     Relationship status: None     Intimate partner violence     Fear of current or ex partner: None     Emotionally abused: None     Physically abused: None     Forced sexual activity: None   Other Topics Concern     Parent/sibling w/ CABG, MI or angioplasty before 65F 55M? Yes   Social History Narrative    ** Merged History Encounter **           ROS: 10 point ROS neg other than the symptoms noted above in the HPI.  Physical Exam  Vitals signs and nursing note reviewed. Exam conducted with a chaperone present.   Constitutional:       Appearance: Normal appearance.   HENT:      Head: Normocephalic and atraumatic.      Nose: Nose normal.      Mouth/Throat:      Mouth: Mucous membranes are moist.   Eyes:      Extraocular Movements: Extraocular movements intact.      Pupils: Pupils are equal, round, and reactive to light.   Neck:      Musculoskeletal: Decreased range of motion. Pain with movement and muscular tenderness present. No erythema, neck rigidity, crepitus, injury, torticollis or spinous process tenderness.      Vascular: No carotid bruit.      Trachea: Trachea normal.   Lymphadenopathy:      Cervical: No cervical adenopathy.   Neurological:      Mental Status: She is alert.           MRI CERVICAL SPINE WITHOUT CONTRAST 11/15/2018 3:04 PM      HISTORY:  Cervicalgia. Occipital headache.     TECHNIQUE: Multiplanar, multisequence MRI of the cervical spine  without contrast.      COMPARISON: Cervical spine x-ray 10/11/2018.      FINDINGS: Slight reversal of the normal cervical lordosis centered at  C5-C6. Anterior posterior alignment of the spine is within normal  limits. Vertebral body height is maintained without evidence of  fracture. There are no destructive  osseous lesions. Marked loss of  intervertebral disc height with disc desiccation and degenerative  endplate changes at C5-C6 and C6-C7. Mild degenerative changes also at  C3-C4 and C4-C5.      The cervical spinal cord and visualized portions of the thoracic  spinal cord appear normal. The visualized portions of the brainstem  and cerebellum appear normal.      Level by level as follows:      C2-C3: Mild bilateral facet hypertrophy without significant spinal  canal or neural foraminal narrowing.      C3-C4: Bilateral facet hypertrophy without significant spinal canal or  neural foraminal narrowing.      C4-C5: Bilateral facet hypertrophy without significant spinal canal or  neural foraminal narrowing.      C5-C6: Circumferential disc bulge with endplate osteophytic spurring  asymmetric in the right foraminal region along with mild facet  hypertrophy. Findings result in moderate-to-severe right and mild left  neural foraminal narrowing. No significant spinal canal narrowing.      C6-C7: Circumferential disc bulge and endplate osteophytic spurring  along with mild facet hypertrophy. Findings result in  moderate-to-severe right and moderate left neural foraminal narrowing.  No significant spinal canal narrowing.      C7-T1: Bilateral facet hypertrophy results in mild bilateral neural  foraminal narrowing. No spinal canal narrowing.      Paraspinous soft tissues are unremarkable.                                                                       IMPRESSION:  Degenerative changes in the cervical spine as described  above.         ANTONINO BE MD        A/P:Patient is a 57 y/o lady with neck pain and heaches.  On physical examination she has tenderness to palpation over the right cervical facet joints.  Historical data and MRI findings are suggestive of cervical facet arthropathy.  To assist in the diagnosis, I recommend cervical medial branch blocks.  No medication changes at this time.      Again, thank you for  allowing me to participate in the care of your patient.      Sincerely,    Terrence Han MD

## 2021-06-11 NOTE — PATIENT INSTRUCTIONS
Procedures:    Call to schedule your procedure: [phone], option #2    Right Sided Cervical Medial Branch Blocks.     Your pre-procedure instructions are below, please call our clinic if you have any questions.    Call the clinic after the procedure to report your pain relief.     Procedure Information related to COVID-19    Please call [phone] option #2 to schedule, reschedule, or cancel your procedure appointment.   Phones are answered Monday - Friday from 08:00 - 4:30pm.  Leave a voicemail with your name, birth date, and phone number if no one is available to take your call.     You will need to be tested for COVID-19 within 4 days (96 hours) of your procedure.  You will be called to schedule your COVID test by a central scheduling team. If you have not been contacted to schedule your test within 4 days of the scheduled procedure, call [phone]    Please be aware that the turn around time for the test is approximately 24-72 hours.   If your results are still pending the day of your procedure, you will be notified as soon as possible as the procedure may be cancelled.    Please note: You will only be contacted for positive and pending results.     The procedure center staff will call you several days before the procedure to review important information that you will need to know for the day of the procedure.   Please contact the clinic if you have further questions about this information.       Information related to Scheduling and Pre-Procedure Instructions      If you are having a Diagnostic procedure, you will be given a Pain Diary to document your pain relief.   Please fax the completed form to our office.   fax number is [phone]    If you must reschedule your procedure more than two times, you must follow up in clinic before rescheduling again.      Preparing for your procedure    CAUTION - FAILURE TO FOLLOW THESE PRE-PROCEDURE INSTRUCTIONS WILL RESULT IN YOUR PROCEDURE BEING  RESCHEDULED.      Your procedure: Right Sided Cervical Medial Branch Blocks            You must have a  take you home after your procedure. Transportation by taxi or para-transit is okay as long as you have a responsible adult accompany you. You must provide your 's full name and contact number at time of check in.     Fasting Protocol Please have nothing to eat and drink for 2 hours before the procedure.      Medications If you take any medications, DO NOT STOP. Take your medications as usual the day of your procedure with a sip of water AT LEAST 2 HOURS PRIOR TO ARRIVAL.    Antibiotics If you are currently taking antibiotics, you must complete the entire dose 7 days prior to your scheduled procedure. You must be clear of any signs or symptoms of infection. If you begin antibiotics, please contact our clinic for instructions.     Fever, Chills, or Rash If you experience a fever of higher than 100 degrees, chills, rash, or open wounds during the one week before your procedure, please call the clinic to see if you may proceed with your procedure.      Medication Hold List  **Patients under Cardiology/Neurology care should consult their provider prior to the pain procedure to verify pre-procedure medication instructions. The information below contains general guidelines.**    Blood Thinners If you are taking daily ASPIRIN, PLAVIX, OR OTHER BLOOD THINNERS SUCH AS COUMADIN/WARFARIN, we will need your prescribing doctor to sign a release permitting you to stop these medications. Once approved by your prescribing doctor - STOP ALL BLOOD THINNERS BASED ON THE TIME TABLE BELOW PRIOR TO YOUR PROCEDURE. If you have been instructed to stop WARFARIN(COUMADIN), you must have an INR lab drawn the day before your procedure. . Your INR must be within normal limits before we can perform your injection. MEDICATIONS CAN BE RESTARTED AFTER YOUR PROCEDURE.    14 DAY HOLD  Ticlid (ticlopidine)    10 DAY HOLD  Effient  (Prasugel)    3 DAY HOLD  Xarelto (rivaroxaban) 7 DAY HOLD  Anacin, Bufferin, Ecotrin, Excedrin, Aggrenox (Aspirin)  Brilinta (ticagrelor)  Coumadin (Warfarin)  Pradexa (Dabigatran)  Elmiron (Pentosan)  Plavix (Clopidogrel Bisulfate)  Pletal (Cilostazol)    24 HOUR HOLD  Lovenox (enoxaparin)  Agrylin (Anagrelide)        Non-steroidal Anti-inflammatories (NSAIDs) DO NOT TAKE any non-steroidal anti-inflammatory medications (NSAIDs) listed on the table below. MEDICATIONS CAN BE RESTARTED AFTER YOUR PROCEDURE. Celebrex is OK to take and does not need to be discontinued.     Medications to stop:  3 DAY HOLD  Advil, Motrin (Ibuprofen)  Arthrotec (diciofenac sodium/misoprostol)  Clinoril (Sulindac)  Indocin (Indomethacin)  Lodine (Etodolac)  Toradol (Ketorolac)  Vicoprofen (Hydrocodone and Ibuprofen)  Voltaren (Diclotenac)    14 DAY HOLD  Daypro (Oxaprozin)  Feldene (Piroxicam)   7 DAY HOLD  Aleve (Naproxen sodium)  Darvon compound (contains aspirin)  Naprosyn (Naproxen)  Norgesic Forte (contains aspirin)  Mobic (Meloxicam)  Oruvall (Ketoprofen)  Percodan (contains aspirin)  Relafen (Nabumetone)  Salsalate  Trilisate  Vitamin E (more than 400 mg per day)  Any medication containing aspirin                To speak with a nurse, schedule/reschedule/cancel a clinic appointment, or request a medication refill call: [phone]     You can also reach us by [employer]: [email or web address]

## 2021-06-11 NOTE — PROGRESS NOTES
Catie Nuñez is a 55 year old female with a past medical history significant for ostearthritis and anxiety  who presents with a chief complaint of headaches and neck pain.  She denies any radicular component, weakness or bowel/bladder incontinence.     The pain has been present for 7 years .    The pain is Moderate Pain (4) in severity.    The pain is described as throbbing, aching, and nausea.   The pain is alleviated by lying, rest and stretching.    It is exacerbated by stress and lack of sleep.    Modalities that have been utilized in the past which were helpful include trigger point injections,tramadol,acupuncture,gabapentin.    Things that were not helpful, but tried ,include chiropractic care.    The patient has never tried TENS therapy or CBD oil/cream.      Current Outpatient Medications   Medication     ALPRAZolam (XANAX) 0.5 MG tablet     [START ON 6/17/2021] ALPRAZolam (XANAX) 0.5 MG tablet     Ascorbic Acid (VITAMIN C) 100 MG CHEW     Aspirin-Acetaminophen-Caffeine (EXCEDRIN PO)     estradiol (ESTRACE) 1 MG tablet     fluticasone (FLONASE) 50 MCG/ACT nasal spray     medroxyPROGESTERone (PROVERA) 2.5 MG tablet     sertraline (ZOLOFT) 100 MG tablet     SUMAtriptan (IMITREX) 25 MG tablet     [START ON 6/17/2021] traMADol (ULTRAM) 50 MG tablet     zinc gluconate 50 MG tablet     ibuprofen (ADVIL/MOTRIN) 600 MG tablet     Multiple Vitamins-Minerals (MULTIVITAMIN PO)     nitroFURantoin macrocrystal-monohydrate (MACROBID) 100 MG capsule     No current facility-administered medications for this visit.      Allergies   Allergen Reactions     Ondansetron Nausea and Vomiting     Sulfa Drugs Hives     Rash        Past Medical History:   Diagnosis Date     Abnormal Pap smear of cervix 11/07/2019    3/11/21     Anxiety      Arthritis 3 yrs ago     ASCUS with positive high risk HPV 6/2015,09/14/16    atypia on colp, 09/14/16: ASCUS + HR HPV 16 and other.      Cervical high risk HPV (human papillomavirus) test  positive 11/07/2019    See problem list.     Depression      Depressive disorder Age 12     Fracture of great toe 02/20/2014     History of scoliosis      Hx of colposcopy with cervical biopsy 10/06/2016    10/06/16: [place] [facility] NIL.     MVP (mitral valve prolapse)      Unexplained endometrial cells on cervical Pap smear 06/2015    thin endometrium on US     No past surgical history on file.  Family History   Problem Relation Age of Onset     Other Cancer Mother         AML     Osteoporosis Mother      Heart Disease Father      Hypertension Father      Diabetes Other         Developed in older age     Cerebrovascular Disease Maternal Grandmother      Hypertension Maternal Grandmother      Asthma No family hx of      Breast Cancer No family hx of      Social History     Socioeconomic History     Marital status: Single     Spouse name: None     Number of children: None     Years of education: None     Highest education level: None   Occupational History     Occupation: retail sales     Employer: [employer]     Comment: [employer]   Social Needs     Financial resource strain: None     Food insecurity     Worry: None     Inability: None     Transportation needs     Medical: None     Non-medical: None   Tobacco Use     Smoking status: Never Smoker     Smokeless tobacco: Never Used   Substance and Sexual Activity     Alcohol use: Yes     Frequency: Monthly or less     Drinks per session: 1 or 2     Comment: occ, rare      Drug use: Never     Sexual activity: Yes     Partners: Male     Birth control/protection: Post-menopausal   Lifestyle     Physical activity     Days per week: None     Minutes per session: None     Stress: None   Relationships     Social connections     Talks on phone: None     Gets together: None     Attends Catholic service: None     Active member of club or organization: None     Attends meetings of clubs or organizations: None     Relationship status: None     Intimate partner violence     Fear of current or  ex partner: None     Emotionally abused: None     Physically abused: None     Forced sexual activity: None   Other Topics Concern     Parent/sibling w/ CABG, MI or angioplasty before 65F 55M? Yes   Social History Narrative    ** Merged History Encounter **           ROS: 10 point ROS neg other than the symptoms noted above in the HPI.  Physical Exam  Vitals signs and nursing note reviewed. Exam conducted with a chaperone present.   Constitutional:       Appearance: Normal appearance.   HENT:      Head: Normocephalic and atraumatic.      Nose: Nose normal.      Mouth/Throat:      Mouth: Mucous membranes are moist.   Eyes:      Extraocular Movements: Extraocular movements intact.      Pupils: Pupils are equal, round, and reactive to light.   Neck:      Musculoskeletal: Decreased range of motion. Pain with movement and muscular tenderness present. No erythema, neck rigidity, crepitus, injury, torticollis or spinous process tenderness.      Vascular: No carotid bruit.      Trachea: Trachea normal.   Lymphadenopathy:      Cervical: No cervical adenopathy.   Neurological:      Mental Status: She is alert.           MRI CERVICAL SPINE WITHOUT CONTRAST 11/15/2018 3:04 PM      HISTORY:  Cervicalgia. Occipital headache.     TECHNIQUE: Multiplanar, multisequence MRI of the cervical spine  without contrast.      COMPARISON: Cervical spine x-ray 10/11/2018.      FINDINGS: Slight reversal of the normal cervical lordosis centered at  C5-C6. Anterior posterior alignment of the spine is within normal  limits. Vertebral body height is maintained without evidence of  fracture. There are no destructive osseous lesions. Marked loss of  intervertebral disc height with disc desiccation and degenerative  endplate changes at C5-C6 and C6-C7. Mild degenerative changes also at  C3-C4 and C4-C5.      The cervical spinal cord and visualized portions of the thoracic  spinal cord appear normal. The visualized portions of the brainstem  and  cerebellum appear normal.      Level by level as follows:      C2-C3: Mild bilateral facet hypertrophy without significant spinal  canal or neural foraminal narrowing.      C3-C4: Bilateral facet hypertrophy without significant spinal canal or  neural foraminal narrowing.      C4-C5: Bilateral facet hypertrophy without significant spinal canal or  neural foraminal narrowing.      C5-C6: Circumferential disc bulge with endplate osteophytic spurring  asymmetric in the right foraminal region along with mild facet  hypertrophy. Findings result in moderate-to-severe right and mild left  neural foraminal narrowing. No significant spinal canal narrowing.      C6-C7: Circumferential disc bulge and endplate osteophytic spurring  along with mild facet hypertrophy. Findings result in  moderate-to-severe right and moderate left neural foraminal narrowing.  No significant spinal canal narrowing.      C7-T1: Bilateral facet hypertrophy results in mild bilateral neural  foraminal narrowing. No spinal canal narrowing.      Paraspinous soft tissues are unremarkable.                                                                       IMPRESSION:  Degenerative changes in the cervical spine as described  above.         ANTONINO BE MD        A/P:Patient is a 55 y/o lady with neck pain and heaches.  On physical examination she has tenderness to palpation over the right cervical facet joints.  Historical data and MRI findings are suggestive of cervical facet arthropathy.  To assist in the diagnosis, I recommend cervical medial branch blocks.  No medication changes at this time.

## 2021-06-21 ENCOUNTER — TELEPHONE (OUTPATIENT)
Dept: ANESTHESIOLOGY | Facility: CLINIC | Age: 56
End: 2021-06-21

## 2021-06-21 NOTE — TELEPHONE ENCOUNTER
Attempt # 1    I called [facility] [place], [place] to schedule a procedure with Dr. Han. Left a voicemail with my call back number and request that they leave a good call back time if they get my voicemail.    Estrella THAKUR  Claudia-Op Coordinator

## 2021-06-23 DIAGNOSIS — Z11.59 ENCOUNTER FOR SCREENING FOR OTHER VIRAL DISEASES: ICD-10-CM

## 2021-07-01 DIAGNOSIS — F41.1 GENERALIZED ANXIETY DISORDER: ICD-10-CM

## 2021-07-02 RX ORDER — SERTRALINE HYDROCHLORIDE 100 MG/1
TABLET, FILM COATED ORAL
Qty: 60 TABLET | Refills: 1 | Status: SHIPPED | OUTPATIENT
Start: 2021-07-02 | End: 2021-08-13 | Stop reason: ALTCHOICE

## 2021-07-05 ENCOUNTER — MYC MEDICAL ADVICE (OUTPATIENT)
Dept: FAMILY MEDICINE | Facility: CLINIC | Age: 56
End: 2021-07-05

## 2021-07-06 ENCOUNTER — TELEPHONE (OUTPATIENT)
Dept: FAMILY MEDICINE | Facility: CLINIC | Age: 56
End: 2021-07-06

## 2021-07-06 NOTE — TELEPHONE ENCOUNTER
Prior Authorization Retail Medication Request    Medication/Dose: SUMAtriptan (IMITREX) 25 MG tablet  ICD code (if different than what is on RX):    Previously Tried and Failed:    Rationale:  QUANTITY LIMIT PA    Insurance Name:  [facility]  Insurance ID:  [id number]      Pharmacy Information (if different than what is on RX)  Name:  [id number]  Phone:  [phone]

## 2021-07-06 NOTE — TELEPHONE ENCOUNTER
PA Initiation    Medication: SUMAtriptan (IMITREX) 25 MG tablet  Insurance Company: [employer] - Phone [phone] Fax [phone]  Pharmacy Filling the Rx: [facility] #[id number] - [place], [place] - [address] & [facility]  Filling Pharmacy Phone: [phone]  Filling Pharmacy Fax: [phone]  Start Date: 7/6/2021    Unable to complete PA via CMM, manually faxed PA request to insurance.

## 2021-07-06 NOTE — TELEPHONE ENCOUNTER
Patient's plan allows 18 tablets within a 21 day period. Medication is too soon to be refilled until 7/14.

## 2021-07-06 NOTE — TELEPHONE ENCOUNTER
Prior auth  Can you see if we can do a quantity override for the imitrex?    Thanks!     Clari Isbell RN

## 2021-07-07 ENCOUNTER — VIRTUAL VISIT (OUTPATIENT)
Dept: FAMILY MEDICINE | Facility: CLINIC | Age: 56
End: 2021-07-07
Payer: COMMERCIAL

## 2021-07-07 DIAGNOSIS — F41.1 GENERALIZED ANXIETY DISORDER: ICD-10-CM

## 2021-07-07 DIAGNOSIS — M54.2 CERVICALGIA: ICD-10-CM

## 2021-07-07 DIAGNOSIS — G43.009 MIGRAINE WITHOUT AURA AND WITHOUT STATUS MIGRAINOSUS, NOT INTRACTABLE: ICD-10-CM

## 2021-07-07 DIAGNOSIS — Z79.899 CONTROLLED SUBSTANCE AGREEMENT SIGNED: ICD-10-CM

## 2021-07-07 DIAGNOSIS — G44.86 CERVICOGENIC HEADACHE: ICD-10-CM

## 2021-07-07 DIAGNOSIS — G89.4 CHRONIC PAIN SYNDROME: ICD-10-CM

## 2021-07-07 PROCEDURE — 99214 OFFICE O/P EST MOD 30 MIN: CPT | Mod: 95 | Performed by: PHYSICIAN ASSISTANT

## 2021-07-07 RX ORDER — SUMATRIPTAN 50 MG/1
50 TABLET, FILM COATED ORAL
Qty: 30 TABLET | Refills: 3 | Status: SHIPPED | OUTPATIENT
Start: 2021-07-07 | End: 2021-12-17

## 2021-07-07 RX ORDER — TRAMADOL HYDROCHLORIDE 50 MG/1
50 TABLET ORAL 2 TIMES DAILY PRN
Qty: 50 TABLET | Refills: 0 | Status: SHIPPED | OUTPATIENT
Start: 2021-07-14 | End: 2021-08-11

## 2021-07-07 RX ORDER — ALPRAZOLAM 0.5 MG
TABLET ORAL
Qty: 30 TABLET | Refills: 0 | Status: SHIPPED | OUTPATIENT
Start: 2021-07-14 | End: 2021-08-11

## 2021-07-07 NOTE — PROGRESS NOTES
Kimmie is a 56 year old who is being evaluated via a billable telephone visit.      What phone number would you like to be contacted at? [phone]  How would you like to obtain your AVS? MyChart    Assessment & Plan     Migraine without aura and without status migrainosus, not intractable  Imitrex helping as abortive therapy for migraine headaches. New rx for 50 mg tablet sent to pharmacy. Kimmie's insurance plan has better coverage for 50 mg form. Follow up as planned with pain for nerve block later this month.   - SUMAtriptan (IMITREX) 50 MG tablet; Take 1 tablet (50 mg) by mouth at onset of headache for migraine May repeat in 2 hours. Max 4 tablets/24 hours.    Cervicalgia  Chronic pain syndrome  Cervicogenic headache  Controlled substance agreement signed  Future order sent to pharmacy.   - traMADol (ULTRAM) 50 MG tablet; Take 1 tablet (50 mg) by mouth 2 times daily as needed for severe pain . 50 tablets to last 30 days.    Generalized anxiety disorder  Future order to pharmacy. Follow up as planned with psychiatry later this summer. Has been on Zoloft and Xanax for years -- I do not think her symptoms are well controlled. I have suggested discontinuing Zoloft and starting Effexor (this may also help with pain/headaches) but will defer to psych on how best to initiate and taper Kimmie's medications. Long term goal is to get pain under better control and no longer have need for Xanax.    - ALPRAZolam (XANAX) 0.5 MG tablet; TAKE 1/2 TO 1 TABLET BY MOUTH DAILY AS NEEDED (30 tablets to last 30 days)      Return in about 4 weeks (around 8/4/2021).    CHAITANYA [facility]   Kimmie is a 56 year old who presents for the following health issues     HPI   Chronic pain/migraine follow up  Coming off 3 day headache  Stress has been higher - Just moved and traveling next week  Lack of sleep and stress are biggest triggers for headaches   Trying to breathe through it   Most she ever  took of imitrex 25 mg was 3 of the tablets in one day. Has run out of imitrex. Insurance limiting quantity of 25 mg tablets.      Review of Systems   Constitutional, HEENT, cardiovascular, pulmonary, gi and gu systems are negative, except as otherwise noted.      Objective           Vitals:  No vitals were obtained today due to virtual visit.    Physical Exam   healthy, alert and no distress  PSYCH: Alert and oriented times 3; coherent speech, normal   rate and volume, able to articulate logical thoughts, able   to abstract reason, no tangential thoughts, no hallucinations   or delusions  Her affect is normal  RESP: No cough, no audible wheezing, able to talk in full sentences  Remainder of exam unable to be completed due to telephone visits                Phone call duration: 13 minutes

## 2021-07-27 ENCOUNTER — HOSPITAL ENCOUNTER (OUTPATIENT)
Facility: AMBULATORY SURGERY CENTER | Age: 56
End: 2021-07-27
Payer: COMMERCIAL

## 2021-08-03 ENCOUNTER — TELEPHONE (OUTPATIENT)
Dept: ANESTHESIOLOGY | Facility: CLINIC | Age: 56
End: 2021-08-03

## 2021-08-03 NOTE — TELEPHONE ENCOUNTER
Attempt # 1    [place] called [facility] [place] with Dr. Han. Left a voicemail with my call back number and request that they leave a good call back time if they get my voicemail.    Estrella THAKUR  Claudia-Op Coordinator

## 2021-08-06 NOTE — TELEPHONE ENCOUNTER
Attempt # 2    I called [facility] [place] to schedule a procedure with Dr. Han. Left a voicemail with my call back number and request that they leave a good call back time if they get my voicemail.    Estrella THAKUR  Claudia-Op Coordinator

## 2021-08-11 ENCOUNTER — OFFICE VISIT (OUTPATIENT)
Dept: FAMILY MEDICINE | Facility: CLINIC | Age: 56
End: 2021-08-11
Payer: COMMERCIAL

## 2021-08-11 VITALS
OXYGEN SATURATION: 98 % | BODY MASS INDEX: 18.8 KG/M2 | RESPIRATION RATE: 16 BRPM | DIASTOLIC BLOOD PRESSURE: 70 MMHG | HEART RATE: 70 BPM | WEIGHT: 113 LBS | TEMPERATURE: 98.6 F | SYSTOLIC BLOOD PRESSURE: 116 MMHG

## 2021-08-11 DIAGNOSIS — M54.2 CERVICALGIA: ICD-10-CM

## 2021-08-11 DIAGNOSIS — G44.86 CERVICOGENIC HEADACHE: ICD-10-CM

## 2021-08-11 DIAGNOSIS — G89.4 CHRONIC PAIN SYNDROME: Primary | ICD-10-CM

## 2021-08-11 DIAGNOSIS — F41.1 GENERALIZED ANXIETY DISORDER: ICD-10-CM

## 2021-08-11 DIAGNOSIS — N95.1 SYMPTOMATIC MENOPAUSAL OR FEMALE CLIMACTERIC STATES: ICD-10-CM

## 2021-08-11 DIAGNOSIS — F11.90 CHRONIC, CONTINUOUS USE OF OPIOIDS: ICD-10-CM

## 2021-08-11 PROCEDURE — 99214 OFFICE O/P EST MOD 30 MIN: CPT | Performed by: NURSE PRACTITIONER

## 2021-08-11 RX ORDER — ALPRAZOLAM 0.5 MG
TABLET ORAL
Qty: 30 TABLET | Refills: 2 | Status: SHIPPED | OUTPATIENT
Start: 2021-08-11 | End: 2021-08-16

## 2021-08-11 RX ORDER — ESTRADIOL 1 MG/1
0.5 TABLET ORAL DAILY
Qty: 90 TABLET | Refills: 3 | Status: SHIPPED | OUTPATIENT
Start: 2021-08-11 | End: 2022-08-30

## 2021-08-11 RX ORDER — TRAMADOL HYDROCHLORIDE 50 MG/1
50 TABLET ORAL 2 TIMES DAILY PRN
Qty: 50 TABLET | Refills: 2 | Status: SHIPPED | OUTPATIENT
Start: 2021-08-11 | End: 2021-11-01

## 2021-08-11 RX ORDER — MEDROXYPROGESTERONE ACETATE 2.5 MG/1
2.5 TABLET ORAL DAILY
Qty: 90 TABLET | Refills: 3 | Status: SHIPPED | OUTPATIENT
Start: 2021-08-11 | End: 2022-08-30

## 2021-08-11 ASSESSMENT — PATIENT HEALTH QUESTIONNAIRE - PHQ9
SUM OF ALL RESPONSES TO PHQ QUESTIONS 1-9: 3
5. POOR APPETITE OR OVEREATING: NOT AT ALL

## 2021-08-11 ASSESSMENT — ANXIETY QUESTIONNAIRES
IF YOU CHECKED OFF ANY PROBLEMS ON THIS QUESTIONNAIRE, HOW DIFFICULT HAVE THESE PROBLEMS MADE IT FOR YOU TO DO YOUR WORK, TAKE CARE OF THINGS AT HOME, OR GET ALONG WITH OTHER PEOPLE: SOMEWHAT DIFFICULT
5. BEING SO RESTLESS THAT IT IS HARD TO SIT STILL: NOT AT ALL
1. FEELING NERVOUS, ANXIOUS, OR ON EDGE: SEVERAL DAYS
6. BECOMING EASILY ANNOYED OR IRRITABLE: NOT AT ALL
3. WORRYING TOO MUCH ABOUT DIFFERENT THINGS: SEVERAL DAYS
GAD7 TOTAL SCORE: 2
2. NOT BEING ABLE TO STOP OR CONTROL WORRYING: NOT AT ALL
7. FEELING AFRAID AS IF SOMETHING AWFUL MIGHT HAPPEN: NOT AT ALL

## 2021-08-11 NOTE — PROGRESS NOTES
Assessment & Plan     Chronic pain syndrome   reviewed.  UDS and CSA are current.  Refills given for 3 months.  She will reschedule cervical medial branch blocks.  Follow up in 3 months.   - traMADol (ULTRAM) 50 MG tablet; Take 1 tablet (50 mg) by mouth 2 times daily as needed for severe pain . 50 tablets to last 30 days.    Chronic, continuous use of opioids  See above.     Cervicalgia  See above.   - traMADol (ULTRAM) 50 MG tablet; Take 1 tablet (50 mg) by mouth 2 times daily as needed for severe pain . 50 tablets to last 30 days.    Cervicogenic headache  See above.   - traMADol (ULTRAM) 50 MG tablet; Take 1 tablet (50 mg) by mouth 2 times daily as needed for severe pain . 50 tablets to last 30 days.    Symptomatic menopausal or female climacteric states  Refills given.   Schedule repeat pap in September.   - medroxyPROGESTERone (PROVERA) 2.5 MG tablet; Take 1 tablet (2.5 mg) by mouth daily  - estradiol (ESTRACE) 1 MG tablet; Take 0.5 tablets (0.5 mg) by mouth daily    Generalized anxiety disorder  She will follow up with psychiatry appointment later this week.  - ALPRAZolam (XANAX) 0.5 MG tablet; TAKE 1/2 TO 1 TABLET BY MOUTH DAILY AS NEEDED (30 tablets to last 30 days)                 Return in about 3 months (around 11/11/2021).    Laurie Avelar NP  [facility]    Tyler Burks is a 56 year old who presents for the following health issues     HPI     Medication Follow up     Taking Medication as prescribed: yes    Side Effects:  None    Medication Helping Symptoms:  Yes- for the most part     Encounter-Level CSA - 09/23/2015:    Controlled Substance Agreement - Scan on 9/24/2015  2:10 PM: CONTROLLED SUBSTANCE AGREEMENT     Patient-Level CSA:    Controlled Substance Agreement - Opioid - Scan on 6/2/2021  1:10 PM       She has an appointment with a psychiatrist on Friday.  She feels her anxiety is not well-controlled and that Sertraline is not working as well as it used  to.     She needs to reschedule her appointment for cervical medial branch blocks.    She is due for 6 month repeat pap in September.       Review of Systems         Objective    /70   Pulse 70   Temp 98.6  F (37  C) (Tympanic)   Resp 16   Wt 51.3 kg (113 lb)   LMP 09/01/2016   SpO2 98%   BMI 18.80 kg/m    Body mass index is 18.8 kg/m .  Physical Exam   GENERAL: healthy, alert and no distress  PSYCH: mentation appears normal, affect normal

## 2021-08-12 ASSESSMENT — ANXIETY QUESTIONNAIRES: GAD7 TOTAL SCORE: 2

## 2021-08-13 ENCOUNTER — NURSE TRIAGE (OUTPATIENT)
Dept: NURSING | Facility: CLINIC | Age: 56
End: 2021-08-13

## 2021-08-13 ENCOUNTER — VIRTUAL VISIT (OUTPATIENT)
Dept: PSYCHIATRY | Facility: CLINIC | Age: 56
End: 2021-08-13
Attending: PHYSICIAN ASSISTANT
Payer: COMMERCIAL

## 2021-08-13 ENCOUNTER — MYC MEDICAL ADVICE (OUTPATIENT)
Dept: FAMILY MEDICINE | Facility: CLINIC | Age: 56
End: 2021-08-13

## 2021-08-13 DIAGNOSIS — F33.0 MAJOR DEPRESSIVE DISORDER, RECURRENT EPISODE, MILD (H): ICD-10-CM

## 2021-08-13 DIAGNOSIS — F41.1 GAD (GENERALIZED ANXIETY DISORDER): ICD-10-CM

## 2021-08-13 DIAGNOSIS — F33.0 MAJOR DEPRESSIVE DISORDER, RECURRENT EPISODE, MILD (H): Primary | ICD-10-CM

## 2021-08-13 PROCEDURE — 99204 OFFICE O/P NEW MOD 45 MIN: CPT | Mod: 95 | Performed by: NURSE PRACTITIONER

## 2021-08-13 RX ORDER — QUETIAPINE FUMARATE 25 MG/1
25 TABLET, FILM COATED ORAL AT BEDTIME
Qty: 30 TABLET | Refills: 1 | Status: SHIPPED | OUTPATIENT
Start: 2021-08-13 | End: 2021-08-16

## 2021-08-13 RX ORDER — VENLAFAXINE HYDROCHLORIDE 37.5 MG/1
37.5 CAPSULE, EXTENDED RELEASE ORAL DAILY
Qty: 60 CAPSULE | Refills: 1 | Status: SHIPPED | OUTPATIENT
Start: 2021-08-13 | End: 2021-08-16

## 2021-08-13 NOTE — TELEPHONE ENCOUNTER
Seen psych today and the prescribed her   venlafaxine (EFFEXOR-XR) 37.5 MG 24 hr capsule 60 capsule 1     QUEtiapine (SEROQUEL) 25 MG tablet 30 tablet 1     She is a restricted pateitn so she needs primary to sign off on them. please sign off on med if correct.    thank you
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
## 2021-08-13 NOTE — PATIENT INSTRUCTIONS
1.  Discontinue sertraline  2.  Add venlafaxine 1 capsule daily for 7 days then 2 capsules daily  3.  Add quetiapine 25 mg tablet at bedtime  4.  Xanax to continue for now at 0.5 mg tablet daily as needed-take sparingly-no refills given today  5.  Continue talk therapy with Brenda to process trauma history        Continue all other medical directions per primary care provider.     Continue all other medications as reviewed per electronic medical record today.     Safety plan reviewed. To the Emergency Department as needed or call after hours crisis line at [phone] or [phone]. [place] Crisis Text Line: Text [place] to 438152  or  Suicide LifeLine Chat: Nubity.org/chat/    To schedule individual or family therapy, call [facility] at [phone].     Schedule an appointment with me in October or sooner as needed.  Call [facility] at [phone] to schedule.    Follow up with primary care provider as planned or for acute medical concerns.    Call the psychiatric nurse line with medication questions or concerns at [phone].    Lee Silber may be used to communicate with your provider, but this is not intended to be used for emergencies.    Crisis Resources:    National Suicide Prevention Lifeline: 420.206.1118 (TTY: 332.219.6962). Call anytime for help.  (www.suicidepreventionlifeline.org)  National [place] on Mental Illness (www.virgilio.org): 191.317.6630 or 511-241-5800.   Mental Health Association (www.mentalhealth.org): 700.236.3982 or 142-260-9882.  [place] Crisis Text Line: Text [place] to 682349  Suicide LifeLine Chat: Nubity.org/chat

## 2021-08-16 ENCOUNTER — TELEPHONE (OUTPATIENT)
Dept: FAMILY MEDICINE | Facility: CLINIC | Age: 56
End: 2021-08-16

## 2021-08-16 DIAGNOSIS — F41.1 GENERALIZED ANXIETY DISORDER: ICD-10-CM

## 2021-08-16 RX ORDER — QUETIAPINE FUMARATE 25 MG/1
25 TABLET, FILM COATED ORAL AT BEDTIME
Qty: 30 TABLET | Refills: 1 | Status: SHIPPED | OUTPATIENT
Start: 2021-08-16 | End: 2021-11-01

## 2021-08-16 RX ORDER — VENLAFAXINE HYDROCHLORIDE 37.5 MG/1
37.5 CAPSULE, EXTENDED RELEASE ORAL DAILY
Qty: 60 CAPSULE | Refills: 1 | Status: SHIPPED | OUTPATIENT
Start: 2021-08-16 | End: 2021-10-19

## 2021-08-16 RX ORDER — ALPRAZOLAM 0.5 MG
TABLET ORAL
Qty: 30 TABLET | Refills: 0 | Status: SHIPPED | OUTPATIENT
Start: 2021-08-16 | End: 2021-11-01

## 2021-08-16 NOTE — TELEPHONE ENCOUNTER
Attempt # 3    [place] [facility] [place] Dr. Han. Left a voicemail with my call back number and request that they leave a good call back time if they get my voicemail.    Estrella THAKUR  Claudia-Op Coordinator

## 2021-08-16 NOTE — TELEPHONE ENCOUNTER
Dr. Mercedes,  Pt calling was rxd xanax 8/11 by another provider but says she is restricted to you and requesting med.  Please advise.  Thanks,  Valerie Wick RN

## 2021-08-16 NOTE — TELEPHONE ENCOUNTER
Today, faxed [facility] [phone] Restricted Recipient Form so Pt can see Kristal Farias NP at [facility]   [facility].    CHICHI Treviño [employer] Referral Rep

## 2021-08-16 NOTE — NURSING NOTE
________________________________________  Medications Phoned  to Pharmacy [] yes [x]no  Name of Pharmacist:  List Medications, including dose, quantity and instructions    Medications ordered this visit were e-scribed.  Verified by order class [] yes  [x] no    Medication changes or discontinuations were communicated to patient's pharmacy: [x] yes  [] no   D/C'd Sertraline  Dictation completed at time of chart check: [] yes  [x] no    I have checked the documentation for today s encounters and the above information has been reviewed and completed.

## 2021-08-17 NOTE — TELEPHONE ENCOUNTER
Adalberto Mercedes PA-C   Grand Triage; Tanya Asher 21 hours ago (1:18 PM)   EW  Rx approved.     Kimmie should follow up with Brittni Avelar moving forward -- Tanya Bardales -- will Brittni need to complete any paperwork to make this happen? Thank you!

## 2021-08-17 NOTE — TELEPHONE ENCOUNTER
Left message to call back and ask to speak with an available triage nurse.  ABIOLA MengN, RN  [facility]

## 2021-08-19 ENCOUNTER — MYC MEDICAL ADVICE (OUTPATIENT)
Dept: FAMILY MEDICINE | Facility: CLINIC | Age: 56
End: 2021-08-19

## 2021-08-19 DIAGNOSIS — G44.86 CERVICOGENIC HEADACHE: ICD-10-CM

## 2021-08-19 DIAGNOSIS — G43.009 MIGRAINE WITHOUT AURA AND WITHOUT STATUS MIGRAINOSUS, NOT INTRACTABLE: ICD-10-CM

## 2021-08-19 DIAGNOSIS — G89.4 CHRONIC PAIN SYNDROME: Primary | ICD-10-CM

## 2021-08-20 NOTE — TELEPHONE ENCOUNTER
Spoke with Xin, from [facility] insurance who is this Pt's . Informed Xin Mercedes's last day at [facility].is 10/1/21. Xin will send Pt the paperwork to transfer to another Provider. If Pt wants Laurie Avelar CNP as her PCP she has to note it on the [facility] restricted form and send back to Xin at [facility].    If Pt calls again about this process of transferring to new PCP, you can transfer the call back to [facility] referral rep at [phone].    CHICHI Treviño [facility] Referral Rep

## 2021-08-20 NOTE — TELEPHONE ENCOUNTER
Tanya Bardales,     Did you see this message about pt switching to Brittni Avelar as PCP as related to any paperwork needed due to restricted status?    Consuelo Boone, RN, BSN  [facility]

## 2021-08-27 ENCOUNTER — MYC MEDICAL ADVICE (OUTPATIENT)
Dept: FAMILY MEDICINE | Facility: CLINIC | Age: 56
End: 2021-08-27

## 2021-09-03 ENCOUNTER — PATIENT OUTREACH (OUTPATIENT)
Dept: OBGYN | Facility: CLINIC | Age: 56
End: 2021-09-03

## 2021-09-03 ENCOUNTER — ANCILLARY PROCEDURE (OUTPATIENT)
Dept: GENERAL RADIOLOGY | Facility: CLINIC | Age: 56
End: 2021-09-03
Attending: PHYSICIAN ASSISTANT
Payer: COMMERCIAL

## 2021-09-03 ENCOUNTER — OFFICE VISIT (OUTPATIENT)
Dept: URGENT CARE | Facility: URGENT CARE | Age: 56
End: 2021-09-03
Payer: COMMERCIAL

## 2021-09-03 VITALS
OXYGEN SATURATION: 100 % | DIASTOLIC BLOOD PRESSURE: 82 MMHG | TEMPERATURE: 98.9 F | BODY MASS INDEX: 19.29 KG/M2 | SYSTOLIC BLOOD PRESSURE: 114 MMHG | WEIGHT: 113 LBS | HEIGHT: 64 IN | HEART RATE: 93 BPM

## 2021-09-03 DIAGNOSIS — R04.2 HEMOPTYSIS: Primary | ICD-10-CM

## 2021-09-03 LAB
BASOPHILS # BLD AUTO: 0 10E3/UL (ref 0–0.2)
BASOPHILS NFR BLD AUTO: 0 %
EOSINOPHIL # BLD AUTO: 0.2 10E3/UL (ref 0–0.7)
EOSINOPHIL NFR BLD AUTO: 4 %
ERYTHROCYTE [DISTWIDTH] IN BLOOD BY AUTOMATED COUNT: 12.7 % (ref 10–15)
HCT VFR BLD AUTO: 41.4 % (ref 35–47)
HGB BLD-MCNC: 14.2 G/DL (ref 11.7–15.7)
IMM GRANULOCYTES # BLD: 0 10E3/UL
IMM GRANULOCYTES NFR BLD: 0 %
LYMPHOCYTES # BLD AUTO: 1.5 10E3/UL (ref 0.8–5.3)
LYMPHOCYTES NFR BLD AUTO: 24 %
MCH RBC QN AUTO: 30.7 PG (ref 26.5–33)
MCHC RBC AUTO-ENTMCNC: 34.3 G/DL (ref 31.5–36.5)
MCV RBC AUTO: 89 FL (ref 78–100)
MONOCYTES # BLD AUTO: 0.4 10E3/UL (ref 0–1.3)
MONOCYTES NFR BLD AUTO: 7 %
NEUTROPHILS # BLD AUTO: 3.9 10E3/UL (ref 1.6–8.3)
NEUTROPHILS NFR BLD AUTO: 65 %
PLATELET # BLD AUTO: 250 10E3/UL (ref 150–450)
RBC # BLD AUTO: 4.63 10E6/UL (ref 3.8–5.2)
WBC # BLD AUTO: 6.1 10E3/UL (ref 4–11)

## 2021-09-03 PROCEDURE — 99214 OFFICE O/P EST MOD 30 MIN: CPT | Performed by: PHYSICIAN ASSISTANT

## 2021-09-03 PROCEDURE — 85025 COMPLETE CBC W/AUTO DIFF WBC: CPT | Performed by: PHYSICIAN ASSISTANT

## 2021-09-03 PROCEDURE — 36415 COLL VENOUS BLD VENIPUNCTURE: CPT | Performed by: PHYSICIAN ASSISTANT

## 2021-09-03 PROCEDURE — 71046 X-RAY EXAM CHEST 2 VIEWS: CPT | Performed by: RADIOLOGY

## 2021-09-03 ASSESSMENT — MIFFLIN-ST. JEOR: SCORE: 1079.62

## 2021-09-03 NOTE — PROGRESS NOTES
"Assessment & Plan     Hemoptysis    - XR Chest 2 Views  - CBC with platelets and differential   Work up and exam unremarkable. We discussed plan to go to the ER if she coughs up blood again.     Diagnosis and treatment plan were discussed with patient and/or parent. If symptoms worsen or do not improve in the next few days, follow-up with your primary care provider or visit an [facility] urgent care clinic location.  Patient verbalizes understanding of all things discussed. All questions were addressed and answered.     Return in about 2 weeks (around 9/17/2021) for If not better, sooner if worsening.    Ros Renteria PA-C  [facility] [place]    Tyler Burks is a 56 year old female who presents to clinic today for the following health issues:  Chief Complaint   Patient presents with     Urgent Care     Cough     Last night while lying down felt sudden urge to cough, coughed up some bright red blood along with mucous.  No previous incidence of this, no hx of smoking or doing drugs, is very \"freaked out\" about it.  Notes her boyfriend weighs 220 lbs and was laying on top of her right before she coughed and did feel pressure in her chest afterwards.     Cough     Reports hx of hospitalization in the past for bleeding issues; has tendency to bleed.     HPI    She was woken up by the urge to cough last night. She didn't think anything of it but went to the bathroom to see what she coughed up in her hand. She noticed that what she coughed into her hand was bloody and she coughed a little more up. Blood was bright red in her hand, no mucus. She took a xanax after the coughed up the blood to help her fall asleep. Since last night she hasn't coughed up any other blood. She has had an occasional cough today but no other URI symptoms.    Denies any dyspnea.   Does not drink alcohol.  Takes excedrin every day for migraines and neck pain.     No black stools or BRB AL.    No recent " "travel, never lived in a 3rd world country.   She has a hx of a calcified granuloma on CXR and CT chest- reviewed from 2014.     She takes sumitriptan for migraines and just started effexor 2 weeks ago for depression/anxiety.     Review of Systems        Objective    /82   Pulse 93   Temp 98.9  F (37.2  C) (Oral)   Ht 1.613 m (5' 3.5\")   Wt 51.3 kg (113 lb)   LMP 09/01/2016   SpO2 100%   BMI 19.70 kg/m    Physical Exam   GENERAL: healthy, alert and no distress  EYES: Eyes grossly normal to inspection, PERRL and conjunctivae and sclerae normal  HENT: ear canals and TM's normal, nose and mouth without ulcers or lesions  NECK: no adenopathy, no asymmetry, masses, or scars and thyroid normal to palpation  RESP: lungs clear to auscultation - no rales, rhonchi or wheezes  CV: regular rate and rhythm, normal S1 S2, no S3 or S4, no murmur, click or rub, no peripheral edema and peripheral pulses strong  ABDOMEN: soft, nontender, no hepatosplenomegaly, no masses and bowel sounds normal  MS: no gross musculoskeletal defects noted, no edema  SKIN: no suspicious lesions or rashes  NEURO: Normal strength and tone, mentation intact and speech normal  BACK: no CVA tenderness, no paralumbar tenderness  PSYCH: mentation appears normal, affect normal/bright and slightly anxious  LYMPH: no cervical, supraclavicular, axillary, or inguinal adenopathy    Results for orders placed or performed in visit on 09/03/21 (from the past 24 hour(s))   XR Chest 2 Views    Narrative    XR CHEST 2 VW 9/3/2021 6:17 PM    HISTORY: Hemoptysis    COMPARISON: 7/15/2014 chest CT      Impression    IMPRESSION: Mild biapical pleural thickening. No focal infiltrate,  pleural effusion or pneumothorax. Left upper lobe calcified granuloma,  mediastinal and hilar calcified lymph nodes and punctate calcified  splenic granulomas are stable, the sequela of prior granulomatous  disease.    IRVIN HOOVER MD         SYSTEM ID:  [id number]   CBC with platelets " and differential    Narrative    The following orders were created for panel order CBC with platelets and differential.  Procedure                               Abnormality         Status                     ---------                               -----------         ------                     CBC with platelets and d...[[id number]]                      Final result                 Please view results for these tests on the individual orders.   CBC with platelets and differential   Result Value Ref Range    WBC Count 6.1 4.0 - 11.0 10e3/uL    RBC Count 4.63 3.80 - 5.20 10e6/uL    Hemoglobin 14.2 11.7 - 15.7 g/dL    Hematocrit 41.4 35.0 - 47.0 %    MCV 89 78 - 100 fL    MCH 30.7 26.5 - 33.0 pg    MCHC 34.3 31.5 - 36.5 g/dL    RDW 12.7 10.0 - 15.0 %    Platelet Count 250 150 - 450 10e3/uL    % Neutrophils 65 %    % Lymphocytes 24 %    % Monocytes 7 %    % Eosinophils 4 %    % Basophils 0 %    % Immature Granulocytes 0 %    Absolute Neutrophils 3.9 1.6 - 8.3 10e3/uL    Absolute Lymphocytes 1.5 0.8 - 5.3 10e3/uL    Absolute Monocytes 0.4 0.0 - 1.3 10e3/uL    Absolute Eosinophils 0.2 0.0 - 0.7 10e3/uL    Absolute Basophils 0.0 0.0 - 0.2 10e3/uL    Absolute Immature Granulocytes 0.0 <=0.0 10e3/uL

## 2021-09-03 NOTE — LETTER
September 3, 2021      Catie Nuñez  [address] [facility] [address]        Dear ,    At [facility], your health and wellness are our primary concern. That is why we are following up on your most recent Colposcopy.    Please call [phone] and press 1 to schedule an appointment for your recommended follow-up Pap smear and Human Papillomavirus (HPV) test at your earliest convenience due on or after 09/22/21.      If you have completed the appointment outside of the [facility] system, please have the records forwarded to our office. We will update your chart for your provider to review before your next annual wellness visit.     Thank you for choosing [facility]!      Sincerely,    Your [facility] Care Team

## 2021-09-12 ENCOUNTER — HEALTH MAINTENANCE LETTER (OUTPATIENT)
Age: 56
End: 2021-09-12

## 2021-09-15 ENCOUNTER — NURSE TRIAGE (OUTPATIENT)
Dept: FAMILY MEDICINE | Facility: CLINIC | Age: 56
End: 2021-09-15

## 2021-09-15 DIAGNOSIS — J20.9 ACUTE BRONCHITIS, UNSPECIFIED ORGANISM: Primary | ICD-10-CM

## 2021-09-15 RX ORDER — AZITHROMYCIN 250 MG/1
TABLET, FILM COATED ORAL
Qty: 6 TABLET | Refills: 0 | Status: SHIPPED | OUTPATIENT
Start: 2021-09-15 | End: 2021-09-20

## 2021-09-15 NOTE — TELEPHONE ENCOUNTER
"S-(situation): generalized chest tightness like when she has had bronchitis in the past but also having migraines    B-(background): pt was in UC about a week ago due to coughing up blood.  She states since then she continues to have chest heaviness but has migraines, fatigue, and not feeling well.  Pt does not report any heart symptoms    A-(assessment): patient could possibly have COVID or bronchitis but should be seen    R-(recommendations): routing to provider to advise as there are not openings and requesting second level triage    Luz Elena Avila RN, BSN         Answer Assessment - Initial Assessment Questions  1. LOCATION: \"Where does it hurt?\"        Generalized over chest area  2. RADIATION: \"Does the pain go anywhere else?\" (e.g., into neck, jaw, arms, back)      Does not radiate  3. ONSET: \"When did the chest pain begin?\" (Minutes, hours or days)       1 wk ago she was in urgent care due to coughing up blood and chest pain  4. PATTERN \"Does the pain come and go, or has it been constant since it started?\"  \"Does it get worse with exertion?\"       Constant heaviness in her chest   5. DURATION: \"How long does it last\" (e.g., seconds, minutes, hours)      1 week  6. SEVERITY: \"How bad is the pain?\"  (e.g., Scale 1-10; mild, moderate, or severe)     - MILD (1-3): doesn't interfere with normal activities      - MODERATE (4-7): interferes with normal activities or awakens from sleep     - SEVERE (8-10): excruciating pain, unable to do any normal activities        mild  7. CARDIAC RISK FACTORS: \"Do you have any history of heart problems or risk factors for heart disease?\" (e.g., angina, prior heart attack; diabetes, high blood pressure, high cholesterol, smoker, or strong family history of heart disease)      Mitral valve prolapse and heart murmur  8. PULMONARY RISK FACTORS: \"Do you have any history of lung disease?\"  (e.g., blood clots in lung, asthma, emphysema, birth control pills)      none  9. CAUSE: \"What do " "you think is causing the chest pain?\"      unsure  10. OTHER SYMPTOMS: \"Do you have any other symptoms?\" (e.g., dizziness, nausea, vomiting, sweating, fever, difficulty breathing, cough)        Dry cough, fatigue, migraines  11. PREGNANCY: \"Is there any chance you are pregnant?\" \"When was your last menstrual period?\"        n/a    Protocols used: CHEST PAIN-A-OH      "

## 2021-09-15 NOTE — TELEPHONE ENCOUNTER
If she is continuing to cough up blood, she needs to go to the ED.  If she is not, then can try ZPak, which I sent in.

## 2021-09-16 NOTE — TELEPHONE ENCOUNTER
Writer called patient and reviewed message per ABDOUL Avelar CNP, along with to which pharmacy medication sent.    Patient verbalized understanding and in agreement with plan.    Patient stated she is not coughing up blood.    SHAWANDA Meng, RN  [facility]

## 2021-09-27 ENCOUNTER — E-VISIT (OUTPATIENT)
Dept: FAMILY MEDICINE | Facility: CLINIC | Age: 56
End: 2021-09-27
Payer: COMMERCIAL

## 2021-09-27 DIAGNOSIS — J01.90 ACUTE SINUSITIS WITH SYMPTOMS > 10 DAYS: Primary | ICD-10-CM

## 2021-09-27 PROCEDURE — 99421 OL DIG E/M SVC 5-10 MIN: CPT | Performed by: PHYSICIAN ASSISTANT

## 2021-09-28 NOTE — PATIENT INSTRUCTIONS
Dear Catie Nuñez    After reviewing your responses, I've been able to diagnose you with?a sinus infection caused by bacteria.?     Based on your responses and diagnosis, I have prescribed augmentin to treat your symptoms. I have sent this to your pharmacy.?     It is also important to stay well hydrated, get lots of rest and take over-the-counter decongestants,?tylenol?or ibuprofen if you?are able to?take those medications per your primary care provider to help relieve discomfort.?     It is important that you take?all of?your prescribed medication even if your symptoms are improving after a few doses.? Taking?all of?your medicine helps prevent the symptoms from returning.?     If your symptoms worsen, you develop severe headache, vomiting, high fever (>102), or are not improving in 7 days, please contact your primary care provider for an appointment or visit any of our convenient Walk-in Care or Urgent Care Centers to be seen which can be found on our website?here.?     Thanks again for choosing?us?as your health care partner,?   ?  Pedro Aquino PA-C?   You may want to try a nasal lavage (also known as nasal irrigation). You can find over-the-counter products, such as Neti-Pot, at retail locations or make your own at home. Instructions for homemade nasal lavage and more information on the process are available online at http://www.aafp.org/afp/[email or web address]/1115/p1121.html.

## 2021-10-03 ENCOUNTER — OFFICE VISIT (OUTPATIENT)
Dept: URGENT CARE | Facility: URGENT CARE | Age: 56
End: 2021-10-03
Payer: COMMERCIAL

## 2021-10-03 ENCOUNTER — ANCILLARY PROCEDURE (OUTPATIENT)
Dept: GENERAL RADIOLOGY | Facility: CLINIC | Age: 56
End: 2021-10-03
Attending: PHYSICIAN ASSISTANT
Payer: COMMERCIAL

## 2021-10-03 ENCOUNTER — MYC MEDICAL ADVICE (OUTPATIENT)
Dept: FAMILY MEDICINE | Facility: CLINIC | Age: 56
End: 2021-10-03

## 2021-10-03 VITALS
SYSTOLIC BLOOD PRESSURE: 119 MMHG | BODY MASS INDEX: 19.49 KG/M2 | OXYGEN SATURATION: 98 % | DIASTOLIC BLOOD PRESSURE: 76 MMHG | RESPIRATION RATE: 18 BRPM | HEART RATE: 73 BPM | WEIGHT: 110 LBS | TEMPERATURE: 98.8 F | HEIGHT: 63 IN

## 2021-10-03 DIAGNOSIS — R05.9 COUGH: ICD-10-CM

## 2021-10-03 DIAGNOSIS — R05.9 COUGH: Primary | ICD-10-CM

## 2021-10-03 PROCEDURE — 99214 OFFICE O/P EST MOD 30 MIN: CPT | Performed by: PHYSICIAN ASSISTANT

## 2021-10-03 PROCEDURE — U0003 INFECTIOUS AGENT DETECTION BY NUCLEIC ACID (DNA OR RNA); SEVERE ACUTE RESPIRATORY SYNDROME CORONAVIRUS 2 (SARS-COV-2) (CORONAVIRUS DISEASE [COVID-19]), AMPLIFIED PROBE TECHNIQUE, MAKING USE OF HIGH THROUGHPUT TECHNOLOGIES AS DESCRIBED BY CMS-2020-01-R: HCPCS | Performed by: PHYSICIAN ASSISTANT

## 2021-10-03 PROCEDURE — U0005 INFEC AGEN DETEC AMPLI PROBE: HCPCS | Performed by: PHYSICIAN ASSISTANT

## 2021-10-03 PROCEDURE — 71046 X-RAY EXAM CHEST 2 VIEWS: CPT | Performed by: RADIOLOGY

## 2021-10-03 RX ORDER — ALBUTEROL SULFATE 90 UG/1
1-2 AEROSOL, METERED RESPIRATORY (INHALATION) EVERY 4 HOURS PRN
Qty: 18 G | Refills: 0 | Status: SHIPPED | OUTPATIENT
Start: 2021-10-03 | End: 2024-02-12

## 2021-10-03 RX ORDER — BENZONATATE 200 MG/1
200 CAPSULE ORAL 3 TIMES DAILY PRN
Qty: 21 CAPSULE | Refills: 0 | Status: SHIPPED | OUTPATIENT
Start: 2021-10-03 | End: 2021-10-10

## 2021-10-03 RX ORDER — INHALER, ASSIST DEVICES
SPACER (EA) MISCELLANEOUS
Qty: 1 EACH | Refills: 0 | Status: SHIPPED | OUTPATIENT
Start: 2021-10-03 | End: 2022-08-30

## 2021-10-03 ASSESSMENT — MIFFLIN-ST. JEOR: SCORE: 1058.09

## 2021-10-03 NOTE — PROGRESS NOTES
SUBJECTIVE:     Catie Nuñez is a 56 year old female presenting with a chief complaint of cough - productive.  Onset of symptoms was 2 month(s) ago.  Course of illness is same.    Severity mild  Current and Associated symptoms: cough - non-productive, shortness of breath and fatigue      Immunization History   Administered Date(s) Administered     COVID-19,PF,Pfizer 04/06/2021, 08/11/2021     Influenza Quad, Recombinant, pf(RIV4) (Flublok) 10/29/2019     TD (ADULT, 7+) 11/23/2020     TDAP Vaccine (Boostrix) 01/06/2015         Past Medical History:   Diagnosis Date     Abnormal Pap smear of cervix 11/07/2019    3/11/21     Anxiety      Arthritis 3 yrs ago     ASCUS with positive high risk HPV 6/2015,09/14/16    atypia on colp, 09/14/16: ASCUS + HR HPV 16 and other.      Cervical high risk HPV (human papillomavirus) test positive 11/07/2019    See problem list.     Depression      Depressive disorder Age 12     Fracture of great toe 02/20/2014     History of scoliosis      Hx of colposcopy with cervical biopsy 10/06/2016    10/06/16: [place] Bx NIL.     MVP (mitral valve prolapse)      Unexplained endometrial cells on cervical Pap smear 06/2015    thin endometrium on US     Current Outpatient Medications   Medication Sig Dispense Refill     ALPRAZolam (XANAX) 0.5 MG tablet TAKE 1/2 TO 1 TABLET BY MOUTH DAILY AS NEEDED (30 tablets to last 30 days) 30 tablet 0     amoxicillin-clavulanate (AUGMENTIN) 875-125 MG tablet Take 1 tablet by mouth 2 times daily for 7 days 14 tablet 0     Ascorbic Acid (VITAMIN C) 100 MG CHEW        Aspirin-Acetaminophen-Caffeine (EXCEDRIN PO) Take by mouth as needed       estradiol (ESTRACE) 1 MG tablet Take 0.5 tablets (0.5 mg) by mouth daily 90 tablet 3     fluticasone (FLONASE) 50 MCG/ACT nasal spray SHAKE LIQUID AND USE 1 TO 2 SPRAYS IN EACH NOSTRIL DAILY 16 g 11     medroxyPROGESTERone (PROVERA) 2.5 MG tablet Take 1 tablet (2.5 mg) by mouth daily 90 tablet 3     SUMAtriptan (IMITREX)  "50 MG tablet Take 1 tablet (50 mg) by mouth at onset of headache for migraine May repeat in 2 hours. Max 4 tablets/24 hours. 30 tablet 3     traMADol (ULTRAM) 50 MG tablet Take 1 tablet (50 mg) by mouth 2 times daily as needed for severe pain . 50 tablets to last 30 days. 50 tablet 2     venlafaxine (EFFEXOR-XR) 37.5 MG 24 hr capsule Take 1 capsule (37.5 mg) by mouth daily For 7 days then 2 capsules daily 60 capsule 1     ibuprofen (ADVIL/MOTRIN) 600 MG tablet Take 600 mg by mouth Take 1 tablet by mouth 4 times daily if needed. Maximum of 3200 mg in 24 hours. (Patient not taking: Reported on 9/3/2021)       Multiple Vitamins-Minerals (MULTIVITAMIN PO)  (Patient not taking: Reported on 9/3/2021)       QUEtiapine (SEROQUEL) 25 MG tablet Take 1 tablet (25 mg) by mouth At Bedtime (Patient not taking: Reported on 9/3/2021) 30 tablet 1     Social History     Tobacco Use     Smoking status: Never Smoker     Smokeless tobacco: Never Used   Substance Use Topics     Alcohol use: Yes     Comment: occ, rare        ROS:  Review of systems negative except as stated above.    OBJECTIVE:  /76   Pulse 73   Temp 98.8  F (37.1  C) (Oral)   Resp 18   Ht 1.6 m (5' 3\")   Wt 49.9 kg (110 lb)   LMP 09/01/2016 (LMP Unknown)   SpO2 98%   Breastfeeding No   BMI 19.49 kg/m    GENERAL APPEARANCE: healthy, alert and no distress  EYES: conjunctiva clear  HENT: ear canals and TM's normal.  Nose and mouth without ulcers, erythema or lesions  NECK: supple, nontender, no lymphadenopathy  RESP: lungs clear to auscultation - no rales, rhonchi or wheezes  CV: regular rates and rhythm, normal S1 S2, no murmur noted  NEURO: Normal strength and tone, sensory exam grossly normal,  normal speech and mentation  SKIN: no suspicious lesions or rashes    X-ray image initially interpreted in clinic by me in order to rule out pneumonia.  No evidence appreciated.      ASSESSMENT:  (R05.9) Cough  (primary encounter diagnosis)  Plan: Symptomatic " COVID-19 Virus (Coronavirus) by PCR        Nose, XR Chest 2 Views, albuterol (PROAIR         HFA/PROVENTIL HFA/VENTOLIN HFA) 108 (90 Base)         MCG/ACT inhaler, benzonatate (TESSALON) 200 MG         capsule, spacer (OPTICHAMBER TANYA) holding         chamber    Follow up with PCP with persistent symptoms    Covid-19  Pt was evaluated during a global COVID-19 pandemic, which necessitated consideration that the patient might be at risk for infection with the SARS-CoV-2 virus that causes COVID-19.   Applicable protocols for evaluation were followed during the patient's care.   COVID-19 was considered as part of the patient's evaluation. The plan for testing is:  a test was ordered during this visit.    No severe headache, chest pain, shortness of breath  No additional infectious symptoms  Rest, isolate for 10 days, hydrate, test, follow up if worsening or new symptoms  HH members to isolate until test results, if positive isolate for 2 weeks and follow up for testing if symptoms occur  Red flags and emergent follow up discussed, and understood by patient  Follow up with PCP if symptoms worsen or fail to improve    Surgical mask, gown, shield, hairnet, gloves worn by provider      Patient Instructions     Patient Education     Bronchospasm (Adult)    Bronchospasm occurs when the airways (bronchial tubes) go into spasm and contract. This makes it hard to breathe and causes wheezing (a high-pitched whistling sound). Bronchospasm can also cause frequent coughing without wheezing.  Bronchospasm is due to irritation, inflammation, or allergic reaction of the airways. People with asthma get bronchospasm. However, not everyone with bronchospasm has asthma.  Being exposed to harmful fumes, a recent case of bronchitis, exercise, or a flare-up of chronic obstructive pulmonary disease (COPD) may cause the airways to spasm. An episode of bronchospasm may last 7 to 14 days. Medicine may be prescribed to relax the airways and  prevent wheezing. Antibiotics will be prescribed only if your healthcare provider thinks there is a bacterial infection. Antibiotics do not help a viral infection.  Home care    Drink lots of water or other fluids (at least 10 glasses a day) during an attack. This will loosen lung secretions and make it easier to breathe. If you have heart or kidney disease, check with your doctor before you drink extra fluids.    Take prescribed medicine exactly at the times advised. If you take an inhaled medicine to help with breathing, don't use it more than once every 4 hours, unless told to do so. If prescribed an antibiotic or prednisone, take all of the medicine, even if you are feeling better after a few days.    Don't smoke. Also avoid being exposed to secondhand smoke.    If you were given an inhaler, use it exactly as directed. If you need to use it more often than prescribed, your condition may be getting worse. Contact your healthcare provider.  Follow-up care  Follow up with your healthcare provider, or as advised.  If you are age 65 or older, have a chronic lung disease or condition that affects your immune system, or you smoke, ask your healthcare provider about getting a pneumococcal vaccine, as well as a yearly flu shot (influenza vaccine).  When to seek medical advice  Call your healthcare provider right away if any of these occur:    You need to use your inhalers more often than usual    Fever of 100.4 F (38 C) or higher, or as directed by your healthcare provider    Cough that brings up lots of dark-colored sputum (mucus)    You don't get better within 24 hours  Call 911  Call 911 if any of these occur:    Coughing up bloody sputum (mucus)    Chest pain with each breath    Increased wheezing or shortness of breath  Ali last reviewed this educational content on 6/1/2018 2000-2021 [employer]. All rights reserved. This information is not intended as a substitute for professional medical  care. Always follow your healthcare professional's instructions.

## 2021-10-04 ENCOUNTER — MYC MEDICAL ADVICE (OUTPATIENT)
Dept: FAMILY MEDICINE | Facility: CLINIC | Age: 56
End: 2021-10-04

## 2021-10-04 DIAGNOSIS — R05.9 COUGH: Primary | ICD-10-CM

## 2021-10-04 RX ORDER — INHALER, ASSIST DEVICES
SPACER (EA) MISCELLANEOUS
Qty: 1 EACH | Refills: 0 | Status: SHIPPED | OUTPATIENT
Start: 2021-10-04 | End: 2022-01-29

## 2021-10-04 RX ORDER — ALBUTEROL SULFATE 90 UG/1
1-2 AEROSOL, METERED RESPIRATORY (INHALATION) EVERY 6 HOURS
Qty: 18 G | Refills: 0 | Status: SHIPPED | OUTPATIENT
Start: 2021-10-04 | End: 2022-01-29

## 2021-10-04 RX ORDER — BENZONATATE 200 MG/1
200 CAPSULE ORAL 3 TIMES DAILY PRN
Qty: 21 CAPSULE | Refills: 0 | Status: SHIPPED | OUTPATIENT
Start: 2021-10-04 | End: 2022-01-29

## 2021-10-04 NOTE — TELEPHONE ENCOUNTER
Left message on phone and sent My Chart message that refills have been sent by Adalberto.  Irasema Keller RN  [facility]

## 2021-10-04 NOTE — TELEPHONE ENCOUNTER
Spoke with Regine Lauren Restricted  for this Pt. [facility] Referral Rep will complete the requested referral information so Pt can receive her medications.    CHICHI Treviño [facility] Referral Rep

## 2021-10-05 LAB — SARS-COV-2 RNA RESP QL NAA+PROBE: NEGATIVE

## 2021-10-05 RX ORDER — BENZONATATE 200 MG/1
200 CAPSULE ORAL 3 TIMES DAILY PRN
Qty: 30 CAPSULE | Refills: 0 | Status: CANCELLED | OUTPATIENT
Start: 2021-10-05

## 2021-10-05 NOTE — TELEPHONE ENCOUNTER
Adalberto-Please review and advise/sign if agree:  1. Patient completed eVisit on 9/27/21 for sinus symptoms and is now reporting cough increasing in frequency and is more productive  2. Writer would recommend patient use Albuterol inhaler  3. Would it be reasonable for patient to try Tessalon?  Order pended  4. COVID-19 test in process as of today at 2182    Thank you!  ABIOLA MengN, RN  [facility]

## 2021-10-05 NOTE — TELEPHONE ENCOUNTER
I sent in some meds yesterday -- please follow up with Kimmie to see if she is still needing anything. I think she should be all set. Thanks!  Adalberto

## 2021-10-05 NOTE — TELEPHONE ENCOUNTER
Writer responded via Wish.    ABIOLA MengN, RN  [facility]

## 2021-10-17 DIAGNOSIS — F41.1 GAD (GENERALIZED ANXIETY DISORDER): ICD-10-CM

## 2021-10-17 DIAGNOSIS — F33.0 MAJOR DEPRESSIVE DISORDER, RECURRENT EPISODE, MILD (H): ICD-10-CM

## 2021-10-19 RX ORDER — VENLAFAXINE HYDROCHLORIDE 37.5 MG/1
CAPSULE, EXTENDED RELEASE ORAL
Qty: 60 CAPSULE | Refills: 1 | Status: SHIPPED | OUTPATIENT
Start: 2021-10-19 | End: 2021-11-01 | Stop reason: DRUGHIGH

## 2021-10-19 NOTE — TELEPHONE ENCOUNTER
Reason for call:  Medication   If this is a refill request, has the caller requested the refill from the pharmacy already? Yes  Will the patient be using a [facility]? No  Name of the pharmacy and phone number for the current request: [facility]: [phone]    Name of the medication requested: venlafaxine     Other request: Patient is currently out.  Message routed high priority.    Phone number to reach patient:  Home number on file [phone] (home)    Best Time:  ASAP    Can we leave a detailed message on this number?  Not Applicable    Travel screening: Not Applicable

## 2021-11-01 ENCOUNTER — VIRTUAL VISIT (OUTPATIENT)
Dept: FAMILY MEDICINE | Facility: CLINIC | Age: 56
End: 2021-11-01
Payer: COMMERCIAL

## 2021-11-01 DIAGNOSIS — F33.0 MAJOR DEPRESSIVE DISORDER, RECURRENT EPISODE, MILD (H): ICD-10-CM

## 2021-11-01 DIAGNOSIS — F41.1 GENERALIZED ANXIETY DISORDER: ICD-10-CM

## 2021-11-01 DIAGNOSIS — F11.90 CHRONIC, CONTINUOUS USE OF OPIOIDS: ICD-10-CM

## 2021-11-01 DIAGNOSIS — G89.4 CHRONIC PAIN SYNDROME: ICD-10-CM

## 2021-11-01 DIAGNOSIS — F41.1 GAD (GENERALIZED ANXIETY DISORDER): Primary | ICD-10-CM

## 2021-11-01 DIAGNOSIS — G44.86 CERVICOGENIC HEADACHE: ICD-10-CM

## 2021-11-01 DIAGNOSIS — M54.2 CERVICALGIA: ICD-10-CM

## 2021-11-01 PROCEDURE — 99214 OFFICE O/P EST MOD 30 MIN: CPT | Mod: 95 | Performed by: PHYSICIAN ASSISTANT

## 2021-11-01 RX ORDER — TRAMADOL HYDROCHLORIDE 50 MG/1
50 TABLET ORAL 2 TIMES DAILY PRN
Qty: 50 TABLET | Refills: 2 | Status: SHIPPED | OUTPATIENT
Start: 2021-11-01 | End: 2022-01-10

## 2021-11-01 RX ORDER — ALPRAZOLAM 0.5 MG
TABLET ORAL
Qty: 30 TABLET | Refills: 2 | Status: SHIPPED | OUTPATIENT
Start: 2021-11-01 | End: 2022-01-10

## 2021-11-01 RX ORDER — VENLAFAXINE HYDROCHLORIDE 75 MG/1
150 CAPSULE, EXTENDED RELEASE ORAL DAILY
Qty: 180 CAPSULE | Refills: 0 | Status: SHIPPED | OUTPATIENT
Start: 2021-11-01 | End: 2021-11-01 | Stop reason: DRUGHIGH

## 2021-11-01 NOTE — PROGRESS NOTES
Kimmie is a 56 year old who is being evaluated via a billable video visit.      How would you like to obtain your AVS? MyChart  If the video visit is dropped, the invitation should be resent by: Text to cell phone: [phone]  Will anyone else be joining your video visit? No      Assessment & Plan     DAYLIN (generalized anxiety disorder)  Major depressive disorder, recurrent episode, mild (H)  Chronic pain syndrome  Cervicogenic headache  Cervicalgia  Chronic, continuous use of opioids  Generalized anxiety disorder  Increase dose of Effexor to 150 mg daily. This does seem to be helping with mood. Hopeful it will provide some relief of pain and migraines however Kimmie is motivated to schedule a nerve block and give that treatment modality a chance. Follow up in 3 months for recheck or sooner if any new or worsening symptoms.   - traMADol (ULTRAM) 50 MG tablet; Take 1 tablet (50 mg) by mouth 2 times daily as needed for severe pain . 50 tablets to last 30 days.  - venlafaxine (EFFEXOR-XR) 75 MG 24 hr capsule; Take 2 capsules (150 mg) by mouth daily  - ALPRAZolam (XANAX) 0.5 MG tablet; TAKE 1/2 TO 1 TABLET BY MOUTH DAILY AS NEEDED (30 tablets to last 30 days)    Return in about 3 months (around 2/1/2022).    Adalberto Mercedes PA-C  [facility]   Kimmie is a 56 year old who presents for the following health issues     HPI     Pain History:  When did you first notice your pain? - More than 6 weeks   Have you seen this provider for your pain in the past?   Yes   Where in your body do you have pain? Neck, shoulders and head   Are you seeing anyone else for your pain? No      PHQ-9 SCORE 4/20/2021 5/28/2021 8/11/2021   PHQ-9 Total Score MyChart - 2 (Minimal depression) -   PHQ-9 Total Score 4 2 3       DAYLIN-7 SCORE 4/20/2021 5/28/2021 8/11/2021   Total Score - - -   Total Score - 6 (mild anxiety) -   Total Score 14 6 2       PEG Score 11/1/2021   PEG Total Score 5.33       Chronic Pain Follow  Up:    Location of pain: neck head sholders   Analgesia/pain control:    - Recent changes:  More frequent migrains in the past few weeks     - Overall control: Tolerable with discomfort    - Current treatments: taking medications    Adherence:     - Do you ever take more pain medicine than prescribed? No    - When did you take your last dose of pain medicine?  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 Seems like migraines are more frequent. Trying not to overuse the Imitrex but admits it hard not to. Thinking long term she will arrange for a nerve block. Following with Dr. Han. Worried about not being able to take Excedrin for the 6 days preceding injection. Also planning to follow up with acupuncture. Was started on Effexor as well to help with headaches/pain/mood. Still taking Alprazolam for acute anxiety and tramadol for chronic pain.   Insomnia - recently tried Seroquel - did not like side effects. Groggy and woozy. Felt medicated the next day. Unpleasant.    Mitral valve prolapse  Depression / anxiety - Some improvement since stopping Zoloft and starting Effexor. Currently taking 75 mg daily.       Review of Systems   Constitutional, HEENT, cardiovascular, pulmonary, gi and gu systems are negative, except as otherwise noted.      Objective           Vitals:  No vitals were obtained today due to virtual visit.    Physical Exam   Visit conducted over telephone today  Normal mood, pleasant             Telephone call duration: 15 minutes

## 2021-11-02 RX ORDER — VENLAFAXINE HYDROCHLORIDE 75 MG/1
150 CAPSULE, EXTENDED RELEASE ORAL DAILY
Qty: 180 CAPSULE | Refills: 0 | Status: SHIPPED | OUTPATIENT
Start: 2021-11-02 | End: 2022-03-22

## 2021-11-02 NOTE — PROGRESS NOTES
"  Psychiatry Clinic Progress Note                                                                   Catie Nuñez is a 53 year old female who returns to the clinic for follow-up care.  Patient was 15 minutes late for appointment.    Therapist: None  PCP: Yasmin Velazquez  Other Providers: None    Pertinent Background:  See previous notes.  Psych critical item history includes trauma hx and using Xanax for past 4 years.     Interim History                                                                                                        4, 4     The patient is a fair historian, reports good treatment adherence and was last seen 8/29/18.  Since the last visit, Catie reports she continues to experience migraine and neck pain.  She reports she is concerned that there is not a plan in place to help her manage her chronic pain.  She stated she did not take Gabapentin since last appointment due misunderstanding indication of medication.  Did successfully titrate Xanax to 0.25mg BID without significant increase in anxiety.  Continues to report need for xanax is due to panic she experiences in regards to possible future pain or headaches.  Notified Catie that being late to appointments does not allow for more in-depth conversation of possible pharmacological and non-pharmacological interventions    8/29/18:  Catie stopped taking Buspar due to nausea, lightheadedness, and feeling \"foggy/medicated.\"  Propranolol was stopped due to being ineffective.  Both trials lasted approximately a week. Not interested in higher dose.  Catie reports her generalized anxiety is well managed with Sertraline.  Her periods of intense anxiety stems from headaches and occipital nerve neuralgia.  No concerns with depression.      Catie reports she is exercising regularly, doing yoga, and focusing on diet as means to manage anxiety.      Does not need reorder of Sertraline today    Recent Symptoms:   Depression:  low energy " "and insomnia.  Elevated:  none  Psychosis:  none  Anxiety:  excessive worry, feeling fearful and nervous/overwhelmed  Panic Attack:  none  Trauma Related:  intrusive memories, nightmares, avoidance, trauma trigger psychological / physiological response, negative beliefs / emotions, startle response and hypervigilance     Recent Substance Use:  No changes reported        Social/ Family History                                  [per patient report]                                 1ea,1ea   FINANCIAL SUPPORT- working     Artist (illustrator, [employer]) and retail  FEELS SAFE AT HOME- Yes    Medical / Surgical History                                                                                                                  Patient Active Problem List   Diagnosis     CARDIOVASCULAR SCREENING; LDL GOAL LESS THAN 160     Adhesive capsulitis of shoulder     Scoliosis     Shoulder impingement     Generalized anxiety disorder     Pulmonary nodule     Migraine without aura and without status migrainosus, not intractable     Controlled substance agreement signed     ASCUS Pap + high risk HPV, + 16, + \"other\"      Papanicolaou smear of cervix with low grade squamous intraepithelial lesion (LGSIL)     Acute seasonal allergic rhinitis, unspecified trigger       No past surgical history on file.     Medical Review of Systems                                                                                                    2,10   The remainder of the review of systems is noncontributory  Migraine pain  Allergy                                Ondansetron and Sulfa drugs  Current Medications                                                                                                       Current Outpatient Prescriptions   Medication Sig Dispense Refill     ALPRAZolam (XANAX) 0.5 MG tablet Take 1/2 tablet (0.25mg) in the morning and 1/2 tablet (0.25mg) in the evening 30 tablet 0     Aspirin-Acetaminophen-Caffeine (EXCEDRIN PO) " "Take by mouth as needed       estradiol (ESTRACE) 1 MG tablet Take 0.5 tablets (0.5 mg) by mouth daily 90 tablet 1     fluticasone (FLONASE) 50 MCG/ACT spray Spray 1-2 sprays into both nostrils daily 3 Bottle 3     gabapentin (NEURONTIN) 100 MG capsule Take 1 capsule (100 mg) by mouth 3 times daily 90 capsule 0     ibuprofen (ADVIL/MOTRIN) 600 MG tablet Take 600 mg by mouth Take 1 tablet by mouth 4 times daily if needed. Maximum of 3200 mg in 24 hours.       medroxyPROGESTERone (PROVERA) 2.5 MG tablet Take 1 tablet (2.5 mg) by mouth daily 90 tablet 3     Multiple Vitamins-Minerals (MULTIVITAMIN PO)        riboflavin 400 MG CAPS Take 400 mg by mouth every morning 30 capsule 0     sertraline (ZOLOFT) 100 MG tablet Take 1.5 tablets (150 mg) by mouth daily 135 tablet 1     traMADol (ULTRAM) 50 MG tablet Take 1 tablet (50 mg) by mouth 2 times daily as needed for severe pain . 45 tablets to las 30 days. 45 tablet 0     traMADol (ULTRAM) 50 MG tablet Take 1 tablet (50 mg) by mouth 2 times daily as needed for severe pain . 45 tablets to last 30 days. 45 tablet 0     [START ON 10/17/2018] traMADol (ULTRAM) 50 MG tablet Take 1 tablet (50 mg) by mouth 2 times daily as needed for severe pain . 45 tablets to last 30 days. 45 tablet 0     Vitals                                                                                                                       3, 3   /85  Pulse 84  Wt 52.1 kg (114 lb 12.8 oz)  LMP 09/01/2016  BMI 20.02 kg/m2   Mental Status Exam                                                                                    9, 14 cog gs     Alertness: alert  and oriented  Appearance: casually groomed  Behavior/Demeanor: cooperative, pleasant and calm, with good  eye contact   Speech: normal  Language: no obvious problem  Psychomotor: normal or unremarkable  Mood: \"ok\"  Affect: appropriate; was congruent to mood; was congruent to content  Thought Process/Associations: unremarkable  Thought Content:  " (1) body pink, extremities blue Reports none;  Denies suicidal ideation and violent ideation  Perception:  Reports none;  Denies auditory hallucinations and visual hallucinations  Insight: adequate  Judgment: adequate for safety  Cognition: (6) does  appear grossly intact; formal cognitive testing was not done  Gait/Station and/or Muscle Strength/Tone: unremarkable    Labs and Data                                                                                                                 Rating Scales:    PHQ9    PHQ9 Today:  2  PHQ-9 SCORE 5/19/2017 1/9/2018 8/1/2018   Total Score 5 5 8         Diagnosis and Assessment                                                                             m2, h3     Today the following issues were addressed:    1) Generalized Anxiety Disorder  2) PTSD     [place] Prescription Monitoring Program [] was checked today:  indicates filling both Xanax and Tramadol on monthly basis for past year.  At times it does appear that she is filling earlier than 30 days..     PSYCHOTROPIC DRUG INTERACTIONS: Tramadol-Xanax: Concurrent use of TRAMADOL and CNS DEPRESSANTS may result in increased risk of respiratory and CNS depression.  Buspar-Tramadol:  Concurrent use of TRAMADOL and SEROTONERGIC CNS DEPRESSANTS may result in increased risk of serotonin syndrome; increased risk of respiratory and CNS depression. FEMHRT-Xanax: Concurrent use of ALPRAZOLAM and CONTRACEPTIVES, COMBINATION may result in an increased risk of alprazolam toxicity (CNS depression, hypotension). .    Plan                                                                                                                    m2, h3      1) Medication Management  Continue Xanax taper:  Take 0.25mg in the morning and 0.25mg in the evening  Continue Sertraline 150mg daily  Start Gabapentin 100mg TID      2) Therapy  Individual psychotherapy focusing on past trauma and current anxiety is highly recommended      RTC: 1 month    CRISIS NUMBERS:   Provided  routinely in AVS.    Treatment Risk Statement:  The patient understands the risks, benefits, adverse effects and alternatives. Agrees to treatment with the capacity to do so. No medical contraindications to treatment. Agrees to call clinic for any problems. The patient understands to call 911 or go to the nearest ED if life threatening or urgent symptoms occur.      PROVIDER:  JEREMY Ibarra CNP

## 2021-11-15 ENCOUNTER — OFFICE VISIT (OUTPATIENT)
Dept: OBGYN | Facility: CLINIC | Age: 56
End: 2021-11-15
Payer: COMMERCIAL

## 2021-11-15 VITALS
DIASTOLIC BLOOD PRESSURE: 80 MMHG | SYSTOLIC BLOOD PRESSURE: 132 MMHG | WEIGHT: 116 LBS | TEMPERATURE: 98.7 F | HEART RATE: 78 BPM | BODY MASS INDEX: 20.55 KG/M2

## 2021-11-15 DIAGNOSIS — R87.613 HSIL (HIGH GRADE SQUAMOUS INTRAEPITHELIAL LESION) ON PAP SMEAR OF CERVIX: Primary | ICD-10-CM

## 2021-11-15 PROCEDURE — 87624 HPV HI-RISK TYP POOLED RSLT: CPT | Performed by: OBSTETRICS & GYNECOLOGY

## 2021-11-15 PROCEDURE — 88175 CYTOPATH C/V AUTO FLUID REDO: CPT | Performed by: OBSTETRICS & GYNECOLOGY

## 2021-11-15 PROCEDURE — 99213 OFFICE O/P EST LOW 20 MIN: CPT | Performed by: OBSTETRICS & GYNECOLOGY

## 2021-11-15 NOTE — PROGRESS NOTES
GYN CLINIC VISIT  OB GYN CLINIC VISIT  11/15/2021  CC: pap smear    HPI:  Catie Nuñez is a 56 year old  postmenopausal female who presents for repeat pap smear.     Pap history as follows -     2015: ASCUS, + HPV 16 & other HR HPV with unexplained endometrial cells. Needs colp and endo bx, US also planned. Tracking started.  7/7/15: [place] - atypia, no endo bx needed as US showed thin endometrium. Plan cotest pap & HPV in 1 year  16: ASCUS + HR HPV 16 and other. Plan [place] per provider  10/06/16: [place] Bx NIL. Plan Cotest in 1 year per provider.  17 LSIL, +HPV 16 & other HR type. Plan: [place] per guidelines.  18: [place] ECC benign neg for dysplasia. Plan cotest in 1 year.   19 NIL pap, + HR HPV 16. Plan: colpo  04/3/19: [place] ECC benign. Plan pap in 1 year.   19: LSIL Pap, + HR HPV 16 result. Plan cotest in 1 year, if next one is abnormal [place] at that time.   10/27/20 [employer] reminder sent -- Read  20 Virtual FP Visit -- Note added  20 Lost to follow-up for pap tracking  3/11/21 HSIL pap, LSIL also present, Neg HPV. Plan colp due bef 21. If pt prefers immediate LEEP without colpo first, provider is okay with that plan. Pt changed mind to    3/18/21 Pt notified and wants to schedule colp first >> 21 Pt called and is concerned about colpo being 1 month out - pt notified of recommendation for colp within 3 months but also offered to send message to Dr. Alarcon about possibly working her in sooner - Tele enc sent to provider  21 [place] Bx and ECC Neg for Dysplasia, Plan LEEP now per pt's preference due bef 21.  Provider released results and recommendations to the pt through [employer], pt viewed.  21 Phone call to the pt, new plan see pt's preference above.   21 Per clinic RN, Pt canceled her LEEP and will now come back for a cotest due by 21.   09/3/21 Reminder Mychart, Letter  10/4/21 Reminder call-vm full  10/6/21 Reminder  call-lm  11/15/21 pap scheduled    Vitals:    11/15/21 1317   BP: 132/80   Pulse: 78   Temp: 98.7  F (37.1  C)   TempSrc: Oral   Weight: 52.6 kg (116 lb)     Gen: alert, oriented, no distress,  pleasant, appears stated age,  groomed  : normal external genitalia without lesions or erythema; normal, well supported urethra, normal Bartholins, normal Skenes; normal pink rugated vaginal mucosa, no lesions or abnormal discharge; medium cornelio speculum inserted without difficulty; normal appearing cervix without lesions, bleeding or discharge.   Extr: warm, well perfused, nontender, no edema  Psych: affect bright, cooperative, responds appropriately      56 year old  with h/o HSIL pap followed by normal colposcopy biopsies negative for dysplasia here for follow up diagnostic pap.     1. HSIL (high grade squamous intraepithelial lesion) on Pap smear of cervix  If pap is NIL and neg HR HPV plan cotest in 12 months  If pap or HPV abnormal recommend colposcopy  - Pap diagnostic with HPV  - HPV Hold (Lab Only)    Chio Sanchez MD

## 2021-11-16 ENCOUNTER — MYC MEDICAL ADVICE (OUTPATIENT)
Dept: FAMILY MEDICINE | Facility: CLINIC | Age: 56
End: 2021-11-16
Payer: COMMERCIAL

## 2021-11-17 LAB
BKR LAB AP GYN ADEQUACY: NORMAL
BKR LAB AP GYN INTERPRETATION: NORMAL
BKR LAB AP HPV REFLEX: NORMAL
BKR LAB AP PREVIOUS ABNL DX: NORMAL
BKR LAB AP PREVIOUS ABNORMAL: NORMAL
PATH REPORT.COMMENTS IMP SPEC: NORMAL
PATH REPORT.COMMENTS IMP SPEC: NORMAL
PATH REPORT.RELEVANT HX SPEC: NORMAL

## 2021-11-18 NOTE — TELEPHONE ENCOUNTER
Writer called [facility] and was unable to continue to hold for a staff member.  Recall on 11/19/21.    ABIOLA MengN, RN  [facility]

## 2021-11-18 NOTE — TELEPHONE ENCOUNTER
"Adalberto-     OK to give verbal orders for eye drops?     \"I have had a prescription filled by my eye dr, [facility], [address], [place], for an eye drop to assist with my peripheral vision issues.  Unfortunately, my idiotic insurance will not cover it without Dr. Mercedes signing off on it.  The script is now sitting at [facility], [address] [place] [address]., [phone].  Thank you for your assistance with this!!  Kimmie\"    Areli Mclean, ABIOLAN RN  [facility]    "

## 2021-11-19 ENCOUNTER — PATIENT OUTREACH (OUTPATIENT)
Dept: OBGYN | Facility: CLINIC | Age: 56
End: 2021-11-19
Payer: COMMERCIAL

## 2021-11-19 LAB
HUMAN PAPILLOMA VIRUS 16 DNA: POSITIVE
HUMAN PAPILLOMA VIRUS 18 DNA: NEGATIVE
HUMAN PAPILLOMA VIRUS FINAL DIAGNOSIS: ABNORMAL
HUMAN PAPILLOMA VIRUS OTHER HR: NEGATIVE

## 2021-11-19 NOTE — TELEPHONE ENCOUNTER
Verbal order for Brimonidine under Adalberto Mercedes given to [facility] pharmacy staff.    Message sent to john Owen RN  [facility]

## 2021-11-19 NOTE — TELEPHONE ENCOUNTER
11/15/21 NIL Pap, + HR HPV type 16. Plan [place] per provider's office note, due bef 02/15/22

## 2021-12-15 ENCOUNTER — VIRTUAL VISIT (OUTPATIENT)
Dept: PSYCHIATRY | Facility: CLINIC | Age: 56
End: 2021-12-15
Payer: COMMERCIAL

## 2021-12-15 DIAGNOSIS — F41.1 GAD (GENERALIZED ANXIETY DISORDER): ICD-10-CM

## 2021-12-15 DIAGNOSIS — F33.0 MAJOR DEPRESSIVE DISORDER, RECURRENT EPISODE, MILD (H): Primary | ICD-10-CM

## 2021-12-15 PROCEDURE — 99214 OFFICE O/P EST MOD 30 MIN: CPT | Mod: 95 | Performed by: NURSE PRACTITIONER
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
## 2021-12-16 ENCOUNTER — MYC MEDICAL ADVICE (OUTPATIENT)
Dept: FAMILY MEDICINE | Facility: CLINIC | Age: 56
End: 2021-12-16
Payer: COMMERCIAL

## 2021-12-16 ENCOUNTER — TELEPHONE (OUTPATIENT)
Dept: OBGYN | Facility: CLINIC | Age: 56
End: 2021-12-16
Payer: COMMERCIAL

## 2021-12-16 NOTE — TELEPHONE ENCOUNTER
Patient called the clinic to schedule a colposcopy. Appointment scheduled, instructions sent via [employer].   Maria De Jesus Wood RN

## 2022-01-07 ENCOUNTER — TELEPHONE (OUTPATIENT)
Dept: PSYCHIATRY | Facility: CLINIC | Age: 57
End: 2022-01-07
Payer: COMMERCIAL

## 2022-01-07 NOTE — TELEPHONE ENCOUNTER
Reason for call:  Symptom     Symptom or request: request    Duration (how long have symptoms been present): since starting the venlafaxine    Have you been treated for this before? Yes    Additional comments: Pt states that since starting the venlafaxine their migranes have been worse intensity, lasted longer and been more frequent.     Phone number to reach patient:  Home number on file [phone] (home)    Best Time:  Today    Can we leave a detailed message on this number?  YES    Travel screening: Not Applicable    [id number] - [address]  [phone]

## 2022-01-10 ENCOUNTER — VIRTUAL VISIT (OUTPATIENT)
Dept: FAMILY MEDICINE | Facility: CLINIC | Age: 57
End: 2022-01-10
Payer: COMMERCIAL

## 2022-01-10 DIAGNOSIS — G89.4 CHRONIC PAIN SYNDROME: ICD-10-CM

## 2022-01-10 DIAGNOSIS — G44.86 CERVICOGENIC HEADACHE: ICD-10-CM

## 2022-01-10 DIAGNOSIS — M54.2 CERVICALGIA: ICD-10-CM

## 2022-01-10 DIAGNOSIS — F11.90 CHRONIC, CONTINUOUS USE OF OPIOIDS: ICD-10-CM

## 2022-01-10 DIAGNOSIS — F41.1 GENERALIZED ANXIETY DISORDER: ICD-10-CM

## 2022-01-10 PROCEDURE — 99214 OFFICE O/P EST MOD 30 MIN: CPT | Mod: 95 | Performed by: FAMILY MEDICINE

## 2022-01-10 RX ORDER — TRAMADOL HYDROCHLORIDE 50 MG/1
50 TABLET ORAL 2 TIMES DAILY PRN
Qty: 50 TABLET | Refills: 2 | Status: SHIPPED | OUTPATIENT
Start: 2022-01-28 | End: 2022-04-11

## 2022-01-10 RX ORDER — ALPRAZOLAM 0.5 MG
TABLET ORAL
Qty: 30 TABLET | Refills: 2 | Status: SHIPPED | OUTPATIENT
Start: 2022-01-28 | End: 2022-04-11

## 2022-01-10 NOTE — PROGRESS NOTES
Kimmie is a 56 year old who is being evaluated via a billable telephone visit.      Assessment & Plan     Generalized anxiety disorder  -Xanax for acute panic attacks  -Continue seeing psychiatry, other med changes per psych  - ALPRAZolam (XANAX) 0.5 MG tablet  Dispense: 30 tablet; Refill: 2  -Follow-up in 3 months  -PDMP reviewed no concerning activity    Chronic pain syndrome  Cervicogenic headache  Cervicalgia  Chronic, continuous use of opioids  -Symptoms stable with tramadol  -Follow-up in 3 months  -PDMP reviewed no concerning activity  - traMADol (ULTRAM) 50 MG tablet  Dispense: 50 tablet; Refill: 2      Iglesia Michelle   [facility]   Kimmie is a 56 year old who presents for the following health issues     HPI     New patient to me. Usually sees BEATRIZ Dalton.     History of chronic pain and migraines. Takes tramadol about twice a day for pain. Has been stable on this. Also takes Imitrex prn for migraines.     Hx of panic attacks. Worse when she has migraines. Takes xanax about once a day with relief of acute attacks.    Pt also follows with psychiatry.     Review of Systems         Objective       Vitals:  No vitals were obtained today due to virtual visit.    Physical Exam   healthy, alert and no distress  PSYCH: Alert and oriented times 3; coherent speech, normal   rate and volume, able to articulate logical thoughts, able   to abstract reason, no tangential thoughts, no hallucinations   or delusions  Her affect is normal  RESP: No cough, no audible wheezing, able to talk in full sentences  Remainder of exam unable to be completed due to telephone visits          Phone call duration: 7 minutes

## 2022-01-10 NOTE — TELEPHONE ENCOUNTER
Kristal Farias NP  P Psych Rn - [facility]  Caller: Unspecified (3 days ago,  2:47 PM)  Decrease venlafaxine to 75 mg daily and check blood pressure

## 2022-01-24 ENCOUNTER — OFFICE VISIT (OUTPATIENT)
Dept: OBGYN | Facility: CLINIC | Age: 57
End: 2022-01-24
Payer: COMMERCIAL

## 2022-01-24 VITALS
DIASTOLIC BLOOD PRESSURE: 88 MMHG | WEIGHT: 114 LBS | SYSTOLIC BLOOD PRESSURE: 130 MMHG | HEART RATE: 79 BPM | BODY MASS INDEX: 20.19 KG/M2

## 2022-01-24 DIAGNOSIS — R87.810 PAP SMEAR OF CERVIX SHOWS HIGH RISK HPV PRESENT: Primary | ICD-10-CM

## 2022-01-24 PROCEDURE — 57454 BX/CURETT OF CERVIX W/SCOPE: CPT | Performed by: OBSTETRICS & GYNECOLOGY

## 2022-01-24 PROCEDURE — 88342 IMHCHEM/IMCYTCHM 1ST ANTB: CPT | Performed by: PATHOLOGY

## 2022-01-24 PROCEDURE — 88305 TISSUE EXAM BY PATHOLOGIST: CPT | Performed by: PATHOLOGY

## 2022-01-24 NOTE — PROGRESS NOTES
"GYN CLINIC VISIT  2022  CC: COLPOSCOPY    HPI:   Catie Nuñez \"\"Kimmie\" is a 56 year old postmenopausal  female who presents for repeat colposcopy, referred by Chio Sanchez. Pap smear on 11/15/2021 showed: normal and with high risk HPV present: +.     PAP HISTORY:  2015: ASCUS, + HPV 16 & other HR HPV with unexplained endometrial cells. Needs colp and endo bx, US also planned.  7/7/15: [place] - atypia, no endo bx needed as US showed thin endometrium. Plan cotest pap & HPV in 1 year  16: ASCUS + HR HPV 16 and other. Plan [place] per provider  10/06/16: [place] Bx NIL. Plan Cotest in 1 year per provider.  17 LSIL, +HPV 16 & other HR type. Plan: [place] per guidelines.  18: [place] ECC benign neg for dysplasia. Plan cotest in 1 year.   19 NIL pap, + HR HPV 16. Plan: colpo  04/3/19: [place] ECC benign. Plan pap in 1 year.   19: LSIL Pap, + HR HPV 16 result. Plan cotest in 1 year, if next one is abnormal [place] at that time.   10/27/20 [email or web address] reminder sent -- Read  20 Virtual FP Visit -- Note added  20 Lost to follow-up for pap tracking  3/11/21 HSIL pap, LSIL also present, Neg HPV. Plan colp due bef 21. If pt prefers immediate LEEP without colpo first, provider is okay with that plan. Pt changed mind to    3/18/21 Pt notified and wants to schedule colp first >> 21 Pt called and is concerned about colpo being 1 month out - pt notified of recommendation for colp within 3 months but also offered to send message to Dr. Alarcon about possibly working her in sooner - Tele enc sent to provider  21 [place] Bx and ECC Neg for Dysplasia, Plan LEEP now per pt's preference due bef 21.  Provider released results and recommendations to the pt through [employer], pt viewed.  21 Phone call to the pt, new plan see pt's preference above.   21 Per clinic RN, Pt canceled her LEEP and will now come back for a cotest due by 21.   11/15/21 NIL Pap, + HR HPV type 16. Plan " [place] per provider's office note, due bef 02/15/22.  11/19/21 Pt was advised.  01/24/22 [place] scheduled    Patient's last menstrual period was 09/01/2016 (lmp unknown).  UPT today is postmenopausal  Patient does not smoke  Type of contraception: none  Age at first sexual intercourse: 32  Number of sexual partners (lifetime): 6  Past GYN history: HPV  Prior cervical/vaginal disease: HPV related changes.  Prior cervical treatment: no treatment.    Vitals:    01/24/22 0918   BP: 130/88   Pulse: 79   Weight: 51.7 kg (114 lb)       PROCEDURE:  Before the procedure, it was ensured that the patient was educated regarding the nature of her findings to date, the implications, and what was to be done. She has been made aware of the role of HPV, the natural history of infection, ways to minimize her future risk, the effect of HPV on the cervix, and treatment options available should they be indicated. The details of the colposcopic procedure were reviewed. All questions were answered before proceeding, and informed consent was therefore obtained.      Speculum placed in vagina and excellent visualization of cervix acheived, cervix swabbed x 3 with acetic acid solution.      FINDINGS:  Cervix: acetowhitening noted at 4 and 8 o'clok  SCJ seen?: yes   ECC done?: Yes   Satisfactory examination?: yes  Representative biopsies taken at 4 and 8 o'clock. ECC performed with endocervical curette followed by cytobrush. Specimens labeled and sent to pathology. monsels applied to achieve hemostasis. Patient tolerated procedure well.     ASSESSMENT: HPV related changes.    PLAN:   1. Pap smear of cervix shows high risk HPV present  - Surgical Pathology Exam  - COLP CERVIX/UPPER VAGINA W BX CERVIX/ENDOCERV CURETT    specimens labelled and sent to Pathology, will base further treatment on Pathology findings, treatment options discussed with patient, post biopsy instructions given to patient and call to discuss Pathology results      Chio ERNST  MD Daniel

## 2022-01-25 LAB
PATH REPORT.COMMENTS IMP SPEC: NORMAL
PATH REPORT.FINAL DX SPEC: NORMAL
PATH REPORT.GROSS SPEC: NORMAL
PATH REPORT.MICROSCOPIC SPEC OTHER STN: NORMAL
PATH REPORT.MICROSCOPIC SPEC OTHER STN: NORMAL
PATH REPORT.RELEVANT HX SPEC: NORMAL
PHOTO IMAGE: NORMAL

## 2022-01-26 ENCOUNTER — PATIENT OUTREACH (OUTPATIENT)
Dept: OBGYN | Facility: CLINIC | Age: 57
End: 2022-01-26
Payer: COMMERCIAL

## 2022-01-26 ENCOUNTER — TELEPHONE (OUTPATIENT)
Dept: OBGYN | Facility: CLINIC | Age: 57
End: 2022-01-26
Payer: COMMERCIAL

## 2022-01-26 DIAGNOSIS — R87.613 HIGH GRADE SQUAMOUS INTRAEPITHELIAL LESION OF CERVIX: Primary | ICD-10-CM

## 2022-01-26 NOTE — TELEPHONE ENCOUNTER
01/24/22 [place] Bx VLADIMIR 1, ECC scant small fragments of ectocervical squamous mucosa with non gradable dysplasia, area worrisome for high-grade dysplasia. Plan LEEP due bef 04/24/22.

## 2022-01-26 NOTE — TELEPHONE ENCOUNTER
I called Kimmie to discuss pathology from colposcopy. Discussed with p16 positive stain I am concerned for high grade lesion that I missed with the other biopsies. I recommend LEEP . Discussed procedure. She would prefer this be done in OR with sedation. Order placed  Chio Sanchez MD

## 2022-01-26 NOTE — TELEPHONE ENCOUNTER
Catie Nuñez is a 56 year old who underwent colposcopy on 1/24/2022. I tried to call patient to follow up to see how she is feeling after the procedure and to discuss pathology.    Biopsy showed VLADIMIR 1 on biopsy with negative ECC but possible high grade dysplasia based on positive p16 staining.     Recommend LEEP.     I tried calling patient to discuss but no answer, left voicemail.     Chio Sanchez MD         Pathology showed   A. Cervix,4:00 , biopsy-  -Cervical squamous epithelium with acute inflammation and reactive changes  -Negative for dysplasia or malignancy  -Sampling includes ectocervix and endocervix        B. Cervix, 8:00 , biopsy-  -Low-grade squamous intraepithelial lesion/VLADIMIR 1/Mild dysplasia  -Sampling includes ectocervix and endocervix  -Negative for high grade dysplasia or malignancy        C. Endocervix, curettings-  -Scant small fragments of ectocervical squamous mucosa with Non Gradable dysplasia (please see comment)  -Benign endocervical glandular mucosa without dysplasia or malignancy            Electronically signed by Emmie Lea MD on 1/25/2022 at  2:54 PM   Comment  [facility]   Scant small fragments of unoriented non-full-thickness ectocervical squamous mucosa is present in  specimen C showing dysplastic features.  The fragmented, and unoriented nature of the fragment precludes an accurate grading of dysplasia.  A p16 immunostain is performed which shows focal diffuse strong staining, worrisome for high-grade dysplasia.  Clinical correlation and follow-up is recommended.         Chio Sanchez MD

## 2022-01-26 NOTE — TELEPHONE ENCOUNTER
TC from [facility] to discuss pathology. Would appreciate a call back from . Amenable to phone call later this afternoon or evening.  Ashley Ling RN

## 2022-01-28 ENCOUNTER — TELEPHONE (OUTPATIENT)
Dept: OBGYN | Facility: CLINIC | Age: 57
End: 2022-01-28
Payer: COMMERCIAL

## 2022-01-28 DIAGNOSIS — Z01.818 PRE-OP EXAM: Primary | ICD-10-CM

## 2022-01-28 DIAGNOSIS — R87.613 HIGH GRADE SQUAMOUS INTRAEPITHELIAL LESION OF CERVIX: ICD-10-CM

## 2022-01-28 DIAGNOSIS — Z11.59 ENCOUNTER FOR SCREENING FOR OTHER VIRAL DISEASES: Primary | ICD-10-CM

## 2022-01-28 NOTE — TELEPHONE ENCOUNTER
Called patient to schedule procedure, scheduled for 05/04/22 and on several wait/cancellation lists.     Patient takes Excedrin daily for chronic migraines and is wondering if/when she should stop taking prior to procedure? Patient remembers bleeding heavily after having child and doesn't not want a repeat of that kind of bleeding, so willing to stop Excedrin if that would help avoid that.     Routing to [facility] Surgery Scheduler

## 2022-01-28 NOTE — TELEPHONE ENCOUNTER
Type of surgery: gyn  Location of surgery: [facility] OR  Date and time of surgery: 02/03/22 1PM  Surgeon: Daniel  Pre-Op Appt Date: 01/31/22 Aleshia Spence  Post-Op Appt Date: patient will schedule later   Packet sent out: patient will  at lab appt  Pre-cert/Authorization completed:  Not Applicable  Date: 01/28/22    [facility] OB/GYN Surgery Scheduler

## 2022-01-29 NOTE — PATIENT INSTRUCTIONS
Preparing for Your Surgery  Getting started  A nurse will call you to review your health history and instructions. They will give you an arrival time based on your scheduled surgery time. Please be ready to share:    Your doctor's clinic name and phone number    Your medical, surgical and anesthesia history    A list of allergies and sensitivities    A list of medicines, including herbal treatments and over-the-counter drugs    Whether the patient has a legal guardian (ask how to send us the papers in advance)  Please tell us if you're pregnant--or if there's any chance you might be pregnant. Some surgeries may injure a fetus (unborn baby), so they require a pregnancy test. Surgeries that are safe for a fetus don't always need a test, and you can choose whether to have one.   If you have a child who's having surgery, please ask for a copy of Preparing for Your Child's Surgery.    Preparing for surgery    Within 30 days of surgery: Have a pre-op exam (sometimes called an H&P, or History and Physical). This can be done at a clinic or pre-operative center.  ? If you're having a , you may not need this exam. Talk to your care team.    At your pre-op exam, talk to your care team about all medicines you take. If you need to stop any medicines before surgery, ask when to start taking them again.  ? We do this for your safety. Many medicines can make you bleed too much during surgery. Some change how well surgery (anesthesia) drugs work.    Call your insurance company to let them know you're having surgery. (If you don't have insurance, call [phone].)    Call your clinic if there's any change in your health. This includes signs of a cold or flu (sore throat, runny nose, cough, rash, fever). It also includes a scrape or scratch near the surgery site.    If you have questions on the day of surgery, call your hospital or surgery center.  COVID testing  You may need to be tested for COVID-19 before having  surgery. If so, your surgical team will give you instructions for scheduling this test, separate from your preoperative history and physical.  Eating and drinking guidelines  For your safety: Unless your surgeon tells you otherwise, follow the guidelines below.    Eat and drink as usual until 8 hours before surgery. After that, no food or milk.    Drink clear liquids until 2 hours before surgery. These are liquids you can see through, like water, Gatorade and Propel Water. You may also have black coffee and tea (no cream or milk).    Nothing by mouth within 2 hours of surgery. This includes gum, candy and breath mints.    If you drink alcohol: Stop drinking it the night before surgery.    If your care team tells you to take medicine on the morning of surgery, it's okay to take it with a sip of water.  Preventing infection    Shower or bathe the night before and morning of your surgery. Follow the instructions your clinic gave you. (If no instructions, use regular soap.)    Don't shave or clip hair near your surgery site. We'll remove the hair if needed.    Don't smoke or vape the morning of surgery. You may chew nicotine gum up to 2 hours before surgery. A nicotine patch is okay.  ? Note: Some surgeries require you to completely quit smoking and nicotine. Check with your surgeon.    Your care team will make every effort to keep you safe from infection. We will:  ? Clean our hands often with soap and water (or an alcohol-based hand rub).  ? Clean the skin at your surgery site with a special soap that kills germs.  ? Give you a special gown to keep you warm. (Cold raises the risk of infection.)  ? Wear special hair covers, masks, gowns and gloves during surgery.  ? Give antibiotic medicine, if prescribed. Not all surgeries need antibiotics.  What to bring on the day of surgery    Photo ID and insurance card    Copy of your health care directive, if you have one    Glasses and hearing aides (bring cases)  ? You can't  wear contacts during surgery    Inhaler and eye drops, if you use them (tell us about these when you arrive)    CPAP machine or breathing device, if you use them    A few personal items, if spending the night    If you have . . .  ? A pacemaker, ICD (cardiac defibrillator) or other implant: Bring the ID card.  ? An implanted stimulator: Bring the remote control.  ? A legal guardian: Bring a copy of the certified (court-stamped) guardianship papers.  Please remove any jewelry, including body piercings. Leave jewelry and other valuables at home.  If you're going home the day of surgery    You must have a responsible adult drive you home. They should stay with you overnight as well.    If you don't have someone to stay with you, and you aren't safe to go home alone, we may keep you overnight. Insurance often won't pay for this.  After surgery  If it's hard to control your pain or you need more pain medicine, please call your surgeon's office.  Questions?   If you have any questions for your care team, list them here: _________________________________________________________________________________________________________________________________________________________________________ ____________________________________ ____________________________________ ____________________________________  For informational purposes only. Not to replace the advice of your health care provider. Copyright   2003, 2019 [facility]. All rights reserved. Clinically reviewed by Brenda Altamirano MD. [id number] - REV 07/21.

## 2022-01-29 NOTE — TELEPHONE ENCOUNTER
Recommend patient stop excedrin the day prior to procedure and day of procedure.  Chio Sanchez MD

## 2022-01-31 ENCOUNTER — TELEPHONE (OUTPATIENT)
Dept: FAMILY MEDICINE | Facility: CLINIC | Age: 57
End: 2022-01-31

## 2022-01-31 ENCOUNTER — OFFICE VISIT (OUTPATIENT)
Dept: FAMILY MEDICINE | Facility: CLINIC | Age: 57
End: 2022-01-31
Payer: COMMERCIAL

## 2022-01-31 VITALS
HEART RATE: 71 BPM | OXYGEN SATURATION: 99 % | HEIGHT: 63 IN | DIASTOLIC BLOOD PRESSURE: 87 MMHG | TEMPERATURE: 97.6 F | RESPIRATION RATE: 13 BRPM | WEIGHT: 109.8 LBS | BODY MASS INDEX: 19.45 KG/M2 | SYSTOLIC BLOOD PRESSURE: 129 MMHG

## 2022-01-31 DIAGNOSIS — F41.1 GENERALIZED ANXIETY DISORDER: ICD-10-CM

## 2022-01-31 DIAGNOSIS — Z01.818 PREOP GENERAL PHYSICAL EXAM: Primary | ICD-10-CM

## 2022-01-31 DIAGNOSIS — R87.613 HSIL (HIGH GRADE SQUAMOUS INTRAEPITHELIAL LESION) ON PAP SMEAR OF CERVIX: ICD-10-CM

## 2022-01-31 DIAGNOSIS — F33.0 MAJOR DEPRESSIVE DISORDER, RECURRENT EPISODE, MILD (H): ICD-10-CM

## 2022-01-31 PROCEDURE — 99214 OFFICE O/P EST MOD 30 MIN: CPT | Performed by: FAMILY MEDICINE

## 2022-01-31 ASSESSMENT — PATIENT HEALTH QUESTIONNAIRE - PHQ9
5. POOR APPETITE OR OVEREATING: SEVERAL DAYS
SUM OF ALL RESPONSES TO PHQ QUESTIONS 1-9: 4

## 2022-01-31 ASSESSMENT — ANXIETY QUESTIONNAIRES
GAD7 TOTAL SCORE: 7
6. BECOMING EASILY ANNOYED OR IRRITABLE: SEVERAL DAYS
1. FEELING NERVOUS, ANXIOUS, OR ON EDGE: MORE THAN HALF THE DAYS
3. WORRYING TOO MUCH ABOUT DIFFERENT THINGS: SEVERAL DAYS
7. FEELING AFRAID AS IF SOMETHING AWFUL MIGHT HAPPEN: SEVERAL DAYS
2. NOT BEING ABLE TO STOP OR CONTROL WORRYING: SEVERAL DAYS
5. BEING SO RESTLESS THAT IT IS HARD TO SIT STILL: NOT AT ALL
IF YOU CHECKED OFF ANY PROBLEMS ON THIS QUESTIONNAIRE, HOW DIFFICULT HAVE THESE PROBLEMS MADE IT FOR YOU TO DO YOUR WORK, TAKE CARE OF THINGS AT HOME, OR GET ALONG WITH OTHER PEOPLE: SOMEWHAT DIFFICULT

## 2022-01-31 ASSESSMENT — MIFFLIN-ST. JEOR: SCORE: 1062.05

## 2022-01-31 NOTE — TELEPHONE ENCOUNTER
Patient states that she received notice from her insurance that her prior auth for imitrex was denied this month (needs to be renewed annually).  I'm not seeing any prior auth documentation in her chart.  Please clarify the status of this - did we submit a PA for imitrex this year?      Aleshia Spence MD

## 2022-01-31 NOTE — LETTER
[facility]  [address] [place] [address]  [phone]          March 17, 2022    Re: Catie Nuñez                                                                                                                     [address] [facility] [address]            To Whom It May Concern:    Catie Nuñez is a patient of the clinic.  It is medically necessary for her to take SUMAtriptan (IMITREX) 50 MG tablet for treatment of Migraine without aura and without status migrainosus, not intractable [G43.009].  Please cover this necessary medication.        Sincerely,         Laurie Avelar DNP, CNP/at

## 2022-01-31 NOTE — TELEPHONE ENCOUNTER
Called patient and relayed SL's recommendation to stop taking Excedrin day prior and day of procedure, patient understood and agreed.     Mavis Ramoside OB/GYN Surgery Scheduler

## 2022-01-31 NOTE — PROGRESS NOTES
[facility] [address] [place] [address]  Phone: [phone]  Primary Provider: Adalberto Mercedes  Pre-op Performing Provider: LUIZ MORRISON      PREOPERATIVE EVALUATION:  Today's date: 1/31/2022    Catie Nuñez is a 56 year old female who presents for a preoperative evaluation.    Surgical Information:  Surgery/Procedure: COLPOSCOPY, WITH LEEP OF CERVIX  Surgery Location: [facility]  Surgeon: Chio Sanchez MD  Surgery Date: 2/3/2022  Time of Surgery: 1:00pm  Where patient plans to recover: At home with family  Fax number for surgical facility: Note does not need to be faxed, will be available electronically in Epic.    Type of Anesthesia Anticipated: Monitor Anesthesia [facility]    Assessment & Plan     The proposed surgical procedure is considered LOW risk.      ICD-10-CM    1. Preop general physical exam  Z01.818    2. HSIL (high grade squamous intraepithelial lesion) on Pap smear of cervix  R87.613    3. Generalized anxiety disorder  F41.1    4. Major depressive disorder, recurrent episode, mild (H)  F33.0       She will follow-up with Dr. Martinez to discuss Effexor dosing.  She is currently taking only one tab (75 mg) daily.    Risks and Recommendations:  The patient has the following additional risks and recommendations for perioperative complications:  Anemia/Bleeding/Clotting:    - possible familial bleeding diathesis although patient had 2 unremarkable vaginal births after her first delivery which was complicated by post-partum hemorrhage.      Medication Instructions:  Patient is to take all scheduled medications on the day of surgery   - Bleeding risk is low for this procedure (e.g. dental, skin, cataract).   GYN has advised she hold the aspirin just one day prior to surgery.    RECOMMENDATION:  APPROVAL GIVEN to proceed with proposed procedure, without further diagnostic evaluation.        Subjective     HPI  related to upcoming procedure:  6/2015: ASCUS, + HPV 16 & other HR HPV with unexplained endometrial cells. Needs colp and endo bx, US also planned.  7/7/15: [facility] - atypia, no endo bx needed as US showed thin endometrium. Plan cotest pap & HPV in 1 year  09/14/16: ASCUS + HR HPV 16 and other. Plan Benavides per provider  10/06/16: [facility] Bx NIL. Plan Cotest in 1 year per provider.  12/19/17 LSIL, +HPV 16 & other HR type. Plan: Benavides per guidelines.  01/05/18: [facility] ECC benign neg for dysplasia. Plan cotest in 1 year.   2/12/19 NIL pap, + HR HPV 16. Plan: colpo  04/3/19: Ben[facility]ides ECC benign. Plan pap in 1 year.   11/07/19: LSIL Pap, + HR HPV 16 result. Plan cotest in 1 year, if next one is abnormal Benavides at that time.   10/27/20 [employer] reminder sent -- Read  11/30/20 Virtual FP Visit -- Note added  12/28/20 Lost to follow-up for pap tracking  3/11/21 HSIL pap, LSIL also present, Neg HPV. Plan colp due bef 6/11/21. If pt prefers immediate LEEP without colpo first, provider is okay with that plan. Pt changed mind to    3/18/21 Pt notified and wants to schedule colp first >> 03/19/21 Pt called and is concerned about colpo being 1 month out - pt notified of recommendation for colp within 3 months but also offered to send message to Dr. Alarcon about possibly working her in sooner - Tele enc sent to provider  03/22/21 [facility] Bx and ECC Neg for Dysplasia, Plan LEEP now per pt's preference due bef 06/22/21.  Provider released results and recommendations to the pt through [employer], pt viewed.  03/29/21 Phone call to the pt, new plan see pt's preference above.   04/28/21 Per clinic RN, Pt canceled her LEEP and will now come back for a cotest due by 09/22/21.   11/15/21 NIL Pap, + HR HPV type 16. Plan Benavides per provider's office note, due bef 02/15/22.  11/19/21 Pt was advised.  01/24/22 [facility] Bx VLADIMIR 1, ECC scant small fragments of ectocervical squamous mucosa with non gradable dysplasia, area worrisome for high-grade dysplasia. Plan LEEP due bef  22.       Preop Questions 2022   1. Have you ever had a heart attack or stroke? No   2. Have you ever had surgery on your heart or blood vessels, such as a stent placement, a coronary artery bypass, or surgery on an artery in your head, neck, heart, or legs? No   3. Do you have chest pain with activity? No   4. Do you have a history of  heart failure? No   5. Do you currently have a cold, bronchitis or symptoms of other infection? No   6. Do you have a cough, shortness of breath, or wheezing? No   7. Do you or anyone in your family have previous history of blood clots? No   8. Do you or does anyone in your family have a serious bleeding problem such as prolonged bleeding following surgeries or cuts? YES - patient and her dad both had nosebleeds that could not be controlled and which required hospitalization.  Patient also had post-partum hemorrhage after her first delivery but subsequently had two 's with no bleeding complications.   9. Have you ever had problems with anemia or been told to take iron pills? No   10. Have you had any abnormal blood loss such as black, tarry or bloody stools, or abnormal vaginal bleeding? No   11. Have you ever had a blood transfusion? No   12. Are you willing to have a blood transfusion if it is medically needed before, during, or after your surgery? Yes   13. Have you or any of your relatives ever had problems with anesthesia? No   14. Do you have sleep apnea, excessive snoring or daytime drowsiness? No   15. Do you have any artifical heart valves or other implanted medical devices like a pacemaker, defibrillator, or continuous glucose monitor? No   16. Do you have artificial joints? No   17. Are you allergic to latex? No   18. Is there any chance that you may be pregnant? No       Health Care Directive:  Patient does not have a Health Care Directive or Living Will: Discussed advance care planning with patient; information given to patient to review.    Preoperative  Review of :   reviewed - controlled substances reflected in medication list.      Status of Chronic Conditions:  See problem list for active medical problems.  Problems all longstanding and stable, except as noted/documented.  See ROS for pertinent symptoms related to these conditions.             Review of Systems  10 point ROS of systems including Constitutional, ENT, Respiratory, Cardiovascular, Gastroenterology, Genitourinary, Integumentary, Musculoskeletal, Neurologic were all negative except for pertinent positives noted.     Patient Active Problem List   Diagnosis     CARDIOVASCULAR SCREENING; LDL GOAL LESS THAN 160     Adhesive capsulitis of shoulder     Scoliosis     Shoulder impingement     Cervicalgia     Cervicogenic headache     Generalized anxiety disorder     Pulmonary nodule     Migraine without aura and without status migrainosus, not intractable     Chronic prescription benzodiazepine use     HSIL (high grade squamous intraepithelial lesion) on Pap smear of cervix     Papanicolaou smear of cervix with low grade squamous intraepithelial lesion (LGSIL)     Acute seasonal allergic rhinitis, unspecified trigger     Chronic pain syndrome     Cervical high risk human papillomavirus (HPV 16) DNA test positive     Tension headache     Medication overuse headache     Chronic, continuous use of opioids     Major depressive disorder, recurrent episode, mild (H)      Past Medical History:   Diagnosis Date     Abnormal Pap smear of cervix 11/07/2019    3/11/21     Anxiety      Arthritis 3 yrs ago     ASCUS with positive high risk HPV 6/2015,09/14/16    atypia on colp, 09/14/16: ASCUS + HR HPV 16 and other.      Cervical high risk HPV (human papillomavirus) test positive 11/07/2019 11/7/21     Depression      Depressive disorder Age 12     Fracture of great toe 02/20/2014     History of scoliosis      Hx of colposcopy with cervical biopsy 10/06/2016    10/06/16: [place] Bx NIL.     MVP (mitral valve  prolapse)      Unexplained endometrial cells on cervical Pap smear 06/2015    thin endometrium on US     History reviewed. No pertinent surgical history.  Current Outpatient Medications   Medication Sig Dispense Refill     ALPRAZolam (XANAX) 0.5 MG tablet TAKE 1/2 TO 1 TABLET BY MOUTH DAILY AS NEEDED (30 tablets to last 30 days) 30 tablet 2     Ascorbic Acid (VITAMIN C) 100 MG CHEW        Aspirin-Acetaminophen-Caffeine (EXCEDRIN PO) Take by mouth as needed       estradiol (ESTRACE) 1 MG tablet Take 0.5 tablets (0.5 mg) by mouth daily 90 tablet 3     fluticasone (FLONASE) 50 MCG/ACT nasal spray SHAKE LIQUID AND USE 1 TO 2 SPRAYS IN EACH NOSTRIL DAILY 16 g 11     medroxyPROGESTERone (PROVERA) 2.5 MG tablet Take 1 tablet (2.5 mg) by mouth daily 90 tablet 3     SUMAtriptan (IMITREX) 50 MG tablet TAKE 1 TABLET(50 MG) BY MOUTH AT ONSET OF HEADACHE FOR MIGRAINE. MAY REPEAT IN 2 HOURS. MAX 4 TABLETS/ 24 HOURS 30 tablet 3     traMADol (ULTRAM) 50 MG tablet Take 1 tablet (50 mg) by mouth 2 times daily as needed for severe pain . 50 tablets to last 30 days. 50 tablet 2     venlafaxine (EFFEXOR-XR) 75 MG 24 hr capsule Take 2 capsules (150 mg) by mouth daily 180 capsule 0     albuterol (PROAIR HFA/PROVENTIL HFA/VENTOLIN HFA) 108 (90 Base) MCG/ACT inhaler Inhale 1-2 puffs into the lungs every 4 hours as needed for shortness of breath / dyspnea or wheezing (Patient not taking: Reported on 1/31/2022) 18 g 0     spacer (OPTICHAMBER TANYA) holding chamber For use with rescue inhaler (Patient not taking: Reported on 1/31/2022) 1 each 0       Allergies   Allergen Reactions     Ondansetron Nausea and Vomiting     Sulfa Drugs Hives     Rash          Social History     Tobacco Use     Smoking status: Never Smoker     Smokeless tobacco: Never Used   Substance Use Topics     Alcohol use: Yes     Comment: occ, rare        History   Drug Use Unknown         Objective     /87 (BP Location: Left arm, Patient Position: Sitting, Cuff  "Size: Adult Regular)   Pulse 71   Temp 97.6  F (36.4  C) (Temporal)   Resp 13   Ht 1.608 m (5' 3.31\")   Wt 49.8 kg (109 lb 12.8 oz)   LMP 09/01/2016 (LMP Unknown)   SpO2 99%   BMI 19.26 kg/m      Physical Exam  GENERAL APPEARANCE: healthy, alert and no distress  EYES: Eyes grossly normal to inspection, conjunctivae and sclerae normal  HENT: ear canals and TM's normal  NECK: no adenopathy, no asymmetry, masses, or scars and thyroid normal to palpation  RESP: lungs clear to auscultation - no rales, rhonchi or wheezes  CV: regular rate and rhythm, normal S1 S2, no S3 or S4, no murmur, click or rub, no peripheral edema   ABDOMEN: soft, nontender, no hepatosplenomegaly, no masses   MS: no musculoskeletal defects are noted and gait is age appropriate without ataxia  SKIN: no suspicious lesions or rashes  NEURO: Normal strength and tone, sensory exam grossly normal, mentation intact and speech normal  PSYCH: mentation appears normal and affect normal/bright     Recent Labs   Lab Test 09/03/21 1922   HGB 14.2           Diagnostics:  No labs were ordered during this visit.   No EKG required for low risk surgery (cataract, skin procedure, breast biopsy, etc).    Revised Cardiac Risk Index (RCRI):  The patient has the following serious cardiovascular risks for perioperative complications:   - No serious cardiac risks = 0 points     RCRI Interpretation: 0 points: Class I (very low risk - 0.4% complication rate)       Signed Electronically by: Aleshia Spence MD  Copy of this evaluation report is provided to requesting physician.        "

## 2022-01-31 NOTE — H&P (VIEW-ONLY)
[facility] [address] [place] [address]  Phone: [phone]  Primary Provider: Adalberto Mercedes  Pre-op Performing Provider: LUIZ MORRISON      PREOPERATIVE EVALUATION:  Today's date: 1/31/2022    Catie Nuñez is a 56 year old female who presents for a preoperative evaluation.    Surgical Information:  Surgery/Procedure: COLPOSCOPY, WITH LEEP OF CERVIX  Surgery Location: [facility]  Surgeon: Chio Sanchez MD  Surgery Date: 2/3/2022  Time of Surgery: 1:00pm  Where patient plans to recover: At home with family  Fax number for surgical facility: Note does not need to be faxed, will be available electronically in Epic.    Type of Anesthesia Anticipated: Monitor Anesthesia Care    Assessment & Plan     The proposed surgical procedure is considered LOW risk.      ICD-10-CM    1. Preop general physical exam  Z01.818    2. HSIL (high grade squamous intraepithelial lesion) on Pap smear of cervix  R87.613    3. Generalized anxiety disorder  F41.1    4. Major depressive disorder, recurrent episode, mild (H)  F33.0       She will follow-up with Dr. Martinez to discuss Effexor dosing.  She is currently taking only one tab (75 mg) daily.    Risks and Recommendations:  The patient has the following additional risks and recommendations for perioperative complications:  Anemia/Bleeding/Clotting:    - possible familial bleeding diathesis although patient had 2 unremarkable vaginal births after her first delivery which was complicated by post-partum hemorrhage.      Medication Instructions:  Patient is to take all scheduled medications on the day of surgery   - Bleeding risk is low for this procedure (e.g. dental, skin, cataract).   GYN has advised she hold the aspirin just one day prior to surgery.    RECOMMENDATION:  APPROVAL GIVEN to proceed with proposed procedure, without further diagnostic evaluation.        Subjective     HPI  related to upcoming procedure:  6/2015: ASCUS, + HPV 16 & other HR HPV with unexplained endometrial cells. Needs colp and endo bx, US also planned.  7/7/15: [place] - atypia, no endo bx needed as US showed thin endometrium. Plan cotest pap & HPV in 1 year  09/14/16: ASCUS + HR HPV 16 and other. Plan [place] per provider  10/06/16: [place] Bx NIL. Plan Cotest in 1 year per provider.  12/19/17 LSIL, +HPV 16 & other HR type. Plan: [place] per guidelines.  01/05/18: [place] ECC benign neg for dysplasia. Plan cotest in 1 year.   2/12/19 NIL pap, + HR HPV 16. Plan: colpo  04/3/19: [place] ECC benign. Plan pap in 1 year.   11/07/19: LSIL Pap, + HR HPV 16 result. Plan cotest in 1 year, if next one is abnormal [place] at that time.   10/27/20 Smartbill - Recurrence Backoffice reminder sent -- Read  11/30/20 Virtual FP Visit -- Note added  12/28/20 Lost to follow-up for pap tracking  3/11/21 HSIL pap, LSIL also present, Neg HPV. Plan colp due bef 6/11/21. If pt prefers immediate LEEP without colpo first, provider is okay with that plan. Pt changed mind to    3/18/21 Pt notified and wants to schedule colp first >> 03/19/21 Pt called and is concerned about colpo being 1 month out - pt notified of recommendation for colp within 3 months but also offered to send message to Dr. Alarcon about possibly working her in sooner - Tele enc sent to provider  03/22/21 [place] Bx and ECC Neg for Dysplasia, Plan LEEP now per pt's preference due bef 06/22/21.  Provider released results and recommendations to the pt through [employer], pt viewed.  03/29/21 Phone call to the pt, new plan see pt's preference above.   04/28/21 Per clinic RN, Pt canceled her LEEP and will now come back for a cotest due by 09/22/21.   11/15/21 NIL Pap, + HR HPV type 16. Plan [place] per provider's office note, due bef 02/15/22.  11/19/21 Pt was advised.  01/24/22 [place] Bx VLADIMIR 1, ECC scant small fragments of ectocervical squamous mucosa with non gradable dysplasia, area worrisome for high-grade dysplasia. Plan LEEP due bef  22.       Preop Questions 2022   1. Have you ever had a heart attack or stroke? No   2. Have you ever had surgery on your heart or blood vessels, such as a stent placement, a coronary artery bypass, or surgery on an artery in your head, neck, heart, or legs? No   3. Do you have chest pain with activity? No   4. Do you have a history of  heart failure? No   5. Do you currently have a cold, bronchitis or symptoms of other infection? No   6. Do you have a cough, shortness of breath, or wheezing? No   7. Do you or anyone in your family have previous history of blood clots? No   8. Do you or does anyone in your family have a serious bleeding problem such as prolonged bleeding following surgeries or cuts? YES - patient and her dad both had nosebleeds that could not be controlled and which required hospitalization.  Patient also had post-partum hemorrhage after her first delivery but subsequently had two 's with no bleeding complications.   9. Have you ever had problems with anemia or been told to take iron pills? No   10. Have you had any abnormal blood loss such as black, tarry or bloody stools, or abnormal vaginal bleeding? No   11. Have you ever had a blood transfusion? No   12. Are you willing to have a blood transfusion if it is medically needed before, during, or after your surgery? Yes   13. Have you or any of your relatives ever had problems with anesthesia? No   14. Do you have sleep apnea, excessive snoring or daytime drowsiness? No   15. Do you have any artifical heart valves or other implanted medical devices like a pacemaker, defibrillator, or continuous glucose monitor? No   16. Do you have artificial joints? No   17. Are you allergic to latex? No   18. Is there any chance that you may be pregnant? No       Health Care Directive:  Patient does not have a Health Care Directive or Living Will: Discussed advance care planning with patient; information given to patient to review.    Preoperative  Review of :   reviewed - controlled substances reflected in medication list.      Status of Chronic Conditions:  See problem list for active medical problems.  Problems all longstanding and stable, except as noted/documented.  See ROS for pertinent symptoms related to these conditions.             Review of Systems  10 point ROS of systems including Constitutional, ENT, Respiratory, Cardiovascular, Gastroenterology, Genitourinary, Integumentary, Musculoskeletal, Neurologic were all negative except for pertinent positives noted.     Patient Active Problem List   Diagnosis     CARDIOVASCULAR SCREENING; LDL GOAL LESS THAN 160     Adhesive capsulitis of shoulder     Scoliosis     Shoulder impingement     Cervicalgia     Cervicogenic headache     Generalized anxiety disorder     Pulmonary nodule     Migraine without aura and without status migrainosus, not intractable     Chronic prescription benzodiazepine use     HSIL (high grade squamous intraepithelial lesion) on Pap smear of cervix     Papanicolaou smear of cervix with low grade squamous intraepithelial lesion (LGSIL)     Acute seasonal allergic rhinitis, unspecified trigger     Chronic pain syndrome     Cervical high risk human papillomavirus (HPV 16) DNA test positive     Tension headache     Medication overuse headache     Chronic, continuous use of opioids     Major depressive disorder, recurrent episode, mild (H)      Past Medical History:   Diagnosis Date     Abnormal Pap smear of cervix 11/07/2019    3/11/21     Anxiety      Arthritis 3 yrs ago     ASCUS with positive high risk HPV 6/2015,09/14/16    atypia on colp, 09/14/16: ASCUS + HR HPV 16 and other.      Cervical high risk HPV (human papillomavirus) test positive 11/07/2019 11/7/21     Depression      Depressive disorder Age 12     Fracture of great toe 02/20/2014     History of scoliosis      Hx of colposcopy with cervical biopsy 10/06/2016    10/06/16: [place] Bx NIL.     MVP (mitral valve  prolapse)      Unexplained endometrial cells on cervical Pap smear 06/2015    thin endometrium on US     History reviewed. No pertinent surgical history.  Current Outpatient Medications   Medication Sig Dispense Refill     ALPRAZolam (XANAX) 0.5 MG tablet TAKE 1/2 TO 1 TABLET BY MOUTH DAILY AS NEEDED (30 tablets to last 30 days) 30 tablet 2     Ascorbic Acid (VITAMIN C) 100 MG CHEW        Aspirin-Acetaminophen-Caffeine (EXCEDRIN PO) Take by mouth as needed       estradiol (ESTRACE) 1 MG tablet Take 0.5 tablets (0.5 mg) by mouth daily 90 tablet 3     fluticasone (FLONASE) 50 MCG/ACT nasal spray SHAKE LIQUID AND USE 1 TO 2 SPRAYS IN EACH NOSTRIL DAILY 16 g 11     medroxyPROGESTERone (PROVERA) 2.5 MG tablet Take 1 tablet (2.5 mg) by mouth daily 90 tablet 3     SUMAtriptan (IMITREX) 50 MG tablet TAKE 1 TABLET(50 MG) BY MOUTH AT ONSET OF HEADACHE FOR MIGRAINE. MAY REPEAT IN 2 HOURS. MAX 4 TABLETS/ 24 HOURS 30 tablet 3     traMADol (ULTRAM) 50 MG tablet Take 1 tablet (50 mg) by mouth 2 times daily as needed for severe pain . 50 tablets to last 30 days. 50 tablet 2     venlafaxine (EFFEXOR-XR) 75 MG 24 hr capsule Take 2 capsules (150 mg) by mouth daily 180 capsule 0     albuterol (PROAIR HFA/PROVENTIL HFA/VENTOLIN HFA) 108 (90 Base) MCG/ACT inhaler Inhale 1-2 puffs into the lungs every 4 hours as needed for shortness of breath / dyspnea or wheezing (Patient not taking: Reported on 1/31/2022) 18 g 0     spacer (OPTICHAMBER TANYA) holding chamber For use with rescue inhaler (Patient not taking: Reported on 1/31/2022) 1 each 0       Allergies   Allergen Reactions     Ondansetron Nausea and Vomiting     Sulfa Drugs Hives     Rash          Social History     Tobacco Use     Smoking status: Never Smoker     Smokeless tobacco: Never Used   Substance Use Topics     Alcohol use: Yes     Comment: occ, rare        History   Drug Use Unknown         Objective     /87 (BP Location: Left arm, Patient Position: Sitting, Cuff  "Size: Adult Regular)   Pulse 71   Temp 97.6  F (36.4  C) (Temporal)   Resp 13   Ht 1.608 m (5' 3.31\")   Wt 49.8 kg (109 lb 12.8 oz)   LMP 09/01/2016 (LMP Unknown)   SpO2 99%   BMI 19.26 kg/m      Physical Exam  GENERAL APPEARANCE: healthy, alert and no distress  EYES: Eyes grossly normal to inspection, conjunctivae and sclerae normal  HENT: ear canals and TM's normal  NECK: no adenopathy, no asymmetry, masses, or scars and thyroid normal to palpation  RESP: lungs clear to auscultation - no rales, rhonchi or wheezes  CV: regular rate and rhythm, normal S1 S2, no S3 or S4, no murmur, click or rub, no peripheral edema   ABDOMEN: soft, nontender, no hepatosplenomegaly, no masses   MS: no musculoskeletal defects are noted and gait is age appropriate without ataxia  SKIN: no suspicious lesions or rashes  NEURO: Normal strength and tone, sensory exam grossly normal, mentation intact and speech normal  PSYCH: mentation appears normal and affect normal/bright     Recent Labs   Lab Test 09/03/21 1922   HGB 14.2           Diagnostics:  No labs were ordered during this visit.   No EKG required for low risk surgery (cataract, skin procedure, breast biopsy, etc).    Revised Cardiac Risk Index (RCRI):  The patient has the following serious cardiovascular risks for perioperative complications:   - No serious cardiac risks = 0 points     RCRI Interpretation: 0 points: Class I (very low risk - 0.4% complication rate)       Signed Electronically by: Aleshia Sepnce MD  Copy of this evaluation report is provided to requesting physician.        "

## 2022-02-01 ENCOUNTER — LAB (OUTPATIENT)
Dept: LAB | Facility: CLINIC | Age: 57
End: 2022-02-01
Payer: COMMERCIAL

## 2022-02-01 DIAGNOSIS — R87.613 HIGH GRADE SQUAMOUS INTRAEPITHELIAL LESION OF CERVIX: ICD-10-CM

## 2022-02-01 DIAGNOSIS — Z11.59 ENCOUNTER FOR SCREENING FOR OTHER VIRAL DISEASES: ICD-10-CM

## 2022-02-01 DIAGNOSIS — Z01.818 PRE-OP EXAM: ICD-10-CM

## 2022-02-01 LAB
ABO/RH(D): NORMAL
ANTIBODY SCREEN: NEGATIVE
ERYTHROCYTE [DISTWIDTH] IN BLOOD BY AUTOMATED COUNT: 12.6 % (ref 10–15)
HCT VFR BLD AUTO: 41.9 % (ref 35–47)
HGB BLD-MCNC: 13.8 G/DL (ref 11.7–15.7)
MCH RBC QN AUTO: 30.7 PG (ref 26.5–33)
MCHC RBC AUTO-ENTMCNC: 32.9 G/DL (ref 31.5–36.5)
MCV RBC AUTO: 93 FL (ref 78–100)
PLATELET # BLD AUTO: 299 10E3/UL (ref 150–450)
RBC # BLD AUTO: 4.49 10E6/UL (ref 3.8–5.2)
SPECIMEN EXPIRATION DATE: NORMAL
WBC # BLD AUTO: 7.2 10E3/UL (ref 4–11)

## 2022-02-01 PROCEDURE — U0003 INFECTIOUS AGENT DETECTION BY NUCLEIC ACID (DNA OR RNA); SEVERE ACUTE RESPIRATORY SYNDROME CORONAVIRUS 2 (SARS-COV-2) (CORONAVIRUS DISEASE [COVID-19]), AMPLIFIED PROBE TECHNIQUE, MAKING USE OF HIGH THROUGHPUT TECHNOLOGIES AS DESCRIBED BY CMS-2020-01-R: HCPCS

## 2022-02-01 PROCEDURE — 86900 BLOOD TYPING SEROLOGIC ABO: CPT

## 2022-02-01 PROCEDURE — 86850 RBC ANTIBODY SCREEN: CPT

## 2022-02-01 PROCEDURE — U0005 INFEC AGEN DETEC AMPLI PROBE: HCPCS

## 2022-02-01 PROCEDURE — 85027 COMPLETE CBC AUTOMATED: CPT

## 2022-02-01 PROCEDURE — 86901 BLOOD TYPING SEROLOGIC RH(D): CPT

## 2022-02-01 PROCEDURE — 36415 COLL VENOUS BLD VENIPUNCTURE: CPT

## 2022-02-01 ASSESSMENT — ANXIETY QUESTIONNAIRES: GAD7 TOTAL SCORE: 7

## 2022-02-02 ENCOUNTER — ANESTHESIA EVENT (OUTPATIENT)
Dept: SURGERY | Facility: CLINIC | Age: 57
End: 2022-02-02
Payer: COMMERCIAL

## 2022-02-02 LAB — SARS-COV-2 RNA RESP QL NAA+PROBE: NEGATIVE

## 2022-02-02 NOTE — ANESTHESIA PREPROCEDURE EVALUATION
Anesthesia Pre-Procedure Evaluation    Patient: Catie Nuñez   MRN: [id number] : 1965        Preoperative Diagnosis: High grade squamous intraepithelial lesion of cervix [R87.613]    Procedure : Procedure(s):  COLPOSCOPY, WITH LEEP OF CERVIX          Past Medical History:   Diagnosis Date     Abnormal Pap smear of cervix 2019    3/11/21     Anxiety      Arthritis 3 yrs ago     ASCUS with positive high risk HPV 2015,16    atypia on colp, 16: ASCUS + HR HPV 16 and other.      Cervical high risk HPV (human papillomavirus) test positive 2019     Depression      Depressive disorder Age 12     Fracture of great toe 2014     History of scoliosis      Hx of colposcopy with cervical biopsy 10/06/2016    10/06/16: [place] Bx NIL.     MVP (mitral valve prolapse)      Unexplained endometrial cells on cervical Pap smear 2015    thin endometrium on US      Past Surgical History:   Procedure Laterality Date     NO HISTORY OF SURGERY        Allergies   Allergen Reactions     Ondansetron Nausea and Vomiting     Sulfa Drugs Hives     Rash        Social History     Tobacco Use     Smoking status: Never Smoker     Smokeless tobacco: Never Used   Substance Use Topics     Alcohol use: Yes     Comment: occ, rare       Wt Readings from Last 1 Encounters:   22 49.8 kg (109 lb 12.8 oz)        Anesthesia Evaluation            ROS/MED HX  ENT/Pulmonary:  - neg pulmonary ROS     Neurologic: Comment: Cervicogenic headache    (+) migraines,     Cardiovascular:  - neg cardiovascular ROS   (+) -----valvular problems/murmurs (per PMH MVP ) type: MVP  (-) RUANO, syncope and irregular heartbeat/palpitations   METS/Exercise Tolerance: 4 - Raking leaves, gardening    Hematologic:  - neg hematologic  ROS     Musculoskeletal: Comment: Scoliosis       GI/Hepatic:  - neg GI/hepatic ROS  (-) GERD and liver disease   Renal/Genitourinary:    (-) renal disease   Endo:    (-) Type I DM, Type II DM and  thyroid disease   Psychiatric/Substance Use:     (+) psychiatric history anxiety and depression     Infectious Disease:  - neg infectious disease ROS     Malignancy:  - neg malignancy ROS     Other:  - neg other ROS    (+) , H/O Chronic Pain,        Physical Exam    Airway        Mallampati: II   TM distance: > 3 FB   Neck ROM: full   Mouth opening: > 3 cm    Respiratory Devices and Support         Dental  no notable dental history         Cardiovascular   cardiovascular exam normal          Pulmonary   pulmonary exam normal                OUTSIDE LABS:  CBC:   Lab Results   Component Value Date    WBC 7.2 02/01/2022    WBC 6.1 09/03/2021    HGB 13.8 02/01/2022    HGB 14.2 09/03/2021    HCT 41.9 02/01/2022    HCT 41.4 09/03/2021     02/01/2022     09/03/2021     BMP:   Lab Results   Component Value Date     06/18/2018     02/19/2015    POTASSIUM 4.5 06/18/2018    POTASSIUM 3.9 02/19/2015    CHLORIDE 107 06/18/2018    CHLORIDE 106 02/19/2015    CO2 27 06/18/2018    CO2 33 (H) 02/19/2015    BUN 8 06/18/2018    BUN 13 02/19/2015    CR 0.64 06/18/2018    CR 0.52 02/19/2015    GLC 79 02/12/2019    GLC 83 06/18/2018     COAGS: No results found for: PTT, INR, FIBR  POC: No results found for: BGM, HCG, HCGS  HEPATIC:   Lab Results   Component Value Date    ALBUMIN 4.3 06/18/2018    PROTTOTAL 7.5 06/18/2018    ALT 21 06/18/2018    AST 26 06/18/2018    ALKPHOS 67 06/18/2018    BILITOTAL 0.3 06/18/2018     OTHER:   Lab Results   Component Value Date    HAWK 9.5 06/18/2018    TSH 3.82 03/11/2021    CRP <2.9 06/18/2018    SED 8 06/18/2018       Anesthesia Plan    ASA Status:  2   NPO Status:  NPO Appropriate    Anesthesia Type: MAC.     - Reason for MAC: straight local not clinically adequate              Consents    Anesthesia Plan(s) and associated risks, benefits, and realistic alternatives discussed. Questions answered and patient/representative(s) expressed understanding.    - Discussed:     -  Discussed with:  Patient         Postoperative Care            Comments:                Shakira Rawls MD

## 2022-02-03 ENCOUNTER — ANESTHESIA (OUTPATIENT)
Dept: SURGERY | Facility: CLINIC | Age: 57
End: 2022-02-03
Payer: COMMERCIAL

## 2022-02-03 ENCOUNTER — HOSPITAL ENCOUNTER (OUTPATIENT)
Facility: CLINIC | Age: 57
Discharge: HOME | End: 2022-02-03
Attending: OBSTETRICS & GYNECOLOGY | Admitting: OBSTETRICS & GYNECOLOGY
Payer: COMMERCIAL

## 2022-02-03 VITALS
RESPIRATION RATE: 16 BRPM | OXYGEN SATURATION: 97 % | HEIGHT: 64 IN | DIASTOLIC BLOOD PRESSURE: 86 MMHG | TEMPERATURE: 97.9 F | SYSTOLIC BLOOD PRESSURE: 124 MMHG | WEIGHT: 112.88 LBS | BODY MASS INDEX: 19.27 KG/M2 | HEART RATE: 80 BPM

## 2022-02-03 LAB — GLUCOSE BLDC GLUCOMTR-MCNC: 84 MG/DL (ref 70–99)

## 2022-02-03 PROCEDURE — 250N000009 HC RX 250: Performed by: OBSTETRICS & GYNECOLOGY

## 2022-02-03 PROCEDURE — 250N000013 HC RX MED GY IP 250 OP 250 PS 637: Performed by: STUDENT IN AN ORGANIZED HEALTH CARE EDUCATION/TRAINING PROGRAM

## 2022-02-03 PROCEDURE — 250N000011 HC RX IP 250 OP 636: Performed by: STUDENT IN AN ORGANIZED HEALTH CARE EDUCATION/TRAINING PROGRAM

## 2022-02-03 PROCEDURE — 88305 TISSUE EXAM BY PATHOLOGIST: CPT | Mod: 26 | Performed by: PATHOLOGY

## 2022-02-03 PROCEDURE — 272N000001 HC OR GENERAL SUPPLY STERILE: Performed by: OBSTETRICS & GYNECOLOGY

## 2022-02-03 PROCEDURE — 360N000075 HC SURGERY LEVEL 2, PER MIN: Performed by: OBSTETRICS & GYNECOLOGY

## 2022-02-03 PROCEDURE — 250N000013 HC RX MED GY IP 250 OP 250 PS 637: Performed by: ANESTHESIOLOGY

## 2022-02-03 PROCEDURE — 88307 TISSUE EXAM BY PATHOLOGIST: CPT | Mod: 26 | Performed by: PATHOLOGY

## 2022-02-03 PROCEDURE — 82962 GLUCOSE BLOOD TEST: CPT

## 2022-02-03 PROCEDURE — 258N000003 HC RX IP 258 OP 636: Performed by: NURSE ANESTHETIST, CERTIFIED REGISTERED

## 2022-02-03 PROCEDURE — 250N000011 HC RX IP 250 OP 636: Performed by: NURSE ANESTHETIST, CERTIFIED REGISTERED

## 2022-02-03 PROCEDURE — 710N000012 HC RECOVERY PHASE 2, PER MINUTE: Performed by: OBSTETRICS & GYNECOLOGY

## 2022-02-03 PROCEDURE — 57460 BX OF CERVIX W/SCOPE LEEP: CPT | Mod: GC | Performed by: OBSTETRICS & GYNECOLOGY

## 2022-02-03 PROCEDURE — 88305 TISSUE EXAM BY PATHOLOGIST: CPT | Mod: TC | Performed by: OBSTETRICS & GYNECOLOGY

## 2022-02-03 PROCEDURE — 370N000017 HC ANESTHESIA TECHNICAL FEE, PER MIN: Performed by: OBSTETRICS & GYNECOLOGY

## 2022-02-03 PROCEDURE — 999N000141 HC STATISTIC PRE-PROCEDURE NURSING ASSESSMENT: Performed by: OBSTETRICS & GYNECOLOGY

## 2022-02-03 RX ORDER — IODINE AND POTASSIUM IODIDE 50; 100 MG/ML; MG/ML
LIQUID ORAL PRN
Status: DISCONTINUED | OUTPATIENT
Start: 2022-02-03 | End: 2022-02-03 | Stop reason: HOSPADM

## 2022-02-03 RX ORDER — LIDOCAINE HYDROCHLORIDE AND EPINEPHRINE 10; 10 MG/ML; UG/ML
INJECTION, SOLUTION INFILTRATION; PERINEURAL PRN
Status: DISCONTINUED | OUTPATIENT
Start: 2022-02-03 | End: 2022-02-03 | Stop reason: HOSPADM

## 2022-02-03 RX ORDER — PROPOFOL 10 MG/ML
INJECTION, EMULSION INTRAVENOUS CONTINUOUS PRN
Status: DISCONTINUED | OUTPATIENT
Start: 2022-02-03 | End: 2022-02-03

## 2022-02-03 RX ORDER — SODIUM CHLORIDE, SODIUM LACTATE, POTASSIUM CHLORIDE, CALCIUM CHLORIDE 600; 310; 30; 20 MG/100ML; MG/100ML; MG/100ML; MG/100ML
INJECTION, SOLUTION INTRAVENOUS CONTINUOUS
Status: DISCONTINUED | OUTPATIENT
Start: 2022-02-03 | End: 2022-02-03 | Stop reason: HOSPADM

## 2022-02-03 RX ORDER — LIDOCAINE 40 MG/G
CREAM TOPICAL
Status: CANCELLED | OUTPATIENT
Start: 2022-02-03

## 2022-02-03 RX ORDER — ONDANSETRON 4 MG/1
4 TABLET, ORALLY DISINTEGRATING ORAL EVERY 30 MIN PRN
Status: DISCONTINUED | OUTPATIENT
Start: 2022-02-03 | End: 2022-02-03 | Stop reason: HOSPADM

## 2022-02-03 RX ORDER — ONDANSETRON 2 MG/ML
4 INJECTION INTRAMUSCULAR; INTRAVENOUS EVERY 30 MIN PRN
Status: DISCONTINUED | OUTPATIENT
Start: 2022-02-03 | End: 2022-02-03 | Stop reason: HOSPADM

## 2022-02-03 RX ORDER — FENTANYL CITRATE 50 UG/ML
25 INJECTION, SOLUTION INTRAMUSCULAR; INTRAVENOUS
Status: DISCONTINUED | OUTPATIENT
Start: 2022-02-03 | End: 2022-02-03 | Stop reason: HOSPADM

## 2022-02-03 RX ORDER — MEPERIDINE HYDROCHLORIDE 25 MG/ML
12.5 INJECTION INTRAMUSCULAR; INTRAVENOUS; SUBCUTANEOUS
Status: DISCONTINUED | OUTPATIENT
Start: 2022-02-03 | End: 2022-02-03 | Stop reason: HOSPADM

## 2022-02-03 RX ORDER — ACETAMINOPHEN 325 MG/1
975 TABLET ORAL ONCE
Status: CANCELLED | OUTPATIENT
Start: 2022-02-03 | End: 2022-02-03

## 2022-02-03 RX ORDER — OXYCODONE HYDROCHLORIDE 5 MG/1
5 TABLET ORAL EVERY 4 HOURS PRN
Status: DISCONTINUED | OUTPATIENT
Start: 2022-02-03 | End: 2022-02-03 | Stop reason: HOSPADM

## 2022-02-03 RX ORDER — METOPROLOL TARTRATE 1 MG/ML
1-2 INJECTION, SOLUTION INTRAVENOUS EVERY 5 MIN PRN
Status: DISCONTINUED | OUTPATIENT
Start: 2022-02-03 | End: 2022-02-03 | Stop reason: HOSPADM

## 2022-02-03 RX ORDER — FENTANYL CITRATE 50 UG/ML
25 INJECTION, SOLUTION INTRAMUSCULAR; INTRAVENOUS EVERY 5 MIN PRN
Status: DISCONTINUED | OUTPATIENT
Start: 2022-02-03 | End: 2022-02-03 | Stop reason: HOSPADM

## 2022-02-03 RX ORDER — HYDROMORPHONE HCL IN WATER/PF 6 MG/30 ML
0.2 PATIENT CONTROLLED ANALGESIA SYRINGE INTRAVENOUS EVERY 5 MIN PRN
Status: DISCONTINUED | OUTPATIENT
Start: 2022-02-03 | End: 2022-02-03 | Stop reason: HOSPADM

## 2022-02-03 RX ORDER — HYDRALAZINE HYDROCHLORIDE 20 MG/ML
2.5-5 INJECTION INTRAMUSCULAR; INTRAVENOUS EVERY 10 MIN PRN
Status: DISCONTINUED | OUTPATIENT
Start: 2022-02-03 | End: 2022-02-03 | Stop reason: HOSPADM

## 2022-02-03 RX ORDER — PROPOFOL 10 MG/ML
INJECTION, EMULSION INTRAVENOUS PRN
Status: DISCONTINUED | OUTPATIENT
Start: 2022-02-03 | End: 2022-02-03

## 2022-02-03 RX ORDER — SODIUM CHLORIDE, SODIUM LACTATE, POTASSIUM CHLORIDE, CALCIUM CHLORIDE 600; 310; 30; 20 MG/100ML; MG/100ML; MG/100ML; MG/100ML
INJECTION, SOLUTION INTRAVENOUS CONTINUOUS
Status: CANCELLED | OUTPATIENT
Start: 2022-02-03

## 2022-02-03 RX ORDER — ACETAMINOPHEN 325 MG/1
975 TABLET ORAL ONCE
Status: COMPLETED | OUTPATIENT
Start: 2022-02-03 | End: 2022-02-03

## 2022-02-03 RX ORDER — IBUPROFEN 600 MG/1
600 TABLET, FILM COATED ORAL ONCE
Status: CANCELLED | OUTPATIENT
Start: 2022-02-03 | End: 2022-02-03

## 2022-02-03 RX ORDER — SODIUM CHLORIDE, SODIUM LACTATE, POTASSIUM CHLORIDE, CALCIUM CHLORIDE 600; 310; 30; 20 MG/100ML; MG/100ML; MG/100ML; MG/100ML
INJECTION, SOLUTION INTRAVENOUS CONTINUOUS PRN
Status: DISCONTINUED | OUTPATIENT
Start: 2022-02-03 | End: 2022-02-03

## 2022-02-03 RX ORDER — KETOROLAC TROMETHAMINE 30 MG/ML
INJECTION, SOLUTION INTRAMUSCULAR; INTRAVENOUS PRN
Status: DISCONTINUED | OUTPATIENT
Start: 2022-02-03 | End: 2022-02-03

## 2022-02-03 RX ORDER — LIDOCAINE 40 MG/G
CREAM TOPICAL
Status: DISCONTINUED | OUTPATIENT
Start: 2022-02-03 | End: 2022-02-03 | Stop reason: HOSPADM

## 2022-02-03 RX ORDER — LORAZEPAM 2 MG/ML
.5-1 INJECTION INTRAMUSCULAR
Status: DISCONTINUED | OUTPATIENT
Start: 2022-02-03 | End: 2022-02-03 | Stop reason: HOSPADM

## 2022-02-03 RX ORDER — LABETALOL HYDROCHLORIDE 5 MG/ML
10 INJECTION, SOLUTION INTRAVENOUS
Status: DISCONTINUED | OUTPATIENT
Start: 2022-02-03 | End: 2022-02-03 | Stop reason: HOSPADM

## 2022-02-03 RX ORDER — FENTANYL CITRATE 50 UG/ML
INJECTION, SOLUTION INTRAMUSCULAR; INTRAVENOUS PRN
Status: DISCONTINUED | OUTPATIENT
Start: 2022-02-03 | End: 2022-02-03

## 2022-02-03 RX ORDER — ALBUTEROL SULFATE 0.83 MG/ML
2.5 SOLUTION RESPIRATORY (INHALATION) EVERY 4 HOURS PRN
Status: DISCONTINUED | OUTPATIENT
Start: 2022-02-03 | End: 2022-02-03 | Stop reason: HOSPADM

## 2022-02-03 RX ORDER — KETOROLAC TROMETHAMINE 15 MG/ML
15 INJECTION, SOLUTION INTRAMUSCULAR; INTRAVENOUS EVERY 6 HOURS PRN
Status: DISCONTINUED | OUTPATIENT
Start: 2022-02-03 | End: 2022-02-03 | Stop reason: HOSPADM

## 2022-02-03 RX ORDER — ACETAMINOPHEN 325 MG/1
975 TABLET ORAL ONCE
Status: DISCONTINUED | OUTPATIENT
Start: 2022-02-03 | End: 2022-02-03 | Stop reason: HOSPADM

## 2022-02-03 RX ADMIN — SODIUM CHLORIDE, POTASSIUM CHLORIDE, SODIUM LACTATE AND CALCIUM CHLORIDE: 600; 310; 30; 20 INJECTION, SOLUTION INTRAVENOUS at 15:13

## 2022-02-03 RX ADMIN — MIDAZOLAM 2 MG: 1 INJECTION INTRAMUSCULAR; INTRAVENOUS at 15:13

## 2022-02-03 RX ADMIN — OXYCODONE HYDROCHLORIDE 5 MG: 5 TABLET ORAL at 16:22

## 2022-02-03 RX ADMIN — PROPOFOL 30 MG: 10 INJECTION, EMULSION INTRAVENOUS at 15:29

## 2022-02-03 RX ADMIN — FENTANYL CITRATE 25 MCG: 50 INJECTION, SOLUTION INTRAMUSCULAR; INTRAVENOUS at 15:16

## 2022-02-03 RX ADMIN — KETOROLAC TROMETHAMINE 25 MG: 30 INJECTION, SOLUTION INTRAMUSCULAR at 15:52

## 2022-02-03 RX ADMIN — HYDROMORPHONE HYDROCHLORIDE 0.2 MG: 1 INJECTION, SOLUTION INTRAMUSCULAR; INTRAVENOUS; SUBCUTANEOUS at 16:21

## 2022-02-03 RX ADMIN — PROPOFOL 75 MCG/KG/MIN: 10 INJECTION, EMULSION INTRAVENOUS at 15:16

## 2022-02-03 RX ADMIN — MIDAZOLAM 1 MG: 1 INJECTION INTRAMUSCULAR; INTRAVENOUS at 15:33

## 2022-02-03 RX ADMIN — ACETAMINOPHEN 975 MG: 325 TABLET, FILM COATED ORAL at 12:41

## 2022-02-03 RX ADMIN — PROPOFOL 40 MG: 10 INJECTION, EMULSION INTRAVENOUS at 15:16

## 2022-02-03 RX ADMIN — HYDROMORPHONE HYDROCHLORIDE 0.2 MG: 1 INJECTION, SOLUTION INTRAMUSCULAR; INTRAVENOUS; SUBCUTANEOUS at 16:09

## 2022-02-03 RX ADMIN — FENTANYL CITRATE 25 MCG: 50 INJECTION, SOLUTION INTRAMUSCULAR; INTRAVENOUS at 15:26

## 2022-02-03 ASSESSMENT — MIFFLIN-ST. JEOR: SCORE: 1079.06

## 2022-02-03 NOTE — OR NURSING
Discussed urine pregnancy test with both patient and Dr. Mcdonald. Patient has been postmenopausal for 8 years and is comfortable proceeding without test. Per Dr. Mcdonald, sample held just in case, but not sent in preop.

## 2022-02-03 NOTE — ANESTHESIA CARE TRANSFER NOTE
Patient: Catie Nuñez    Procedure: Procedure(s):  COLPOSCOPY, WITH LEEP OF CERVIX       Diagnosis: High grade squamous intraepithelial lesion of cervix [R87.613]  Diagnosis Additional Information: No value filed.    Anesthesia Type:   MAC     Note:    Oropharynx: oropharynx clear of all foreign objects  Level of Consciousness: awake  Oxygen Supplementation: room air    Independent Airway: airway patency satisfactory and stable  Dentition: dentition unchanged  Vital Signs Stable: post-procedure vital signs reviewed and stable  Report to RN Given: handoff report given  Patient transferred to: Phase II    Handoff Report: Identifed the Patient, Identified the Reponsible Provider, Reviewed the pertinent medical history, Discussed the surgical course, Reviewed Intra-OP anesthesia mangement and issues during anesthesia, Set expectations for post-procedure period and Allowed opportunity for questions and acknowledgement of understanding      Vitals:  Vitals Value Taken Time   BP 90/64 02/03/22 1600   Temp 36.9  C (98.4  F) 02/03/22 1600   Pulse 78 02/03/22 1600   Resp     SpO2 98 % 02/03/22 1602   Vitals shown include unvalidated device data.    Electronically Signed By: JEREMY Nascimento CRNA  February 3, 2022  4:03 PM

## 2022-02-03 NOTE — DISCHARGE INSTRUCTIONS
Same-Day Surgery   Adult Discharge Orders & Instructions     For 24 hours after surgery:  1. Get plenty of rest.  A responsible adult must stay with you for at least 24 hours after you leave the hospital.   2. Pain medication can slow your reflexes. Do not drive or use heavy equipment.  If you have weakness or tingling, don't drive or use heavy equipment until this feeling goes away.  3. Mixing alcohol and pain medication can cause dizziness and slow your breathing. It can even be fatal. Do not drink alcohol while taking pain medication.  4. Avoid strenuous or risky activities.  Ask for help when climbing stairs.   5. You may feel lightheaded.  If so, sit for a few minutes before standing.  Have someone help you get up.   6. If you have nausea (feel sick to your stomach), drink only clear liquids such as apple juice, ginger ale, broth or 7-Up.  Rest may also help.  Be sure to drink enough fluids.  Move to a regular diet as you feel able. Take pain medications with a small amount of solid food, such as toast or crackers, to avoid nausea.   7. A slight fever is normal. Call the doctor if your fever is over 100 F (37.7 C) (taken under the tongue) or lasts longer than 24 hours.  8. You may have a dry mouth, muscle aches, trouble sleeping or a sore throat.  These symptoms should go away after 24 hours.  9. Do not make important or legal decisions.   Pain Management:      1. Take pain medication (if prescribed) for pain as directed by your physician.        2. WARNING: If the pain medication you have been prescribed contains Tylenol  (acetaminophen), DO NOT take additional doses of Tylenol (acetaminophen).     Call your doctor for any of the followin.  Signs of infection (fever, growing tenderness at the surgery site, severe pain, a large amount of drainage or bleeding, foul-smelling drainage, redness, swelling).    2.  It has been over 8 to 10 hours since surgery and you are still not able to urinate (pee).    3.   Headache for over 24 hours.    4.  Numbness, tingling or weakness the day after surgery (if you had spinal anesthesia).  To contact a doctor, call Dr. Sanchez, OB/GYN Clinic at [phone] or:      [phone] and ask for the Resident On Call for: OBGYN (answered 24 hours a day)      Emergency Department:  [place] [facility] [place] [facility]: [phone]  [place] [facility]: [phone]               Rev. 10/2014

## 2022-02-03 NOTE — OP NOTE
Gynecologic Full Operation Note  Name: Catie Nuñez   MRN: [id number]   : 1965   Procedure date: 2022     Pre-operative diagnosis:   -Endocervical curettage concerning for high grade dysplasia  -HPV 16 positive  Post-operative diagnosis: Same, s/p below procedure     Procedure: Colposcopy, loop electrosurgical procedure of cervix, endocervical curettage  Anesthesia: monitored anesthesia care + intracervical block     Surgeon: Chio Sanchez MD  Assistant(s): Tamara Mcdonald MD PGY4     Indications: Catie Nuñez, 56 year old , is here for above procedure due to abnormal colposcopic results with high grade dysplasia found on endocervical curettage and HPV 16 positive.     Estimated blood loss: 10mL  Total IV fluids: 550 mL, Lactated Ringer  Total urine output: bladder was not drained   Specimens: LEEP with a suture at 12 o'clock, endocervical curettage   Findings:   - Exam under anesthesia: normal appearing external genitalia; the entire SCJ was visualized without acetowhite uptake and no non-staining sites were found upon Lugol's iodine solution application.    Procedure:   The patient was taken to the operating room where she underwent monitored anesthesia care without difficulty. She was placed in a deep dorsal lithotomy position using yellow fin stirrups. A speculum was inserted into the vagina. The cervix and vagina were swabbed with dilute acetic acid and a brief colposcopic exam was performed with above findings noted. Next the cervix and vagina were cleansed with lugol's solution. An intracervical block was then placed with 1% lidocaine with epinephrine, approximately 20 ml were used. A LEEP cervical biopsy was then performed using a large Leahy cone biopsy excisor. Specimen was inked and stitched at 12 o'clock. Endocervical curettage then performed. The bed of the excised area was then made hemostatic with roller-ball cautery and Monsel solution as applied. Estimated blood  loss was minimal. The specimen was submitted to pathology for examination. The patient tolerated the procedure well and was taken to the recovery room in stable condition. Dr. Sanchez was scrubbed and present for the entire procedure.    Tamara Mcdonald MD  [facility] OBGYN PGY-4  02/03/2022     ATTESTATION:   I was scrubbed and present for the entire procedure. I agree with the above note which I have edited to reflect my findings.   Chio Sanchez MD

## 2022-02-03 NOTE — ANESTHESIA POSTPROCEDURE EVALUATION
Patient: Catie Nuñez    Procedure: Procedure(s):  COLPOSCOPY, WITH LEEP OF CERVIX       Diagnosis:High grade squamous intraepithelial lesion of cervix [R87.613]  Diagnosis Additional Information: No value filed.    Anesthesia Type:  MAC    Note:  Disposition: Outpatient   Postop Pain Control: Uneventful            Sign Out: Well controlled pain   PONV:    Neuro/Psych: Uneventful            Sign Out: Acceptable/Baseline neuro status   Airway/Respiratory: Uneventful            Sign Out: Acceptable/Baseline resp. status   CV/Hemodynamics: Uneventful            Sign Out: Acceptable CV status; No obvious hypovolemia; No obvious fluid overload   Other NRE:    DID A NON-ROUTINE EVENT OCCUR?            Last vitals:  Vitals Value Taken Time   /76 02/03/22 1615   Temp 36.9  C (98.4  F) 02/03/22 1600   Pulse 78 02/03/22 1615   Resp 14 02/03/22 1600   SpO2 97 % 02/03/22 1625   Vitals shown include unvalidated device data.    Electronically Signed By: Ilir Sandra MD  February 3, 2022  4:26 PM

## 2022-02-05 NOTE — TELEPHONE ENCOUNTER
Central Prior Authorization Team   Phone: [phone]    I do not see any documentation that a PA was submitted this year.  Encounter 7/06/2021 is the last time the PA team submitted a PA for this medication.  I can try to contact insurance to find out what happened.  Insurance states the patient started a PA.  A question set was faxed to the clinic, insurance rep states they did not receive the answers that were needed so the PA was denied.  Since PA was denied an appeal will need to be submitted.  Insurance rep states she will fax the denial to PA team.

## 2022-02-07 LAB
PATH REPORT.COMMENTS IMP SPEC: NORMAL
PATH REPORT.COMMENTS IMP SPEC: NORMAL
PATH REPORT.FINAL DX SPEC: NORMAL
PATH REPORT.GROSS SPEC: NORMAL
PATH REPORT.MICROSCOPIC SPEC OTHER STN: NORMAL
PATH REPORT.RELEVANT HX SPEC: NORMAL
PHOTO IMAGE: NORMAL

## 2022-02-08 ENCOUNTER — TELEPHONE (OUTPATIENT)
Dept: OBGYN | Facility: CLINIC | Age: 57
End: 2022-02-08
Payer: COMMERCIAL

## 2022-02-08 ENCOUNTER — PATIENT OUTREACH (OUTPATIENT)
Dept: OBGYN | Facility: CLINIC | Age: 57
End: 2022-02-08
Payer: COMMERCIAL

## 2022-02-08 DIAGNOSIS — R87.613 HSIL (HIGH GRADE SQUAMOUS INTRAEPITHELIAL LESION) ON PAP SMEAR OF CERVIX: ICD-10-CM

## 2022-02-08 NOTE — TELEPHONE ENCOUNTER
ELVIA from [facility] this afternoon, wonders about Dr. Sanchez's recommendations. Discussed that pain at this point after the procedure can be quite normal although she is concerned about infection. States she gets antibiotics before dentist appointments which is why she is concerned for infection. Discussed that risk for infection after dental procedures is higher due to germs in the mouth and that infections after LEEPs are far less common. She is reassured by this. Still somewhat concerned about the pain and would still like to know if Dr. Sanchez has more recommendations about her pain.   Ashley Ling RN

## 2022-02-08 NOTE — TELEPHONE ENCOUNTER
"TC to clinic to report pain post-LEEP. The LEEP was preformed on Thursday 2/3, and initially the patient reports that she \"was doing okay\", but experienced a burning sensation in the vaginal area. That dissipated around day three. On Sunday night, she began having a lot of cramping/aches to the extent that she couldn't sleep. She describes it as achy, like period cramping and that she is tired. She reports taking ibuprofen, tylenol and aspirin for the pain. Advised not to take ibuprofen and aspirin concurrently. Patient wondering if this pain is to be expected after this procedure or if she needs to be seen. Forwarded to provider for recommendation.   Maria De Jesus Wood RN       "

## 2022-02-08 NOTE — TELEPHONE ENCOUNTER
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 Pathology showed VLADIMIR 1 with negative margins.     night had stomach pain like cramping, had trouble sleeping. Still having cramping. Lower abdomen is tender and achy like menstrual cramps. No nausea or vomiting. No diarrhea. No fevers or chills. Took ibuprofen 600mg at 2pm, no relief from that. Using 1-2 pads per day. Brown, yellow and red discharge with a strong odor. She is worried maybe she has an infection or things aren't healing well. Recommend evaluation in clinic, cannot come right away this evening so will schedule with a partner tomorrow as I am not in clinic until Fri. Scheduled for tomorrow at 845 with Dr. Radha Oseguera. Routing to Dr. Oseguera as SUZE.    Chio Sanchez MD

## 2022-02-08 NOTE — TELEPHONE ENCOUNTER
2/3/22 LEEP - VLADIMIR 1. ECC - Negative. Margins negative. Plan 6 month co-test / notified per [facility]

## 2022-02-09 ENCOUNTER — TELEPHONE (OUTPATIENT)
Dept: OBGYN | Facility: CLINIC | Age: 57
End: 2022-02-09

## 2022-02-09 ENCOUNTER — OFFICE VISIT (OUTPATIENT)
Dept: OBGYN | Facility: CLINIC | Age: 57
End: 2022-02-09
Payer: COMMERCIAL

## 2022-02-09 VITALS
HEART RATE: 76 BPM | DIASTOLIC BLOOD PRESSURE: 78 MMHG | SYSTOLIC BLOOD PRESSURE: 115 MMHG | WEIGHT: 112.3 LBS | BODY MASS INDEX: 19.58 KG/M2 | OXYGEN SATURATION: 97 %

## 2022-02-09 DIAGNOSIS — R10.2 PELVIC PAIN IN FEMALE: Primary | ICD-10-CM

## 2022-02-09 LAB
ALBUMIN UR-MCNC: NEGATIVE MG/DL
APPEARANCE UR: CLEAR
BACTERIA #/AREA URNS HPF: ABNORMAL /HPF
BILIRUB UR QL STRIP: NEGATIVE
CLUE CELLS: ABNORMAL
COLOR UR AUTO: YELLOW
GLUCOSE UR STRIP-MCNC: NEGATIVE MG/DL
HGB UR QL STRIP: ABNORMAL
KETONES UR STRIP-MCNC: NEGATIVE MG/DL
LEUKOCYTE ESTERASE UR QL STRIP: NEGATIVE
NITRATE UR QL: NEGATIVE
PH UR STRIP: 6.5 [PH] (ref 5–7)
RBC #/AREA URNS AUTO: ABNORMAL /HPF
SP GR UR STRIP: 1.01 (ref 1–1.03)
SQUAMOUS #/AREA URNS AUTO: ABNORMAL /LPF
TRICHOMONAS, WET PREP: ABNORMAL
UROBILINOGEN UR STRIP-ACNC: 0.2 E.U./DL
WBC #/AREA URNS AUTO: ABNORMAL /HPF
WBC'S/HIGH POWER FIELD, WET PREP: ABNORMAL
YEAST, WET PREP: ABNORMAL

## 2022-02-09 PROCEDURE — 87086 URINE CULTURE/COLONY COUNT: CPT | Performed by: OBSTETRICS & GYNECOLOGY

## 2022-02-09 PROCEDURE — 87210 SMEAR WET MOUNT SALINE/INK: CPT | Performed by: OBSTETRICS & GYNECOLOGY

## 2022-02-09 PROCEDURE — 99213 OFFICE O/P EST LOW 20 MIN: CPT | Performed by: OBSTETRICS & GYNECOLOGY

## 2022-02-09 PROCEDURE — 81001 URINALYSIS AUTO W/SCOPE: CPT | Performed by: OBSTETRICS & GYNECOLOGY

## 2022-02-09 RX ORDER — HYDROXYZINE PAMOATE 25 MG/1
25-50 CAPSULE ORAL 3 TIMES DAILY PRN
Qty: 12 CAPSULE | Refills: 0 | Status: SHIPPED | OUTPATIENT
Start: 2022-02-09 | End: 2022-08-30

## 2022-02-09 NOTE — TELEPHONE ENCOUNTER
Called patient and reviewed UA and wet prep results.  Negative for infection.  Sent for UC to be certain.  Reviewed no indication at this time for antibiotic.  Discussed pain is likely due to normal post op cramping.  She has not picked up the vistaril because she states her son takes that for sleep and she doesn't think it is going to help.  I explained that this medication has many indications including sleeping, itching, pain, sleep, and anxiety.  She is concerned about feeling tired with it and I encouraged her to take it prior to going to bed.  Patient states understanding and is in agreement with plan.      Radha Oseguera MD

## 2022-02-09 NOTE — TELEPHONE ENCOUNTER
Kimmie calling today requesting antibiotic for UTI, believes she has a UTI due to labs done today. RN agreed she likely has UTI. Kimmie reassured that once MD has a chance to look at lab results a medication will be ordered. Kimmie hoping to get medication ordered before the end of the day, RN told her that it was likely that Dr. Oseguera would be able to do that. Routed to MD for FYI.  Ashley Ling RN

## 2022-02-09 NOTE — PROGRESS NOTES
[facility] OBGYN    CC:pelvic cramping and vaginal discharge    S:Catie Nuñez is a 56 year old  s/p LEEP and ECC in OR on 2/3/22 who presents today for concerns of pelvic cramping and vaginal bleeding.  Patient reports  Initially her pain was ok.  Then  night her cramping increased.  She is taking ibuprofen and tylenol but this is not adequately controlling the pain.  She also notes burning with urination and difficulty sitting due to pelvic discomfort.  She does have discharge that she states has a foul odor and yellow/brown color.      O: Patient Vitals for the past 24 hrs:   BP Pulse SpO2 Weight   22 0912 115/78 76 97 % 50.9 kg (112 lb 4.8 oz)   ]  Exam:  General- awake, alert, answering questions appropriately, appears tearful during portions of the visit  CV- regular rate  Lung- breathing comfortably on room air  - normal appearing external genitalia, normal appearing vaginal mucosa, cervix with eschar present at LEEP site, small portions of granulation tissue and moderate amount of yellow tinged thin creamy vaginal discharge.  No areas of bleeding on LEEP bed.  Suprapubic tenderness present    Imaging and Labs:  Results for orders placed or performed in visit on 22   UA with Microscopic reflex to Culture - lab collect     Status: Abnormal    Specimen: Urine, Clean Catch   Result Value Ref Range    Color Urine Yellow Colorless, Straw, Light Yellow, Yellow    Appearance Urine Clear Clear    Glucose Urine Negative Negative mg/dL    Bilirubin Urine Negative Negative    Ketones Urine Negative Negative mg/dL    Specific Gravity Urine 1.015 1.003 - 1.035    Blood Urine Moderate (A) Negative    pH Urine 6.5 5.0 - 7.0    Protein Albumin Urine Negative Negative mg/dL    Urobilinogen Urine 0.2 0.2, 1.0 E.U./dL    Nitrite Urine Negative Negative    Leukocyte Esterase Urine Negative Negative   Urine Microscopic     Status: Abnormal   Result Value Ref Range    Bacteria Urine  Few (A) None Seen /HPF    RBC Urine 2-5 (A) 0-2 /HPF /HPF    WBC Urine 0-5 0-5 /HPF /HPF    Squamous Epithelials Urine Moderate (A) None Seen /LPF    Narrative    Urine Culture not indicated   Wet prep - Clinic Collect     Status: Abnormal    Specimen: Vagina; Swab   Result Value Ref Range    Trichomonas Absent Absent    Yeast Absent Absent    Clue Cells Absent Absent    WBCs/high power field 3+ (A) None     A&P: Catie Nuñez is a 56 year old  s/p LEEP and ECC in OR on 2/3/22 who presents today for concerns of pelvic cramping and vaginal bleeding.     (R10.2) Pelvic pain in female  (primary encounter diagnosis)  Comment: Patient with LEEP site on exam that appears to be healing well.  No abnormalities to surrounding vaginal tissues.  Vaginal discharge characteristic post-LEEP.  Vaginitis ruled out.  Given dysuria and suprapubic tenderness UA also collected and negative for infection.  Likely pain is due to post-op healing.  Discussed taking tylenol and ibuprofen q6 hours with staggered dosing so every 3 hours can take a dose of medication.  Recommend vistaril as adjunct for pain control.    Plan: hydrOXYzine (VISTARIL) 25 MG capsule, Wet prep         - Clinic Collect, UA with Microscopic reflex to        Culture - lab collect, Urine Microscopic, Urine        Culture Aerobic Bacterial - lab collect    Radha Oseguera MD

## 2022-02-09 NOTE — TELEPHONE ENCOUNTER
"Kimmie calling back regarding lab results. States that she is \"in pain\" and is frustrated because \"she knows what a UTI feels like\" and she is concerned that if she does not receive an antibiotic today the pain and infection will get worse. She states she \"knows my body\" and knows this is a UTI. She would like to speak to an MD.   Ashley Ling RN   "

## 2022-02-11 LAB — BACTERIA UR CULT: NO GROWTH

## 2022-02-15 NOTE — TELEPHONE ENCOUNTER
PRIOR AUTHORIZATION DENIED    Medication: sumatriptan-DENIED    Denial Date: 2/9/2022    Denial Rational: QUESTIONS WERE FAXED TO CLINIC.  INSURANCE DID NOT RECEIVE THE INFORMATION REQUESTED.        Appeal Information:  IF YOU WOULD LIKE TO APPEAL PLEASE SUPPLY PA TEAM WITH A LETTER OF MEDICAL NECESSITY WITH CLINICAL REASON.

## 2022-03-04 NOTE — TELEPHONE ENCOUNTER
What's the status of this?  Can we close encounter?  Aleshia Spence MD  [facility]

## 2022-03-14 NOTE — PATIENT INSTRUCTIONS
1.  Alprazolam 0.25 to 0.5 mg daily as needed for extreme anxiety-30 tablets must last 30 days    2.  Venlafaxine  mg daily    3.  Talk therapy when able to to learn coping skills to manage life adjustments and stressors        Continue all other medications as reviewed per electronic medical record today.     Safety plan reviewed. To the Emergency Department as needed or call after hours crisis line at [phone] or [phone]. [place] Crisis Text Line. Text [place] to 175757 or Suicide LifeLine Chat: suicideArticleAlley.org/chat/    To schedule individual or family therapy, call [facility] at [phone].    Schedule an appointment with me in 2 months or sooner as needed. Call [facility] at [phone] to schedule.    Follow up with primary care provider as planned or for acute medical concerns.    Call the psychiatric nurse line with medication questions or concerns at [phone].    bMenuhart may be used to communicate with your provider, but this is not intended to be used for emergencies.    Crisis Resources:    National Suicide Prevention Lifeline: 145.624.2185 (TTY: 472.572.4182). Call anytime for help.  (www.suicidepreventionlifeline.org)  National [employer] Mental Illness (www.virgilio.org): 164.138.7749 or 519-337-8450.   Mental Health Association (www.mentalhealth.org): 679.315.3986 or 766-788-7787.  [place] Crisis Text Line: Text [place] to 730684  Suicide LifeLine Chat: suicideArticleAlley.org/chat

## 2022-03-16 NOTE — TELEPHONE ENCOUNTER
PA Team-  1. Clinic did not receive fax from insurance company (our fax number is [phone]).  Is the only option to submit an appeal at this time?    Thank you!  ABIOLA MengN, RN  [facility]

## 2022-03-16 NOTE — TELEPHONE ENCOUNTER
Patient called to inquire about the status of this PA / getting her medication. States the PA dept / her insurance has been waiting to receive the requested information back and that it has been months + without medication. Please assist. Thanks!

## 2022-03-17 NOTE — TELEPHONE ENCOUNTER
When the patient starts an appeal the insurance will fax to the fax number that is listed in their system for the provider.  I am not sure which fax number insurance has listed. Since insurance did not receive the question set back they automatically deny the request.  You will have to submit an appeal.  If you write a letter of medical necessity, I can submit it for the appeal.

## 2022-03-17 NOTE — TELEPHONE ENCOUNTER
HP Provider-  1. Patient's insurance reports faxing form/questions to clinic and not receiving a response, so PA automatically denied  2. Appeal can be submitted with letter of medical necessity-pended    Thank you!  ABIOLA MengN, RN  [facility]

## 2022-03-21 DIAGNOSIS — F33.0 MAJOR DEPRESSIVE DISORDER, RECURRENT EPISODE, MILD (H): ICD-10-CM

## 2022-03-21 DIAGNOSIS — G44.86 CERVICOGENIC HEADACHE: ICD-10-CM

## 2022-03-21 DIAGNOSIS — G89.4 CHRONIC PAIN SYNDROME: ICD-10-CM

## 2022-03-21 DIAGNOSIS — F41.1 GAD (GENERALIZED ANXIETY DISORDER): ICD-10-CM

## 2022-03-21 NOTE — TELEPHONE ENCOUNTER
Team coordinators-Please fax appeal letter to [phone] and notify patient when this is completed.    Thank you!  ABIOLA MengN, RN  [facility]

## 2022-03-22 RX ORDER — VENLAFAXINE HYDROCHLORIDE 75 MG/1
CAPSULE, EXTENDED RELEASE ORAL
Qty: 180 CAPSULE | Refills: 0 | Status: SHIPPED | OUTPATIENT
Start: 2022-03-22 | End: 2022-05-09

## 2022-03-22 NOTE — TELEPHONE ENCOUNTER
Routing refill request to provider for review/approval because:  Labs not current:  Creatinine (none on file since 2018)    SHAWANDA Henderson RN  [facility]

## 2022-03-23 ENCOUNTER — IMMUNIZATION (OUTPATIENT)
Dept: NURSING | Facility: CLINIC | Age: 57
End: 2022-03-23
Payer: COMMERCIAL

## 2022-03-23 PROCEDURE — 91305 COVID-19,PF,PFIZER (12+ YRS): CPT

## 2022-03-23 PROCEDURE — 0054A COVID-19,PF,PFIZER (12+ YRS): CPT

## 2022-04-11 ENCOUNTER — VIRTUAL VISIT (OUTPATIENT)
Dept: FAMILY MEDICINE | Facility: CLINIC | Age: 57
End: 2022-04-11
Payer: COMMERCIAL

## 2022-04-11 DIAGNOSIS — F11.90 CHRONIC, CONTINUOUS USE OF OPIOIDS: ICD-10-CM

## 2022-04-11 DIAGNOSIS — M54.2 CERVICALGIA: ICD-10-CM

## 2022-04-11 DIAGNOSIS — F41.0 PANIC ATTACK: ICD-10-CM

## 2022-04-11 DIAGNOSIS — Z12.31 VISIT FOR SCREENING MAMMOGRAM: ICD-10-CM

## 2022-04-11 DIAGNOSIS — G44.86 CERVICOGENIC HEADACHE: ICD-10-CM

## 2022-04-11 DIAGNOSIS — I34.1 MITRAL VALVE PROLAPSE: ICD-10-CM

## 2022-04-11 DIAGNOSIS — G89.4 CHRONIC PAIN SYNDROME: Primary | ICD-10-CM

## 2022-04-11 DIAGNOSIS — F41.1 GENERALIZED ANXIETY DISORDER: ICD-10-CM

## 2022-04-11 DIAGNOSIS — Z79.899 CHRONIC PRESCRIPTION BENZODIAZEPINE USE: ICD-10-CM

## 2022-04-11 PROCEDURE — 99214 OFFICE O/P EST MOD 30 MIN: CPT | Mod: 95 | Performed by: FAMILY MEDICINE

## 2022-04-11 RX ORDER — TRAMADOL HYDROCHLORIDE 50 MG/1
50 TABLET ORAL 2 TIMES DAILY PRN
Qty: 50 TABLET | Refills: 2 | Status: SHIPPED | OUTPATIENT
Start: 2022-04-20 | End: 2022-07-11

## 2022-04-11 RX ORDER — ALPRAZOLAM 0.5 MG
TABLET ORAL
Qty: 30 TABLET | Refills: 2 | Status: SHIPPED | OUTPATIENT
Start: 2022-04-20 | End: 2022-07-11

## 2022-04-11 NOTE — PROGRESS NOTES
Kimmie is a 56 year old who is being evaluated via a billable video visit.      How would you like to obtain your AVS? MyChart  If the video visit is dropped, the invitation should be resent by: Text to cell phone: [phone]  Will anyone else be joining your video visit? No      Video Start Time: 1:36 PM    Assessment & Plan     Chronic pain syndrome  Cervicalgia  Cervicogenic headache  Chronic, continuous use of opioids  - reviewed, no concerning activity  -Works well for pain control  - traMADol (ULTRAM) 50 MG tablet  Dispense: 50 tablet; Refill: 2  -Follow-up in 3 months for monitoring    DAYLIN (generalized anxiety disorder)  Panic attack  Chronic prescription benzodiazepine use  -Stable, continue with therapy  -Follow-up with psych provider to discuss venlafaxine dosing  - ALPRAZolam (XANAX) 0.5 MG tablet  Dispense: 30 tablet; Refill: 2  -Follow-up in 3 months for monitoring    Visit for screening mammogram  - MA SCREENING DIGITAL BILAT - Future  (s+30)    Mitral valve prolapse  - Adult Cardiology Eval  Referral        Return in about 3 months (around 7/11/2022) for Routine preventive, with me.    Iglesia Martinez, [facility]   Kimmie is a 56 year old who presents for the following health issues     HPI     Hx of chronic pain syndrome and cervicalgia Takes tramadol 1-2 times a day for pain with good relief.     History of anxiety and depression. Goes to therapy once a week. Takes xanax daily, 1/2- 1 tab.  Has appt with psych provider in May to discuss venlafaxine dosage.     Hx of mitral valve prolapse. States would like cardiology referral for follow-up. Occasional feeling of skipped beats. No chest pain, dizziness, lightheadedness, SOB.    Review of Systems         Objective         Vitals:  No vitals were obtained today due to virtual visit.    Physical Exam   GENERAL: Healthy, alert and no distress  EYES: Eyes grossly normal to inspection.  No discharge or  erythema, or obvious scleral/conjunctival abnormalities.  RESP: No audible wheeze, cough, or visible cyanosis.  No visible retractions or increased work of breathing.    SKIN: Visible skin clear. No significant rash, abnormal pigmentation or lesions.  NEURO: Cranial nerves grossly intact.  Mentation and speech appropriate for age.  PSYCH: Mentation appears normal, affect normal/bright, judgement and insight intact, normal speech and appearance well-groomed.            Video-Visit Details    Type of service:  Video Visit    Video End Time:1:36 PM    Originating Location (pt. Location): Home    Distant Location (provider location):  [facility]     Platform used for Video Visit: [employer]en [employer] Anais

## 2022-04-24 ENCOUNTER — HEALTH MAINTENANCE LETTER (OUTPATIENT)
Age: 57
End: 2022-04-24

## 2022-04-27 ENCOUNTER — NURSE TRIAGE (OUTPATIENT)
Dept: NURSING | Facility: CLINIC | Age: 57
End: 2022-04-27
Payer: COMMERCIAL

## 2022-04-27 ENCOUNTER — VIRTUAL VISIT (OUTPATIENT)
Dept: FAMILY MEDICINE | Facility: CLINIC | Age: 57
End: 2022-04-27
Payer: COMMERCIAL

## 2022-04-27 DIAGNOSIS — F41.0 PANIC ATTACK: ICD-10-CM

## 2022-04-27 DIAGNOSIS — R05.9 COUGH: ICD-10-CM

## 2022-04-27 DIAGNOSIS — U07.1 CLINICAL DIAGNOSIS OF COVID-19: Primary | ICD-10-CM

## 2022-04-27 PROCEDURE — 99215 OFFICE O/P EST HI 40 MIN: CPT | Mod: 95 | Performed by: NURSE PRACTITIONER

## 2022-04-27 RX ORDER — LORAZEPAM 0.5 MG/1
0.5 TABLET ORAL EVERY 6 HOURS PRN
Qty: 6 TABLET | Refills: 0 | Status: SHIPPED | OUTPATIENT
Start: 2022-04-27 | End: 2022-05-09

## 2022-04-27 NOTE — TELEPHONE ENCOUNTER
"Diagnosed Sunday with COVID  Congested , wet cough,  Symptoms Saturday  Vomiting x 3 times   Body aches, chills.  Patient advised to make virtual appointment  Today with Provider.    Claudette Chu RN   [facility] Nurse Advisor    COVID 19 Nurse Triage Plan/Patient Instructions    Please be aware that novel coronavirus (COVID-19) may be circulating in the community. If you develop symptoms such as fever, cough, or SOB or if you have concerns about the presence of another infection including coronavirus (COVID-19), please contact your health care provider or visit [email or web address].     Disposition/Instructions    Home care recommended. Follow home care protocol based instructions.  Virtual Visit with provider recommended. Reference Visit Selection Guide.  Additional COVID19 information to add for patients.   How can I protect others?  If you have symptoms (fever, cough, body aches or trouble breathing): Stay home and away from others (self-isolate) until:    At least 10 days have passed since your symptoms started, And     You ve had no fever--and no medicine that reduces fever--for 1 full day (24 hours), And      Your other symptoms have resolved (gotten better).     If you don t have symptoms, but a test showed that you have COVID-19 (you tested positive):    Stay home and away from others (self-isolate). Follow the tips under \"How do I self-isolate?\" below for 10 days (20 days if you have a weak immune system).    You don't need to be retested for COVID-19 before going back to school or work. As long as you're fever-free and feeling better, you can go back to school, work and other activities after waiting the 10 or 20 days.     How do I self-isolate?    Stay in your own room, even for meals. Use your own bathroom if you can.     Stay away from others in your home. No hugging, kissing or shaking hands. No visitors.    Don t go to work, school or anywhere else.     Clean  high touch  surfaces " often (doorknobs, counters, handles, etc.). Use a household cleaning spray or wipes. You ll find a full list on the EPA website:  www.epa.gov/pesticide-registration/list-n-disinfectants-use-against-sars-cov-2.    Cover your mouth and nose with a mask, tissue or washcloth to avoid spreading germs.    Wash your hands and face often. Use soap and water.    Caregivers in these groups are at risk for severe illness due to COVID-19:  o People 65 years and older  o People who live in a nursing home or long-term care facility  o People with chronic disease (lung, heart, cancer, diabetes, kidney, liver, immunologic)  o People who have a weakened immune system, including those who:  - Are in cancer treatment  - Take medicine that weakens the immune system, such as corticosteroids  - Had a bone marrow or organ transplant  - Have an immune deficiency  - Have poorly controlled HIV or AIDS  - Are obese (body mass index of 40 or higher)  - Smoke regularly    Caregivers should wear gloves while washing dishes, handling laundry and cleaning bedrooms and bathrooms.    Use caution when washing and drying laundry: Don t shake dirty laundry, and use the warmest water setting that you can.    For more tips, go to www.cdc.gov/coronavirus/2019-ncov/downloads/10Things.pdf.    How can I take care of myself?  1. Get lots of rest. Drink extra fluids (unless a doctor has told you not to).     2. Take Tylenol (acetaminophen) for fever or pain. If you have liver or kidney problems, ask your family doctor if it s okay to take Tylenol.     Adults can take either:     650 mg (two 325 mg pills) every 4 to 6 hours, or     1,000 mg (two 500 mg pills) every 8 hours as needed.     Note: Don t take more than 3,000 mg in one day.   Acetaminophen is found in many medicines (both prescribed and over-the-counter medicines). Read all labels to be sure you don t take too much.     For children, check the Tylenol bottle for the right dose. The dose is based on  the child s age or weight.    3. If you have other health problems (like cancer, heart failure, an organ transplant or severe kidney disease): Call your specialty clinic if you don t feel better in the next 2 days.    4. Know when to call 911: Emergency warning signs include:    Trouble breathing or shortness of breath    Pain or pressure in the chest that doesn t go away    Feeling confused like you haven t felt before, or not being able to wake up    Bluish-colored lips or face    What are the symptoms of COVID-19?     The most common symptoms are cough, fever and trouble breathing.     Less common symptoms include body aches, chills, diarrhea (loose, watery poops), fatigue (feeling very tired), headache, runny nose, sore throat and loss of smell.    COVID-19 can cause severe coughing (bronchitis) and lung infection (pneumonia).    How does it spread?     The virus may spread when a person coughs or sneezes into the air. The virus can travel about 6 feet this way, and it can live on surfaces.      Common  (household disinfectants) will kill the virus.    Who is at risk?  Anyone can catch COVID-19 if they re around someone who has the virus.    How can others protect themselves?     Stay away from people who have COVID-19 (or symptoms of COVID-19).    Wash hands often with soap and water. Or, use hand  with at least 60% alcohol.    Avoid touching the eyes, nose or mouth.     Wear a face mask when you go out in public, when sick or when caring for a sick person.    Where can I get more information?    [facility]: About COVID-19: [email or web address]    CDC: What to Do If You re Sick: www.cdc.gov/coronavirus/2019-ncov/about/steps-when-sick.html    CDC: Ending Home Isolation: www.cdc.gov/coronavirus/2019-ncov/hcp/disposition-in-home-patients.html     CDC: Caring for Someone: www.cdc.gov/coronavirus/2019-ncov/if-you-are-sick/care-for-someone.html     [facility]: Interim Guidance for  Hospital Discharge to Home: www.[facility]./diseases/coronavirus/hcp/hospdischarge.pdf    [facility] clinical trials (COVID-19 research studies): [email or web address].[facility]-clinical-trials     Below are the COVID-19 hotlines at the [place] [facility] ([facility]). Interpreters are available.   o For health questions: Call 242-508-4007 or 1-454.958.3323 (7 a.m. to 7 p.m.)  o For questions about schools and childcare: Call 620-351-7315 or 1-593.959.4662 (7 a.m. to 7 p.m.)          Thank you for taking steps to prevent the spread of this virus.  o Limit your contact with others.  o Wear a simple mask to cover your cough.  o Wash your hands well and often.    Resources    [facility]: About COVID-19: [email or web address]    CDC: What to Do If You're Sick: www.cdc.gov/coronavirus/2019-ncov/about/steps-when-sick.html    CDC: Ending Home Isolation: www.cdc.gov/coronavirus/2019-ncov/hcp/disposition-in-home-patients.html     CDC: Caring for Someone: www.cdc.gov/coronavirus/2019-ncov/if-you-are-sick/care-for-someone.html     [facility]: Interim Guidance for Hospital Discharge to Home: www.[facility]./diseases/coronavirus/hcp/hospdischarge.pdf    [facility] clinical trials (COVID-19 research studies): [email or web address].[facility]-clinical-trials     Below are the COVID-19 hotlines at the [place] [facility] ([facility]). Interpreters are available.   o For health questions: Call 395-645-8907 or 1-273.921.7393 (7 a.m. to 7 p.m.)  o For questions about schools and childcare: Call 095-597-9524 or 1-319.114.9458 (7 a.m. to 7 p.m.)                   Reason for Disposition    [1] COVID-19 diagnosed by positive lab test (e.g., PCR, rapid self-test kit) AND [2] mild symptoms (e.g., cough, fever, others) AND [3] no complications or SOB    Additional Information    Negative: SEVERE difficulty breathing (e.g., struggling for each breath, speaks in single words)    Negative:  Difficult to awaken or acting confused (e.g., disoriented, slurred speech)    Negative: Bluish (or gray) lips or face now    Negative: Shock suspected (e.g., cold/pale/clammy skin, too weak to stand, low BP, rapid pulse)    Negative: Sounds like a life-threatening emergency to the triager    Negative: [1] Diagnosed or suspected COVID-19 AND [2] symptoms lasting 3 or more weeks    Negative: [1] COVID-19 exposure AND [2] no symptoms    Negative: COVID-19 vaccine reaction suspected (e.g., fever, headache, muscle aches) occurring 1 to 3 days after getting vaccine    Negative: COVID-19 vaccine, questions about    Negative: [1] Lives with someone known to have influenza (flu test positive) AND [2] flu-like symptoms (e.g., cough, runny nose, sore throat, SOB; with or without fever)    Negative: [1] Adult with possible COVID-19 symptoms AND [2] triager concerned about severity of symptoms or other causes    Negative: COVID-19 and breastfeeding, questions about    Negative: SEVERE or constant chest pain or pressure  (Exception: Mild central chest pain, present only when coughing.)    Negative: MODERATE difficulty breathing (e.g., speaks in phrases, SOB even at rest, pulse 100-120)    Negative: Headache and stiff neck (can't touch chin to chest)    Negative: Oxygen level (e.g., pulse oximetry) 90 percent or lower    Negative: Chest pain or pressure    Negative: Patient sounds very sick or weak to the triager    Negative: MILD difficulty breathing (e.g., minimal/no SOB at rest, SOB with walking, pulse <100)    Negative: Fever > 103 F (39.4 C)    Negative: [1] Fever > 101 F (38.3 C) AND [2] over 60 years of age    Negative: [1] Fever > 100.0 F (37.8 C) AND [2] bedridden (e.g., nursing home patient, CVA, chronic illness, recovering from surgery)    Negative: HIGH RISK for severe COVID complications (e.g., weak immune system, age > 64 years, obesity with BMI > 25, pregnant, chronic lung disease or other chronic medical condition)  (Exception: Already seen by PCP and no new or worsening symptoms.)    Negative: [1] HIGH RISK patient AND [2] influenza is widespread in the community AND [3] ONE OR MORE respiratory symptoms: cough, sore throat, runny or stuffy nose    Negative: [1] HIGH RISK patient AND [2] influenza exposure within the last 7 days AND [3] ONE OR MORE respiratory symptoms: cough, sore throat, runny or stuffy nose    Negative: Oxygen level (e.g., pulse oximetry) 91 to 94 percent    Negative: [1] COVID-19 diagnosed by positive lab test (e.g., PCR, rapid self-test kit) AND [2] NO symptoms (e.g., cough, fever, others)    Negative: [1] COVID-19 infection suspected by caller or triager AND [2] mild symptoms (cough, fever, or others) AND [3] negative COVID-19 rapid test    Negative: Fever present > 3 days (72 hours)    Negative: [1] Fever returns after gone for over 24 hours AND [2] symptoms worse or not improved    Negative: [1] Continuous (nonstop) coughing interferes with work or school AND [2] no improvement using cough treatment per Care Advice    Negative: Cough present > 3 weeks    Protocols used: CORONAVIRUS (COVID-19) DIAGNOSED OR [id number] 1.18.2022

## 2022-04-27 NOTE — PROGRESS NOTES
"Kimmie is a 56 year old who is being evaluated via a billable video visit.      How would you like to obtain your AVS? MyChart  If the video visit is dropped, the invitation should be resent by: Text to cell phone: [phone]  Will anyone else be joining your video visit? No    Video Start Time: 8;40    Assessment & Plan     Panic attack  Discontinue Alprazolam while on Paxlovid    - nirmatrelvir and ritonavir (PAXLOVID) therapy pack; Take 3 tablets by mouth 2 times daily for 5 days Take 2 Nirmatrelvir tablets and 1 Ritonavir tablet twice daily for 5 days.  - LORazepam (ATIVAN) 0.5 MG tablet; Take 1 tablet (0.5 mg) by mouth every 6 hours as needed for anxiety    Cough  Worsening ( Covid pos)   - nirmatrelvir and ritonavir (PAXLOVID) therapy pack; Take 3 tablets by mouth 2 times daily for 5 days Take 2 Nirmatrelvir tablets and 1 Ritonavir tablet twice daily for 5 days.    Clinical diagnosis of COVID-19  Test pos on 04/23/2022  - nirmatrelvir and ritonavir (PAXLOVID) therapy pack; Take 3 tablets by mouth 2 times daily for 5 days Take 2 Nirmatrelvir tablets and 1 Ritonavir tablet twice daily for 5 days.    Prescription drug management reviewed all drug interactions and made adjustments   Will discontinue Flonase while on Paxlovid   Reviewed HRT and possible menopausal symptoms  Will discontinue Alprazolam and start Lorazepam    40 minutes spent on the date of the encounter doing chart review, patient visit and documentation        See Patient Instructions    No follow-ups on file.    JEREMY [facility]   Kimmie is a 56 year old who presents for the following health issues  accompanied by her alone.    HPI   04/23/2022: onset of Symptoms and Pos Covid test  Patient has significant nasal congestion,   Reports a \"tight chest\" but is not short of breath.   Cough and respiratory symptoms are getting worse.  Not sleeping well  Takes Alprazolam for anxiety daily  Is hydrating " well      COVID-19 Symptom Review  How many days ago did these symptoms start? 4    Are any of the following symptoms significant for you?  New or worsening difficulty breathing? Yes    Please describe what kind of difficulty you are having breathing:Mild dyspnea (able to do ADLs without difficulty, mild shortness of breath with activities such as climbing one or two flights of stairs or walking briskly)    Worsening cough? Yes, I am coughing up mucus.    Fever or chills? Yes, I felt feverish or had chills.    Headache: YES    Sore throat: YES    Chest pain: no    Diarrhea: no    Body aches? YES    What treatments has patient tried? Nonsteroidals and Nasal steroid spray   Does patient live in a nursing home, group home, or shelter? no  Does patient have a way to get food/medications during quarantined? Yes, I have a friend or family member who can help me.               MASSBP Score 4/27/2022   Age Greater than or equal to 65 years 0   BMI greater than or equal to 35 kg/m2 0   Has Diabetes Mellitus 0   Has Chronic Kidney Disease 0   Has Cardiovascular Disease and 55 years or older 0   Has Chronic Respiratory Disease and 55 years or older 0   Has Hypertension and 55 years or older 0   Is Immunocompromised 0   Is Pregnant 0   Member of BIPOC community (Black/, /, ,  or , or  or [place] Native)  0   MASSBP Score 0   other risk comorbidities include: Depression     Review of Systems   Constitutional, HEENT, cardiovascular, pulmonary, gi and gu systems are negative, except as otherwise noted.      Objective           Vitals:  No vitals were obtained today due to virtual visit.    Physical Exam   GENERAL: Healthy, alert and no distress  EYES: Eyes grossly normal to inspection.  No discharge or erythema, or obvious scleral/conjunctival abnormalities.  RESP: Dry cough heard during visit.  Can talk without dyspnea  SKIN: Visible skin clear. No  significant rash, abnormal pigmentation or lesions.  NEURO: Cranial nerves grossly intact.  Mentation and speech appropriate for age.  PSYCH: Mentation appears normal, affect normal/bright, judgement and insight intact, normal speech and appearance well-groomed.          Video-Visit Details    Type of service:  Video Visit    Video End Time:9;10    Originating Location (pt. Location): Home    Distant Location (provider location):  [facility]     Platform used for Video Visit: [facility] (Lori) CNP  [facility]  Certificate in Travel Health

## 2022-04-27 NOTE — PATIENT INSTRUCTIONS

## 2022-04-29 DIAGNOSIS — G43.009 MIGRAINE WITHOUT AURA AND WITHOUT STATUS MIGRAINOSUS, NOT INTRACTABLE: ICD-10-CM

## 2022-05-02 RX ORDER — SUMATRIPTAN 50 MG/1
TABLET, FILM COATED ORAL
Qty: 30 TABLET | Refills: 3 | Status: SHIPPED | OUTPATIENT
Start: 2022-05-02 | End: 2022-08-08

## 2022-05-02 NOTE — TELEPHONE ENCOUNTER
Routing refill request to provider for review/approval because:   Serotonin Agonist request needs review.    Last Written Prescription Date:  12/17/21  Last Fill Quantity: 30,  # refills: 3   Last office visit: 1/31/2022 with prescribing provider:  Dr. Spence   Future Office Visit:  none    Alix James RN  [facility]

## 2022-05-09 ENCOUNTER — VIRTUAL VISIT (OUTPATIENT)
Dept: PSYCHIATRY | Facility: CLINIC | Age: 57
End: 2022-05-09
Payer: COMMERCIAL

## 2022-05-09 DIAGNOSIS — F41.1 GAD (GENERALIZED ANXIETY DISORDER): ICD-10-CM

## 2022-05-09 DIAGNOSIS — F33.0 MAJOR DEPRESSIVE DISORDER, RECURRENT EPISODE, MILD (H): Primary | ICD-10-CM

## 2022-05-09 PROCEDURE — 99214 OFFICE O/P EST MOD 30 MIN: CPT | Mod: 95 | Performed by: NURSE PRACTITIONER

## 2022-05-09 RX ORDER — VENLAFAXINE HYDROCHLORIDE 75 MG/1
75 CAPSULE, EXTENDED RELEASE ORAL DAILY
Qty: 180 CAPSULE | Refills: 0 | COMMUNITY
Start: 2022-05-09 | End: 2022-08-30

## 2022-05-09 NOTE — PROGRESS NOTES
"Kimmie is a 56 year old who is being evaluated via a billable video visit.      How would you like to obtain your AVS? MyChart  If the video visit is dropped, the invitation should be resent by: Text to cell phone: [phone]  Will anyone else be joining your video visit? No     Aleja Drummond VF    Video Start Time: 2:57 PM  Video-Visit Details    Type of service:  Video Visit    Video End Time:3:30 PM    Originating Location (pt. Location): Home    Distant Location (provider location):  [facility]     Platform used for Video Visit: [facility]              Outpatient Psychiatric Progress Note    Name: Catie Nuñez   : 1965                    Primary Care Provider: Iglesia Martinez DO   Therapist: Yes    PHQ-9 scores:  PHQ-9 SCORE 2021   PHQ-9 Total Score MyChart - - 4 (Minimal depression)   PHQ-9 Total Score 3 4 4   Some encounter information is confidential and restricted. Go to Review Flowsheets activity to see all data.       DAYLIN-7 scores:  DAYLIN-7 SCORE 2021   Total Score - - -   Total Score 6 (mild anxiety) - -   Total Score 6 2 7       Patient Identification:    Patient is a 56 year old year old, single  White Not  or  female  who presents for return visit with me.  Patient is currently unemployed. Patient attended the session alone. Patient prefers to be called: \" Kimmie\".    Current medications include: SUMAtriptan (IMITREX) 50 MG tablet, TAKE 1 TABLET(50 MG) BY MOUTH AT ONSET OF HEADACHE FOR MIGRAINE. MAY REPEAT IN 2 HOURS. MAX 4 TABLETS/ 24 HOURS  albuterol (PROAIR HFA/PROVENTIL HFA/VENTOLIN HFA) 108 (90 Base) MCG/ACT inhaler, Inhale 1-2 puffs into the lungs every 4 hours as needed for shortness of breath / dyspnea or wheezing  ALPRAZolam (XANAX) 0.5 MG tablet, TAKE 1/2 TO 1 TABLET BY MOUTH DAILY AS NEEDED (30 tablets to last 30 days)  Ascorbic Acid (VITAMIN C) 100 MG CHEW, "   Aspirin-Acetaminophen-Caffeine (EXCEDRIN PO), Take by mouth as needed  estradiol (ESTRACE) 1 MG tablet, Take 0.5 tablets (0.5 mg) by mouth daily  fluticasone (FLONASE) 50 MCG/ACT nasal spray, SHAKE LIQUID AND USE 1 TO 2 SPRAYS IN EACH NOSTRIL DAILY  hydrOXYzine (VISTARIL) 25 MG capsule, Take 1-2 capsules (25-50 mg) by mouth 3 times daily as needed for other (pain)  LORazepam (ATIVAN) 0.5 MG tablet, Take 1 tablet (0.5 mg) by mouth every 6 hours as needed for anxiety  medroxyPROGESTERone (PROVERA) 2.5 MG tablet, Take 1 tablet (2.5 mg) by mouth daily  spacer (OPTICHAMBER TANYA) holding chamber, For use with rescue inhaler (Patient not taking: No sig reported)  traMADol (ULTRAM) 50 MG tablet, Take 1 tablet (50 mg) by mouth 2 times daily as needed for severe pain . 50 tablets to last 30 days.  venlafaxine (EFFEXOR-XR) 75 MG 24 hr capsule, TAKE 2 CAPSULES(150 MG) BY MOUTH DAILY    No current facility-administered medications on file prior to visit.       The [place] Prescription Monitoring Program has been reviewed and there are no concerns about diversionary activity for controlled substances at this time.      I was able to review most recent Primary Care Provider, specialty provider, and therapy visit notes that I have access to.     Today, patient reports that she just got over COVID.  She  Became SOB.  She is taking the Venlafaxine at the same dose.  When she took a higher dose she had more migraine pain.  Her son started venlafaxine and has less OCD.   She is taking tramadol for neck pain.  She had been taking Ativan for a few days but not now.       has a past medical history of Abnormal Pap smear of cervix (11/07/2019), Anxiety, Arthritis (3 yrs ago), ASCUS with positive high risk HPV (6/2015,09/14/16), Cervical high risk HPV (human papillomavirus) test positive (11/07/2019), Depression, Depressive disorder (Age 12), Fracture of great toe (02/20/2014), History of scoliosis, colposcopy with cervical biopsy  (10/06/2016), MVP (mitral valve prolapse), and Unexplained endometrial cells on cervical Pap smear (06/2015).    She has no past medical history of Cancer (H), Cerebral infarction (H), Congestive heart failure (H), COPD (chronic obstructive pulmonary disease) (H), Diabetes (H), History of blood transfusion, Hypertension, Thyroid disease, or Uncomplicated asthma.    Social history updates:    No changes in [facility] social history to report    Substance use updates:    No alcohol use to report  Tobacco use: No    Vital Signs:   LMP 09/01/2016 (LMP Unknown)     Labs:    Most recent laboratory results reviewed and no new labs.     Mental Status Examination:  Appearance: awake, alert  Attitude: cooperative  Eye Contact:  adequate  Gait and Station: No assistive Devices used and No dizziness or falls  Psychomotor Behavior:  intact station, gait and muscle tone  Oriented to:  time, person, and place  Attention Span and Concentration:  Normal  Speech:   clear, coherent and Speaks: English  Mood:  anxious, depressed and better  Affect:  mood congruent  Associations:  no loose associations  Thought Process:  goal oriented  Thought Content:  no evidence of suicidal ideation or homicidal ideation and no auditory hallucinations present  Recent and Remote Memory:  intact Not formally assessed. No amnesia.  Fund of Knowledge: appropriate  Insight:  good  Judgment:  intact  Impulse Control:  intact    Suicide Risk Assessment:  Today Catie Nuñez reports no thoughts to harm themself or others. In addition, there are notable risk factors for self-harm, including anxiety. However, risk is mitigated by commitment to family, history of seeking help when needed, future oriented, denies suicidal intent or plan and denies homicidal ideation, intent, or plan. Therefore, based on all available evidence including the factors cited above, Catie Nuñez does not appear to be at imminent risk for self-harm, does not meet criteria for a 72-hr  hold, and therefore remains appropriate for ongoing outpatient level of care.  A thorough assessment of risk factors related to suicide and self-harm have been reviewed and are noted above. The patient convincingly denies suicidality on several occasions. Local community safety resources printed and reviewed for patient to use if needed. There was no deceit detected, and the patient presented in a manner that was believable.     DSM5 Diagnosis:  296.31 (F33.0) Major Depressive Disorder, Recurrent Episode, Mild _ and With mixed features  300.02 (F41.1) Generalized Anxiety Disorder    Medical comorbidities include:   Patient Active Problem List    Diagnosis Date Noted     Panic attack 04/11/2022     Priority: Medium     Mitral valve prolapse 04/11/2022     Priority: Medium     Major depressive disorder, recurrent episode, mild (H) 01/31/2022     Priority: Medium     Chronic, continuous use of opioids 08/11/2021     Priority: Medium     Tension headache 08/05/2020     Priority: Medium     Medication overuse headache 08/05/2020     Priority: Medium     Cervical high risk human papillomavirus (HPV 16) DNA test positive 04/03/2019     Priority: Medium     Chronic pain syndrome 11/16/2018     Priority: Medium     Acute seasonal allergic rhinitis, unspecified trigger 03/29/2018     Priority: Medium     Papanicolaou smear of cervix with low grade squamous intraepithelial lesion (LGSIL) 01/05/2018     Priority: Medium     HSIL (high grade squamous intraepithelial lesion) on Pap smear of cervix 10/06/2016     Priority: Medium     6/2015: ASCUS, + HPV 16 & other HR HPV with unexplained endometrial cells. Needs colp and endo bx, US also planned.  7/7/15: [place] - atypia, no endo bx needed as US showed thin endometrium. Plan cotest pap & HPV in 1 year  09/14/16: ASCUS + HR HPV 16 and other. Plan [place] per provider  10/06/16: [place] Bx NIL. Plan Cotest in 1 year per provider.  12/19/17 LSIL, +HPV 16 & other HR type. Plan: [place] per  guidelines.  01/05/18: [place] ECC benign neg for dysplasia. Plan cotest in 1 year.   2/12/19 NIL pap, + HR HPV 16. Plan: colpo  04/3/19: [place] ECC benign. Plan pap in 1 year.   11/07/19: [facility]IL Pap, + HR HPV 16 result. Plan cotest in 1 year, if next one is abnormal [place] at that time.   10/27/20 [facility] reminder sent -- Read  11/30/20 Virtual FP Visit -- Note added  12/28/20 Lost to follow-up for pap tracking  3/11/21 HSIL pap, LSIL also present, Neg HPV. Plan colp due bef 6/11/21. If pt prefers immediate LEEP without colpo first, provider is okay with that plan. Pt changed mind to    3/18/21 Pt notified and wants to schedule colp first >> 03/19/21 Pt called and is concerned about colpo being 1 month out - pt notified of recommendation for colp within 3 months but also offered to send message to Dr. Alarcon about possibly working her in sooner - Tele enc sent to provider  03/22/21 [place] Bx and ECC Neg for Dysplasia, Plan LEEP now per pt's preference due bef 06/22/21.  Provider released results and recommendations to the pt through [employer], pt viewed.  03/29/21 Phone call to the pt, new plan see pt's preference above.   04/28/21 Per clinic RN, Pt canceled her LEEP and will now come back for a cotest due by 09/22/21.   11/15/21 NIL Pap, + HR HPV type 16. Plan [place] per provider's office note, due bef 02/15/22.  11/19/21 Pt was advised.  01/24/22 [place] Bx VLADIMIR 1, ECC scant small fragments of ectocervical squamous mucosa with non gradable dysplasia, area worrisome for high-grade dysplasia. Plan LEEP due bef 04/24/22.   2/3/22 LEEP - VLADIMIR 1. ECC - Negative. Margins negative. Plan 6 month co-test / notified per [facility]       Chronic prescription benzodiazepine use 09/23/2015     Priority: Medium     Patient is followed by JENN BLOCK for ongoing prescription of benzodiazepines.  All refills should be approved by this provider, or covering partner.    Medication(s): xanax and tramadol.   Maximum quantity per month: xanax  40 tablets per month and tramadol 20 tablets per 3 months.  Clinic visit frequency required: Q 3 months     Controlled substance agreement on file: Yes       Date(s): 9/2015      Last [facility] website verification:  done on 1/5/2016   [email or web address]    **Catie has been compliant with her clinic appointments for refills, but I would recommend that appointments are REQUIRED for her refills.       Migraine without aura and without status migrainosus, not intractable 09/16/2014     Priority: Medium     Problem list name updated by automated process. Provider to review       Pulmonary nodule 02/18/2014     Priority: Medium     Generalized anxiety disorder 10/28/2013     Priority: Medium     Diagnosis updated by automated process. Provider to review and confirm.       CARDIOVASCULAR SCREENING; LDL GOAL LESS THAN 160 10/23/2013     Priority: Medium     Cervicalgia 10/04/2013     Priority: Medium     Cervicogenic headache 10/04/2013     Priority: Medium     Adhesive capsulitis of shoulder 09/21/2013     Priority: Medium     Scoliosis 09/21/2013     Priority: Medium     Shoulder impingement 09/21/2013     Priority: Medium       Assessment:    Catie Nuñez reported in today that when she took a higher dose of venlafaxine she had more frequency and intensity of migraine headaches.  Now that she is on the lower dose she is feeling more stable with less depression and anxiety symptoms.  She tells me her son is taking venlafaxine for OCD tendencies and it is helpful for him..  Noticed she had been prescribed lorazepam but she told me she took it for a few days but not now.  Venlafaxine will continue at 75 mg daily.  Migraine headache and neck pain are relieved when she takes tramadol.    Medication side effects and alternatives were reviewed. Health promotion activities recommended and reviewed today. All questions addressed. Education and counseling completed regarding risks and benefits of medications and  psychotherapy options.    Treatment Plan:        1.  Venlafaxine ER 75 mg daily    2.  Limit use of benzodiazepines to avoid respiratory suppression, increased risk for falls, and confusion    3.  Talk therapy to learn new methods to process anxiety        Continue all other medications as reviewed per electronic medical record today.     Safety plan reviewed. To the Emergency Department as needed or call after hours crisis line at [phone] or [phone]. [place] Crisis Text Line. Text [place] to 490645 or Suicide LifeLine Chat: Webcrumbz.org/chat/    To schedule individual or family therapy, call [facility] at [phone]    Schedule an appointment with me in 6 weeks or sooner as needed. Call [facility] at [phone] to schedule.    Follow up with primary care provider as planned or for acute medical concerns.    Call the psychiatric nurse line with medication questions or concerns at [phone]    MyChart may be used to communicate with your provider, but this is not intended to be used for emergencies.    Crisis Resources:    National Suicide Prevention Lifeline: 765.617.8342 (TTY: 765.376.7771). Call anytime for help.  (www.suicidepreventionlifeline.org)  National [place] on Mental Illness (www.virgilio.org): 749.156.9129 or 698-729-4605.   Mental Health Association (www.mentalhealth.org): 887.578.7226 or 841-289-5314.  [place] Crisis Text Line: Text [place] to 795356  Suicide LifeLine Chat: suicideWorktopia.org/chat    Administrative Billing:   Time spent with patient includes counseling and coordination of care regarding above diagnoses and treatment plan.    Patient Status:  Patient will continue to be seen for ongoing consultation and stabilization.    Signed:   SHANNAN Mills-BC   Psychiatry

## 2022-05-16 NOTE — TELEPHONE ENCOUNTER
RECORDS RECEIVED FROM: Internal   DATE RECEIVED: 5.24.22   NOTES STATUS DETAILS   OFFICE NOTE from referring provider    Internal 4.11.22 YNES Martinez [place]   OFFICE NOTE from other cardiologist        DISCHARGE SUMMARY from hospital        DISCHARGE REPORT from the ER       OPERATIVE REPORT        MEDICATION LIST   Internal    LABS     BMP       CBC   Internal 2.1.22   CMP       Lipids       TSH   Internal 3.11.21   DIAGNOSTIC PROCEDURES     EKG       Monitor Reports       IMAGING (DISC & REPORT)      Echo       Stress Tests       Cath       MRI/MRA       CT/CTA

## 2022-05-22 NOTE — PROGRESS NOTES
[facility]  CARDIOVASCULAR MEDICINE CLINIC NOTE    Referring Provider: Iglesia Martinez   Primary Care Provider: Iglesia Martinez     Patient Name: Catie Nuñez   MRN: [id number]       Chief complaint: mitral valve prolapse?    HPI:   Catie Nuñez is a 56 year old female with a pmhx sig for depression anxiety cervcal high grade dysplasia as/p colposcopy and LEEP 2/3/22 who presents for a history of mitral valve prolapse     She works from home. She lives in a 3 story home and goes up and down but is not actually exercising. She had MVP diagnosed in her early 20's when [place] was [place] job and has never been checked. We do not have records  This. She describes palpitations once every few days lasting a few seconds and it makes her anxious. Last labs show normal electrolytes (2019). She has never passed out.   Does not remember what her dad had but lived to his 90's. siblings are healthy. She has had 5 pregnancies 2 miscarriages early in the course of her pregnancy.     We do not have records of prior echo, she denies though any RUANO, swelling PND.     Current cardiac meds  none    CURRENT MEDICATIONS:  Current Outpatient Medications   Medication Sig Dispense Refill     ALPRAZolam (XANAX) 0.5 MG tablet TAKE 1/2 TO 1 TABLET BY MOUTH DAILY AS NEEDED (30 tablets to last 30 days) 30 tablet 2     Ascorbic Acid (VITAMIN C) 100 MG CHEW        Aspirin-Acetaminophen-Caffeine (EXCEDRIN PO) Take by mouth as needed       estradiol (ESTRACE) 1 MG tablet Take 0.5 tablets (0.5 mg) by mouth daily 90 tablet 3     fluticasone (FLONASE) 50 MCG/ACT nasal spray SHAKE LIQUID AND USE 1 TO 2 SPRAYS IN EACH NOSTRIL DAILY 16 g 11     hydrOXYzine (VISTARIL) 25 MG capsule Take 1-2 capsules (25-50 mg) by mouth 3 times daily as needed for other (pain) 12 capsule 0     medroxyPROGESTERone (PROVERA) 2.5 MG tablet Take 1 tablet (2.5 mg) by mouth daily 90 tablet 3     SUMAtriptan (IMITREX) 50 MG tablet TAKE 1 TABLET(50  MG) BY MOUTH AT ONSET OF HEADACHE FOR MIGRAINE. MAY REPEAT IN 2 HOURS. MAX 4 TABLETS/ 24 HOURS 30 tablet 3     traMADol (ULTRAM) 50 MG tablet Take 1 tablet (50 mg) by mouth 2 times daily as needed for severe pain . 50 tablets to last 30 days. 50 tablet 2     venlafaxine (EFFEXOR XR) 75 MG 24 hr capsule Take 1 capsule (75 mg) by mouth daily 180 capsule 0     albuterol (PROAIR HFA/PROVENTIL HFA/VENTOLIN HFA) 108 (90 Base) MCG/ACT inhaler Inhale 1-2 puffs into the lungs every 4 hours as needed for shortness of breath / dyspnea or wheezing (Patient not taking: Reported on 5/24/2022) 18 g 0     spacer (OPTICHAMBER TANYA) holding chamber For use with rescue inhaler (Patient not taking: No sig reported) 1 each 0       PAST MEDICAL HISTORY:  Past Medical History:   Diagnosis Date     Abnormal Pap smear of cervix 11/07/2019    3/11/21     Anxiety      Arthritis 3 yrs ago     ASCUS with positive high risk HPV 6/2015,09/14/16    atypia on colp, 09/14/16: ASCUS + HR HPV 16 and other.      Cervical high risk HPV (human papillomavirus) test positive 11/07/2019 11/7/21     Depression      Depressive disorder Age 12     Fracture of great toe 02/20/2014     History of scoliosis      Hx of colposcopy with cervical biopsy 10/06/2016    10/06/16: [place] Bx NIL.     MVP (mitral valve prolapse)      Unexplained endometrial cells on cervical Pap smear 06/2015    thin endometrium on US       PAST SURGICAL HISTORY:  Past Surgical History:   Procedure Laterality Date     COLPOSCOPY CERVIX, LOOP ELECTRODE BIOPSY, COMBINED N/A 2/3/2022    Procedure: COLPOSCOPY, WITH LEEP OF CERVIX;  Surgeon: Chio Sanchez MD;  Location: [facility] OR     NO HISTORY OF SURGERY         ALLERGIES:     Allergies   Allergen Reactions     Ondansetron Nausea and Vomiting     Sulfa Drugs Hives     Rash         FAMILY HISTORY:  Family History   Problem Relation Age of Onset     Other Cancer Mother         AML     Osteoporosis Mother      Heart Disease Father   "    Hypertension Father      Bleeding Diathesis Father         nosebleed that wouldn't stop - required hospitalization     Cerebrovascular Disease Maternal Grandmother      Hypertension Maternal Grandmother      Bleeding Diathesis Paternal Grandmother      Diabetes Other         Developed in older age     Asthma No family hx of      Breast Cancer No family hx of        SOCIAL HISTORY:  Social History     Tobacco Use     Smoking status: Never Smoker     Smokeless tobacco: Never Used   Substance Use Topics     Alcohol use: Yes     Comment: occ, rare      Drug use: Never       ROS:   A comprehensive 14 point review of systems is negative other than as mentioned in HPI.    Exam:  /85 (BP Location: Right arm, Patient Position: Sitting, Cuff Size: Adult Small)   Pulse 95   Ht 1.614 m (5' 3.54\")   Wt 49.9 kg (110 lb 1.6 oz)   LMP 09/01/2016 (LMP Unknown)   SpO2 97%   BMI 19.17 kg/m    Body mass index is 19.17 kg/m .  Wt Readings from Last 2 Encounters:   05/24/22 49.9 kg (110 lb 1.6 oz)   02/09/22 50.9 kg (112 lb 4.8 oz)     Constitutional: no acute distress, pleasant and cooperative, appears overall well.  Eyes:sclera white, conjunctiva clear, without icterus or pallor   Ears/Nose/Mouth/Throat: mucosa moist, no central cyanosis, Nares patent b/l, moist mucous membranes  Cardiovascular: RRR nl S1S2, JVP not elevated, extremities with no edema or cyanosis  Respiratory: clear to auscultation bilaterally -no rales, rhonchi or wheezes, no use of accessory muscles, no retractions, respirations are unlabored, normal respiratory rate  Gastrointestinal: soft, nontender, non distended, no hepatosplenomegaly or masses  Musculoskeletal: normal muscle bulk and tone, joints   Skin: normal skin appearance without worrisome lesions.   Neurologic: Alert and oriented, face symmetric, normal gait  Psychiatric: appropriate affect, cooperative        Labs:  Reviewed.   Recent Labs   Lab Test 06/18/18  1656 02/19/15  1029    " 139   POTASSIUM 4.5 3.9   CHLORIDE 107 106   CO2 27 33*   BUN 8 13   CR 0.64 0.52     CBC RESULTS:   Recent Labs   Lab Test 02/01/22  1314   WBC 7.2   RBC 4.49   HGB 13.8   HCT 41.9   MCV 93   MCH 30.7   MCHC 32.9   RDW 12.6        Cholesterol   Date Value Ref Range Status   02/12/2019 183 <200 mg/dL Final   09/14/2016 217 (H) <200 mg/dL Final     Comment:     Desirable:       <200 mg/dl     HDL Cholesterol   Date Value Ref Range Status   02/12/2019 58 >49 mg/dL Final   09/14/2016 73 >49 mg/dL Final     LDL Cholesterol Calculated   Date Value Ref Range Status   02/12/2019 112 (H) <100 mg/dL Final     Comment:     Above desirable:  100-129 mg/dl  Borderline High:  130-159 mg/dL  High:             160-189 mg/dL  Very high:       >189 mg/dl     09/14/2016 122 (H) <100 mg/dL Final     Comment:     Above desirable:  100-129 mg/dl   Borderline High:  130-159 mg/dL   High:             160-189 mg/dL   Very high:       >189 mg/dl       Triglycerides   Date Value Ref Range Status   02/12/2019 66 <150 mg/dL Final     Comment:     Non Fasting   09/14/2016 111 <150 mg/dL Final     Comment:     Fasting specimen     Cholesterol/HDL Ratio   Date Value Ref Range Status   01/06/2015 2.4 0.0 - 5.0 Final     No results found for: INR    TSH   Date Value Ref Range Status   03/11/2021 3.82 0.40 - 4.00 mU/L Final     No results for input(s): A1C in the last 68869 hours.    Testing/Procedures:  I personally reviewed all the following information:    ECG 5/24/22 with normal sinus rhythm and LA enlargement     Assessment and Plan:   Catie Nuñez is a 56 year old female with history of  56 year old female with a pmhx sig for depression anxiety cervcal high grade dysplasia as/p colposcopy and LEEP 2/3/22 who presents for a history of mitral valve prolapse     MVP  Symptoms- only palpitations has no heart failure symptoms   Echo    Palpitations  - zio patch to correlate rhythm with symptoms  - TSH, BMP    -we discussed recommendation  of 150 min moderate intensity exercise /week   - discussed the need for heart healthy diet including a diet rich in fruits, vegetables and fiber and low on carbonated beverages sugar  -discussed recommendation of moderation of alcohol, salt and NSAIDs    >50% of this 60 minute visit were spent with the patient and ** family on in-person counseling and discussion regarding the above issues, the remainder of the time was spent in chart review, documentation, ordering and communication with other providers.    The patient states understanding and is agreeable with plan.     Berta Gomez MD  Cardiology    CC  [facility]

## 2022-05-24 ENCOUNTER — OFFICE VISIT (OUTPATIENT)
Dept: CARDIOLOGY | Facility: CLINIC | Age: 57
End: 2022-05-24
Attending: FAMILY MEDICINE
Payer: COMMERCIAL

## 2022-05-24 ENCOUNTER — ANCILLARY PROCEDURE (OUTPATIENT)
Dept: CARDIOLOGY | Facility: CLINIC | Age: 57
End: 2022-05-24
Attending: STUDENT IN AN ORGANIZED HEALTH CARE EDUCATION/TRAINING PROGRAM
Payer: COMMERCIAL

## 2022-05-24 ENCOUNTER — PRE VISIT (OUTPATIENT)
Dept: CARDIOLOGY | Facility: CLINIC | Age: 57
End: 2022-05-24
Payer: COMMERCIAL

## 2022-05-24 VITALS
BODY MASS INDEX: 18.8 KG/M2 | WEIGHT: 110.1 LBS | DIASTOLIC BLOOD PRESSURE: 85 MMHG | HEART RATE: 95 BPM | OXYGEN SATURATION: 97 % | HEIGHT: 64 IN | SYSTOLIC BLOOD PRESSURE: 129 MMHG

## 2022-05-24 DIAGNOSIS — R00.2 PALPITATIONS: ICD-10-CM

## 2022-05-24 DIAGNOSIS — I34.1 MITRAL VALVE PROLAPSE: Primary | ICD-10-CM

## 2022-05-24 DIAGNOSIS — I34.1 MITRAL VALVE PROLAPSE: ICD-10-CM

## 2022-05-24 PROCEDURE — 93244 EXT ECG>48HR<7D REV&INTERPJ: CPT | Performed by: INTERNAL MEDICINE

## 2022-05-24 PROCEDURE — G0463 HOSPITAL OUTPT CLINIC VISIT: HCPCS | Mod: 25

## 2022-05-24 PROCEDURE — 99205 OFFICE O/P NEW HI 60 MIN: CPT | Mod: 25 | Performed by: STUDENT IN AN ORGANIZED HEALTH CARE EDUCATION/TRAINING PROGRAM

## 2022-05-24 PROCEDURE — 93005 ELECTROCARDIOGRAM TRACING: CPT

## 2022-05-24 ASSESSMENT — PAIN SCALES - GENERAL: PAINLEVEL: NO PAIN (0)

## 2022-05-24 NOTE — LETTER
5/24/2022      RE: Catie Nuñez  [address] [facility] [address]       Dear Colleague,    Thank you for the opportunity to participate in the care of your patient, Catie Nuñez, at the [facility] HEART [facility]IN[facility] [place] [facility]. Please see a copy of my visit note below.    [facility]  CARDIOVASCULAR MEDICINE CLINIC NOTE    Referring Provider: Iglesia Martinez   Primary Care Provider: Iglesia Martinez     Patient Name: Catie Nuñez   MRN: [id number]       Chief complaint: mitral valve prolapse?    HPI:   Catie Nuñez is a 56 year old female with a pmhx sig for depression anxiety cervcal high grade dysplasia as/p colposcopy and LEEP 2/3/22 who presents for a history of mitral valve prolapse     She works from home. She lives in a 3 story home and goes up and down but is not actually exercising. She had MVP diagnosed in her early 20's when [place] was [place] do a [employer] job and has never been checked. We do not have records  This. She describes palpitations once every few days lasting a few seconds and it makes her anxious. Last labs show normal electrolytes (2019). She has never passed out.   Does not remember what her dad had but lived to his 90's. siblings are healthy. She has had 5 pregnancies 2 miscarriages early in the course of her pregnancy.     We do not have records of prior echo, she denies though any RUANO, swelling PND.     Current cardiac meds  none    CURRENT MEDICATIONS:  Current Outpatient Medications   Medication Sig Dispense Refill     ALPRAZolam (XANAX) 0.5 MG tablet TAKE 1/2 TO 1 TABLET BY MOUTH DAILY AS NEEDED (30 tablets to last 30 days) 30 tablet 2     Ascorbic Acid (VITAMIN C) 100 MG CHEW        Aspirin-Acetaminophen-Caffeine (EXCEDRIN PO) Take by mouth as needed       estradiol (ESTRACE) 1 MG tablet Take 0.5 tablets (0.5 mg) by mouth daily 90 tablet 3     fluticasone (FLONASE) 50 MCG/ACT nasal spray SHAKE  LIQUID AND USE 1 TO 2 SPRAYS IN EACH NOSTRIL DAILY 16 g 11     hydrOXYzine (VISTARIL) 25 MG capsule Take 1-2 capsules (25-50 mg) by mouth 3 times daily as needed for other (pain) 12 capsule 0     medroxyPROGESTERone (PROVERA) 2.5 MG tablet Take 1 tablet (2.5 mg) by mouth daily 90 tablet 3     SUMAtriptan (IMITREX) 50 MG tablet TAKE 1 TABLET(50 MG) BY MOUTH AT ONSET OF HEADACHE FOR MIGRAINE. MAY REPEAT IN 2 HOURS. MAX 4 TABLETS/ 24 HOURS 30 tablet 3     traMADol (ULTRAM) 50 MG tablet Take 1 tablet (50 mg) by mouth 2 times daily as needed for severe pain . 50 tablets to last 30 days. 50 tablet 2     venlafaxine (EFFEXOR XR) 75 MG 24 hr capsule Take 1 capsule (75 mg) by mouth daily 180 capsule 0     albuterol (PROAIR HFA/PROVENTIL HFA/VENTOLIN HFA) 108 (90 Base) MCG/ACT inhaler Inhale 1-2 puffs into the lungs every 4 hours as needed for shortness of breath / dyspnea or wheezing (Patient not taking: Reported on 5/24/2022) 18 g 0     spacer (OPTICHAMBER TANYA) holding chamber For use with rescue inhaler (Patient not taking: No sig reported) 1 each 0       PAST MEDICAL HISTORY:  Past Medical History:   Diagnosis Date     Abnormal Pap smear of cervix 11/07/2019    3/11/21     Anxiety      Arthritis 3 yrs ago     ASCUS with positive high risk HPV 6/2015,09/14/16    atypia on colp, 09/14/16: ASCUS + HR HPV 16 and other.      Cervical high risk HPV (human papillomavirus) test positive 11/07/2019 11/7/21     Depression      Depressive disorder Age 12     Fracture of great toe 02/20/2014     History of scoliosis      Hx of colposcopy with cervical biopsy 10/06/2016    10/06/16: [place] Bx NIL.     MVP (mitral valve prolapse)      Unexplained endometrial cells on cervical Pap smear 06/2015    thin endometrium on US       PAST SURGICAL HISTORY:  Past Surgical History:   Procedure Laterality Date     COLPOSCOPY CERVIX, LOOP ELECTRODE BIOPSY, COMBINED N/A 2/3/2022    Procedure: COLPOSCOPY, WITH LEEP OF CERVIX;  Surgeon: Daniel  "Chio Collado MD;  Location: [facility] OR     NO HISTORY OF SURGERY         ALLERGIES:     Allergies   Allergen Reactions     Ondansetron Nausea and Vomiting     Sulfa Drugs Hives     Rash         FAMILY HISTORY:  Family History   Problem Relation Age of Onset     Other Cancer Mother         AML     Osteoporosis Mother      Heart Disease Father      Hypertension Father      Bleeding Diathesis Father         nosebleed that wouldn't stop - required hospitalization     Cerebrovascular Disease Maternal Grandmother      Hypertension Maternal Grandmother      Bleeding Diathesis Paternal Grandmother      Diabetes Other         Developed in older age     Asthma No family hx of      Breast Cancer No family hx of        SOCIAL HISTORY:  Social History     Tobacco Use     Smoking status: Never Smoker     Smokeless tobacco: Never Used   Substance Use Topics     Alcohol use: Yes     Comment: occ, rare      Drug use: Never       ROS:   A comprehensive 14 point review of systems is negative other than as mentioned in HPI.    Exam:  /85 (BP Location: Right arm, Patient Position: Sitting, Cuff Size: Adult Small)   Pulse 95   Ht 1.614 m (5' 3.54\")   Wt 49.9 kg (110 lb 1.6 oz)   LMP 09/01/2016 (LMP Unknown)   SpO2 97%   BMI 19.17 kg/m    Body mass index is 19.17 kg/m .  Wt Readings from Last 2 Encounters:   05/24/22 49.9 kg (110 lb 1.6 oz)   02/09/22 50.9 kg (112 lb 4.8 oz)     Constitutional: no acute distress, pleasant and cooperative, appears overall well.  Eyes:sclera white, conjunctiva clear, without icterus or pallor   Ears/Nose/Mouth/Throat: mucosa moist, no central cyanosis, Nares patent b/l, moist mucous membranes  Cardiovascular: RRR nl S1S2, JVP not elevated, extremities with no edema or cyanosis  Respiratory: clear to auscultation bilaterally -no rales, rhonchi or wheezes, no use of accessory muscles, no retractions, respirations are unlabored, normal respiratory rate  Gastrointestinal: soft, nontender, non " distended, no hepatosplenomegaly or masses  Musculoskeletal: normal muscle bulk and tone, joints   Skin: normal skin appearance without worrisome lesions.   Neurologic: Alert and oriented, face symmetric, normal gait  Psychiatric: appropriate affect, cooperative        Labs:  Reviewed.   Recent Labs   Lab Test 06/18/18  1656 02/19/15  1029    139   POTASSIUM 4.5 3.9   CHLORIDE 107 106   CO2 27 33*   BUN 8 13   CR 0.64 0.52     CBC RESULTS:   Recent Labs   Lab Test 02/01/22  1314   WBC 7.2   RBC 4.49   HGB 13.8   HCT 41.9   MCV 93   MCH 30.7   MCHC 32.9   RDW 12.6        Cholesterol   Date Value Ref Range Status   02/12/2019 183 <200 mg/dL Final   09/14/2016 217 (H) <200 mg/dL Final     Comment:     Desirable:       <200 mg/dl     HDL Cholesterol   Date Value Ref Range Status   02/12/2019 58 >49 mg/dL Final   09/14/2016 73 >49 mg/dL Final     LDL Cholesterol Calculated   Date Value Ref Range Status   02/12/2019 112 (H) <100 mg/dL Final     Comment:     Above desirable:  100-129 mg/dl  Borderline High:  130-159 mg/dL  High:             160-189 mg/dL  Very high:       >189 mg/dl     09/14/2016 122 (H) <100 mg/dL Final     Comment:     Above desirable:  100-129 mg/dl   Borderline High:  130-159 mg/dL   High:             160-189 mg/dL   Very high:       >189 mg/dl       Triglycerides   Date Value Ref Range Status   02/12/2019 66 <150 mg/dL Final     Comment:     Non Fasting   09/14/2016 111 <150 mg/dL Final     Comment:     Fasting specimen     Cholesterol/HDL Ratio   Date Value Ref Range Status   01/06/2015 2.4 0.0 - 5.0 Final     No results found for: INR    TSH   Date Value Ref Range Status   03/11/2021 3.82 0.40 - 4.00 mU/L Final     No results for input(s): A1C in the last 20795 hours.    Testing/Procedures:  I personally reviewed all the following information:    ECG 5/24/22 with normal sinus rhythm and LA enlargement     Assessment and Plan:   Catie Nuñez is a 56 year old female with history of   56 year old female with a pmhx sig for depression anxiety cervcal high grade dysplasia as/p colposcopy and LEEP 2/3/22 who presents for a history of mitral valve prolapse     MVP  Symptoms- only palpitations has no heart failure symptoms   Echo    Palpitations  - zio patch to correlate rhythm with symptoms  - TSH, BMP    -we discussed recommendation of 150 min moderate intensity exercise /week   - discussed the need for heart healthy diet including a diet rich in fruits, vegetables and fiber and low on carbonated beverages sugar  -discussed recommendation of moderation of alcohol, salt and NSAIDs    >50% of this 60 minute visit were spent with the patient and ** family on in-person counseling and discussion regarding the above issues, the remainder of the time was spent in chart review, documentation, ordering and communication with other providers.    The patient states understanding and is agreeable with plan.     Berta Gomez MD  Cardiology    CC  [facility]

## 2022-05-24 NOTE — PROGRESS NOTES
"Per Dr. Rubio, patient to have Zio monitor placed.  Diagnosis: mitral valve prolapse [I341]  Monitor placed: No  Patient Instructed: Yes  Patient verbalized understanding: {YES / NO:352001::\"Yes\"}  Holter # [id number]    "

## 2022-05-24 NOTE — NURSING NOTE
Chief Complaint   Patient presents with     New Patient      mitral valve prolapse, referral from Iglesia Martinez DO   Vitals were taken, medications reconciled, and EKG was performed.    José Luis Emmanuel, EMT  12:36 PM

## 2022-05-24 NOTE — PATIENT INSTRUCTIONS
"You were seen today in the Cardiovascular Clinic at the [facility].     Cardiology Providers you saw during your visit: Berta Gomez MD      Diagnosis:   Encounter Diagnosis   Name Primary?    Mitral valve prolapse         Results: Discussed with you today your symptoms       Orders:   Orders Placed This Encounter   Procedures    EKG 12-lead, tracing only (Same Day)         Medications Discontinued:  There are no discontinued medications.      Recommendations:   1. We will do a zio patch to look at what the palpitation feeling is actually in your heart rhythm  2. We will do an ultrasound of the heart to see how it looks   3. Labs to see if we find any reason for palpitations or abnormal heart beats.   4. See you in a year.        Please feel free to call me with any questions or concerns.       Questions: [phone].   First press #1 for [employer] for \"Medical Questions\" to reach us Cardiology Nurses.     [phone].   First press #1 for the [employer] and then press #1     On Call Cardiologist for after hours or on weekends: [phone]   option #4 and ask to speak to the on-call Cardiologist.          If you need a medication refill please contact your pharmacy.  Please allow 3 business days for your refill to be completed.  ________________________________________________________________________________________________________________________________        "

## 2022-05-25 LAB
ATRIAL RATE - MUSE: 89 BPM
DIASTOLIC BLOOD PRESSURE - MUSE: NORMAL MMHG
INTERPRETATION ECG - MUSE: NORMAL
P AXIS - MUSE: 67 DEGREES
PR INTERVAL - MUSE: 144 MS
QRS DURATION - MUSE: 62 MS
QT - MUSE: 344 MS
QTC - MUSE: 418 MS
R AXIS - MUSE: -35 DEGREES
SYSTOLIC BLOOD PRESSURE - MUSE: NORMAL MMHG
T AXIS - MUSE: 34 DEGREES
VENTRICULAR RATE- MUSE: 89 BPM

## 2022-06-04 DIAGNOSIS — J30.2 ACUTE SEASONAL ALLERGIC RHINITIS: ICD-10-CM

## 2022-06-07 RX ORDER — FLUTICASONE PROPIONATE 50 MCG
1-2 SPRAY, SUSPENSION (ML) NASAL DAILY
Qty: 16 G | Refills: 11 | Status: SHIPPED | OUTPATIENT
Start: 2022-06-07 | End: 2023-06-23

## 2022-06-07 NOTE — TELEPHONE ENCOUNTER
Routing refill request to provider for review/approval because:  Last ordered by ABIOLA TorresN RN  [facility]

## 2022-06-15 ENCOUNTER — MYC MEDICAL ADVICE (OUTPATIENT)
Dept: FAMILY MEDICINE | Facility: CLINIC | Age: 57
End: 2022-06-15
Payer: COMMERCIAL

## 2022-06-15 NOTE — TELEPHONE ENCOUNTER
Dr. Martinez was the ordering provider for both of these meds. These rx are suppose to last for 30 days.    Dr. Martinez- are you OK approving the refill on 6/16/22 with the understanding , the rx are to last 30 days.  PT noted pharmacy wouldn't fill until 6/17/22.    LILLY Son

## 2022-06-15 NOTE — TELEPHONE ENCOUNTER
Please see below message-patient hopeful for a phone call at the very least to let her know if this can be done or not.

## 2022-06-16 NOTE — TELEPHONE ENCOUNTER
I called pharmacy to authorize early fill today on alprazolam and tramadol. Notified pt.    SHAWANDA Henderson RN  [facility]

## 2022-06-19 ENCOUNTER — HEALTH MAINTENANCE LETTER (OUTPATIENT)
Age: 57
End: 2022-06-19

## 2022-07-01 ENCOUNTER — HOSPITAL ENCOUNTER (OUTPATIENT)
Dept: MAMMOGRAPHY | Facility: CLINIC | Age: 57
Discharge: HOME | End: 2022-07-01
Attending: FAMILY MEDICINE | Admitting: FAMILY MEDICINE
Payer: COMMERCIAL

## 2022-07-01 DIAGNOSIS — Z12.31 VISIT FOR SCREENING MAMMOGRAM: ICD-10-CM

## 2022-07-01 PROCEDURE — 77067 SCR MAMMO BI INCL CAD: CPT

## 2022-07-05 ENCOUNTER — MYC MEDICAL ADVICE (OUTPATIENT)
Dept: FAMILY MEDICINE | Facility: CLINIC | Age: 57
End: 2022-07-05

## 2022-07-05 DIAGNOSIS — G89.4 CHRONIC PAIN SYNDROME: ICD-10-CM

## 2022-07-05 DIAGNOSIS — G44.86 CERVICOGENIC HEADACHE: ICD-10-CM

## 2022-07-05 DIAGNOSIS — M54.2 CERVICALGIA: ICD-10-CM

## 2022-07-05 DIAGNOSIS — F11.90 CHRONIC, CONTINUOUS USE OF OPIOIDS: ICD-10-CM

## 2022-07-05 DIAGNOSIS — F41.1 GENERALIZED ANXIETY DISORDER: ICD-10-CM

## 2022-07-07 NOTE — TELEPHONE ENCOUNTER
Please see patient message - will be rescheduling for after August 5th.  Routing refill request to provider for review/approval because:  Drug not on the [facility] refill protocol     Last Written Prescription Date:  4/20/22  Last Fill Quantity: 30 days,  # refills: 2   Last office visit: 4/11/22 virtual with Michelle  Future Office Visit:   Next 5 appointments (look out 90 days)    Aug 02, 2022  2:30 PM  (Arrive by 2:10 PM)  Provider Visit with Iglesia Martinez DO  [facility] ([facility] ) [address] [place] [address] [phone]         Writer responded via [employer].    ROWAN Owen RN  [facility]

## 2022-07-11 RX ORDER — ALPRAZOLAM 0.5 MG
TABLET ORAL
Qty: 30 TABLET | Refills: 1 | Status: SHIPPED | OUTPATIENT
Start: 2022-07-16 | End: 2022-08-09

## 2022-07-11 RX ORDER — TRAMADOL HYDROCHLORIDE 50 MG/1
50 TABLET ORAL 2 TIMES DAILY PRN
Qty: 50 TABLET | Refills: 1 | Status: SHIPPED | OUTPATIENT
Start: 2022-07-16 | End: 2022-08-09

## 2022-07-11 NOTE — TELEPHONE ENCOUNTER
Writer responded via [employer].    ABIOLA MengN, RN  [facility]

## 2022-07-18 ENCOUNTER — PATIENT OUTREACH (OUTPATIENT)
Dept: OBGYN | Facility: CLINIC | Age: 57
End: 2022-07-18

## 2022-07-18 DIAGNOSIS — R87.613 HSIL (HIGH GRADE SQUAMOUS INTRAEPITHELIAL LESION) ON PAP SMEAR OF CERVIX: Primary | ICD-10-CM

## 2022-07-19 NOTE — PATIENT INSTRUCTIONS
1.  Venlafaxine ER 75 mg daily  2.  Limit use of benzodiazepines to avoid respiratory suppression, increased risk for falls, and confusion  3.  Talk therapy to learn new methods to process anxiety    Continue all other medications as reviewed per electronic medical record today.   Safety plan reviewed. To the Emergency Department as needed or call after hours crisis line at [phone] or [phone]. [place] Crisis Text Line. Text [place] to [address]91 or Suicide LifeLine Chat: [employer].org/chat/  To schedule individual or family therapy, call [facility] at [phone]  Schedule an appointment with me in 6 weeks or sooner as needed. Call [facility] at [phone] to schedule.  Follow up with primary care provider as planned or for acute medical concerns.  Call the psychiatric nurse line with medication questions or concerns at [phone]  MyChart may be used to communicate with your provider, but this is not intended to be used for emergencies.    Crisis Resources:    National Suicide Prevention Lifeline: 226.890.8836 (TTY: 332.907.3499). Call anytime for help.  (www.suicidepreventionlifeline.org)  National [place] on Mental Illness (www.virgilio.org): 224.663.1859 or 612-708-2069.   Mental Health Association (www.mentalhealth.org): 452.440.3268 or 487-474-0191.  [place] Crisis Text Line: Text [place] to 209421  Suicide LifeLine Chat: [employer]Bl[employer].org/chat

## 2022-08-07 ENCOUNTER — MYC MEDICAL ADVICE (OUTPATIENT)
Dept: FAMILY MEDICINE | Facility: CLINIC | Age: 57
End: 2022-08-07

## 2022-08-07 DIAGNOSIS — M54.2 CERVICALGIA: ICD-10-CM

## 2022-08-07 DIAGNOSIS — F41.1 GENERALIZED ANXIETY DISORDER: ICD-10-CM

## 2022-08-07 DIAGNOSIS — G44.86 CERVICOGENIC HEADACHE: ICD-10-CM

## 2022-08-07 DIAGNOSIS — F11.90 CHRONIC, CONTINUOUS USE OF OPIOIDS: ICD-10-CM

## 2022-08-07 DIAGNOSIS — G89.4 CHRONIC PAIN SYNDROME: ICD-10-CM

## 2022-08-08 DIAGNOSIS — G43.009 MIGRAINE WITHOUT AURA AND WITHOUT STATUS MIGRAINOSUS, NOT INTRACTABLE: ICD-10-CM

## 2022-08-08 NOTE — TELEPHONE ENCOUNTER
Dr. Martinez-Please advise if okay for early refills of Alprazolam and Tramadol?  Patient reports leaving for out of state on 8/10/22.    Thank you!  ABIOLA MengN, RN  [facility]

## 2022-08-08 NOTE — TELEPHONE ENCOUNTER
Patient calling to inquire about the status of this request. States she is leaving town Wednesday which is why this is a last minute request. Please assist. Thanks!

## 2022-08-09 RX ORDER — TRAMADOL HYDROCHLORIDE 50 MG/1
50 TABLET ORAL 2 TIMES DAILY PRN
Qty: 50 TABLET | Refills: 0 | Status: SHIPPED | OUTPATIENT
Start: 2022-08-09 | End: 2022-08-30

## 2022-08-09 RX ORDER — ALPRAZOLAM 0.5 MG
TABLET ORAL
Qty: 30 TABLET | Refills: 0 | Status: SHIPPED | OUTPATIENT
Start: 2022-08-09 | End: 2022-08-30

## 2022-08-09 NOTE — TELEPHONE ENCOUNTER
Dr. Martinez-Please review and sign if agree:  1. [facility] needs new orders due to new regulations with [place]   A. Sig for both medications state a certain number of tablets must last 30 days so a new order is needed for each medication   B. Pharmacist is cancelling the refill from 7/16/22 medication orders in anticipation of new orders being sent today   C. Medications pended without stating certain amount of tablets to last 30 days and with note to pharmacy to refill today due to travel    Writer called [facility] and gave verbal authorization per Dr. Martinez for early refill today due to travel.    Thank you!  ABIOLA MengN, RN  [facility]

## 2022-08-10 NOTE — TELEPHONE ENCOUNTER
Writer responded via [employer].    ABIOLA MengN, RN  [facility]

## 2022-08-10 NOTE — TELEPHONE ENCOUNTER
Routing refill request to provider for review/approval because:  --Based on order history patient may have had 8 or more treatments per month.  --Protocol - Please review patient's record. If patient has had 8 or more treatments in the past month, please forward to provider        --Last Written Prescription Date:    Disp Refills Start End DARA   SUMAtriptan (IMITREX) 50 MG tablet 30 tablet 3 5/2/2022  No   Sig: TAKE 1 TABLET(50 MG) BY MOUTH AT ONSET OF HEADACHE FOR MIGRAINE. MAY REPEAT IN 2 HOURS. MAX 4 TABLETS/ 24 HOURS         --Last visit:  4/27/2022 Michelle for chronic pain +8.    --Future Visit:   Next 5 appointments (look out 90 days)    Aug 30, 2022  3:00 PM  (Arrive by 2:40 PM)  Provider Visit with Iglesia Martinez DO  [facility] ([facility] ) [address] [place] [address] [phone]

## 2022-08-11 RX ORDER — SUMATRIPTAN 50 MG/1
TABLET, FILM COATED ORAL
Qty: 30 TABLET | Refills: 0 | Status: SHIPPED | OUTPATIENT
Start: 2022-08-11 | End: 2022-08-30

## 2022-08-11 NOTE — TELEPHONE ENCOUNTER
Filled, no refills. Will review med and migraine control at follow-up visit.    Iglesia Martinez, DO

## 2022-08-30 ENCOUNTER — OFFICE VISIT (OUTPATIENT)
Dept: FAMILY MEDICINE | Facility: CLINIC | Age: 57
End: 2022-08-30
Payer: COMMERCIAL

## 2022-08-30 VITALS
HEIGHT: 63 IN | SYSTOLIC BLOOD PRESSURE: 135 MMHG | WEIGHT: 116 LBS | HEART RATE: 73 BPM | OXYGEN SATURATION: 96 % | TEMPERATURE: 97 F | RESPIRATION RATE: 16 BRPM | BODY MASS INDEX: 20.55 KG/M2 | DIASTOLIC BLOOD PRESSURE: 85 MMHG

## 2022-08-30 DIAGNOSIS — M54.2 CERVICALGIA: ICD-10-CM

## 2022-08-30 DIAGNOSIS — G89.4 CHRONIC PAIN SYNDROME: ICD-10-CM

## 2022-08-30 DIAGNOSIS — F33.0 MAJOR DEPRESSIVE DISORDER, RECURRENT EPISODE, MILD (H): ICD-10-CM

## 2022-08-30 DIAGNOSIS — Z79.899 CONTROLLED SUBSTANCE AGREEMENT SIGNED: ICD-10-CM

## 2022-08-30 DIAGNOSIS — G44.86 CERVICOGENIC HEADACHE: ICD-10-CM

## 2022-08-30 DIAGNOSIS — F41.1 GENERALIZED ANXIETY DISORDER: ICD-10-CM

## 2022-08-30 DIAGNOSIS — F41.0 PANIC ATTACK: ICD-10-CM

## 2022-08-30 DIAGNOSIS — F11.90 CHRONIC, CONTINUOUS USE OF OPIOIDS: Primary | ICD-10-CM

## 2022-08-30 DIAGNOSIS — G43.009 MIGRAINE WITHOUT AURA AND WITHOUT STATUS MIGRAINOSUS, NOT INTRACTABLE: ICD-10-CM

## 2022-08-30 DIAGNOSIS — N95.1 SYMPTOMATIC MENOPAUSAL OR FEMALE CLIMACTERIC STATES: ICD-10-CM

## 2022-08-30 LAB
AMPHETAMINES UR QL: NOT DETECTED
BARBITURATES UR QL SCN: NOT DETECTED
BENZODIAZ UR QL SCN: NOT DETECTED
BUPRENORPHINE UR QL: NOT DETECTED
CANNABINOIDS UR QL: NOT DETECTED
COCAINE UR QL SCN: NOT DETECTED
D-METHAMPHET UR QL: NOT DETECTED
METHADONE UR QL SCN: NOT DETECTED
OPIATES UR QL SCN: NOT DETECTED
OXYCODONE UR QL SCN: NOT DETECTED
PCP UR QL SCN: NOT DETECTED
PROPOXYPH UR QL: NOT DETECTED
TRICYCLICS UR QL SCN: NOT DETECTED

## 2022-08-30 PROCEDURE — 99214 OFFICE O/P EST MOD 30 MIN: CPT | Performed by: FAMILY MEDICINE

## 2022-08-30 PROCEDURE — 80306 DRUG TEST PRSMV INSTRMNT: CPT | Performed by: FAMILY MEDICINE

## 2022-08-30 RX ORDER — ATENOLOL 50 MG/1
50 TABLET ORAL DAILY
Qty: 90 TABLET | Refills: 0 | Status: SHIPPED | OUTPATIENT
Start: 2022-08-30 | End: 2022-10-05 | Stop reason: SINTOL

## 2022-08-30 RX ORDER — TRAMADOL HYDROCHLORIDE 50 MG/1
50 TABLET ORAL 2 TIMES DAILY PRN
Qty: 50 TABLET | Refills: 0 | Status: SHIPPED | OUTPATIENT
Start: 2022-09-08 | End: 2022-11-30

## 2022-08-30 RX ORDER — SUMATRIPTAN 50 MG/1
TABLET, FILM COATED ORAL
Qty: 30 TABLET | Refills: 1 | Status: SHIPPED | OUTPATIENT
Start: 2022-08-30 | End: 2022-10-24

## 2022-08-30 RX ORDER — ESTRADIOL 1 MG/1
0.5 TABLET ORAL DAILY
Qty: 90 TABLET | Refills: 3 | Status: SHIPPED | OUTPATIENT
Start: 2022-08-30 | End: 2023-09-19

## 2022-08-30 RX ORDER — MEDROXYPROGESTERONE ACETATE 2.5 MG/1
2.5 TABLET ORAL DAILY
Qty: 90 TABLET | Refills: 3 | Status: SHIPPED | OUTPATIENT
Start: 2022-08-30 | End: 2023-08-21

## 2022-08-30 RX ORDER — ALPRAZOLAM 0.5 MG
TABLET ORAL
Qty: 30 TABLET | Refills: 2 | Status: SHIPPED | OUTPATIENT
Start: 2022-09-08 | End: 2022-11-30

## 2022-08-30 RX ORDER — VENLAFAXINE HYDROCHLORIDE 75 MG/1
75 CAPSULE, EXTENDED RELEASE ORAL DAILY
Qty: 180 CAPSULE | Refills: 3 | Status: SHIPPED | OUTPATIENT
Start: 2022-08-30 | End: 2023-10-30

## 2022-08-30 RX ORDER — ALPRAZOLAM 0.5 MG
TABLET ORAL
Qty: 30 TABLET | Refills: 0 | Status: CANCELLED | OUTPATIENT
Start: 2022-08-30

## 2022-08-30 ASSESSMENT — PATIENT HEALTH QUESTIONNAIRE - PHQ9
SUM OF ALL RESPONSES TO PHQ QUESTIONS 1-9: 4
SUM OF ALL RESPONSES TO PHQ QUESTIONS 1-9: 4
10. IF YOU CHECKED OFF ANY PROBLEMS, HOW DIFFICULT HAVE THESE PROBLEMS MADE IT FOR YOU TO DO YOUR WORK, TAKE CARE OF THINGS AT HOME, OR GET ALONG WITH OTHER PEOPLE: SOMEWHAT DIFFICULT

## 2022-08-30 ASSESSMENT — ANXIETY QUESTIONNAIRES
4. TROUBLE RELAXING: SEVERAL DAYS
GAD7 TOTAL SCORE: 6
3. WORRYING TOO MUCH ABOUT DIFFERENT THINGS: SEVERAL DAYS
IF YOU CHECKED OFF ANY PROBLEMS ON THIS QUESTIONNAIRE, HOW DIFFICULT HAVE THESE PROBLEMS MADE IT FOR YOU TO DO YOUR WORK, TAKE CARE OF THINGS AT HOME, OR GET ALONG WITH OTHER PEOPLE: SOMEWHAT DIFFICULT
7. FEELING AFRAID AS IF SOMETHING AWFUL MIGHT HAPPEN: NOT AT ALL
GAD7 TOTAL SCORE: 6
1. FEELING NERVOUS, ANXIOUS, OR ON EDGE: MORE THAN HALF THE DAYS
8. IF YOU CHECKED OFF ANY PROBLEMS, HOW DIFFICULT HAVE THESE MADE IT FOR YOU TO DO YOUR WORK, TAKE CARE OF THINGS AT HOME, OR GET ALONG WITH OTHER PEOPLE?: SOMEWHAT DIFFICULT
7. FEELING AFRAID AS IF SOMETHING AWFUL MIGHT HAPPEN: NOT AT ALL
GAD7 TOTAL SCORE: 6
6. BECOMING EASILY ANNOYED OR IRRITABLE: SEVERAL DAYS
5. BEING SO RESTLESS THAT IT IS HARD TO SIT STILL: NOT AT ALL
2. NOT BEING ABLE TO STOP OR CONTROL WORRYING: SEVERAL DAYS

## 2022-08-30 NOTE — LETTER
Opioid / Opioid Plus Controlled Substance Agreement    This is an agreement between you and your provider about the safe and appropriate use of controlled substance/opioids prescribed by your care team. Controlled substances are medicines that can cause physical and mental dependence (abuse).    There are strict laws about having and using these medicines. We here at [facility] are committing to working with you in your efforts to get better. To support you in this work, we ll help you schedule regular office appointments for medicine refills. If we must cancel or change your appointment for any reason, we ll make sure you have enough medicine to last until your next appointment.     As a Provider, I will:    Listen carefully to your concerns and treat you with respect.     Recommend a treatment plan that I believe is in your best interest. This plan may involve therapies other than opioid pain medication.     Talk with you often about the possible benefits, and the risk of harm of any medicine that we prescribe for you.     Provide a plan on how to taper (discontinue or go off) using this medicine if the decision is made to stop its use.    As a Patient, I understand that opioid(s):     Are a controlled substance prescribed by my care team to help me function or work and manage my condition(s).     Are strong medicines and can cause serious side effects such as:    Drowsiness, which can seriously affect my driving ability    A lower breathing rate, enough to cause death    Harm to my thinking ability     Depression     Abuse of and addiction to this medicine    Need to be taken exactly as prescribed. Combining opioids with certain medicines or chemicals (such as illegal drugs, sedatives, sleeping pills, and benzodiazepines) can be dangerous or even fatal. If I stop opioids suddenly, I may have severe withdrawal symptoms.    Do not work for all types of pain nor for all patients. If they re not helpful, I may  be asked to stop them.        The risks, benefits and side effects of these medicine(s) were explained to me. I agree that:  1. I will take part in other treatments as advised by my care team. This may be psychiatry or counseling, physical therapy, behavioral therapy, group treatment or a referral to a specialist.     2. I will keep all my appointments. I understand that this is part of the monitoring of opioids. My care team may require an office visit for EVERY opioid/controlled substance refill. If I miss appointments or don t follow instructions, my care team may stop my medicine.    3. I will take my medicines as prescribed. I will not change the dose or schedule unless my care team tells me to. There will be no refills if I run out early.     4. I may be asked to come to the clinic and complete a urine drug test or complete a pill count at any time. If I don t give a urine sample or participate in a pill count, the care team may stop my medicine.    5. I will only receive prescriptions from this clinic for chronic pain. If I am treated by another provider for acute pain issues, I will tell them that I am taking opioid pain medication for chronic pain and that I have a treatment agreement with this provider. I will inform my [facility] care team within one business day if I am given a prescription for any pain medication by another healthcare provider. My [facility] care team can contact other providers and pharmacists about my use of any medicines.    6. It is up to me to make sure that I don t run out of my medicines on weekends or holidays. If my care team is willing to refill my opioid prescription without a visit, I must request refills only during office hours. Refills may take up to 3 business days to process. I will use one pharmacy to fill all my opioid and other controlled substance prescriptions. I will notify the clinic about any changes to my insurance or medication  availability.    7. I am responsible for my prescriptions. If the medicine/prescription is lost, stolen or destroyed, it will not be replaced. I also agree not to share controlled substance medicines with anyone.    8. I am aware I should not use any illegal or recreational drugs. I agree not to drink alcohol unless my care team says I can.       9. If I enroll in the [place] Medical Cannabis program, I will tell my care team prior to my next refill.     10. I will tell my care team right away if I become pregnant, have a new medical problem treated outside of my regular clinic, or have a change in my medications.    11. I understand that this medicine can affect my thinking, judgment and reaction time. Alcohol and drugs affect the brain and body, which can affect the safety of my driving. Being under the influence of alcohol or drugs can affect my decision-making, behaviors, personal safety, and the safety of others. Driving while impaired (DWI) can occur if a person is driving, operating, or in physical control of a car, motorcycle, boat, snowmobile, ATV, motorbike, off-road vehicle, or any other motor vehicle ([place] Statute 169A.20). I understand the risk if I choose to drive or operate any vehicle or machinery.    I understand that if I do not follow any of the conditions above, my prescriptions or treatment may be stopped or changed.          Opioids  What You Need to Know    What are opioids?   Opioids are pain medicines that must be prescribed by a doctor. They are also known as narcotics.     Examples are:   1. morphine (MS Contin, Beatrice)  2. oxycodone (Oxycontin)  3. oxycodone and acetaminophen (Percocet)  4. hydrocodone and acetaminophen (Vicodin, Norco)   5. fentanyl patch (Duragesic)   6. hydromorphone (Dilaudid)   7. methadone  8. codeine (Tylenol #3)     What do opioids do well?   Opioids are best for severe short-term pain such as after a surgery or injury. They may work well for cancer pain. They may  help some people with long-lasting (chronic) pain.     What do opioids NOT do well?   Opioids never get rid of pain entirely, and they don t work well for most patients with chronic pain. Opioids don t reduce swelling, one of the causes of pain.                                    Other ways to manage chronic pain and improve function include:       Treat the health problem that may be causing pain    Anti-inflammation medicines, which reduce swelling and tenderness, such as ibuprofen (Advil, Motrin) or naproxen (Aleve)    Acetaminophen (Tylenol)    Antidepressants and anti-seizure medicines, especially for nerve pain    Topical treatments such as patches or creams    Injections or nerve blocks    Chiropractic or osteopathic treatment    Acupuncture, massage, deep breathing, meditation, visual imagery, aromatherapy    Use heat or ice at the pain site    Physical therapy     Exercise    Stop smoking    Take part in therapy       Risks and side effects     Talk to your doctor before you start or decide to keep taking opioids. Possible side effects include:      Lowering your breathing rate enough to cause death    Overdose, including death, especially if taking higher than prescribed doses    Worse depression symptoms; less pleasure in things you usually enjoy    Feeling tired or sluggish    Slower thoughts or cloudy thinking    Being more sensitive to pain over time; pain is harder to control    Trouble sleeping or restless sleep    Changes in hormone levels (for example, less testosterone)    Changes in sex drive or ability to have sex    Constipation    Unsafe driving    Itching and sweating    Dizziness    Nausea, throwing up and dry mouth    What else should I know about opioids?    Opioids may lead to dependence, tolerance, or addiction.      Dependence means that if you stop or reduce the medicine too quickly, you will have withdrawal symptoms. These include loose poop (diarrhea), jitters, flu-like symptoms,  nervousness and tremors. Dependence is not the same as addiction.                       Tolerance means needing higher doses over time to get the same effect. This may increase the chance of serious side effects.      Addiction is when people improperly use a substance that harms their body, their mind or their relations with others. Use of opiates can cause a relapse of addiction if you have a history of drug or alcohol abuse.      People who have used opioids for a long time may have a lower quality of life, worse depression, higher levels of pain and more visits to doctors.    You can overdose on opioids. Take these steps to lower your risk of overdose:    1. Recognize the signs:  Signs of overdose include decrease or loss of consciousness (blackout), slowed breathing, trouble waking up and blue lips. If someone is worried about overdose, they should call 911.    2. Talk to your doctor about Narcan (naloxone).   If you are at risk for overdose, you may be given a prescription for Narcan. This medicine very quickly reverses the effects of opioids.   If you overdose, a friend or family member can give you Narcan while waiting for the ambulance. They need to know the signs of overdose and how to give Narcan.     3. Don't use alcohol or street drugs.   Taking them with opioids can cause death.    4. Do not take any of these medicines unless your doctor says it s OK. Taking these with opioids can cause death:    Benzodiazepines, such as lorazepam (Ativan), alprazolam (Xanax) or diazepam (Valium)    Muscle relaxers, such as cyclobenzaprine (Flexeril)    Sleeping pills like zolpidem (Ambien)     Other opioids      How to keep you and other people safe while taking opioids:    1. Never share your opioids with others.  Opioid medicines are regulated by the Drug Enforcement Agency ([place]). Selling or sharing medications is a criminal act.    2. Be sure to store opioids in a secure place, locked up if possible. Young children  can easily swallow them and overdose.    3. When you are traveling with your medicines, keep them in the original bottles. If you use a pill box, be sure you also carry a copy of your medicine list from your clinic or pharmacy.    4. Safe disposal of opioids    Most pharmacies have places to get rid of medicine, called disposal kiosks. Medicine disposal options are also available in every [facility]. Search your county and  medication disposal  to find more options. You can find more details at:  [email or web address]     I agree that my provider, clinic care team, and pharmacy may work with any city, state or federal law enforcement agency that investigates the misuse, sale, or other diversion of my controlled medicine. I will allow my provider to discuss my care with, or share a copy of, this agreement with any other treating provider, pharmacy or emergency room where I receive care.    I have read this agreement and have asked questions about anything I did not understand.    _______________________________________________________  Patient Signature - Catie Nuñez _____________________                   Date     _______________________________________________________  Provider Signature - Iglesia Martinez DO   _____________________                   Date     _______________________________________________________  Witness Signature (required if provider not present while patient signing)   _____________________                   Date

## 2022-08-30 NOTE — PROGRESS NOTES
Assessment & Plan     Chronic, continuous use of opioids  Chronic pain syndrome  Cervicalgia  Cervicogenic headache  Controlled substance agreement signed  -CSA reviewed and signed today. Discussed appropriate use of medications.  -Follow-up every 3 months  - Drug Abuse Screen Panel 13, Urine (Pain Care Package) - lab collect  - traMADol (ULTRAM) 50 MG tablet  Dispense: 50 tablet; Refill: 0  - traMADol (ULTRAM) 50 MG tablet  Dispense: 50 tablet; Refill: 0  - traMADol (ULTRAM) 50 MG tablet  Dispense: 50 tablet; Refill: 0    Major depressive disorder, recurrent episode, mild (H)  -Stable, continue venlafaxine  - venlafaxine (EFFEXOR XR) 75 MG 24 hr capsule  Dispense: 180 capsule; Refill: 3    Generalized anxiety disorder  Panic attack  -CSA signed for xanax. Discussed appropriate use.  - venlafaxine (EFFEXOR XR) 75 MG 24 hr capsule  Dispense: 180 capsule; Refill: 3  - ALPRAZolam (XANAX) 0.5 MG tablet  Dispense: 30 tablet; Refill: 2    Migraine without aura and without status migrainosus, not intractable  -Discussed trying BB for migraine prevention. Should also help with anxiety and palpitations that pt experiences from mitral valve prolapse.  -Follow-up in two weeks for BP check on BB  -Office visit scheduled in 4 weeks for monitoring  - atenolol (TENORMIN) 50 MG tablet  Dispense: 90 tablet; Refill: 0  - SUMAtriptan (IMITREX) 50 MG tablet  Dispense: 30 tablet; Refill: 1    Symptomatic menopausal or female climacteric states  - estradiol (ESTRACE) 1 MG tablet  Dispense: 90 tablet; Refill: 3  - medroxyPROGESTERone (PROVERA) 2.5 MG tablet  Dispense: 90 tablet; Refill: 3      DO CHICHI [facility]    Tyler Burks is a 57 year old, presenting for the following health issues:  Recheck Medication    Patient here for medication monitoring.  Hx of back and neck pain and muscle spasms. Tramadol once or twice a day. Staying active to help with pain.    Hx of depression, anxiety and  "anxiety attacks. Takes venlafaxine, needs refill. Xanax takes once a day for acute anxiety. Medications were initially prescribed by psychiatry.  Follows with therapist.  DAYLIN-7 SCORE 8/11/2021 1/31/2022 8/30/2022   Total Score - - -   Total Score - - 6 (mild anxiety)   Total Score 2 7 6     PHQ 1/31/2022 5/9/2022 8/30/2022   PHQ-9 Total Score 4 4 4   Q9: Thoughts of better off dead/self-harm past 2 weeks Not at all Not at all Not at all       Takes sumatriptan for migraines.   Having migraines twice a week, sometimes 3 times a week.   Has never tried medication for migraine prevention but would like to try.    Hx of mitral valve prolapse. Follows with cardiology. Endorses occasional nervousness and palpitations from this.     BP Readings from Last 6 Encounters:   08/30/22 135/85   05/24/22 129/85   02/09/22 115/78   02/03/22 124/86   01/31/22 129/87   01/24/22 130/88       Review of Systems         Objective    /85 (BP Location: Right arm, Patient Position: Sitting, Cuff Size: Adult Regular)   Pulse 73   Temp 97  F (36.1  C) (Temporal)   Resp 16   Ht 1.6 m (5' 2.99\")   Wt 52.6 kg (116 lb)   LMP 09/01/2016 (LMP Unknown)   SpO2 96%   BMI 20.55 kg/m    Body mass index is 20.55 kg/m .  Physical Exam   GENERAL: healthy, alert and no distress  RESP: breathing comfortably, no acute respiratory distress  MS: no gross musculoskeletal defects noted, no edema  PSYCH: mentation appears normal, affect normal/bright        .  ..  "

## 2022-08-31 RX ORDER — TRAMADOL HYDROCHLORIDE 50 MG/1
50 TABLET ORAL 2 TIMES DAILY PRN
Qty: 50 TABLET | Refills: 0 | Status: SHIPPED | OUTPATIENT
Start: 2022-11-09 | End: 2022-11-30

## 2022-08-31 RX ORDER — TRAMADOL HYDROCHLORIDE 50 MG/1
50 TABLET ORAL 2 TIMES DAILY PRN
Qty: 50 TABLET | Refills: 0 | Status: SHIPPED | OUTPATIENT
Start: 2022-10-09 | End: 2022-11-08

## 2022-09-11 ENCOUNTER — MYC MEDICAL ADVICE (OUTPATIENT)
Dept: FAMILY MEDICINE | Facility: CLINIC | Age: 57
End: 2022-09-11

## 2022-09-13 NOTE — TELEPHONE ENCOUNTER
Dr. Martinez-     See pts message, she stopped atenolol d/t adverse symptoms    Areli Mclean, BSN RN  [facility]

## 2022-10-05 ENCOUNTER — OFFICE VISIT (OUTPATIENT)
Dept: FAMILY MEDICINE | Facility: CLINIC | Age: 57
End: 2022-10-05
Payer: COMMERCIAL

## 2022-10-05 VITALS
OXYGEN SATURATION: 97 % | DIASTOLIC BLOOD PRESSURE: 83 MMHG | TEMPERATURE: 97.8 F | HEART RATE: 85 BPM | RESPIRATION RATE: 16 BRPM | WEIGHT: 118 LBS | SYSTOLIC BLOOD PRESSURE: 125 MMHG | BODY MASS INDEX: 20.91 KG/M2

## 2022-10-05 DIAGNOSIS — G89.4 CHRONIC PAIN SYNDROME: ICD-10-CM

## 2022-10-05 DIAGNOSIS — G44.86 CERVICOGENIC HEADACHE: ICD-10-CM

## 2022-10-05 DIAGNOSIS — F11.90 CHRONIC, CONTINUOUS USE OF OPIOIDS: ICD-10-CM

## 2022-10-05 DIAGNOSIS — Z23 ENCOUNTER FOR VACCINATION: ICD-10-CM

## 2022-10-05 DIAGNOSIS — Z12.4 CERVICAL CANCER SCREENING: ICD-10-CM

## 2022-10-05 DIAGNOSIS — F41.0 PANIC ATTACK: ICD-10-CM

## 2022-10-05 DIAGNOSIS — M54.2 CERVICALGIA: ICD-10-CM

## 2022-10-05 DIAGNOSIS — G43.009 MIGRAINE WITHOUT AURA AND WITHOUT STATUS MIGRAINOSUS, NOT INTRACTABLE: Primary | ICD-10-CM

## 2022-10-05 PROCEDURE — G0145 SCR C/V CYTO,THINLAYER,RESCR: HCPCS | Performed by: FAMILY MEDICINE

## 2022-10-05 PROCEDURE — 0124A COVID-19,PF,PFIZER BOOSTER BIVALENT: CPT | Performed by: FAMILY MEDICINE

## 2022-10-05 PROCEDURE — 91312 COVID-19,PF,PFIZER BOOSTER BIVALENT: CPT | Performed by: FAMILY MEDICINE

## 2022-10-05 PROCEDURE — 99214 OFFICE O/P EST MOD 30 MIN: CPT | Performed by: FAMILY MEDICINE

## 2022-10-05 PROCEDURE — 87624 HPV HI-RISK TYP POOLED RSLT: CPT | Performed by: FAMILY MEDICINE

## 2022-10-05 ASSESSMENT — ANXIETY QUESTIONNAIRES
4. TROUBLE RELAXING: SEVERAL DAYS
GAD7 TOTAL SCORE: 8
7. FEELING AFRAID AS IF SOMETHING AWFUL MIGHT HAPPEN: NOT AT ALL
1. FEELING NERVOUS, ANXIOUS, OR ON EDGE: NEARLY EVERY DAY
GAD7 TOTAL SCORE: 8
6. BECOMING EASILY ANNOYED OR IRRITABLE: SEVERAL DAYS
7. FEELING AFRAID AS IF SOMETHING AWFUL MIGHT HAPPEN: NOT AT ALL
2. NOT BEING ABLE TO STOP OR CONTROL WORRYING: SEVERAL DAYS
8. IF YOU CHECKED OFF ANY PROBLEMS, HOW DIFFICULT HAVE THESE MADE IT FOR YOU TO DO YOUR WORK, TAKE CARE OF THINGS AT HOME, OR GET ALONG WITH OTHER PEOPLE?: SOMEWHAT DIFFICULT
5. BEING SO RESTLESS THAT IT IS HARD TO SIT STILL: NOT AT ALL
IF YOU CHECKED OFF ANY PROBLEMS ON THIS QUESTIONNAIRE, HOW DIFFICULT HAVE THESE PROBLEMS MADE IT FOR YOU TO DO YOUR WORK, TAKE CARE OF THINGS AT HOME, OR GET ALONG WITH OTHER PEOPLE: SOMEWHAT DIFFICULT
3. WORRYING TOO MUCH ABOUT DIFFERENT THINGS: MORE THAN HALF THE DAYS

## 2022-10-05 NOTE — PROGRESS NOTES
Assessment & Plan     Migraine without aura and without status migrainosus, not intractable  -Multiple episodes in a week  -Has tried atenolol and increasing venlafaxine for prophylaxis. Atenolol was poorly tolerated. Venlafaxine make migraines worse.  -Pt trying other techniques including walking and stretching. Would like to hold off on trying any other medications for migraine prevention at this time.    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 LEEP showed low grade lesion  -Due for follow-up pap  - Pap Screen with HPV - recommended age 30 - 65 years    Encounter for vaccination  - COVID-19,PF,PFIZER BOOSTER BIVALENT    Chronic, continuous use of opioids  Chronic pain syndrome  Cervicalgia  Cervicogenic headache  -Takes tramadol 1-2 times a day  -Has scripts through November  - reviewed, no concerning activity  -Continue with exercise and stretching    Panic attack  -Takes xanax bid  -[place] goals discussed and agree that pt should be weaned off over time. Goal to have anxiety better controlled and needing only to use xanax prn.      No follow-ups on file.   Follow-up Visit   Expected date:  Feb 05, 2023 (Approximate)      Follow Up Appointment Details:     Follow-up with whom?: Me    Follow-Up for what?: Adult Preventive    Any Additional Chronic Condition Management?: General (Other)    Additional Details: chronic pain    How?: In Person                    Iglesia Martinez DO  [facility]    Subjective   Kimmie is a 57 year old, presenting for the following health issues:  Back Pain and Headache    Still having frequent migraine episodes. Takes sumatriptan prn. Tried atenolol for prophylaxis but could not tolerate side effects. Has increased venlafixine in past, had more migraines. States is trying to do other things to help relieve stress. Walking for stress reduction. Stretch lab a few weeks ago.    Feels that increased physical activity and stretch lab  "will also help with her anxiety and chronic neck pain.   Takes tramadol 1-2 times a day, this has not increased.  Takes xanax twice a day for acute anxiety.     Had LEEP procedure in 2/2022, due for pap monitoring today.    History of Present Illness       Back Pain:  She presents for follow up of back pain. Patient's back pain is a chronic problem.  Location of back pain:  Right lower back, left lower back, right upper back, left upper back, right side of neck, left side of neck, right shoulder and left shoulder  Description of back pain: burning, dull ache and gnawing  Back pain spreads: right side of neck and left side of neck    Since patient first noticed back pain, pain is: always present, but gets better and worse  Does back pain interfere with her job:  Yes      Headaches:   Since the patient's last clinic visit, headaches are: worsened  The patient is getting headaches:  Several times a week  She is not able to do normal daily activities when she has a migraine.  The patient is taking the following rescue/relief medications:  Excedrin and sumatriptan (Imitrex)   Patient states \"I get some relief\" from the rescue/relief medications.   The patient is taking the following medications to prevent migraines:  No medications to prevent migraines  In the past 4 weeks, the patient has gone to an Urgent Care or Emergency Room 0 times times due to headaches.    She eats 0-1 servings of fruits and vegetables daily.She consumes 0 sweetened beverage(s) daily.She exercises with enough effort to increase her heart rate 30 to 60 minutes per day.  She exercises with enough effort to increase her heart rate 3 or less days per week.   She is taking medications regularly.  Today's DAYLIN-7 Score: 8       Review of Systems         Objective    /83 (BP Location: Left arm, Patient Position: Sitting, Cuff Size: Adult Regular)   Pulse 85   Temp 97.8  F (36.6  C) (Temporal)   Resp 16   Wt 53.5 kg (118 lb)   LMP 09/01/2016 " (LMP Unknown)   SpO2 97%   BMI 20.91 kg/m    Body mass index is 20.91 kg/m .  Physical Exam   GENERAL: healthy, alert and no distress  RESP: lungs clear to auscultation - no rales, rhonchi or wheezes  CV: regular rate and rhythm, normal S1 S2, no S3 or S4, no murmur, click or rub, no peripheral edema and peripheral pulses strong   (female): normal female external genitalia, normal urethral meatus, vaginal mucosa, normal cervix.  SKIN: no suspicious lesions or rashes  PSYCH: mentation appears normal, affect normal/bright

## 2022-10-10 LAB
HUMAN PAPILLOMA VIRUS 16 DNA: NEGATIVE
HUMAN PAPILLOMA VIRUS 18 DNA: NEGATIVE
HUMAN PAPILLOMA VIRUS FINAL DIAGNOSIS: NORMAL
HUMAN PAPILLOMA VIRUS OTHER HR: NEGATIVE

## 2022-10-13 ENCOUNTER — PATIENT OUTREACH (OUTPATIENT)
Dept: FAMILY MEDICINE | Facility: CLINIC | Age: 57
End: 2022-10-13

## 2022-10-13 DIAGNOSIS — R87.613 HSIL (HIGH GRADE SQUAMOUS INTRAEPITHELIAL LESION) ON PAP SMEAR OF CERVIX: Primary | ICD-10-CM

## 2022-10-22 ENCOUNTER — NURSE TRIAGE (OUTPATIENT)
Dept: NURSING | Facility: CLINIC | Age: 57
End: 2022-10-22

## 2022-10-22 DIAGNOSIS — G43.009 MIGRAINE WITHOUT AURA AND WITHOUT STATUS MIGRAINOSUS, NOT INTRACTABLE: ICD-10-CM

## 2022-10-23 NOTE — TELEPHONE ENCOUNTER
"Routing refill request to provider for review/approval because:  More than 8 treatments per month.  Patient states she uses #30 per month- she is wondering if she can get 3 months supply at a a time?    Last Written Prescription Date:  8/30/22  Last Fill Quantity: 30,  # refills: 1   Last office visit provider:  10/5/22     Requested Prescriptions   Pending Prescriptions Disp Refills     SUMAtriptan (IMITREX) 50 MG tablet [Pharmacy Med Name: SUMATRIPTAN 50MG TABLETS] 30 tablet 1     Sig: TAKE 1 TABLET(50 MG) BY MOUTH AT ONSET OF HEADACHE FOR MIGRAINE. MAY REPEAT IN 2 HOURS. MAX 4 TABLETS/ 24 HOURS       Serotonin Agonists Failed - 10/22/2022  3:59 PM        Failed - Serotonin Agonist request needs review.     Please review patient's record. If patient has had 8 or more treatments in the past month, please forward to provider.          Passed - Blood pressure under 140/90 in past 12 months     BP Readings from Last 3 Encounters:   10/05/22 125/83   08/30/22 135/85   05/24/22 129/85                 Passed - Recent (12 mo) or future (30 days) visit within the authorizing provider's specialty     Patient has had an office visit with the authorizing provider or a provider within the authorizing providers department within the previous 12 mos or has a future within next 30 days. See \"Patient Info\" tab in inbasket, or \"Choose Columns\" in Meds & Orders section of the refill encounter.              Passed - Medication is active on med list        Passed - Patient is age 18 or older        Passed - No active pregnancy on record        Passed - No positive pregnancy test in past 12 months             Tangela Atkins RN 10/22/22 8:28 PM  "

## 2022-10-23 NOTE — TELEPHONE ENCOUNTER
Patient is looking for a refill of the Imitrex.    She has used both refills from 8/30/2022.    She is down to 1 pill.    She is wondering if she can get a refill for this today.    She has been having headache for the last several days. She states she has this happen on occasion.    She is anxious because she only has one pill remaining.    Patient advised to go to urgent care. Patient agrees with the plan.  Tangela Atkins RN on 10/22/2022 at 8:32 PM        Reason for Disposition    [1] Prescription refill request for NON-ESSENTIAL medicine (i.e., no harm to patient if med not taken) AND [2] triager unable to refill per department policy    Additional Information    Negative: New-onset or worsening symptoms, see that guideline (e.g., diarrhea, runny nose, sore throat)    Negative: Medicine question not related to refill or renewal    Negative: Caller (e.g., patient or pharmacist) requesting information about a new medicine    Negative: Caller requesting information unrelated to medicine    Negative: [1] Prescription refill request for ESSENTIAL medicine (i.e., likelihood of harm to patient if not taken) AND [2] triager unable to refill per department policy    Negative: [1] Prescription not at pharmacy AND [2] was prescribed by PCP recently  (Exception: triager has access to EMR and prescription is recorded there. Go to Home Care and confirm for pharmacy.)    Negative: [1] Pharmacy calling with prescription questions AND [2] triager unable to answer question    Negative: Prescription request for new medicine (not a refill)    Negative: Caller requesting a CONTROLLED substance prescription refill (e.g., narcotics, ADHD medicines)    Protocols used: MEDICATION REFILL AND RENEWAL CALL-A-

## 2022-10-24 RX ORDER — SUMATRIPTAN 50 MG/1
TABLET, FILM COATED ORAL
Refills: 1 | OUTPATIENT
Start: 2022-10-24

## 2022-10-24 NOTE — TELEPHONE ENCOUNTER
Pasting routing comment from PCP    Using far too much. Needs follow-up to discuss seeing neurology for better migraine management.     Iglesia Martinez DO       Sent pt [employer] message    ABIOLA HendresonN RN  [facility]

## 2022-10-25 ENCOUNTER — MYC MEDICAL ADVICE (OUTPATIENT)
Dept: FAMILY MEDICINE | Facility: CLINIC | Age: 57
End: 2022-10-25

## 2022-10-25 ENCOUNTER — E-VISIT (OUTPATIENT)
Dept: OBGYN | Facility: CLINIC | Age: 57
End: 2022-10-25
Payer: COMMERCIAL

## 2022-10-25 DIAGNOSIS — G44.86 CERVICOGENIC HEADACHE: ICD-10-CM

## 2022-10-25 DIAGNOSIS — G89.4 CHRONIC PAIN SYNDROME: ICD-10-CM

## 2022-10-25 DIAGNOSIS — M54.2 CERVICALGIA: Primary | ICD-10-CM

## 2022-10-25 DIAGNOSIS — N95.1 SYMPTOMATIC MENOPAUSAL OR FEMALE CLIMACTERIC STATES: ICD-10-CM

## 2022-10-25 DIAGNOSIS — G43.009 MIGRAINE WITHOUT AURA AND WITHOUT STATUS MIGRAINOSUS, NOT INTRACTABLE: Primary | ICD-10-CM

## 2022-10-25 PROCEDURE — 99421 OL DIG E/M SVC 5-10 MIN: CPT | Performed by: OBSTETRICS & GYNECOLOGY

## 2022-10-26 NOTE — TELEPHONE ENCOUNTER
Pt's insurance is not a referral base insurance plan.  If pt's insurance allow pt can go out of network.    Teresa-Referral Coordinaotr

## 2022-10-27 NOTE — TELEPHONE ENCOUNTER
Dr. Martinez-Please review, complete referral and sign if agree.  1. Per chart review, Dr. Han works with [facility] in [place]-patient had a consult with Dr. Han in June 2021   A. Pain referral pended    Writer responded via [employer]      Thank you!  ROWAN Dyer BSN, RN-[facility]

## 2022-10-28 NOTE — TELEPHONE ENCOUNTER
Patient Questionnaire Submission  --------------------------------     Questionnaire: Headache     Question: Where is the location of your headache(s)?  Answer:   I can't describe my headaches in this way (Pain in the front of head, one side of head, back of head, all over head, behind eyes)     Question: Which of the following describes your headache(s)?  Answer:   A chronic problem     Question: How long have you had chronic headache(s)?  Answer:   Many years     Question: How long has your current headache lasted?  Answer:   More than 6 hours     Question: When was your last headache?  Answer:   I'm having it now     Question: How long has it been going on?  Answer:   1 - 3 hours     Question: Which of the following describes your headache(s)?  Answer:   Constant pain     Question: How do you describe the pain?  Answer:   Dull/aching            Pressure            Other     Question: Please describe.  Answer:   Nausea     Question: On a scale of 1 - 10, where 10 is the worst headache you can imagine, how painful was your worst recent headache?  Answer:   8     Question: Are your headaches getting more frequent with time?  Answer:   Yes     Question: Are your headaches getting more intense with time?  Answer:   Yes     Question: Do you have a headache when you eat a certain food, take a certain medicine, or have a certain drink?  Answer:   No     Question: Do you feel nauseous before or during your headache(s)?  Answer:   Yes     Question: Did you fall down and hit your head, or did someone or something hit you in the head in the recent past?  Answer:   No     Question: Do you have, or have you recently had, any of the following?  Answer:   None of the above (fever, change in vision, change in hearing, feeling confused or disoriented, memory loss, weakness, inability to do your normal activities)     Question: Have you experienced any of the following?  Answer:   None (Taking a new medicine, new glasses or lost  glasses, a change in where you live, home repair/painting)     Question: Do you have any of the following?  Answer:   None (Depression/sadness, sleeping too much or not enough, change in appetite)     Question: Have you ever been told you have a specific type of headache?  Answer:   Yes     Question: Please write a few words about what you were told.  Answer:   Chronic migraine triggered by occipital nerves.     Question: Are you pregnant?  Answer:   I am confident that I am not pregnant     Question: Are you breastfeeding?  Answer:   No     Question: Are you taking birth control pills?  Answer:   No     Question: Are you taking any of the following for your headache(s)?  Answer:   Aspirin            Tylenol or Acetaminophen     Question: If you are taking medicine, what kind? How much are you taking? Does it help?  Answer:   Sumatriptan, which was a miracle for my migraines when I started this medication.   My insurance iniated an overide to allow me 30 pills a month through February '23 due to Dr Mercedes's request before leaving the clinic.             Now, however my new primary just decided that was too much medication for me to be taking(?!) and is now prescribing 15 with no refills(?!).  I don't know what to do or believe, as this was the only thing I've tried that actually allows me to live somewhat normally.  When I asked what should I do?, I was advised to see neurology AGAIN, which I scheduled out for, but also suggested I not continue my HRT, as this could be contributing to my migraines.  Is this possibly making them worse, and what do you suggest in your professional medical opinion???  I feel I've been left without the help I need.  Thank you!!  Kimmie     Question: Was there a medicine that you took in the past that helped your headaches?  Answer:   Yes     Question: What medication was it? How much did you take?  Answer:   Sumatriptan 50mg, 30 pills per month as needed.     Question: Anything else you  would like to add?  Answer:       Provider E-Visit time total (minutes): 10  Chio Sanchez MD

## 2022-10-31 ENCOUNTER — VIRTUAL VISIT (OUTPATIENT)
Dept: FAMILY MEDICINE | Facility: CLINIC | Age: 57
End: 2022-10-31
Payer: COMMERCIAL

## 2022-10-31 DIAGNOSIS — G43.009 MIGRAINE WITHOUT AURA AND WITHOUT STATUS MIGRAINOSUS, NOT INTRACTABLE: Primary | ICD-10-CM

## 2022-10-31 PROCEDURE — 99213 OFFICE O/P EST LOW 20 MIN: CPT | Mod: 95 | Performed by: FAMILY MEDICINE

## 2022-10-31 NOTE — PROGRESS NOTES
Kimmie is a 57 year old who is being evaluated via a billable video visit.      Assessment & Plan     Migraine without aura and without status migrainosus, not intractable  -Increased frequency. Overusing sumatriptan, discussed my concerns with overuse last visit.  -We have tried increasing her venlafaxine and a BB without any benefit in controlling migraines.  -Last visit with neurology 5/16/2018, there was discussion about prophylactic medication. No subsequent follow-ups.  -Again encouraged patient to keep follow-up appointment with neurology and be open to treatment options. Discussed neurologist may refill sumatriptan if feels that it is appropriate.       [facility]    Subjective   Kimmie is a 57 year old, presenting for the following health issues:  No chief complaint on file.      HPI     Here with her partner.  History of migraines. Poorly controlled. Multiple migraine days a week. States sumatriptan is the only thing that helps. States has seen 4 neurologist in the past was never offered any treatment options. Has appointment with neurologist later this week. Wants to know why I did not give her refills on recent sumatriptan script, state this cause her lots of anxiety. Feels like she is not getting answers to why her migraines are worse.     Review of Systems         Objective         Vitals:  No vitals were obtained today due to virtual visit.    Physical Exam   GENERAL: Healthy, alert and no distress  EYES: Eyes grossly normal to inspection.  No discharge or erythema, or obvious scleral/conjunctival abnormalities.  RESP: No audible wheeze, cough, or visible cyanosis.  No visible retractions or increased work of breathing.    SKIN: Visible skin clear. No significant rash, abnormal pigmentation or lesions.  NEURO: Cranial nerves grossly intact.  Mentation and speech appropriate for age.  PSYCH: Anxious, judgement and insight intact, normal speech and appearance  well-groaaron.            Video-Visit Details    Video Start Time: 10:03 AM    Type of service:  Video Visit    Video End Time:10:03 AM    Originating Location (pt. Location): Home        Distant Location (provider location):  Off-site    Platform used for Video Visit: Rafael

## 2022-11-10 ENCOUNTER — OFFICE VISIT (OUTPATIENT)
Dept: NEUROLOGY | Facility: CLINIC | Age: 57
End: 2022-11-10
Payer: COMMERCIAL

## 2022-11-10 VITALS — OXYGEN SATURATION: 99 % | DIASTOLIC BLOOD PRESSURE: 78 MMHG | HEART RATE: 71 BPM | SYSTOLIC BLOOD PRESSURE: 118 MMHG

## 2022-11-10 DIAGNOSIS — G43.009 MIGRAINE WITHOUT AURA AND WITHOUT STATUS MIGRAINOSUS, NOT INTRACTABLE: Primary | ICD-10-CM

## 2022-11-10 DIAGNOSIS — G44.209 TENSION HEADACHE: ICD-10-CM

## 2022-11-10 DIAGNOSIS — G44.86 CERVICOGENIC HEADACHE: ICD-10-CM

## 2022-11-10 PROCEDURE — 99205 OFFICE O/P NEW HI 60 MIN: CPT | Performed by: PSYCHIATRY & NEUROLOGY

## 2022-11-10 RX ORDER — SUMATRIPTAN 50 MG/1
TABLET, FILM COATED ORAL
Qty: 18 TABLET | Refills: 3 | Status: SHIPPED | OUTPATIENT
Start: 2022-11-10 | End: 2023-04-03

## 2022-11-10 RX ORDER — FREMANEZUMAB-VFRM 225 MG/1.5ML
225 INJECTION SUBCUTANEOUS
Qty: 1 ML | Refills: 11 | Status: SHIPPED | OUTPATIENT
Start: 2022-11-10 | End: 2023-08-02

## 2022-11-10 NOTE — LETTER
11/10/2022         RE: Catie Nuñez  [address]  [facility] [address]        Dear Colleague,    Thank you for referring your patient, Catie Nuñez, to the [facility]. Please see a copy of my visit note below.    INITIAL NEUROLOGY CONSULTATION    DATE OF VISIT: 11/10/2022  CLINIC LOCATION: [facility]  MRN: [id number]  PATIENT NAME: Catie Nuñez  YOB: 1965    REASON FOR VISIT:   Chief Complaint   Patient presents with     Follow Up     Previous patient is in to follow up on migraines, patient main concern is her PCP told her no more Sumatriptan as its dangerous.     HISTORY OF PRESENT ILLNESS:                                                    Ms. Catie Nuñez is 57 year old right handed female patient with past medical history of multifactorial headaches, mitral valve prolapse, depression, and anxiety, who was seen today for headache management.  She is accompanied by her male partner.    I evaluated patient more than 4 years ago (in [id number]) for multifactorial headaches including migraine, tension, cervicogenic, medication overuse, and occipital neuralgia.  Initially, she was not able to tolerate sumatriptan due to severe nausea and other side effects, but currently uses it frequently.  Almost every day until recently.  She also tried Topamax, gabapentin, Lyrica, tizanidine, and baclofen along with riboflavin/magnesium.  Propranolol was prescribed, but not tried.  She tried atenolol instead, but it caused intolerable weakness/tiredness.  Currently on Effexor 75 mg daily.  Higher dose was not tolerated causing increased frequency of migraines.    At the present time the patient reports several headache kinds.      Her most intense headache originates at the neck/base of the skull region radiating to the top of the head and into her eyes.  It is pulsating 8-9/10 pain that is associated with light/sound sensitivity,  "nausea/emesis, and intolerance of physical activity.  It might last up to 3 days and usually responds well to Imitrex.  It occurs 2-3 times per month on average.    The second type of headache is associated with \"feeling off/fuzzy and queasy\" and is less intense occurring up to 10-15 times per month.  She tends to take Imitrex that usually helps to prevent it to go into bad headache.    Finally, she also has tension/pressure headaches that occur 10-15 times per month.  These usually respond to Excedrin that she takes almost every day.    The patient reports that her sleep is poor due to elevated stress level.  She eats 3 meals per day and drinks more than 64 fluid ounces.  She denies any focal neurological symptoms.  Has prescription for tramadol that she uses for neck and back pain.    Laboratory evaluation from February 2022 includes unremarkable CBC, urinalysis, and normal glucose level.    No additional useful information is available in Care Everywhere, which was reviewed.  PAST MEDICAL/SURGICAL HISTORY:                                                    I personally reviewed patient's past medical and surgical history with the patient at today's visit.  MEDICATIONS:                                                    I personally reviewed patient's medications and allergies with the patient at today's visit.  ALLERGIES:                                                      Allergies   Allergen Reactions     Ondansetron Nausea and Vomiting     Sulfa Drugs Hives     Rash       EXAM:                                                    VITAL SIGNS:   /78 (BP Location: Right arm, Patient Position: Sitting, Cuff Size: Adult Small)   Pulse 71   LMP 09/01/2016 (LMP Unknown)   SpO2 99%     General: pt is in NAD, cooperative.  Skin: normal turgor, moist mucous membranes, no lesions/rashes noticed.  HEENT: ATNC, EOMI, PERRL, white sclera, normal conjunctiva, no nystagmus or ptosis. No carotid bruits " bilaterally.  Respiratory: lung sounds clear to auscultation bilaterally, no crackles, wheezes, rhonchi. Symmetric lung excursion, no accessory respiratory muscle use.  Cardiovascular: normal S1/S2, no murmurs/rubs/gallops.   Abdomen: Not distended.  : deferred.    Neurological:  Mental: alert, follows commands, no aphasia or dysarthria. Fund of knowledge is appropriate for age.  Cranial Nerves:  CN II: visual acuity - able to accurately count fingers with each eye. Visual fields intact, fundi: discs sharp, no papilledema and normal vessels bilaterally.  CN III, IV, VI: EOM intact, pupils equal and reactive  CN V: facial sensation nl  CN VII: face symmetric, no facial droop  CN VIII: hearing normal  CN IX: palate elevation symmetric, uvula at midline  CN XI SCM normal, shoulder shrug nl  CN XII: tongue midline  Motor: Strength: 5/5 in all major groups of all extremities. Normal tone. No abnormal movements. No pronator drift b/l.  Reflexes: Triceps, biceps, brachioradialis, patellar, and achilles reflexes normal and symmetric. No clonus noted. Toes are down-going b/l.   Sensory: light touch, pinprick, and vibration intact. Romberg: negative.  Coordination: FNF and heel-shin tests intact b/l.   Gait:  Normal casual walk.  There is tenderness to palpation over both occipital nerves bilaterally.  DATA:   LABS/EEG/IMAGING/OTHER STUDIES: I reviewed pertinent medical records, as detailed in the history of present illness.  ASSESSMENT AND PLAN:      ASSESSMENT: Catie Nuñez is a 57 year old female patient with listed above past medical history, who presents with worsening of multifactorial headaches.    We had a detailed discussion with the patient and her partner regarding her presenting complaints.  The neurological exam today is non-focal, though she has tenderness to palpation of all both occipital nerves bilaterally.  We reviewed the suspected diagnoses along with available treatment options and the plan, as  summarized below.    DIAGNOSES:    ICD-10-CM    1. Migraine without aura and without status migrainosus, not intractable  G43.009 Fremanezumab-vfrm (AJOVY) 225 MG/1.5ML SOAJ     SUMAtriptan (IMITREX) 50 MG tablet      2. Tension headache  G44.209       3. Medication overuse headache  G44.40       4. Cervicogenic headache  G44.86         PLAN: At today's visit we thoroughly discussed various diagnostic possibilities for patient's symptoms that are most likely consistent with multifactorial headache disorder, including migraines, tension, cervicogenic (versus occipital neuralgia) headaches with possible component of medication overuse.  We also reviewed available treatment options and the plan.    For headache prevention:  1.  We decided to add Ajovy 225 mg subcutaneously every 30 days for the next 3 months.  She was advised to contact my clinic with any intolerable side effects and give me an update in 4-6 weeks after starting this medication.  She will also let me know if she needs nurse help with first injection.  2. Other future options include Nortriptyline, Amitriptyline, doxepin, Depakote, verapamil (these options might not be tolerated given patient's experience with other drugs, but might be considered), other CGRP antagonists, and neurostimulator devices (Cefaly or Nerivio).  Botox might also be an option if others are not tolerated or not effective.  3.  She should continue Effexor at 75 mg daily without changes.  4.  She previously tried topiramate, gabapentin, Lyrica, tizanidine, baclofen, nutraceuticals, and atenolol, which either caused side effects or were not tolerated.  For acute headache therapy:  1.  Sumatriptan 50 mg at the onset of intolerable headache.  We discussed that she may repeat dose in 2 hours if necessary or take 100 mg at headache onset, but should limit use to less than 9 days/month to prevent rebound headaches.  We also discussed options of additional triptans and Nurtec, Ubrelvy,  "and Reyvow.  2.  She may also use Excedrin, but should take it with food and limit use of all analgesics to less that 15 days/month.    Reviewed non-pharmacological headache prevention measures include proper sleep hygiene, regular meals, adequate hydration, regular aerobic exercise, stress reduction techniques, and limiting caffeine intake.    I advised the patient to keep the headache diary (paper or Migraine Amarjit) and bring it to the next follow up visit or upload it via My Chart.    Next follow-up appointment is in the next 3 months or earlier if needed.    Total Time: 69 minutes spent on the date of the encounter doing chart review, history and exam, documentation and further activities per the note.    Gelacio Campbell MD  [facility] Neurology  (Chart documentation was completed in part with Dragon voice-recognition software. Even though reviewed, some grammatical, spelling, and word errors may remain.)            Catie Nuñez is a 57 year old female who presents for:  Chief Complaint   Patient presents with     Follow Up     Previous patient is in to follow up on migraines         Initial Vitals:  /78 (BP Location: Right arm, Patient Position: Sitting, Cuff Size: Adult Small)   Pulse 71   LMP 09/01/2016 (LMP Unknown)   SpO2 99%  Estimated body mass index is 20.91 kg/m  as calculated from the following:    Height as of 8/30/22: 1.6 m (5' 2.99\").    Weight as of 10/5/22: 53.5 kg (118 lb).. There is no height or weight on file to calculate BSA. BP completed using cuff size: small regular         Chio Diaz      Again, thank you for allowing me to participate in the care of your patient.        Sincerely,        Gelacio Campbell MD    "

## 2022-11-10 NOTE — PROGRESS NOTES
"Catie Nuñez is a 57 year old female who presents for:  Chief Complaint   Patient presents with     Follow Up     Previous patient is in to follow up on migraines         Initial Vitals:  /78 (BP Location: Right arm, Patient Position: Sitting, Cuff Size: Adult Small)   Pulse 71   LMP 09/01/2016 (LMP Unknown)   SpO2 99%  Estimated body mass index is 20.91 kg/m  as calculated from the following:    Height as of 8/30/22: 1.6 m (5' 2.99\").    Weight as of 10/5/22: 53.5 kg (118 lb).. There is no height or weight on file to calculate BSA. BP completed using cuff size: small morris Diaz  "

## 2022-11-10 NOTE — PATIENT INSTRUCTIONS
AFTER VISIT SUMMARY (AVS):    At today's visit we thoroughly discussed various diagnostic possibilities for your symptoms that are most likely consistent with multifactorial headache disorder.  We also reviewed available treatment options and the plan.    For headache prevention:  1.  Ajovy 225 mg subcutaneously every months for the next 3 months.  Please contact my clinic with any intolerable side effects and give me an update in 4-6 weeks after starting this medication.  Please also let me know if you need nurse help with first injection.  2. Other future options include Nortriptyline, Amitriptyline, doxepin, Depakote, verapamil, other CGRP antagonists, and neurostimulator devices (Cefaly or Nerivio).  For acute headache therapy:  1.  Sumatriptan 50 mg at the onset of intolerable headache.  We discussed that she may repeat dose in 2 hours if necessary or take 100 mg at headache onset, but should limit use to less than 9 days/month.  2. May also use Excedrin, but take it with food and limit use of all analgesics to less that 15 days/month.    Reviewed non-pharmacological headache prevention measures include proper sleep hygiene, regular meals, adequate hydration, regular aerobic exercise, stress reduction techniques, and limiting caffeine intake.    Please keep the headache diary (paper or Migraine Amarjit) and bring it to the next follow up visit or upload it via My Chart.    Next follow-up appointment is in the next 3 months or earlier if needed.    Please do not hesitate to call me with any questions or concerns.    Thanks.

## 2022-11-10 NOTE — PROGRESS NOTES
"INITIAL NEUROLOGY CONSULTATION    DATE OF VISIT: 11/10/2022  CLINIC LOCATION: [facility]  MRN: [id number]  PATIENT NAME: Catie Nuñez  YOB: 1965    REASON FOR VISIT:   Chief Complaint   Patient presents with     Follow Up     Previous patient is in to follow up on migraines, patient main concern is her PCP told her no more Sumatriptan as its dangerous.     HISTORY OF PRESENT ILLNESS:                                                    Ms. Catie Nuñez is 57 year old right handed female patient with past medical history of multifactorial headaches, mitral valve prolapse, depression, and anxiety, who was seen today for headache management.  She is accompanied by her male partner.    I evaluated patient more than 4 years ago (in [id number]) for multifactorial headaches including migraine, tension, cervicogenic, medication overuse, and occipital neuralgia.  Initially, she was not able to tolerate sumatriptan due to severe nausea and other side effects, but currently uses it frequently.  Almost every day until recently.  She also tried Topamax, gabapentin, Lyrica, tizanidine, and baclofen along with riboflavin/magnesium.  Propranolol was prescribed, but not tried.  She tried atenolol instead, but it caused intolerable weakness/tiredness.  Currently on Effexor 75 mg daily.  Higher dose was not tolerated causing increased frequency of migraines.    At the present time the patient reports several headache kinds.      Her most intense headache originates at the neck/base of the skull region radiating to the top of the head and into her eyes.  It is pulsating 8-9/10 pain that is associated with light/sound sensitivity, nausea/emesis, and intolerance of physical activity.  It might last up to 3 days and usually responds well to Imitrex.  It occurs 2-3 times per month on average.    The second type of headache is associated with \"feeling off/fuzzy and queasy\" and is less intense occurring " up to 10-15 times per month.  She tends to take Imitrex that usually helps to prevent it to go into bad headache.    Finally, she also has tension/pressure headaches that occur 10-15 times per month.  These usually respond to Excedrin that she takes almost every day.    The patient reports that her sleep is poor due to elevated stress level.  She eats 3 meals per day and drinks more than 64 fluid ounces.  She denies any focal neurological symptoms.  Has prescription for tramadol that she uses for neck and back pain.    Laboratory evaluation from February 2022 includes unremarkable CBC, urinalysis, and normal glucose level.    No additional useful information is available in Care Everywhere, which was reviewed.  PAST MEDICAL/SURGICAL HISTORY:                                                    I personally reviewed patient's past medical and surgical history with the patient at today's visit.  MEDICATIONS:                                                    I personally reviewed patient's medications and allergies with the patient at today's visit.  ALLERGIES:                                                      Allergies   Allergen Reactions     Ondansetron Nausea and Vomiting     Sulfa Drugs Hives     Rash       EXAM:                                                    VITAL SIGNS:   /78 (BP Location: Right arm, Patient Position: Sitting, Cuff Size: Adult Small)   Pulse 71   LMP 09/01/2016 (LMP Unknown)   SpO2 99%     General: pt is in NAD, cooperative.  Skin: normal turgor, moist mucous membranes, no lesions/rashes noticed.  HEENT: ATNC, EOMI, PERRL, white sclera, normal conjunctiva, no nystagmus or ptosis. No carotid bruits bilaterally.  Respiratory: lung sounds clear to auscultation bilaterally, no crackles, wheezes, rhonchi. Symmetric lung excursion, no accessory respiratory muscle use.  Cardiovascular: normal S1/S2, no murmurs/rubs/gallops.   Abdomen: Not distended.  :  deferred.    Neurological:  Mental: alert, follows commands, no aphasia or dysarthria. Fund of knowledge is appropriate for age.  Cranial Nerves:  CN II: visual acuity - able to accurately count fingers with each eye. Visual fields intact, fundi: discs sharp, no papilledema and normal vessels bilaterally.  CN III, IV, VI: EOM intact, pupils equal and reactive  CN V: facial sensation nl  CN VII: face symmetric, no facial droop  CN VIII: hearing normal  CN IX: palate elevation symmetric, uvula at midline  CN XI SCM normal, shoulder shrug nl  CN XII: tongue midline  Motor: Strength: 5/5 in all major groups of all extremities. Normal tone. No abnormal movements. No pronator drift b/l.  Reflexes: Triceps, biceps, brachioradialis, patellar, and achilles reflexes normal and symmetric. No clonus noted. Toes are down-going b/l.   Sensory: light touch, pinprick, and vibration intact. Romberg: negative.  Coordination: FNF and heel-shin tests intact b/l.   Gait:  Normal casual walk.  There is tenderness to palpation over both occipital nerves bilaterally.  DATA:   LABS/EEG/IMAGING/OTHER STUDIES: I reviewed pertinent medical records, as detailed in the history of present illness.  ASSESSMENT AND PLAN:      ASSESSMENT: Catie Nuñez is a 57 year old female patient with listed above past medical history, who presents with worsening of multifactorial headaches.    We had a detailed discussion with the patient and her partner regarding her presenting complaints.  The neurological exam today is non-focal, though she has tenderness to palpation of all both occipital nerves bilaterally.  We reviewed the suspected diagnoses along with available treatment options and the plan, as summarized below.    DIAGNOSES:    ICD-10-CM    1. Migraine without aura and without status migrainosus, not intractable  G43.009 Fremanezumab-vfrm (AJOVY) 225 MG/1.5ML SOAJ     SUMAtriptan (IMITREX) 50 MG tablet      2. Tension headache  G44.209       3.  Medication overuse headache  G44.40       4. Cervicogenic headache  G44.86         PLAN: At today's visit we thoroughly discussed various diagnostic possibilities for patient's symptoms that are most likely consistent with multifactorial headache disorder, including migraines, tension, cervicogenic (versus occipital neuralgia) headaches with possible component of medication overuse.  We also reviewed available treatment options and the plan.    For headache prevention:  1.  We decided to add Ajovy 225 mg subcutaneously every 30 days for the next 3 months.  She was advised to contact my clinic with any intolerable side effects and give me an update in 4-6 weeks after starting this medication.  She will also let me know if she needs nurse help with first injection.  2. Other future options include Nortriptyline, Amitriptyline, doxepin, Depakote, verapamil (these options might not be tolerated given patient's experience with other drugs, but might be considered), other CGRP antagonists, and neurostimulator devices (Cefaly or Nerivio).  Botox might also be an option if others are not tolerated or not effective.  3.  She should continue Effexor at 75 mg daily without changes.  4.  She previously tried topiramate, gabapentin, Lyrica, tizanidine, baclofen, nutraceuticals, and atenolol, which either caused side effects or were not tolerated.  For acute headache therapy:  1.  Sumatriptan 50 mg at the onset of intolerable headache.  We discussed that she may repeat dose in 2 hours if necessary or take 100 mg at headache onset, but should limit use to less than 9 days/month to prevent rebound headaches.  We also discussed options of additional triptans and Nurtec, Ubrelvy, and Reyvow.  2.  She may also use Excedrin, but should take it with food and limit use of all analgesics to less that 15 days/month.    Reviewed non-pharmacological headache prevention measures include proper sleep hygiene, regular meals, adequate hydration,  regular aerobic exercise, stress reduction techniques, and limiting caffeine intake.    I advised the patient to keep the headache diary (paper or Migraine Amarjit) and bring it to the next follow up visit or upload it via My Chart.    Next follow-up appointment is in the next 3 months or earlier if needed.    Total Time: 69 minutes spent on the date of the encounter doing chart review, history and exam, documentation and further activities per the note.    Gelacio Campbell MD  [facility]  (Chart documentation was completed in part with Dragon voice-recognition software. Even though reviewed, some grammatical, spelling, and word errors may remain.)

## 2022-11-19 ENCOUNTER — HEALTH MAINTENANCE LETTER (OUTPATIENT)
Age: 57
End: 2022-11-19

## 2022-11-26 ENCOUNTER — HOSPITAL ENCOUNTER (EMERGENCY)
Facility: CLINIC | Age: 57
Discharge: LEFT BEFORE DISCHARGE | End: 2022-11-26
Admitting: EMERGENCY MEDICINE
Payer: COMMERCIAL

## 2022-11-26 VITALS
SYSTOLIC BLOOD PRESSURE: 159 MMHG | DIASTOLIC BLOOD PRESSURE: 94 MMHG | HEART RATE: 100 BPM | RESPIRATION RATE: 18 BRPM | TEMPERATURE: 97.9 F | OXYGEN SATURATION: 99 %

## 2022-11-26 LAB
HOLD SPECIMEN: NORMAL

## 2022-11-26 PROCEDURE — 250N000011 HC RX IP 250 OP 636: Performed by: EMERGENCY MEDICINE

## 2022-11-26 PROCEDURE — 96375 TX/PRO/DX INJ NEW DRUG ADDON: CPT

## 2022-11-26 PROCEDURE — 999N000104 HC STATISTIC NO CHARGE

## 2022-11-26 PROCEDURE — 96361 HYDRATE IV INFUSION ADD-ON: CPT

## 2022-11-26 PROCEDURE — 258N000003 HC RX IP 258 OP 636: Performed by: EMERGENCY MEDICINE

## 2022-11-26 PROCEDURE — 96374 THER/PROPH/DIAG INJ IV PUSH: CPT

## 2022-11-26 RX ORDER — KETOROLAC TROMETHAMINE 15 MG/ML
15 INJECTION, SOLUTION INTRAMUSCULAR; INTRAVENOUS ONCE
Status: COMPLETED | OUTPATIENT
Start: 2022-11-26 | End: 2022-11-26

## 2022-11-26 RX ORDER — METOCLOPRAMIDE HYDROCHLORIDE 5 MG/ML
10 INJECTION INTRAMUSCULAR; INTRAVENOUS ONCE
Status: COMPLETED | OUTPATIENT
Start: 2022-11-26 | End: 2022-11-26

## 2022-11-26 RX ADMIN — METOCLOPRAMIDE 10 MG: 5 INJECTION, SOLUTION INTRAMUSCULAR; INTRAVENOUS at 14:56

## 2022-11-26 RX ADMIN — SODIUM CHLORIDE 1000 ML: 9 INJECTION, SOLUTION INTRAVENOUS at 14:56

## 2022-11-26 RX ADMIN — KETOROLAC TROMETHAMINE 15 MG: 15 INJECTION, SOLUTION INTRAMUSCULAR; INTRAVENOUS at 14:56

## 2022-11-26 NOTE — ED NOTES
Pt came up to triage, reports she feels better. Will follow-up with PCP this week, will go home to rest

## 2022-11-26 NOTE — ED TRIAGE NOTES
Headache started thurs. Took max dose of imatrex with no relief      Triage Assessment     Row Name 11/26/22 [id number]       Triage Assessment (Adult)    Airway WDL WDL       Respiratory WDL    Respiratory WDL WDL       Skin Circulation/Temperature WDL    Skin Circulation/Temperature WDL WDL       Cardiac WDL    Cardiac WDL WDL       Peripheral/Neurovascular WDL    Peripheral Neurovascular WDL WDL       Cognitive/Neuro/Behavioral WDL    Cognitive/Neuro/Behavioral WDL X       [place] Coma Scale    Best Eye Response 4-->(E4) spontaneous    Best Motor Response 6-->(M6) obeys commands    Best Verbal Response 5-->(V5) oriented    Jens Coma Scale Score 15

## 2022-12-09 ENCOUNTER — E-VISIT (OUTPATIENT)
Dept: FAMILY MEDICINE | Facility: CLINIC | Age: 57
End: 2022-12-09
Payer: COMMERCIAL

## 2022-12-09 DIAGNOSIS — J06.9 VIRAL URI: Primary | ICD-10-CM

## 2022-12-09 PROCEDURE — 99421 OL DIG E/M SVC 5-10 MIN: CPT | Performed by: PHYSICIAN ASSISTANT

## 2022-12-09 RX ORDER — PSEUDOEPHEDRINE HCL 120 MG/1
120 TABLET, FILM COATED, EXTENDED RELEASE ORAL EVERY 12 HOURS
Qty: 15 TABLET | Refills: 0 | Status: SHIPPED | OUTPATIENT
Start: 2022-12-09 | End: 2023-08-02

## 2022-12-09 NOTE — PATIENT INSTRUCTIONS
Viral Upper Respiratory Illness (Adult)    You have a viral upper respiratory illness (URI), which is another term for the common cold. This illness is contagious during the first few days. It is spread through the air by coughing and sneezing. It may also be spread by direct contact (touching the sick person and then touching your own eyes, nose, or mouth). Frequent handwashing will decrease risk of spread. Most viral illnesses go away within 7 to 10 days with rest and simple home remedies. Sometimes the illness may last for several weeks. Antibiotics will not kill a virus, and they are generally not prescribed for this condition.  Home care  If symptoms are severe, rest at home for the first 2 to 3 days. When you resume activity, don't let yourself get too tired.  Don't smoke. If you need help stopping, talk with your healthcare provider.  Avoid being exposed to cigarette smoke (yours or others ).  You may use acetaminophen or ibuprofen to control pain and fever, unless another medicine was prescribed. If you have chronic liver or kidney disease, have ever had a stomach ulcer or gastrointestinal bleeding, or are taking blood-thinning medicines, talk with your healthcare provider before using these medicines. Aspirin should never be given to anyone under 18 years of age who is ill with a viral infection or fever. It may cause severe liver or brain damage.  Your appetite may be poor, so a light diet is fine. Stay well hydrated by drinking 6 to 8 glasses of fluids per day (water, soft drinks, juices, tea, or soup). Extra fluids will help loosen secretions in the nose and lungs.  Over-the-counter cold medicines will not shorten the length of time you re sick, but they may be helpful for the following symptoms: cough, sore throat, and nasal and sinus congestion. If you take prescription medicines, ask your healthcare provider or pharmacist which over-the-counter medicines are safe to use. (Note: Don't use  decongestants if you have high blood pressure.)  Follow-up care  Follow up with your healthcare provider, or as advised.  When to seek medical advice  Call your healthcare provider right away if any of these occur:  Cough with lots of colored sputum (mucus)  Severe headache; face, neck, or ear pain  Difficulty swallowing due to throat pain  Fever of 100.4 F (38 C) or higher, or as directed by your healthcare provider  Call 911  Call 911 if any of these occur:  Chest pain, shortness of breath, wheezing, or difficulty breathing  Coughing up blood  Very severe pain with swallowing, especially if it goes along with a muffled voice   [employer] last reviewed this educational content on 6/1/2018 2000-2021 [employer]. All rights reserved. This information is not intended as a substitute for professional medical care. Always follow your healthcare professional's instructions.

## 2022-12-10 ENCOUNTER — OFFICE VISIT (OUTPATIENT)
Dept: URGENT CARE | Facility: URGENT CARE | Age: 57
End: 2022-12-10
Payer: COMMERCIAL

## 2022-12-10 VITALS
OXYGEN SATURATION: 97 % | DIASTOLIC BLOOD PRESSURE: 96 MMHG | TEMPERATURE: 97.8 F | WEIGHT: 115 LBS | HEART RATE: 87 BPM | HEIGHT: 64 IN | BODY MASS INDEX: 19.63 KG/M2 | SYSTOLIC BLOOD PRESSURE: 136 MMHG

## 2022-12-10 DIAGNOSIS — J01.00 SUBACUTE MAXILLARY SINUSITIS: Primary | ICD-10-CM

## 2022-12-10 PROCEDURE — 99213 OFFICE O/P EST LOW 20 MIN: CPT | Performed by: FAMILY MEDICINE

## 2022-12-10 RX ORDER — CEFDINIR 300 MG/1
300 CAPSULE ORAL 2 TIMES DAILY
Qty: 20 CAPSULE | Refills: 0 | Status: SHIPPED | OUTPATIENT
Start: 2022-12-10 | End: 2022-12-20

## 2022-12-10 NOTE — PROGRESS NOTES
Subjective: Patient got cold symptoms over a week ago that she got from her daughter, has a history of sinus infections although she has not had one for many years.  She started to get better from the cold and then started to feel her sinuses plugged up and get very painful behind her eyes.  It feels just like sinus infections have in the past.  No fevers.  Not a lot is coming out right now.    Objective: ENT positive for pain over the frontal and maxillary sinuses.  Neck is slightly tender but no palpable nodes.  Lungs are clear.    Assessment and plan: Sinusitis by history, just starting to develop.  Will treat with Omnicef.  She had trouble with Augmentin in the past

## 2023-01-27 ENCOUNTER — TELEPHONE (OUTPATIENT)
Dept: CARDIOLOGY | Facility: CLINIC | Age: 58
End: 2023-01-27
Payer: COMMERCIAL

## 2023-01-27 DIAGNOSIS — F11.90 CHRONIC, CONTINUOUS USE OF OPIOIDS: ICD-10-CM

## 2023-01-27 DIAGNOSIS — G89.4 CHRONIC PAIN SYNDROME: ICD-10-CM

## 2023-01-27 DIAGNOSIS — G44.86 CERVICOGENIC HEADACHE: ICD-10-CM

## 2023-01-27 DIAGNOSIS — M54.2 CERVICALGIA: ICD-10-CM

## 2023-01-27 NOTE — TELEPHONE ENCOUNTER
Left Voicemail (1st Attempt) and Sent Mychart (1st Attempt) for the patient to call back and schedule the following:    Appointment type: Cardiologist- Return General  Provider: Any general caridologist  Return date: 05/24/23  Specialty phone number: [phone]  Additional appointment(s) needed: none  Additonal Notes: none    Skyler Nuñez, Visit Facilitator/MA.

## 2023-01-30 RX ORDER — TRAMADOL HYDROCHLORIDE 50 MG/1
TABLET ORAL
Qty: 50 TABLET | Refills: 0 | Status: SHIPPED | OUTPATIENT
Start: 2023-01-30 | End: 2023-02-09

## 2023-01-31 NOTE — TELEPHONE ENCOUNTER
"Nicolás Fermin is a 63 year old male who presents for:  Chief Complaint   Patient presents with     Follow Up     Neuropathy- still can't feel feet, left leg calf area is still painful.         Initial Vitals:  There were no vitals taken for this visit. Estimated body mass index is 32.28 kg/m  as calculated from the following:    Height as of 10/10/22: 1.88 m (6' 2\").    Weight as of 10/10/22: 114 kg (251 lb 6.4 oz).. There is no height or weight on file to calculate BSA. BP completed using cuff size: arabella Bonilla  " I am the patient's restricted prescription provider.  No one else can write prescriptions for this patient.  I do not know why the pharmacy is saying I am not listed.      Please clarify with Tanya Asher and also call the pharmacy back.  If I am no longer able to write prescriptions for her, then Kimmie will have to find another provider.

## 2023-02-08 ENCOUNTER — OFFICE VISIT (OUTPATIENT)
Dept: FAMILY MEDICINE | Facility: CLINIC | Age: 58
End: 2023-02-08
Attending: FAMILY MEDICINE
Payer: COMMERCIAL

## 2023-02-08 ENCOUNTER — ANCILLARY PROCEDURE (OUTPATIENT)
Dept: GENERAL RADIOLOGY | Facility: CLINIC | Age: 58
End: 2023-02-08
Attending: FAMILY MEDICINE
Payer: COMMERCIAL

## 2023-02-08 VITALS
DIASTOLIC BLOOD PRESSURE: 79 MMHG | RESPIRATION RATE: 17 BRPM | SYSTOLIC BLOOD PRESSURE: 117 MMHG | WEIGHT: 121 LBS | HEART RATE: 77 BPM | TEMPERATURE: 97.9 F | HEIGHT: 64 IN | OXYGEN SATURATION: 97 % | BODY MASS INDEX: 20.66 KG/M2

## 2023-02-08 DIAGNOSIS — F11.90 CHRONIC, CONTINUOUS USE OF OPIOIDS: ICD-10-CM

## 2023-02-08 DIAGNOSIS — F41.0 PANIC ATTACK: ICD-10-CM

## 2023-02-08 DIAGNOSIS — Z13.220 LIPID SCREENING: ICD-10-CM

## 2023-02-08 DIAGNOSIS — M54.50 BILATERAL LOW BACK PAIN WITHOUT SCIATICA, UNSPECIFIED CHRONICITY: ICD-10-CM

## 2023-02-08 DIAGNOSIS — Z12.11 SCREEN FOR COLON CANCER: ICD-10-CM

## 2023-02-08 DIAGNOSIS — Z12.31 ENCOUNTER FOR SCREENING MAMMOGRAM FOR BREAST CANCER: ICD-10-CM

## 2023-02-08 DIAGNOSIS — Z00.00 ROUTINE GENERAL MEDICAL EXAMINATION AT A HEALTH CARE FACILITY: Primary | ICD-10-CM

## 2023-02-08 DIAGNOSIS — F33.0 MAJOR DEPRESSIVE DISORDER, RECURRENT EPISODE, MILD (H): ICD-10-CM

## 2023-02-08 DIAGNOSIS — F41.1 GENERALIZED ANXIETY DISORDER: ICD-10-CM

## 2023-02-08 DIAGNOSIS — M54.2 CERVICALGIA: ICD-10-CM

## 2023-02-08 DIAGNOSIS — G89.4 CHRONIC PAIN SYNDROME: ICD-10-CM

## 2023-02-08 LAB
CHOLEST SERPL-MCNC: 221 MG/DL
HDLC SERPL-MCNC: 63 MG/DL
LDLC SERPL CALC-MCNC: 147 MG/DL
NONHDLC SERPL-MCNC: 158 MG/DL
TRIGL SERPL-MCNC: 54 MG/DL

## 2023-02-08 PROCEDURE — 72100 X-RAY EXAM L-S SPINE 2/3 VWS: CPT | Mod: TC | Performed by: RADIOLOGY

## 2023-02-08 PROCEDURE — 36415 COLL VENOUS BLD VENIPUNCTURE: CPT | Performed by: FAMILY MEDICINE

## 2023-02-08 PROCEDURE — 99396 PREV VISIT EST AGE 40-64: CPT | Performed by: FAMILY MEDICINE

## 2023-02-08 PROCEDURE — 80061 LIPID PANEL: CPT | Performed by: FAMILY MEDICINE

## 2023-02-08 PROCEDURE — 99214 OFFICE O/P EST MOD 30 MIN: CPT | Mod: 25 | Performed by: FAMILY MEDICINE

## 2023-02-08 ASSESSMENT — ENCOUNTER SYMPTOMS
DIARRHEA: 0
JOINT SWELLING: 0
ABDOMINAL PAIN: 0
HEMATURIA: 0
HEARTBURN: 0
DYSURIA: 0
COUGH: 0
BREAST MASS: 0
HEMATOCHEZIA: 0
DIZZINESS: 0
PALPITATIONS: 0
EYE PAIN: 0
SORE THROAT: 0
FEVER: 0
CONSTIPATION: 0
ARTHRALGIAS: 1
PARESTHESIAS: 0
HEADACHES: 1
NAUSEA: 0
SHORTNESS OF BREATH: 0
CHILLS: 0
WEAKNESS: 0
MYALGIAS: 1
NERVOUS/ANXIOUS: 1
FREQUENCY: 0

## 2023-02-08 ASSESSMENT — ANXIETY QUESTIONNAIRES
GAD7 TOTAL SCORE: 7
6. BECOMING EASILY ANNOYED OR IRRITABLE: SEVERAL DAYS
GAD7 TOTAL SCORE: 7
3. WORRYING TOO MUCH ABOUT DIFFERENT THINGS: SEVERAL DAYS
1. FEELING NERVOUS, ANXIOUS, OR ON EDGE: NEARLY EVERY DAY
2. NOT BEING ABLE TO STOP OR CONTROL WORRYING: SEVERAL DAYS
5. BEING SO RESTLESS THAT IT IS HARD TO SIT STILL: NOT AT ALL
8. IF YOU CHECKED OFF ANY PROBLEMS, HOW DIFFICULT HAVE THESE MADE IT FOR YOU TO DO YOUR WORK, TAKE CARE OF THINGS AT HOME, OR GET ALONG WITH OTHER PEOPLE?: SOMEWHAT DIFFICULT
4. TROUBLE RELAXING: SEVERAL DAYS
7. FEELING AFRAID AS IF SOMETHING AWFUL MIGHT HAPPEN: NOT AT ALL
7. FEELING AFRAID AS IF SOMETHING AWFUL MIGHT HAPPEN: NOT AT ALL
IF YOU CHECKED OFF ANY PROBLEMS ON THIS QUESTIONNAIRE, HOW DIFFICULT HAVE THESE PROBLEMS MADE IT FOR YOU TO DO YOUR WORK, TAKE CARE OF THINGS AT HOME, OR GET ALONG WITH OTHER PEOPLE: SOMEWHAT DIFFICULT

## 2023-02-08 ASSESSMENT — PAIN SCALES - GENERAL: PAINLEVEL: NO PAIN (0)

## 2023-02-08 NOTE — PROGRESS NOTES
SUBJECTIVE:   CC: Kimmie is an 57 year old who presents for preventive health visit.   Patient has been advised of split billing requirements and indicates understanding: Yes  Healthy Habits:     Getting at least 3 servings of Calcium per day:  Yes    Bi-annual eye exam:  Yes    Dental care twice a year:  NO    Sleep apnea or symptoms of sleep apnea:  None    Diet:  Regular (no restrictions)    Frequency of exercise:  1 day/week    Duration of exercise:  Less than 15 minutes    Taking medications regularly:  Yes    Medication side effects:  None    PHQ-2 Total Score: 0    Additional concerns today:  No    Other concerns:  Hx of chronic pain syndrome, neck and upper back pain. Participating in a stretching program. Sees chiropractor twice a week. Takes tramadol twice a day for pain. No new symptoms.    States achy bilateral lower back pain the last few weeks. No hx of injury or trauma. Worried about arthritis in her lumbar spine. Denies numbness, tingling, weakness, change in bowel or bladder function.     Hx of migraines. Following with neurology. Down to 10 sumatriptan a month.    Hx of panic attacks and generalized anxiety. Takes venlafaxine. Uses xanax about once a day for panic attacks.       Today's PHQ-2 Score:   PHQ-2 ( 1999 Pfizer) 2/8/2023   Q1: Little interest or pleasure in doing things 0   Q2: Feeling down, depressed or hopeless 0   PHQ-2 Score 0   PHQ-2 Total Score (12-17 Years)- Positive if 3 or more points; Administer PHQ-A if positive -   Q1: Little interest or pleasure in doing things Not at all   Q2: Feeling down, depressed or hopeless Not at all   PHQ-2 Score 0       Have you ever done Advance Care Planning? (For example, a Health Directive, POLST, or a discussion with a medical provider or your loved ones about your wishes): No, advance care planning information given to patient to review.  Patient declined advance care planning discussion at this time.    Social History     Tobacco Use      Smoking status: Never     Smokeless tobacco: Never   Substance Use Topics     Alcohol use: Yes     Comment: occ, rare      If you drink alcohol do you typically have >3 drinks per day or >7 drinks per week? No    Alcohol Use 2/8/2023   Prescreen: >3 drinks/day or >7 drinks/week? Not Applicable   Prescreen: >3 drinks/day or >7 drinks/week? -   No flowsheet data found.      Breast Cancer Screening:    Breast CA Risk Assessment (FHS-7) 1/31/2022   Do you have a family history of breast, colon, or ovarian cancer? No / Unknown       Mammogram Screening: Recommended mammography every 1-2 years with patient discussion and risk factor consideration  Pertinent mammograms are reviewed under the imaging tab.    History of abnormal Pap smear: YES - updated in Problem List and Health Maintenance accordingly  PAP / HPV Latest Ref Rng & Units 10/5/2022 11/15/2021 3/11/2021   PAP   Negative for Intraepithelial Lesion or Malignancy (NILM) Negative for Intraepithelial Lesion or Malignancy (NILM) -   PAP (Historical) - - - HSIL(A)   HPV16 Negative Negative Positive(A) Negative   HPV18 Negative Negative Negative Negative   HRHPV Negative Negative Negative Negative     Reviewed and updated as needed this visit by clinical staff   Tobacco  Allergies  Meds              Reviewed and updated as needed this visit by Provider     Meds                 Review of Systems   Constitutional: Negative for chills and fever.   HENT: Negative for congestion, ear pain, hearing loss and sore throat.    Eyes: Negative for pain and visual disturbance.   Respiratory: Negative for cough and shortness of breath.    Cardiovascular: Negative for chest pain, palpitations and peripheral edema.   Gastrointestinal: Negative for abdominal pain, constipation, diarrhea, heartburn, hematochezia and nausea.   Breasts:  Negative for tenderness, breast mass and discharge.   Genitourinary: Negative for dysuria, frequency, genital sores, hematuria, pelvic pain, urgency,  "vaginal bleeding and vaginal discharge.   Musculoskeletal: Positive for arthralgias and myalgias. Negative for joint swelling.   Skin: Negative for rash.   Neurological: Positive for headaches. Negative for dizziness, weakness and paresthesias.   Psychiatric/Behavioral: Negative for mood changes. The patient is nervous/anxious.      OBJECTIVE:   /79 (BP Location: Left arm, Patient Position: Sitting, Cuff Size: Adult Regular)   Pulse 77   Temp 97.9  F (36.6  C) (Temporal)   Resp 17   Ht 1.613 m (5' 3.5\")   Wt 54.9 kg (121 lb)   LMP 09/01/2016 (LMP Unknown)   SpO2 97%   BMI 21.10 kg/m    Physical Exam  GENERAL: healthy, alert and no distress  EYES: Eyes grossly normal to inspection, PERRL and conjunctivae and sclerae normal  HENT: nose and mouth without ulcers or lesions  NECK: no adenopathy, no asymmetry, masses, or scars and thyroid normal to palpation  RESP: lungs clear to auscultation - no rales, rhonchi or wheezes  CV: regular rate and rhythm, normal S1 S2, no S3 or S4, no murmur, click or rub, no peripheral edema and peripheral pulses strong  ABDOMEN: soft, nontender, no hepatosplenomegaly, no masses and bowel sounds normal  MS: no gross musculoskeletal defects noted, no edema  SKIN: no suspicious lesions or rashes  NEURO: Normal strength and tone, mentation intact and speech normal  PSYCH: mentation appears normal, affect normal/bright    ASSESSMENT/PLAN:   Catie was seen today for physical.    Diagnoses and all orders for this visit:    Routine general medical examination at a health care facility  -     PRIMARY CARE FOLLOW-UP SCHEDULING    Screen for colon cancer  -     Fecal colorectal cancer screen FIT - Future (S+30)    Lipid screening  -     Lipid panel reflex to direct LDL Non-fasting    Encounter for screening mammogram for breast cancer  -     MA Screening Digital Bilateral; Future    Bilateral low back pain without sciatica, unspecified chronicity  -No red flag signs or " symptoms  -Discussed working with PT to help with symptoms  -     XR Lumbar Spine 2/3 Views; Future  -     Physical Therapy Referral; Future    Chronic, continuous use of opioids  Cervicalgia  Chronic pain syndrome  -Symptoms stable. Does stretching and sees chiropractor as well  -Consider tapering down next visit in 3 months  - reviewed, no concerning activity  -     traMADol (ULTRAM) 50 MG tablet; Take 1 tablet (50 mg) by mouth 2 times daily as needed for severe pain (7-10) 30 day supply    Major depressive disorder, recurrent episode, mild (H)  -Stable, continue venlafaxine    Generalized anxiety disorder  Panic attack  -Stable, continue venlafaxine  -Xanax prn for panic attack, discuss tapering back use next visit  -     ALPRAZolam (XANAX) 0.5 MG tablet; TAKE 1/2 TO 1 TABLET BY MOUTH DAILY AS NEEDED. 30 day supply.      COUNSELING:  Reviewed preventive health counseling, as reflected in patient instructions       Regular exercise       Healthy diet/nutrition    Iglesia Martinez DO  [facility]

## 2023-02-09 RX ORDER — ALPRAZOLAM 0.5 MG
TABLET ORAL
Qty: 30 TABLET | Refills: 2 | Status: SHIPPED | OUTPATIENT
Start: 2023-02-09 | End: 2023-05-16

## 2023-02-09 RX ORDER — TRAMADOL HYDROCHLORIDE 50 MG/1
50 TABLET ORAL 2 TIMES DAILY PRN
Qty: 50 TABLET | Refills: 2 | Status: SHIPPED | OUTPATIENT
Start: 2023-02-09 | End: 2023-05-16

## 2023-02-10 ENCOUNTER — OFFICE VISIT (OUTPATIENT)
Dept: NEUROLOGY | Facility: CLINIC | Age: 58
End: 2023-02-10
Payer: COMMERCIAL

## 2023-02-10 VITALS — DIASTOLIC BLOOD PRESSURE: 75 MMHG | HEART RATE: 68 BPM | SYSTOLIC BLOOD PRESSURE: 115 MMHG | OXYGEN SATURATION: 97 %

## 2023-02-10 DIAGNOSIS — G44.209 TENSION HEADACHE: ICD-10-CM

## 2023-02-10 DIAGNOSIS — G44.86 CERVICOGENIC HEADACHE: ICD-10-CM

## 2023-02-10 DIAGNOSIS — G44.40 MEDICATION OVERUSE HEADACHE: ICD-10-CM

## 2023-02-10 DIAGNOSIS — G43.009 MIGRAINE WITHOUT AURA AND WITHOUT STATUS MIGRAINOSUS, NOT INTRACTABLE: Primary | ICD-10-CM

## 2023-02-10 PROCEDURE — 99214 OFFICE O/P EST MOD 30 MIN: CPT | Performed by: PSYCHIATRY & NEUROLOGY

## 2023-02-10 NOTE — PROGRESS NOTES
"Catie Nuñez is a 57 year old female who presents for:  Chief Complaint   Patient presents with     Follow Up     Migraines- have improved some. Patient brought in [facility] as needs training to inject         Initial Vitals:  /75 (BP Location: Right arm, Patient Position: Sitting, Cuff Size: Adult Regular)   Pulse 68   LMP 09/01/2016 (LMP Unknown)   SpO2 97%  Estimated body mass index is 21.1 kg/m  as calculated from the following:    Height as of 2/8/23: 1.613 m (5' 3.5\").    Weight as of 2/8/23: 54.9 kg (121 lb).. There is no height or weight on file to calculate BSA. BP completed using cuff size: morris Diaz  "

## 2023-02-10 NOTE — PATIENT INSTRUCTIONS
AFTER VISIT SUMMARY (AVS):    At today's visit we thoroughly discussed current symptoms, available treatment options, and the plan.  I am pleased to hear that your headaches improved.    We decided to start Ajovy without any additional medication changes.  Please reach out if you need Imitrex refill in the future.    Please keep the headache diary and bring it to the next follow-up visit or upload via My Chart.     Next follow-up appointment is in the next 3 months or earlier if needed.    Please do not hesitate to call me with any questions or concerns.    Thanks.

## 2023-02-10 NOTE — PROGRESS NOTES
ESTABLISHED PATIENT NEUROLOGY NOTE    DATE OF VISIT: 2/10/2023  CLINIC LOCATION: [facility]  MRN: [id number]  PATIENT NAME: Catie Nuñez  YOB: 1965    REASON FOR VISIT:   Chief Complaint   Patient presents with     Follow Up     Migraines- have improved some. Patient brought in Ajovy as needs training to inject      SUBJECTIVE:                                                      HISTORY OF PRESENT ILLNESS: Patient is here to follow up regarding multifactorial headaches. Please refer to my initial note from 11/10/2022 for further information.  Accompanied by her partner.    Since the last visit, the patient reports that her headaches improved after she reduced the use of acute therapy.  For headache prevention she was prescribed, but not started yet Ajovy and for acute therapy she uses Imitrex with good results.  She needs additional teaching on how to do the injection.  She is on 75 mg of Effexor daily.  No significant side effects or interval development of new focal neurological symptoms.  Reports that chiropractor care and stretching are helpful.  EXAM:                                                    Physical Exam:   Vitals: /75 (BP Location: Right arm, Patient Position: Sitting, Cuff Size: Adult Regular)   Pulse 68   LMP 09/01/2016 (LMP Unknown)   SpO2 97%     General: pt is in NAD, cooperative.  Skin: normal turgor, moist mucous membranes, no lesions/rashes noticed.  HEENT: ATNC, white sclera, normal conjunctiva.  Respiratory: Symmetric lung excursion, no accessory respiratory muscle use.  Abdomen: Non distended.  Neurological: awake, cooperative, follows commands, no aphasia or dysarthria noted, cranial nerves II-XII: no ptosis, face is symmetric, equally moves all extremities, no dysmetria bilaterally, casual gait is normal.  ASSESSMENT AND PLAN:                                                    Assessment: 57 year old female patient presents for follow-up  of migraines, tension headaches, medication overuse and cervicogenic headaches.  She reports headache improvement after reducing frequency of Imitrex.  She needs teaching on how to do Ajovy.  She is on Effexor at 75 mg daily, but was not able to tolerate higher doses.  She also tried Topamax, gabapentin, Lyrica, tizanidine, baclofen, and supplements.      At the initial visit we discussed that additional preventive options include nortriptyline, amitriptyline, doxepin, Depakote, verapamil, and neurostimulator along with other CGRP antagonists.  For acute therapy different triptans along with Reyvow, Nurtec, or Ubrelvy could be used.      However, she have not tried Ajovy yet.  My suspicion that her headaches will be much better controlled after she adds this medication.  I provided her necessary teaching and also resources online.  Perceived improvement of her headaches are likely due to resolution of medication overuse headaches.  She also reports that chiropractor care and stretching help cervicogenic headache.    Diagnoses:    ICD-10-CM    1. Migraine without aura and without status migrainosus, not intractable  G43.009       2. Tension headache  G44.209       3. Cervicogenic headache  G44.86       4. Medication overuse headache  G44.40         Plan: At today's visit we thoroughly discussed current symptoms, available treatment options, and the plan.  I am pleased to hear that her headaches improved.    We decided to start Ajovy without any additional medication changes.  She will reach out if she needs Imitrex refill in the future.    Advised the patient to keep the headache diary and bring it to the next follow-up visit or upload via My Chart.     Next follow-up appointment is in the next 3 months or earlier if needed.    Total Time: 31 minutes spent on the date of the encounter doing chart review, history and exam, documentation and further activities per the note.    Gelacio Campbell MD  [facility]  [facility]  (Chart documentation was completed in part with Dragon voice-recognition software. Even though reviewed, some grammatical, spelling, and word errors may remain.)

## 2023-02-10 NOTE — LETTER
2/10/2023         RE: Catie Nuñez  [address]  [place] [id number]        Dear Colleague,    Thank you for referring your patient, Catie Nuñez, to the [facility]. Please see a copy of my visit note below.    ESTABLISHED PATIENT NEUROLOGY NOTE    DATE OF VISIT: 2/10/2023  CLINIC LOCATION: [facility]  MRN: [id number]  PATIENT NAME: Catie Nuñez  YOB: 1965    REASON FOR VISIT:   Chief Complaint   Patient presents with     Follow Up     Migraines- have improved some. Patient [facility] as needs training to inject      SUBJECTIVE:                                                      HISTORY OF PRESENT ILLNESS: Patient is here to follow up regarding multifactorial headaches. Please refer to my initial note from 11/10/2022 for further information.  Accompanied by her partner.    Since the last visit, the patient reports that her headaches improved after she reduced the use of acute therapy.  For headache prevention she was prescribed, but not started yet Ajovy and for acute therapy she uses Imitrex with good results.  She needs additional teaching on how to do the injection.  She is on 75 mg of Effexor daily.  No significant side effects or interval development of new focal neurological symptoms.  Reports that chiropractor care and stretching are helpful.  EXAM:                                                    Physical Exam:   Vitals: /75 (BP Location: Right arm, Patient Position: Sitting, Cuff Size: Adult Regular)   Pulse 68   LMP 09/01/2016 (LMP Unknown)   SpO2 97%     General: pt is in NAD, cooperative.  Skin: normal turgor, moist mucous membranes, no lesions/rashes noticed.  HEENT: ATNC, white sclera, normal conjunctiva.  Respiratory: Symmetric lung excursion, no accessory respiratory muscle use.  Abdomen: Non distended.  Neurological: awake, cooperative, follows commands, no aphasia or dysarthria noted, cranial  nerves II-XII: no ptosis, face is symmetric, equally moves all extremities, no dysmetria bilaterally, casual gait is normal.  ASSESSMENT AND PLAN:                                                    Assessment: 57 year old female patient presents for follow-up of migraines, tension headaches, medication overuse and cervicogenic headaches.  She reports headache improvement after reducing frequency of Imitrex.  She needs teaching on how to do Ajovy.  She is on Effexor at 75 mg daily, but was not able to tolerate higher doses.  She also tried Topamax, gabapentin, Lyrica, tizanidine, baclofen, and supplements.      At the initial visit we discussed that additional preventive options include nortriptyline, amitriptyline, doxepin, Depakote, verapamil, and neurostimulator along with other CGRP antagonists.  For acute therapy different triptans along with Reyvow, Nurtec, or Ubrelvy could be used.      However, she have not tried Ajovy yet.  My suspicion that her headaches will be much better controlled after she adds this medication.  I provided her necessary teaching and also resources online.  Perceived improvement of her headaches are likely due to resolution of medication overuse headaches.  She also reports that chiropractor care and stretching help cervicogenic headache.    Diagnoses:    ICD-10-CM    1. Migraine without aura and without status migrainosus, not intractable  G43.009       2. Tension headache  G44.209       3. Cervicogenic headache  G44.86       4. Medication overuse headache  G44.40         Plan: At today's visit we thoroughly discussed current symptoms, available treatment options, and the plan.  I am pleased to hear that her headaches improved.    We decided to start Ajovy without any additional medication changes.  She will reach out if she needs Imitrex refill in the future.    Advised the patient to keep the headache diary and bring it to the next follow-up visit or upload via My Chart.     Next  "follow-up appointment is in the next 3 months or earlier if needed.    Total Time: 31 minutes spent on the date of the encounter doing chart review, history and exam, documentation and further activities per the note.    Gelacio Campbell MD  [facility]  (Chart documentation was completed in part with Dragon voice-recognition software. Even though reviewed, some grammatical, spelling, and word errors may remain.)      Catie Nuñez is a 57 year old female who presents for:  Chief Complaint   Patient presents with     Follow Up     Migraines- have improved some. Patient brought in [facility] as needs training to inject         Initial Vitals:  /75 (BP Location: Right arm, Patient Position: Sitting, Cuff Size: Adult Regular)   Pulse 68   LMP 09/01/2016 (LMP Unknown)   SpO2 97%  Estimated body mass index is 21.1 kg/m  as calculated from the following:    Height as of 2/8/23: 1.613 m (5' 3.5\").    Weight as of 2/8/23: 54.9 kg (121 lb).. There is no height or weight on file to calculate BSA. BP completed using cuff size: regular        Chio Diaz      Again, thank you for allowing me to participate in the care of your patient.        Sincerely,        Gelacio Campbell MD    "

## 2023-03-31 ENCOUNTER — ANCILLARY PROCEDURE (OUTPATIENT)
Dept: GENERAL RADIOLOGY | Facility: CLINIC | Age: 58
End: 2023-03-31
Attending: INTERNAL MEDICINE
Payer: COMMERCIAL

## 2023-03-31 ENCOUNTER — OFFICE VISIT (OUTPATIENT)
Dept: URGENT CARE | Facility: URGENT CARE | Age: 58
End: 2023-03-31
Payer: COMMERCIAL

## 2023-03-31 VITALS
SYSTOLIC BLOOD PRESSURE: 126 MMHG | WEIGHT: 121 LBS | HEIGHT: 64 IN | BODY MASS INDEX: 20.66 KG/M2 | OXYGEN SATURATION: 96 % | TEMPERATURE: 99.7 F | HEART RATE: 85 BPM | DIASTOLIC BLOOD PRESSURE: 85 MMHG | RESPIRATION RATE: 15 BRPM

## 2023-03-31 DIAGNOSIS — R05.8 PRODUCTIVE COUGH: ICD-10-CM

## 2023-03-31 DIAGNOSIS — J22 LRTI (LOWER RESPIRATORY TRACT INFECTION): Primary | ICD-10-CM

## 2023-03-31 DIAGNOSIS — J22 LRTI (LOWER RESPIRATORY TRACT INFECTION): ICD-10-CM

## 2023-03-31 PROCEDURE — 99214 OFFICE O/P EST MOD 30 MIN: CPT | Performed by: INTERNAL MEDICINE

## 2023-03-31 PROCEDURE — 71046 X-RAY EXAM CHEST 2 VIEWS: CPT | Mod: TC | Performed by: RADIOLOGY

## 2023-03-31 RX ORDER — AZITHROMYCIN 250 MG/1
TABLET, FILM COATED ORAL
Qty: 6 TABLET | Refills: 0 | Status: SHIPPED | OUTPATIENT
Start: 2023-03-31 | End: 2023-04-05

## 2023-03-31 RX ORDER — BENZONATATE 200 MG/1
200 CAPSULE ORAL 3 TIMES DAILY PRN
Qty: 30 CAPSULE | Refills: 0 | Status: SHIPPED | OUTPATIENT
Start: 2023-03-31 | End: 2023-08-02

## 2023-03-31 ASSESSMENT — ENCOUNTER SYMPTOMS
DIAPHORESIS: 1
CHILLS: 0
FEVER: 0
HEADACHES: 1
MYALGIAS: 1

## 2023-03-31 NOTE — PROGRESS NOTES
"ASSESSMENT AND PLAN:      ICD-10-CM    1. LRTI (lower respiratory tract infection)  J22 XR Chest 2 Views     azithromycin (ZITHROMAX) 250 MG tablet     benzonatate (TESSALON) 200 MG capsule      2. Productive cough  R05.8 XR Chest 2 Views     azithromycin (ZITHROMAX) 250 MG tablet     benzonatate (TESSALON) 200 MG capsule          Susceptible to pneumonia  Chest x-ray screening    PLAN:      Patient Instructions   Chest x-ray - no new pneumonia     Z-Hermilo antibiotics  Tessalon Perles    Fluids.  Rest.    Return if symptoms worsen or fail to improve.        Diana Bose MD  [facility]    Subjective     Catie Nuñez is a 57 year old who presents for Patient presents with:  Urgent Care: X 10 days pt has been achey, took neg covid at home   URI: X 10 days pt has possible upper respiratory issue   Ear Problem: Woke up with rt ear pain    an established patient of [facility].    URI Adult    Onset of symptoms was 10 day(s) ago.  Course of illness is worsening.      Current and Associated symptoms:  achy  Went into chest  Has scar tissue in lungs so susceptible to pneumonia  Grey-green phlegm  right ear pain today    Treatment measures tried include Tylenol/Ibuprofen and OTC Cough med.  Predisposing factors include ill contact: Family member - sons with viral bronchitis.        Review of Systems   Constitutional: Positive for diaphoresis. Negative for chills and fever.   Musculoskeletal: Positive for myalgias.   Neurological: Positive for headaches.           Objective    /85 (BP Location: Right arm, Patient Position: Sitting, Cuff Size: Adult Regular)   Pulse 85   Temp 99.7  F (37.6  C) (Oral)   Resp 15   Ht 1.613 m (5' 3.5\")   Wt 54.9 kg (121 lb)   LMP 09/01/2016 (LMP Unknown)   SpO2 96%   BMI 21.10 kg/m    Physical Exam  Vitals reviewed.   Constitutional:       Appearance: Normal appearance. She is ill-appearing.   HENT:      Left Ear: Tympanic membrane normal.      Ears:      " Comments: Red TM slightly red     Mouth/Throat:      Mouth: Mucous membranes are moist.      Pharynx: Oropharynx is clear.   Cardiovascular:      Rate and Rhythm: Normal rate and regular rhythm.      Pulses: Normal pulses.      Heart sounds: Normal heart sounds.   Pulmonary:      Effort: Pulmonary effort is normal.      Breath sounds: Normal breath sounds.   Neurological:      Mental Status: She is alert.            CXR - Reviewed and interpreted by me Normal- no infiltrates, effusions, pneumothoraces, cardiomegaly or masses  Stable calcified nodules

## 2023-04-03 DIAGNOSIS — G43.009 MIGRAINE WITHOUT AURA AND WITHOUT STATUS MIGRAINOSUS, NOT INTRACTABLE: ICD-10-CM

## 2023-04-03 RX ORDER — SUMATRIPTAN 50 MG/1
TABLET, FILM COATED ORAL
Qty: 18 TABLET | Refills: 0 | Status: SHIPPED | OUTPATIENT
Start: 2023-04-03 | End: 2023-06-29

## 2023-04-03 NOTE — TELEPHONE ENCOUNTER
Medication is being filled for 1 time refill only due to:  Pt has upcoming appt 5/5/23.      Annie VELAZCO RN, BSN  [facility]

## 2023-04-12 ENCOUNTER — LAB (OUTPATIENT)
Dept: LAB | Facility: CLINIC | Age: 58
End: 2023-04-12
Payer: COMMERCIAL

## 2023-04-12 DIAGNOSIS — Z12.11 SPECIAL SCREENING FOR MALIGNANT NEOPLASMS, COLON: Primary | ICD-10-CM

## 2023-04-12 PROCEDURE — 82274 ASSAY TEST FOR BLOOD FECAL: CPT | Performed by: FAMILY MEDICINE

## 2023-04-14 LAB — HEMOCCULT STL QL IA: NEGATIVE

## 2023-05-15 ENCOUNTER — VIRTUAL VISIT (OUTPATIENT)
Dept: FAMILY MEDICINE | Facility: CLINIC | Age: 58
End: 2023-05-15
Payer: COMMERCIAL

## 2023-05-15 DIAGNOSIS — G89.4 CHRONIC PAIN SYNDROME: ICD-10-CM

## 2023-05-15 DIAGNOSIS — Z13.220 LIPID SCREENING: ICD-10-CM

## 2023-05-15 DIAGNOSIS — F11.90 CHRONIC, CONTINUOUS USE OF OPIOIDS: ICD-10-CM

## 2023-05-15 DIAGNOSIS — M54.2 CERVICALGIA: ICD-10-CM

## 2023-05-15 DIAGNOSIS — F41.0 PANIC ATTACK: Primary | ICD-10-CM

## 2023-05-15 PROCEDURE — 99214 OFFICE O/P EST MOD 30 MIN: CPT | Mod: VID | Performed by: FAMILY MEDICINE

## 2023-05-15 ASSESSMENT — ANXIETY QUESTIONNAIRES
GAD7 TOTAL SCORE: 8
3. WORRYING TOO MUCH ABOUT DIFFERENT THINGS: MORE THAN HALF THE DAYS
1. FEELING NERVOUS, ANXIOUS, OR ON EDGE: MORE THAN HALF THE DAYS
6. BECOMING EASILY ANNOYED OR IRRITABLE: SEVERAL DAYS
4. TROUBLE RELAXING: MORE THAN HALF THE DAYS
7. FEELING AFRAID AS IF SOMETHING AWFUL MIGHT HAPPEN: NOT AT ALL
2. NOT BEING ABLE TO STOP OR CONTROL WORRYING: SEVERAL DAYS
7. FEELING AFRAID AS IF SOMETHING AWFUL MIGHT HAPPEN: NOT AT ALL
5. BEING SO RESTLESS THAT IT IS HARD TO SIT STILL: NOT AT ALL
GAD7 TOTAL SCORE: 8
GAD7 TOTAL SCORE: 8

## 2023-05-15 ASSESSMENT — PATIENT HEALTH QUESTIONNAIRE - PHQ9
SUM OF ALL RESPONSES TO PHQ QUESTIONS 1-9: 1
SUM OF ALL RESPONSES TO PHQ QUESTIONS 1-9: 1
10. IF YOU CHECKED OFF ANY PROBLEMS, HOW DIFFICULT HAVE THESE PROBLEMS MADE IT FOR YOU TO DO YOUR WORK, TAKE CARE OF THINGS AT HOME, OR GET ALONG WITH OTHER PEOPLE: SOMEWHAT DIFFICULT

## 2023-05-15 NOTE — PROGRESS NOTES
Kimmie is a 57 year old who is being evaluated via a billable video visit.        Assessment & Plan     Panic attack  -Currently taking xanax 0.5 mg daily. Agreeable to decreasing to 0.25 mg (1/2 tab) nightly to start to wean herself off mediation as she has been relying on it for sleep.  -Can take extra 1/2 - 1 tab daily for panic attack.  - reviewed, no concerning activity  -Will continue to taper at follow-up visit in 3 months.  - ALPRAZolam (XANAX) 0.5 MG tablet  Dispense: 25 tablet; Refill: 2    Cervicalgia  Chronic pain syndrome  Chronic, continuous use of opioids  -Stable with medication  -Continue with therapy  -Will reevaluate in 3 months  - reviewed, no concerning activity  - traMADol (ULTRAM) 50 MG tablet  Dispense: 50 tablet; Refill: 2    Lipid screening  - Lipid panel reflex to direct LDL Fasting        DO CHICHI [facility]   Kimmie is a 57 year old, presenting for the following health issues:  Recheck Medication        5/15/2023     4:51 PM   Additional Questions   Roomed by Kelsi TRUONG     Here for pain follow-up.  Seeing PT twice a week. Still having a lot of right shoulder. Continues to take tramadol 1-2 times a day. Thinks she may be having referred pain to low back. Takes Excedrin prn for pain as well.     Hx of panic attacks. Taking xanax for sleep and has realized that she has been doing this. Severe anxiety with social events and public speaking. Currently taking 1 tab daily. Endorses that she should not be taking her Xanax for sleep, would like to start decreasing quantity of med so that she is not relying on it for sleep.    Last filled 4/25 xanax and 4/20/23.    Worried about high cholesterol would like to retest with fasting sample.      11/30/2022    11:41 AM 2/8/2023    11:07 AM 5/15/2023     4:55 PM   DAYLIN-7 SCORE   Total Score 5 (mild anxiety) 7 (mild anxiety) 8 (mild anxiety)   Total Score 5 7 8         5/9/2022     2:01 PM  8/30/2022     3:04 PM 5/15/2023     4:57 PM   PHQ   PHQ-9 Total Score 4 4 1   Q9: Thoughts of better off dead/self-harm past 2 weeks Not at all Not at all Not at all             Review of Systems         Objective         Vitals:  No vitals were obtained today due to virtual visit.    Physical Exam   GENERAL: Healthy, alert and no distress  EYES: Eyes grossly normal to inspection.  No discharge or erythema, or obvious scleral/conjunctival abnormalities.  RESP: No audible wheeze, cough, or visible cyanosis.  No visible retractions or increased work of breathing.    SKIN: Visible skin clear. No significant rash, abnormal pigmentation or lesions.  NEURO: Cranial nerves grossly intact.  Mentation and speech appropriate for age.  PSYCH: Mentation appears normal, affect normal/bright, judgement and insight intact, normal speech and appearance well-groomed.                Video-Visit Details    Type of service:  Video Visit   Video Start Time: 9:17 AM  Video End Time:9:17 AM    Originating Location (pt. Location): Home    Distant Location (provider location):  On-site  Platform used for Video Visit: Rafael

## 2023-05-16 RX ORDER — TRAMADOL HYDROCHLORIDE 50 MG/1
50 TABLET ORAL 2 TIMES DAILY PRN
Qty: 50 TABLET | Refills: 2 | Status: SHIPPED | OUTPATIENT
Start: 2023-05-25 | End: 2023-08-02

## 2023-05-16 RX ORDER — ALPRAZOLAM 0.5 MG
TABLET ORAL
Qty: 25 TABLET | Refills: 2 | Status: SHIPPED | OUTPATIENT
Start: 2023-05-25 | End: 2023-08-02

## 2023-06-01 ENCOUNTER — PATIENT OUTREACH (OUTPATIENT)
Dept: CARE COORDINATION | Facility: CLINIC | Age: 58
End: 2023-06-01
Payer: COMMERCIAL

## 2023-06-21 DIAGNOSIS — J30.2 ACUTE SEASONAL ALLERGIC RHINITIS: ICD-10-CM

## 2023-06-23 RX ORDER — FLUTICASONE PROPIONATE 50 MCG
SPRAY, SUSPENSION (ML) NASAL
Qty: 16 G | Refills: 11 | Status: SHIPPED | OUTPATIENT
Start: 2023-06-23 | End: 2024-07-05

## 2023-06-23 NOTE — TELEPHONE ENCOUNTER
Prescription approved per [facility] Refill Protocol.    Nery Weathers, BSN RN  [facility]

## 2023-06-28 DIAGNOSIS — G43.009 MIGRAINE WITHOUT AURA AND WITHOUT STATUS MIGRAINOSUS, NOT INTRACTABLE: ICD-10-CM

## 2023-06-29 ENCOUNTER — PATIENT OUTREACH (OUTPATIENT)
Dept: CARE COORDINATION | Facility: CLINIC | Age: 58
End: 2023-06-29
Payer: COMMERCIAL

## 2023-06-29 RX ORDER — SUMATRIPTAN 50 MG/1
TABLET, FILM COATED ORAL
Qty: 18 TABLET | Refills: 0 | Status: SHIPPED | OUTPATIENT
Start: 2023-06-29 | End: 2023-07-05

## 2023-06-29 NOTE — TELEPHONE ENCOUNTER
Medication is being filled for 1 time refill only due to:  Has upcoming appt 7/5/23.  Will fill for one time.    Annie VELAZCO RN, BSN  [facility]

## 2023-07-03 ENCOUNTER — TELEPHONE (OUTPATIENT)
Dept: NEUROLOGY | Facility: CLINIC | Age: 58
End: 2023-07-03
Payer: COMMERCIAL

## 2023-07-05 ENCOUNTER — OFFICE VISIT (OUTPATIENT)
Dept: NEUROLOGY | Facility: CLINIC | Age: 58
End: 2023-07-05
Payer: COMMERCIAL

## 2023-07-05 VITALS — HEART RATE: 71 BPM | OXYGEN SATURATION: 98 % | DIASTOLIC BLOOD PRESSURE: 78 MMHG | SYSTOLIC BLOOD PRESSURE: 114 MMHG

## 2023-07-05 DIAGNOSIS — G44.209 TENSION HEADACHE: ICD-10-CM

## 2023-07-05 DIAGNOSIS — G43.009 MIGRAINE WITHOUT AURA AND WITHOUT STATUS MIGRAINOSUS, NOT INTRACTABLE: Primary | ICD-10-CM

## 2023-07-05 PROCEDURE — 99214 OFFICE O/P EST MOD 30 MIN: CPT | Performed by: PSYCHIATRY & NEUROLOGY

## 2023-07-05 RX ORDER — SUMATRIPTAN 50 MG/1
TABLET, FILM COATED ORAL
Qty: 18 TABLET | Refills: 4 | Status: SHIPPED | OUTPATIENT
Start: 2023-07-05 | End: 2023-12-01

## 2023-07-05 ASSESSMENT — ANXIETY QUESTIONNAIRES
6. BECOMING EASILY ANNOYED OR IRRITABLE: SEVERAL DAYS
2. NOT BEING ABLE TO STOP OR CONTROL WORRYING: SEVERAL DAYS
1. FEELING NERVOUS, ANXIOUS, OR ON EDGE: MORE THAN HALF THE DAYS
3. WORRYING TOO MUCH ABOUT DIFFERENT THINGS: MORE THAN HALF THE DAYS
7. FEELING AFRAID AS IF SOMETHING AWFUL MIGHT HAPPEN: NOT AT ALL
GAD7 TOTAL SCORE: 7
IF YOU CHECKED OFF ANY PROBLEMS ON THIS QUESTIONNAIRE, HOW DIFFICULT HAVE THESE PROBLEMS MADE IT FOR YOU TO DO YOUR WORK, TAKE CARE OF THINGS AT HOME, OR GET ALONG WITH OTHER PEOPLE: SOMEWHAT DIFFICULT
4. TROUBLE RELAXING: SEVERAL DAYS
GAD7 TOTAL SCORE: 7
5. BEING SO RESTLESS THAT IT IS HARD TO SIT STILL: NOT AT ALL

## 2023-07-05 NOTE — PROGRESS NOTES
ESTABLISHED PATIENT NEUROLOGY NOTE    DATE OF VISIT: 7/5/2023  CLINIC LOCATION: [facility]  MRN: [id number]  PATIENT NAME: Catie Nuñez  YOB: 1965    REASON FOR VISIT:   Chief Complaint   Patient presents with     Follow Up     Migraines- patient says they have improved      SUBJECTIVE:                                                      HISTORY OF PRESENT ILLNESS: Patient is here to follow up regarding multifactorial headaches.  The last visit was on 2/10/2023.  At that time we decided to add Ajovy.  Please refer to my initial/other prior notes for further information.    Since the last visit, the patient reports migraine improvement with lifestyle modifications.  She averages approximately 1 migraine per week.  She has milder headaches almost every day and takes Excedrin almost daily.  She is working with physical therapy and chiropractor.  Also doing acupuncture and addressing her stress.  She made some changes to her diet.      Decided against starting Ajovy.  She is on Effexor at 75 mg daily for unrelated reasons, but it is also used for headache prevention.  Imitrex works well for acute therapy (averages 1 pill/week).      No significant side effects or interval development of new neurological symptoms, but mentioned that her right eye opening is bigger than the left.  Her eye specialist suggested to discuss this with me for need of brain imaging.  EXAM:                                                    Physical Exam:   Vitals: /78 (BP Location: Right arm, Patient Position: Sitting, Cuff Size: Adult Regular)   Pulse 71   LMP 09/01/2016 (LMP Unknown)   SpO2 98%     General: pt is in NAD, cooperative.  Skin: normal turgor, moist mucous membranes, no lesions/rashes noticed.  HEENT: ATNC, white sclera, normal conjunctiva.  Respiratory: Symmetric lung excursion, no accessory respiratory muscle use.  Abdomen: Non distended.  Neurological: awake, cooperative, follows  commands, right palpebral fissure is slightly wider than the left, pupils are equal and round, extraocular movements are full bilaterally, no other exam changes compared to the previous visits.  ASSESSMENT AND PLAN:                                                    Assessment: 57 year old female patient presents for follow-up of migraines, tension headaches, and cervicogenic headaches.  She reports migraine improvement with lifestyle modifications and reducing Imitrex use, though she continues to take Excedrin almost daily.  She did not start Ajovy due to concerns of potential side effects, but is on Effexor, which is also used for headache prevention.      We discussed that she could consider trial of Ajovy versus increasing the dose of Effexor to see if her headaches improve.  She reports that Imitrex works well for acute therapy, but she is overusing Excedrin which could be additional contributing factor to her frequent milder headaches.      In the past she tried Topamax, gabapentin, Lyrica, tizanidine, baclofen, and supplements without success.  Additional preventive treatment options include nortriptyline, amitriptyline, doxepin, Depakote, verapamil, and other CGRP antagonists, including Qulipta.    Regarding her complaints of wider palpebral fissure on the right compared to the left, I do not see any additional pathologic findings to pursue brain imaging at this point.  We decided to monitor this symptom and do brain MRI later if we notice worsening.  Her previous brain imaging was done approximately 15 years ago while she was in [place] and was normal.    Diagnoses:    ICD-10-CM    1. Migraine without aura and without status migrainosus, not intractable  G43.009       2. Tension headache  G44.209         Plan: At today's visit we thoroughly discussed current symptoms, available treatment options, and the plan. I am pleased to hear that her migraines improved.    We decided not to make any medication changes.   Imitrex prescription was refilled.  We discussed that she should cut down Excedrin use to less than 15 days/month to avoid rebound/medication overuse headaches.    We also discussed Ajovy in the future as well as increasing the dose of Effexor for migraine prevention.    I advised the patient to keep the headache diary and bring it to the next follow-up visit or upload via My Chart.     Next follow-up appointment is in the next 6 months or earlier if needed.    Total Time: 34 minutes spent on the date of the encounter doing chart review, history and exam, documentation and further activities per the note.    Gelacio Campbell MD  [facility]  (Chart documentation was completed in part with Dragon voice-recognition software. Even though reviewed, some grammatical, spelling, and word errors may remain.)

## 2023-07-05 NOTE — PATIENT INSTRUCTIONS
AFTER VISIT SUMMARY (AVS):    At today's visit we thoroughly discussed current symptoms, available treatment options, and the plan. I am pleased to hear that your migraines improved.    We decided not to make any medication changes.  Imitrex prescription was refilled.  We discussed that you should cut down Excedrin use to less than 15 days/month to avoid rebound/medication overuse headaches.    We also discussed Ajovy in the future as well as increasing the dose of Effexor for migraine prevention.    Please keep the headache diary and bring it to the next follow-up visit or upload via My Chart.     Next follow-up appointment is in the next 6 months or earlier if needed.    Please do not hesitate to call me with any questions or concerns.    Thanks.

## 2023-07-05 NOTE — LETTER
7/5/2023         RE: Catie Nuñez  [address]  [facility] [address]        Dear Colleague,    Thank you for referring your patient, Catie Nuñez, to the [facility] NEUROLOGY [facility]. Please see a copy of my visit note below.    ESTABLISHED PATIENT NEUROLOGY NOTE    DATE OF VISIT: 7/5/2023  CLINIC LOCATION: [facility]  MRN: [id number]  PATIENT NAME: Catie Nuñez  YOB: 1965    REASON FOR VISIT:   Chief Complaint   Patient presents with     Follow Up     Migraines- patient says they have improved      SUBJECTIVE:                                                      HISTORY OF PRESENT ILLNESS: Patient is here to follow up regarding multifactorial headaches.  The last visit was on 2/10/2023.  At that time we decided to add Ajovy.  Please refer to my initial/other prior notes for further information.    Since the last visit, the patient reports migraine improvement with lifestyle modifications.  She averages approximately 1 migraine per week.  She has milder headaches almost every day and takes Excedrin almost daily.  She is working with physical therapy and chiropractor.  Also doing acupuncture and addressing her stress.  She made some changes to her diet.      Decided against starting Ajovy.  She is on Effexor at 75 mg daily for unrelated reasons, but it is also used for headache prevention.  Imitrex works well for acute therapy (averages 1 pill/week).      No significant side effects or interval development of new neurological symptoms, but mentioned that her right eye opening is bigger than the left.  Her eye specialist suggested to discuss this with me for need of brain imaging.  EXAM:                                                    Physical Exam:   Vitals: /78 (BP Location: Right arm, Patient Position: Sitting, Cuff Size: Adult Regular)   Pulse 71   LMP 09/01/2016 (LMP Unknown)   SpO2 98%     General: pt is in NAD, cooperative.  Skin:  normal turgor, moist mucous membranes, no lesions/rashes noticed.  HEENT: ATNC, white sclera, normal conjunctiva.  Respiratory: Symmetric lung excursion, no accessory respiratory muscle use.  Abdomen: Non distended.  Neurological: awake, cooperative, follows commands, right palpebral fissure is slightly wider than the left, pupils are equal and round, extraocular movements are full bilaterally, no other exam changes compared to the previous visits.  ASSESSMENT AND PLAN:                                                    Assessment: 57 year old female patient presents for follow-up of migraines, tension headaches, and cervicogenic headaches.  She reports migraine improvement with lifestyle modifications and reducing Imitrex use, though she continues to take Excedrin almost daily.  She did not start Ajovy due to concerns of potential side effects, but is on Effexor, which is also used for headache prevention.      We discussed that she could consider trial of Ajovy versus increasing the dose of Effexor to see if her headaches improve.  She reports that Imitrex works well for acute therapy, but she is overusing Excedrin which could be additional contributing factor to her frequent milder headaches.      In the past she tried Topamax, gabapentin, Lyrica, tizanidine, baclofen, and supplements without success.  Additional preventive treatment options include nortriptyline, amitriptyline, doxepin, Depakote, verapamil, and other CGRP antagonists, including Qulipta.    Regarding her complaints of wider palpebral fissure on the right compared to the left, I do not see any additional pathologic findings to pursue brain imaging at this point.  We decided to monitor this symptom and do brain MRI later if we notice worsening.  Her previous brain imaging was done approximately 15 years ago while she was in [place] and was normal.    Diagnoses:    ICD-10-CM    1. Migraine without aura and without status migrainosus, not intractable   "G43.009       2. Tension headache  G44.209         Plan: At today's visit we thoroughly discussed current symptoms, available treatment options, and the plan. I am pleased to hear that her migraines improved.    We decided not to make any medication changes.  Imitrex prescription was refilled.  We discussed that she should cut down Excedrin use to less than 15 days/month to avoid rebound/medication overuse headaches.    We also discussed Ajovy in the future as well as increasing the dose of Effexor for migraine prevention.    I advised the patient to keep the headache diary and bring it to the next follow-up visit or upload via My Chart.     Next follow-up appointment is in the next 6 months or earlier if needed.    Total Time: 34 minutes spent on the date of the encounter doing chart review, history and exam, documentation and further activities per the note.    Gelacio Campbell MD  [facility]  (Chart documentation was completed in part with Dragon voice-recognition software. Even though reviewed, some grammatical, spelling, and word errors may remain.)      Catie Nuñez is a 58 year old female who presents for:  Chief Complaint   Patient presents with     Follow Up     Migraines- patient says they have improved         Initial Vitals:  /78 (BP Location: Right arm, Patient Position: Sitting, Cuff Size: Adult Regular)   Pulse 71   LMP 09/01/2016 (LMP Unknown)   SpO2 98%  Estimated body mass index is 21.1 kg/m  as calculated from the following:    Height as of 3/31/23: 1.613 m (5' 3.5\").    Weight as of 3/31/23: 54.9 kg (121 lb).. There is no height or weight on file to calculate BSA. BP completed using cuff size: regular    Chio Diaz      Again, thank you for allowing me to participate in the care of your patient.        Sincerely,        Gelacio Campbell MD    "

## 2023-07-05 NOTE — PROGRESS NOTES
"Catie Nuñez is a 58 year old female who presents for:  Chief Complaint   Patient presents with     Follow Up     Migraines- patient says they have improved         Initial Vitals:  /78 (BP Location: Right arm, Patient Position: Sitting, Cuff Size: Adult Regular)   Pulse 71   LMP 09/01/2016 (LMP Unknown)   SpO2 98%  Estimated body mass index is 21.1 kg/m  as calculated from the following:    Height as of 3/31/23: 1.613 m (5' 3.5\").    Weight as of 3/31/23: 54.9 kg (121 lb).. There is no height or weight on file to calculate BSA. BP completed using cuff size: regular    Chio Diaz  "

## 2023-07-27 ENCOUNTER — OFFICE VISIT (OUTPATIENT)
Dept: URGENT CARE | Facility: URGENT CARE | Age: 58
End: 2023-07-27
Payer: COMMERCIAL

## 2023-07-27 VITALS
BODY MASS INDEX: 21.1 KG/M2 | SYSTOLIC BLOOD PRESSURE: 129 MMHG | HEART RATE: 76 BPM | TEMPERATURE: 98.5 F | DIASTOLIC BLOOD PRESSURE: 81 MMHG | OXYGEN SATURATION: 99 % | WEIGHT: 121 LBS

## 2023-07-27 DIAGNOSIS — S06.0X0A CONCUSSION WITHOUT LOSS OF CONSCIOUSNESS, INITIAL ENCOUNTER: Primary | ICD-10-CM

## 2023-07-27 PROCEDURE — 99214 OFFICE O/P EST MOD 30 MIN: CPT | Performed by: PHYSICIAN ASSISTANT

## 2023-07-27 RX ORDER — METOCLOPRAMIDE 10 MG/1
10 TABLET ORAL 3 TIMES DAILY PRN
Qty: 30 TABLET | Refills: 0 | Status: SHIPPED | OUTPATIENT
Start: 2023-07-27 | End: 2023-08-02

## 2023-07-27 ASSESSMENT — ENCOUNTER SYMPTOMS
VOMITING: 0
WEAKNESS: 0
NAUSEA: 1
PHOTOPHOBIA: 0
HEADACHES: 1
FATIGUE: 1
AGITATION: 1
EYE REDNESS: 0
DIZZINESS: 0
DECREASED CONCENTRATION: 0
NUMBNESS: 0
SHORTNESS OF BREATH: 0
PALPITATIONS: 0
EYE PAIN: 0
NERVOUS/ANXIOUS: 1
HALLUCINATIONS: 0
SLEEP DISTURBANCE: 1
LIGHT-HEADEDNESS: 0
SPEECH DIFFICULTY: 0
CONFUSION: 0

## 2023-07-27 NOTE — PROGRESS NOTES
Concussion without loss of consciousness, initial encounter  - Concussion  Referral; Future  - metoclopramide (REGLAN) 10 MG tablet; Take 1 tablet (10 mg) by mouth 3 times daily as needed (nasuea)    Patient was offered reassurance as it does not seem that she needs a head CT at this time.  I would like her to start concussion therapy in the next 1 to 2 weeks and follow-up with primary care in 3 to 4 weeks for further management of her concussion symptoms.    Patient was advised to return to clinic if symptoms do not improve in the next 48 hours. Patient educated on red flag symptoms and asked to go directly to the ED if symptoms present themselves or if her current symptoms worsen.     30 minutes spent by me on the date of the encounter doing chart review, history and exam, documentation and further activities per the note    Isac Fuentes PA-C  [facility]    Subjective   58 year old who presents to clinic today for the following health issues:    Urgent Care and Head Injury       HPI     Fell on monday been seen in ER [facility] but still not getting better. C/O HA and nausea after fall 4 days ago. She had some dermabond placed on her right eye brow. Patient had a shoulder x-ray at the time but never had a head CT done. Patient has a history of migraine headaches and was having one just prior to the onset of the fall. Its unclear as to whether or not she was having worse than usually head pain at the time of the injury. She is concerned because she continue to have nausea and right headache that has not necessarily been worsening but has been persistent. Patient has been fatigued lately and out of sorts. She has began to have redness and swelling around her right eye that has been gradually worsening. She denies any light sensitivity or changes in vision.     Patient Imitrex for her HA which did seem to help last night. Patient has a history of concussion 15 years ago.    Review of  Systems   Review of Systems   Constitutional:  Positive for fatigue.   Eyes:  Negative for photophobia, pain, redness and visual disturbance.   Respiratory:  Negative for shortness of breath.    Cardiovascular:  Negative for palpitations.   Gastrointestinal:  Positive for nausea. Negative for vomiting.   Neurological:  Positive for headaches. Negative for dizziness, syncope, speech difficulty, weakness, light-headedness and numbness.   Psychiatric/Behavioral:  Positive for agitation and sleep disturbance. Negative for confusion, decreased concentration and hallucinations. The patient is nervous/anxious.       See HPI    Objective    Temp: 98.5  F (36.9  C) Temp src: Tympanic BP: 129/81 Pulse: 76     SpO2: 99 %       Physical Exam   Physical Exam  Constitutional:       General: She is not in acute distress.     Appearance: Normal appearance. She is normal weight. She is not ill-appearing, toxic-appearing or diaphoretic.   HENT:      Head: Normocephalic and atraumatic.   Eyes:      General:         Right eye: No discharge.         Left eye: No discharge.      Extraocular Movements: Extraocular movements intact.      Conjunctiva/sclera: Conjunctivae normal.      Pupils: Pupils are equal, round, and reactive to light.   Cardiovascular:      Rate and Rhythm: Normal rate.      Pulses: Normal pulses.   Pulmonary:      Effort: Pulmonary effort is normal. No respiratory distress.   Neurological:      General: No focal deficit present.      Mental Status: She is alert and oriented to person, place, and time. Mental status is at baseline.      Cranial Nerves: No cranial nerve deficit.      Sensory: No sensory deficit.      Motor: No weakness.      Coordination: Coordination normal.      Gait: Gait normal.      Deep Tendon Reflexes: Reflexes normal.   Psychiatric:         Mood and Affect: Mood normal.         Behavior: Behavior normal.         Thought Content: Thought content normal.         Judgment: Judgment normal.           No results found for this or any previous visit (from the past 24 hour(s)).

## 2023-08-02 ENCOUNTER — VIRTUAL VISIT (OUTPATIENT)
Dept: FAMILY MEDICINE | Facility: CLINIC | Age: 58
End: 2023-08-02
Payer: COMMERCIAL

## 2023-08-02 DIAGNOSIS — G89.4 CHRONIC PAIN SYNDROME: ICD-10-CM

## 2023-08-02 DIAGNOSIS — G47.00 INSOMNIA, UNSPECIFIED TYPE: Primary | ICD-10-CM

## 2023-08-02 DIAGNOSIS — F41.0 PANIC ATTACK: ICD-10-CM

## 2023-08-02 DIAGNOSIS — F11.90 CHRONIC, CONTINUOUS USE OF OPIOIDS: ICD-10-CM

## 2023-08-02 DIAGNOSIS — M54.2 CERVICALGIA: ICD-10-CM

## 2023-08-02 PROCEDURE — 99214 OFFICE O/P EST MOD 30 MIN: CPT | Mod: VID | Performed by: FAMILY MEDICINE

## 2023-08-02 RX ORDER — TRAMADOL HYDROCHLORIDE 50 MG/1
50 TABLET ORAL 2 TIMES DAILY PRN
Qty: 50 TABLET | Refills: 2 | Status: SHIPPED | OUTPATIENT
Start: 2023-08-11 | End: 2023-10-24

## 2023-08-02 RX ORDER — ALPRAZOLAM 0.5 MG
TABLET ORAL
Qty: 25 TABLET | Refills: 2 | Status: SHIPPED | OUTPATIENT
Start: 2023-08-11 | End: 2023-11-01

## 2023-08-02 ASSESSMENT — ANXIETY QUESTIONNAIRES
6. BECOMING EASILY ANNOYED OR IRRITABLE: MORE THAN HALF THE DAYS
5. BEING SO RESTLESS THAT IT IS HARD TO SIT STILL: NOT AT ALL
3. WORRYING TOO MUCH ABOUT DIFFERENT THINGS: MORE THAN HALF THE DAYS
IF YOU CHECKED OFF ANY PROBLEMS ON THIS QUESTIONNAIRE, HOW DIFFICULT HAVE THESE PROBLEMS MADE IT FOR YOU TO DO YOUR WORK, TAKE CARE OF THINGS AT HOME, OR GET ALONG WITH OTHER PEOPLE: VERY DIFFICULT
1. FEELING NERVOUS, ANXIOUS, OR ON EDGE: NEARLY EVERY DAY
GAD7 TOTAL SCORE: 12
7. FEELING AFRAID AS IF SOMETHING AWFUL MIGHT HAPPEN: NOT AT ALL
2. NOT BEING ABLE TO STOP OR CONTROL WORRYING: MORE THAN HALF THE DAYS
GAD7 TOTAL SCORE: 12
4. TROUBLE RELAXING: NEARLY EVERY DAY

## 2023-08-02 ASSESSMENT — PATIENT HEALTH QUESTIONNAIRE - PHQ9
SUM OF ALL RESPONSES TO PHQ QUESTIONS 1-9: 6
SUM OF ALL RESPONSES TO PHQ QUESTIONS 1-9: 6
10. IF YOU CHECKED OFF ANY PROBLEMS, HOW DIFFICULT HAVE THESE PROBLEMS MADE IT FOR YOU TO DO YOUR WORK, TAKE CARE OF THINGS AT HOME, OR GET ALONG WITH OTHER PEOPLE: VERY DIFFICULT

## 2023-08-02 NOTE — PROGRESS NOTES
Kimmie is a 58 year old who is being evaluated via a billable video visit.      How would you like to obtain your AVS? MyChart  If the video visit is dropped, the invitation should be resent by: Text to cell phone: [phone]  Will anyone else be joining your video visit? No          Assessment & Plan     Insomnia, unspecified type  -Chronic insomnia, dependent on benzo. Pt actively trying to wean off benzo.  -Recommended seeing sleep psychology, pt amendable  - Sleep Psychology  Referral    Panic attack  -Still taking daily, would like pt to work to taking only prn  -Discussed using mindfully. Plan to reduce dose again in 3 months, pt aware  - ALPRAZolam (XANAX) 0.5 MG tablet  Dispense: 25 tablet; Refill: 2    Cervicalgia  Chronic pain syndrome  Chronic, continuous use of opioids  -Tramadol continues to work well for pain, no side effects  -Continue with stretching, home exercises, and chiro  -Follow-up in 3 months with video visit for monitoring  - traMADol (ULTRAM) 50 MG tablet  Dispense: 50 tablet; Refill: 2     reviewed, no concerning activity.                 Iglesia Martinez [facility]   Kimmie is a 58 year old, presenting for the following health issues:  Recheck Medication        8/2/2023     4:58 PM   Additional Questions   Roomed by Karen LERMA       History of Present Illness       Reason for visit:  Med check    She eats 2-3 servings of fruits and vegetables daily.She consumes 0 sweetened beverage(s) daily.She exercises with enough effort to increase her heart rate 30 to 60 minutes per day.  She exercises with enough effort to increase her heart rate 3 or less days per week.   She is taking medications regularly.     Here for follow-up on chronic pain -neck and shoulders. Going back to chiro twice a week.  Taking tramadol twice a day. No side effects.    Anxiety: cut back Xanax last month. Pt being more mindful of when she uses it. Still taking daily, but  "is cutting tab in half. Finds it difficult to sleep without it but is trying to readjust and retrain her body.               Review of Systems         Objective    Vitals - Patient Reported  Height (Patient Reported): 161.3 cm (5' 3.5\")  Pain Score: Moderate Pain (5)  Pain Loc: Head        Physical Exam   GENERAL: Healthy, alert and no distress  EYES: Eyes grossly normal to inspection.  No discharge or erythema, or obvious scleral/conjunctival abnormalities.  RESP: No audible wheeze, cough, or visible cyanosis.  No visible retractions or increased work of breathing.    SKIN: Visible skin clear. No significant rash, abnormal pigmentation or lesions.  NEURO: Cranial nerves grossly intact.  Mentation and speech appropriate for age.  PSYCH: Mentation appears normal, affect normal/bright, judgement and insight intact, normal speech and appearance well-groomed.                Video-Visit Details    Type of service:  Video Visit   Video Start Time: 5:36 PM  Video End Time:5:36 PM    Originating Location (pt. Location): Home    Distant Location (provider location):  On-site  Platform used for Video Visit: Rafael"

## 2023-08-20 DIAGNOSIS — N95.1 SYMPTOMATIC MENOPAUSAL OR FEMALE CLIMACTERIC STATES: ICD-10-CM

## 2023-08-21 RX ORDER — MEDROXYPROGESTERONE ACETATE 2.5 MG/1
2.5 TABLET ORAL DAILY
Qty: 90 TABLET | Refills: 3 | Status: SHIPPED | OUTPATIENT
Start: 2023-08-21 | End: 2024-08-28

## 2023-09-10 ENCOUNTER — HEALTH MAINTENANCE LETTER (OUTPATIENT)
Age: 58
End: 2023-09-10

## 2023-09-11 ENCOUNTER — HOSPITAL ENCOUNTER (OUTPATIENT)
Dept: MAMMOGRAPHY | Facility: CLINIC | Age: 58
Discharge: HOME | End: 2023-09-11
Attending: FAMILY MEDICINE | Admitting: FAMILY MEDICINE
Payer: COMMERCIAL

## 2023-09-11 DIAGNOSIS — Z12.31 VISIT FOR SCREENING MAMMOGRAM: ICD-10-CM

## 2023-09-11 PROCEDURE — 77067 SCR MAMMO BI INCL CAD: CPT

## 2023-09-19 DIAGNOSIS — N95.1 SYMPTOMATIC MENOPAUSAL OR FEMALE CLIMACTERIC STATES: ICD-10-CM

## 2023-09-19 RX ORDER — ESTRADIOL 1 MG/1
0.5 TABLET ORAL DAILY
Qty: 90 TABLET | Refills: 0 | Status: SHIPPED | OUTPATIENT
Start: 2023-09-19 | End: 2024-02-19

## 2023-09-21 ENCOUNTER — PATIENT OUTREACH (OUTPATIENT)
Dept: FAMILY MEDICINE | Facility: CLINIC | Age: 58
End: 2023-09-21
Payer: COMMERCIAL

## 2023-09-21 DIAGNOSIS — R87.613 HSIL (HIGH GRADE SQUAMOUS INTRAEPITHELIAL LESION) ON PAP SMEAR OF CERVIX: Primary | ICD-10-CM

## 2023-09-30 ENCOUNTER — NURSE TRIAGE (OUTPATIENT)
Dept: NURSING | Facility: CLINIC | Age: 58
End: 2023-09-30
Payer: COMMERCIAL

## 2023-09-30 ENCOUNTER — OFFICE VISIT (OUTPATIENT)
Dept: URGENT CARE | Facility: URGENT CARE | Age: 58
End: 2023-09-30
Payer: COMMERCIAL

## 2023-09-30 VITALS
BODY MASS INDEX: 21.1 KG/M2 | TEMPERATURE: 98.5 F | SYSTOLIC BLOOD PRESSURE: 136 MMHG | DIASTOLIC BLOOD PRESSURE: 88 MMHG | HEART RATE: 79 BPM | OXYGEN SATURATION: 99 % | WEIGHT: 121 LBS

## 2023-09-30 DIAGNOSIS — G43.811 OTHER MIGRAINE WITH STATUS MIGRAINOSUS, INTRACTABLE: Primary | ICD-10-CM

## 2023-09-30 PROCEDURE — 96372 THER/PROPH/DIAG INJ SC/IM: CPT | Performed by: PHYSICIAN ASSISTANT

## 2023-09-30 PROCEDURE — 99213 OFFICE O/P EST LOW 20 MIN: CPT | Mod: 25 | Performed by: PHYSICIAN ASSISTANT

## 2023-09-30 RX ORDER — KETOROLAC TROMETHAMINE 30 MG/ML
30 INJECTION, SOLUTION INTRAMUSCULAR; INTRAVENOUS ONCE
Status: COMPLETED | OUTPATIENT
Start: 2023-09-30 | End: 2023-09-30

## 2023-09-30 RX ADMIN — KETOROLAC TROMETHAMINE 30 MG: 30 INJECTION, SOLUTION INTRAMUSCULAR; INTRAVENOUS at 16:26

## 2023-09-30 NOTE — TELEPHONE ENCOUNTER
"  Nurse Triage SBAR    Is this a 2nd Level Triage? NO    Situation: Migraine.    Background: Patient reports she has a Hx of migraines but \"can't get a handle on this one\". Reports migraine started on 9/22. She takes Excedrin and Imitrex and has tried cold packs. States she is \"maxed out on Imitrex\", and migraine continues.    Assessment: Rates migraine 7/10, with pain behind her eyes, sensitivity to light, nausea ad feels \"oozy\". States she can't eat much d/t nausea. Denies vomiting. Reports she \"can't function\" and \"can't get over this one\". She is wondering if [facility] can do Toradol shots. States she was seen in ED last year for migraine.    Protocol Recommended Disposition:   See PCP Within 24 Hours    Recommendation: Recommendation is to go to [facility]. She states she is going to [facility]. Explained that [facility] can do IM injections but not IV. Gave care advice and call back instructions. Patient plans to follow advice.          Does the patient meet one of the following criteria for ADS visit consideration? 16+ years old, with an [facility] PCP     TIP  Providers, please consider if this condition is appropriate for management at one of our Acute and Diagnostic Services sites.     If patient is a good candidate, please use dotphrase <dot>triageresponse and select Refer to ADS to document.        Reason for Disposition   [1] MODERATE headache (e.g., interferes with normal activities) AND [2] present > 24 hours AND [3] unexplained  (Exceptions: analgesics not tried, typical migraine, or headache part of viral illness)     Hx of migraines but has tried all of the meds and ice with no relief. Present for over a week.    Additional Information   Negative: Difficult to awaken or acting confused (e.g., disoriented, slurred speech)   Negative: [1] Weakness of the face, arm or leg on one side of the body AND [2] new-onset   Negative: [1] Numbness of the face, arm or leg on one side of the body AND [2] new-onset   Negative: [1] " "Loss of speech or garbled speech AND [2] new-onset   Negative: Passed out (i.e., lost consciousness, collapsed and was not responding)   Negative: Sounds like a life-threatening emergency to the triager   Negative: Followed a head injury   Negative: Pregnant   Negative: Postpartum (from 0 to 6 weeks after delivery)   Negative: Traumatic Brain Injury (TBI) is suspected   Negative: Unable to walk, or can only walk with assistance (e.g., requires support)   Negative: Stiff neck (can't touch chin to chest)   Negative: Severe pain in one eye   Negative: [1] Other family members (or people in same household) with headaches AND [2] possibility of carbon monoxide exposure   Negative: [1] SEVERE headache (e.g., excruciating) AND [2] \"worst headache\" of life   Negative: [1] SEVERE headache AND [2] sudden-onset (i.e., reaching maximum intensity within seconds to 1 hour)   Negative: [1] SEVERE headache AND [2] fever   Negative: Loss of vision or double vision  (Exception: Same as prior migraines.)   Negative: [1] Fever > 100.0 F (37.8 C) AND [2] diabetes mellitus or weak immune system (e.g., HIV positive, cancer chemo, splenectomy, organ transplant, chronic steroids)   Negative: Patient sounds very sick or weak to the triager   Negative: [1] SEVERE headache (e.g., excruciating) AND [2] not improved after 2 hours of pain medicine   Negative: [1] Vomiting AND [2] 2 or more times  (Exception: Similar to previous migraines.)   Negative: Fever > 104 F (40 C)    Protocols used: Headache-A-AH    "

## 2023-09-30 NOTE — PROGRESS NOTES
SUBJECTIVE:  Catie Nuñez is a 58 year old female who comes in for evaluation of headache.  Headache began 2day(s)}ago and is still present  DESCRIPTION OF HEADACHE:   Location of pain: right-sided unilateral   Radiation of pain?: NO   Character of pain:aching   Severity of pain: severe   Accompanying symptoms: none     History of Migranes: Yes   Are most headaches similar in presentation? YES        CURRENT USE OF MEDS TO TREAT HA:   Abortive meds? aspirin/acetaminophen/caffeine    Daily use? NO         Past Medical History:   Diagnosis Date    Abnormal Pap smear of cervix 11/07/2019    3/11/21    Anxiety     Arthritis 3 yrs ago    ASCUS with positive high risk HPV 6/2015,09/14/16    atypia on colp, 09/14/16: ASCUS + HR HPV 16 and other.     Cervical high risk HPV (human papillomavirus) test positive 11/07/2019 11/7/21    Depression     Depressive disorder Age 12    Fracture of great toe 02/20/2014    History of scoliosis     Hx of colposcopy with cervical biopsy 10/06/2016    10/06/16: [facility] NIL.    MVP (mitral valve prolapse)     Unexplained endometrial cells on cervical Pap smear 06/2015    thin endometrium on US     Current Outpatient Medications   Medication Sig Dispense Refill    ALPRAZolam (XANAX) 0.5 MG tablet TAKE 1/2 TO 1 TABLET BY MOUTH DAILY AS NEEDED. 30 day supply. 25 tablet 2    Aspirin-Acetaminophen-Caffeine (EXCEDRIN PO) Take by mouth as needed      estradiol (ESTRACE) 1 MG tablet TAKE ONE-HALF TABLET BY MOUTH DAILY 90 tablet 0    fluticasone (FLONASE) 50 MCG/ACT nasal spray SHAKE LIQUID AND USE 1 TO 2 SPRAYS IN EACH NOSTRIL DAILY 16 g 11    medroxyPROGESTERone (PROVERA) 2.5 MG tablet TAKE 1 TABLET(2.5 MG) BY MOUTH DAILY 90 tablet 3    SUMAtriptan (IMITREX) 50 MG tablet TAKE 1 TABLET BY MOUTH AT ONSET OF MIGRAINE. MAY REPEAT IN 2 HOURS, MAX 2 TABLETS IN 24 HOURS. MAX 9 DAYS PER MONTH 18 tablet 4    traMADol (ULTRAM) 50 MG tablet Take 1 tablet (50 mg) by mouth 2 times daily as needed for  severe pain 30 day supply 50 tablet 2    venlafaxine (EFFEXOR XR) 75 MG 24 hr capsule Take 1 capsule (75 mg) by mouth daily 180 capsule 3    albuterol (PROAIR HFA/PROVENTIL HFA/VENTOLIN HFA) 108 (90 Base) MCG/ACT inhaler Inhale 1-2 puffs into the lungs every 4 hours as needed for shortness of breath / dyspnea or wheezing (Patient not taking: Reported on 5/24/2022) 18 g 0     Social History     Tobacco Use    Smoking status: Never     Passive exposure: Never    Smokeless tobacco: Never   Substance Use Topics    Alcohol use: Yes     Comment: occ, rare        ROS:   Review of systems negative except as stated above.    OBJECTIVE:  /88   Pulse 79   Temp 98.5  F (36.9  C) (Tympanic)   Wt 54.9 kg (121 lb)   LMP 09/01/2016 (LMP Unknown)   SpO2 99%   BMI 21.10 kg/m    GENERAL APPEARANCE: healthy, alert and no distress  EYES: conjunctiva clear  NEURO: Normal strength and tone, sensory exam grossly normal,  normal speech and mentation  SKIN: no suspicious lesions or rashes      No results found for any visits on 09/30/23.    ASSESSMENT:  (G43.811) Other migraine with status migrainosus, intractable  (primary encounter diagnosis)  Comment: 30mg due to aspirin 500mg 6.5 hours ago  Plan: ketorolac (TORADOL) injection 30 mg      Red flags and emergent follow up discussed, and understood by patient  Follow up with PCP if symptoms worsen or fail to improve

## 2023-10-20 ENCOUNTER — TELEPHONE (OUTPATIENT)
Dept: FAMILY MEDICINE | Facility: CLINIC | Age: 58
End: 2023-10-20
Payer: COMMERCIAL

## 2023-10-20 DIAGNOSIS — M54.2 CERVICALGIA: ICD-10-CM

## 2023-10-20 DIAGNOSIS — F41.0 PANIC ATTACK: ICD-10-CM

## 2023-10-20 DIAGNOSIS — F11.90 CHRONIC, CONTINUOUS USE OF OPIOIDS: ICD-10-CM

## 2023-10-20 DIAGNOSIS — G89.4 CHRONIC PAIN SYNDROME: ICD-10-CM

## 2023-10-20 NOTE — TELEPHONE ENCOUNTER
Medication Question or Refill    Contacts         Type Contact Phone/Fax    10/20/2023 03:59 PM CDT Phone (Incoming) Kimmie Nuñez (Self) [phone] (M)            What medication are you calling about (include dose and sig)?: ALPRAZolam (XANAX) 0.5 MG tablet  traMADol (ULTRAM) 50 MG tablet    Preferred Pharmacy:       [facility] #[id number] [place], [place] - [address] [address]  Phone: [phone] Fax: [phone]        Controlled Substance Agreement on file:   CSA -- Patient Level:     [Media Unavailable] Controlled Substance Agreement - Opioid - Scan on 8/31/2022  7:04 AM   [Media Unavailable] Controlled Substance Agreement - Opioid - Scan on 6/2/2021  1:10 PM       Who prescribed the medication?: Dr Iglesia Martinez    Do you need a refill? Yes    When did you use the medication last? Everyday use    Patient offered an appointment? No    Do you have any questions or concerns?  No      Could we send this information to you in [facility] or would you prefer to receive a phone call?:   No preference   Okay to leave a detailed message?: Yes at Cell number on file:    Telephone Information:   Mobile [phone]

## 2023-10-24 RX ORDER — TRAMADOL HYDROCHLORIDE 50 MG/1
50 TABLET ORAL 2 TIMES DAILY PRN
Qty: 50 TABLET | Refills: 0 | Status: SHIPPED | OUTPATIENT
Start: 2023-11-03 | End: 2023-11-22

## 2023-10-24 NOTE — TELEPHONE ENCOUNTER
Next fill for Tramadol is 11/3, script sent to be filled then.  Xanax script is due to fill at your follow-up visit. Will discuss this med then.      Iglesia Martinez, DO

## 2023-10-24 NOTE — TELEPHONE ENCOUNTER
I reviewed this message with pt.  She has a question about the xanax-  11/22/23 is her appt.  She will run out around 11/18/23 or 11/19/23. She will run out of the 30 day supply.  PT didn't want to abruptly stop the med.    LILLY Son

## 2023-10-29 DIAGNOSIS — F41.1 GENERALIZED ANXIETY DISORDER: ICD-10-CM

## 2023-10-29 DIAGNOSIS — F33.0 MAJOR DEPRESSIVE DISORDER, RECURRENT EPISODE, MILD (H): ICD-10-CM

## 2023-10-30 RX ORDER — VENLAFAXINE HYDROCHLORIDE 75 MG/1
75 CAPSULE, EXTENDED RELEASE ORAL DAILY
Qty: 180 CAPSULE | Refills: 3 | Status: SHIPPED | OUTPATIENT
Start: 2023-10-30 | End: 2024-02-12 | Stop reason: DRUGHIGH

## 2023-11-01 RX ORDER — ALPRAZOLAM 0.25 MG
0.25 TABLET ORAL DAILY PRN
Qty: 20 TABLET | Refills: 0 | Status: SHIPPED | OUTPATIENT
Start: 2023-11-18 | End: 2023-12-05

## 2023-11-01 NOTE — TELEPHONE ENCOUNTER
Writer called and left message on patient's voicemail to call back and speak with a triage nurse.    ABIOLA FosterN RN  [facility]

## 2023-11-01 NOTE — TELEPHONE ENCOUNTER
Per our last visit discussion. We will be further decreasing Xanax use. Script sent for fill 11/18/23. Xanax 0.25 mg one tab daily prn, 20 tabs.     Iglesia Martinez DO

## 2023-11-02 NOTE — TELEPHONE ENCOUNTER
Writer relayed message from Dr. Martinez.     Per our last visit discussion. We will be further decreasing Xanax use. Script sent for fill 11/18/23. Xanax 0.25 mg one tab daily prn, 20 tabs.      Patient was appreciative.     Nery Weathers, BSN RN  [facility]

## 2023-11-19 ENCOUNTER — MYC MEDICAL ADVICE (OUTPATIENT)
Dept: FAMILY MEDICINE | Facility: CLINIC | Age: 58
End: 2023-11-19
Payer: COMMERCIAL

## 2023-11-21 NOTE — TELEPHONE ENCOUNTER
General Call    Contacts         Type Contact Phone/Fax    11/21/2023 02:51 PM CST Phone (Incoming) Kimmie Nuñez (Self) [phone] (M)          Reason for Call:      What are your questions or concerns:  Pt stated that her MG dosage and her tablet dosage has decreased. Pt stated she is was given a 0.50 MG dosage with 25 tablets last month. Pt is saying this time she was given 0.25MG with 20 tablets which she is not comfortable with she is wanting 0.50 MG tablets with 20 tablets as she is okay with tapering down but is concerned with such a large decrease in not only MG dosage but tablets given per month. Pt doesn't feel this is reasonable to decrease the dosage this quickly as this is less than half the amount she was given last month. Pt stated that she is having anxiety just thinking about this.     Date of last appointment with provider: patient has upcoming appointment on 11/22/23    Could we send this information to you in [facility] or would you prefer to receive a phone call?:   Patient would like to be contacted via [facility]

## 2023-11-22 ENCOUNTER — OFFICE VISIT (OUTPATIENT)
Dept: FAMILY MEDICINE | Facility: CLINIC | Age: 58
End: 2023-11-22
Payer: COMMERCIAL

## 2023-11-22 VITALS
BODY MASS INDEX: 19.81 KG/M2 | HEIGHT: 64 IN | RESPIRATION RATE: 16 BRPM | WEIGHT: 116 LBS | DIASTOLIC BLOOD PRESSURE: 78 MMHG | OXYGEN SATURATION: 97 % | SYSTOLIC BLOOD PRESSURE: 115 MMHG | HEART RATE: 90 BPM | TEMPERATURE: 98.1 F

## 2023-11-22 DIAGNOSIS — F51.04 PSYCHOPHYSIOLOGICAL INSOMNIA: ICD-10-CM

## 2023-11-22 DIAGNOSIS — M54.2 CERVICALGIA: ICD-10-CM

## 2023-11-22 DIAGNOSIS — Z79.899 CHRONIC PRESCRIPTION BENZODIAZEPINE USE: ICD-10-CM

## 2023-11-22 DIAGNOSIS — G89.4 CHRONIC PAIN SYNDROME: ICD-10-CM

## 2023-11-22 DIAGNOSIS — F11.90 CHRONIC, CONTINUOUS USE OF OPIOIDS: ICD-10-CM

## 2023-11-22 DIAGNOSIS — Z12.4 CERVICAL CANCER SCREENING: Primary | ICD-10-CM

## 2023-11-22 DIAGNOSIS — Z79.899 CONTROLLED SUBSTANCE AGREEMENT SIGNED: ICD-10-CM

## 2023-11-22 DIAGNOSIS — F41.1 GENERALIZED ANXIETY DISORDER: ICD-10-CM

## 2023-11-22 PROCEDURE — G0480 DRUG TEST DEF 1-7 CLASSES: HCPCS | Performed by: FAMILY MEDICINE

## 2023-11-22 PROCEDURE — G0145 SCR C/V CYTO,THINLAYER,RESCR: HCPCS | Performed by: FAMILY MEDICINE

## 2023-11-22 PROCEDURE — 87624 HPV HI-RISK TYP POOLED RSLT: CPT | Performed by: FAMILY MEDICINE

## 2023-11-22 PROCEDURE — 99215 OFFICE O/P EST HI 40 MIN: CPT | Performed by: FAMILY MEDICINE

## 2023-11-22 RX ORDER — MIRTAZAPINE 7.5 MG/1
7.5 TABLET, FILM COATED ORAL
Qty: 30 TABLET | Refills: 0 | Status: SHIPPED | OUTPATIENT
Start: 2023-11-22 | End: 2024-05-14 | Stop reason: SINTOL

## 2023-11-22 RX ORDER — TRAMADOL HYDROCHLORIDE 50 MG/1
50 TABLET ORAL 2 TIMES DAILY PRN
Qty: 50 TABLET | Refills: 2 | Status: SHIPPED | OUTPATIENT
Start: 2023-12-01 | End: 2024-02-12

## 2023-11-22 ASSESSMENT — ENCOUNTER SYMPTOMS
PALPITATIONS: 0
NERVOUS/ANXIOUS: 1
EYE PAIN: 0
NAUSEA: 1
DIARRHEA: 0
DYSURIA: 0
CHILLS: 0
MYALGIAS: 1
SHORTNESS OF BREATH: 0
ARTHRALGIAS: 0
WEAKNESS: 0
COUGH: 0
PARESTHESIAS: 0
FEVER: 0
SORE THROAT: 0
FREQUENCY: 0
JOINT SWELLING: 0
HEADACHES: 1
CONSTIPATION: 0
HEMATOCHEZIA: 0
DIZZINESS: 0
HEMATURIA: 0
HEARTBURN: 0
ABDOMINAL PAIN: 0

## 2023-11-22 ASSESSMENT — PATIENT HEALTH QUESTIONNAIRE - PHQ9
SUM OF ALL RESPONSES TO PHQ QUESTIONS 1-9: 3
SUM OF ALL RESPONSES TO PHQ QUESTIONS 1-9: 3
10. IF YOU CHECKED OFF ANY PROBLEMS, HOW DIFFICULT HAVE THESE PROBLEMS MADE IT FOR YOU TO DO YOUR WORK, TAKE CARE OF THINGS AT HOME, OR GET ALONG WITH OTHER PEOPLE: SOMEWHAT DIFFICULT

## 2023-11-22 ASSESSMENT — PAIN SCALES - GENERAL: PAINLEVEL: MILD PAIN (2)

## 2023-11-22 NOTE — LETTER
[facility]  11/22/23  Patient: Caite Nuñez  YOB: 1965  Medical Record Number: [id number]                                                                                  Non-Opioid Controlled Substance Agreement    This is an agreement between you and your provider regarding safe and appropriate use of controlled substances prescribed by your care team. Controlled substances are?medicines that can cause physical and mental dependence (abuse).     There are strict laws about having and using these medicines. We here at [facility] are  committed to working with you in your efforts to get better. To support you in this work, we'll help you schedule regular office appointments for medicine refills. If we must cancel or change your appointment for any reason, we'll make sure you have enough medicine to last until your next appointment.     As a Provider, I will:   Listen carefully to your concerns while treating you with respect.   Recommend a treatment plan that I believe is in your best interest and may involve therapies other than medicine.    Talk with you often about the possible benefits and the risk of harm of any medicine that we prescribe for you.  Assess the safety of this medicine and check how well it works.    Provide a plan on how to taper (discontinue or go off) using this medicine if the decision is made to stop its use.      ::  As a Patient, I understand controlled substances:     Are prescribed by my care provider to help me function or work and manage my condition(s).?  Are strong medicines and can cause serious side effects.     Need to be taken exactly as prescribed.?Combining controlled substances with certain medicines or chemicals (such as illegal drugs, alcohol, sedatives, sleeping pills, and benzodiazepines) can be dangerous or even fatal.? If I stop taking my medicines suddenly, I may have severe withdrawal symptoms.     The risks, benefits,  and side effects of these medicine(s) were explained to me. I agree that:    I will take part in other treatments as advised by my care team. This may be psychiatry or counseling, physical therapy, behavioral therapy, group treatment or a referral to specialist.    I will keep all my appointments and understand this is part of the monitoring of controlled substances.?My care team may require an office visit for EVERY controlled substance refill. If I miss appointments or don t follow instructions, my care team may stop my medicine    I will take my medicines as prescribed. I will not change the dose or schedule unless my care team tells me to. There will be no refills if I run out early.      I may be asked to come to the clinic and complete a urine drug test or complete a pill count. If I don t give a urine sample or participate in a pill count, the care team may stop my medicine.    I will only receive controlled substance prescriptions from this clinic. If I am treated by another provider, I will tell them that I am taking controlled substances and that I have a treatment agreement with this provider. I will inform my [facility] care team within one business day if I am given a prescription for any controlled substance by another healthcare provider. My [facility] care team can contact other providers and pharmacists about my use of any medicines.    It is up to me to make sure that I don't run out of my medicines on weekends or holidays.?If my care team is willing to refill my prescription without a visit, I must request refills only during office hours. Refills may take up to 3 business days to process. I will use one pharmacy to fill all my controlled substance prescriptions. I will notify the clinic about any changes to my insurance or medicine availability.    I am responsible for my prescriptions. If the medicine/prescription is lost, stolen or destroyed, it will not be replaced.?I also agree  not to share controlled substance medicines with anyone.     I am aware I should not use any illegal or recreational drugs. I agree not to drink alcohol unless my care team says I can.     If I enroll in the [place] Medical Cannabis program, I will tell my care team before my next refill.    I will tell my care team right away if I become pregnant, have a new medical problem treated outside of my regular clinic, or have a change in my medicines.     I understand that this medicine can affect my thinking, judgment and reaction time.? Alcohol and drugs affect the brain and body, which can affect the safety of my driving. Being under the influence of alcohol or drugs can affect my decision-making, behaviors, personal safety and the safety of others. Driving while impaired (DWI) can occur if a person is driving, operating or in physical control of a car, motorcycle, boat, snowmobile, ATV, motorbike, off-road vehicle or any other motor vehicle ([place] Statute 169A.20). I understand the risk if I choose to drive or operate any vehicle or machinery.    I understand that if I do not follow any of the conditions above, my prescriptions or treatment may be stopped or changed.   I agree that my provider, clinic care team and pharmacy may work with any city, state or federal law enforcement agency that investigates the misuse, sale or other diversion of my controlled medicine. I will allow my provider to discuss my care with, or share a copy of, this agreement with any other treating provider, pharmacy or emergency room where I receive care.     I have read this agreement and have asked questions about anything I did not understand.    ________________________________________________________  Patient Signature - Catie Nuñez     ___________________                   Date     ________________________________________________________  Provider Signature - Iglesia Martinez DO       ___________________                   Date      ________________________________________________________  Witness Signature (required if provider not present while patient signing)          ___________________                   Date

## 2023-11-22 NOTE — LETTER
Opioid / Opioid Plus Controlled Substance Agreement    This is an agreement between you and your provider about the safe and appropriate use of controlled substance/opioids prescribed by your care team. Controlled substances are medicines that can cause physical and mental dependence (abuse).    There are strict laws about having and using these medicines. We here at [facility] are committing to working with you in your efforts to get better. To support you in this work, we ll help you schedule regular office appointments for medicine refills. If we must cancel or change your appointment for any reason, we ll make sure you have enough medicine to last until your next appointment.     As a Provider, I will:  Listen carefully to your concerns and treat you with respect.   Recommend a treatment plan that I believe is in your best interest. This plan may involve therapies other than opioid pain medication.   Talk with you often about the possible benefits, and the risk of harm of any medicine that we prescribe for you.   Provide a plan on how to taper (discontinue or go off) using this medicine if the decision is made to stop its use.    As a Patient, I understand that opioid(s):   Are a controlled substance prescribed by my care team to help me function or work and manage my condition(s).   Are strong medicines and can cause serious side effects such as:  Drowsiness, which can seriously affect my driving ability  A lower breathing rate, enough to cause death  Harm to my thinking ability   Depression   Abuse of and addiction to this medicine  Need to be taken exactly as prescribed. Combining opioids with certain medicines or chemicals (such as illegal drugs, sedatives, sleeping pills, and benzodiazepines) can be dangerous or even fatal. If I stop opioids suddenly, I may have severe withdrawal symptoms.  Do not work for all types of pain nor for all patients. If they re not helpful, I may be asked to stop  them.        The risks, benefits and side effects of these medicine(s) were explained to me. I agree that:  I will take part in other treatments as advised by my care team. This may be psychiatry or counseling, physical therapy, behavioral therapy, group treatment or a referral to a specialist.     I will keep all my appointments. I understand that this is part of the monitoring of opioids. My care team may require an office visit for EVERY opioid/controlled substance refill. If I miss appointments or don t follow instructions, my care team may stop my medicine.    I will take my medicines as prescribed. I will not change the dose or schedule unless my care team tells me to. There will be no refills if I run out early.     I may be asked to come to the clinic and complete a urine drug test or complete a pill count at any time. If I don t give a urine sample or participate in a pill count, the care team may stop my medicine.    I will only receive prescriptions from this clinic for chronic pain. If I am treated by another provider for acute pain issues, I will tell them that I am taking opioid pain medication for chronic pain and that I have a treatment agreement with this provider. I will inform my [facility] care team within one business day if I am given a prescription for any pain medication by another healthcare provider. My [facility] care team can contact other providers and pharmacists about my use of any medicines.    It is up to me to make sure that I don t run out of my medicines on weekends or holidays. If my care team is willing to refill my opioid prescription without a visit, I must request refills only during office hours. Refills may take up to 3 business days to process. I will use one pharmacy to fill all my opioid and other controlled substance prescriptions. I will notify the clinic about any changes to my insurance or medication availability.    I am responsible for my  prescriptions. If the medicine/prescription is lost, stolen or destroyed, it will not be replaced. I also agree not to share controlled substance medicines with anyone.    I am aware I should not use any illegal or recreational drugs. I agree not to drink alcohol unless my care team says I can.       If I enroll in the [place] Medical Cannabis program, I will tell my care team prior to my next refill.     I will tell my care team right away if I become pregnant, have a new medical problem treated outside of my regular clinic, or have a change in my medications.    I understand that this medicine can affect my thinking, judgment and reaction time. Alcohol and drugs affect the brain and body, which can affect the safety of my driving. Being under the influence of alcohol or drugs can affect my decision-making, behaviors, personal safety, and the safety of others. Driving while impaired (DWI) can occur if a person is driving, operating, or in physical control of a car, motorcycle, boat, snowmobile, ATV, motorbike, off-road vehicle, or any other motor vehicle ([place] Statute 169A.20). I understand the risk if I choose to drive or operate any vehicle or machinery.    I understand that if I do not follow any of the conditions above, my prescriptions or treatment may be stopped or changed.          Opioids  What You Need to Know    What are opioids?   Opioids are pain medicines that must be prescribed by a doctor. They are also known as narcotics.     Examples are:   morphine (MS Contin, Beatrice)  oxycodone (Oxycontin)  oxycodone and acetaminophen (Percocet)  hydrocodone and acetaminophen (Vicodin, Norco)   fentanyl patch (Duragesic)   hydromorphone (Dilaudid)   methadone  codeine (Tylenol #3)     What do opioids do well?   Opioids are best for severe short-term pain such as after a surgery or injury. They may work well for cancer pain. They may help some people with long-lasting (chronic) pain.     What do opioids NOT do  well?   Opioids never get rid of pain entirely, and they don t work well for most patients with chronic pain. Opioids don t reduce swelling, one of the causes of pain.                                    Other ways to manage chronic pain and improve function include:     Treat the health problem that may be causing pain  Anti-inflammation medicines, which reduce swelling and tenderness, such as ibuprofen (Advil, Motrin) or naproxen (Aleve)  Acetaminophen (Tylenol)  Antidepressants and anti-seizure medicines, especially for nerve pain  Topical treatments such as patches or creams  Injections or nerve blocks  Chiropractic or osteopathic treatment  Acupuncture, massage, deep breathing, meditation, visual imagery, aromatherapy  Use heat or ice at the pain site  Physical therapy   Exercise  Stop smoking  Take part in therapy       Risks and side effects     Talk to your doctor before you start or decide to keep taking opioids. Possible side effects include:    Lowering your breathing rate enough to cause death  Overdose, including death, especially if taking higher than prescribed doses  Worse depression symptoms; less pleasure in things you usually enjoy  Feeling tired or sluggish  Slower thoughts or cloudy thinking  Being more sensitive to pain over time; pain is harder to control  Trouble sleeping or restless sleep  Changes in hormone levels (for example, less testosterone)  Changes in sex drive or ability to have sex  Constipation  Unsafe driving  Itching and sweating  Dizziness  Nausea, throwing up and dry mouth    What else should I know about opioids?    Opioids may lead to dependence, tolerance, or addiction.    Dependence means that if you stop or reduce the medicine too quickly, you will have withdrawal symptoms. These include loose poop (diarrhea), jitters, flu-like symptoms, nervousness and tremors. Dependence is not the same as addiction.                     Tolerance means needing higher doses over time to  get the same effect. This may increase the chance of serious side effects.    Addiction is when people improperly use a substance that harms their body, their mind or their relations with others. Use of opiates can cause a relapse of addiction if you have a history of drug or alcohol abuse.    People who have used opioids for a long time may have a lower quality of life, worse depression, higher levels of pain and more visits to doctors.    You can overdose on opioids. Take these steps to lower your risk of overdose:    Recognize the signs:  Signs of overdose include decrease or loss of consciousness (blackout), slowed breathing, trouble waking up and blue lips. If someone is worried about overdose, they should call 911.    Talk to your doctor about Narcan (naloxone).   If you are at risk for overdose, you may be given a prescription for Narcan. This medicine very quickly reverses the effects of opioids.   If you overdose, a friend or family member can give you Narcan while waiting for the ambulance. They need to know the signs of overdose and how to give Narcan.     Don't use alcohol or street drugs.   Taking them with opioids can cause death.    Do not take any of these medicines unless your doctor says it s OK. Taking these with opioids can cause death:  Benzodiazepines, such as lorazepam (Ativan), alprazolam (Xanax) or diazepam (Valium)  Muscle relaxers, such as cyclobenzaprine (Flexeril)  Sleeping pills like zolpidem (Ambien)   Other opioids      How to keep you and other people safe while taking opioids:    Never share your opioids with others.  Opioid medicines are regulated by the Drug Enforcement Agency ([place]). Selling or sharing medications is a criminal act.    2. Be sure to store opioids in a secure place, locked up if possible. Young children can easily swallow them and overdose.    3. When you are traveling with your medicines, keep them in the original bottles. If you use a pill box, be sure you also  carry a copy of your medicine list from your clinic or pharmacy.    4. Safe disposal of opioids    Most pharmacies have places to get rid of medicine, called disposal kiosks. Medicine disposal options are also available in every [facility]. Search your county and  medication disposal  to find more options. You can find more details at:  [email or web address]://[email or web address].[email or web address]-green/managing-unwanted-medications     I agree that my provider, clinic care team, and pharmacy may work with any city, state or federal law enforcement agency that investigates the misuse, sale, or other diversion of my controlled medicine. I will allow my provider to discuss my care with, or share a copy of, this agreement with any other treating provider, pharmacy or emergency room where I receive care.    I have read this agreement and have asked questions about anything I did not understand.    _______________________________________________________  Patient Signature - Catie Nuñez _____________________                   Date     _______________________________________________________  Provider Signature - Iglesia Martinez DO   _____________________                   Date     _______________________________________________________  Witness Signature (required if provider not present while patient signing)   _____________________                   Date

## 2023-11-22 NOTE — PROGRESS NOTES
Assessment & Plan     Cervical cancer screening  - Pap Screen with HPV - recommended age 30 - 65 years    Chronic pain syndrome  Cervicalgia  Controlled substance agreement signed  Chronic, continuous use of opioids  -CSA reviewed and signed  - reviewed, no concerning activity  -Discussed not taking tramadol with alprazolam or Remeron   -Continue with PT, stretch lab, chiro, home exercise and stretching  - [id number] - Urine Drug Confirmation Panel (Comprehensive)  - traMADol (ULTRAM) 50 MG tablet  Dispense: 50 tablet; Refill: 2    Chronic prescription benzodiazepine use  Generalized anxiety disorder  -CSA reviewed and signed  - reviewed, no concerning activity  -Currently weaning off alprazolam. Last script dosage of alprazolam was decreased to 0.25 mg prn #20, patient notes she was mentally unprepared for this. She we agreed to alprazolam 0.5 mg prn #20 with x 3 months, then decreasing quantity by 5 tabs every 3 months. Pt will message me when she is ready for fill.  -Patient aware of risks of concomitant usage of opioid and benzo including central respiratory depression, advised to take meds separately and only prn. Continue to wean off benzo.    Psychophysiological insomnia  -Reminded patient to avoid taking alprazolam for sleep  -Can try mirtazapine, do not take with alprazolam or tramadol  -Continue with therapy and working on calming techniques  - mirtazapine (REMERON) 7.5 MG tablet  Dispense: 30 tablet; Refill: 0    Follow-up in 3 months via virtual visit for monitoring      40 minutes spent by me on the date of the encounter doing chart review, history and exam, documentation and further activities per the note           DO CHICHI Sevilla [facility]    Subjective   Kimmie is a 58 year old, presenting for the following health issues:  Physical and Recheck Medication        11/22/2023    12:46 PM   Additional Questions   Roomed by Karen TRUONG     Due for repeat  "pap.    Anxiety. Continues to work with therapist to finding ways to cope. Working on breathing and calming techniques. Finds that sleep can be difficult as that's with thoughts of pass trauma arise. Sometimes takes an alprazolam to sleep. Usually takes 1/2 alprazolam 1-2 times a day for anxiety. Aware that she should continue to work on decreasing her dependence on alprazolam, there are some days where she does not need it. Wants to continue on the 0.5 mg dosage of alprazolam while decreasing the quantity every 3 months, states that is easier for her to mentally process as she tries to wean herself off. Has tried increasing her venlafaxine prior, but it cause side effects.     Continues to do PT and chiro treatments for he neck twice a week. Also doing stretch clinic once a week. Does other exercises at home. Takes tramadol 1-2 times a day as needed for severe pain.              Review of Systems         Objective    /78 (BP Location: Right arm, Patient Position: Sitting, Cuff Size: Adult Regular)   Pulse 90   Temp 98.1  F (36.7  C) (Temporal)   Resp 16   Ht 1.613 m (5' 3.5\")   Wt 52.6 kg (116 lb)   LMP 09/01/2016 (LMP Unknown)   SpO2 97%   BMI 20.23 kg/m    Body mass index is 20.23 kg/m .  Physical Exam   GENERAL: healthy, alert and no distress  RESP: lungs clear to auscultation - no rales, rhonchi or wheezes  CV: regular rate and rhythm, normal S1 S2, no S3 or S4, no murmur, click or rub, no peripheral edema and peripheral pulses strong   (female): normal female external genitalia, normal urethral meatus , vaginal mucosa pink, moist, well rugated, and normal cervix.  MS: no gross musculoskeletal defects noted, no edema  PSYCH: mentation appears normal, affect normal/bright                      "

## 2023-11-23 LAB — CREAT UR-MCNC: 135 MG/DL

## 2023-11-27 ENCOUNTER — TELEPHONE (OUTPATIENT)
Dept: FAMILY MEDICINE | Facility: CLINIC | Age: 58
End: 2023-11-27
Payer: COMMERCIAL

## 2023-11-27 DIAGNOSIS — Z51.81 ENCOUNTER FOR MEDICATION MONITORING: Primary | ICD-10-CM

## 2023-11-27 LAB
A-OH ALPRAZ UR QL CFM: PRESENT
CODEINE UR CFM-MCNC: 173 NG/ML
CODEINE/CREAT UR: 128 NG/MG {CREAT}
N-NORTRAMADOL/CREAT UR CFM: ABNORMAL NG/MG{CREAT}
O-NORTRAMADOL UR CFM-MCNC: ABNORMAL NG/ML
TRAMADOL CTO UR CFM-MCNC: ABNORMAL NG/ML
TRAMADOL/CREAT UR: ABNORMAL

## 2023-11-27 NOTE — TELEPHONE ENCOUNTER
Test Results        Who ordered the test:  Dr. Martinez    Type of test: Lab    Date of test:  11/22/2023    Where was the test performed:  [facility]    What are your questions/concerns?:  patient received her results with my chart and she is a little confuse to the results needs it explained to her.    Could we send this information to you in [employer] or would you prefer to receive a phone call?:   Patient would prefer a phone call   Okay to leave a detailed message?: Yes at Home number on file [phone] (home)

## 2023-11-27 NOTE — TELEPHONE ENCOUNTER
Patient's drug screen is positive for codeine and she is not sure why. She states she does not take anything that contains codeine. Please advise. Thank you.    Laxmi Hamilton RN, BSN, PHN  [facility]  [phone]

## 2023-11-28 NOTE — TELEPHONE ENCOUNTER
Spoke with patient.   No hx of any other opioid use other than what is prescribed.  Had everything bagel prior to visit which contains poppy seeds.  Discussed recheck UDS in the next few week, pt will schedule lab visit.    Iglesia Martinez, DO

## 2023-11-30 ENCOUNTER — PATIENT OUTREACH (OUTPATIENT)
Dept: FAMILY MEDICINE | Facility: CLINIC | Age: 58
End: 2023-11-30
Payer: COMMERCIAL

## 2023-11-30 DIAGNOSIS — R87.613 HSIL (HIGH GRADE SQUAMOUS INTRAEPITHELIAL LESION) ON PAP SMEAR OF CERVIX: Primary | ICD-10-CM

## 2023-11-30 DIAGNOSIS — G43.009 MIGRAINE WITHOUT AURA AND WITHOUT STATUS MIGRAINOSUS, NOT INTRACTABLE: ICD-10-CM

## 2023-12-01 RX ORDER — SUMATRIPTAN 50 MG/1
TABLET, FILM COATED ORAL
Qty: 18 TABLET | Refills: 0 | Status: SHIPPED | OUTPATIENT
Start: 2023-12-01 | End: 2023-12-28

## 2023-12-01 NOTE — TELEPHONE ENCOUNTER
Pt has follow up 1/5/23.  Will send one time fill.   Annie VELAZCO RN, BSN  [facility]

## 2023-12-05 ENCOUNTER — MYC REFILL (OUTPATIENT)
Dept: FAMILY MEDICINE | Facility: CLINIC | Age: 58
End: 2023-12-05

## 2023-12-05 ENCOUNTER — LAB (OUTPATIENT)
Dept: LAB | Facility: CLINIC | Age: 58
End: 2023-12-05
Payer: COMMERCIAL

## 2023-12-05 DIAGNOSIS — Z13.220 LIPID SCREENING: ICD-10-CM

## 2023-12-05 DIAGNOSIS — F41.0 PANIC ATTACK: ICD-10-CM

## 2023-12-05 DIAGNOSIS — Z51.81 ENCOUNTER FOR MEDICATION MONITORING: ICD-10-CM

## 2023-12-05 LAB
AMPHETAMINES UR QL SCN: NORMAL
BARBITURATES UR QL SCN: NORMAL
BENZODIAZ UR QL SCN: NORMAL
BZE UR QL SCN: NORMAL
CANNABINOIDS UR QL SCN: NORMAL
CHOLEST SERPL-MCNC: 192 MG/DL
FASTING STATUS PATIENT QL REPORTED: YES
FENTANYL UR QL: NORMAL
HDLC SERPL-MCNC: 58 MG/DL
LDLC SERPL CALC-MCNC: 126 MG/DL
NONHDLC SERPL-MCNC: 134 MG/DL
OPIATES UR QL SCN: NORMAL
PCP QUAL URINE (ROCHE): NORMAL
TRIGL SERPL-MCNC: 41 MG/DL

## 2023-12-05 PROCEDURE — 36415 COLL VENOUS BLD VENIPUNCTURE: CPT

## 2023-12-05 PROCEDURE — 80307 DRUG TEST PRSMV CHEM ANLYZR: CPT

## 2023-12-05 PROCEDURE — 80061 LIPID PANEL: CPT

## 2023-12-05 RX ORDER — ALPRAZOLAM 0.25 MG
0.25 TABLET ORAL DAILY PRN
Qty: 20 TABLET | Refills: 0 | Status: SHIPPED | OUTPATIENT
Start: 2023-12-05 | End: 2024-02-12

## 2023-12-23 ENCOUNTER — NURSE TRIAGE (OUTPATIENT)
Dept: NURSING | Facility: CLINIC | Age: 58
End: 2023-12-23
Payer: COMMERCIAL

## 2023-12-23 NOTE — TELEPHONE ENCOUNTER
"Triage Call:    Caller: Patient  She is trying to get her xanax filled that is being titrated down and the pharmacy has it marked on hold and needing clarification.  Advised that can not be addressed until clinic is open on Tuesday as it is a controlled substance.    Advised to speak with pharmacist to find out more information, as initial person she talked to was a pharm tech and didn't have any further information other than \"the pharmacist won't fill it\".        Caller verbalized understanding of instructions and questions answered.      Laurie Cox RN on 12/23/2023 at 11:54 AM      Reason for Disposition    [1] Caller has NON-URGENT medicine question about med that PCP prescribed AND [2] triager unable to answer question    Protocols used: Medication Question Call-A-AH    "

## 2023-12-25 DIAGNOSIS — G43.009 MIGRAINE WITHOUT AURA AND WITHOUT STATUS MIGRAINOSUS, NOT INTRACTABLE: ICD-10-CM

## 2023-12-26 NOTE — TELEPHONE ENCOUNTER
Pending Prescriptions:                       Disp   Refills    SUMAtriptan (IMITREX) 50 MG tablet [Pharm*18 tab*0            Sig: TAKE 1 TABLET BY MOUTH AT ONSET OF MIGRAINE. MAY           REPEAT IN 2 HOURS. MAX 2 TABLETS IN 24 HOURS. MAX           9 DAYS PER MONTH    Attempted to contact patient to verify if patient has enough medication to make it to appointment with neuro provider on 01/05/24.    Unable to LM for callback.    Doreen Milner MA  [facility]

## 2023-12-27 RX ORDER — SUMATRIPTAN 50 MG/1
TABLET, FILM COATED ORAL
Qty: 18 TABLET | Refills: 0 | OUTPATIENT
Start: 2023-12-27

## 2023-12-27 NOTE — TELEPHONE ENCOUNTER
Requested pharmacy to have pt call if they need refill prior to appt 1/5/24.     Annie VELAZCO RN, BSN  [facility]

## 2023-12-28 RX ORDER — SUMATRIPTAN 50 MG/1
TABLET, FILM COATED ORAL
Qty: 18 TABLET | Refills: 3 | Status: SHIPPED | OUTPATIENT
Start: 2023-12-28 | End: 2024-03-27

## 2023-12-28 NOTE — TELEPHONE ENCOUNTER
Pt had to reschedule appt due to conflict.     Will send refills of sumatriptan to last until appt. 4/11/24      Annie VELAZCO RN, BSN  [facility]

## 2023-12-28 NOTE — TELEPHONE ENCOUNTER
[facility] Call [facility]    Phone Message    May a detailed message be left on voicemail: yes     Reason for Call: Other: Pt called requesting refill before appt date. Pt will be out of town.   Writer rescheduled upcoming appt.    SUMAtriptan (IMITREX) 50 MG tablet     [id number] - [place], [place] - [address]       Action Taken: Other: Neurology    Travel Screening: Not Applicable

## 2024-01-01 NOTE — PROGRESS NOTES
"  SUBJECTIVE:   Catie Nuñez is a 53 year old female who presents to clinic today for the following health issues:  Chief Complaint   Patient presents with     Recheck Medication     refill tramadol          Medication Followup of tramadol    Taking Medication as prescribed: yes- ran out 5-6 days ago    Side Effects:  None    Medication Helping Symptoms:  Yes- would like to figure out a way to get better. MRI? Specialist?    Requesting pcp tp take over refilling/managing  xanax.      Chronic shoulder and neck pain.  Bad days include migraine, nausea, dizziness.  Working more at home.  Driving more and driving is an issue.  Constant grinding in her neck.  She has been working hard on cutting down on her tramadol and xanax.  She goes to [facility] (psychiatry at the [facility]).    Propranolol: \"I didn't like the effects of that.\"  Buspar: helped the anxiety to a low degree, but it made her foggy, couldn't think clearly, she was making errors at work, \"I didn't feel well at all.\"  Anxiety is intertwined with the pain.       Xanax:  She is tapering down as instructed by psychiatry.  She is currently on 30 tablets per month, down from 45.       Problem list and histories reviewed & adjusted, as indicated.  Additional history: as documented    Patient Active Problem List   Diagnosis     CARDIOVASCULAR SCREENING; LDL GOAL LESS THAN 160     Adhesive capsulitis of shoulder     Scoliosis     Shoulder impingement     Generalized anxiety disorder     Pulmonary nodule     Migraine without aura and without status migrainosus, not intractable     Controlled substance agreement signed     ASCUS Pap + high risk HPV, + 16, + \"other\"      Papanicolaou smear of cervix with low grade squamous intraepithelial lesion (LGSIL)     Acute seasonal allergic rhinitis, unspecified trigger     History reviewed. No pertinent surgical history.    Social History   Substance Use Topics     Smoking status: Never Smoker     " Smokeless tobacco: Never Used     Alcohol use 0.0 oz/week     0 Standard drinks or equivalent per week      Comment: occ, rare      Family History   Problem Relation Age of Onset     HEART DISEASE Father      Asthma No family hx of      Diabetes No family hx of      Hypertension No family hx of      Cerebrovascular Disease No family hx of      Breast Cancer No family hx of          Current Outpatient Prescriptions   Medication Sig Dispense Refill     ALPRAZolam (XANAX) 0.5 MG tablet Take 1/2 tablet (0.25mg) in the morning and 1/2 tablet (0.25mg) in the evening 30 tablet 0     Aspirin-Acetaminophen-Caffeine (EXCEDRIN PO) Take by mouth as needed       estradiol (ESTRACE) 1 MG tablet Take 0.5 tablets (0.5 mg) by mouth daily 90 tablet 1     fluticasone (FLONASE) 50 MCG/ACT spray Spray 1-2 sprays into both nostrils daily 3 Bottle 3     gabapentin (NEURONTIN) 100 MG capsule Take 1 capsule (100 mg) by mouth 3 times daily 90 capsule 0     ibuprofen (ADVIL/MOTRIN) 600 MG tablet Take 600 mg by mouth Take 1 tablet by mouth 4 times daily if needed. Maximum of 3200 mg in 24 hours.       medroxyPROGESTERone (PROVERA) 2.5 MG tablet Take 1 tablet (2.5 mg) by mouth daily 90 tablet 3     Multiple Vitamins-Minerals (MULTIVITAMIN PO)        riboflavin 400 MG CAPS Take 400 mg by mouth every morning 30 capsule 0     sertraline (ZOLOFT) 100 MG tablet Take 1.5 tablets (150 mg) by mouth daily 90 tablet 0     traMADol (ULTRAM) 50 MG tablet Take 1 tablet (50 mg) by mouth 2 times daily as needed for severe pain . 45 tablets to las 30 days. 45 tablet 0     traMADol (ULTRAM) 50 MG tablet Take 1 tablet (50 mg) by mouth 2 times daily as needed for severe pain . 45 tablets to last 30 days. (Patient not taking: Reported on 10/11/2018) 45 tablet 0     [START ON 10/17/2018] traMADol (ULTRAM) 50 MG tablet Take 1 tablet (50 mg) by mouth 2 times daily as needed for severe pain . 45 tablets to last 30 days. (Patient not taking: Reported on 10/11/2018) 45  tablet 0     Allergies   Allergen Reactions     Ondansetron Nausea and Vomiting     Sulfa Drugs Hives     Rash       Recent Labs   Lab Test  06/18/18   1656  09/14/16   1102  02/19/15   1029  01/06/15   1120   LDL   --   122*   --   94   HDL   --   73   --   75   TRIG   --   111   --   63   ALT  21   --   20  14   CR  0.64   --   0.52  0.60   GFRESTIMATED  >90   --   >90  Non  GFR Calc    >90  Non  GFR Calc     GFRESTBLACK  >90   --   >90   GFR Calc    >90   GFR Calc     POTASSIUM  4.5   --   3.9  4.2   TSH   --    --    --   3.60      BP Readings from Last 3 Encounters:   10/11/18 123/82   09/25/18 124/85   08/29/18 107/71    Wt Readings from Last 3 Encounters:   10/11/18 113 lb (51.3 kg)   09/25/18 114 lb 12.8 oz (52.1 kg)   08/29/18 115 lb 6.4 oz (52.3 kg)                  Labs reviewed in EPIC    Reviewed and updated as needed this visit by clinical staff       Reviewed and updated as needed this visit by Provider         ROS:  Constitutional, HEENT, cardiovascular, pulmonary, GI, , musculoskeletal, neuro, skin, endocrine and psych systems are negative, except as otherwise noted.    OBJECTIVE:     /82  Pulse 68  Temp 98  F (36.7  C) (Oral)  Resp 20  Wt 113 lb (51.3 kg)  LMP 09/01/2016  SpO2 99%  BMI 19.7 kg/m2  Body mass index is 19.7 kg/(m^2).  GENERAL: healthy, alert and no distress  MS: Neck straight, nontender to palpation.  Range of motion intact.  SKIN: Warm and dry  NEURO: Normal strength and tone, mentation intact and speech normal  PSYCH: mentation appears normal, affect normal/bright but she does always appear little bit anxious.    Neck xray:  No acute changes noted, awaiting radiologist's final interpretation.    ASSESSMENT/PLAN:     (M54.2) Neck pain  (primary encounter diagnosis)  Comment: Worsening  Plan: XR Cervical Spine 2/3 Views, ORTHO          REFERRAL          I had a long discussion with the patient  about her chronic neck issues, the way the pain causes increased anxiety, her mental health, etc.  I do think it is very appropriate for her to follow-up with a neck specialist to see if there are other treatment options.  We discussed possibly adding physical therapy, but she would like to talk with the orthopedist first.    For now, she can go ahead and take her tramadol For neck or back pain.  I did go ahead and refill that today.  I asked her to return to the clinic to see me in 2 months for her next round of refills, sooner problems.  I also discussed with her her continuing care with psychiatry.  She has been on a  Xanax taper and I would like her to continue care with the psychiatrist to taper down even further possible.  I also hope that in the event she is seen by Archana MONTOYA and her neck pain improved, that that will help her Xanax taper.  She agrees and understands and will follow up with psychiatry.    (M54.5) Midline low back pain without sciatica, unspecified chronicity  Comment:   Plan: traMADol (ULTRAM) 50 MG tablet, traMADol         (ULTRAM) 50 MG tablet        See above        JEREMY [facility]     no concerns

## 2024-01-09 ENCOUNTER — PATIENT OUTREACH (OUTPATIENT)
Dept: CARE COORDINATION | Facility: CLINIC | Age: 59
End: 2024-01-09
Payer: COMMERCIAL

## 2024-01-23 ENCOUNTER — PATIENT OUTREACH (OUTPATIENT)
Dept: CARE COORDINATION | Facility: CLINIC | Age: 59
End: 2024-01-23
Payer: COMMERCIAL

## 2024-01-23 ENCOUNTER — OFFICE VISIT (OUTPATIENT)
Dept: URGENT CARE | Facility: URGENT CARE | Age: 59
End: 2024-01-23
Payer: COMMERCIAL

## 2024-01-23 VITALS
HEIGHT: 64 IN | WEIGHT: 115 LBS | DIASTOLIC BLOOD PRESSURE: 83 MMHG | HEART RATE: 78 BPM | RESPIRATION RATE: 16 BRPM | BODY MASS INDEX: 19.63 KG/M2 | TEMPERATURE: 98.3 F | OXYGEN SATURATION: 99 % | SYSTOLIC BLOOD PRESSURE: 127 MMHG

## 2024-01-23 DIAGNOSIS — J01.40 ACUTE NON-RECURRENT PANSINUSITIS: Primary | ICD-10-CM

## 2024-01-23 PROCEDURE — 99213 OFFICE O/P EST LOW 20 MIN: CPT | Performed by: PHYSICIAN ASSISTANT

## 2024-01-23 RX ORDER — CEFDINIR 300 MG/1
300 CAPSULE ORAL 2 TIMES DAILY
Qty: 20 CAPSULE | Refills: 0 | Status: SHIPPED | OUTPATIENT
Start: 2024-01-23 | End: 2024-02-02

## 2024-01-24 ENCOUNTER — TELEPHONE (OUTPATIENT)
Dept: OBGYN | Facility: CLINIC | Age: 59
End: 2024-01-24
Payer: COMMERCIAL

## 2024-01-24 NOTE — TELEPHONE ENCOUNTER
[facility] Call [facility]    Phone Message    May a detailed message be left on voicemail: yes     Reason for Call: Other: Pt returning a call to reschedule colposcopy. Please call to discuss.     Action Taken: Other: OBGYN    Travel Screening: Not Applicable

## 2024-01-24 NOTE — PROGRESS NOTES
Acute non-recurrent pansinusitis  - cefdinir (OMNICEF) 300 MG capsule; Take 1 capsule (300 mg) by mouth 2 times daily for 10 days    Age 12 months or more  Okay to use Zarbee's   Okay to use Rx Children Tylenol if prescribed (Dose based on weight)    Age 2-12:   Okay to use Children Motrin or Tylenol over the counter.    Adults:  Okay to take acetaminophen 500 mg- 2 tabs (Total of 1000 mg) every 8 hrs   Okay to take ibuprofen 200 mg- 3 tabs (Total of 600 mg) every 6 hours        Okay to use Neti pot for sinus lavage up to three times daily for congestion and sinus pressure if present. Daily hot shower can be beneficial for congestion and body aches. Okay to use bedroom vaporizer or humidifier if symptoms are worse at night. Nightly Vicks Vapor rub and 5-10 mg of Melatonin okay to use for sleep.     Over the counter cough medication and decongestants okay if not prescribed by me during this visit. For homeopathic alternatives to cough syrup and decongestant, feel free to try Elderberry extract.    Okay to use salt water gargles, warm tea (or warm water with lemon and honey), and lozenges for any throat discomfort. Chloraseptic spray is also highly encourages for throat pain/irritation.     Patient will need to get plenty of rest and drink at least 1.5-2 liters of fluids daily for adults and 1-1.5 liters for children. If vomiting and not tolerating liquids for more than 24 hrs, please go to your nearest emergency department for IV fluids and further treatment.     Patient is not contagious after 1 week from start of symptoms. If possible, wear mask for first 7 days. Wash hands regularly and vigorously for 30 seconds often.     Patient was advised to return to clinic for reevaluation (either UC or PCP) if symptoms do not improve in 7 days. Patient educated on red flag symptoms and asked to go directly to the ED if these symptoms present themselves.       CHA[facility]  -likely secondary to above  -however, up to 3.3 yesterday from 2.4   -given low grade temperature over the weekend; and has not had bowel movement in over a week; may consider RUQ ultrasound   CARE    Subjective   58 year old who presents to clinic today for the following health issues:    Urgent Care and Sinus Problem       HPI     Acute Illness  Acute illness concerns: Sinus pressure and congestion   Onset/Duration: 9 days- worse yesterday   Symptoms:  Fever: No  Chills/Sweats: No  Headache (location?): YES  Sinus Pressure: YES  Conjunctivitis:  No  Ear Pain: no  Rhinorrhea: YES  Congestion: YES  Sore Throat: No  Cough: YES  Wheeze: No  Decreased Appetite: YES  Nausea: YES  Vomiting: No  Diarrhea: No  Dysuria/Freq.: No  Dysuria or Hematuria: No  Fatigue/Achiness: Fatigue   Sick/Strep Exposure: None  Therapies tried and outcome: Excedrin and sumatriptan.     Review of Systems   Review of Systems   See HPI    Objective    Temp: 98.3  F (36.8  C) Temp src: Temporal BP: 127/83 Pulse: 78   Resp: 16 SpO2: 99 %       Physical Exam   Physical Exam  Constitutional:       General: She is not in acute distress.     Appearance: Normal appearance. She is normal weight. She is not ill-appearing, toxic-appearing or diaphoretic.   HENT:      Head: Normocephalic and atraumatic.      Right Ear: Tympanic membrane, ear canal and external ear normal. There is no impacted cerumen.      Left Ear: Tympanic membrane, ear canal and external ear normal. There is no impacted cerumen.      Nose: Congestion and rhinorrhea present.      Right Sinus: Maxillary sinus tenderness and frontal sinus tenderness present.      Left Sinus: Maxillary sinus tenderness and frontal sinus tenderness present.      Mouth/Throat:      Mouth: Mucous membranes are moist.      Pharynx: No oropharyngeal exudate or posterior oropharyngeal erythema.   Cardiovascular:      Rate and Rhythm: Normal rate and regular rhythm.      Pulses: Normal pulses.      Heart sounds: Normal heart sounds. No murmur heard.     No friction rub. No gallop.   Pulmonary:      Effort: Pulmonary effort is normal. No respiratory distress.      Breath sounds: No stridor. No wheezing,  rhonchi or rales.   Chest:      Chest wall: No tenderness.   Lymphadenopathy:      Cervical: No cervical adenopathy.   Neurological:      General: No focal deficit present.      Mental Status: She is alert and oriented to person, place, and time. Mental status is at baseline.      Gait: Gait normal.   Psychiatric:         Mood and Affect: Mood normal.         Behavior: Behavior normal.         Thought Content: Thought content normal.         Judgment: Judgment normal.          No results found for this or any previous visit (from the past 24 hour(s)).

## 2024-01-29 ENCOUNTER — TELEPHONE (OUTPATIENT)
Dept: FAMILY MEDICINE | Facility: CLINIC | Age: 59
End: 2024-01-29
Payer: COMMERCIAL

## 2024-01-29 NOTE — TELEPHONE ENCOUNTER
Agree with completing abx course. Follow-up in clinic if symptoms not improved.     Iglesia Martinez, DO

## 2024-01-29 NOTE — TELEPHONE ENCOUNTER
Dr Salguero,     Pt inquires if she should try a different antibiotic for her sinus infection. Please see note below and provide your recommendation.    Pt was seen in  on 1/23/24 for Sinus problem, was prescribed Cefdinir 300 mg by 2 times daily for 10 days. Pt has been taking it for 6 days now. Symptoms (still very congested at night, runny nose, headache) has not improved but not worst. Pt inquires if she should be on a different antibiotic as pt feel her symptoms should be improving by now.     RN advised it is best to complete complete the antibiotic course but will forward to PCP to review and provide recommendation.     Bia SANTOYO RN  [facility]

## 2024-01-30 NOTE — TELEPHONE ENCOUNTER
Attempt #1: Left message for patient to call clinic and talk with a triage nurse.    Laxmi Hamilton, RN, BSN, PHN  [facility]  [phone]

## 2024-01-30 NOTE — TELEPHONE ENCOUNTER
Pt return call. RN relay PCP recommendation below. Pt verbalized understanding. No further action required at this time.     Bia SANTOYO RN  [facility]      Iglesia Martinez DO Physician SignedYesterday     Addend     Delete     Copy     Agree with completing abx course. Follow-up in clinic if symptoms not improved.      Iglesia Martinez, DO

## 2024-02-09 ASSESSMENT — ANXIETY QUESTIONNAIRES
7. FEELING AFRAID AS IF SOMETHING AWFUL MIGHT HAPPEN: SEVERAL DAYS
GAD7 TOTAL SCORE: 14
2. NOT BEING ABLE TO STOP OR CONTROL WORRYING: NEARLY EVERY DAY
3. WORRYING TOO MUCH ABOUT DIFFERENT THINGS: NEARLY EVERY DAY
IF YOU CHECKED OFF ANY PROBLEMS ON THIS QUESTIONNAIRE, HOW DIFFICULT HAVE THESE PROBLEMS MADE IT FOR YOU TO DO YOUR WORK, TAKE CARE OF THINGS AT HOME, OR GET ALONG WITH OTHER PEOPLE: SOMEWHAT DIFFICULT
5. BEING SO RESTLESS THAT IT IS HARD TO SIT STILL: NOT AT ALL
GAD7 TOTAL SCORE: 14
7. FEELING AFRAID AS IF SOMETHING AWFUL MIGHT HAPPEN: SEVERAL DAYS
1. FEELING NERVOUS, ANXIOUS, OR ON EDGE: NEARLY EVERY DAY
6. BECOMING EASILY ANNOYED OR IRRITABLE: SEVERAL DAYS
8. IF YOU CHECKED OFF ANY PROBLEMS, HOW DIFFICULT HAVE THESE MADE IT FOR YOU TO DO YOUR WORK, TAKE CARE OF THINGS AT HOME, OR GET ALONG WITH OTHER PEOPLE?: SOMEWHAT DIFFICULT
4. TROUBLE RELAXING: NEARLY EVERY DAY

## 2024-02-12 ENCOUNTER — VIRTUAL VISIT (OUTPATIENT)
Dept: FAMILY MEDICINE | Facility: CLINIC | Age: 59
End: 2024-02-12
Payer: COMMERCIAL

## 2024-02-12 DIAGNOSIS — F11.90 CHRONIC, CONTINUOUS USE OF OPIOIDS: ICD-10-CM

## 2024-02-12 DIAGNOSIS — F41.1 GENERALIZED ANXIETY DISORDER: Primary | ICD-10-CM

## 2024-02-12 DIAGNOSIS — M54.2 CERVICALGIA: ICD-10-CM

## 2024-02-12 DIAGNOSIS — F41.0 PANIC ATTACK: ICD-10-CM

## 2024-02-12 DIAGNOSIS — G89.4 CHRONIC PAIN SYNDROME: ICD-10-CM

## 2024-02-12 DIAGNOSIS — F33.0 MAJOR DEPRESSIVE DISORDER, RECURRENT EPISODE, MILD (H): ICD-10-CM

## 2024-02-12 PROCEDURE — 99214 OFFICE O/P EST MOD 30 MIN: CPT | Mod: 95 | Performed by: FAMILY MEDICINE

## 2024-02-12 PROCEDURE — 96127 BRIEF EMOTIONAL/BEHAV ASSMT: CPT | Mod: 95 | Performed by: FAMILY MEDICINE

## 2024-02-12 RX ORDER — TRAMADOL HYDROCHLORIDE 50 MG/1
50 TABLET ORAL 2 TIMES DAILY PRN
Qty: 50 TABLET | Refills: 2 | Status: SHIPPED | OUTPATIENT
Start: 2024-02-27 | End: 2024-05-08

## 2024-02-12 RX ORDER — VENLAFAXINE HYDROCHLORIDE 150 MG/1
150 CAPSULE, EXTENDED RELEASE ORAL DAILY
Qty: 90 CAPSULE | Refills: 3 | Status: SHIPPED | OUTPATIENT
Start: 2024-02-12 | End: 2024-04-11

## 2024-02-12 NOTE — PROGRESS NOTES
Kimmie is a 58 year old who is being evaluated via a billable video visit.      How would you like to obtain your AVS? Rick  the invitation should be sent by: Text to cell phone: [phone]  Will anyone else be joining your video visit? No          Assessment & Plan     Generalized anxiety disorder  Panic attack  -Has been on benzos long-term for anxiety 1-2 times daily. Working with patient to wean her off, pt with continued anxiety regarding with the thought of weaning off benzo. Takes alprazolam 1/2 to 1 tab daily for anxiety, not necessarily panic attacks. We discussed needing to get back to using medications appropriately. If needing to take benzo daily than her anxiety is not controlled, there seems to still be a disconnect with patient. Encouraged patient to continue to work with therapist to develop better coping mechanisms. Recommended increasing venlafaxine to 150 mg daily to further help with generalized anxiety.   -Continue with alprazolam 0.5 mg tab 1/2 -1 tab 1-2 times daily prn for anxiety attacks. Next visit in May supply will decrease to 15 tabs per month.   -Follow-up in 3 months for monitoring  - venlafaxine (EFFEXOR XR) 150 MG 24 hr capsule  Dispense: 90 capsule; Refill: 3    Major depressive disorder, recurrent episode, mild (H24)  -Stable, will increase venlafaxine as above for anxiety  - venlafaxine (EFFEXOR XR) 150 MG 24 hr capsule  Dispense: 90 capsule; Refill: 3    Chronic, continuous use of opioids  Chronic pain syndrome  Cervicalgia  -Stable  -Reminded pt not to use tramadol and alprazolam together.   - traMADol (ULTRAM) 50 MG tablet  Dispense: 50 tablet; Refill: 2                  Subjective   Kimmie is a 58 year old, presenting for the following health issues:  Recheck Medication (ALPRAZolam (XANAX) 0.5 MG tablet)        2/12/2024     9:43 AM   Additional Questions   Roomed by Theresa     History of Present Illness       Mental Health Follow-up:  Patient presents to follow-up on  Anxiety.    Patient's anxiety since last visit has been:  Medium  The patient is not having other symptoms associated with anxiety.  Any significant life events: grief or loss  Patient is feeling anxious or having panic attacks.  Patient has no concerns about alcohol or drug use.    She eats 2-3 servings of fruits and vegetables daily.She consumes 0 sweetened beverage(s) daily.She exercises with enough effort to increase her heart rate 9 or less minutes per day.  She exercises with enough effort to increase her heart rate 3 or less days per week.   She is taking medications regularly.     Anxiety: taking alprazolam daily -taking 0.25 mg 1/2 to 1 tab daily. Takes it when she starts to feel anxious. Following with a therapist. Feels anxious with the thought of needing to decrease her reliance on alprazolam.     Taking tramadol 1-2 times a day for chronic pain. No changes to pain.                 Objective           Vitals:  No vitals were obtained today due to virtual visit.    Physical Exam   GENERAL: alert and no distress  EYES: Eyes grossly normal to inspection.  No discharge or erythema, or obvious scleral/conjunctival abnormalities.  RESP: No audible wheeze, cough, or visible cyanosis.    SKIN: Visible skin clear. No significant rash, abnormal pigmentation or lesions.  NEURO: Cranial nerves grossly intact.  Mentation and speech appropriate for age.  PSYCH: mentation appears normal and anxious          Video-Visit Details    Type of service:  Video Visit   Video Start Time:  10:02 AM  Video End Time: 10:13 AM    Originating Location (pt. Location): Home    Distant Location (provider location):  On-site  Platform used for Video Visit: Rafael  Signed Electronically by: Iglesia Martinez DO

## 2024-02-19 ENCOUNTER — MYC MEDICAL ADVICE (OUTPATIENT)
Dept: FAMILY MEDICINE | Facility: CLINIC | Age: 59
End: 2024-02-19
Payer: COMMERCIAL

## 2024-02-19 DIAGNOSIS — F41.1 GENERALIZED ANXIETY DISORDER: ICD-10-CM

## 2024-02-19 DIAGNOSIS — N95.1 SYMPTOMATIC MENOPAUSAL OR FEMALE CLIMACTERIC STATES: ICD-10-CM

## 2024-02-19 DIAGNOSIS — F33.0 MAJOR DEPRESSIVE DISORDER, RECURRENT EPISODE, MILD (H): ICD-10-CM

## 2024-02-19 RX ORDER — ESTRADIOL 1 MG/1
0.5 TABLET ORAL DAILY
Qty: 90 TABLET | Refills: 0 | Status: SHIPPED | OUTPATIENT
Start: 2024-02-19 | End: 2024-08-28

## 2024-02-20 NOTE — TELEPHONE ENCOUNTER
Patient seen on 2/12/24 and venlafaxine was increased from 75 mg daily to 150 mg daily.  Patient states prefers to stay on 75 mg daily for now. Order pended for review. Thank you.    Laxmi Hamilton RN, BSN, PHN  [facility]

## 2024-02-21 RX ORDER — VENLAFAXINE HYDROCHLORIDE 75 MG/1
75 CAPSULE, EXTENDED RELEASE ORAL DAILY
Qty: 90 CAPSULE | Refills: 1 | Status: SHIPPED | OUTPATIENT
Start: 2024-02-21 | End: 2024-08-28

## 2024-03-12 ENCOUNTER — OFFICE VISIT (OUTPATIENT)
Dept: URGENT CARE | Facility: URGENT CARE | Age: 59
End: 2024-03-12
Payer: COMMERCIAL

## 2024-03-12 VITALS
SYSTOLIC BLOOD PRESSURE: 134 MMHG | OXYGEN SATURATION: 98 % | HEART RATE: 89 BPM | BODY MASS INDEX: 20.12 KG/M2 | TEMPERATURE: 98.9 F | DIASTOLIC BLOOD PRESSURE: 98 MMHG | RESPIRATION RATE: 16 BRPM | WEIGHT: 115.4 LBS

## 2024-03-12 DIAGNOSIS — L03.114 CELLULITIS OF LEFT HAND: Primary | ICD-10-CM

## 2024-03-12 DIAGNOSIS — Z48.89 SUTURE CHECK: ICD-10-CM

## 2024-03-12 PROCEDURE — 99213 OFFICE O/P EST LOW 20 MIN: CPT | Performed by: FAMILY MEDICINE

## 2024-03-12 RX ORDER — CEPHALEXIN 500 MG/1
500 CAPSULE ORAL 3 TIMES DAILY
Qty: 30 CAPSULE | Refills: 0 | Status: SHIPPED | OUTPATIENT
Start: 2024-03-12 | End: 2024-03-22

## 2024-03-12 NOTE — PROGRESS NOTES
Chief Complaint   Patient presents with    infection     Had stitches placed on 2-4-24 for laceration of her left hand     Kimmie was seen today for infection.    Diagnoses and all orders for this visit:    Cellulitis of left hand  -     cephALEXin (KEFLEX) 500 MG capsule; Take 1 capsule (500 mg) by mouth 3 times daily for 10 days    Suture check    after informed consent area was cleaned 4 sutures were removed  Then area was cleaned   Soaked in warm water   Will be started on antibiotic   Area dressed with bacitracin wrapped with telfa and coban     Chaparrita Tate MD       .SUBJECTIVE:  Catie Nuñez is a 58 year old female with a chief complaint of redness around suture area   She had a suture placed on her left hand over the thenar eminence area Onset of symptoms was 8 day(s) ago.    Following few days noticed worsening pain redness and swelling in the area.  She has noticed no drainage in the area she has been cleaning the area very well and avoiding any direct contact with water.  Course of illness: worsening.  Severity moderate    Past Medical History:   Diagnosis Date    Abnormal Pap smear of cervix 11/07/2019    3/11/21    Anxiety     Arthritis 3 yrs ago    ASCUS with positive high risk HPV 6/2015,09/14/16    atypia on colp, 09/14/16: ASCUS + HR HPV 16 and other.     Cervical high risk HPV (human papillomavirus) test positive 11/07/2019 11/7/21    Depression     Depressive disorder Age 12    Fracture of great toe 02/20/2014    History of scoliosis     Hx of colposcopy with cervical biopsy 10/06/2016    10/06/16: [place] Bx NIL.    MVP (mitral valve prolapse)     Unexplained endometrial cells on cervical Pap smear 06/2015    thin endometrium on US     Current Outpatient Medications   Medication Sig Dispense Refill    ALPRAZolam (XANAX) 0.5 MG tablet Take 1/2 tablet twice a day as needed for anxiety. Script to last 30 days. 20 tablet 2    Aspirin-Acetaminophen-Caffeine (EXCEDRIN PO) Take by mouth as needed       estradiol (ESTRACE) 1 MG tablet TAKE 1/2 TABLET BY MOUTH DAILY 90 tablet 0    fluticasone (FLONASE) 50 MCG/ACT nasal spray SHAKE LIQUID AND USE 1 TO 2 SPRAYS IN EACH NOSTRIL DAILY 16 g 11    medroxyPROGESTERone (PROVERA) 2.5 MG tablet TAKE 1 TABLET(2.5 MG) BY MOUTH DAILY 90 tablet 3    mirtazapine (REMERON) 7.5 MG tablet Take 1 tablet (7.5 mg) by mouth nightly as needed (insomnia) 30 tablet 0    SUMAtriptan (IMITREX) 50 MG tablet TAKE 1 TABLET BY MOUTH AT ONSET OF MIGRAINE. MAY REPEAT IN 2 HOURS, MAX 2 TABLETS IN 24 HOURS. MAX 9 DAYS PER MONTH 18 tablet 3    traMADol (ULTRAM) 50 MG tablet Take 1 tablet (50 mg) by mouth 2 times daily as needed for severe pain 30 day supply. Do not take with alprazolam. 50 tablet 2    venlafaxine (EFFEXOR XR) 150 MG 24 hr capsule Take 1 capsule (150 mg) by mouth daily 90 capsule 3    venlafaxine (EFFEXOR XR) 75 MG 24 hr capsule Take 1 capsule (75 mg) by mouth daily 90 capsule 1     Social History     Tobacco Use    Smoking status: Never     Passive exposure: Never    Smokeless tobacco: Never   Substance Use Topics    Alcohol use: Yes     Comment: occ, rare        ROS:  Review of systems negative except as stated above.    OBJECTIVE:   BP (!) 134/98 (BP Location: Right arm, Patient Position: Sitting, Cuff Size: Adult Regular)   Pulse 89   Temp 98.9  F (37.2  C) (Tympanic)   Resp 16   Wt 52.3 kg (115 lb 6.4 oz)   LMP 09/01/2016 (LMP Unknown)   SpO2 98%   BMI 20.12 kg/m    GENERAL APPEARANCE: healthy, alert and no distress  MS:  left side hand erythema, swelling, and tenderness to palpation area of erythema measured 1.5 cm x 1 cm surrounding the suture line ,THERE WAS no wound dehiscence noted   PSYCH: mentation appears normal    Chaparrita Tate MD

## 2024-03-12 NOTE — PATIENT INSTRUCTIONS
Please finish the antibiotic even if you are feeling better.  Watch for worsening signs of infection such as increasing redness increasing warmth increasing pain and any red streaking    Chaparrita Tate MD

## 2024-03-14 ENCOUNTER — MYC MEDICAL ADVICE (OUTPATIENT)
Dept: FAMILY MEDICINE | Facility: CLINIC | Age: 59
End: 2024-03-14
Payer: COMMERCIAL

## 2024-03-14 ENCOUNTER — MYC REFILL (OUTPATIENT)
Dept: FAMILY MEDICINE | Facility: CLINIC | Age: 59
End: 2024-03-14
Payer: COMMERCIAL

## 2024-03-14 DIAGNOSIS — F41.1 GENERALIZED ANXIETY DISORDER: ICD-10-CM

## 2024-03-14 DIAGNOSIS — F41.0 PANIC ATTACK: ICD-10-CM

## 2024-03-14 RX ORDER — ALPRAZOLAM 0.5 MG
TABLET ORAL
Qty: 20 TABLET | Refills: 2 | OUTPATIENT
Start: 2024-03-14

## 2024-03-14 RX ORDER — ALPRAZOLAM 0.5 MG
TABLET ORAL
Qty: 20 TABLET | Refills: 2 | Status: SHIPPED | OUTPATIENT
Start: 2024-03-14 | End: 2024-05-14

## 2024-03-15 NOTE — TELEPHONE ENCOUNTER
Patient's insurance doesn't require a referral, so they must need an order faxed to them.    Kelsi  [facility] Referrals

## 2024-03-15 NOTE — TELEPHONE ENCOUNTER
Referral team-- can we provide referral for her to [facility]?    SHAWANDA Gupta RN  [facility]

## 2024-03-18 ENCOUNTER — OFFICE VISIT (OUTPATIENT)
Dept: OBGYN | Facility: CLINIC | Age: 59
End: 2024-03-18
Payer: COMMERCIAL

## 2024-03-18 VITALS
BODY MASS INDEX: 18.84 KG/M2 | HEART RATE: 83 BPM | DIASTOLIC BLOOD PRESSURE: 84 MMHG | SYSTOLIC BLOOD PRESSURE: 130 MMHG | TEMPERATURE: 98 F | WEIGHT: 108.03 LBS

## 2024-03-18 DIAGNOSIS — R87.810 CERVICAL HIGH RISK HPV (HUMAN PAPILLOMAVIRUS) TEST POSITIVE: Primary | ICD-10-CM

## 2024-03-18 PROCEDURE — 88305 TISSUE EXAM BY PATHOLOGIST: CPT | Performed by: STUDENT IN AN ORGANIZED HEALTH CARE EDUCATION/TRAINING PROGRAM

## 2024-03-18 PROCEDURE — 57454 BX/CURETT OF CERVIX W/SCOPE: CPT | Performed by: OBSTETRICS & GYNECOLOGY

## 2024-03-18 NOTE — PROGRESS NOTES
GYN CLINIC VISIT  3/18/2024  CC: colposcopy    HPI:  Catie Nuñez is a 58 year old female  who presents for repeat colposcopy.     Pap smear on 2023 showed: normal and with high risk HPV present: +16hpv.   The pap history is as follows:  2015: ASCUS, + HPV 16 & other HR HPV with unexplained endometrial cells. Needs colp and endo bx, US also planned.  7/7/15: [place] - atypia, no endo bx needed as US showed thin endometrium. Plan cotest pap & HPV in 1 year  16: ASCUS + HR HPV 16 and other. Plan [place] per provider  10/06/16: [place] Bx NIL. Plan Cotest in 1 year per provider.  17 LSIL, +HPV 16 & other HR type. Plan: [place] per guidelines.  18: [place] ECC benign neg for dysplasia. Plan cotest in 1 year.   19 NIL pap, + HR HPV 16. Plan: colpo  04/3/19: [place] ECC benign. Plan pap in 1 year.   19: LSIL Pap, + HR HPV 16 result. Plan cotest in 1 year, if next one is abnormal [place] at that time.   20 Lost to follow-up for pap tracking  3/11/21 HSIL pap, LSIL also present, Neg HPV. Plan colp due bef 21. If pt prefers immediate LEEP without colpo first, provider is okay with that plan. Pt changed mind to    3/18/21 Pt notified and wants to schedule colp first >> 21 Pt called and is concerned about colpo being 1 month out - pt notified of recommendation for colp within 3 months but also offered to send message to Dr. Alarcon about possibly working her in sooner - Tele enc sent to provider  21 [place] Bx and ECC Neg for Dysplasia, Plan LEEP now per pt's preference due bef 21.    21 Phone call to the pt, new plan see pt's preference above.   21 Per clinic RN, Pt canceled her LEEP and will now come back for a cotest due by 21.   11/15/21 NIL Pap, + HR HPV type 16. Plan [place] per provider's office note, due bef 02/15/22.  22 [place] Bx VLADIMIR 1, ECC scant small fragments of ectocervical squamous mucosa with non gradable dysplasia, area worrisome for  high-grade dysplasia. Plan LEEP due bef 04/24/22.   2/3/22 LEEP - VLADIMIR 1. ECC - Negative. Margins negative. Plan 6 month co-test / notified per [place]  10/5/22 NIL, Neg HPV. Plan 1 yr co-test  11/22/23 NIL pap, +HPV 16. Plan [place] bef 2/22/24 11/30/23 Phone call attempt, no answer and voice mail full, Rick result letter sent / notified by phone  2/15/24 [place]:-cancelled  3/18/24 [place]:      Patient's last menstrual period was 09/01/2016 (lmp unknown).  UPT today is not done  Patient does not smoke  Type of contraception: none  Age at first sexual intercourse:   Number of sexual partners (lifetime):   Past GYN history: HPV  Prior cervical/vaginal disease: VLADIMIR 1.  Prior cervical treatment: LEEP.    Vitals:    03/18/24 1303   BP: 130/84   Pulse: 83   Temp: 98  F (36.7  C)   Weight: 49 kg (108 lb 0.4 oz)         PROCEDURE:  Before the procedure, it was ensured that the patient was educated regarding the nature of her findings to date, the implications, and what was to be done. She has been made aware of the role of HPV, the natural history of infection, ways to minimize her future risk, the effect of HPV on the cervix, and treatment options available should they be indicated. The details of the colposcopic procedure were reviewed. All questions were answered before proceeding, and informed consent was therefore obtained.      Speculum placed in vagina and excellent visualization of cervix acheived, cervix swabbed x 3 with acetic acid solution.      FINDINGS:  Cervix: no visible lesions. Biopsy taken with tischler forceps.   SCJ seen?: no   ECC done?: Yes. Tenaculum placed on anterior lip of cervix. Endocervical curettage performed with curette followed by cytobrush      ASSESSMENT: Catie Nuñez is a 58 year old with h/o LEEP 2/3/2022. pap 11/22/23 NIL +HR HPV 16. Normal colpsocopy. ECC performed.    PLAN:   1. Cervical high risk HPV (human papillomavirus) test positive    - Surgical Pathology Exam  - COLP  CERVIX/UPPER VAGINA W ENDOCERV CURETT    specimens labelled and sent to Pathology, will base further treatment on Pathology findings, treatment options discussed with patient, post biopsy instructions given to patient, and call to discuss Pathology results      Chio Sanchez MD

## 2024-03-22 ENCOUNTER — PATIENT OUTREACH (OUTPATIENT)
Dept: FAMILY MEDICINE | Facility: CLINIC | Age: 59
End: 2024-03-22
Payer: COMMERCIAL

## 2024-03-22 DIAGNOSIS — R87.613 HSIL (HIGH GRADE SQUAMOUS INTRAEPITHELIAL LESION) ON PAP SMEAR OF CERVIX: Primary | ICD-10-CM

## 2024-03-26 DIAGNOSIS — G43.009 MIGRAINE WITHOUT AURA AND WITHOUT STATUS MIGRAINOSUS, NOT INTRACTABLE: ICD-10-CM

## 2024-03-27 RX ORDER — SUMATRIPTAN 50 MG/1
TABLET, FILM COATED ORAL
Qty: 6 TABLET | Refills: 0 | Status: SHIPPED | OUTPATIENT
Start: 2024-03-27 | End: 2024-04-11

## 2024-03-27 NOTE — TELEPHONE ENCOUNTER
[facility] Call Center    Phone Message    May a detailed message be left on voicemail: yes     Reason for Call: Other:    Patient returning call from clinic. Patient states she only has one tablet left and would need a refill of the SUMAtriptan (IMITREX) 50 MG table  to make it to her appointment in April. Patient can be reached this afternoon at  [phone] to discuss.     Action Taken: Message routed to:  Other: [facility]    Travel Screening: Not Applicable

## 2024-03-27 NOTE — TELEPHONE ENCOUNTER
Pending Prescriptions:                       Disp   Refills    SUMAtriptan (IMITREX) 50 MG tablet [Pharm*18 tab*3            Sig: TAKE 1 TABLET BY MOUTH AT ONSET OF MIGRAINE. MAY           REPEAT IN 2 HOURS. MAX 2 TABLETS IN 24 HOURS. MAX           9 DAYS PER MONTH    LOV: 07/05/24    NOV: 04/11/24    Last Refill: 12/28/23  18 tablets with 3 refills    Chart Review:  Plan: At today's visit we thoroughly discussed current symptoms, available treatment options, and the plan. I am pleased to hear that her migraines improved.    We decided not to make any medication changes.  Imitrex prescription was refilled.  We discussed that she should cut down Excedrin use to less than 15 days/month to avoid rebound/medication overuse headaches.     We also discussed Ajovy in the future as well as increasing the dose of Effexor for migraine prevention.     I advised the patient to keep the headache diary and bring it to the next follow-up visit or upload via My Chart.      Next follow-up appointment is in the next 6 months or earlier if needed.        Action(s):  Attempted to contact patient to verify if patient has enough until appointment in April. LM for callback.    Doreen Milner MA  [facility]

## 2024-04-07 ENCOUNTER — HEALTH MAINTENANCE LETTER (OUTPATIENT)
Age: 59
End: 2024-04-07

## 2024-04-10 ENCOUNTER — TELEPHONE (OUTPATIENT)
Dept: NEUROLOGY | Facility: CLINIC | Age: 59
End: 2024-04-10
Payer: COMMERCIAL

## 2024-04-10 NOTE — PROGRESS NOTES
ESTABLISHED PATIENT NEUROLOGY NOTE    DATE OF VISIT: 4/11/2024  CLINIC LOCATION: [facility]  MRN: [id number]  PATIENT NAME: Catie Nuñez  YOB: 1965    REASON FOR VISIT: No chief complaint on file.    SUBJECTIVE:                                                      HISTORY OF PRESENT ILLNESS: Patient is here to follow up regarding multifactorial headaches.  The last visit was on 7/5/2023.  Please refer to my initial/other prior notes for further information.    Since the last visit, the patient reports ***.  She is on 225 mg of Effexor for unrelated reasons, but it could be used for headache prevention.  Imitrex seems to work for acute therapy.  She denies interval development of new neurological symptoms.  EXAM:                                                    Physical Exam:   Vitals: LMP 09/01/2016 (LMP Unknown)     General: pt is in NAD, cooperative.  Skin: normal turgor, moist mucous membranes, no lesions/rashes noticed.  HEENT: ATNC, white sclera, normal conjunctiva.  Respiratory: Symmetric lung excursion, no accessory respiratory muscle use.  Abdomen: Non distended.  Neurological: awake, cooperative, follows commands, no exam changes compared to the previous visits.  ASSESSMENT AND PLAN:                                                    Assessment: 58 year old female patient presents for follow-up of migraine, tension headaches, and cervicogenic headaches.  She reports ***.    For headache prevention, we discussed trial of Ajovy, but the patient was hesitant regarding side effects.  She is on venlafaxine for unrelated reasons.  This medicine could be used for headache prevention.  In the past she tried gabapentin, Topamax, Lyrica, tizanidine, baclofen, and supplements without success.  Additional preventive options include nortriptyline, amitriptyline, doxepin, Depakote, verapamil, and CGRP antagonists.  For acute therapy she should continue Imitrex.    Diagnoses:     ICD-10-CM    1. Migraine without aura and without status migrainosus, not intractable  G43.009       2. Tension headache  G44.209       3. Cervicogenic headache  G44.86         Plan:  There are no Patient Instructions on file for this visit.    Total Time: *** minutes spent on the date of the encounter doing chart review, history and exam, documentation and further activities per the note.    Gelacio Campbell MD  [facility]  (Chart documentation was completed in part with Dragon voice-recognition software. Even though reviewed, some grammatical, spelling, and word errors may remain.)

## 2024-04-10 NOTE — TELEPHONE ENCOUNTER
Attempted to reach patient with appointment reminder for 4/11/24 with Dr Campbell at 3:30pm. Unable to leave message as patient mailbox is full.

## 2024-04-10 NOTE — TELEPHONE ENCOUNTER
Attempted to reach patient to remind them about appointment scheduled with Gelacio Campbell MD on 4/11/24 in our [place] location. Unable to leave message as mailbox is full.

## 2024-04-11 ENCOUNTER — OFFICE VISIT (OUTPATIENT)
Dept: NEUROLOGY | Facility: CLINIC | Age: 59
End: 2024-04-11
Payer: COMMERCIAL

## 2024-04-11 VITALS — SYSTOLIC BLOOD PRESSURE: 132 MMHG | DIASTOLIC BLOOD PRESSURE: 88 MMHG | HEART RATE: 72 BPM | OXYGEN SATURATION: 96 %

## 2024-04-11 DIAGNOSIS — G44.209 TENSION HEADACHE: ICD-10-CM

## 2024-04-11 DIAGNOSIS — G43.009 MIGRAINE WITHOUT AURA AND WITHOUT STATUS MIGRAINOSUS, NOT INTRACTABLE: Primary | ICD-10-CM

## 2024-04-11 DIAGNOSIS — M54.81 CERVICO-OCCIPITAL NEURALGIA OF RIGHT SIDE: ICD-10-CM

## 2024-04-11 DIAGNOSIS — G44.86 CERVICOGENIC HEADACHE: ICD-10-CM

## 2024-04-11 PROCEDURE — 99215 OFFICE O/P EST HI 40 MIN: CPT | Performed by: PSYCHIATRY & NEUROLOGY

## 2024-04-11 PROCEDURE — G2211 COMPLEX E/M VISIT ADD ON: HCPCS | Performed by: PSYCHIATRY & NEUROLOGY

## 2024-04-11 RX ORDER — SUMATRIPTAN 50 MG/1
TABLET, FILM COATED ORAL
Qty: 9 TABLET | Refills: 0 | Status: SHIPPED | OUTPATIENT
Start: 2024-04-11 | End: 2024-05-03

## 2024-04-11 NOTE — PROGRESS NOTES
"Catie Nuñez is a 58 year old female who presents for:  Chief Complaint   Patient presents with    Follow Up     Migraines- having daily headaches with extreme tightness on right side of neck and base of skull         Initial Vitals:  BP (!) 142/85 (BP Location: Right arm, Patient Position: Sitting, Cuff Size: Adult Small)   Pulse 75   LMP 09/01/2016 (LMP Unknown)   SpO2 97%  Estimated body mass index is 18.84 kg/m  as calculated from the following:    Height as of 1/23/24: 1.613 m (5' 3.5\").    Weight as of 3/18/24: 49 kg (108 lb 0.4 oz).. There is no height or weight on file to calculate BSA. BP completed using cuff size: small morris Diaz   "

## 2024-04-11 NOTE — PROGRESS NOTES
ESTABLISHED PATIENT NEUROLOGY NOTE    DATE OF VISIT: 4/11/2024  CLINIC LOCATION: [facility]  MRN: [id number]  PATIENT NAME: Catie Nuñez  YOB: 1965    REASON FOR VISIT:   Chief Complaint   Patient presents with    Follow Up     Migraines- having daily headaches with extreme tightness on right side of neck and base of skull      SUBJECTIVE:                                                      HISTORY OF PRESENT ILLNESS: Patient is here to follow up regarding multifactorial headaches.  The last visit was on 7/5/2023.  Please refer to my initial/other prior notes for further information.  Accompanied by her significant other.    Since the last visit, the patient reports worsening of her symptoms.  She reports almost daily headaches that originate at the right occipital area, by the end of the day and radiate to the top of her scalp all the way to her right eye.  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  She also tends to sleep on her stomach.  On good month she has up to 4 migraines, but on bed months more than 15.  She is afraid of needles and for that reason did not try previously prescribed Ajovy.  She is on 75 mg of Effexor for unrelated reasons, but it could be used for headache prevention.  Imitrex seems to work for acute therapy, but she is overusing it up to 15 days on some months.  She denies interval development of new neurological symptoms.  EXAM:                                                    Physical Exam:   Vitals: BP (!) 142/85 (BP Location: Right arm, Patient Position: Sitting, Cuff Size: Adult Small)   Pulse 75   LMP 09/01/2016 (LMP Unknown)   SpO2 97%     General: pt is in NAD, cooperative.  Skin: normal turgor, moist mucous membranes, no lesions/rashes noticed.  HEENT: ATNC, white sclera, normal conjunctiva.  Respiratory: Symmetric lung excursion, no accessory respiratory muscle use.  Abdomen: Non distended.  Neurological:  awake, cooperative, follows commands, there is tenderness to palpation over the lesser occipital nerve on the right, no other exam changes compared to the previous visits.  ASSESSMENT AND PLAN:                                                    Assessment: 58 year old female patient presents for follow-up of migraine, tension headaches, and cervicogenic headaches.  She reports headache worsening.    For headache prevention, we discussed trial of Ajovy, but the patient was hesitant regarding side effects.  Today, she admits that she is afraid of needles, but is open to try oral CGRP antagonist.  We discussed Qulipta and Nurtec, and decided to try Nurtec.  She will use Imitrex in the interim, but after Nurtec is approved, will switch to Nurtec monotherapy for both preventive and acute treatment.  She is on venlafaxine for unrelated reasons.  This medicine is used for headache prevention, but does not seem to be helping.  In the past she tried gabapentin, Topamax, Lyrica, tizanidine, baclofen, and supplements without success.  Additional preventive options include nortriptyline, amitriptyline, doxepin, Depakote, verapamil, though the likelihood that they will be helpful is low given her past experience.       In addition, the patient also reports symptoms that are consistent with right occipital neuralgia.  We reviewed the condition, available treatment options, and I placed the order for occipital nerve block with steroid.    Kimmie to follow up with Primary Care provider regarding elevated blood pressure.    Diagnoses:    ICD-10-CM    1. Migraine without aura and without status migrainosus, not intractable  G43.009       2. Tension headache  G44.209       3. Cervicogenic headache  G44.86         Plan: At today's visit we thoroughly discussed current symptoms, evaluation results, diagnosis, available treatment options, and the plan.    We decided to try Nurtec for prophylactic and acute therapy.  I advised her to take it  every other day.  We discussed that she may take additional dose on the days when she does not take it, but no sooner than 24 hours for up to 3 days/month.  She will use Imitrex while awaiting for insurance approval.  I gave her a 1 month supply.    I also referred her to right occipital nerve block.    Next follow-up appointment is in the next 4 months or earlier if needed.    Total Time: 41 minutes spent on the date of the encounter doing chart review, history and exam, documentation and further activities per the note.    Gelacio Campbell MD  [facility]  (Chart documentation was completed in part with Dragon voice-recognition software. Even though reviewed, some grammatical, spelling, and word errors may remain.)

## 2024-04-11 NOTE — PATIENT INSTRUCTIONS
AFTER VISIT SUMMARY (AVS):    At today's visit we thoroughly discussed current symptoms, evaluation results, diagnosis, available treatment options, and the plan.    We decided to try Nurtec for prophylactic and acute therapy.  Please take it every other day.  You may take additional dose on the days when you do not take it, but no sooner than 24 hours for up to 3 days/month.  While you are prescription for Nurtec is getting approved, you could continue using Imitrex on a as needed basis.    I also referred you to right occipital nerve block.    Next follow-up appointment is in the next 4 months or earlier if needed.    Please do not hesitate to call me with any questions or concerns.    Thanks.

## 2024-04-11 NOTE — LETTER
4/11/2024         RE: Catie Nuñez  [address]  [place] [address]        Dear Colleague,    Thank you for referring your patient, Catie Nuñez, to the [facility]. Please see a copy of my visit note below.    ESTABLISHED PATIENT NEUROLOGY NOTE    DATE OF VISIT: 4/11/2024  CLINIC LOCATION: [facility]  MRN: [id number]  PATIENT NAME: Catie Nuñez  YOB: 1965    REASON FOR VISIT:   Chief Complaint   Patient presents with     Follow Up     Migraines- having daily headaches with extreme tightness on right side of neck and base of skull      SUBJECTIVE:                                                      HISTORY OF PRESENT ILLNESS: Patient is here to follow up regarding multifactorial headaches.  The last visit was on 7/5/2023.  Please refer to my initial/other prior notes for further information.  Accompanied by her significant other.    Since the last visit, the patient reports worsening of her symptoms.  She reports almost daily headaches that originate at the right occipital area, by the end of the day and radiate to the top of her scalp all the way to her right eye.  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  She also tends to sleep on her stomach.  On good month she has up to 4 migraines, but on bed months more than 15.  She is afraid of needles and for that reason did not try previously prescribed Ajovy.  She is on 75 mg of Effexor for unrelated reasons, but it could be used for headache prevention.  Imitrex seems to work for acute therapy, but she is overusing it up to 15 days on some months.  She denies interval development of new neurological symptoms.  EXAM:                                                    Physical Exam:   Vitals: BP (!) 142/85 (BP Location: Right arm, Patient Position: Sitting, Cuff Size: Adult Small)   Pulse 75   LMP 09/01/2016 (LMP Unknown)   SpO2 97%     General: pt is  in NAD, cooperative.  Skin: normal turgor, moist mucous membranes, no lesions/rashes noticed.  HEENT: ATNC, white sclera, normal conjunctiva.  Respiratory: Symmetric lung excursion, no accessory respiratory muscle use.  Abdomen: Non distended.  Neurological: awake, cooperative, follows commands, there is tenderness to palpation over the lesser occipital nerve on the right, no other exam changes compared to the previous visits.  ASSESSMENT AND PLAN:                                                    Assessment: 58 year old female patient presents for follow-up of migraine, tension headaches, and cervicogenic headaches.  She reports headache worsening.    For headache prevention, we discussed trial of Ajovy, but the patient was hesitant regarding side effects.  Today, she admits that she is afraid of needles, but is open to try oral CGRP antagonist.  We discussed Qulipta and Nurtec, and decided to try Nurtec.  She will use Imitrex in the interim, but after Nurtec is approved, will switch to Nurtec monotherapy for both preventive and acute treatment.  She is on venlafaxine for unrelated reasons.  This medicine is used for headache prevention, but does not seem to be helping.  In the past she tried gabapentin, Topamax, Lyrica, tizanidine, baclofen, and supplements without success.  Additional preventive options include nortriptyline, amitriptyline, doxepin, Depakote, verapamil, though the likelihood that they will be helpful is low given her past experience.       In addition, the patient also reports symptoms that are consistent with right occipital neuralgia.  We reviewed the condition, available treatment options, and I placed the order for occipital nerve block with steroid.    Kimmie to follow up with Primary Care provider regarding elevated blood pressure.    Diagnoses:    ICD-10-CM    1. Migraine without aura and without status migrainosus, not intractable  G43.009       2. Tension headache  G44.209       3.  "Cervicogenic headache  G44.86         Plan: At today's visit we thoroughly discussed current symptoms, evaluation results, diagnosis, available treatment options, and the plan.    We decided to try Nurtec for prophylactic and acute therapy.  I advised her to take it every other day.  We discussed that she may take additional dose on the days when she does not take it, but no sooner than 24 hours for up to 3 days/month.  She will use Imitrex while awaiting for insurance approval.  I gave her a 1 month supply.    I also referred her to right occipital nerve block.    Next follow-up appointment is in the next 4 months or earlier if needed.    Total Time: 41 minutes spent on the date of the encounter doing chart review, history and exam, documentation and further activities per the note.    Gelacio Campbell MD  [facility]  (Chart documentation was completed in part with Dragon voice-recognition software. Even though reviewed, some grammatical, spelling, and word errors may remain.)    Catie Nuñez is a 58 year old female who presents for:  Chief Complaint   Patient presents with     Follow Up     Migraines- having daily headaches with extreme tightness on right side of neck and base of skull         Initial Vitals:  BP (!) 142/85 (BP Location: Right arm, Patient Position: Sitting, Cuff Size: Adult Small)   Pulse 75   LMP 09/01/2016 (LMP Unknown)   SpO2 97%  Estimated body mass index is 18.84 kg/m  as calculated from the following:    Height as of 1/23/24: 1.613 m (5' 3.5\").    Weight as of 3/18/24: 49 kg (108 lb 0.4 oz).. There is no height or weight on file to calculate BSA. BP completed using cuff size: small regular    Chio Diaz       Again, thank you for allowing me to participate in the care of your patient.        Sincerely,        Gelacio Campbell MD  "

## 2024-04-12 ENCOUNTER — TELEPHONE (OUTPATIENT)
Dept: NEUROLOGY | Facility: CLINIC | Age: 59
End: 2024-04-12
Payer: COMMERCIAL

## 2024-04-12 NOTE — TELEPHONE ENCOUNTER
PRIOR AUTHORIZATION DENIED    Medication: RIMEGEPANT SULFATE 75 MG PO TBDP  Insurance Company: [employer] - Phone [phone] Fax [phone]  Denial Date: 4/12/2024  Denial Reason(s):     Appeal Information:     Patient Notified: No

## 2024-04-13 NOTE — TELEPHONE ENCOUNTER
Medication Appeal Initiation    Medication: RIMEGEPANT SULFATE 75 MG PO TBDP  Appeal Start Date:  4/13/2024  Insurance Company: [employer] - Phone [phone] Fax [phone]   Insurance Phone:   Insurance Fax: [phone]  Comments:

## 2024-04-16 ENCOUNTER — TELEPHONE (OUTPATIENT)
Dept: NEUROLOGY | Facility: CLINIC | Age: 59
End: 2024-04-16
Payer: COMMERCIAL

## 2024-04-16 NOTE — TELEPHONE ENCOUNTER
Faxed referral to [facility] [place]  Fax: [phone]    Informed patient via [employer].    Doreen Milner MA  [facility]

## 2024-04-16 NOTE — TELEPHONE ENCOUNTER
[facility] Call Center    Phone Message    May a detailed message be left on voicemail: yes     Reason for Call: Order(s): Other:   Reason for requested: Injection for migraine-nerve block   Order needs to be sent to [facility]  Date needed: ASAP  Provider name: OMAR Anne    If questions please call patient.  Also let patient know the order has been sent by MY CHART    Action Taken: [facility]     Travel Screening: Not Applicable

## 2024-04-17 ENCOUNTER — NURSE TRIAGE (OUTPATIENT)
Dept: NURSING | Facility: CLINIC | Age: 59
End: 2024-04-17
Payer: COMMERCIAL

## 2024-04-17 DIAGNOSIS — G43.009 MIGRAINE WITHOUT AURA AND WITHOUT STATUS MIGRAINOSUS, NOT INTRACTABLE: ICD-10-CM

## 2024-04-17 DIAGNOSIS — R11.0 NAUSEA: Primary | ICD-10-CM

## 2024-04-17 NOTE — TELEPHONE ENCOUNTER
Medication was refilled 04/11/24    LOV: 04/11/24    NOV: 08/27/24    Last Refill: 04/11/24    Chart note:   She will use Imitrex while awaiting for insurance approval. I gave her a 1 month supply.     Doreen Milner MA  [facility]

## 2024-04-17 NOTE — TELEPHONE ENCOUNTER
Nurse Triage SBAR     Is this a 2nd Level Triage?  YES, LICENSED PRACTITIONER REVIEW IS REQUIRED       Situation: Migraine headache for greater than 2 weeks, debilitating.      Background: Last seen in Neurology clinic: 4/11/24 for migraine follow-up.    Hx migraine, tension headaches, and cervicogenic headaches      Assessment:  - Pain in Head & neck, radiates R eye and R side of neck  - Onset >2 weeks ago  - Near constant throbbing - occasionally will have 1 to 2 hours relief and then it returns  - Severe, debilitating and has been mostly staying in bed.  - Nauseated, sick when she eats and sick when she doesn't.  - Endorses staying well hydrated, urine is light yellow  - Gets migraines frequently but never one like this that she's unable to get a handle on  - R eye and R neck pain is signature migraine for her  -July 2023- tripped on concrete accidentally and hit head, required stitches. CT never obtained per patient. Migraines have been worse ever since.  -Stiff/sore neck - is chronic but acutely worse with migraines  - Using Excedrin and Imitrex (she is now out of Imitrex). Admits and knows she is overusing it.  - Has been seeing chiropractor and also receiving accupuncture  - Private practice nurse injector came out to her home at 2200 last night and gave her an IM toradol injection, she felt relief from this for several hours and was the first time that she felt improvement before pain returned again after it wore off.  - Scheduled for nerve block next Wednesday (soonest possible)  - Nurtec was denied coverage, this was upsetting to find out  - Tearful and feeling hopeless       Protocol Recommended Disposition:   Callback By PCP Within 1 Hour     Recommendation: Please call patient to further assess/advise. If she does not hear from anyone or she is finding it unbearable she is advised to present to ER for evaluation. Patient verbalizes understanding and is amenable to plan of care.     Routed to  provider/care team     Mariana THAKUR RN  [facility]/Red Flag Triage & Med Refill Team    Addendum 1650: Reached out to patient - she has not heard back from anyone in clinic. She thinks her last Imitrex is helping a little bit, and she took some Unasom which she believes helps her nausea. She is worried about headache getting bad again during night. If this happens she should go to ER for management, otherwise message has been sent to care team for someone to reach out to her tomorrow.    Mariana THAKUR RN  [facility]/Red Flag Triage & Med Refill Team    Reason for Disposition   SEVERE headache and not relieved by pain meds   SEVERE headache (e.g., excruciating) and has had severe headaches before    Additional Information   Negative: Difficult to awaken or acting confused (e.g., disoriented, slurred speech)   Negative: Weakness of the face, arm or leg on one side of the body and new-onset   Negative: Numbness of the face, arm or leg on one side of the body and new-onset   Negative: Loss of speech or garbled speech and new-onset   Negative: Passed out (i.e., fainted, collapsed and was not responding)   Negative: Sounds like a life-threatening emergency to the triager   Negative: Followed a head injury within last 3 days   Negative: Traumatic Brain Injury (TBI) is suspected   Negative: Sinus pain of forehead and yellow or green nasal discharge   Negative: Pregnant   Negative: Unable to walk without falling   Negative: Stiff neck (can't touch chin to chest)   Negative: Possibility of carbon monoxide exposure   Negative: Fever > 103 F (39.4 C)   Negative: Fever > 100.0 F (37.8 C) and has diabetes mellitus or a weak immune system (e.g., HIV positive, cancer chemotherapy, organ transplant, splenectomy, chronic steroids)   Negative: SEVERE headache, states 'worst headache' of life   Negative: SEVERE headache, sudden-onset (i.e., reaching maximum intensity within seconds to 1 hour)   Negative: Severe pain in one  eye   Negative: Loss of vision or double vision  (Exception: Same as prior migraines.)   Negative: Patient sounds very sick or weak to the triager    Protocols used: Headache-A-OH

## 2024-04-18 RX ORDER — SUMATRIPTAN 50 MG/1
TABLET, FILM COATED ORAL
Qty: 9 TABLET | Refills: 0 | OUTPATIENT
Start: 2024-04-18

## 2024-04-18 RX ORDER — PROCHLORPERAZINE MALEATE 10 MG
10 TABLET ORAL EVERY 6 HOURS PRN
Qty: 10 TABLET | Refills: 0 | Status: SHIPPED | OUTPATIENT
Start: 2024-04-18

## 2024-04-18 RX ORDER — SUMATRIPTAN 50 MG/1
TABLET, FILM COATED ORAL
Qty: 9 TABLET | Refills: 0 | Status: CANCELLED | OUTPATIENT
Start: 2024-04-18

## 2024-04-18 NOTE — TELEPHONE ENCOUNTER
Headache Triage  Onset: 2 week(s) ago  Description:                Type: Migraine headache                 Location: Now it is more neck and shoulders radiating behind right eye                  Pain scale ratin/10                 Are headaches getting more intense or more frequent: YES    Is this headache similar to previous headaches? YES    Are you experiencing any new or different symptoms? NO    Accompanying Signs & Symptoms:   Fever/illness: No  Nausea or vomiting: YES- making difficult to eat, unisom not helping  Dizziness: No  Numbness/ Weakness: No  Difficulty walking: No  Visual changes: No      Speech changes: No      Other symptoms: No     Does headache worsen with position changes? NO    Medications tried and outcome:  Excedrin, Imitrex, unisom, benadryl and heat, ice, rest with minor relief    Recommendations:  Pt has already tried taking excedrin, imitrex and benadryl together.  It helped her sleep but she just can't get relief.     In the past she did not tolerate zofran.  She is not sure if she has tried compazine.  Will see if provider would be okay ordering compazine for patient to try.     Spoke with pt again and she is able to do ONB appointment with Dr. Goodwin tomorrow at 3PM in [place].     Annie VELAZCO, RN, BSN  [facility]

## 2024-04-18 NOTE — TELEPHONE ENCOUNTER
Per OV note no further refills needed, pt is planning to switch to nurtec.     Annie VELAZCO RN, BSN  [facility]

## 2024-04-18 NOTE — TELEPHONE ENCOUNTER
Called pt back and let her know that Dr. Campbell ordered compazine.      Reviewed that it can be taken with sumatriptan and benadryl 25 mg, to increase effectiveness against headache.     She said she is out of the imitrex.  She took her last one yesterday.   She did utilize 2 tablets a couple of the days, and so she is in need of refill.     RN did review overuse of triptans and how she needs to utilize it 9 days or less to avoid rebound headaches, and she verbalized understanding.     Pt is scheduled for ONB tomorrow.  Please advise on an additional refill of sumatriptan.     Annie VELAZCO RN, BSN  [facility]

## 2024-04-18 NOTE — TELEPHONE ENCOUNTER
Attempted to call pt to follow up.     Unable to leave message due to voicemail being full.     Will attempt again later today.     Annie VELAZCO RN, BSN  [facility]

## 2024-04-18 NOTE — TELEPHONE ENCOUNTER
Dr. Campbell recommends seeing how ONB goes, no early refill of sumatriptan sent in.     MyChart sent to pt with update.     Annie VELAZCO RN, BSN  [facility]

## 2024-04-19 ENCOUNTER — OFFICE VISIT (OUTPATIENT)
Dept: PHYSICAL MEDICINE AND REHAB | Facility: CLINIC | Age: 59
End: 2024-04-19
Payer: COMMERCIAL

## 2024-04-19 ENCOUNTER — TELEPHONE (OUTPATIENT)
Dept: NEUROLOGY | Facility: CLINIC | Age: 59
End: 2024-04-19

## 2024-04-19 VITALS — DIASTOLIC BLOOD PRESSURE: 81 MMHG | SYSTOLIC BLOOD PRESSURE: 120 MMHG | HEART RATE: 81 BPM | OXYGEN SATURATION: 99 %

## 2024-04-19 DIAGNOSIS — M54.81 CERVICO-OCCIPITAL NEURALGIA OF RIGHT SIDE: ICD-10-CM

## 2024-04-19 DIAGNOSIS — G89.29 OTHER CHRONIC PAIN: ICD-10-CM

## 2024-04-19 DIAGNOSIS — M47.812 CERVICAL SPONDYLOSIS WITHOUT MYELOPATHY: Primary | ICD-10-CM

## 2024-04-19 DIAGNOSIS — M54.81 BILATERAL OCCIPITAL NEURALGIA: ICD-10-CM

## 2024-04-19 PROCEDURE — 64450 NJX AA&/STRD OTHER PN/BRANCH: CPT | Mod: 50 | Performed by: PHYSICAL MEDICINE & REHABILITATION

## 2024-04-19 PROCEDURE — 64405 NJX AA&/STRD GR OCPL NRV: CPT | Mod: 50 | Performed by: PHYSICAL MEDICINE & REHABILITATION

## 2024-04-19 PROCEDURE — 99205 OFFICE O/P NEW HI 60 MIN: CPT | Mod: 25 | Performed by: PHYSICAL MEDICINE & REHABILITATION

## 2024-04-19 RX ORDER — BETAMETHASONE SODIUM PHOSPHATE AND BETAMETHASONE ACETATE 3; 3 MG/ML; MG/ML
6 INJECTION, SUSPENSION INTRA-ARTICULAR; INTRALESIONAL; INTRAMUSCULAR; SOFT TISSUE ONCE
Status: COMPLETED | OUTPATIENT
Start: 2024-04-19 | End: 2024-04-19

## 2024-04-19 RX ORDER — BUPIVACAINE HYDROCHLORIDE 5 MG/ML
3 INJECTION, SOLUTION PERINEURAL ONCE
Status: COMPLETED | OUTPATIENT
Start: 2024-04-19 | End: 2024-04-19

## 2024-04-19 RX ADMIN — BETAMETHASONE SODIUM PHOSPHATE AND BETAMETHASONE ACETATE 6 MG: 3; 3 INJECTION, SUSPENSION INTRA-ARTICULAR; INTRALESIONAL; INTRAMUSCULAR; SOFT TISSUE at 15:58

## 2024-04-19 RX ADMIN — BUPIVACAINE HYDROCHLORIDE 15 MG: 5 INJECTION, SOLUTION PERINEURAL at 15:57

## 2024-04-19 ASSESSMENT — PATIENT HEALTH QUESTIONNAIRE - PHQ9
SUM OF ALL RESPONSES TO PHQ QUESTIONS 1-9: 10
10. IF YOU CHECKED OFF ANY PROBLEMS, HOW DIFFICULT HAVE THESE PROBLEMS MADE IT FOR YOU TO DO YOUR WORK, TAKE CARE OF THINGS AT HOME, OR GET ALONG WITH OTHER PEOPLE: VERY DIFFICULT
SUM OF ALL RESPONSES TO PHQ QUESTIONS 1-9: 10
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
## 2024-04-19 NOTE — TELEPHONE ENCOUNTER
Returned Mariel's call per provider request. Mariel is calling from patient [facility] health insurance company currently trying to submit med for approval. Mariel states she needs to know how many migraine/ headache days patient has per month as well as previous and current meds. Opened patient chart to review provider note and provided the following information:      's office notes for patient on 4/11/2024    She reports almost daily headaches that originate at the right occipital area, by the end of the day and radiate to the top of her scalp all the way to her right eye.  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  She also tends to sleep on her stomach.  On good month she has up to 4 migraines, but on bed months more than 15.  She is afraid of needles and for that reason did not try previously prescribed Ajovy.  She is on 75 mg of Effexor for unrelated reasons, but it could be used for headache prevention.  Imitrex seems to work for acute therapy, but she is overusing it up to 15 days on some months.     For headache prevention, we discussed trial of Ajovy, but the patient was hesitant regarding side effects.  Today, she admits that she is afraid of needles, but is open to try oral CGRP antagonist.  We discussed Qulipta and Nurtec, and decided to try Nurtec.  She will use Imitrex in the interim, but after Nurtec is approved, will switch to Nurtec monotherapy for both preventive and acute treatment.  She is on venlafaxine for unrelated reasons.  This medicine is used for headache prevention, but does not seem to be helping.  In the past she tried gabapentin, Topamax, Lyrica, tizanidine, baclofen, and supplements without success.  Additional preventive options include nortriptyline, amitriptyline, doxepin, Depakote, verapamil, though the likelihood that they will be helpful is low given her past experience.        Mariel states she will submit this information and she believes she  has everything she needs. Rep will contact us back if any other questions or concerns.      Geovanna White MA

## 2024-04-19 NOTE — TELEPHONE ENCOUNTER
[facility] Call Center    Phone Message    May a detailed message be left on voicemail: yes     Reason for Call: Medication Question or concern regarding medication   Prescription Clarification  Name of Medication: rimegepant (NURTEC) 75 MG ODT  Prescribing Provider: Gelacio Campbell MD   Pharmacy: N/A   What on the order needs clarification? Pat has questions on what the medication is being used for.      Please call Pat at [phone]   To discuss further.      Action Taken: Message routed to:  Other:  Neurology    Travel Screening: Not Applicable

## 2024-04-19 NOTE — NURSING NOTE
"Catie Nuñez is a 58 year old female who presents for:  Chief Complaint   Patient presents with    Consult     Nausea, pain, headaches that has not had much of any relief in from treatments recently        Initial Vitals:  /81   Pulse 81   LMP 09/01/2016 (LMP Unknown)   SpO2 99%  Estimated body mass index is 18.84 kg/m  as calculated from the following:    Height as of 1/23/24: 1.613 m (5' 3.5\").    Weight as of 3/18/24: 49 kg (108 lb 0.4 oz).. There is no height or weight on file to calculate BSA. BP completed using cuff size: small regular  Data Unavailable    Nursing Comments:     Armin Ely    "

## 2024-04-25 ENCOUNTER — TELEPHONE (OUTPATIENT)
Dept: NEUROLOGY | Facility: CLINIC | Age: 59
End: 2024-04-25
Payer: COMMERCIAL

## 2024-04-25 ENCOUNTER — TELEPHONE (OUTPATIENT)
Dept: PHYSICAL MEDICINE AND REHAB | Facility: CLINIC | Age: 59
End: 2024-04-25
Payer: COMMERCIAL

## 2024-04-25 DIAGNOSIS — G44.86 CERVICOGENIC HEADACHE: Primary | ICD-10-CM

## 2024-04-25 DIAGNOSIS — M47.812 CERVICAL SPONDYLOSIS: ICD-10-CM

## 2024-04-25 NOTE — TELEPHONE ENCOUNTER
Health Call Center    Phone Message    May a detailed message be left on voicemail: yes     Reason for Call: Other: Patient's spouse called with patient next to him to set up an appointment with Christa. Patient and spouse stated they wanted to see Christa for an office visit, and that they already had spoken with Christa about a plan of care moving forward. Writer noted that patient had only seen Christa for a procedure and hadn't established care so put patient down as a NEW visit type. Please change if NEW visit type was not needed.      Action Taken: Message routed to:  [facility] PMR    Travel Screening: Not Applicable

## 2024-04-25 NOTE — TELEPHONE ENCOUNTER
[facility] Call Center    Phone Message    May a detailed message be left on voicemail: yes     Reason for Call: Other: Pt is calling requesting another referral from Dr. Campbell for [facility] to be seen with Rajesh Goodwin MD.     Pt can be reached for further information at: [phone]    Action Taken: Other: Neurology     Travel Screening: Not Applicable

## 2024-04-28 ENCOUNTER — HOSPITAL ENCOUNTER (OUTPATIENT)
Dept: MRI IMAGING | Facility: CLINIC | Age: 59
Discharge: HOME | End: 2024-04-28
Attending: PHYSICAL MEDICINE & REHABILITATION | Admitting: PHYSICAL MEDICINE & REHABILITATION
Payer: COMMERCIAL

## 2024-04-28 DIAGNOSIS — G89.29 OTHER CHRONIC PAIN: ICD-10-CM

## 2024-04-28 DIAGNOSIS — M54.81 BILATERAL OCCIPITAL NEURALGIA: ICD-10-CM

## 2024-04-28 DIAGNOSIS — M54.81 CERVICO-OCCIPITAL NEURALGIA OF RIGHT SIDE: ICD-10-CM

## 2024-04-28 DIAGNOSIS — M47.812 CERVICAL SPONDYLOSIS WITHOUT MYELOPATHY: ICD-10-CM

## 2024-04-28 PROCEDURE — 72141 MRI NECK SPINE W/O DYE: CPT | Mod: 26 | Performed by: RADIOLOGY

## 2024-04-28 PROCEDURE — 72141 MRI NECK SPINE W/O DYE: CPT

## 2024-04-30 ENCOUNTER — TELEPHONE (OUTPATIENT)
Dept: PHYSICAL MEDICINE AND REHAB | Facility: CLINIC | Age: 59
End: 2024-04-30

## 2024-04-30 ENCOUNTER — HOSPITAL ENCOUNTER (OUTPATIENT)
Facility: AMBULATORY SURGERY CENTER | Age: 59
End: 2024-04-30
Attending: PHYSICAL MEDICINE & REHABILITATION
Payer: COMMERCIAL

## 2024-04-30 ENCOUNTER — OFFICE VISIT (OUTPATIENT)
Dept: PHYSICAL MEDICINE AND REHAB | Facility: CLINIC | Age: 59
End: 2024-04-30
Payer: COMMERCIAL

## 2024-04-30 VITALS — HEART RATE: 84 BPM | OXYGEN SATURATION: 100 % | DIASTOLIC BLOOD PRESSURE: 85 MMHG | SYSTOLIC BLOOD PRESSURE: 126 MMHG

## 2024-04-30 DIAGNOSIS — G89.29 OTHER CHRONIC PAIN: ICD-10-CM

## 2024-04-30 DIAGNOSIS — G44.86 CERVICOGENIC HEADACHE: ICD-10-CM

## 2024-04-30 DIAGNOSIS — M47.812 CERVICAL SPONDYLOSIS WITHOUT MYELOPATHY: Primary | ICD-10-CM

## 2024-04-30 DIAGNOSIS — M47.812 CERVICAL SPONDYLOSIS: ICD-10-CM

## 2024-04-30 PROCEDURE — 99215 OFFICE O/P EST HI 40 MIN: CPT | Performed by: PHYSICAL MEDICINE & REHABILITATION

## 2024-04-30 ASSESSMENT — PAIN SCALES - GENERAL: PAINLEVEL: MODERATE PAIN (5)
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
## 2024-04-30 NOTE — NURSING NOTE
"Catie Nuñez is a 58 year old female who presents for:  Chief Complaint   Patient presents with    RECHECK     Relief from injection lasted a few days but then the pain returned to baseline, 5/10        Initial Vitals:  /85   Pulse 84   LMP 09/01/2016 (LMP Unknown)   SpO2 100%  Estimated body mass index is 18.84 kg/m  as calculated from the following:    Height as of 1/23/24: 1.613 m (5' 3.5\").    Weight as of 3/18/24: 49 kg (108 lb 0.4 oz).. There is no height or weight on file to calculate BSA. BP completed using cuff size: regular  Moderate Pain (5)    Nursing Comments:     Armin Ely    "

## 2024-04-30 NOTE — TELEPHONE ENCOUNTER
Called patient to schedule procedure with Dr. Goodwin    Date of Procedure: Carlos 6/10/24, 6/17/24 & 7/1/24    Arrival time given: Yes: Arrival Time 1:30pm & 2:30pm       Procedure Location: [facility]     Verified Location with Patient:  Yes  Address provided to the patient    Pre-op H&P Required:  No: Local Anesthesia        Post-Op/Follow Up Appt:  Not Indicated in Request      Informed patient they will need a  to drive them home:  Yes    Patients : Thomas      Patient is aware that pre-op RN from the procedure center will call 2-3 days prior to scheduled procedure to confirm arrival time and review any instructions:  Yes       Additional Comments: N/A        Tiffanie Lr MA on 4/30/2024 at 4:18 PM      P: [phone]*

## 2024-05-03 ENCOUNTER — TELEPHONE (OUTPATIENT)
Dept: NEUROLOGY | Facility: CLINIC | Age: 59
End: 2024-05-03
Payer: COMMERCIAL

## 2024-05-03 DIAGNOSIS — G43.009 MIGRAINE WITHOUT AURA AND WITHOUT STATUS MIGRAINOSUS, NOT INTRACTABLE: ICD-10-CM

## 2024-05-03 RX ORDER — SUMATRIPTAN 50 MG/1
TABLET, FILM COATED ORAL
Qty: 9 TABLET | Refills: 0 | Status: SHIPPED | OUTPATIENT
Start: 2024-05-03 | End: 2024-05-23

## 2024-05-03 NOTE — TELEPHONE ENCOUNTER
Pending Prescriptions:                       Disp   Refills    SUMAtriptan (IMITREX) 50 MG tablet        9 tabl*0            Sig: TAKE 1 TABLET BY MOUTH AT ONSET OF MIGRAINE. MAY           REPEAT IN 2 HOURS, MAX 2 TABLETS IN 24 HOURS. MAX           9 DAYS PER MONTH     Chart note: She will use Imitrex while awaiting for insurance approval. I gave her a 1 month supply.     Last fill: 4.11.24     LOV : 4.11.24    NOV: 8.7.24

## 2024-05-03 NOTE — TELEPHONE ENCOUNTER
[facility] Call Center    Phone Message    May a detailed message be left on voicemail: yes     Reason for Call: Medication Refill Request    Has the patient contacted the pharmacy for the refill? Yes   Name of medication being requested: SUMAtriptan (IMITREX)   Provider who prescribed the medication:   Gelacio Campbell MD  Pharmacy[id number] [address] [facility]    Date medication is needed: 05/03/24   Please call Catie at #   [phone] to discuss further.    Action Taken: Message routed to:  Other: [facility]    Travel Screening: Not Applicable

## 2024-05-03 NOTE — TELEPHONE ENCOUNTER
Nurtec appeal is still pending. (Writer sent message to PA team to look into this, but it may take some time)    Please advise on additional refill while PA is pending.     Annie VELAZCO RN, BSN  [facility]

## 2024-05-06 ENCOUNTER — TELEPHONE (OUTPATIENT)
Dept: PHYSICAL MEDICINE AND REHAB | Facility: CLINIC | Age: 59
End: 2024-05-06
Payer: COMMERCIAL

## 2024-05-06 NOTE — TELEPHONE ENCOUNTER
[facility] Call Center    Phone Message    May a detailed message be left on voicemail: yes     Reason for Call: Other: Pt spouse called stating Pt is in a lot of pain and wants another nerve block, please put in order for one and call Pt to schedule. Pt states she is having a series of injections start on 5/20 and wants to get the nerve block before that. Please call back to advise.      Action Taken: Message routed to:  [facility] PMR    Travel Screening: Not Applicable

## 2024-05-07 NOTE — TELEPHONE ENCOUNTER
Returned call to pt and her spouse. Had to [facility] stating if they can call back soon, we have appt available today at 4:30 [place] location and or Friday any time.

## 2024-05-07 NOTE — TELEPHONE ENCOUNTER
Received a letter from [facility] that [facility] ODT is approved from 4/19/24-4/19/25. Faxed letter to PA Dept to process

## 2024-05-08 ENCOUNTER — OFFICE VISIT (OUTPATIENT)
Dept: PHYSICAL MEDICINE AND REHAB | Facility: CLINIC | Age: 59
End: 2024-05-08
Payer: COMMERCIAL

## 2024-05-08 ENCOUNTER — MYC REFILL (OUTPATIENT)
Dept: FAMILY MEDICINE | Facility: CLINIC | Age: 59
End: 2024-05-08

## 2024-05-08 VITALS — SYSTOLIC BLOOD PRESSURE: 116 MMHG | DIASTOLIC BLOOD PRESSURE: 79 MMHG | HEART RATE: 70 BPM | OXYGEN SATURATION: 98 %

## 2024-05-08 DIAGNOSIS — F11.90 CHRONIC, CONTINUOUS USE OF OPIOIDS: ICD-10-CM

## 2024-05-08 DIAGNOSIS — R51.9 NONINTRACTABLE HEADACHE, UNSPECIFIED CHRONICITY PATTERN, UNSPECIFIED HEADACHE TYPE: ICD-10-CM

## 2024-05-08 DIAGNOSIS — M54.2 CERVICALGIA: ICD-10-CM

## 2024-05-08 DIAGNOSIS — M79.18 MYALGIA, OTHER SITE: Primary | ICD-10-CM

## 2024-05-08 DIAGNOSIS — G89.29 OTHER CHRONIC PAIN: ICD-10-CM

## 2024-05-08 DIAGNOSIS — M47.812 CERVICAL SPONDYLOSIS WITHOUT MYELOPATHY: ICD-10-CM

## 2024-05-08 DIAGNOSIS — G89.4 CHRONIC PAIN SYNDROME: ICD-10-CM

## 2024-05-08 PROCEDURE — 20553 NJX 1/MLT TRIGGER POINTS 3/>: CPT | Performed by: PHYSICAL MEDICINE & REHABILITATION

## 2024-05-08 PROCEDURE — 99213 OFFICE O/P EST LOW 20 MIN: CPT | Mod: 25 | Performed by: PHYSICAL MEDICINE & REHABILITATION

## 2024-05-08 RX ORDER — BUPIVACAINE HYDROCHLORIDE 5 MG/ML
4 INJECTION, SOLUTION PERINEURAL ONCE
Status: COMPLETED | OUTPATIENT
Start: 2024-05-08 | End: 2024-05-08

## 2024-05-08 RX ORDER — MECLIZINE HCL 12.5 MG 12.5 MG/1
12.5 TABLET ORAL 3 TIMES DAILY PRN
Qty: 30 TABLET | Refills: 0 | Status: SHIPPED | OUTPATIENT
Start: 2024-05-08

## 2024-05-08 RX ORDER — PANTOPRAZOLE SODIUM 40 MG/1
40 TABLET, DELAYED RELEASE ORAL DAILY
Qty: 30 TABLET | Refills: 0 | Status: SHIPPED | OUTPATIENT
Start: 2024-05-08

## 2024-05-08 RX ORDER — TRAMADOL HYDROCHLORIDE 50 MG/1
50 TABLET ORAL 2 TIMES DAILY PRN
Qty: 50 TABLET | Refills: 0 | Status: SHIPPED | OUTPATIENT
Start: 2024-05-08 | End: 2024-05-14

## 2024-05-08 RX ADMIN — BUPIVACAINE HYDROCHLORIDE 20 MG: 5 INJECTION, SOLUTION PERINEURAL at 10:07

## 2024-05-08 NOTE — TELEPHONE ENCOUNTER
MEDICATION APPEAL APPROVED    Medication: RIMEGEPANT SULFATE 75 MG PO TBDP  Authorization Effective Date: 4/19/2024  Authorization Expiration Date: 4/19/2025  Approved Dose/Quantity:   Reference #:     Appeal Insurance Company: [employer] - Phone [phone] Fax [phone]   Expected CoPay: $       CoPay Card Available:    Financial Assistance Needed:   Filling Pharmacy: [facility] #[id number] [address]  Comments:

## 2024-05-08 NOTE — LETTER
5/8/2024       RE: Catie Nuñez  [address]  [place] [address]       Dear Colleague,    Thank you for referring your patient, Catie Nuñez, to the [facility] at [facility]. Please see a copy of my visit note below.    CC: Patient is here because she is nauseous and is having severe pain in the right side of the neck.    She was last seen by me on 4/30/2024.  In addition to the issues noted on that date, she reports being slightly dizzy and nauseous.  None of the medication she has taken has been helpful.  She is here for evaluation.    Past Medical History:   Diagnosis Date    Abnormal Pap smear of cervix 11/07/2019    3/11/21    Anxiety     Arthritis 3 yrs ago    ASCUS with positive high risk HPV 6/2015,09/14/16    atypia on colp, 09/14/16: ASCUS + HR HPV 16 and other.     Cervical high risk HPV (human papillomavirus) test positive 11/07/2019 11/7/21    Depression     Depressive disorder Age 12    Fracture of great toe 02/20/2014    History of scoliosis     Hx of colposcopy with cervical biopsy 10/06/2016    10/06/16: [place] Bx NIL.    MVP (mitral valve prolapse)     Unexplained endometrial cells on cervical Pap smear 06/2015    thin endometrium on US     Past Surgical History:   Procedure Laterality Date    COLPOSCOPY CERVIX, LOOP ELECTRODE BIOPSY, COMBINED N/A 2/3/2022    Procedure: COLPOSCOPY, WITH LEEP OF CERVIX;  Surgeon: Chio Sanchez MD;  Location: [facility]    NO HISTORY OF SURGERY       Current Outpatient Medications   Medication Sig Dispense Refill    ALPRAZolam (XANAX) 0.5 MG tablet TAKE ONE-HALF TABLET BY MOUTH TWICE DAILY AS NEEDED FOR ANXIETY(TO LAST 30 DAYS) 20 tablet 2    Aspirin-Acetaminophen-Caffeine (EXCEDRIN PO) Take by mouth as needed      estradiol (ESTRACE) 1 MG tablet TAKE 1/2 TABLET BY MOUTH DAILY 90 tablet 0    fluticasone (FLONASE) 50 MCG/ACT nasal spray SHAKE LIQUID AND USE 1 TO 2 SPRAYS IN EACH  NOSTRIL DAILY 16 g 11    medroxyPROGESTERone (PROVERA) 2.5 MG tablet TAKE 1 TABLET(2.5 MG) BY MOUTH DAILY 90 tablet 3    mirtazapine (REMERON) 7.5 MG tablet Take 1 tablet (7.5 mg) by mouth nightly as needed (insomnia) (Patient not taking: Reported on 4/11/2024) 30 tablet 0    prochlorperazine (COMPAZINE) 10 MG tablet Take 1 tablet (10 mg) by mouth every 6 hours as needed for nausea or vomiting 10 tablet 0    rimegepant (NURTEC) 75 MG ODT tablet Place 1 tablet (75 mg) under the tongue every 48 hours 18 tablet 3    SUMAtriptan (IMITREX) 50 MG tablet TAKE 1 TABLET BY MOUTH AT ONSET OF MIGRAINE. MAY REPEAT IN 2 HOURS, MAX 2 TABLETS IN 24 HOURS. MAX 9 DAYS PER MONTH 9 tablet 0    traMADol (ULTRAM) 50 MG tablet Take 1 tablet (50 mg) by mouth 2 times daily as needed for severe pain 30 day supply. Do not take with alprazolam. 50 tablet 2    venlafaxine (EFFEXOR XR) 75 MG 24 hr capsule Take 1 capsule (75 mg) by mouth daily 90 capsule 1     LMP 09/01/2016 (LMP Unknown)     On examination, patient is alert and cooperative.  She is able to get up and walk around without difficulty.  Musculoskeletal examination reveals tenderness over the cervical paraspinal region overlying the C2-C3 joint area.  She is not tender over the occipital nerves on either side.  She has a tight tender levator scapulae on the right side.    Impression: At this point this patient with chronic headache neck pain and occipital neuralgia has a number of comorbidities contributing to her pain.  It is possible that some of this could be related to acid reflux and or hyperacidity etc.  I would consider starting her on Protonix and also give her some Antivert for her sense of dizziness/nausea.  In addition she will benefit from trigger point injections to the affected areas.  We are preparing her for medial branch blocks scheduled for the 20th of this month.    20 minutes for the evaluation management portion of the visit, exclusive of the procedure.  Greater  than 50% was for counseling on above-mentioned issues.    Procedure note: With informed consent, after explaining the benefits and risks of the procedure, using ChloraPrep for skin prep, 4 cc of 0.5% bupivacaine were utilized to inject triggers in the right paraspinal region overlying the C2-C3 joint as well as the right levator scapulae region.  Postinjection she felt improvement in her pain.  She continued to have some tension over the top of the scalp.  She was noted to have some triggers in the sternocleidomastoid on either side.  They were injected and post injection, she felt improvement in all of the symptoms.    I have asked her to use ice and heat as needed continue her current medications and reassured her on her response.    Thank you much for allow me to assist in her care.        Again, thank you for allowing me to participate in the care of your patient.      Sincerely,    Rajesh Goodwin MD

## 2024-05-08 NOTE — PROGRESS NOTES
CC: Patient is here because she is nauseous and is having severe pain in the right side of the neck.    She was last seen by me on 4/30/2024.  In addition to the issues noted on that date, she reports being slightly dizzy and nauseous.  None of the medication she has taken has been helpful.  She is here for evaluation.    Past Medical History:   Diagnosis Date    Abnormal Pap smear of cervix 11/07/2019    3/11/21    Anxiety     Arthritis 3 yrs ago    ASCUS with positive high risk HPV 6/2015,09/14/16    atypia on colp, 09/14/16: ASCUS + HR HPV 16 and other.     Cervical high risk HPV (human papillomavirus) test positive 11/07/2019 11/7/21    Depression     Depressive disorder Age 12    Fracture of great toe 02/20/2014    History of scoliosis     Hx of colposcopy with cervical biopsy 10/06/2016    10/06/16: [place] Bx NIL.    MVP (mitral valve prolapse)     Unexplained endometrial cells on cervical Pap smear 06/2015    thin endometrium on US     Past Surgical History:   Procedure Laterality Date    COLPOSCOPY CERVIX, LOOP ELECTRODE BIOPSY, COMBINED N/A 2/3/2022    Procedure: COLPOSCOPY, WITH LEEP OF CERVIX;  Surgeon: Chio Sanchez MD;  Location: [facility]    NO HISTORY OF SURGERY       Current Outpatient Medications   Medication Sig Dispense Refill    ALPRAZolam (XANAX) 0.5 MG tablet TAKE ONE-HALF TABLET BY MOUTH TWICE DAILY AS NEEDED FOR ANXIETY(TO LAST 30 DAYS) 20 tablet 2    Aspirin-Acetaminophen-Caffeine (EXCEDRIN PO) Take by mouth as needed      estradiol (ESTRACE) 1 MG tablet TAKE 1/2 TABLET BY MOUTH DAILY 90 tablet 0    fluticasone (FLONASE) 50 MCG/ACT nasal spray SHAKE LIQUID AND USE 1 TO 2 SPRAYS IN EACH NOSTRIL DAILY 16 g 11    medroxyPROGESTERone (PROVERA) 2.5 MG tablet TAKE 1 TABLET(2.5 MG) BY MOUTH DAILY 90 tablet 3    mirtazapine (REMERON) 7.5 MG tablet Take 1 tablet (7.5 mg) by mouth nightly as needed (insomnia) (Patient not taking: Reported on 4/11/2024) 30 tablet 0    prochlorperazine  (COMPAZINE) 10 MG tablet Take 1 tablet (10 mg) by mouth every 6 hours as needed for nausea or vomiting 10 tablet 0    rimegepant (NURTEC) 75 MG ODT tablet Place 1 tablet (75 mg) under the tongue every 48 hours 18 tablet 3    SUMAtriptan (IMITREX) 50 MG tablet TAKE 1 TABLET BY MOUTH AT ONSET OF MIGRAINE. MAY REPEAT IN 2 HOURS, MAX 2 TABLETS IN 24 HOURS. MAX 9 DAYS PER MONTH 9 tablet 0    traMADol (ULTRAM) 50 MG tablet Take 1 tablet (50 mg) by mouth 2 times daily as needed for severe pain 30 day supply. Do not take with alprazolam. 50 tablet 2    venlafaxine (EFFEXOR XR) 75 MG 24 hr capsule Take 1 capsule (75 mg) by mouth daily 90 capsule 1     LMP 09/01/2016 (LMP Unknown)     On examination, patient is alert and cooperative.  She is able to get up and walk around without difficulty.  Musculoskeletal examination reveals tenderness over the cervical paraspinal region overlying the C2-C3 joint area.  She is not tender over the occipital nerves on either side.  She has a tight tender levator scapulae on the right side.    Impression: At this point this patient with chronic headache neck pain and occipital neuralgia has a number of comorbidities contributing to her pain.  It is possible that some of this could be related to acid reflux and or hyperacidity etc.  I would consider starting her on Protonix and also give her some Antivert for her sense of dizziness/nausea.  In addition she will benefit from trigger point injections to the affected areas.  We are preparing her for medial branch blocks scheduled for the 20th of this month.    20 minutes for the evaluation management portion of the visit, exclusive of the procedure.  Greater than 50% was for counseling on above-mentioned issues.    Procedure note: With informed consent, after explaining the benefits and risks of the procedure, using ChloraPrep for skin prep, 4 cc of 0.5% bupivacaine were utilized to inject triggers in the right paraspinal region overlying the  C2-C3 joint as well as the right levator scapulae region.  Postinjection she felt improvement in her pain.  She continued to have some tension over the top of the scalp.  She was noted to have some triggers in the sternocleidomastoid on either side.  They were injected and post injection, she felt improvement in all of the symptoms.    I have asked her to use ice and heat as needed continue her current medications and reassured her on her response.    Thank you much for allow me to assist in her care.    Rajesh Goodwin MD

## 2024-05-13 ENCOUNTER — TELEPHONE (OUTPATIENT)
Dept: PHYSICAL MEDICINE AND REHAB | Facility: CLINIC | Age: 59
End: 2024-05-13
Payer: COMMERCIAL

## 2024-05-13 NOTE — TELEPHONE ENCOUNTER
Scheduled for C2-C5 Medial branch block #1 on 5/20/2024 with Dr. Goodwin.     Attempted to call patient to review the upcoming procedure with Dr. Goodwin and review the need to hold any aspirin containing products for 6 days prior to the injection, due to being at the cervical location of her spine.     No answer, left VM for patient to call back or send a My Chart and advise if there is a better phone number or time to reach her, left clinic phone # [phone] for patient.    Kera Martínez, ABIOLAN RN  RN Care Coordinator for Physical Medicine and Rehabilitation  [facility] [place] - [address] [address]  Office: [phone]  Fax: [phone]

## 2024-05-13 NOTE — TELEPHONE ENCOUNTER
Called patient and spoke with her regarding her C2-C5 Medial branch block #1 on 5/20/2024 with Dr. Goodwin.      Reviewed the procedure and what she can expect, answered her . Patient states she has to take Excedrin daily which contains Aspirin 250 mg per pill and that her migraines have gotten so bad that she is taking 2 pills twice daily. Reviewed with Dr. Goodwin and he states that patient will need to hold her Excedrin until after her injection 5/20/2024 and additionally needs to speak with her provider managing her migraines, ideally neurology, to advise on recommendations to better manage the migraines due to taking twice the daily recommended dose. If unable to hold the Excedrin then may need to postpone or cancel the injection.     Spoke with patient and she verbalized understanding. She will try to not take the Excedrin prior to the injection on 5/20/24, as she hopes to still proceed with the injection. She plans to reschedule or cancel if unable to discontinue the Excedrin completely and also will plan to reach out to Neurology to help with med management for her migraines.     Kera Martínez, ABIOLAN RN  RN Care Coordinator for Physical Medicine and Rehabilitation  [facility] [place] - [address] [address]  Office: [phone]  Fax: [phone]

## 2024-05-14 ENCOUNTER — VIRTUAL VISIT (OUTPATIENT)
Dept: FAMILY MEDICINE | Facility: CLINIC | Age: 59
End: 2024-05-14
Payer: COMMERCIAL

## 2024-05-14 ENCOUNTER — TELEPHONE (OUTPATIENT)
Dept: PHYSICAL MEDICINE AND REHAB | Facility: CLINIC | Age: 59
End: 2024-05-14

## 2024-05-14 DIAGNOSIS — F41.1 GENERALIZED ANXIETY DISORDER: ICD-10-CM

## 2024-05-14 DIAGNOSIS — M54.2 CERVICALGIA: ICD-10-CM

## 2024-05-14 DIAGNOSIS — F11.90 CHRONIC, CONTINUOUS USE OF OPIOIDS: ICD-10-CM

## 2024-05-14 DIAGNOSIS — G89.4 CHRONIC PAIN SYNDROME: ICD-10-CM

## 2024-05-14 DIAGNOSIS — G47.01 INSOMNIA DUE TO MEDICAL CONDITION: Primary | ICD-10-CM

## 2024-05-14 DIAGNOSIS — F41.0 PANIC ATTACK: ICD-10-CM

## 2024-05-14 PROCEDURE — 99214 OFFICE O/P EST MOD 30 MIN: CPT | Mod: 95 | Performed by: FAMILY MEDICINE

## 2024-05-14 RX ORDER — HYDROXYZINE HYDROCHLORIDE 25 MG/1
25 TABLET, FILM COATED ORAL
Qty: 90 TABLET | Refills: 1 | Status: SHIPPED | OUTPATIENT
Start: 2024-05-14

## 2024-05-14 RX ORDER — TRAMADOL HYDROCHLORIDE 50 MG/1
50 TABLET ORAL 2 TIMES DAILY PRN
Qty: 50 TABLET | Refills: 2 | Status: SHIPPED | OUTPATIENT
Start: 2024-06-07 | End: 2024-08-21

## 2024-05-14 RX ORDER — ALPRAZOLAM 0.5 MG
TABLET ORAL
Qty: 20 TABLET | Refills: 2 | Status: SHIPPED | OUTPATIENT
Start: 2024-06-12 | End: 2024-08-21

## 2024-05-14 RX ORDER — ALPRAZOLAM 0.5 MG
TABLET ORAL
Qty: 20 TABLET | Refills: 2 | Status: CANCELLED | OUTPATIENT
Start: 2024-05-14

## 2024-05-14 RX ORDER — TRAMADOL HYDROCHLORIDE 50 MG/1
50 TABLET ORAL 2 TIMES DAILY PRN
Qty: 50 TABLET | Refills: 0 | Status: CANCELLED | OUTPATIENT
Start: 2024-05-14

## 2024-05-14 NOTE — TELEPHONE ENCOUNTER
[facility] Call Center    Phone Message    May a detailed message be left on voicemail: yes     Reason for Call: Other: Patient calling wondering if she can have a Toradol injection before Mondays procedure to help with her pain .  Please call her back to advise.      Action Taken: Message routed to:  [facility]    Travel Screening: Not Applicable

## 2024-05-14 NOTE — PROGRESS NOTES
Kimmie is a 58 year old who is being evaluated via a billable video visit.    How would you like to obtain your AVS? MyChart  If the video visit is dropped, the invitation should be resent by: Text to cell phone: [phone]  Will anyone else be joining your video visit? No      Assessment & Plan     Chronic, continuous use of opioids  Chronic pain syndrome  Cervicalgia  -Currently seeing specialist for worsening cervical pain  - reviewed, no concerning activity  -No changes to tramadol dosage at this time  - traMADol (ULTRAM) 50 MG tablet  Dispense: 50 tablet; Refill: 2    Generalized anxiety disorder  Panic attack  -Worsening anxiety due to chronic pain  -Discussed keep current meds the same until cervical pain is under better control  -Continue with therapy  - hydrOXYzine HCl (ATARAX) 25 MG tablet  Dispense: 90 tablet; Refill: 1  - ALPRAZolam (XANAX) 0.5 MG tablet  Dispense: 20 tablet; Refill: 2    Insomnia due to medical condition  -Worsening due to anxiety and pain. Hydroxyzine has worked prior for sleep.   - hydrOXYzine HCl (ATARAX) 25 MG tablet  Dispense: 90 tablet; Refill: 1    Follow-up in 3 months in person for monitoring.                   Subjective   Kimmie is a 58 year old, presenting for the following health issues:  Recheck Medication and Refill Request        5/14/2024     4:35 PM   Additional Questions   Roomed by MIKHAIL Bolivar   Accompanied by self         5/14/2024     4:35 PM   Patient Reported Additional Medications   Patient reports taking the following new medications none     Video Start Time: 5:24 PM    History of Present Illness       Reason for visit:  Meds refill/check up    She eats 2-3 servings of fruits and vegetables daily.She consumes 0 sweetened beverage(s) daily.She exercises with enough effort to increase her heart rate 10 to 19 minutes per day.  She exercises with enough effort to increase her heart rate 7 days per week.   She is taking medications regularly.     Here today for med  "check.  Worsening cervical pain recently. Seeing specialist. Has procedures scheduled.    Anxiety: Worsening anxiety. Pain from neck is causing more anxiety. Anxiety causing more pain. Had trigger point injection a few weeks ago and felt normal again.     Having insomnia with recent worsening of chronic pain. Has tried mirtazapine in the past but made feel loopy. Would like script for hydroxyzine.               Objective    Vitals - Patient Reported  Weight (Patient Reported): 52.2 kg (115 lb)  Height (Patient Reported): 161.3 cm (5' 3.5\")  BMI (Based on Pt Reported Ht/Wt): 20.05  Pain Score: Worst Pain (10)  Pain Loc: Neck      Physical Exam   GENERAL: alert and no distress  EYES: Eyes grossly normal to inspection.  No discharge or erythema, or obvious scleral/conjunctival abnormalities.  RESP: No audible wheeze, cough, or visible cyanosis.    SKIN: Visible skin clear. No significant rash, abnormal pigmentation or lesions.  NEURO: Cranial nerves grossly intact.  Mentation and speech appropriate for age.  PSYCH: Appropriate affect, tone, and pace of words          Video-Visit Details    Type of service:  Video Visit   Video End Time:5:32 PM  Originating Location (pt. Location): Home    Distant Location (provider location):  On-site  Platform used for Video Visit: Rafael  Signed Electronically by: Iglesia Martinez DO    "

## 2024-05-15 NOTE — TELEPHONE ENCOUNTER
Dr. Goodwin reviewed patient message and advised that he cannot do a Toradol injection, that patient can check with her provider who offered it previously to see if they can provide that before her injection scheduled next week.     Called patient to relay provider message. No answer, left detailed message as approved by patient when she called in. Advised to reach out to her PCP or neurologist and if they cannot get her in that she may need to go to Urgent Care (last Toradol shot on file was 9/30/23 at [facility]).     Kera Martínez, ABIOLAN RN  RN Care Coordinator for Physical Medicine and Rehabilitation  [facility] [address] [address]  Office: [phone]  Fax: [phone]

## 2024-05-20 ENCOUNTER — ANCILLARY PROCEDURE (OUTPATIENT)
Dept: RADIOLOGY | Facility: AMBULATORY SURGERY CENTER | Age: 59
End: 2024-05-20
Attending: PHYSICAL MEDICINE & REHABILITATION
Payer: COMMERCIAL

## 2024-05-20 ENCOUNTER — HOSPITAL ENCOUNTER (OUTPATIENT)
Facility: AMBULATORY SURGERY CENTER | Age: 59
Discharge: HOME | End: 2024-05-20
Attending: PHYSICAL MEDICINE & REHABILITATION | Admitting: PHYSICAL MEDICINE & REHABILITATION
Payer: COMMERCIAL

## 2024-05-20 VITALS
OXYGEN SATURATION: 99 % | HEIGHT: 63 IN | HEART RATE: 100 BPM | WEIGHT: 108 LBS | DIASTOLIC BLOOD PRESSURE: 91 MMHG | RESPIRATION RATE: 16 BRPM | TEMPERATURE: 99.9 F | BODY MASS INDEX: 19.14 KG/M2 | SYSTOLIC BLOOD PRESSURE: 121 MMHG

## 2024-05-20 DIAGNOSIS — G44.86 CERVICOGENIC HEADACHE: ICD-10-CM

## 2024-05-20 PROCEDURE — 64490 INJ PARAVERT F JNT C/T 1 LEV: CPT | Mod: RT,KX

## 2024-05-20 RX ORDER — BUPIVACAINE HYDROCHLORIDE 2.5 MG/ML
INJECTION, SOLUTION INFILTRATION; PERINEURAL PRN
Status: DISCONTINUED | OUTPATIENT
Start: 2024-05-20 | End: 2024-05-20 | Stop reason: HOSPADM

## 2024-05-20 RX ORDER — LIDOCAINE HYDROCHLORIDE 10 MG/ML
INJECTION, SOLUTION EPIDURAL; INFILTRATION; INTRACAUDAL; PERINEURAL PRN
Status: DISCONTINUED | OUTPATIENT
Start: 2024-05-20 | End: 2024-05-20 | Stop reason: HOSPADM

## 2024-05-20 RX ORDER — IOPAMIDOL 408 MG/ML
INJECTION, SOLUTION INTRATHECAL PRN
Status: DISCONTINUED | OUTPATIENT
Start: 2024-05-20 | End: 2024-05-20 | Stop reason: HOSPADM

## 2024-05-20 NOTE — DISCHARGE INSTRUCTIONS
Home Care Instructions after a Medial Branch Block    In a medial branch block, a local anesthetic (numbing medicine) is injected near the medial branch nerve. This stops the transmission of pain signals from the facet joint. If this reduces your pain and improves your mobility, it may tell the doctor which facet joint is causing the pain. This procedure is a diagnostic procedure and is typically short lasting, with intentions of doing a radiofrequency ablation. With this injection, a steroid to increase the longevity of the blocks effect is rarely used.    Activity  -You may resume most normal activity levels with the exception of strenuous activity. It is important for us to know if your pain with normal activity is relieved after this injection.  -DO NOT shower for 24 hours  -DO NOT remove bandaid for 24 hours    Pain  -You may experience soreness at the injection site for one or two days  -You may use an ice pack for 20 minutes every 2 hours for the first 24 hours  -You may use a heating pad after the first 24 hours  -You may use Tylenol (acetaminophen) every 4 hours or other pain medicines as     directed by your physician    You may experience numbness radiating into your legs or arms (depending on the procedure location). This numbness may last several hours. Until sensation returns to normal; please use caution in walking, climbing stairs, and stepping out of your vehicle, etc.    PLEASE KEEP TRACK OF YOUR SYMPTOMS AND NOTE YOUR IMPROVEMENT FOR YOUR DOCTOR.     Fill out the pain diary and fax it back to us. You do not need to call us if the pain diary was faxed. [phone] is the FAX number  If you do not have access to a fax machine, attach it to Weotta.  You do not need to call us if the pain diary was attached to Weotta.   If you cannot fax or send via Weotta, then call our call center and request to speak with a nurse for follow up after a procedure     Please contact us if you have:  -Severe  pain  -Fever more than 101.5 degrees Fahrenheit  -Signs of infection at the injection site (redness, swelling, or drainage)    FOR PAIN CENTER PATIENTS:  If you have questions, please contact the Pain Clinic at [phone] Option #1 between the hours of 7:00 am and 3:00 pm Monday through Friday. After office hours you can contact the on call provider by dialing [phone]. If you need immediate attention, we recommend that you go to a hospital emergency room or dial [phone]. Fax number is [phone]    FOR PM&R PATIENTS:  For patients seen by the PM and R service, please call [phone]. (Monday through Friday 8a-5p).  After business hours and weekends call [phone] and ask for the PM and R doctor on call). If you need to fax a pain diary to PM&R the fax number is [phone]. If you are unable to fax, uploading to Vserv is encouraged, then send to provider. If you have procedure scheduling questions please call [phone] Option #2.

## 2024-05-20 NOTE — OP NOTE
"Pre-procedure Diagnoses   Cervical spondylosis without myelopathy [M47.812]   Post-procedure Diagnoses   Cervical spondylosis without myelopathy [M47.812]   Procedures   CA FACET/MEDIAL BRANCH INJ CERV/THOR 1ST W FLUORO [7876958]   CA FACET/MEDIAL BRANCH INJ CERV/THOR 2ND W FLUORO [[id number]]   CA FACET/MEDIAL BRANCH INJ CERV/THOR 3RD & ADDL LVLS W FLUORO [[id number]]                 [address] [facility]  [address] [facility] [address]  [phone]  5/20/2024     Procedure Note:     The patient is here undergoing right cervical medial branch blocks from C2-C5 including the third occipital nerve, for chronic headaches with neck and shoulder pain.     After informed consent, the patient was taken to the fluoroscopic suite and placed prone on the table.  The area was prepped and draped in a sterile fashion.  Timeout was carried out identifying the site and the sites.  Using 1% lidocaine topical anesthesia was obtained.  Using fluoroscopy the joint line of C2-C3,  waist of C3, C4 and C5 facets on the right side were identified and marked.  Using 25-gauge 3.5\" needle the above spots were accessed to target the third occipital nerve as well as the medial branches for C2-3, C3- C4 and C4 C5..     Using Isovue-200 for contrast, there was no spread intravascularly.  Using 0.25% Marcaine 0.5 cc was injected at each levels and an additional 0.5 cc was injected as the needle was withdrawn.  She tolerated the procedure well.  Her pain improved immediately post injection from 5/10 to 0/10.  There was no change in strength or sensation. Able to move better. She will keep a pain diary update my clinic. return for diagnostic block on the right side in a week's time.     Thank you much problem with assist in his care.     Rajesh Goodwin MD          "

## 2024-05-21 ENCOUNTER — MYC MEDICAL ADVICE (OUTPATIENT)
Dept: PHYSICAL MEDICINE AND REHAB | Facility: CLINIC | Age: 59
End: 2024-05-21
Payer: COMMERCIAL

## 2024-05-21 ENCOUNTER — TELEPHONE (OUTPATIENT)
Dept: PHYSICAL MEDICINE AND REHAB | Facility: CLINIC | Age: 59
End: 2024-05-21
Payer: COMMERCIAL

## 2024-05-21 DIAGNOSIS — G43.009 MIGRAINE WITHOUT AURA AND WITHOUT STATUS MIGRAINOSUS, NOT INTRACTABLE: ICD-10-CM

## 2024-05-21 NOTE — TELEPHONE ENCOUNTER
Phoned the patient but couldn't leave voicemail. Patient's voicemail was full. Send Zilta message stating procedure on 6/3/24 with dr. Goodwin has been canceled and call and reschedule at [phone].

## 2024-05-22 NOTE — TELEPHONE ENCOUNTER
Pain Diary Summary received from patient via My Chart    Follow up after Right C2-C5 including third occipital nerve Medial Branch Block #1  Date of service: 5/20/24    Location of pain: chronic headaches with neck and shoulder pain  Percentage & Duration of pain relief after procedure: 90% relief for first 3 hours, 50% relief for 3 hours, and 20% for 3 hours.    Follow up: Routed to Dr. Goodwin to review.

## 2024-05-23 RX ORDER — SUMATRIPTAN 50 MG/1
TABLET, FILM COATED ORAL
Qty: 9 TABLET | Refills: 0 | OUTPATIENT
Start: 2024-05-23

## 2024-05-23 NOTE — TELEPHONE ENCOUNTER
Duplicate request.     Please see [facility] refill encounter.   Annie VELAZCO RN, BSN  [facility]

## 2024-05-28 ENCOUNTER — TELEPHONE (OUTPATIENT)
Dept: NEUROLOGY | Facility: CLINIC | Age: 59
End: 2024-05-28
Payer: COMMERCIAL

## 2024-05-28 NOTE — TELEPHONE ENCOUNTER
Unable to reach patient to discuss a sooner appointment with Dr. Campbell for medication review. Mailbox is full. Will attempt calling again

## 2024-05-29 NOTE — TELEPHONE ENCOUNTER
2nd attempt to reach patient- voice mailbox full- unable to leave a message.    My chart message was sent to patient to call and ask for the clinic. Patient needs to be seen ASAP and clinic will assist with getting patient into seeing dr. Campbell.

## 2024-05-31 ENCOUNTER — TELEPHONE (OUTPATIENT)
Dept: PHYSICAL MEDICINE AND REHAB | Facility: CLINIC | Age: 59
End: 2024-05-31
Payer: COMMERCIAL

## 2024-05-31 NOTE — TELEPHONE ENCOUNTER
Called patient to speak with her regarding her Left C2-C5 Medial Branch Block # 1 on 6/10/2024 with Dr. Goodwin and to remind her to hold any aspirin containing products for 6 days prior to the injection.     No answer, left a VM advising her a My Chart message will be sent to her, since she has that active. Advised her to respond to the My chart message or call if she has any questions.     Kera Martínez, ABIOLAN RN  RN Care Coordinator for Physical Medicine and Rehabilitation  [facility] [address] [address]  Office: [phone]  Fax: [phone]

## 2024-06-04 ENCOUNTER — TELEPHONE (OUTPATIENT)
Dept: NEUROLOGY | Facility: CLINIC | Age: 59
End: 2024-06-04
Payer: COMMERCIAL

## 2024-06-04 NOTE — TELEPHONE ENCOUNTER
Offered to reschedule appointment on 6-6-24 from 3p to 10:30a with Dr. Campbell.  Trying to fill in open schedule gaps with a new patient.

## 2024-06-05 NOTE — PROGRESS NOTES
ESTABLISHED PATIENT NEUROLOGY NOTE    DATE OF VISIT: 6/6/2024  CLINIC LOCATION: [facility]  MRN: [id number]  PATIENT NAME: Catie Nuñez  YOB: 1965    REASON FOR VISIT: No chief complaint on file.    SUBJECTIVE:                                                      HISTORY OF PRESENT ILLNESS: Patient is here to follow up regarding multifactorial headaches.  She was last seen on 4/11/2024.  Please refer to my initial/other prior notes for further information.  Accompanied by significant other.    Since the last visit, the patient reports that her headaches are not adequately controlled after starting preventative Nurtec.  She reached out via My Chart at the end of May asking for sumatriptan, and 1 month supply was provided, but advised the patient to schedule a visit to discuss treatment options.    Today, the patient ***.  EXAM:                                                    Physical Exam:   Vitals: LMP 09/01/2016 (LMP Unknown)     General: pt is in NAD, cooperative.  Skin: normal turgor, moist mucous membranes, no lesions/rashes noticed.  HEENT: ATNC, white sclera, normal conjunctiva.  Respiratory: Symmetric lung excursion, no accessory respiratory muscle use.  Abdomen: Non distended.  Neurological: awake, cooperative, follows commands, face is symmetric, equally moves all extremities, no dysmetria.  ASSESSMENT AND PLAN:                                                    Assessment: 58 year old female patient presents for follow-up of migraine, tension headaches, and cervicogenic headaches.  She reports inadequate control after switching to Nurtec.  Requested to add sumatriptan back.    Diagnoses:    ICD-10-CM    1. Migraine without aura and without status migrainosus, not intractable  G43.009       2. Cervicogenic headache  G44.86       3. Tension headache  G44.209         Plan:  There are no Patient Instructions on file for this visit.    Total Time: *** minutes spent on  the date of the encounter doing chart review, history and exam, documentation and further activities per the note.    Gelacio Campbell MD  [facility]  (Chart documentation was completed in part with Dragon voice-recognition software. Even though reviewed, some grammatical, spelling, and word errors may remain.)

## 2024-06-06 ENCOUNTER — OFFICE VISIT (OUTPATIENT)
Dept: NEUROLOGY | Facility: CLINIC | Age: 59
End: 2024-06-06
Payer: COMMERCIAL

## 2024-06-06 VITALS — HEART RATE: 76 BPM | DIASTOLIC BLOOD PRESSURE: 88 MMHG | OXYGEN SATURATION: 97 % | SYSTOLIC BLOOD PRESSURE: 129 MMHG

## 2024-06-06 DIAGNOSIS — G44.209 TENSION HEADACHE: ICD-10-CM

## 2024-06-06 DIAGNOSIS — G43.009 MIGRAINE WITHOUT AURA AND WITHOUT STATUS MIGRAINOSUS, NOT INTRACTABLE: ICD-10-CM

## 2024-06-06 DIAGNOSIS — G44.86 CERVICOGENIC HEADACHE: Primary | ICD-10-CM

## 2024-06-06 PROCEDURE — G2211 COMPLEX E/M VISIT ADD ON: HCPCS | Performed by: PSYCHIATRY & NEUROLOGY

## 2024-06-06 PROCEDURE — 99214 OFFICE O/P EST MOD 30 MIN: CPT | Performed by: PSYCHIATRY & NEUROLOGY

## 2024-06-06 RX ORDER — SUMATRIPTAN 50 MG/1
TABLET, FILM COATED ORAL
Qty: 18 TABLET | Refills: 3 | Status: SHIPPED | OUTPATIENT
Start: 2024-06-06 | End: 2024-08-29

## 2024-06-06 NOTE — PROGRESS NOTES
ESTABLISHED PATIENT NEUROLOGY NOTE    DATE OF VISIT: 6/6/2024  CLINIC LOCATION: [facility]  MRN: [id number]  PATIENT NAME: Catie Nuñez  YOB: 1965    REASON FOR VISIT: No chief complaint on file.    SUBJECTIVE:                                                      HISTORY OF PRESENT ILLNESS: Patient is here to follow up regarding multifactorial headaches.  She was last seen on 4/11/2024.  Please refer to my initial/other prior notes for further information.  Accompanied by significant other.    Since the last visit, the patient reports that her headaches are not adequately controlled after starting preventative Nurtec.  She reached out via My Chart at the end of May asking for sumatriptan, and 1 month supply was provided, but advised the patient to schedule a visit to discuss treatment options.    Today, the patient ***.  EXAM:                                                    Physical Exam:   Vitals: LMP 09/01/2016 (LMP Unknown)     General: pt is in NAD, cooperative.  Skin: normal turgor, moist mucous membranes, no lesions/rashes noticed.  HEENT: ATNC, white sclera, normal conjunctiva.  Respiratory: Symmetric lung excursion, no accessory respiratory muscle use.  Abdomen: Non distended.  Neurological: awake, cooperative, follows commands, face is symmetric, equally moves all extremities, no dysmetria.  ASSESSMENT AND PLAN:                                                    Assessment: 58 year old female patient presents for follow-up of migraine, tension headaches, and cervicogenic headaches.  She reports inadequate control after switching to Nurtec.  Requested to add sumatriptan back.    Diagnoses:    ICD-10-CM    1. Migraine without aura and without status migrainosus, not intractable  G43.009       2. Cervicogenic headache  G44.86       3. Tension headache  G44.209         Plan:  There are no Patient Instructions on file for this visit.    Total Time: *** minutes spent on  the date of the encounter doing chart review, history and exam, documentation and further activities per the note.    Gelacio Campbell MD  [facility]  (Chart documentation was completed in part with Dragon voice-recognition software. Even though reviewed, some grammatical, spelling, and word errors may remain.)

## 2024-06-06 NOTE — LETTER
6/6/2024      Catie Nuñez  [address]  [facility] [address]      Dear Colleague,    Thank you for referring your patient, Catie Nuñez, to the [facility] NEUROLOGY CLINICS [facility] Please see a copy of my visit note below.    ESTABLISHED PATIENT NEUROLOGY NOTE    DATE OF VISIT: 6/6/2024  CLINIC LOCATION: [facility]  MRN: [id number]  PATIENT NAME: Catie Nuñez  YOB: 1965    REASON FOR VISIT:   Chief Complaint   Patient presents with     RECHECK     Medication review, follow up migraine  Having nausea     SUBJECTIVE:                                                      HISTORY OF PRESENT ILLNESS: Patient is here to follow up regarding multifactorial headaches.  She was last seen on 4/11/2024.  Please refer to my initial/other prior notes for further information.  Accompanied by significant other and daughter.    Since the last visit, the patient reports that her headaches are not adequately controlled after starting preventative Nurtec.  She reached out via My Chart at the end of May asking for sumatriptan, and 1 month supply was provided, but I advised the patient to schedule a visit to discuss treatment options.  She stopped Nurtec.  She feels that sumatriptan is helping greatly.    Today, the patient reports that she feels that her majority of current headaches originate from the right side of the neck.  Occur daily.  She feels that Imitrex really helps.  She is also planning to have diagnostic nerve blocks and followed by neck ablation with the next appointment on Monday.  Previously was not able to tolerate gabapentin.  Also is on Effexor for unrelated reasons.  EXAM:                                                    Physical Exam:   Vitals: BP (!) 136/93 (BP Location: Left arm, Patient Position: Supine, Cuff Size: Adult Regular)   Pulse 88   LMP 09/01/2016 (LMP Unknown)   SpO2 100%     General: pt is in NAD, cooperative.  Skin: normal turgor, moist  mucous membranes, no lesions/rashes noticed.  HEENT: ATNC, white sclera, normal conjunctiva.  Respiratory: Symmetric lung excursion, no accessory respiratory muscle use.  Abdomen: Non distended.  Neurological: awake, cooperative, follows commands, face is symmetric, equally moves all extremities, no dysmetria.  ASSESSMENT AND PLAN:                                                    Assessment: 58 year old female patient presents for follow-up of migraine, tension headaches, and cervicogenic headaches.  She reports inadequate control after switching to Nurtec.  Requested to add sumatriptan back.  Today, we discussed various treatment options, including additional CGRP antagonists versus medication for nerve pain.  It sounds that now her cervicogenic headaches are the main issue that she is dealing with.  We discussed that we could try Lyrica or Cymbalta versus her dose of Effexor could be increased, but decided to hold off on medication changes.  I provided her refills of Imitrex until she gets the procedures done.  If pain persists, she will consider additional medication changes.    Kimmie to follow up with Primary Care provider regarding elevated blood pressure.     Diagnoses:    ICD-10-CM    1. Cervicogenic headache  G44.86       2. Migraine without aura and without status migrainosus, not intractable  G43.009 SUMAtriptan (IMITREX) 50 MG tablet      3. Tension headache  G44.209         Plan: At today's visit we thoroughly discussed current symptoms, diagnosis, available treatment options, and the plan.    We decided to stop Nurtec and continue Imitrex for now.  We will discuss additional treatment options if she continues to experience headaches following cervical nerve ablation.    Next follow-up appointment is in the next 2 months or earlier if needed.    Total Time: 23 minutes spent on the date of the encounter doing chart review, history and exam, documentation and further activities per the note.    Gelacio ESTEVEZ  MD Shannan  [facility]  (Chart documentation was completed in part with Dragon voice-recognition software. Even though reviewed, some grammatical, spelling, and word errors may remain.)      Again, thank you for allowing me to participate in the care of your patient.        Sincerely,        Gelacio Campbell MD

## 2024-06-06 NOTE — NURSING NOTE
"Catie Nuñez is a 58 year old female who presents for:  Chief Complaint   Patient presents with    RECHECK     Medication review, follow up migraine  Having nausea        Initial Vitals:  LMP 09/01/2016 (LMP Unknown)  Estimated body mass index is 19.13 kg/m  as calculated from the following:    Height as of 5/20/24: 1.6 m (5' 3\").    Weight as of 5/20/24: 49 kg (108 lb).. There is no height or weight on file to calculate BSA. BP completed using cuff size: regular    Penelope Higuera MA   "

## 2024-06-06 NOTE — PATIENT INSTRUCTIONS
AFTER VISIT SUMMARY (AVS):    At today's visit we thoroughly discussed current symptoms, diagnosis, available treatment options, and the plan.    We decided to stop Nurtec and continue Imitrex for now.  We will discuss additional treatment options if you continue to experience headaches following cervical nerve ablation.    Next follow-up appointment is in the next 2 months or earlier if needed.    Please do not hesitate to call me with any questions or concerns.    Thanks.

## 2024-06-06 NOTE — PROGRESS NOTES
ESTABLISHED PATIENT NEUROLOGY NOTE    DATE OF VISIT: 6/6/2024  CLINIC LOCATION: [facility]  MRN: [id number]  PATIENT NAME: Catie Nuñez  YOB: 1965    REASON FOR VISIT:   Chief Complaint   Patient presents with    RECHECK     Medication review, follow up migraine  Having nausea     SUBJECTIVE:                                                      HISTORY OF PRESENT ILLNESS: Patient is here to follow up regarding multifactorial headaches.  She was last seen on 4/11/2024.  Please refer to my initial/other prior notes for further information.  Accompanied by significant other and daughter.    Since the last visit, the patient reports that her headaches are not adequately controlled after starting preventative Nurtec.  She reached out via My Chart at the end of May asking for sumatriptan, and 1 month supply was provided, but I advised the patient to schedule a visit to discuss treatment options.  She stopped Nurtec.  She feels that sumatriptan is helping greatly.    Today, the patient reports that she feels that her majority of current headaches originate from the right side of the neck.  Occur daily.  She feels that Imitrex really helps.  She is also planning to have diagnostic nerve blocks and followed by neck ablation with the next appointment on Monday.  Previously was not able to tolerate gabapentin.  Also is on Effexor for unrelated reasons.  EXAM:                                                    Physical Exam:   Vitals: BP (!) 136/93 (BP Location: Left arm, Patient Position: Supine, Cuff Size: Adult Regular)   Pulse 88   LMP 09/01/2016 (LMP Unknown)   SpO2 100%     General: pt is in NAD, cooperative.  Skin: normal turgor, moist mucous membranes, no lesions/rashes noticed.  HEENT: ATNC, white sclera, normal conjunctiva.  Respiratory: Symmetric lung excursion, no accessory respiratory muscle use.  Abdomen: Non distended.  Neurological: awake, cooperative, follows commands,  face is symmetric, equally moves all extremities, no dysmetria.  ASSESSMENT AND PLAN:                                                    Assessment: 58 year old female patient presents for follow-up of migraine, tension headaches, and cervicogenic headaches.  She reports inadequate control after switching to Nurtec.  Requested to add sumatriptan back.  Today, we discussed various treatment options, including additional CGRP antagonists versus medication for nerve pain.  It sounds that now her cervicogenic headaches are the main issue that she is dealing with.  We discussed that we could try Lyrica or Cymbalta versus her dose of Effexor could be increased, but decided to hold off on medication changes.  I provided her refills of Imitrex until she gets the procedures done.  If pain persists, she will consider additional medication changes.    Kimmie to follow up with Primary Care provider regarding elevated blood pressure.     Diagnoses:    ICD-10-CM    1. Cervicogenic headache  G44.86       2. Migraine without aura and without status migrainosus, not intractable  G43.009 SUMAtriptan (IMITREX) 50 MG tablet      3. Tension headache  G44.209         Plan: At today's visit we thoroughly discussed current symptoms, diagnosis, available treatment options, and the plan.    We decided to stop Nurtec and continue Imitrex for now.  We will discuss additional treatment options if she continues to experience headaches following cervical nerve ablation.    Next follow-up appointment is in the next 2 months or earlier if needed.    Total Time: 23 minutes spent on the date of the encounter doing chart review, history and exam, documentation and further activities per the note.    Gelacio Campbell MD  [facility] Neurology  (Chart documentation was completed in part with Dragon voice-recognition software. Even though reviewed, some grammatical, spelling, and word errors may remain.)

## 2024-06-10 ENCOUNTER — ANCILLARY PROCEDURE (OUTPATIENT)
Dept: RADIOLOGY | Facility: AMBULATORY SURGERY CENTER | Age: 59
End: 2024-06-10
Attending: PHYSICAL MEDICINE & REHABILITATION
Payer: COMMERCIAL

## 2024-06-10 ENCOUNTER — HOSPITAL ENCOUNTER (OUTPATIENT)
Facility: AMBULATORY SURGERY CENTER | Age: 59
Discharge: HOME | End: 2024-06-10
Attending: PHYSICAL MEDICINE & REHABILITATION | Admitting: PHYSICAL MEDICINE & REHABILITATION
Payer: COMMERCIAL

## 2024-06-10 VITALS
TEMPERATURE: 98.1 F | OXYGEN SATURATION: 98 % | SYSTOLIC BLOOD PRESSURE: 136 MMHG | DIASTOLIC BLOOD PRESSURE: 94 MMHG | RESPIRATION RATE: 16 BRPM

## 2024-06-10 DIAGNOSIS — R52 PAIN: ICD-10-CM

## 2024-06-10 PROCEDURE — 64490 INJ PARAVERT F JNT C/T 1 LEV: CPT | Mod: RT,KX

## 2024-06-10 RX ORDER — BUPIVACAINE HYDROCHLORIDE 2.5 MG/ML
INJECTION, SOLUTION INFILTRATION; PERINEURAL DAILY PRN
Status: DISCONTINUED | OUTPATIENT
Start: 2024-06-10 | End: 2024-06-10 | Stop reason: HOSPADM

## 2024-06-10 RX ORDER — IOPAMIDOL 408 MG/ML
INJECTION, SOLUTION INTRATHECAL DAILY PRN
Status: DISCONTINUED | OUTPATIENT
Start: 2024-06-10 | End: 2024-06-10 | Stop reason: HOSPADM

## 2024-06-10 RX ORDER — LIDOCAINE HYDROCHLORIDE 10 MG/ML
INJECTION, SOLUTION EPIDURAL; INFILTRATION; INTRACAUDAL; PERINEURAL DAILY PRN
Status: DISCONTINUED | OUTPATIENT
Start: 2024-06-10 | End: 2024-06-10 | Stop reason: HOSPADM

## 2024-06-10 NOTE — DISCHARGE INSTRUCTIONS
Home Care Instructions after a Medial Branch Block      In a medial branch block, a local anesthetic (numbing medicine) is injected near the medial branch nerve. This stops the transmission of pain signals from the facet joint. If this reduces your pain and improves your mobility, it may tell the doctor which facet joint is causing the pain. This procedure is a diagnostic procedure and is typically short lasting, with intentions of doing a radiofrequency ablation. With this injection, a steroid to increase the longevity of the blocks effect is rarely used.    Activity  -You may resume most normal activity levels with the exception of strenuous activity. It is important for us to know if your pain with normal activity is relieved after this injection.  -DO NOT shower for 24 hours  -DO NOT remove bandaid for 24 hours    Pain  -You may experience soreness at the injection site for one or two days  -You may use an ice pack for 20 minutes every 2 hours for the first 24 hours  -You may use a heating pad after the first 24 hours  -You may use Tylenol (acetaminophen) every 4 hours or other pain medicines as     directed by your physician    You may experience numbness radiating into your legs or arms (depending on the procedure location). This numbness may last several hours. Until sensation returns to normal; please use caution in walking, climbing stairs, and stepping out of your vehicle, etc.        PLEASE KEEP TRACK OF YOUR SYMPTOMS AND NOTE YOUR IMPROVEMENT FOR YOUR DOCTOR.     Fill out the pain diary and fax it back to us. You do not need to call us if the pain diary was faxed. [phone] is the FAX number  If you do not have access to a fax machine, attach it to [employer].  You do not need to call us if the pain diary was attached to [employer].   If you cannot fax or send via [employer], then call our call center and request to speak with a nurse for follow up after a procedure       Please contact us if you  have:  -Severe pain  -Fever more than 101.5 degrees Fahrenheit  -Signs of infection at the injection site (redness, swelling, or drainage)    FOR PM&R PATIENTS:  For patients seen by the PM and R service, please call [phone]. (Monday through Friday 8a-5p).  After business hours and weekends call [phone] and ask for the PM and R doctor on call). If you need to fax a pain diary to PM&R the fax number is [phone]. If you are unable to fax, uploading to [employer] is encouraged, then send to provider. If you have procedure scheduling questions please call [phone] Option #2.

## 2024-06-10 NOTE — OP NOTE
"  Pre-procedure Diagnoses   Cervical spondylosis without myelopathy [M47.812]   Post-procedure Diagnoses   Cervical spondylosis without myelopathy [M47.812]   Procedures   NC FACET/MEDIAL BRANCH INJ CERV/THOR 1ST W FLUORO [[id number]]   NC FACET/MEDIAL BRANCH INJ CERV/THOR 2ND W FLUORO [[id number]]   NC FACET/MEDIAL BRANCH INJ CERV/THOR 3RD & ADDL LVLS W FLUORO [[id number]]                  [address] [facility]  [address] [facility] [address]  [phone]  6/10/2024      Procedure Note:     The patient is here undergoing right cervical medial branch blocks from C2-C5 including the third occipital nerve, for chronic headaches with neck and shoulder pain.     After informed consent, the patient was taken to the fluoroscopic suite and placed prone on the table.  The area was prepped and draped in a sterile fashion.  Timeout was carried out identifying the site and the sites.  Using 1% lidocaine topical anesthesia was obtained.  Using fluoroscopy the joint line of C2-C3,  waist of C3, C4 and C5 facets on the right side were identified and marked.  Using 25-gauge 3.5\" needle the above spots were accessed to target the third occipital nerve as well as the medial branches for C2-3, C3- C4 and C4 C5..     Using Isovue-200 for contrast, there was no spread intravascularly.  Using 0.25% Marcaine 0.5 cc was injected at each levels and an additional 0.5 cc was injected as the needle was withdrawn.  She tolerated the procedure well.  Her pain improved immediately post injection from 4/10 to 0/10.  There was no change in strength or sensation. Able to move better. She will keep a pain diary update my clinic.     Thank you much problem with assist in his care.              "

## 2024-06-13 ENCOUNTER — TELEPHONE (OUTPATIENT)
Dept: PHYSICAL MEDICINE AND REHAB | Facility: CLINIC | Age: 59
End: 2024-06-13
Payer: COMMERCIAL

## 2024-06-13 DIAGNOSIS — M47.12 CERVICAL SPONDYLOSIS WITH MYELOPATHY: Primary | ICD-10-CM

## 2024-06-13 NOTE — TELEPHONE ENCOUNTER
Called patient to schedule procedure with Dr. Goodwin    Date of Procedure: 6/24/24    Arrival time given: Yes: Arrival Time 12pm       Procedure Location: [facility]     Verified Location with Patient:  Yes  Address provided to the patient    Pre-op H&P Required:  No: Moderate Sedation       Post-Op/Follow Up Appt:  Not Indicated in Request      Informed patient they will need a  to drive them home:  Yes    Patients : Spouse     Patient is aware that pre-op RN from the procedure center will call 2-3 days prior to scheduled procedure to confirm arrival time and review any instructions:  Yes       Additional Comments: N/A        Tiffanie Lr MA on 6/13/2024 at 3:29 PM      P: [phone]*

## 2024-06-24 ENCOUNTER — ANCILLARY PROCEDURE (OUTPATIENT)
Dept: RADIOLOGY | Facility: AMBULATORY SURGERY CENTER | Age: 59
End: 2024-06-24
Attending: PHYSICAL MEDICINE & REHABILITATION
Payer: COMMERCIAL

## 2024-06-24 ENCOUNTER — DOCUMENTATION ONLY (OUTPATIENT)
Dept: PHYSICAL MEDICINE AND REHAB | Facility: CLINIC | Age: 59
End: 2024-06-24

## 2024-06-24 ENCOUNTER — HOSPITAL ENCOUNTER (OUTPATIENT)
Facility: AMBULATORY SURGERY CENTER | Age: 59
Discharge: HOME | End: 2024-06-24
Attending: PHYSICAL MEDICINE & REHABILITATION | Admitting: PHYSICAL MEDICINE & REHABILITATION
Payer: COMMERCIAL

## 2024-06-24 VITALS
TEMPERATURE: 98.3 F | HEIGHT: 63 IN | BODY MASS INDEX: 19.14 KG/M2 | RESPIRATION RATE: 16 BRPM | HEART RATE: 86 BPM | OXYGEN SATURATION: 97 % | SYSTOLIC BLOOD PRESSURE: 115 MMHG | WEIGHT: 108 LBS | DIASTOLIC BLOOD PRESSURE: 78 MMHG

## 2024-06-24 DIAGNOSIS — M47.12 CERVICAL SPONDYLOSIS WITH MYELOPATHY: ICD-10-CM

## 2024-06-24 PROCEDURE — 64633 DESTROY CERV/THOR FACET JNT: CPT | Mod: RT

## 2024-06-24 RX ORDER — FENTANYL CITRATE 50 UG/ML
INJECTION, SOLUTION INTRAMUSCULAR; INTRAVENOUS PRN
Status: DISCONTINUED | OUTPATIENT
Start: 2024-06-24 | End: 2024-06-24 | Stop reason: HOSPADM

## 2024-06-24 RX ORDER — BUPIVACAINE HYDROCHLORIDE 2.5 MG/ML
INJECTION, SOLUTION INFILTRATION; PERINEURAL PRN
Status: DISCONTINUED | OUTPATIENT
Start: 2024-06-24 | End: 2024-06-24 | Stop reason: HOSPADM

## 2024-06-24 RX ORDER — BETAMETHASONE SODIUM PHOSPHATE AND BETAMETHASONE ACETATE 3; 3 MG/ML; MG/ML
INJECTION, SUSPENSION INTRA-ARTICULAR; INTRALESIONAL; INTRAMUSCULAR; SOFT TISSUE PRN
Status: DISCONTINUED | OUTPATIENT
Start: 2024-06-24 | End: 2024-06-24 | Stop reason: HOSPADM

## 2024-06-24 RX ORDER — LIDOCAINE HYDROCHLORIDE 10 MG/ML
INJECTION, SOLUTION EPIDURAL; INFILTRATION; INTRACAUDAL; PERINEURAL PRN
Status: DISCONTINUED | OUTPATIENT
Start: 2024-06-24 | End: 2024-06-24 | Stop reason: HOSPADM

## 2024-06-24 NOTE — OP NOTE
Procedure(s): DSTR NROLYTC AGNT PARVERTEB FCT SNGL CRVCL/THORA; DSTR NROLYTC AGNT PARVERTEB FCT ADDL CRVCL/THORA; DSTR NROLYTC AGNT PARVERTEB FCT ADDL CRVCL/THORA; INJECT RX OTHER PERIPH NERVE; CT MOD SED SAME PHYS/QHP INITIAL 15 MINS 5/> YRS; CT MOD SED SAME PHYS/QHP EACH ADDL 15 MINS    Pre-Procedure Diagnose(s): Cervical spondylosis without myelopathy; Cervico-occipital neuralgia of RIGHT side    Post-Procedure Diagnose(s): Cervical spondylosis without myelopathy; Cervico-occipital neuralgia of RIGHT side       PROCEDURE NOTE   6/24/2024     Chief Complaint: Cervicalgia, headaches, here for RFA of C2,3,4,5 and Third Occipital Nerve, on the right side  Patient is well known to the service from her previous evaluation and  treatment. She was previously seen by me for cervical injections for her ongoing  pain issues. She has had previous right third occipital nerve and C2,3,4 and 5 medial branch blocks. This gave her relief with both initial and confirmatory blocks, but the pain returned. It is therefore felt treating this She is here for undergoing  radiofrequency ablations at right C2,3,4,5 medial branches and third occipital nerve.     The benefits and risks of the procedure were reviewed with the patient. She was an appropriatecandidate for moderate IV sedation. She was taken to the fluoroscopic suiteand placed prone on the table. Universal protocol was followed. TIME OUT conducted just prior to starting procedure confirmed patient identity, site/side, procedure, patient position, and availability of correct equipment and implants. Yes     The area was prepped with Cloralprep and draped in a sterile  fashion. Her vitals including blood pressure, pulse oximeter and O2 sats  were monitored and he had oxygen given via nasal cannula. Her vitals  remained stable. She received 1 mg of versed and 50 mcg of Fentanyl.  Topical anesthesia was obtained with a combination of Marcaine 0.25% and  lidocaine 1% Using  Fluoroscopy, Radiofrequency probes 10 cm long, 100 mm tip were then placed at the concavity of C2,C3,C4 and C5, as well as the convexity of the C2-3 joint on the right side, to access the Medial brances of C2,3,4,5 and Third occipital nerves respectively.  This was confirmed with AP and Lateral views. Stimulation was then carried out at 2 hertz from 0 to 2 volts. No spread occurred beyond the local area. None went to the extremities or the face. Lesioning was then carried  out at 80 degrees C for 75 seconds.     She tolerated theprocedure well. No further medications were needed. After the lesioning a mixture comprised of 1 cc of Celestone and 3 cc of the Marcaine-lidocaine  mixture were utilized to inject the 4 different areas.     Sedation time was from 1247 to 1310 She remained stable throughout and   was observed by a registered nurse, who was present for that purpose.     I have asked her to ice the regions for the next day or 2 and to anticipate  some discomfort for the next few weeks as the lesioning heals.      Thank you very much for allowing me to assist with her care. If you have any  further questions, please feel free to contact me.      Rajesh Goodwin MD

## 2024-06-24 NOTE — PROGRESS NOTES
I am on call tonight and received a page to call Kimmie back. She noted that she has some severe deep soreness in her neck (right middle area) since the procedure that has not responded to ice, tramadol and tylenol. Denied any other associated symptoms.     Connect with Dr. Goodwin and he recommended ongoing use of ice along with tylenol and tramadol. No other recs or changes in meds. Called Kimmie back and reviewed the plan. Recommended to send a Car reviewst message to our nursing team if the pain doesn't get better by tomorrow. She agreed with the plan.       Adelina Tsai MD  Physical Medicine & Rehabilitation

## 2024-06-24 NOTE — OR NURSING
Requested order to place IV.  Dr. Goodwin declined to place order stating that since the procedure is scheduled with sedation it is implied that an IV should be placed and therefore does not require an order.

## 2024-06-24 NOTE — DISCHARGE INSTRUCTIONS
Home Care Instructions after a Radio Frequency Ablation      Activity  -Rest today  -Do not work today  -Resume normal activity in 2-3 days  -No heavy lifting, turning or twisting for 24 hours  -DO NOT shower or soak in tub for 24 hours  -DO NOT remove bandaid for 24 hours    Pain  -You may experience soreness at the injection site for one or two days  -You may use an ice pack for 20 minutes every 2 hours for the first 24 hours, longer if needed for comfort, do not use heat  -You may use Tylenol (acetaminophen) every 4 hours or other pain medicines as directed by your physician  -If other pain medications are prescribed by your physician, please follow dosing instructions carefully.    What to expect  You may experience numbness radiating into your legs or arms (depending on the procedure location). This numbness may last several hours. Until sensation returns to normal, please use caution in walking, climbing stairs, and stepping out of your vehicle, etc. Relief of your initial symptoms can take up to 4 weeks to feel better, this is normal and due to the healing process. The procedure site may feel like a deep burn. Using ice will greatly minimize this discomfort. Do not use numbing creams or patches over your injection sites immediately following your procedure. Please keep injection sites covered, clean and dry for 24 hours.    DID YOU RECEIVE SEDATION TODAY?  Yes    Safety  Sedation medicine, if given, may remain active for many hours. It is important for the next 24 hours that you do not:  -Drive a car  -Operate machines or power tools  -Consume alcohol, including beer  -Sign any important papers or legal documents  -Please have a responsible adult with you for 24 hours following any sedation    DID YOU RECEIVE STEROIDS TODAY?  Yes    Common side effects of steroids:  Not everyone will experience corticosteroid side effects. If side effects are experienced, they will gradually subside in the 7-10 day period  following an injection. Most common side effects include:  -Flushed face and/or chest  -Feeling of warmth, particularly in the face but could be an overall feeling of warmth  -Increased blood sugar in diabetic patients  -Menstrual irregularities my occur. If taking hormone-based birth control an alternate method of birth control is recommended  -Sleep disturbances and/or mood swings are possible  -Leg cramps    Please contact us if you have:  -Severe pain  -Fever more than 101.5 degrees Fahrenheit  -Signs of infection at the injection site (redness, swelling, or drainage)    FOR PM&R PATIENTS:  For patients seen by the PM and R service, please call [phone]. (Monday through Friday 8a-5p.  After business hours and weekends call [phone] and ask for the PM and R doctor on call). If you need to fax a pain diary to PM&R the fax number is [phone]. If you are unable to fax, uploading to [employer] is encouraged, then send to provider. If you have procedure scheduling questions please call [phone] Option #2.

## 2024-07-01 ENCOUNTER — NURSE TRIAGE (OUTPATIENT)
Dept: NURSING | Facility: CLINIC | Age: 59
End: 2024-07-01
Payer: COMMERCIAL

## 2024-07-01 NOTE — TELEPHONE ENCOUNTER
Information only: red flag triage. Hx 6/24/24 DESTRUCTION, NERVE, FACET JOINT, CERVICOTHORACIC, C2-C5 RIGHT w  Rajesh Goodwin MD .  Pt reports pain decreased after 1st few days to 4/10, Pain now still 4/10 base of skull to neck. Used ice, gentle massage by . Tramadol for other issues not helping. Has been resting, little activity, but is moving. 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 Stops when pain worsens, Eating,drinking. Nausea persists, nerve related.Everyday. Imitrex  ( neurology)Is only thing that  that relieves  nausea.Uses [facility] [employer] [address] Please call pt .    YANIRA Bailey tele triage Post op problems. Pg [phone].

## 2024-07-01 NOTE — TELEPHONE ENCOUNTER
Reviewed with Dr Horta and he would like the patient to get in to see him in clinic for an evaluation.     Spoke with patient and she is in agreement with this plan, states she can see him at [place] any of the days that work this week.     Routed to Clinic Navigator to assist with getting patient scheduled for that follow up visit with Dr. Goodwin.      ABIOLA SteenN RN  RN Care Coordinator for Physical Medicine and Rehabilitation  [facility] [place] - [address] [address]  Office: [phone]  Fax: [phone]

## 2024-07-02 ENCOUNTER — OFFICE VISIT (OUTPATIENT)
Dept: PHYSICAL MEDICINE AND REHAB | Facility: CLINIC | Age: 59
End: 2024-07-02
Payer: COMMERCIAL

## 2024-07-02 ENCOUNTER — MYC MEDICAL ADVICE (OUTPATIENT)
Dept: PHYSICAL MEDICINE AND REHAB | Facility: CLINIC | Age: 59
End: 2024-07-02

## 2024-07-02 DIAGNOSIS — M47.12 CERVICAL SPONDYLOSIS WITH MYELOPATHY: ICD-10-CM

## 2024-07-02 DIAGNOSIS — G89.29 OTHER CHRONIC PAIN: ICD-10-CM

## 2024-07-02 DIAGNOSIS — M79.18 MYALGIA, OTHER SITE: ICD-10-CM

## 2024-07-02 DIAGNOSIS — M54.81 CERVICO-OCCIPITAL NEURALGIA OF RIGHT SIDE: Primary | ICD-10-CM

## 2024-07-02 PROCEDURE — 64450 NJX AA&/STRD OTHER PN/BRANCH: CPT | Mod: 79 | Performed by: PHYSICAL MEDICINE & REHABILITATION

## 2024-07-02 PROCEDURE — 99213 OFFICE O/P EST LOW 20 MIN: CPT | Mod: 24 | Performed by: PHYSICAL MEDICINE & REHABILITATION

## 2024-07-02 PROCEDURE — 64405 NJX AA&/STRD GR OCPL NRV: CPT | Mod: 79 | Performed by: PHYSICAL MEDICINE & REHABILITATION

## 2024-07-02 PROCEDURE — 20552 NJX 1/MLT TRIGGER POINT 1/2: CPT | Mod: 79 | Performed by: PHYSICAL MEDICINE & REHABILITATION

## 2024-07-02 RX ORDER — BETAMETHASONE SODIUM PHOSPHATE AND BETAMETHASONE ACETATE 3; 3 MG/ML; MG/ML
6 INJECTION, SUSPENSION INTRA-ARTICULAR; INTRALESIONAL; INTRAMUSCULAR; SOFT TISSUE ONCE
Status: COMPLETED | OUTPATIENT
Start: 2024-07-02 | End: 2024-07-02

## 2024-07-02 RX ORDER — BUPIVACAINE HYDROCHLORIDE 5 MG/ML
4 INJECTION, SOLUTION EPIDURAL; INTRACAUDAL ONCE
Status: COMPLETED | OUTPATIENT
Start: 2024-07-02 | End: 2024-07-02

## 2024-07-02 RX ADMIN — BETAMETHASONE SODIUM PHOSPHATE AND BETAMETHASONE ACETATE 6 MG: 3; 3 INJECTION, SUSPENSION INTRA-ARTICULAR; INTRALESIONAL; INTRAMUSCULAR; SOFT TISSUE at 15:22

## 2024-07-02 RX ADMIN — BUPIVACAINE HYDROCHLORIDE 20 MG: 5 INJECTION, SOLUTION EPIDURAL; INTRACAUDAL at 15:23

## 2024-07-02 ASSESSMENT — PAIN SCALES - GENERAL: PAINLEVEL: MODERATE PAIN (5)

## 2024-07-02 NOTE — TELEPHONE ENCOUNTER
Patient is scheduled for a visit today with Dr. Goodwin, per Dr. Goodwin's request.    Kera Martínez, ABIOLAN RN  RN Care Coordinator for Physical Medicine and Rehabilitation  [facility] [address] [address]  Office: [phone]  Fax: [phone]

## 2024-07-02 NOTE — TELEPHONE ENCOUNTER
Patient was called back and scheduled for a visit to get in to see Dr. Goodwin, per provider request. She was able to get an appt today.     Kera Martínez, ABIOLAN RN  RN Care Coordinator for Physical Medicine and Rehabilitation  [facility] [address] [address]  Office: [phone]  Fax: [phone]
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
## 2024-07-02 NOTE — TELEPHONE ENCOUNTER
Pt called and she is scheduled for an appointment tomorrow at 3:30.  Pt said that she needs someone to call her back to tell her what she is supposed to do about the pain until tomorrow.  She said that she needs a pain medication to get her by until tomorrow.  Pt is requesting a phone call back.  Thanks.
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
## 2024-07-03 DIAGNOSIS — J30.2 ACUTE SEASONAL ALLERGIC RHINITIS: ICD-10-CM

## 2024-07-05 RX ORDER — FLUTICASONE PROPIONATE 50 MCG
SPRAY, SUSPENSION (ML) NASAL
Qty: 16 G | Refills: 5 | Status: SHIPPED | OUTPATIENT
Start: 2024-07-05

## 2024-07-23 ENCOUNTER — OFFICE VISIT (OUTPATIENT)
Dept: PHYSICAL MEDICINE AND REHAB | Facility: CLINIC | Age: 59
End: 2024-07-23
Payer: COMMERCIAL

## 2024-07-23 VITALS — DIASTOLIC BLOOD PRESSURE: 75 MMHG | SYSTOLIC BLOOD PRESSURE: 117 MMHG | OXYGEN SATURATION: 97 % | HEART RATE: 70 BPM

## 2024-07-23 DIAGNOSIS — M79.18 MYALGIA, OTHER SITE: ICD-10-CM

## 2024-07-23 DIAGNOSIS — M47.812 CERVICAL SPONDYLOSIS WITHOUT MYELOPATHY: Primary | ICD-10-CM

## 2024-07-23 DIAGNOSIS — G89.29 OTHER CHRONIC PAIN: ICD-10-CM

## 2024-07-23 PROCEDURE — 99214 OFFICE O/P EST MOD 30 MIN: CPT | Performed by: PHYSICAL MEDICINE & REHABILITATION

## 2024-07-23 RX ORDER — TIZANIDINE 2 MG/1
2 TABLET ORAL 3 TIMES DAILY PRN
Qty: 90 TABLET | Refills: 1 | Status: SHIPPED | OUTPATIENT
Start: 2024-07-23 | End: 2024-07-23

## 2024-07-23 RX ORDER — TIZANIDINE 2 MG/1
2 TABLET ORAL 3 TIMES DAILY PRN
Qty: 60 TABLET | Refills: 1 | Status: SHIPPED | OUTPATIENT
Start: 2024-07-23

## 2024-07-23 NOTE — NURSING NOTE
Catie Nuñez is a 59 year old female who presents for:  Chief Complaint   Patient presents with    Follow Up     2 week follow up TPI       Initial Vitals: /75 (BP Location: Right arm, Patient Position: Supine, Cuff Size: Adult Regular)   Pulse 70   LMP 09/01/2016 (LMP Unknown)   SpO2 97%   BP cuff size: regular    Data Unavailable    Penelope Higuera MA
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
## 2024-08-06 ENCOUNTER — PATIENT OUTREACH (OUTPATIENT)
Dept: CARE COORDINATION | Facility: CLINIC | Age: 59
End: 2024-08-06
Payer: COMMERCIAL

## 2024-08-13 ENCOUNTER — PATIENT OUTREACH (OUTPATIENT)
Dept: CARE COORDINATION | Facility: CLINIC | Age: 59
End: 2024-08-13
Payer: COMMERCIAL

## 2024-08-20 ENCOUNTER — VIRTUAL VISIT (OUTPATIENT)
Dept: FAMILY MEDICINE | Facility: CLINIC | Age: 59
End: 2024-08-20
Payer: COMMERCIAL

## 2024-08-20 DIAGNOSIS — F41.1 GENERALIZED ANXIETY DISORDER: ICD-10-CM

## 2024-08-20 DIAGNOSIS — G89.4 CHRONIC PAIN SYNDROME: ICD-10-CM

## 2024-08-20 DIAGNOSIS — M54.2 CERVICALGIA: ICD-10-CM

## 2024-08-20 DIAGNOSIS — F11.90 CHRONIC, CONTINUOUS USE OF OPIOIDS: Primary | ICD-10-CM

## 2024-08-20 DIAGNOSIS — Z12.11 SCREENING FOR MALIGNANT NEOPLASM OF COLON: ICD-10-CM

## 2024-08-20 DIAGNOSIS — Z12.31 ENCOUNTER FOR SCREENING MAMMOGRAM FOR BREAST CANCER: ICD-10-CM

## 2024-08-20 PROCEDURE — 99214 OFFICE O/P EST MOD 30 MIN: CPT | Mod: 95 | Performed by: FAMILY MEDICINE

## 2024-08-20 RX ORDER — ALPRAZOLAM 0.5 MG
TABLET ORAL
Qty: 20 TABLET | Refills: 2 | Status: CANCELLED | OUTPATIENT
Start: 2024-08-20

## 2024-08-20 RX ORDER — TRAMADOL HYDROCHLORIDE 50 MG/1
50 TABLET ORAL 2 TIMES DAILY PRN
Qty: 50 TABLET | Refills: 2 | Status: CANCELLED | OUTPATIENT
Start: 2024-08-20

## 2024-08-20 ASSESSMENT — ANXIETY QUESTIONNAIRES
3. WORRYING TOO MUCH ABOUT DIFFERENT THINGS: MORE THAN HALF THE DAYS
GAD7 TOTAL SCORE: 10
GAD7 TOTAL SCORE: 10
4. TROUBLE RELAXING: SEVERAL DAYS
7. FEELING AFRAID AS IF SOMETHING AWFUL MIGHT HAPPEN: SEVERAL DAYS
GAD7 TOTAL SCORE: 10
IF YOU CHECKED OFF ANY PROBLEMS ON THIS QUESTIONNAIRE, HOW DIFFICULT HAVE THESE PROBLEMS MADE IT FOR YOU TO DO YOUR WORK, TAKE CARE OF THINGS AT HOME, OR GET ALONG WITH OTHER PEOPLE: SOMEWHAT DIFFICULT
6. BECOMING EASILY ANNOYED OR IRRITABLE: SEVERAL DAYS
1. FEELING NERVOUS, ANXIOUS, OR ON EDGE: NEARLY EVERY DAY
7. FEELING AFRAID AS IF SOMETHING AWFUL MIGHT HAPPEN: SEVERAL DAYS
2. NOT BEING ABLE TO STOP OR CONTROL WORRYING: MORE THAN HALF THE DAYS
8. IF YOU CHECKED OFF ANY PROBLEMS, HOW DIFFICULT HAVE THESE MADE IT FOR YOU TO DO YOUR WORK, TAKE CARE OF THINGS AT HOME, OR GET ALONG WITH OTHER PEOPLE?: SOMEWHAT DIFFICULT
5. BEING SO RESTLESS THAT IT IS HARD TO SIT STILL: NOT AT ALL

## 2024-08-20 NOTE — PROGRESS NOTES
Kimmie is a 59 year old who is being evaluated via a billable video visit.    How would you like to obtain your AVS? MyChart  If the video visit is dropped, the invitation should be resent by: Text to cell phone: [phone]  Will anyone else be joining your video visit? No      Assessment & Plan     Chronic, continuous use of opioids  Chronic pain syndrome  Cervicalgia  -Still using tramadol regularly. PM&R note reported improving symptoms and pt weaning her tramadol, this does not seem to be the case. Pt with high anxiety, not interested in reducing her tramadol dosage.   - traMADol (ULTRAM) 50 MG tablet  Dispense: 50 tablet; Refill: 1      Generalized anxiety disorder  -Pt using xanax for general anxiety once a day most days. She has never described her acute anxiety to be in there realm of a panic attack. We have discussed on several occasions that xanax should only be use for panic attacks and we should work on getting her off it and work on safer long-term med for anxiety. She is currently on venlafaxine but attempt to increase dosage was met with increased anxiety and resistance prior. She has never met with psychiatry, I recommended that she does to find better safer med options for her anxiety.     As we discussed prior, I will be reducing her xanax supply to 15 tabs per month. Patient aware of the dangers of using both an opioid pain medication and benzo, I am uncomfortably with continuing to prescribe both to her. I feel at this point that we have conflicting goals of care and that she would be best served by another provider. She is welcome to continue with me but I expressed to patient that it may be a good idea to establish with a new provider, one that she may have a more therapeutic relationship with.  -Advised patient that next appt needs to be in person, she is overdue for CSA renewal and UDS  - ALPRAZolam (XANAX) 0.5 MG tablet  Dispense: 15 tablet; Refill: 1  - [facility]  "Referral      Screening for malignant neoplasm of colon  - Fecal colorectal cancer screen FIT    Encounter for screening mammogram for breast cancer  - MA Screening Bilateral w/ [place]                  Subjective   Kimmie is a 59 year old, presenting for the following health issues:  Recheck Medication      8/20/2024     4:20 PM   Additional Questions   Roomed by Karen Cueva Start Time: 5:03 PM    History of Present Illness       Back Pain:  She presents for follow up of back pain. Patient's back pain is a chronic problem.  Location of back pain:  Right upper back, right side of neck and right shoulder  Description of back pain: burning, dull ache, fullness and gnawing  Back pain spreads: right shoulder and right side of neck    Since patient first noticed back pain, pain is: unchanged  Does back pain interfere with her job:  Yes       Mental Health Follow-up:  Patient presents to follow-up on Anxiety.    Patient's anxiety since last visit has been:  No change  The patient is having other symptoms associated with anxiety.  Any significant life events: health concerns  Patient is feeling anxious or having panic attacks.  Patient has no concerns about alcohol or drug use.    Headaches:   Since the patient's last clinic visit, headaches are: worsened  The patient is getting headaches:  Multiple times a month  She is not able to do normal daily activities when she has a migraine.  The patient is taking the following rescue/relief medications:  Excedrin and sumatriptan (Imitrex)   Patient states \"I get some relief\" from the rescue/relief medications.   The patient is taking the following medications to prevent migraines:  No medications to prevent migraines  In the past 4 weeks, the patient has gone to an Urgent Care or Emergency Room 0 times times due to headaches.    She eats 2-3 servings of fruits and vegetables daily.She consumes 0 sweetened beverage(s) daily.She exercises with enough effort to increase her heart " "rate 10 to 19 minutes per day.  She exercises with enough effort to increase her heart rate 3 or less days per week.   She is taking medications regularly.    Patient with hx of chronic pain, migraines and anxiety.    Using Xanax about 0.5 mg daily for anxiety. Has not tried reducing use.   Still having migraines, increases anxiety. Has appt with her neurologist next week.    Using tramadol helping with residual muscle pain. Reports still having significant cervical pain even after her procedure, despite what her specialist notes.                 Objective    Vitals - Patient Reported  Weight (Patient Reported): 49 kg (108 lb)  Height (Patient Reported): 161.3 cm (5' 3.5\")  BMI (Based on Pt Reported Ht/Wt): 18.83  Pain Score: Moderate Pain (4)  Pain Loc: Upper Back        Physical Exam   GENERAL: alert and no distress  EYES: Eyes grossly normal to inspection.  No discharge or erythema, or obvious scleral/conjunctival abnormalities.  RESP: No audible wheeze, cough, or visible cyanosis.    SKIN: Visible skin clear. No significant rash, abnormal pigmentation or lesions.  NEURO: Cranial nerves grossly intact.  Mentation and speech appropriate for age.  PSYCH: Appropriate affect, tone, and pace of words          Video-Visit Details    Type of service:  Video Visit   Video End Time:5:13 PM  Originating Location (pt. Location): Home    Distant Location (provider location):  On-site  Platform used for Video Visit: Rafael  Signed Electronically by: Iglesia Martinez DO    "

## 2024-08-21 RX ORDER — TRAMADOL HYDROCHLORIDE 50 MG/1
50 TABLET ORAL 2 TIMES DAILY PRN
Qty: 50 TABLET | Refills: 1 | Status: SHIPPED | OUTPATIENT
Start: 2024-08-31 | End: 2024-09-19

## 2024-08-21 RX ORDER — ALPRAZOLAM 0.5 MG
TABLET ORAL
Qty: 15 TABLET | Refills: 1 | Status: SHIPPED | OUTPATIENT
Start: 2024-08-31 | End: 2024-09-19

## 2024-08-27 DIAGNOSIS — N95.1 SYMPTOMATIC MENOPAUSAL OR FEMALE CLIMACTERIC STATES: ICD-10-CM

## 2024-08-27 DIAGNOSIS — F33.0 MAJOR DEPRESSIVE DISORDER, RECURRENT EPISODE, MILD (H): ICD-10-CM

## 2024-08-27 DIAGNOSIS — F41.1 GENERALIZED ANXIETY DISORDER: ICD-10-CM

## 2024-08-28 ENCOUNTER — TELEPHONE (OUTPATIENT)
Dept: OBGYN | Facility: CLINIC | Age: 59
End: 2024-08-28
Payer: COMMERCIAL

## 2024-08-28 RX ORDER — VENLAFAXINE HYDROCHLORIDE 75 MG/1
75 CAPSULE, EXTENDED RELEASE ORAL DAILY
Qty: 90 CAPSULE | Refills: 0 | Status: SHIPPED | OUTPATIENT
Start: 2024-08-28

## 2024-08-28 RX ORDER — ESTRADIOL 1 MG/1
0.5 TABLET ORAL DAILY
Qty: 90 TABLET | Refills: 0 | Status: SHIPPED | OUTPATIENT
Start: 2024-08-28

## 2024-08-28 RX ORDER — MEDROXYPROGESTERONE ACETATE 2.5 MG/1
2.5 TABLET ORAL DAILY
Qty: 90 TABLET | Refills: 0 | Status: SHIPPED | OUTPATIENT
Start: 2024-08-28

## 2024-08-28 NOTE — TELEPHONE ENCOUNTER
Health Call Center    Phone Message    May a detailed message be left on voicemail: yes     Reason for Call: Medication Refill Request    Has the patient contacted the pharmacy for the refill? Yes   Name of medication being requested: estradiol (ESTRACE) 1 MG tablet  medroxyPROGESTERone (PROVERA) 2.5 MG tablet  Provider who prescribed the medication: Iglesia Martinez DO  Pharmacy: [id number] [address]  Date medication is needed: As soon as possible- pt states she has 2 pills left      Action Taken: Other: obgyn    Travel Screening: Not Applicable     Date of Service:             Pt states her PCP is being difficult about refilling these prescriptions and is wondering if Dr. Sanchez would take it over. She states Dr. Sanchez has refilled them in the past. Please advise.

## 2024-08-29 ENCOUNTER — OFFICE VISIT (OUTPATIENT)
Dept: NEUROLOGY | Facility: CLINIC | Age: 59
End: 2024-08-29
Payer: COMMERCIAL

## 2024-08-29 VITALS — HEART RATE: 78 BPM | OXYGEN SATURATION: 96 % | DIASTOLIC BLOOD PRESSURE: 89 MMHG | SYSTOLIC BLOOD PRESSURE: 124 MMHG

## 2024-08-29 DIAGNOSIS — M54.81 CERVICO-OCCIPITAL NEURALGIA OF RIGHT SIDE: ICD-10-CM

## 2024-08-29 DIAGNOSIS — G43.009 MIGRAINE WITHOUT AURA AND WITHOUT STATUS MIGRAINOSUS, NOT INTRACTABLE: Primary | ICD-10-CM

## 2024-08-29 DIAGNOSIS — G44.209 TENSION HEADACHE: ICD-10-CM

## 2024-08-29 DIAGNOSIS — G44.86 CERVICOGENIC HEADACHE: ICD-10-CM

## 2024-08-29 PROCEDURE — 99214 OFFICE O/P EST MOD 30 MIN: CPT | Performed by: PSYCHIATRY & NEUROLOGY

## 2024-08-29 PROCEDURE — G2211 COMPLEX E/M VISIT ADD ON: HCPCS | Performed by: PSYCHIATRY & NEUROLOGY

## 2024-08-29 RX ORDER — SUMATRIPTAN 50 MG/1
TABLET, FILM COATED ORAL
Qty: 18 TABLET | Refills: 3 | Status: SHIPPED | OUTPATIENT
Start: 2024-08-29

## 2024-08-29 NOTE — PROGRESS NOTES
ESTABLISHED PATIENT NEUROLOGY NOTE    DATE OF VISIT: 8/29/2024  CLINIC LOCATION: [facility]  MRN: [id number]  PATIENT NAME: Catie Nuñez  YOB: 1965    REASON FOR VISIT:   Chief Complaint   Patient presents with    Headache     Noticed improvement after ablation.     SUBJECTIVE:                                                      HISTORY OF PRESENT ILLNESS: Patient is here to follow up regarding multifactorial headaches.  The last visit was on 6/6/2024. Please refer to my initial/other prior notes for further information.  Came alone.    Since the last visit, the patient reports mild improvement of headaches after ablation.  She underwent occipital nerve block on 7/2/2024 and median branch block on 6/10/2024 after cervical ablation.  She reports that it was beneficial.  She only took 1 Imitrex over the last 1.5 weeks, which is good for her.  She is also on 75 mg of Effexor daily which is used for headache prevention.  She denies any new neurological symptoms.  EXAM:                                                    Physical Exam:   Vitals: /89 (BP Location: Left arm, Patient Position: Sitting, Cuff Size: Adult Regular)   Pulse 78   LMP 09/01/2016 (LMP Unknown)   SpO2 96%     General: pt is in NAD, cooperative.  Skin: normal turgor, moist mucous membranes, no lesions/rashes noticed.  HEENT: ATNC, white sclera, normal conjunctiva.  Respiratory: Symmetric lung excursion, no accessory respiratory muscle use.  Abdomen: Non distended.  Neurological: awake, cooperative, follows commands, no exam changes compared to the previous visits.  ASSESSMENT AND PLAN:                                                    Assessment: 59 year old female patient presents for follow-up of multifactorial headaches, including migraine, tension headaches and cervicogenic headache/right occipital neuralgia.  She reports noticeable improvement of her symptoms.      Previously we discussed that we  could try Lyrica or Cymbalta versus her dose of Effexor could be further increased.  Qulipta could also be considered, but she cannot try injectables due to fear of needles.  Gabapentin, topiramate, Lyrica, tizanidine, baclofen, and supplements were tried in the past without success.  Nurtec at the prophylactic dosing was not helpful.  Additional preventive options include nortriptyline, amitriptyline, doxepin, Depakote, and verapamil.  However, given her reported improvement I would not recommend any medication changes.  It looks like Effexor is prescribed via her primary care office.    Imitrex seems to work well for acute therapy.  I refilled the prescription.    Diagnoses:    ICD-10-CM    1. Migraine without aura and without status migrainosus, not intractable  G43.009       2. Cervicogenic headache  G44.86       3. Tension headache  G44.209       4. Cervico-occipital neuralgia of right side  M54.81           Plan: At today's visit we thoroughly discussed current symptoms, evaluation results, diagnosis, available treatment options, and the plan.    We decided not to make any medication changes.  I refilled her Imitrex.    Advised the patient to keep the headache diary and bring it to the next follow-up visit or upload via My Chart.     Next follow-up appointment is in the next 4 months or earlier if needed.    Total Time: 16 minutes spent on the date of the encounter doing chart review, history and exam, documentation and further activities per the note.    Gelacio Campbell MD  [facility] Neurology  (Chart documentation was completed in part with Dragon voice-recognition software. Even though reviewed, some grammatical, spelling, and word errors may remain.)

## 2024-08-29 NOTE — PROGRESS NOTES
"Catie Nuñez is a 59 year old female who presents for:  Chief Complaint   Patient presents with    Headache     Noticed improvement after ablation.        Initial Vitals:  /89 (BP Location: Left arm, Patient Position: Sitting, Cuff Size: Adult Regular)   Pulse 78   LMP 09/01/2016 (LMP Unknown)   SpO2 96%  Estimated body mass index is 19.13 kg/m  as calculated from the following:    Height as of 6/24/24: 1.6 m (5' 3\").    Weight as of 6/24/24: 49 kg (108 lb).. There is no height or weight on file to calculate BSA. BP completed using cuff size: regular    Chio Diaz   "

## 2024-08-29 NOTE — PATIENT INSTRUCTIONS
AFTER VISIT SUMMARY (AVS):    At today's visit we thoroughly discussed current symptoms, evaluation results, diagnosis, available treatment options, and the plan.    We decided not to make any medication changes.  I refilled your Imitrex.    Please keep the headache diary and bring it to the next follow-up visit or upload via My Chart.     Next follow-up appointment is in the next 4 months or earlier if needed.    Please do not hesitate to call me with any questions or concerns.    Thanks.

## 2024-08-29 NOTE — LETTER
8/29/2024      Catie Nuñez  [address]  [place] [address]      Dear Colleague,    Thank you for referring your patient, Catie Nuñez, to the [facility]. Please see a copy of my visit note below.    ESTABLISHED PATIENT NEUROLOGY NOTE    DATE OF VISIT: 8/29/2024  CLINIC LOCATION: [facility]  MRN: [id number]  PATIENT NAME: Catie Nuñez  YOB: 1965    REASON FOR VISIT:   Chief Complaint   Patient presents with     Headache     Noticed improvement after ablation.     SUBJECTIVE:                                                      HISTORY OF PRESENT ILLNESS: Patient is here to follow up regarding multifactorial headaches.  The last visit was on 6/6/2024. Please refer to my initial/other prior notes for further information.  Came alone.    Since the last visit, the patient reports mild improvement of headaches after ablation.  She underwent occipital nerve block on 7/2/2024 and median branch block on 6/10/2024 after cervical ablation.  She reports that it was beneficial.  She only took 1 Imitrex over the last 1.5 weeks, which is good for her.  She is also on 75 mg of Effexor daily which is used for headache prevention.  She denies any new neurological symptoms.  EXAM:                                                    Physical Exam:   Vitals: /89 (BP Location: Left arm, Patient Position: Sitting, Cuff Size: Adult Regular)   Pulse 78   LMP 09/01/2016 (LMP Unknown)   SpO2 96%     General: pt is in NAD, cooperative.  Skin: normal turgor, moist mucous membranes, no lesions/rashes noticed.  HEENT: ATNC, white sclera, normal conjunctiva.  Respiratory: Symmetric lung excursion, no accessory respiratory muscle use.  Abdomen: Non distended.  Neurological: awake, cooperative, follows commands, no exam changes compared to the previous visits.  ASSESSMENT AND PLAN:                                                    Assessment: 59 year old  female patient presents for follow-up of multifactorial headaches, including migraine, tension headaches and cervicogenic headache/right occipital neuralgia.  She reports noticeable improvement of her symptoms.      Previously we discussed that we could try Lyrica or Cymbalta versus her dose of Effexor could be further increased.  Qulipta could also be considered, but she cannot try injectables due to fear of needles.  Gabapentin, topiramate, Lyrica, tizanidine, baclofen, and supplements were tried in the past without success.  Nurtec at the prophylactic dosing was not helpful.  Additional preventive options include nortriptyline, amitriptyline, doxepin, Depakote, and verapamil.  However, given her reported improvement I would not recommend any medication changes.  It looks like Effexor is prescribed via her primary care office.    Imitrex seems to work well for acute therapy.  I refilled the prescription.    Diagnoses:    ICD-10-CM    1. Migraine without aura and without status migrainosus, not intractable  G43.009       2. Cervicogenic headache  G44.86       3. Tension headache  G44.209       4. Cervico-occipital neuralgia of right side  M54.81           Plan: At today's visit we thoroughly discussed current symptoms, evaluation results, diagnosis, available treatment options, and the plan.    We decided not to make any medication changes.  I refilled her Imitrex.    Advised the patient to keep the headache diary and bring it to the next follow-up visit or upload via My Chart.     Next follow-up appointment is in the next 4 months or earlier if needed.    Total Time: 16 minutes spent on the date of the encounter doing chart review, history and exam, documentation and further activities per the note.    Gelacio Campbell MD  [facility] Neurology  (Chart documentation was completed in part with Dragon voice-recognition software. Even though reviewed, some grammatical, spelling, and word errors may  "remain.)    Catie Nuñez is a 59 year old female who presents for:  Chief Complaint   Patient presents with     Headache     Noticed improvement after ablation.        Initial Vitals:  /89 (BP Location: Left arm, Patient Position: Sitting, Cuff Size: Adult Regular)   Pulse 78   LMP 09/01/2016 (LMP Unknown)   SpO2 96%  Estimated body mass index is 19.13 kg/m  as calculated from the following:    Height as of 6/24/24: 1.6 m (5' 3\").    Weight as of 6/24/24: 49 kg (108 lb).. There is no height or weight on file to calculate BSA. BP completed using cuff size: regular    Chio Diaz       Again, thank you for allowing me to participate in the care of your patient.        Sincerely,        Gelacio Campbell MD  "

## 2024-09-10 ENCOUNTER — PATIENT OUTREACH (OUTPATIENT)
Dept: CARE COORDINATION | Facility: CLINIC | Age: 59
End: 2024-09-10
Payer: COMMERCIAL

## 2024-09-19 ENCOUNTER — OFFICE VISIT (OUTPATIENT)
Dept: FAMILY MEDICINE | Facility: CLINIC | Age: 59
End: 2024-09-19
Payer: COMMERCIAL

## 2024-09-19 VITALS
HEART RATE: 78 BPM | RESPIRATION RATE: 14 BRPM | BODY MASS INDEX: 19.22 KG/M2 | DIASTOLIC BLOOD PRESSURE: 72 MMHG | WEIGHT: 112.6 LBS | OXYGEN SATURATION: 96 % | SYSTOLIC BLOOD PRESSURE: 118 MMHG | HEIGHT: 64 IN | TEMPERATURE: 99 F

## 2024-09-19 DIAGNOSIS — G89.4 CHRONIC PAIN SYNDROME: ICD-10-CM

## 2024-09-19 DIAGNOSIS — F11.90 CHRONIC, CONTINUOUS USE OF OPIOIDS: ICD-10-CM

## 2024-09-19 DIAGNOSIS — F41.1 GENERALIZED ANXIETY DISORDER: Primary | ICD-10-CM

## 2024-09-19 DIAGNOSIS — Z79.899 CHRONIC PRESCRIPTION BENZODIAZEPINE USE: ICD-10-CM

## 2024-09-19 DIAGNOSIS — M54.2 CERVICALGIA: ICD-10-CM

## 2024-09-19 LAB
AMPHETAMINES UR QL SCN: ABNORMAL
BARBITURATES UR QL SCN: ABNORMAL
BENZODIAZ UR QL SCN: ABNORMAL
BZE UR QL SCN: ABNORMAL
CANNABINOIDS UR QL SCN: ABNORMAL
FENTANYL UR QL: ABNORMAL
OPIATES UR QL SCN: ABNORMAL
PCP QUAL URINE (ROCHE): ABNORMAL

## 2024-09-19 PROCEDURE — G2211 COMPLEX E/M VISIT ADD ON: HCPCS | Performed by: PHYSICIAN ASSISTANT

## 2024-09-19 PROCEDURE — 80307 DRUG TEST PRSMV CHEM ANLYZR: CPT | Performed by: PHYSICIAN ASSISTANT

## 2024-09-19 PROCEDURE — 99215 OFFICE O/P EST HI 40 MIN: CPT | Performed by: PHYSICIAN ASSISTANT

## 2024-09-19 RX ORDER — ALPRAZOLAM 0.5 MG
TABLET ORAL
Qty: 15 TABLET | Refills: 0 | Status: SHIPPED | OUTPATIENT
Start: 2024-09-27

## 2024-09-19 RX ORDER — TRAMADOL HYDROCHLORIDE 50 MG/1
50 TABLET ORAL 2 TIMES DAILY PRN
Qty: 50 TABLET | Refills: 0 | Status: SHIPPED | OUTPATIENT
Start: 2024-09-27

## 2024-09-19 RX ORDER — BUSPIRONE HYDROCHLORIDE 5 MG/1
5 TABLET ORAL 2 TIMES DAILY
Qty: 60 TABLET | Refills: 1 | Status: SHIPPED | OUTPATIENT
Start: 2024-09-19

## 2024-09-19 ASSESSMENT — PAIN SCALES - GENERAL: PAINLEVEL: MILD PAIN (3)

## 2024-09-19 NOTE — PROGRESS NOTES
Assessment & Plan     Generalized anxiety disorder  Chronic prescription benzodiazepine use - discussed my goal for treatment would be to have her anxiety be better controlled that panic symptoms are happening rarely so that benzodiazepine use would be rare case. She would like to increase back to previous quantity of #20/month, however, given goal for ongoing taper will continue with plan for #15 month. Will start buspirone 5mg BID, follow up virtually in 4 weeks. If buspirone not helpful for controlling anxiety, next step would be psychiatry consult. We did also discuss possibility of decrease & stop tramadol use, would consider this as well, although did discuss that benzodiazepine daily use has risks as well.  - busPIRone (BUSPAR) 5 MG tablet  Dispense: 60 tablet; Refill: 1  - ALPRAZolam (XANAX) 0.5 MG tablet  Dispense: 15 tablet; Refill: 0  - Urine Drug Screen  - Urine Drug Screen    Chronic, continuous use of opioids  Chronic pain syndrome  Cervicalgia - reviewed central sensitization, has been on opiates around 10 years. Discussed risk of concurrent opiate & benzodiazepine use up to and including death. Will follow plan as above for decreasing benzodiazepine use. CSA signed and UDS completed today.  - traMADol (ULTRAM) 50 MG tablet  Dispense: 50 tablet; Refill: 0  - Urine Drug Screen  - Urine Drug Screen    The longitudinal plan of care for the diagnosis(es)/condition(s) as documented were addressed during this visit. Due to the added complexity in care, I will continue to support Kimmie in the subsequent management and with ongoing continuity of care.  Total visit time >48 minutes spent in chart review, interviewing patient, performing exam, preparing & counseling regarding plan of care, care coordination & documenting visit.        Subjective   Kimmie is a 59 year old, presenting for the following health issues:  Establish Care        9/19/2024     9:25 AM   Additional Questions   Roomed by Jose F RICHEY MA  "  Accompanied by Self     Ablation in c-spine in June  Was having quite a bit of pain, finally having some improvement  Uses tramadol for neck pain, back pain, shoulder pain  Uses for MSK pain  Makes jewelry at home  Takes 1-2x/day - has been on this around 10 years  Will try Excedrin, does attend accupuncture    Also uses alprazolam for anxiety  Has been tapering down on this per previous primary  Does need to use this daily  Anxiety with flying  Increased dose of venlafaxine increased headaches - does follow with neurology  2018 - gabapentin (100mg TID), buspirone (15mg BID)  [id number] - sertraline (100mg)  Does note that she also takes alprazolam with onset of migraine    HPI       Objective    /72 (BP Location: Right arm, Patient Position: Sitting, Cuff Size: Adult Regular)   Pulse 78   Temp 99  F (37.2  C) (Temporal)   Resp 14   Ht 1.626 m (5' 4\")   Wt 51.1 kg (112 lb 9.6 oz)   LMP 09/01/2016 (LMP Unknown)   SpO2 96%   BMI 19.33 kg/m    Body mass index is 19.33 kg/m .  Physical Exam   GENERAL: alert and no distress  PSYCH: mentation appears normal, affect normal/bright         Signed Electronically by: Kelsi Logan PA-C    "

## 2024-09-22 ENCOUNTER — MYC MEDICAL ADVICE (OUTPATIENT)
Dept: FAMILY MEDICINE | Facility: CLINIC | Age: 59
End: 2024-09-22
Payer: COMMERCIAL

## 2024-09-24 NOTE — TELEPHONE ENCOUNTER
Kelsi - see MyChart, appreciate advisement.    ROWAN Azul, BSN, RN (she/her)  [facility] RN

## 2024-10-09 ENCOUNTER — OFFICE VISIT (OUTPATIENT)
Dept: URGENT CARE | Facility: URGENT CARE | Age: 59
End: 2024-10-09
Payer: COMMERCIAL

## 2024-10-09 VITALS
BODY MASS INDEX: 19.29 KG/M2 | HEIGHT: 64 IN | WEIGHT: 113 LBS | HEART RATE: 78 BPM | RESPIRATION RATE: 16 BRPM | SYSTOLIC BLOOD PRESSURE: 128 MMHG | TEMPERATURE: 99.3 F | DIASTOLIC BLOOD PRESSURE: 82 MMHG | OXYGEN SATURATION: 99 %

## 2024-10-09 DIAGNOSIS — J01.90 ACUTE SINUSITIS WITH SYMPTOMS > 10 DAYS: Primary | ICD-10-CM

## 2024-10-09 LAB
ERYTHROCYTE [DISTWIDTH] IN BLOOD BY AUTOMATED COUNT: 12.9 % (ref 10–15)
HCT VFR BLD AUTO: 39 % (ref 35–47)
HGB BLD-MCNC: 13 G/DL (ref 11.7–15.7)
MCH RBC QN AUTO: 30.6 PG (ref 26.5–33)
MCHC RBC AUTO-ENTMCNC: 33.3 G/DL (ref 31.5–36.5)
MCV RBC AUTO: 92 FL (ref 78–100)
MONOCYTES NFR BLD AUTO: NEGATIVE %
PLATELET # BLD AUTO: 237 10E3/UL (ref 150–450)
RBC # BLD AUTO: 4.25 10E6/UL (ref 3.8–5.2)
WBC # BLD AUTO: 3.6 10E3/UL (ref 4–11)

## 2024-10-09 PROCEDURE — 85027 COMPLETE CBC AUTOMATED: CPT | Performed by: PHYSICIAN ASSISTANT

## 2024-10-09 PROCEDURE — 99214 OFFICE O/P EST MOD 30 MIN: CPT | Performed by: PHYSICIAN ASSISTANT

## 2024-10-09 PROCEDURE — 36415 COLL VENOUS BLD VENIPUNCTURE: CPT | Performed by: PHYSICIAN ASSISTANT

## 2024-10-09 PROCEDURE — 86308 HETEROPHILE ANTIBODY SCREEN: CPT | Performed by: PHYSICIAN ASSISTANT

## 2024-10-09 RX ORDER — CEFDINIR 300 MG/1
300 CAPSULE ORAL 2 TIMES DAILY
Qty: 14 CAPSULE | Refills: 0 | Status: SHIPPED | OUTPATIENT
Start: 2024-10-09 | End: 2024-10-16

## 2024-10-09 RX ORDER — CETIRIZINE HYDROCHLORIDE 10 MG/1
10 TABLET ORAL DAILY
Qty: 30 TABLET | Refills: 0 | Status: SHIPPED | OUTPATIENT
Start: 2024-10-09

## 2024-10-09 RX ORDER — SACCHAROMYCES BOULARDII 250 MG
250 CAPSULE ORAL 2 TIMES DAILY
Qty: 28 CAPSULE | Refills: 0 | Status: SHIPPED | OUTPATIENT
Start: 2024-10-09 | End: 2024-10-23

## 2024-10-09 RX ORDER — GUAIFENESIN 600 MG/1
1200 TABLET, EXTENDED RELEASE ORAL 2 TIMES DAILY
Qty: 28 TABLET | Refills: 0 | Status: SHIPPED | OUTPATIENT
Start: 2024-10-09 | End: 2024-10-16

## 2024-10-09 NOTE — PATIENT INSTRUCTIONS
1.  Plenty of fluids, rest, warm compresses on face  2.  Mucinex twice daily for at least 4 days  3.  Aniya Pot 1x in the morning 1x at night (SALINE MIST SPRAY IS AN ACCEPTABLE, THOUGH NOT AS EFFECTIVE REPLACEMENT)  4.  Benadryl (diphenhydramine) at bedtime   5.  Either Claritin (Loratadine), Allegra (Fexofenadine), or Zyrtec (Cetirizine) in the day  6.  Flonase (Fluticasone) 2x each nostril twice a day for two weeks, then once each nostril once a day  7. Antibioitic  8. Probiotic 2 hours after each antibiotic dose       Please let us know if symptoms persist, or worsen.

## 2024-10-15 ENCOUNTER — TRANSFERRED RECORDS (OUTPATIENT)
Dept: HEALTH INFORMATION MANAGEMENT | Facility: CLINIC | Age: 59
End: 2024-10-15
Payer: COMMERCIAL

## 2024-10-20 DIAGNOSIS — F41.1 GENERALIZED ANXIETY DISORDER: ICD-10-CM

## 2024-10-21 ENCOUNTER — MYC MEDICAL ADVICE (OUTPATIENT)
Dept: FAMILY MEDICINE | Facility: CLINIC | Age: 59
End: 2024-10-21

## 2024-10-21 ENCOUNTER — MYC REFILL (OUTPATIENT)
Dept: FAMILY MEDICINE | Facility: CLINIC | Age: 59
End: 2024-10-21

## 2024-10-21 DIAGNOSIS — M54.2 CERVICALGIA: ICD-10-CM

## 2024-10-21 DIAGNOSIS — F41.1 GENERALIZED ANXIETY DISORDER: ICD-10-CM

## 2024-10-21 DIAGNOSIS — F11.90 CHRONIC, CONTINUOUS USE OF OPIOIDS: ICD-10-CM

## 2024-10-21 DIAGNOSIS — G89.4 CHRONIC PAIN SYNDROME: ICD-10-CM

## 2024-10-22 RX ORDER — TRAMADOL HYDROCHLORIDE 50 MG/1
50 TABLET ORAL 2 TIMES DAILY PRN
Qty: 50 TABLET | Refills: 0 | Status: SHIPPED | OUTPATIENT
Start: 2024-10-25

## 2024-10-22 RX ORDER — ALPRAZOLAM 0.5 MG
TABLET ORAL
Qty: 15 TABLET | OUTPATIENT
Start: 2024-10-22

## 2024-10-22 RX ORDER — ALPRAZOLAM 0.5 MG
TABLET ORAL
Qty: 15 TABLET | Refills: 0 | Status: SHIPPED | OUTPATIENT
Start: 2024-10-25

## 2024-10-22 RX ORDER — TRAMADOL HYDROCHLORIDE 50 MG/1
50 TABLET ORAL 2 TIMES DAILY PRN
Qty: 50 TABLET | Refills: 0 | OUTPATIENT
Start: 2024-10-22

## 2024-10-22 RX ORDER — ALPRAZOLAM 0.5 MG
TABLET ORAL
Qty: 15 TABLET | Refills: 0 | OUTPATIENT
Start: 2024-10-22

## 2024-10-22 NOTE — TELEPHONE ENCOUNTER
Dr. Martinez --    SUZE.   Patient cancelled follow-up appointment for the buspirone.   Has not started taking medication yet.   Due to bout of covid and sinus infection.   Just finishing antibiotics.     Nery Vasquez, BSN RN  [facility]

## 2024-10-30 ENCOUNTER — VIRTUAL VISIT (OUTPATIENT)
Dept: FAMILY MEDICINE | Facility: CLINIC | Age: 59
End: 2024-10-30
Payer: COMMERCIAL

## 2024-10-30 DIAGNOSIS — F41.1 GENERALIZED ANXIETY DISORDER: Primary | ICD-10-CM

## 2024-10-30 DIAGNOSIS — G44.86 CERVICOGENIC HEADACHE: ICD-10-CM

## 2024-10-30 PROCEDURE — 99203 OFFICE O/P NEW LOW 30 MIN: CPT | Mod: 95 | Performed by: NURSE PRACTITIONER

## 2024-10-30 PROCEDURE — G2211 COMPLEX E/M VISIT ADD ON: HCPCS | Mod: 95 | Performed by: NURSE PRACTITIONER

## 2024-10-30 NOTE — PROGRESS NOTES
Kimmie is a 59 year old who is being evaluated via a billable video visit.    How would you like to obtain your AVS? MyChart  If the video visit is dropped, the invitation should be resent by: Text to cell phone: [phone]  Will anyone else be joining your video visit? No      Assessment & Plan     (F41.1) Generalized anxiety disorder  (primary encounter diagnosis)  Comment:   Plan: Adult Mental Health  Referral        I explained the difficulty with regular benzodiazepine use and the interaction between benzos and opioids and the risks of being on both of these medications.  I explained that ideally her anxiety would be managed with an ssri or Buspar, tried to reassure her that her daughter's experience is not typical.  She did agree to a psychiatrist referral, which was placed.  I did empathize with her and tried to explain that the ultimate goal is to treat her pain and anxiety in the safest manner possible. n  Discussed that my practice is closed and that I cannot see her as her PCP.     (G44.86) Cervicogenic headache  Comment:   Plan: See above.     The longitudinal plan of care for the diagnosis(es)/condition(s) as documented were addressed during this visit. Due to the added complexity in care, I will continue to support Kimmie in the subsequent management and with ongoing continuity of care.      I spent a total of 38 minutes on the day of the visit.   Time spent by me doing chart review, history and exam, documentation and further activities per the note              Subjective   Kimmie is a 59 year old, presenting for the following health issues:  Follow Up (Would like to establish care)    She scheduled this visit to establish care and was not told by the  that my practice is closed.  She is feeling not heard and frustrated regarding her chronic migraines, which trigger anxiety.  In the past, she has taken Xanax for the anxiety that occurs when she gets a migraine and was getting 30 tablets a  month.  She also takes Tramadol for the chronic pain.  Most recently she has been seeing Kelsi Logan, who has recommended that she decrease and ultimately try to taper off of Xanax.  She is not understanding the rationale behind this and did not start the Buspar that she prescribed.  She states that her daughter was on this mediation and had to stop due to worsening OCD symptoms and this has her reluctant to try it.  She has not seen a psychiatrist.  She is seeing neurology and pain management for injections for her migraines, which she feels have worsened over time.      Video Start Time:  5:33    History of Present Illness       Reason for visit:  To establish care   She is taking medications regularly.                   Objective    Vitals - Patient Reported  Pain Score: No Pain (0)      Vitals:  No vitals were obtained today due to virtual visit.    Physical Exam   GENERAL: alert and no distress  EYES: Eyes grossly normal to inspection.  No discharge or erythema, or obvious scleral/conjunctival abnormalities.  RESP: No audible wheeze, cough, or visible cyanosis.    SKIN: Visible skin clear. No significant rash, abnormal pigmentation or lesions.  NEURO: Cranial nerves grossly intact.  Mentation and speech appropriate for age.  PSYCH: Appropriate affect, tone, and pace of words          Video-Visit Details    Type of service:  Video Visit   Video End Time: 5:56  Originating Location (pt. Location): Home    Distant Location (provider location):  On-site  Platform used for Video Visit: Rafael  Signed Electronically by: Laurie Avelar NP

## 2024-11-18 ENCOUNTER — VIRTUAL VISIT (OUTPATIENT)
Dept: FAMILY MEDICINE | Facility: CLINIC | Age: 59
End: 2024-11-18
Payer: COMMERCIAL

## 2024-11-18 DIAGNOSIS — F11.90 CHRONIC, CONTINUOUS USE OF OPIOIDS: Primary | ICD-10-CM

## 2024-11-18 DIAGNOSIS — G89.4 CHRONIC PAIN SYNDROME: ICD-10-CM

## 2024-11-18 DIAGNOSIS — M54.2 CERVICALGIA: ICD-10-CM

## 2024-11-18 PROCEDURE — 99214 OFFICE O/P EST MOD 30 MIN: CPT | Mod: 95 | Performed by: PHYSICIAN ASSISTANT

## 2024-11-18 PROCEDURE — G2211 COMPLEX E/M VISIT ADD ON: HCPCS | Mod: 95 | Performed by: PHYSICIAN ASSISTANT

## 2024-11-18 RX ORDER — TRAMADOL HYDROCHLORIDE 50 MG/1
50 TABLET ORAL 2 TIMES DAILY PRN
Qty: 50 TABLET | Refills: 0 | Status: SHIPPED | OUTPATIENT
Start: 2024-11-22

## 2024-11-18 NOTE — PROGRESS NOTES
Kimmie is a 59 year old who is being evaluated via a billable video visit.    How would you like to obtain your AVS? MyChart  If the video visit is dropped, the invitation should be resent by: Text to cell phone: [phone]  Will anyone else be joining your video visit? No      Assessment & Plan     Chronic, continuous use of opioids  Chronic pain syndrome  Cervicalgia - psychiatry has taken over prescribing of benzodiazepines, discussed that there is still possible fatal interaction with benzo + opiate. Goal would be to discontinue one of the medications. She has active plan with upcoming appts in Dec & Feb with neurology for chronic neck pain & migraines. She is aware of risk of benzo + opiate (reviewed this again at this visit), will refill tramadol until next appt, follow up in 3 months. If pain not well controlled with new treatment plan per neurology, would likely recommend follow up with pain clinic.  - traMADol (ULTRAM) 50 MG tablet; Take 1 tablet (50 mg) by mouth 2 times daily as needed for severe pain. 30 day supply. Do not take with alprazolam.  - PRIMARY CARE FOLLOW-UP SCHEDULING; Future    The longitudinal plan of care for the diagnosis(es)/condition(s) as documented were addressed during this visit. Due to the added complexity in care, I will continue to support Kimmie in the subsequent management and with ongoing continuity of care.      Subjective   Kimmie is a 59 year old, presenting for the following health issues:  No chief complaint on file.        11/18/2024    10:46 AM   Additional Questions   Roomed by Jose F RICHEY MA   Accompanied by Self     Kimmie called today for video visit, med follow up    Migraines have been worse - follows with neurology, hoping to do botox  Did MRI with [facility]  Has not started buspirone  Did establish with psychiatry - [facility] - they have taken over her alprazolam     Still using tramadol for chronic neck pain  Has been using sometimes only one a day  Does still have some  "medication left - due for refills later this week    Via the Health Maintenance questionnaire, the patient has reported the following services have been completed -Mammogram: Liz 2023-09-01, this information has been sent to the abstraction team.  Video Start Time: 10:53 AM    History of Present Illness       Back Pain:  She presents for follow up of back pain. Patient's back pain is a chronic problem.  Location of back pain:  Left lower back, right upper back, left upper back, right side of neck, left side of neck, right shoulder, left shoulder and left hip  Description of back pain: dull ache and gnawing  Back pain spreads: nowhere    Since patient first noticed back pain, pain is: unchanged  Does back pain interfere with her job:  Yes       Headaches:   Since the patient's last clinic visit, headaches are: worsened  The patient is getting headaches:  Almost daily symptoms  She is not able to do normal daily activities when she has a migraine.  The patient is taking the following rescue/relief medications:  Tylenol, Excedrin and sumatriptan (Imitrex)   Patient states \"I get some relief\" from the rescue/relief medications.   The patient is taking the following medications to prevent migraines:  No medications to prevent migraines  In the past 4 weeks, the patient has gone to an Urgent Care or Emergency Room 0 times times due to headaches.    She eats 2-3 servings of fruits and vegetables daily.She consumes 0 sweetened beverage(s) daily.She exercises with enough effort to increase her heart rate 10 to 19 minutes per day.  She exercises with enough effort to increase her heart rate 3 or less days per week.   She is taking medications regularly.         Objective       Vitals:  No vitals were obtained today due to virtual visit.    Physical Exam   GENERAL: alert and no distress  EYES: Eyes grossly normal to inspection.  No discharge or erythema, or obvious scleral/conjunctival abnormalities.  RESP: No audible " wheeze, cough, or visible cyanosis.    SKIN: Visible skin clear. No significant rash, abnormal pigmentation or lesions.  NEURO: Cranial nerves grossly intact.  Mentation and speech appropriate for age.  PSYCH: Appropriate affect, tone, and pace of words      Video-Visit Details    Type of service:  Video Visit   Video End Time:11:05 AM  Originating Location (pt. Location): Home    Distant Location (provider location):  Off-site  Platform used for Video Visit: Rafael  Signed Electronically by: Kelsi Logan PA-C

## 2024-11-29 DIAGNOSIS — N95.1 SYMPTOMATIC MENOPAUSAL OR FEMALE CLIMACTERIC STATES: ICD-10-CM

## 2024-12-03 NOTE — PATIENT INSTRUCTIONS
Addended by: ZULAY JUÁREZ on: 12/3/2024 12:16 PM     Modules accepted: Orders     Follow up with your Primary doctor as needed.   If you experience additional episodes of coughing up blood, go to the Emergency room.     Patient Education     Hemoptysis    Hemoptysis is the medical term for coughing up blood. There are many causes for this, including minor illnesses like bronchitis. Hemoptysis can also be an early sign of a more serious illness, like a blood clot in the lung (pulmonary embolism), cancer, tuberculosis, or pneumonia.  Less common causes of hemoptysis can be hard to diagnose in an emergency department or a clinic. More testing will be needed if the symptoms continue.  Home care    Stay away from cigarette smoke. Smoke irritates the bronchial passages.    Unless you are taking daily aspirin to prevent stroke or heart attack, don't take aspirin or products that contain aspirin. Check with your healthcare provider to see if you should continue the aspirin or when you should restart it if you develop hemoptysis. Aspirin affects how readily the blood clots. Medicines that prevent clotting may make hemoptysis worse.    If you have a lung infection, drinking extra fluid will help loosen secretions in the lungs.    Over-the-counter cough medicines that contain dextromethorphan may help reduce coughing. Check with your healthcare provider before taking dextromethorphan if you have a chronic illness, are pregnant, or take daily medicines.    If you were prescribed an antibiotic, take it until it is all gone. Take it even if you are feeling better after only a few days.    Follow-up care  Follow up with your healthcare provider, or as advised.  When to seek medical advice  Call your healthcare provider right away if any of these occur:    Fever of 100.4 F (38 C) or higher, or as directed by your healthcare provider  Call 911  Call 911 if any of these occur:    Coughing up an increased amount of blood    Trouble breathing, wheezing, or pain with breathing    Chest pain or chest  pressure    Fainting or losing consciousness    Rapid heartbeat    Weakness or dizziness  Michael last reviewed this educational content on 6/1/2018 2000-2021 [employer]. All rights reserved. This information is not intended as a substitute for professional medical care. Always follow your healthcare professional's instructions.

## 2024-12-04 RX ORDER — MEDROXYPROGESTERONE ACETATE 2.5 MG/1
2.5 TABLET ORAL DAILY
Qty: 90 TABLET | Refills: 1 | Status: SHIPPED | OUTPATIENT
Start: 2024-12-04

## 2024-12-12 ENCOUNTER — OFFICE VISIT (OUTPATIENT)
Dept: NEUROLOGY | Facility: CLINIC | Age: 59
End: 2024-12-12
Payer: COMMERCIAL

## 2024-12-12 VITALS — OXYGEN SATURATION: 96 % | DIASTOLIC BLOOD PRESSURE: 86 MMHG | HEART RATE: 71 BPM | SYSTOLIC BLOOD PRESSURE: 134 MMHG

## 2024-12-12 DIAGNOSIS — G44.209 TENSION HEADACHE: ICD-10-CM

## 2024-12-12 DIAGNOSIS — G43.009 MIGRAINE WITHOUT AURA AND WITHOUT STATUS MIGRAINOSUS, NOT INTRACTABLE: Primary | ICD-10-CM

## 2024-12-12 DIAGNOSIS — M54.81 CERVICO-OCCIPITAL NEURALGIA OF RIGHT SIDE: ICD-10-CM

## 2024-12-12 DIAGNOSIS — G44.86 CERVICOGENIC HEADACHE: ICD-10-CM

## 2024-12-12 RX ORDER — SUMATRIPTAN 50 MG/1
TABLET, FILM COATED ORAL
Qty: 18 TABLET | Refills: 3 | Status: SHIPPED | OUTPATIENT
Start: 2024-12-12

## 2024-12-12 NOTE — PATIENT INSTRUCTIONS
AFTER VISIT SUMMARY (AVS):    At today's visit we thoroughly discussed current symptoms, available treatment options, and the plan.    We decided to continue the current therapy until you see headache specialist.  I refilled your sumatriptan.    Please keep the headache diary and bring it to the visit with headache specialist.     Next follow-up appointment is on as needed basis.    Please do not hesitate to call me with any questions or concerns.    Thanks.

## 2024-12-12 NOTE — PROGRESS NOTES
ESTABLISHED PATIENT NEUROLOGY NOTE    DATE OF VISIT: 12/12/2024  CLINIC LOCATION: [facility]  MRN: [id number]  PATIENT NAME: Catie Nuñez  YOB: 1965    REASON FOR VISIT:   Chief Complaint   Patient presents with    Follow Up     Migraine- have been managing with medication      SUBJECTIVE:                                                      HISTORY OF PRESENT ILLNESS: Patient is here to follow up regarding multifactorial headaches.  She was last seen on 8/29/2024. Please refer to my initial/other prior notes for further information.    Since the last visit, the patient reported that the effect from ablation and nerve blocks wore off, and pain increased.  At the present time, her headaches are stable.  She averages 10-15 headaches per month.  She requested a referral to a headache specialist to discuss Botox.  The initial visit is scheduled on 2/18/2025.  She is on 75 mg of Effexor daily for mental health reasons.  She uses sumatriptan for acute therapy.  No new neurologic symptoms.  EXAM:                                                    Physical Exam:   Vitals: /86 (BP Location: Right arm, Patient Position: Sitting, Cuff Size: Adult Small)   Pulse 71   LMP 09/01/2016 (LMP Unknown)   SpO2 96%     General: pt is in NAD, cooperative.  Skin: normal turgor, moist mucous membranes, no lesions/rashes noticed.  HEENT: ATNC, white sclera, normal conjunctiva.  Respiratory: Symmetric lung excursion, no accessory respiratory muscle use.  Abdomen: Non distended.  Neurological: awake, cooperative, follows commands, face is symmetric, equally moves all extremities, no sensory deficits, no dysmetria bilaterally.  ASSESSMENT AND PLAN:                                                    Assessment: 59 year old female patient presents for follow-up of multifactorial headaches, including migraine, tension headaches, and cervicogenic headache/right occipital neuralgia.  She reports that  her headaches are stable.  She has upcoming appointment with headache specialist in February 2025.    Previously we discussed that the dose of Effexor could be further increased.  Additional treatment options include Cymbalta, nortriptyline, amitriptyline, doxepin, Depakote, and verapamil.  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  In the past she tried gabapentin, topiramate, Lyrica, tizanidine, baclofen, prophylactic regimen of Nurtec, and supplements without success.    Diagnoses:    ICD-10-CM    1. Migraine without aura and without status migrainosus, not intractable  G43.009       2. Cervicogenic headache  G44.86       3. Tension headache  G44.209       4. Cervico-occipital neuralgia of right side  M54.81         Plan: At today's visit we thoroughly discussed current symptoms, available treatment options, and the plan.    We decided to continue the current therapy until she sees headache specialist.  I refilled her sumatriptan, but we discussed that her headache specialist will take over this prescription eventually.    I recommended the patient to keep the headache diary.     Next follow-up appointment is on as needed basis.    Total Time: 21 minutes spent on the date of the encounter doing chart review, history and exam, documentation and further activities per the note.    Gelacio Campbell MD  [facility]  (Chart documentation was completed in part with Dragon voice-recognition software. Even though reviewed, some grammatical, spelling, and word errors may remain.)

## 2024-12-12 NOTE — PROGRESS NOTES
"Catie Nuñez is a 59 year old female who presents for:  Chief Complaint   Patient presents with    Follow Up     Migraine- have been managing with medication         Initial Vitals:  /86 (BP Location: Right arm, Patient Position: Sitting, Cuff Size: Adult Small)   Pulse 71   LMP 09/01/2016 (LMP Unknown)   SpO2 96%  Estimated body mass index is 19.4 kg/m  as calculated from the following:    Height as of 10/9/24: 1.626 m (5' 4\").    Weight as of 10/9/24: 51.3 kg (113 lb).. There is no height or weight on file to calculate BSA. BP completed using cuff size: small morris Diaz   "

## 2024-12-18 ENCOUNTER — HOSPITAL ENCOUNTER (OUTPATIENT)
Dept: MAMMOGRAPHY | Facility: CLINIC | Age: 59
Discharge: HOME | End: 2024-12-18
Attending: FAMILY MEDICINE
Payer: COMMERCIAL

## 2024-12-18 DIAGNOSIS — Z12.31 ENCOUNTER FOR SCREENING MAMMOGRAM FOR BREAST CANCER: ICD-10-CM

## 2024-12-18 PROCEDURE — 77063 BREAST TOMOSYNTHESIS BI: CPT

## 2024-12-18 PROCEDURE — 77067 SCR MAMMO BI INCL CAD: CPT

## 2025-01-09 NOTE — CONFIDENTIAL NOTE
Reason for visit: Migraine without aura and without status migrainosus, not intractable    Referring Provider: Gelacio Campbell MD   [facility]   Office Visit Notes: 11/14/2024     Progress/Clinic notes -  07/29/2016   Maryann Parker MD  [facility]    Scanned into chart - 08/24/2016      IMAGING   STATUS/LOCATION   DATE/TYPE   MRI/MRA Internal  04/28/2024, 11/15/2018    CT/CTA N/A     LABS Internal    EEG N/A    EMG N/A    NEUOROPSYCH   TEST: N/A

## 2025-01-30 ENCOUNTER — ANCILLARY PROCEDURE (OUTPATIENT)
Dept: GENERAL RADIOLOGY | Facility: CLINIC | Age: 60
End: 2025-01-30
Attending: PHYSICIAN ASSISTANT
Payer: COMMERCIAL

## 2025-01-30 ENCOUNTER — OFFICE VISIT (OUTPATIENT)
Dept: URGENT CARE | Facility: URGENT CARE | Age: 60
End: 2025-01-30
Payer: COMMERCIAL

## 2025-01-30 VITALS
SYSTOLIC BLOOD PRESSURE: 122 MMHG | DIASTOLIC BLOOD PRESSURE: 82 MMHG | OXYGEN SATURATION: 97 % | HEART RATE: 92 BPM | TEMPERATURE: 100 F | BODY MASS INDEX: 19.22 KG/M2 | WEIGHT: 112 LBS | RESPIRATION RATE: 16 BRPM

## 2025-01-30 DIAGNOSIS — R05.8 PRODUCTIVE COUGH: ICD-10-CM

## 2025-01-30 DIAGNOSIS — R50.9 FEVER, UNSPECIFIED: ICD-10-CM

## 2025-01-30 DIAGNOSIS — J10.1 INFLUENZA A: Primary | ICD-10-CM

## 2025-01-30 LAB
FLUAV AG SPEC QL IA: POSITIVE
FLUBV AG SPEC QL IA: NEGATIVE

## 2025-01-30 RX ORDER — OSELTAMIVIR PHOSPHATE 75 MG/1
75 CAPSULE ORAL 2 TIMES DAILY
Qty: 10 CAPSULE | Refills: 0 | Status: SHIPPED | OUTPATIENT
Start: 2025-01-30 | End: 2025-02-04

## 2025-01-30 RX ORDER — IBUPROFEN 600 MG/1
600 TABLET, FILM COATED ORAL EVERY 6 HOURS PRN
Qty: 40 TABLET | Refills: 0 | Status: SHIPPED | OUTPATIENT
Start: 2025-01-30

## 2025-01-30 ASSESSMENT — PAIN SCALES - GENERAL: PAINLEVEL_OUTOF10: SEVERE PAIN (7)

## 2025-01-30 NOTE — PROGRESS NOTES
Patient presents with:  Headache  Generalized Body Aches: Onset of symptoms 1/25/25, Covid tested negative  Cough: Dry and productive cough, cough is worse, fever last night  Fatigue    (J10.1) Influenza A  (primary encounter diagnosis)  Comment:   Plan: oseltamivir (TAMIFLU) 75 MG capsule, ibuprofen         (ADVIL/MOTRIN) 600 MG tablet            (R50.9) Fever, unspecified  Comment:   Plan: Influenza A & B Antigen - Clinic Collect            (R05.8) Productive cough  Comment:   Plan: XR Chest 2 Views            Rest.      Follow up with primary clinic should symptoms persist or worsen.        At the end of the encounter, I discussed results, diagnosis, medications. Discussed red flags for immediate return to clinic/ER, as well as indications for follow up if no improvement. Patient understood and agreed to plan. Patient was stable for discharge     If not improving or if condition worsens, follow up with your Primary Care Provider        SUBJECTIVE:   Catie Nuñez is a 59 year old female who presents today with a productive cough onset 3 days ago.   Started with body aches 5 days ago.      Patient Active Problem List   Diagnosis    CARDIOVASCULAR SCREENING; LDL GOAL LESS THAN 160    Scoliosis    Cervicalgia    Cervicogenic headache    Generalized anxiety disorder    Pulmonary nodule    Migraine without aura and without status migrainosus, not intractable    Chronic prescription benzodiazepine use    HSIL (high grade squamous intraepithelial lesion) on Pap smear of cervix    Papanicolaou smear of cervix with low grade squamous intraepithelial lesion (LGSIL)    Acute seasonal allergic rhinitis, unspecified trigger    Chronic pain syndrome    Cervical high risk human papillomavirus (HPV 16) DNA test positive    Tension headache    Medication overuse headache    Chronic, continuous use of opioids    Major depressive disorder, recurrent episode, mild    Panic attack    Mitral valve prolapse    Cervical spondylosis  without myelopathy    Cervical spondylosis with myelopathy         Past Medical History:   Diagnosis Date    Abnormal Pap smear of cervix 11/07/2019    3/11/21    Anxiety     Arthritis 3 yrs ago    ASCUS with positive high risk HPV 6/2015,09/14/16    atypia on colp, 09/14/16: ASCUS + HR HPV 16 and other.     Cervical high risk HPV (human papillomavirus) test positive 11/07/2019 11/7/21    Depression     Depressive disorder Age 12    Fracture of great toe 02/20/2014    History of scoliosis     Hx of colposcopy with cervical biopsy 10/06/2016    10/06/16: [place] Bx NIL.    MVP (mitral valve prolapse)     Unexplained endometrial cells on cervical Pap smear 06/2015    thin endometrium on US         Current Outpatient Medications   Medication Sig Dispense Refill    Multiple Vitamins-Iron (DAILY-MU/IRON/BETA-CAROTENE) TABS TAKE 1 TABLET BY MOUTH DAILY. (Patient not taking: Reported on 10/19/2020) 30 tablet 7     Social History     Tobacco Use    Smoking status: Never Smoker    Smokeless tobacco: Never Used   Substance Use Topics    Alcohol use: Not on file     Family History   Problem Relation Age of Onset    Diabetes Mother     Diabetes Father          ROS:    10 point ROS of systems including Constitutional, Eyes, Respiratory, Cardiovascular, Gastroenterology, Genitourinary, Integumentary, Muscularskeletal, Psychiatric ,neurological were all negative except for pertinent positives noted in my HPI       OBJECTIVE:  /82   Pulse 92   Temp 100  F (37.8  C) (Tympanic)   Resp 16   Wt 50.8 kg (112 lb)   LMP 09/01/2016 (LMP Unknown)   SpO2 97%   BMI 19.22 kg/m    Physical Exam:  GENERAL APPEARANCE: healthy, alert and no distress  EYES: EOMI,  PERRL, conjunctiva clear  HENT: ear canals and TM's normal.  Nose and mouth without ulcers, erythema or lesions  NECK: supple, nontender, no lymphadenopathy  RESP: lungs clear to auscultation - no rales, rhonchi or wheezes  CV: regular rates and rhythm, normal S1 S2, no  murmur noted  SKIN: no suspicious lesions or rashes    X-Ray was done, my findings are: Chronic stable findings no infiltrates.    Results for orders placed or performed in visit on 01/30/25   XR Chest 2 Views     Status: None    Narrative    EXAM: XR CHEST 2 VIEWS  LOCATION: [facility]  DATE: 1/30/2025    INDICATION: pt complains of productive cough for 3 days, Influenza A positive today.  COMPARISON: 3/31/2023.      Impression    IMPRESSION: Cardiac silhouette is within normal limits. Unchanged calcified left lung granuloma and mediastinal and left hilar lymph nodes. No focal airspace consolidation. No pleural effusion or pneumothorax. Unchanged biapical scarring. Dextrocurvature   of the thoracic spine.   Influenza A & B Antigen - Clinic Collect     Status: Abnormal    Specimen: Nose; Swab   Result Value Ref Range    Influenza A antigen Positive (A) Negative    Influenza B antigen Negative Negative    Narrative    Test results must be correlated with clinical data. If necessary, results should be confirmed by a molecular assay or viral culture.

## 2025-01-30 NOTE — PATIENT INSTRUCTIONS
(J10.1) Influenza A  (primary encounter diagnosis)  Comment:   Plan: oseltamivir (TAMIFLU) 75 MG capsule, ibuprofen         (ADVIL/MOTRIN) 600 MG tablet            (R50.9) Fever, unspecified  Comment:   Plan: Influenza A & B Antigen - Clinic Collect            (R05.8) Productive cough  Comment:   Plan: XR Chest 2 Views            Rest.      Follow up with primary clinic should symptoms persist or worsen.

## 2025-02-17 DIAGNOSIS — J30.2 ACUTE SEASONAL ALLERGIC RHINITIS: ICD-10-CM

## 2025-02-17 RX ORDER — FLUTICASONE PROPIONATE 50 MCG
1-2 SPRAY, SUSPENSION (ML) NASAL DAILY
Qty: 16 G | Refills: 5 | Status: SHIPPED | OUTPATIENT
Start: 2025-02-17

## 2025-02-18 ENCOUNTER — PRE VISIT (OUTPATIENT)
Dept: NEUROLOGY | Facility: CLINIC | Age: 60
End: 2025-02-18

## 2025-02-18 ENCOUNTER — OFFICE VISIT (OUTPATIENT)
Dept: NEUROLOGY | Facility: CLINIC | Age: 60
End: 2025-02-18
Attending: PSYCHIATRY & NEUROLOGY
Payer: COMMERCIAL

## 2025-02-18 VITALS — HEART RATE: 82 BPM | DIASTOLIC BLOOD PRESSURE: 82 MMHG | OXYGEN SATURATION: 97 % | SYSTOLIC BLOOD PRESSURE: 121 MMHG

## 2025-02-18 DIAGNOSIS — G43.009 MIGRAINE WITHOUT AURA AND WITHOUT STATUS MIGRAINOSUS, NOT INTRACTABLE: ICD-10-CM

## 2025-02-18 DIAGNOSIS — G43.709 CHRONIC MIGRAINE WITHOUT AURA WITHOUT STATUS MIGRAINOSUS, NOT INTRACTABLE: Primary | ICD-10-CM

## 2025-02-18 RX ORDER — SUMATRIPTAN 50 MG/1
TABLET, FILM COATED ORAL
Qty: 18 TABLET | Refills: 11 | Status: SHIPPED | OUTPATIENT
Start: 2025-02-18

## 2025-02-18 ASSESSMENT — MIGRAINE DISABILITY ASSESSMENT (MIDAS)
TOTAL SCORE: 45
HOW MANY DAYS WAS YOUR PRODUCTIVITY CUT IN HALF BECAUSE OF HEADACHES: 10
HOW OFTEN WERE SOCIAL ACTIVITIES MISSED DUE TO HEADACHES: 5
ON A SCALE FROM 0-10 ON AVERAGE HOW PAINFUL WERE HEADACHES: 5
HOW MANY DAYS DID YOU MISS WORK OR SCHOOL BECAUSE OF HEADACHES: 10
HOW MANY DAYS IN THE PAST 3 MONTHS HAVE YOU HAD A HEADACHE: 30
HOW MANY DAYS DID YOU NOT DO HOUSEWORK BECAUSE OF HEADACHES: 10
HOW MANY DAYS WAS HOUSEWORK PRODUCTIVITY CUT IN HALF DUE TO HEADACHES: 10

## 2025-02-18 ASSESSMENT — HEADACHE IMPACT TEST (HIT 6)
HOW OFTEN DID HEADACHS LIMIT CONCENTRATION ON WORK OR DAILY ACTIVITY: VERY OFTEN
HOW OFTEN HAVE YOU FELT FED UP OR IRRITATED BECAUSE OF YOUR HEADACHES: VERY OFTEN
WHEN YOU HAVE A HEADACHE HOW OFTEN DO YOU WISH YOU COULD LIE DOWN: VERY OFTEN
HOW OFTEN DO HEADACHES LIMIT YOUR DAILY ACTIVITIES: VERY OFTEN
WHEN YOU HAVE HEADACHES HOW OFTEN IS THE PAIN SEVERE: SOMETIMES
HIT6 TOTAL SCORE: 64
HOW OFTEN HAVE YOU FELT TOO TIRED TO WORK BECAUSE OF YOUR HEADACHES: SOMETIMES

## 2025-02-18 ASSESSMENT — PAIN SCALES - GENERAL: PAINLEVEL_OUTOF10: MODERATE PAIN (5)

## 2025-02-18 NOTE — LETTER
"2/18/2025      Catie Nuñez  [address]  [facility] [address]      Dear Colleague,    Thank you for referring your patient, Catie Nuñez, to the [facility]. Please see a copy of my visit note below.    [facility]   Headache Neurology Consult    February 18, 2025     Catie Nuñez MRN# [id number]   YOB: 1965 Age: 59 year old     Referring provider: Gelacio Campbell          Assessment and Recommendations:     Catie \"Kimmie\" JOE Nuñez is a 59 year old female who presents for further evaluation of headaches.     Her headache presentation criteria for chronic migraine without aura.  I suspect she has this on a genetic basis. Headaches are complicated by chronic neck pain, insomnia, anxiety.     Her neurologic examination is overall intact. She has some significant muscle tension throughout the posterior neck muscles and upper trapezius muscles. We reviewed her images from her October MRI brain together today. MRI brain is reassuring that there is no concerning intracranial pathology contributing to her symptoms.     We discussed the following treatment strategy:  - For acute treatment of mild headache, she may use Excedrin as needed. Reviewed risks of analgesic overuse headache. Recommend she try to limit Excedrin to no more than 9 days per month.   - For acute treatment of moderate to severe headache, she may use sumatriptan 50 mg taken at onset of headache with a repeat dose taken after 2 hours if needed. Use should not exceed 9 days per month to avoid medication overuse.  - She currently also has tramadol for chronic pain. Goal would be for her to use this less frequently as headaches become better controlled.     Her current frequency and severity of headaches warrant prevention.  - She has previously tried and failed several first line migraine prophylactic medications including propranolol and topiramate. She " "has also tried and failed gabapentin, pregabalin, Nurtec. She currently takes venlafaxine for mental health and has not tolerated higher doses.   - She has a phobia of needles so she is unable to self-inject a medication, such as an injectable CGRP inhibitor. However, she is okay if procedures involve needles.   - I recommend a trial of botulinum toxin injections following a chronic migraine protocol. We will work to obtain prior authorization for this. General procedure and potential side effects and risks reviewed today. Recommend a trial of 3 rounds prior to determining effectiveness.   - Alternatively, consider Qulipta or Vyepti.     The longitudinal plan of care for the diagnosis(es)/condition(s) as documented were addressed during this visit. Due to the added complexity in care, I will continue to support Kimmie in the subsequent management and with ongoing continuity of care.     She will return to clinic for first round of injections.     I spent 50 minutes today on patient care including history, exam, medical discussion, chart review, and documentation.     Deandra Vera PA-C  Headache Neurology  [facility]            Chief Complaint:     Chief Complaint   Patient presents with     Consult     Headache     \"Symptoms are not getting better but not getting any worse\"   Less throbbing head pain more nausea           History is obtained from the patient and medical record.      Catie \"Kimmie\" JOE Nuñez is a 59 year old female who presents for further evaluation of headaches.     She has been following with Dr. Campbell (last seen 12/12/2024) and presents today to discuss additional migraine treatment options, including botulinum  toxin injections.     She reports she has suffered from headaches since age 12. She has a history of scoliosis, has always had tension headaches associated with neck posture. Later on, she developed migraine headaches which would last for 3 days.     For her " "migraines, she will have right-sided neck pain and throbbing head pain throughout the vertex or behind her eyes. Typical headaches are a 3-5/10 and severe headaches are a 10/10. Without treatment, headaches will last 3 days in duration.   She will have associated nausea without vomiting and decreased appetite. She will have photophobia, phonophobia, osmophobia. Denies typical aura or other visual disturbances. Denies focal paresthesias or weakness, unilateral autonomic features, positional or exertional component, tinnitus, vertigo, fevers.    With her tension headaches, these are more of a generalized mild ache felt throughout her head, neck, back of the eyes. She does not have associated nausea with these.    She currently reports 30/30 headache days per month, with 4-8/30 severe headache days per month.     Headache triggers include stress, poor sleep, working in poor posture for prolonged periods.     Sumatriptan is very effective for acute treatment of migraine, will get relief within 2 hours and rarely needs to use second dose, though she still has migraine \"hangover\" for 1 day following an attack. Tension headaches are relieved with Excedrin as needed.  She will sometimes take 1/2 of a Unisom for nausea. Reacts poorly to ondansetron.   She did not feel as though prochlorperazine nor meclizine have been helpful before.  Meloxicam was too hard on her stomach.   Takes tramadol 50 mg 1-2 times daily for chronic pain.     She currently takes venlafaxine for mental health. She has tried increasing dose for headache benefit but noted worse headaches. She has maintained on 75 mg daily.     Takes a daily probiotic.     She has previously tried propranolol (lightheaded, orthostasis), gabapentin (too sedating), topiramate (not effective), pregabalin, tizanidine (urinary frequency), baclofen, Nurtec (not effective), dietary supplements, occipital nerve block (7/2/2024, helpful), median branch block (6/10/2024) and " cervical ablation (worsened pain).     She has a needle phobia, is unable to self-administer injections. She is okay if the needle is handled by a provider.     Sleep is usually poor. She has difficulty falling asleep and staying asleep. Has difficulty turning off thoughts running through her head at bedtime. Tries to work on sleep hygiene. She stopped hydroxyzine due to seeing warnings not to take with heart problems. She has history of mitral valve prolapse. Hydroxyzine was helpful for sleep.    She tries to limit caffeine to about 1 cup coffee daily.     She had a concussion about 2 years ago, fell and hit her head on the sidewalk. Had a similar concussion 15 years ago.    She has a strong family history of headache on both side of her family.     Current on eye exams.         2/18/2025    12:03 PM   HIT-6   When you have headaches, how often is the pain severe 10   How often do headaches limit your ability to do usual daily activities including household work, work, school, or social activities? 11   When you have a headache, how often do you wish you could lie down? 11   In the past 4 weeks, how often have you felt too tired to do work or daily activities because of your headaches 10   In the past 4 weeks, how often have you felt fed up or irritated because of your headaches 11   In the past 4 weeks, how often did headaches limit your ability to concentrate on work or daily activities 11   HIT-6 Total Score 64        Patient-reported           2/18/2025    12:17 PM   MIDAS - in the past three months:   On how many days did you miss work or school because of your headaches? 10   How many days was your productivity at work or school reduced by half or more because of your headaches? 10   On how many days did you not do household work because of your headaches? 10   How many days was your productivity in household work reduced by half or more because of your headaches? 10   On how many days did you miss family,  social, or leisure activities because of your headaches? 5   On how many days did you have a headache? 30   On a scale of 0-10, on average how painful were these headaches? 5   MIDAS Score 45 (IV - Severe Disability)                Past Medical History:     Past Medical History:   Diagnosis Date     Abnormal Pap smear of cervix 11/07/2019    3/11/21     Anxiety      Arthritis 3 yrs ago     ASCUS with positive high risk HPV 6/2015,09/14/16    atypia on colp, 09/14/16: ASCUS + HR HPV 16 and other.      Cervical high risk HPV (human papillomavirus) test positive 11/07/2019 11/7/21     Depression      Depressive disorder Age 12     Fracture of great toe 02/20/2014     History of scoliosis      Hx of colposcopy with cervical biopsy 10/06/2016    10/06/16: [place] Bx NIL.     MVP (mitral valve prolapse)      Unexplained endometrial cells on cervical Pap smear 06/2015    thin endometrium on US              Past Surgical History:     Past Surgical History:   Procedure Laterality Date     COLPOSCOPY CERVIX, LOOP ELECTRODE BIOPSY, COMBINED N/A 2/3/2022    Procedure: COLPOSCOPY, WITH LEEP OF CERVIX;  Surgeon: Chio Sanchez MD;  Location: [facility] OR     DESTRUCTION OF PARAVERTEBRAL FACETCERVICAL / THORACIC SINGLE Right 6/24/2024    Procedure: DESTRUCTION, NERVE, FACET JOINT, CERVICOTHORACIC, C2-C5 RIGHT;  Surgeon: Rajesh Goodwin MD;  Location: [facility] OR     INJECT BLOCK MEDIAL BRANCH CERVICAL/THORACIC/LUMBAR Right 5/20/2024    Procedure: Medial branch blocks from C2-C5 including third occipital nerve;  Surgeon: Rajesh Goodwin MD;  Location: [facility] OR     INJECT BLOCK MEDIAL BRANCH CERVICAL/THORACIC/LUMBAR Right 6/10/2024    Procedure: BLOCK, NERVE, FACET JOINT, MEDIAL BRANCH, DIAGNOSTIC, C2-C5 including third occipital nerve;  Surgeon: Rajesh Goodwin MD;  Location: [facility] OR     NO HISTORY OF SURGERY               Social History:     She is self employed, is a .     Social History      Socioeconomic History     Marital status: Single     Spouse name: Not on file     Number of children: Not on file     Years of education: Not on file     Highest education level: Not on file   Occupational History     Occupation: retail sales     Employer: [employer]     Comment: [employer]   Tobacco Use     Smoking status: Never     Passive exposure: Never     Smokeless tobacco: Never   Vaping Use     Vaping status: Never Used   Substance and Sexual Activity     Alcohol use: Not Currently     Comment: occ, rare      Drug use: Never     Sexual activity: Yes     Partners: Male     Birth control/protection: Post-menopausal   Other Topics Concern     Parent/sibling w/ CABG, MI or angioplasty before 65F 55M? Yes   Social History Narrative    ** Merged History Encounter **          Social Drivers of Health     Financial Resource Strain: Low Risk  (11/22/2023)    Financial Resource Strain      Within the past 12 months, have you or your family members you live with been unable to get utilities (heat, electricity) when it was really needed?: No   Food Insecurity: Low Risk  (11/22/2023)    Food Insecurity      Within the past 12 months, did you worry that your food would run out before you got money to buy more?: No      Within the past 12 months, did the food you bought just not last and you didn t have money to get more?: No   Transportation Needs: Low Risk  (11/22/2023)    Transportation Needs      Within the past 12 months, has lack of transportation kept you from medical appointments, getting your medicines, non-medical meetings or appointments, work, or from getting things that you need?: No   Physical Activity: Not on file   Stress: Not on file   Social Connections: Not on file   Interpersonal Safety: Low Risk  (9/19/2024)    Interpersonal Safety      Do you feel physically and emotionally safe where you currently live?: Yes      Within the past 12 months, have you been hit, slapped, kicked or otherwise physically hurt  by someone?: No      Within the past 12 months, have you been humiliated or emotionally abused in other ways by your partner or ex-partner?: No   Housing Stability: Low Risk  (11/22/2023)    Housing Stability      Do you have housing? : Yes      Are you worried about losing your housing?: No             Family History:     Family History   Problem Relation Age of Onset     Other Cancer Mother         AML     Osteoporosis Mother      Heart Disease Father      Hypertension Father      Bleeding Diathesis Father         nosebleed that wouldn't stop - required hospitalization     Cerebrovascular Disease Maternal Grandmother      Hypertension Maternal Grandmother      Bleeding Diathesis Paternal Grandmother      Diabetes Other         Developed in older age     Asthma No family hx of      Breast Cancer No family hx of              Allergies:      Allergies   Allergen Reactions     Ondansetron Nausea and Vomiting     Sulfa Antibiotics Hives     Rash               Medications:     Current Outpatient Medications:      ALPRAZolam (XANAX) 0.5 MG tablet, TAKE ONE-HALF TABLET BY MOUTH TWICE DAILY AS NEEDED FOR ANXIETY(TO LAST 30 DAYS), Disp: 15 tablet, Rfl: 0     Aspirin-Acetaminophen-Caffeine (EXCEDRIN PO), Take by mouth as needed, Disp: , Rfl:      estradiol (ESTRACE) 1 MG tablet, TAKE 1/2 TABLET BY MOUTH DAILY, Disp: 90 tablet, Rfl: 0     fluticasone (FLONASE) 50 MCG/ACT nasal spray, Spray 1-2 sprays into both nostrils daily., Disp: 16 g, Rfl: 5     ibuprofen (ADVIL/MOTRIN) 600 MG tablet, Take 1 tablet (600 mg) by mouth every 6 hours as needed for moderate pain., Disp: 40 tablet, Rfl: 0     medroxyPROGESTERone (PROVERA) 2.5 MG tablet, TAKE 1 TABLET(2.5 MG) BY MOUTH DAILY, Disp: 90 tablet, Rfl: 1     SUMAtriptan (IMITREX) 50 MG tablet, TAKE 1 TABLET BY MOUTH AT ONSET OF MIGRAINE. MAY REPEAT IN 2 HOURS, MAX 2 TABLETS IN 24 HOURS. MAX 9 DAYS PER MONTH, Disp: 18 tablet, Rfl: 3     traMADol (ULTRAM) 50 MG tablet, Take 1 tablet (50  mg) by mouth 2 times daily as needed for severe pain. 30 day supply. Do not take with alprazolam., Disp: 50 tablet, Rfl: 0     venlafaxine (EFFEXOR XR) 75 MG 24 hr capsule, TAKE 1 CAPSULE(75 MG) BY MOUTH DAILY, Disp: 90 capsule, Rfl: 0     busPIRone (BUSPAR) 5 MG tablet, Take 1 tablet (5 mg) by mouth 2 times daily. (Patient not taking: Reported on 2/18/2025), Disp: 60 tablet, Rfl: 1     cetirizine (ZYRTEC) 10 MG tablet, Take 1 tablet (10 mg) by mouth daily. (Patient not taking: Reported on 2/18/2025), Disp: 30 tablet, Rfl: 0     hydrOXYzine HCl (ATARAX) 25 MG tablet, Take 1 tablet (25 mg) by mouth nightly as needed for anxiety (insomina) (Patient not taking: Reported on 2/18/2025), Disp: 90 tablet, Rfl: 1     meclizine (ANTIVERT) 12.5 MG tablet, Take 1 tablet (12.5 mg) by mouth 3 times daily as needed for dizziness (Patient not taking: Reported on 2/18/2025), Disp: 30 tablet, Rfl: 0     pantoprazole (PROTONIX) 40 MG EC tablet, Take 1 tablet (40 mg) by mouth daily (Patient not taking: Reported on 2/18/2025), Disp: 30 tablet, Rfl: 0     prochlorperazine (COMPAZINE) 10 MG tablet, Take 1 tablet (10 mg) by mouth every 6 hours as needed for nausea or vomiting (Patient not taking: Reported on 1/30/2025), Disp: 10 tablet, Rfl: 0     tiZANidine (ZANAFLEX) 2 MG tablet, Take 1 tablet (2 mg) by mouth 3 times daily as needed for muscle spasms (muscle tightness/spasm) (Patient not taking: Reported on 2/18/2025), Disp: 60 tablet, Rfl: 1          Physical Exam:   /82 (BP Location: Left arm, Patient Position: Sitting, Cuff Size: Adult Regular)   Pulse 82   LMP 09/01/2016 (LMP Unknown)   SpO2 97%      General: In no acute distress.  Head: Normocephalic, atraumatic. No radiating pain with palpation over the supraorbital notches, occipital nerves. Temporal pulses intact.   Neck: Normal range of motion with lateral head movements and neck flexion.  Eyes: No conjunctival injection, no scleral icterus.     Neurologic  Exam:  Mental Status Exam: Alert, awake and oriented to situation. No dysarthria. Speech of normal fluency.  Cranial Nerves: Fundoscopic exam with clear disc margins bilaterally. PERRLA, EOMs intact, no nystagmus, facial movements symmetric, facial sensation intact to light touch, hearing intact to conversation, trapezius and SCMs 5/5 bilaterally, tongue midline and fully mobile. No tongue atrophy or fasciculations.   Motor: Normal tone in all four extremities, no atrophy or fasciculations. 5/5 strength bilaterally in shoulder abduction, elbow flexion and extension, wrist flexion and extension, hip flexion, knee flexion and extension, dorsiflexion and plantarflexion. No tremors or abnormal movements noted.  Sensory: Sensation intact to light touch on arms and legs bilaterally.   Coordination: Finger-nose-finger intact bilaterally. Rapidly alternating movements intact bilaterally in the upper extremities. Normal finger tapping bilaterally. Normal Romberg.  Reflexes: 2+ and symmetric in triceps, biceps, brachioradialis, patellar, and Achilles. Plantar reflexes are downgoing bilaterally.  Gait: Normal gait. Able to toe and heel walk. Normal tandem gait.            Data:     MRI brain (10/30/2024 - outside facility)  No acute intracranial pathology, some nonspecific scattered hyperintense foci       Again, thank you for allowing me to participate in the care of your patient.        Sincerely,        Deandra Vera PA-C    Electronically signed

## 2025-02-18 NOTE — PROGRESS NOTES
"[facility]   Headache Neurology Consult    February 18, 2025     Catie Nuñez MRN# [id number]   YOB: 1965 Age: 59 year old     Referring provider: Gelacio Campbell          Assessment and Recommendations:     Catie \"Kimmie\" JOE Nuñez is a 59 year old female who presents for further evaluation of headaches.     Her headache presentation criteria for chronic migraine without aura.  I suspect she has this on a genetic basis. Headaches are complicated by chronic neck pain, insomnia, anxiety.     Her neurologic examination is overall intact. She has some significant muscle tension throughout the posterior neck muscles and upper trapezius muscles. We reviewed her images from her October MRI brain together today. MRI brain is reassuring that there is no concerning intracranial pathology contributing to her symptoms.     We discussed the following treatment strategy:  - For acute treatment of mild headache, she may use Excedrin as needed. Reviewed risks of analgesic overuse headache. Recommend she try to limit Excedrin to no more than 9 days per month.   - For acute treatment of moderate to severe headache, she may use sumatriptan 50 mg taken at onset of headache with a repeat dose taken after 2 hours if needed. Use should not exceed 9 days per month to avoid medication overuse.  - She currently also has tramadol for chronic pain. Goal would be for her to use this less frequently as headaches become better controlled.     Her current frequency and severity of headaches warrant prevention.  - She has previously tried and failed several first line migraine prophylactic medications including propranolol and topiramate. She has also tried and failed gabapentin, pregabalin, Nurtec. She currently takes venlafaxine for mental health and has not tolerated higher doses.   - She has a phobia of needles so she is unable to self-inject a medication, such as an injectable CGRP " "inhibitor. However, she is okay if procedures involve needles.   - I recommend a trial of botulinum toxin injections following a chronic migraine protocol. We will work to obtain prior authorization for this. General procedure and potential side effects and risks reviewed today. Recommend a trial of 3 rounds prior to determining effectiveness.   - Alternatively, consider Qulipta or Vyepti.     The longitudinal plan of care for the diagnosis(es)/condition(s) as documented were addressed during this visit. Due to the added complexity in care, I will continue to support Kimmie in the subsequent management and with ongoing continuity of care.     She will return to clinic for first round of injections.     I spent 50 minutes today on patient care including history, exam, medical discussion, chart review, and documentation.     Deandra Vera PA-C  Headache Neurology  [facility]            Chief Complaint:     Chief Complaint   Patient presents with    Consult    Headache     \"Symptoms are not getting better but not getting any worse\"   Less throbbing head pain more nausea           History is obtained from the patient and medical record.      Catie \"Kimmie\" JOE Nuñez is a 59 year old female who presents for further evaluation of headaches.     She has been following with Dr. Campbell (last seen 12/12/2024) and presents today to discuss additional migraine treatment options, including botulinum  toxin injections.     She reports she has suffered from headaches since age 12. She has a history of scoliosis, has always had tension headaches associated with neck posture. Later on, she developed migraine headaches which would last for 3 days.     For her migraines, she will have right-sided neck pain and throbbing head pain throughout the vertex or behind her eyes. Typical headaches are a 3-5/10 and severe headaches are a 10/10. Without treatment, headaches will last 3 days in duration.   She will have " "associated nausea without vomiting and decreased appetite. She will have photophobia, phonophobia, osmophobia. Denies typical aura or other visual disturbances. Denies focal paresthesias or weakness, unilateral autonomic features, positional or exertional component, tinnitus, vertigo, fevers.    With her tension headaches, these are more of a generalized mild ache felt throughout her head, neck, back of the eyes. She does not have associated nausea with these.    She currently reports 30/30 headache days per month, with 4-8/30 severe headache days per month.     Headache triggers include stress, poor sleep, working in poor posture for prolonged periods.     Sumatriptan is very effective for acute treatment of migraine, will get relief within 2 hours and rarely needs to use second dose, though she still has migraine \"hangover\" for 1 day following an attack. Tension headaches are relieved with Excedrin as needed.  She will sometimes take 1/2 of a Unisom for nausea. Reacts poorly to ondansetron.   She did not feel as though prochlorperazine nor meclizine have been helpful before.  Meloxicam was too hard on her stomach.   Takes tramadol 50 mg 1-2 times daily for chronic pain.     She currently takes venlafaxine for mental health. She has tried increasing dose for headache benefit but noted worse headaches. She has maintained on 75 mg daily.     Takes a daily probiotic.     She has previously tried propranolol (lightheaded, orthostasis), gabapentin (too sedating), topiramate (not effective), pregabalin, tizanidine (urinary frequency), baclofen, Nurtec (not effective), dietary supplements, occipital nerve block (7/2/2024, helpful), median branch block (6/10/2024) and cervical ablation (worsened pain).     She has a needle phobia, is unable to self-administer injections. She is okay if the needle is handled by a provider.     Sleep is usually poor. She has difficulty falling asleep and staying asleep. Has difficulty " turning off thoughts running through her head at bedtime. Tries to work on sleep hygiene. She stopped hydroxyzine due to seeing warnings not to take with heart problems. She has history of mitral valve prolapse. Hydroxyzine was helpful for sleep.    She tries to limit caffeine to about 1 cup coffee daily.     She had a concussion about 2 years ago, fell and hit her head on the sidewalk. Had a similar concussion 15 years ago.    She has a strong family history of headache on both side of her family.     Current on eye exams.         2/18/2025    12:03 PM   HIT-6   When you have headaches, how often is the pain severe 10   How often do headaches limit your ability to do usual daily activities including household work, work, school, or social activities? 11   When you have a headache, how often do you wish you could lie down? 11   In the past 4 weeks, how often have you felt too tired to do work or daily activities because of your headaches 10   In the past 4 weeks, how often have you felt fed up or irritated because of your headaches 11   In the past 4 weeks, how often did headaches limit your ability to concentrate on work or daily activities 11   HIT-6 Total Score 64        Patient-reported           2/18/2025    12:17 PM   MIDAS - in the past three months:   On how many days did you miss work or school because of your headaches? 10   How many days was your productivity at work or school reduced by half or more because of your headaches? 10   On how many days did you not do household work because of your headaches? 10   How many days was your productivity in household work reduced by half or more because of your headaches? 10   On how many days did you miss family, social, or leisure activities because of your headaches? 5   On how many days did you have a headache? 30   On a scale of 0-10, on average how painful were these headaches? 5   MIDAS Score 45 (IV - Severe Disability)                Past Medical History:      Past Medical History:   Diagnosis Date    Abnormal Pap smear of cervix 11/07/2019    3/11/21    Anxiety     Arthritis 3 yrs ago    ASCUS with positive high risk HPV 6/2015,09/14/16    atypia on colp, 09/14/16: ASCUS + HR HPV 16 and other.     Cervical high risk HPV (human papillomavirus) test positive 11/07/2019 11/7/21    Depression     Depressive disorder Age 12    Fracture of great toe 02/20/2014    History of scoliosis     Hx of colposcopy with cervical biopsy 10/06/2016    10/06/16: [place] Bx NIL.    MVP (mitral valve prolapse)     Unexplained endometrial cells on cervical Pap smear 06/2015    thin endometrium on US              Past Surgical History:     Past Surgical History:   Procedure Laterality Date    COLPOSCOPY CERVIX, LOOP ELECTRODE BIOPSY, COMBINED N/A 2/3/2022    Procedure: COLPOSCOPY, WITH LEEP OF CERVIX;  Surgeon: Chio Sanchez MD;  Location: [facility] OR    DESTRUCTION OF PARAVERTEBRAL FACETCERVICAL / THORACIC SINGLE Right 6/24/2024    Procedure: DESTRUCTION, NERVE, FACET JOINT, CERVICOTHORACIC, C2-C5 RIGHT;  Surgeon: Rajesh Goodwin MD;  Location: [facility] OR    INJECT BLOCK MEDIAL BRANCH CERVICAL/THORACIC/LUMBAR Right 5/20/2024    Procedure: Medial branch blocks from C2-C5 including third occipital nerve;  Surgeon: Rajesh Goodwin MD;  Location: [facility] OR    INJECT BLOCK MEDIAL BRANCH CERVICAL/THORACIC/LUMBAR Right 6/10/2024    Procedure: BLOCK, NERVE, FACET JOINT, MEDIAL BRANCH, DIAGNOSTIC, C2-C5 including third occipital nerve;  Surgeon: Rajesh Goodwin MD;  Location: [facility] OR    NO HISTORY OF SURGERY               Social History:     She is self employed, is a .     Social History     Socioeconomic History    Marital status: Single     Spouse name: Not on file    Number of children: Not on file    Years of education: Not on file    Highest education level: Not on file   Occupational History    Occupation: retail sales     Employer: [employer]     Comment:  [employer]   Tobacco Use    Smoking status: Never     Passive exposure: Never    Smokeless tobacco: Never   Vaping Use    Vaping status: Never Used   Substance and Sexual Activity    Alcohol use: Not Currently     Comment: occ, rare     Drug use: Never    Sexual activity: Yes     Partners: Male     Birth control/protection: Post-menopausal   Other Topics Concern    Parent/sibling w/ CABG, MI or angioplasty before 65F 55M? Yes   Social History Narrative    ** Merged History Encounter **          Social Drivers of Health     Financial Resource Strain: Low Risk  (11/22/2023)    Financial Resource Strain     Within the past 12 months, have you or your family members you live with been unable to get utilities (heat, electricity) when it was really needed?: No   Food Insecurity: Low Risk  (11/22/2023)    Food Insecurity     Within the past 12 months, did you worry that your food would run out before you got money to buy more?: No     Within the past 12 months, did the food you bought just not last and you didn t have money to get more?: No   Transportation Needs: Low Risk  (11/22/2023)    Transportation Needs     Within the past 12 months, has lack of transportation kept you from medical appointments, getting your medicines, non-medical meetings or appointments, work, or from getting things that you need?: No   Physical Activity: Not on file   Stress: Not on file   Social Connections: Not on file   Interpersonal Safety: Low Risk  (9/19/2024)    Interpersonal Safety     Do you feel physically and emotionally safe where you currently live?: Yes     Within the past 12 months, have you been hit, slapped, kicked or otherwise physically hurt by someone?: No     Within the past 12 months, have you been humiliated or emotionally abused in other ways by your partner or ex-partner?: No   Housing Stability: Low Risk  (11/22/2023)    Housing Stability     Do you have housing? : Yes     Are you worried about losing your housing?: No              Family History:     Family History   Problem Relation Age of Onset    Other Cancer Mother         AML    Osteoporosis Mother     Heart Disease Father     Hypertension Father     Bleeding Diathesis Father         nosebleed that wouldn't stop - required hospitalization    Cerebrovascular Disease Maternal Grandmother     Hypertension Maternal Grandmother     Bleeding Diathesis Paternal Grandmother     Diabetes Other         Developed in older age    Asthma No family hx of     Breast Cancer No family hx of              Allergies:      Allergies   Allergen Reactions    Ondansetron Nausea and Vomiting    Sulfa Antibiotics Hives     Rash               Medications:     Current Outpatient Medications:     ALPRAZolam (XANAX) 0.5 MG tablet, TAKE ONE-HALF TABLET BY MOUTH TWICE DAILY AS NEEDED FOR ANXIETY(TO LAST 30 DAYS), Disp: 15 tablet, Rfl: 0    Aspirin-Acetaminophen-Caffeine (EXCEDRIN PO), Take by mouth as needed, Disp: , Rfl:     estradiol (ESTRACE) 1 MG tablet, TAKE 1/2 TABLET BY MOUTH DAILY, Disp: 90 tablet, Rfl: 0    fluticasone (FLONASE) 50 MCG/ACT nasal spray, Spray 1-2 sprays into both nostrils daily., Disp: 16 g, Rfl: 5    ibuprofen (ADVIL/MOTRIN) 600 MG tablet, Take 1 tablet (600 mg) by mouth every 6 hours as needed for moderate pain., Disp: 40 tablet, Rfl: 0    medroxyPROGESTERone (PROVERA) 2.5 MG tablet, TAKE 1 TABLET(2.5 MG) BY MOUTH DAILY, Disp: 90 tablet, Rfl: 1    SUMAtriptan (IMITREX) 50 MG tablet, TAKE 1 TABLET BY MOUTH AT ONSET OF MIGRAINE. MAY REPEAT IN 2 HOURS, MAX 2 TABLETS IN 24 HOURS. MAX 9 DAYS PER MONTH, Disp: 18 tablet, Rfl: 3    traMADol (ULTRAM) 50 MG tablet, Take 1 tablet (50 mg) by mouth 2 times daily as needed for severe pain. 30 day supply. Do not take with alprazolam., Disp: 50 tablet, Rfl: 0    venlafaxine (EFFEXOR XR) 75 MG 24 hr capsule, TAKE 1 CAPSULE(75 MG) BY MOUTH DAILY, Disp: 90 capsule, Rfl: 0    busPIRone (BUSPAR) 5 MG tablet, Take 1 tablet (5 mg) by mouth 2 times daily.  (Patient not taking: Reported on 2/18/2025), Disp: 60 tablet, Rfl: 1    cetirizine (ZYRTEC) 10 MG tablet, Take 1 tablet (10 mg) by mouth daily. (Patient not taking: Reported on 2/18/2025), Disp: 30 tablet, Rfl: 0    hydrOXYzine HCl (ATARAX) 25 MG tablet, Take 1 tablet (25 mg) by mouth nightly as needed for anxiety (insomina) (Patient not taking: Reported on 2/18/2025), Disp: 90 tablet, Rfl: 1    meclizine (ANTIVERT) 12.5 MG tablet, Take 1 tablet (12.5 mg) by mouth 3 times daily as needed for dizziness (Patient not taking: Reported on 2/18/2025), Disp: 30 tablet, Rfl: 0    pantoprazole (PROTONIX) 40 MG EC tablet, Take 1 tablet (40 mg) by mouth daily (Patient not taking: Reported on 2/18/2025), Disp: 30 tablet, Rfl: 0    prochlorperazine (COMPAZINE) 10 MG tablet, Take 1 tablet (10 mg) by mouth every 6 hours as needed for nausea or vomiting (Patient not taking: Reported on 1/30/2025), Disp: 10 tablet, Rfl: 0    tiZANidine (ZANAFLEX) 2 MG tablet, Take 1 tablet (2 mg) by mouth 3 times daily as needed for muscle spasms (muscle tightness/spasm) (Patient not taking: Reported on 2/18/2025), Disp: 60 tablet, Rfl: 1          Physical Exam:   /82 (BP Location: Left arm, Patient Position: Sitting, Cuff Size: Adult Regular)   Pulse 82   LMP 09/01/2016 (LMP Unknown)   SpO2 97%      General: In no acute distress.  Head: Normocephalic, atraumatic. No radiating pain with palpation over the supraorbital notches, occipital nerves. Temporal pulses intact.   Neck: Normal range of motion with lateral head movements and neck flexion.  Eyes: No conjunctival injection, no scleral icterus.     Neurologic Exam:  Mental Status Exam: Alert, awake and oriented to situation. No dysarthria. Speech of normal fluency.  Cranial Nerves: Fundoscopic exam with clear disc margins bilaterally. PERRLA, EOMs intact, no nystagmus, facial movements symmetric, facial sensation intact to light touch, hearing intact to conversation, trapezius and SCMs  5/5 bilaterally, tongue midline and fully mobile. No tongue atrophy or fasciculations.   Motor: Normal tone in all four extremities, no atrophy or fasciculations. 5/5 strength bilaterally in shoulder abduction, elbow flexion and extension, wrist flexion and extension, hip flexion, knee flexion and extension, dorsiflexion and plantarflexion. No tremors or abnormal movements noted.  Sensory: Sensation intact to light touch on arms and legs bilaterally.   Coordination: Finger-nose-finger intact bilaterally. Rapidly alternating movements intact bilaterally in the upper extremities. Normal finger tapping bilaterally. Normal Romberg.  Reflexes: 2+ and symmetric in triceps, biceps, brachioradialis, patellar, and Achilles. Plantar reflexes are downgoing bilaterally.  Gait: Normal gait. Able to toe and heel walk. Normal tandem gait.            Data:     MRI brain (10/30/2024 - outside facility)  No acute intracranial pathology, some nonspecific scattered hyperintense foci

## 2025-02-18 NOTE — NURSING NOTE
"Catie Nuñez is a 59 year old female who presents for:  Chief Complaint   Patient presents with    Consult    Headache     \"Symptoms are not getting better but not getting any worse\"   Less throbbing head pain more nausea        Initial Vitals:  /82 (BP Location: Left arm, Patient Position: Sitting, Cuff Size: Adult Regular)   Pulse 82   LMP 09/01/2016 (LMP Unknown)   SpO2 97%  Estimated body mass index is 19.22 kg/m  as calculated from the following:    Height as of 10/9/24: 1.626 m (5' 4\").    Weight as of 1/30/25: 50.8 kg (112 lb).. There is no height or weight on file to calculate BSA. BP completed using cuff size: regular  Moderate Pain (5)    Kirit Temple    "

## 2025-02-27 ENCOUNTER — MYC MEDICAL ADVICE (OUTPATIENT)
Dept: FAMILY MEDICINE | Facility: CLINIC | Age: 60
End: 2025-02-27
Payer: COMMERCIAL

## 2025-02-28 DIAGNOSIS — N95.1 SYMPTOMATIC MENOPAUSAL OR FEMALE CLIMACTERIC STATES: ICD-10-CM

## 2025-02-28 NOTE — TELEPHONE ENCOUNTER
"Kelsi -- please review and advise. I don't see that patient sees a cardiologist    \"I was just prescribed Vyvanse this afternoon by my [facility] provider.  He wanted me to first get medical clearance from my primary provider (care team), due to my mitro valve prolapse, as he wanted to make sure it would be safe for me to take.  Please advise....thank you!!  Kimmie\"    Kelly Gallo RN  [facility]     "

## 2025-03-01 RX ORDER — ESTRADIOL 1 MG/1
0.5 TABLET ORAL DAILY
Qty: 90 TABLET | Refills: 0 | Status: SHIPPED | OUTPATIENT
Start: 2025-03-01

## 2025-03-11 ENCOUNTER — OFFICE VISIT (OUTPATIENT)
Dept: FAMILY MEDICINE | Facility: CLINIC | Age: 60
End: 2025-03-11
Attending: PHYSICIAN ASSISTANT
Payer: COMMERCIAL

## 2025-03-11 VITALS
RESPIRATION RATE: 16 BRPM | OXYGEN SATURATION: 99 % | TEMPERATURE: 99.1 F | HEART RATE: 92 BPM | BODY MASS INDEX: 18.97 KG/M2 | WEIGHT: 111.1 LBS | SYSTOLIC BLOOD PRESSURE: 124 MMHG | DIASTOLIC BLOOD PRESSURE: 72 MMHG | HEIGHT: 64 IN

## 2025-03-11 DIAGNOSIS — M54.2 CERVICALGIA: ICD-10-CM

## 2025-03-11 DIAGNOSIS — F11.90 CHRONIC, CONTINUOUS USE OF OPIOIDS: Primary | ICD-10-CM

## 2025-03-11 DIAGNOSIS — G89.4 CHRONIC PAIN SYNDROME: ICD-10-CM

## 2025-03-11 PROCEDURE — 3074F SYST BP LT 130 MM HG: CPT | Performed by: PHYSICIAN ASSISTANT

## 2025-03-11 PROCEDURE — G2211 COMPLEX E/M VISIT ADD ON: HCPCS | Performed by: PHYSICIAN ASSISTANT

## 2025-03-11 PROCEDURE — 1125F AMNT PAIN NOTED PAIN PRSNT: CPT | Performed by: PHYSICIAN ASSISTANT

## 2025-03-11 PROCEDURE — 99213 OFFICE O/P EST LOW 20 MIN: CPT | Performed by: PHYSICIAN ASSISTANT

## 2025-03-11 PROCEDURE — 3078F DIAST BP <80 MM HG: CPT | Performed by: PHYSICIAN ASSISTANT

## 2025-03-11 RX ORDER — TRAMADOL HYDROCHLORIDE 50 MG/1
50 TABLET ORAL 2 TIMES DAILY PRN
Qty: 30 TABLET | Refills: 0 | Status: SHIPPED | OUTPATIENT
Start: 2025-03-16

## 2025-03-11 RX ORDER — LISDEXAMFETAMINE DIMESYLATE 30 MG/1
30 CAPSULE ORAL EVERY MORNING
COMMUNITY
Start: 2025-02-27

## 2025-03-11 ASSESSMENT — ANXIETY QUESTIONNAIRES
3. WORRYING TOO MUCH ABOUT DIFFERENT THINGS: SEVERAL DAYS
GAD7 TOTAL SCORE: 7
5. BEING SO RESTLESS THAT IT IS HARD TO SIT STILL: NOT AT ALL
8. IF YOU CHECKED OFF ANY PROBLEMS, HOW DIFFICULT HAVE THESE MADE IT FOR YOU TO DO YOUR WORK, TAKE CARE OF THINGS AT HOME, OR GET ALONG WITH OTHER PEOPLE?: SOMEWHAT DIFFICULT
2. NOT BEING ABLE TO STOP OR CONTROL WORRYING: SEVERAL DAYS
1. FEELING NERVOUS, ANXIOUS, OR ON EDGE: NEARLY EVERY DAY
IF YOU CHECKED OFF ANY PROBLEMS ON THIS QUESTIONNAIRE, HOW DIFFICULT HAVE THESE PROBLEMS MADE IT FOR YOU TO DO YOUR WORK, TAKE CARE OF THINGS AT HOME, OR GET ALONG WITH OTHER PEOPLE: SOMEWHAT DIFFICULT
GAD7 TOTAL SCORE: 7
7. FEELING AFRAID AS IF SOMETHING AWFUL MIGHT HAPPEN: NOT AT ALL
6. BECOMING EASILY ANNOYED OR IRRITABLE: SEVERAL DAYS
7. FEELING AFRAID AS IF SOMETHING AWFUL MIGHT HAPPEN: NOT AT ALL
GAD7 TOTAL SCORE: 7
4. TROUBLE RELAXING: SEVERAL DAYS

## 2025-03-11 ASSESSMENT — PATIENT HEALTH QUESTIONNAIRE - PHQ9
SUM OF ALL RESPONSES TO PHQ QUESTIONS 1-9: 1
SUM OF ALL RESPONSES TO PHQ QUESTIONS 1-9: 1

## 2025-03-11 ASSESSMENT — PAIN SCALES - GENERAL: PAINLEVEL_OUTOF10: MODERATE PAIN (4)

## 2025-03-11 NOTE — PROGRESS NOTES
"  Assessment & Plan     Chronic, continuous use of opioids  Chronic pain syndrome  Cervicalgia - will decrease amount of tramadol to #30 tabs/month. She will follow up in 3 months or sooner if needed.   - PRIMARY CARE FOLLOW-UP SCHEDULING  - traMADol (ULTRAM) 50 MG tablet; Take 1 tablet (50 mg) by mouth 2 times daily as needed for severe pain. 30 day supply. Do not take with alprazolam.    The longitudinal plan of care for the diagnosis(es)/condition(s) as documented were addressed during this visit. Due to the added complexity in care, I will continue to support Kimmie in the subsequent management and with ongoing continuity of care.      Subjective   Kimmie is a 59 year old, presenting for the following health issues:  Pain (Follow and check on medication )      3/11/2025     3:42 PM   Additional Questions   Roomed by Ludy Burks here today for chronic pain follow up    Has gone down on tramadol - trying to move to a better place in management of her chronic pain   Hoping that botox injection on Thursday will help as well  Using tramadol once a day or less per month    Pain    History of Present Illness       Back Pain:  She presents for follow up of back pain. Patient's back pain is a chronic problem.  Location of back pain:  Left lower back, right upper back, left upper back, right side of neck, left side of neck, right shoulder and left shoulder  Description of back pain: burning and dull ache  Back pain spreads: nowhere    Since patient first noticed back pain, pain is: always present, but gets better and worse  Does back pain interfere with her job:  Yes       Headaches:   Since the patient's last clinic visit, headaches are: no change  The patient is getting headaches:  Headaches almost daily; migraines multiple times a month  She is not able to do normal daily activities when she has a migraine.  The patient is taking the following rescue/relief medications:  Sumatriptan (Imitrex)   Patient states \"I get " "total relief\" from the rescue/relief medications.   The patient is taking the following medications to prevent migraines:  No medications to prevent migraines  In the past 4 weeks, the patient has gone to an Urgent Care or Emergency Room 0 times times due to headaches.    Reason for visit:  Routine checkup    She eats 2-3 servings of fruits and vegetables daily.She consumes 0 sweetened beverage(s) daily.She exercises with enough effort to increase her heart rate 9 or less minutes per day.  She exercises with enough effort to increase her heart rate 4 days per week.   She is taking medications regularly.            Objective    /72 (BP Location: Right arm, Patient Position: Sitting, Cuff Size: Adult Regular)   Pulse 92   Temp 99.1  F (37.3  C) (Temporal)   Resp 16   Ht 1.626 m (5' 4\")   Wt 50.4 kg (111 lb 1.6 oz)   LMP 09/01/2016 (LMP Unknown)   SpO2 99%   BMI 19.07 kg/m    Body mass index is 19.07 kg/m .  Physical Exam   GENERAL: alert and no distress  PSYCH: mentation appears normal, affect normal/bright        Signed Electronically by: Kelsi Logan PA-C    "

## 2025-03-13 ENCOUNTER — OFFICE VISIT (OUTPATIENT)
Dept: NEUROLOGY | Facility: CLINIC | Age: 60
End: 2025-03-13
Payer: COMMERCIAL

## 2025-03-13 DIAGNOSIS — G43.709 CHRONIC MIGRAINE WITHOUT AURA WITHOUT STATUS MIGRAINOSUS, NOT INTRACTABLE: Primary | ICD-10-CM

## 2025-03-13 NOTE — LETTER
"3/13/2025      Catie Nuñez  [address] [facility] [address]      Dear Colleague,    Thank you for referring your patient, Catie Nuñez, to the [facility]UROLOG[facility]. Please see a copy of my visit note below.    [facility]  Botulinum Toxin Procedure    Deandra Vera PA-C  Headache Neurology    March 13, 2025    Procedure: OnabotulinumtoxinA injections for chronic migraine  Indication: Chronic migraine    Catie Nuñez suffers from severe intractable headaches. She was referred by Deandra Vera PA-C for onabotulinumtoxinA injections for headache.      Headache description from initial consult: \"She will have right-sided neck pain and throbbing head pain throughout the vertex or behind her eyes. Typical headaches are a 3-5/10 and severe headaches are a 10/10. Without treatment, headaches will last 3 days in duration.   She will have associated nausea without vomiting and decreased appetite. She will have photophobia, phonophobia, osmophobia\"    Prior to initiation of botulinum toxin injections, Kimmie reported 30/30 headache days per month, with 4-8/30 severe headache days per month.  Her headaches are quite disabling and often interfere with her ability to function normally.    Date of last injections: First round of injections today  Date of last office visit: 2/18/2025 - Deandra Vera PA-C    She has attempted other migraine prophylactic treatments in the past, which have included: propranolol (lightheaded, orthostasis), gabapentin (too sedating), topiramate (not effective), pregabalin, tizanidine (urinary frequency), baclofen, Nurtec (not effective), dietary supplements, occipital nerve block (7/2/2024, helpful), median branch block (6/10/2024) and cervical ablation (worsened pain), venlafaxine. Needle phobia precludes the use of self-administered injections.      She currently takes no other medications for headache prevention.    Patient's pain was assessed prior to the " procedure. She rated her pain today as 0 out of 10.      The procedure was explained to the patient. Benefits of the treatment were discussed including headache and migraine reduction. Risks of the procedure were reviewed including but not limited to pain, bruising, bleeding, infection, and weakness of muscles injected or those distal to injection sites. Alternatives were discussed. The patient voiced understanding of the risks and benefits. All questions answered and patient consented to proceed.    Procedural Pause: Procedural pause was conducted to verify correct patient identity, procedure to be performed, correct side and site, correct patient position, and special requirements. Appropriate hand hygiene was utilized, and each injection site was prepped with alcohol wipes or Chloraprep swab.     Procedure Details:   200 units of onabotulinumtoxinA was diluted in 4 mL 0.9% normal saline.   A total of 150 units of onabotulinumtoxinA were injected using 30 gauge 0.5 inch needles into the muscles listed below. 50 units of onabotulinumtoxinA were wasted.     Injection Sites: Total = 150 units onabotulinumtoxinA     Procerus muscles - 5 units into the procerus muscle (5 units total)     muscles - 5 units into the left  muscle and 5 units into the right  muscle (1 injection site per muscle) (10 units total)    Frontalis muscles - 5 units into the left superior frontalis muscle and 5 units into the right superior frontalis muscle (2 injection sites per muscle) (10 units total)    Temporalis muscles - 12.5 units into the left temporalis muscle and 12.5 units into the right temporalis muscle (2 injection sites per muscle) (25 units total)    Occipitalis muscles - 12.5 units into the left occipitalis muscle and 12.5 units into the right occipitalis muscle (2 injection sites per muscle) (25 units total)    Splenius Capitis muscles - 12.5 units into the left splenius capitis muscle and 12.5  units into the right splenius capitis muscle (2 injection sites per muscle, divided into 2/3 anteriorly and 1/3 posteriorly) (25 units total)      Trapezius muscles - 25 units into the left trapezius muscle and 25 units into the right trapezius muscle (3 injection sites per muscle, divided into 5 units, 10 units, 10 units, medial to lateral) (50 units total)      Patient tolerated the procedure well without immediate complications. She will follow up in clinic for assessment of the effectiveness of treatment. She did not report any change in her pain level after the botulinumtoxinA injection procedure.      Deandra Vera PA-C  Headache Neurology  [facility] Neurology [facility]      Again, thank you for allowing me to participate in the care of your patient.        Sincerely,        Deandra Vera PA-C    Electronically signed

## 2025-03-13 NOTE — PROGRESS NOTES
"[facility]  Botulinum Toxin Procedure    Deandra Vera PA-C  Headache Neurology    March 13, 2025    Procedure: OnabotulinumtoxinA injections for chronic migraine  Indication: Chronic migraine    Catie Nuñez suffers from severe intractable headaches. She was referred by Deandra Vera PA-C for onabotulinumtoxinA injections for headache.      Headache description from initial consult: \"She will have right-sided neck pain and throbbing head pain throughout the vertex or behind her eyes. Typical headaches are a 3-5/10 and severe headaches are a 10/10. Without treatment, headaches will last 3 days in duration.   She will have associated nausea without vomiting and decreased appetite. She will have photophobia, phonophobia, osmophobia\"    Prior to initiation of botulinum toxin injections, Kimmie reported 30/30 headache days per month, with 4-8/30 severe headache days per month.  Her headaches are quite disabling and often interfere with her ability to function normally.    Date of last injections: First round of injections today  Date of last office visit: 2/18/2025 - Deandra Vera PA-C    She has attempted other migraine prophylactic treatments in the past, which have included: propranolol (lightheaded, orthostasis), gabapentin (too sedating), topiramate (not effective), pregabalin, tizanidine (urinary frequency), baclofen, Nurtec (not effective), dietary supplements, occipital nerve block (7/2/2024, helpful), median branch block (6/10/2024) and cervical ablation (worsened pain), venlafaxine. Needle phobia precludes the use of self-administered injections.      She currently takes no other medications for headache prevention.    Patient's pain was assessed prior to the procedure. She rated her pain today as 0 out of 10.      The procedure was explained to the patient. Benefits of the treatment were discussed including headache and migraine reduction. Risks of the procedure were reviewed including but not limited " to pain, bruising, bleeding, infection, and weakness of muscles injected or those distal to injection sites. Alternatives were discussed. The patient voiced understanding of the risks and benefits. All questions answered and patient consented to proceed.    Procedural Pause: Procedural pause was conducted to verify correct patient identity, procedure to be performed, correct side and site, correct patient position, and special requirements. Appropriate hand hygiene was utilized, and each injection site was prepped with alcohol wipes or Chloraprep swab.     Procedure Details:   200 units of onabotulinumtoxinA was diluted in 4 mL 0.9% normal saline.   A total of 150 units of onabotulinumtoxinA were injected using 30 gauge 0.5 inch needles into the muscles listed below. 50 units of onabotulinumtoxinA were wasted.     Injection Sites: Total = 150 units onabotulinumtoxinA     Procerus muscles - 5 units into the procerus muscle (5 units total)     muscles - 5 units into the left  muscle and 5 units into the right  muscle (1 injection site per muscle) (10 units total)    Frontalis muscles - 5 units into the left superior frontalis muscle and 5 units into the right superior frontalis muscle (2 injection sites per muscle) (10 units total)    Temporalis muscles - 12.5 units into the left temporalis muscle and 12.5 units into the right temporalis muscle (2 injection sites per muscle) (25 units total)    Occipitalis muscles - 12.5 units into the left occipitalis muscle and 12.5 units into the right occipitalis muscle (2 injection sites per muscle) (25 units total)    Splenius Capitis muscles - 12.5 units into the left splenius capitis muscle and 12.5 units into the right splenius capitis muscle (2 injection sites per muscle, divided into 2/3 anteriorly and 1/3 posteriorly) (25 units total)      Trapezius muscles - 25 units into the left trapezius muscle and 25 units into the right trapezius muscle  (3 injection sites per muscle, divided into 5 units, 10 units, 10 units, medial to lateral) (50 units total)      Patient tolerated the procedure well without immediate complications. She will follow up in clinic for assessment of the effectiveness of treatment. She did not report any change in her pain level after the botulinumtoxinA injection procedure.      Deandra Vera PA-C  Headache Neurology  [facility] Neurology [facility]

## 2025-04-11 ENCOUNTER — MYC REFILL (OUTPATIENT)
Dept: FAMILY MEDICINE | Facility: CLINIC | Age: 60
End: 2025-04-11
Payer: COMMERCIAL

## 2025-04-11 DIAGNOSIS — G89.4 CHRONIC PAIN SYNDROME: ICD-10-CM

## 2025-04-11 DIAGNOSIS — M54.2 CERVICALGIA: ICD-10-CM

## 2025-04-11 DIAGNOSIS — F11.90 CHRONIC, CONTINUOUS USE OF OPIOIDS: ICD-10-CM

## 2025-04-14 RX ORDER — TRAMADOL HYDROCHLORIDE 50 MG/1
50 TABLET ORAL 2 TIMES DAILY PRN
Qty: 30 TABLET | Refills: 0 | Status: SHIPPED | OUTPATIENT
Start: 2025-04-14

## 2025-04-19 ENCOUNTER — HEALTH MAINTENANCE LETTER (OUTPATIENT)
Age: 60
End: 2025-04-19

## 2025-05-01 ENCOUNTER — E-VISIT (OUTPATIENT)
Dept: URGENT CARE | Facility: CLINIC | Age: 60
End: 2025-05-01
Payer: COMMERCIAL

## 2025-05-01 DIAGNOSIS — N39.0 ACUTE UTI (URINARY TRACT INFECTION): Primary | ICD-10-CM

## 2025-05-01 RX ORDER — NITROFURANTOIN 25; 75 MG/1; MG/1
100 CAPSULE ORAL 2 TIMES DAILY
Qty: 10 CAPSULE | Refills: 0 | Status: SHIPPED | OUTPATIENT
Start: 2025-05-01 | End: 2025-05-06

## 2025-05-01 NOTE — PATIENT INSTRUCTIONS
Dear Catie Nuñez    After reviewing your responses, I've been able to diagnose you with a urinary tract infection, which is a common infection of the bladder with bacteria.  This is not a sexually transmitted infection, though urinating immediately after intercourse can help prevent infections.  Drinking lots of fluids is also helpful to clear your current infection and prevent the next one.      I have sent a prescription for antibiotics to your pharmacy to treat this infection.    It is important that you take all of your prescribed medication even if your symptoms are improving after a few doses.  Taking all of your medicine helps prevent the symptoms from returning.     If your symptoms worsen, you develop pain in your back or stomach, develop fevers, or are not improving in 5 days, please contact your primary care provider for an appointment or visit any of our convenient Walk-in or Urgent Care Centers to be seen, which can be found on our website here.    Thanks again for choosing us as your health care partner,    Iesha Skinner CNP  Urinary Tract Infection (UTI) in Women: Care Instructions  Overview     A urinary tract infection (UTI) is an infection caused by bacteria. It can happen anywhere in the urinary tract. A UTI can happen in the:  Kidneys.  Ureters, the tubes that connect the kidneys to the bladder.  Bladder.  Urethra, where the urine comes out.  Most UTIs are bladder infections. They often cause pain or burning when you urinate.  Most UTIs can be cured with antibiotics. If you are prescribed antibiotics, be sure to complete your treatment so that the infection does not get worse.  Follow-up care is a key part of your treatment and safety. Be sure to make and go to all appointments, and call your doctor if you are having problems. It's also a good idea to know your test results and keep a list of the medicines you take.  How can you care for yourself at home?  Take your antibiotics as  "directed. Do not stop taking them just because you feel better. You need to take the full course of antibiotics.  Drink extra water and other fluids for the next day or two. This will help make the urine less concentrated and help wash out the bacteria that are causing the infection. (If you have kidney, heart, or liver disease and have to limit fluids, talk with your doctor before you increase the amount of fluids you drink.)  Avoid drinks that are carbonated or have caffeine. They can irritate the bladder.  Urinate often. Try to empty your bladder each time.  To relieve pain, take a hot bath or lay a heating pad set on low over your lower belly or genital area. Never go to sleep with a heating pad in place.  To prevent UTIs  Drink plenty of water each day. This helps you urinate often, which clears bacteria from your system. (If you have kidney, heart, or liver disease and have to limit fluids, talk with your doctor before you increase the amount of fluids you drink.)  Urinate when you need to.  If you are sexually active, urinate right after you have sex.  Change sanitary pads often.  Avoid douches, bubble baths, feminine hygiene sprays, and other feminine hygiene products that have deodorants.  After going to the bathroom, wipe from front to back.  When should you call for help?   Call your doctor now or seek immediate medical care if:    You have new or worse fever, chills, nausea, or vomiting.     You have new pain in your back just below your rib cage. This is called flank pain.     There is new blood or pus in your urine.     You have any problems with your antibiotic medicine.   Watch closely for changes in your health, and be sure to contact your doctor if:    You are not getting better after taking an antibiotic for 2 days.     Your symptoms go away but then come back.   Where can you learn more?  Go to https://www.healthwise.net/patiented  Enter K848 in the search box to learn more about \"Urinary Tract " "Infection (UTI) in Women: Care Instructions.\"  Current as of: April 30, 2024  Content Version: 14.4    8484-8437 [employer].   Care instructions adapted under license by your healthcare professional. If you have questions about a medical condition or this instruction, always ask your healthcare professional. [employer] disclaims any warranty or liability for your use of this information.    "

## 2025-05-06 ENCOUNTER — LAB (OUTPATIENT)
Dept: LAB | Facility: CLINIC | Age: 60
End: 2025-05-06
Payer: COMMERCIAL

## 2025-05-06 ENCOUNTER — E-VISIT (OUTPATIENT)
Dept: URGENT CARE | Facility: CLINIC | Age: 60
End: 2025-05-06
Payer: COMMERCIAL

## 2025-05-06 DIAGNOSIS — J02.9 SORE THROAT: ICD-10-CM

## 2025-05-06 DIAGNOSIS — J02.9 SORE THROAT: Primary | ICD-10-CM

## 2025-05-06 LAB
DEPRECATED S PYO AG THROAT QL EIA: NEGATIVE
S PYO DNA THROAT QL NAA+PROBE: NOT DETECTED

## 2025-05-06 PROCEDURE — 87651 STREP A DNA AMP PROBE: CPT

## 2025-05-06 NOTE — PATIENT INSTRUCTIONS
Dear Kimmie,    After reviewing your responses, I would like you to come in for a swab to make sure we treat you correctly. This swab is to evaluate you for possible Strep Throat, and should be scheduled for today or tomorrow. Please use the Schedule Now button in [facility] to schedule your swab. Otherwise, click this link to schedule a lab only appointment.    Lab appointments are not available at most locations on the weekends. If no Lab Only appointment is available, you should be seen in any of our convenient Urgent Care Centers for an in person visit, which can be found on our website here.    You will receive instructions with your results in [facility] once they are available.     If your symptoms worsen, you develop difficulty breathing, difficulty with drinking enough to stay hydrated, difficulty swallowing your saliva or have fevers for more than 5 days, please contact your primary care provider for an appointment or visit an Urgent Care Center to be seen.      Thanks again for choosing us as your health care partner.   JEREMY Delgado CNP  Sore Throat: Care Instructions  Overview     Infection by bacteria or a virus causes most sore throats. Cigarette smoke, dry air, air pollution, allergies, and yelling can also cause a sore throat. Sore throats can be painful and annoying. Fortunately, most sore throats go away on their own. If you have a bacterial infection, your doctor may prescribe antibiotics.  Follow-up care is a key part of your treatment and safety. Be sure to make and go to all appointments, and call your doctor if you are having problems. It's also a good idea to know your test results and keep a list of the medicines you take.  How can you care for yourself at home?  If your doctor prescribed antibiotics, take them as directed. Do not stop taking them just because you feel better. You need to take the full course of antibiotics.  Gargle with warm salt water several times a day to help reduce  "swelling and relieve pain. Mix 1/2 teaspoon of salt in 1 cup of warm water.  Take an over-the-counter pain medicine, such as acetaminophen (Tylenol), ibuprofen (Advil, Motrin), or naproxen (Aleve). Read and follow all instructions on the label.  Be careful when taking over-the-counter cold or flu medicines and Tylenol at the same time. Many of these medicines have acetaminophen, which is Tylenol. Read the labels to make sure that you are not taking more than the recommended dose. Too much acetaminophen (Tylenol) can be harmful.  Drink plenty of fluids. Fluids may help soothe an irritated throat. Hot fluids, such as tea or soup, may help decrease throat pain.  Use over-the-counter throat lozenges to soothe pain. Regular cough drops or hard candy may also help. These should not be given to young children because of the risk of choking.  Do not smoke or allow others to smoke around you. If you need help quitting, talk to your doctor about stop-smoking programs and medicines. These can increase your chances of quitting for good.  Use a vaporizer or humidifier to add moisture to your bedroom. Follow the directions for cleaning the machine.  When should you call for help?   Call your doctor now or seek immediate medical care if:    You have trouble breathing.     Your sore throat gets much worse on one side.     You have new or worse trouble swallowing.     You have a new or higher fever.   Watch closely for changes in your health, and be sure to contact your doctor if you do not get better as expected.  Where can you learn more?  Go to https://www.[employer].net/patiented  Enter U420 in the search box to learn more about \"Sore Throat: Care Instructions.\"  Current as of: October 27, 2024  Content Version: 14.4    8797-4696 [facility] [employer].   Care instructions adapted under license by your healthcare professional. If you have questions about a medical condition or this instruction, always ask your healthcare " professional. [facility] [email or web address], [facility] disclaims any warranty or liability for your use of this information.

## 2025-05-08 ENCOUNTER — ANCILLARY PROCEDURE (OUTPATIENT)
Dept: GENERAL RADIOLOGY | Facility: CLINIC | Age: 60
End: 2025-05-08
Attending: NURSE PRACTITIONER
Payer: COMMERCIAL

## 2025-05-08 ENCOUNTER — TELEPHONE (OUTPATIENT)
Dept: FAMILY MEDICINE | Facility: CLINIC | Age: 60
End: 2025-05-08

## 2025-05-08 ENCOUNTER — OFFICE VISIT (OUTPATIENT)
Dept: URGENT CARE | Facility: URGENT CARE | Age: 60
End: 2025-05-08
Payer: COMMERCIAL

## 2025-05-08 ENCOUNTER — VIRTUAL VISIT (OUTPATIENT)
Dept: FAMILY MEDICINE | Facility: CLINIC | Age: 60
End: 2025-05-08
Payer: COMMERCIAL

## 2025-05-08 VITALS
SYSTOLIC BLOOD PRESSURE: 110 MMHG | WEIGHT: 108 LBS | HEART RATE: 84 BPM | TEMPERATURE: 98.2 F | RESPIRATION RATE: 18 BRPM | HEIGHT: 64 IN | OXYGEN SATURATION: 98 % | DIASTOLIC BLOOD PRESSURE: 74 MMHG | BODY MASS INDEX: 18.44 KG/M2

## 2025-05-08 DIAGNOSIS — R53.81 MALAISE: ICD-10-CM

## 2025-05-08 DIAGNOSIS — R05.1 ACUTE COUGH: ICD-10-CM

## 2025-05-08 DIAGNOSIS — R06.2 WHEEZING: ICD-10-CM

## 2025-05-08 DIAGNOSIS — J18.9 PNEUMONIA OF LEFT LOWER LOBE DUE TO INFECTIOUS ORGANISM: Primary | ICD-10-CM

## 2025-05-08 DIAGNOSIS — R50.9 LOW GRADE FEVER: ICD-10-CM

## 2025-05-08 DIAGNOSIS — J98.01 ACUTE BRONCHOSPASM: ICD-10-CM

## 2025-05-08 DIAGNOSIS — R06.02 SOB (SHORTNESS OF BREATH): ICD-10-CM

## 2025-05-08 DIAGNOSIS — J20.9 ACUTE BRONCHITIS, UNSPECIFIED ORGANISM: Primary | ICD-10-CM

## 2025-05-08 DIAGNOSIS — R05.8 PRODUCTIVE COUGH: ICD-10-CM

## 2025-05-08 RX ORDER — ALBUTEROL SULFATE 90 UG/1
2 INHALANT RESPIRATORY (INHALATION) EVERY 6 HOURS PRN
Qty: 18 G | Refills: 1 | Status: SHIPPED | OUTPATIENT
Start: 2025-05-08

## 2025-05-08 RX ORDER — AZITHROMYCIN 250 MG/1
TABLET, FILM COATED ORAL
Qty: 6 TABLET | Refills: 0 | Status: SHIPPED | OUTPATIENT
Start: 2025-05-08 | End: 2025-05-13

## 2025-05-08 RX ORDER — DOXYCYCLINE HYCLATE 100 MG
100 TABLET ORAL 2 TIMES DAILY
Qty: 14 TABLET | Refills: 0 | Status: SHIPPED | OUTPATIENT
Start: 2025-05-08 | End: 2025-05-15

## 2025-05-08 RX ORDER — ALBUTEROL SULFATE 90 UG/1
2 INHALANT RESPIRATORY (INHALATION) EVERY 6 HOURS PRN
Qty: 18 G | Refills: 0 | Status: SHIPPED | OUTPATIENT
Start: 2025-05-08 | End: 2025-05-08

## 2025-05-08 ASSESSMENT — ENCOUNTER SYMPTOMS: COUGH: 1

## 2025-05-08 NOTE — PROGRESS NOTES
Assessment & Plan     1. Pneumonia of left lower lobe due to infectious organism (Primary)  Discussed home care related to bacterial lung infection including completing antibiotic prescription side effects were reviewed including GI upset.  Continue OTC such as Mucinex, Robitussin for cough.  Tylenol or ibuprofen as needed for fever and bodyaches.  Push fluids and rest.  We discussed red flag symptoms that would warrant emergent or urgent evaluation patient verbalized understanding was agreement with this plan.    - azithromycin (ZITHROMAX) 250 MG tablet; Take 2 tablets (500 mg) by mouth daily for 1 day, THEN 1 tablet (250 mg) daily for 4 days.  Dispense: 6 tablet; Refill: 0  - doxycycline hyclate (VIBRA-TABS) 100 MG tablet; Take 1 tablet (100 mg) by mouth 2 times daily for 7 days.  Dispense: 14 tablet; Refill: 0    2. SOB (shortness of breath)    - XR Chest 2 Views    3. Wheezing    - XR Chest 2 Views    4. Malaise    - XR Chest 2 Views    5. Productive cough    - XR Chest 2 Views    6. Low grade fever    - XR Chest 2 Views    JEREMY [facility] CARE [place]    Subjective     Kimmie is a 59 year old female who presents to clinic today for the following health issues:  Chief Complaint   Patient presents with    Urgent Care     Wheezing, coughing up green, mucus causing choking in middle of the night. Strep neg Tuesday.          5/8/2025     1:15 PM   Additional Questions   Roomed by YASMANI Meza     HPI      URI Adult    Onset of symptoms was 3 day(s) ago.  Course of illness is waxing and waning.    Severity moderate  Current and Associated symptoms: fever, runny nose, stuffy nose, cough - productive, wheezing, shortness of breath, sore throat, facial pain/pressure, headache, and body aches  Treatment measures tried include Tylenol/Ibuprofen and OTC Cough med.  Predisposing factors include None.      Review of Systems  Constitutional, HEENT, cardiovascular, pulmonary, gi and gu  "systems are negative, except as otherwise noted.      Objective    /74   Pulse 84   Temp 98.2  F (36.8  C) (Oral)   Resp 18   Ht 1.626 m (5' 4\")   Wt 49 kg (108 lb)   LMP 09/01/2016 (LMP Unknown)   SpO2 98%   BMI 18.54 kg/m    Physical Exam   GENERAL: alert and no distress  EYES: Eyes grossly normal to inspection, PERRL and conjunctivae and sclerae normal  HENT: normal cephalic/atraumatic, ear canals and TM's normal, nose and mouth without ulcers or lesions, nasal mucosa edematous , rhinorrhea clear, oropharynx clear, and oral mucous membranes moist  NECK: bilateral anterior cervical adenopathy, no asymmetry, masses, or scars, and thyroid normal to palpation  RESP: rhonchi throughout and expiratory wheezes throughout  CV: regular rate and rhythm, normal S1 S2, no S3 or S4, no murmur, click or rub, no peripheral edema  MS: no gross musculoskeletal defects noted, no edema    Results for orders placed or performed in visit on 05/08/25   XR Chest 2 Views     Status: None    Narrative    EXAM: XR CHEST 2 VIEWS  LOCATION: [facility] [place]  DATE: 5/8/2025    INDICATION:  Malaise, Productive cough, SOB (shortness of breath), Wheezing, Low grade fever  COMPARISON: Chest x-ray 1/30/2025      Impression    IMPRESSION: Normal heart size and pulmonary vasculature. Faint reticular opacity in the left lower lung could be scarring/atelectasis although could reflect pneumonitis in the setting of cough. Hyperinflation. Sequela of granulomatous   infection/inflammation. No pleural effusion or pneumothorax.    Stable biapical scarring, although this has increased in the left apex compared to older exams. Consider follow-up nonemergent contrast enhanced CT chest.            "

## 2025-05-08 NOTE — PATIENT INSTRUCTIONS
Based on our discussion, I have outlined the following instructions for you:      - Get tested for flu, Respiratory Syncytial Virus, and COVID-19.  - Use albuterol inhaler four times a day to help with wheezing.  - Take Tylenol, Delsym, and Mucinex to help with symptoms.  - Use nasal saline drops to help you get better faster.  - Use Afrin nasal spray before bed to help you breathe easier.  - Drink lots of fluids and enjoy hot drinks like tea or soup.  - If you feel worse, go to urgent care to check your oxygen levels, have your lungs examined, and possibly get a chest X-ray.  - Keep taking Delsym and consider using guaifenesin to help with your cough.  - Try using nasal saline drops to feel better.  - Use albuterol inhaler four times a day and additional as needed to help with wheezing.  - Ask the pharmacist to show you how to use the inhaler correctly.

## 2025-05-08 NOTE — TELEPHONE ENCOUNTER
If PT return calls, please assist PT with scheduling LAB ONLY appt for today.    Orders in chart from KIMBERLY Prakash MA on 5/8/2025 at 12:29 PM

## 2025-05-08 NOTE — PROGRESS NOTES
Kimmie is a 59 year old who is being evaluated via a billable video visit.    How would you like to obtain your AVS? MyChart  If the video visit is dropped, the invitation should be resent by: Text to cell phone: [phone]  Will anyone else be joining your video visit? No      Assessment & Plan       Assessment & Plan  Acute bronchitis, unspecified organism x 5 days:  - Symptoms suggest viral bronchitis. History of bronchitis and pneumonia noted.  - Order combined testing for influenza, RSV, and COVID-19. Prescribe albuterol for wheezing. Recommend symptomatic treatment with Tylenol, Delsym, and guaifenesin. Suggest nasal saline drops to potentially shorten illness duration. Okay to use Afrin nasal spray for temporary relief at bedtime for up to 3 days. Encourage hydrating well. If symptoms worsen, recommend visiting urgent care where an exam can be competed, O2 sat checked and chest X-ray if needed.       ICD-10-CM    1. Acute bronchitis, unspecified organism  J20.9       2. Acute cough  R05.1 Influenza A/B, RSV and SARS-CoV2 PCR (COVID-19)      3. Acute bronchospasm  J98.01 albuterol (PROAIR HFA/PROVENTIL HFA/VENTOLIN HFA) 108 (90 Base) MCG/ACT inhaler           Subjective   Kimmie is a 59 year old, presenting for the following health issues:  Cough (Symptoms started Sunday night after picking son up from college(was sick), sore throat, voice hoarse, deep cough, low grade fever, negative for Strep, wheezing, body aches, sleeping a lot, using cough syrup and advil)      Video Start Time: 11:41    Cough    History of Present Illness       Reason for visit:  Possible bronchitis  Symptom onset:  3-7 days ago  Symptoms include:  Productive cough, sore throat, low fever, body aches, wheezing  Symptom intensity:  Severe  Symptom progression:  Worsening  Had these symptoms before:  Yes  Has tried/received treatment for these symptoms:  Yes  Previous treatment was successful:  Yes  Prior treatment description:  Antibiotics    She is taking medications regularly.   Kimmie Nuñez, 59 years    Respiratory Illness  - Symptoms began on Sunday with a sore throat, body aches, and feeling very achy. Progressed to a burning sensation in the chest, a lingering cough, and nasal congestion.  - Reports coughin up dark green mucus and experiencing wheezing.  - Temperature recorded at 99.5 F, which is higher than her usual.  - History of bronchitis and pneumonia in high school. Mother was a heavy smoker, leading to frequent bronchitis as a child and young adult.  - No history of asthma.  - Has used albuterol in the past for symptoms  - Currently taking Tylenol and Delsym for symptom relief. Reports significant congestion and disrupted sleep due to coughing. Has used Afrin nasal spray starting yesterday without much relief as well. has not tried mucinex.  - Wheezing, does not report shortness of breath, but feels terrible.                Objective    Vitals - Patient Reported  Systolic (Patient Reported):  (unknown)  Diastolic (Patient Reported):  (unknown)  Weight (Patient Reported): 49.9 kg (110 lb)  Pain Score: No Pain (0) (just aching)      Vitals:  No vitals were obtained today due to virtual visit.    Physical Exam   GENERAL: alert and appears moderately ill, somewhat hoarse voice  EYES: Eyes grossly normal to inspection.     RESP: intermittent cough, able to speak in full sentences   SKIN: Visible skin clear. No significant rash, abnormal pigmentation or lesions.  NEURO:  Mentation and speech appropriate for age.  PSYCH: Appropriate affect, tone, and pace of words     Video-Visit Details    Type of service:  Video Visit   Video End Time:12:00 PM  Originating Location (pt. Location): Home    Distant Location (provider location):  Off-site  Platform used for Video Visit: Rafael  Signed Electronically by: Aleshia Llanos MD

## 2025-05-08 NOTE — PROGRESS NOTES
Urgent Care Clinic Visit    Chief Complaint   Patient presents with    Urgent Care     Wheezing, coughing up green, mucus causing choking in middle of the night. Strep neg Tuesday.                5/8/2025     1:15 PM   Additional Questions   Roomed by YASMANI Meza

## 2025-05-14 ENCOUNTER — MYC REFILL (OUTPATIENT)
Dept: FAMILY MEDICINE | Facility: CLINIC | Age: 60
End: 2025-05-14
Payer: COMMERCIAL

## 2025-05-14 DIAGNOSIS — F11.90 CHRONIC, CONTINUOUS USE OF OPIOIDS: ICD-10-CM

## 2025-05-14 DIAGNOSIS — G89.4 CHRONIC PAIN SYNDROME: ICD-10-CM

## 2025-05-14 DIAGNOSIS — M54.2 CERVICALGIA: ICD-10-CM

## 2025-05-14 RX ORDER — TRAMADOL HYDROCHLORIDE 50 MG/1
50 TABLET ORAL 2 TIMES DAILY PRN
Qty: 30 TABLET | Refills: 0 | Status: SHIPPED | OUTPATIENT
Start: 2025-05-14

## 2025-05-17 ENCOUNTER — ANCILLARY PROCEDURE (OUTPATIENT)
Dept: GENERAL RADIOLOGY | Facility: CLINIC | Age: 60
End: 2025-05-17
Attending: FAMILY MEDICINE
Payer: COMMERCIAL

## 2025-05-17 ENCOUNTER — OFFICE VISIT (OUTPATIENT)
Dept: URGENT CARE | Facility: URGENT CARE | Age: 60
End: 2025-05-17
Payer: COMMERCIAL

## 2025-05-17 VITALS
DIASTOLIC BLOOD PRESSURE: 84 MMHG | BODY MASS INDEX: 18.78 KG/M2 | RESPIRATION RATE: 18 BRPM | SYSTOLIC BLOOD PRESSURE: 129 MMHG | HEIGHT: 64 IN | TEMPERATURE: 99.9 F | OXYGEN SATURATION: 97 % | HEART RATE: 83 BPM | WEIGHT: 110 LBS

## 2025-05-17 DIAGNOSIS — R06.02 SOB (SHORTNESS OF BREATH): ICD-10-CM

## 2025-05-17 DIAGNOSIS — R05.1 ACUTE COUGH: Primary | ICD-10-CM

## 2025-05-17 DIAGNOSIS — R05.1 ACUTE COUGH: ICD-10-CM

## 2025-05-17 DIAGNOSIS — Z87.01 HISTORY OF RECENT PNEUMONIA: ICD-10-CM

## 2025-05-17 PROCEDURE — 99214 OFFICE O/P EST MOD 30 MIN: CPT | Performed by: FAMILY MEDICINE

## 2025-05-17 PROCEDURE — 3074F SYST BP LT 130 MM HG: CPT | Performed by: FAMILY MEDICINE

## 2025-05-17 PROCEDURE — 71046 X-RAY EXAM CHEST 2 VIEWS: CPT | Mod: TC | Performed by: RADIOLOGY

## 2025-05-17 PROCEDURE — 3079F DIAST BP 80-89 MM HG: CPT | Performed by: FAMILY MEDICINE

## 2025-05-18 NOTE — PROGRESS NOTES
"  Assessment & Plan     Acute cough  History of recent pneumonia  Shortness of breath  59-year-old female presented with cough, congestion, shortness of breath and fatigue, worse again since yesterday, was treated for community-acquired pneumonia with azithromycin and doxycycline recently.  Physical examination remarkable for low-grade temperature.  Chest x-ray findings reviewed independently which showed no acute lung abnormality.  Differentials discussed in detail including but not limited to cardiovascular, pulmonary and infectious etiology.  Needs comprehensive evaluation to delineate specific diagnosis.  Recommended to go to ER for further evaluation and management.  Patient understood and in agreement with above plan.  All questions answered.  - XR Chest 2 Views; Future      Subjective   Kimmie is a 59 year old, presenting for the following health issues:  Urgent Care and Respiratory Problems (Pt in clinic to have eval for cough and fatigue.  Pt states she was being treated for Pneumonia without resolve.)    HPI      Concern -   Onset: worse again since yesterday   Description: cough, chest congestion/pressure, sob and fatigue   Intensity: moderate  Progression of Symptoms:  was better on antibiotics but worse since yesterday  Accompanying Signs & Symptoms: none  Previous history of similar problem:   Therapies tried and outcome: Azithromycin and doxycycline      Review of Systems  Constitutional, HEENT, cardiovascular, pulmonary, gi and gu systems are negative, except as otherwise noted.      Objective    /84   Pulse 83   Temp 99.9  F (37.7  C) (Temporal)   Resp 18   Ht 1.613 m (5' 3.5\")   Wt 49.9 kg (110 lb)   LMP 09/01/2016 (LMP Unknown)   SpO2 97%   BMI 19.18 kg/m    Body mass index is 19.18 kg/m .  Physical Exam   GENERAL: alert and no distress  EYES: Eyes grossly normal to inspection, PERRL and conjunctivae and sclerae normal  HENT: normal cephalic/atraumatic, ear canals and TM's normal, nose " and mouth without ulcers or lesions, oropharynx clear, and oral mucous membranes moist  NECK: no adenopathy, no asymmetry, masses, or scars  RESP: lungs clear to auscultation - no rales, rhonchi or wheezes  CV: regular rates and rhythm, normal S1 S2, no S3 or S4, and no murmur, click or rub  MS: no gross musculoskeletal defects noted, no edema  SKIN: no suspicious lesions or rashes  NEURO: Normal strength and tone, mentation intact and speech normal    Results for orders placed or performed in visit on 05/17/25   XR Chest 2 Views     Status: None    Narrative    EXAM: XR CHEST 2 VIEWS  LOCATION: [facility]  DATE: 5/17/2025    INDICATION: cough, congestion  COMPARISON: 5/8/2025.      Impression    IMPRESSION: Slight hyperinflation. Resolution of the subtle reticular infiltrates in the left lung base. Benign calcified granuloma left mid lung with benign calcified left hilar nodes.    No acute findings.             Signed Electronically by: Timothy Be MD

## 2025-05-21 ENCOUNTER — OFFICE VISIT (OUTPATIENT)
Dept: FAMILY MEDICINE | Facility: CLINIC | Age: 60
End: 2025-05-21
Payer: COMMERCIAL

## 2025-05-21 ENCOUNTER — NURSE TRIAGE (OUTPATIENT)
Dept: FAMILY MEDICINE | Facility: CLINIC | Age: 60
End: 2025-05-21
Payer: COMMERCIAL

## 2025-05-21 VITALS
DIASTOLIC BLOOD PRESSURE: 82 MMHG | WEIGHT: 108.2 LBS | TEMPERATURE: 97.7 F | BODY MASS INDEX: 19.17 KG/M2 | RESPIRATION RATE: 18 BRPM | HEIGHT: 63 IN | OXYGEN SATURATION: 95 % | SYSTOLIC BLOOD PRESSURE: 138 MMHG | HEART RATE: 91 BPM
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
## 2025-05-21 DIAGNOSIS — S61.459A CAT BITE OF HAND, UNSPECIFIED LATERALITY, INITIAL ENCOUNTER: Primary | ICD-10-CM

## 2025-05-21 PROCEDURE — 1126F AMNT PAIN NOTED NONE PRSNT: CPT | Performed by: FAMILY MEDICINE

## 2025-05-21 PROCEDURE — 3079F DIAST BP 80-89 MM HG: CPT | Performed by: FAMILY MEDICINE

## 2025-05-21 PROCEDURE — 99213 OFFICE O/P EST LOW 20 MIN: CPT | Performed by: FAMILY MEDICINE

## 2025-05-21 PROCEDURE — 3075F SYST BP GE 130 - 139MM HG: CPT | Performed by: FAMILY MEDICINE

## 2025-05-21 ASSESSMENT — PAIN SCALES - GENERAL: PAINLEVEL_OUTOF10: NO PAIN (0)

## 2025-05-21 NOTE — PROGRESS NOTES
"  Assessment & Plan     Cat bite of hand, unspecified laterality, initial encounter  I recommended treatment with Augmentin.  If symptoms do not improve or if they become worse at any point despite being on the antibiotic she will seek medical attention right away.  - amoxicillin-clavulanate (AUGMENTIN) 875-125 MG tablet; Take 1 tablet by mouth 2 times daily for 7 days.            Subjective   Kimmie is a 59 year old, presenting for the following health issues:  Trauma        5/21/2025     4:33 PM   Additional Questions   Roomed by Ilir HUANG     HPI    Cat bite - 05/21 in the morning  - kitten bite [employer]ite hand  - multiple small punctures in the right hand/fingers.  There is a small bite rudy in the left thumb.    She denies having fevers or significant swelling/erythema in the hands.  She denies feeling ill.              Review of Systems  Constitutional, HEENT, cardiovascular, pulmonary, GI, , musculoskeletal, neuro, skin, endocrine and psych systems are negative, except as otherwise noted.      Objective    /82   Pulse 91   Temp 97.7  F (36.5  C) (Temporal)   Resp 18   Ht 1.603 m (5' 3.1\")   Wt 49.1 kg (108 lb 3.2 oz)   LMP 09/01/2016 (LMP Unknown)   SpO2 95%   BMI 19.11 kg/m    Body mass index is 19.11 kg/m .  Physical Exam   GENERAL: alert and no distress  EYES: Eyes grossly normal to inspection, PERRL and conjunctivae and sclerae normal  NECK: no adenopathy, no asymmetry, masses, or scars  RESP: lungs clear to auscultation - no rales, rhonchi or wheezes  CV: regular rate and rhythm, normal S1 S2, no S3 or S4, no murmur, click or rub, no peripheral edema  MS: no gross musculoskeletal defects noted, no edema. Radial and ulnar pulses 2 out of 4 bilaterally.   SKIN: There are a few tiny erythematous bite marks in her right hand/fingers and 1 on the left thumb.  There is no significant surrounding erythema or swelling.    NEURO: Normal strength and tone, mentation intact and speech normal  PSYCH: mentation " appears normal, affect normal/bright            Signed Electronically by: Bill Laura, DO

## 2025-05-21 NOTE — TELEPHONE ENCOUNTER
"S-(situation): 15-20 puncture sites from kitten bites. Location: left thumb, right index finger and pinky finger.     B-(background): kitten was scared due to paw being trapped and Kimmie assisting kitten.    A-(assessment): patient will need to be placed on antibiotic due to cat bites and multiple puncture sites.     R-(recommendations): be seen today in clinic. Scheduled for uptown with provider.     1. APPEARANCE \"What does it look like?\"  (e.g., abrasion, bruise, puncture)       Bruised and puncture wounds, swollen   2. SIZE: \"How big is the bite?\" (e.g., inches, cm; or compare to size of coin, pea, grape, ping pong ball)       Cat bites.   3. LOCATION: \"Where is the bite located?\"       Right index finger and right pinky and left thumb.   4. ONSET: \"When did the bite happen?\" (e.g., minutes, hours ago)       This morning between 5-6 am.   5. ANIMAL: \"What type of animal caused the bite?\" \"Is the injury from a bite or a claw?\" If the animal is a dog or a cat, ask: \"Was it a pet or a stray?\" \"Was it acting ill or behaving strangely?\"      Cat/yaa. Bite. From [employer]. No.   6. RABIES VACCINE: For dog or cat bites, ask: \"Do you know if the pet is vaccinated against rabies?\"  (e.g., yes, no, overdue for rabies shot, unknown)      No. Cat is not 12 weeks old.   7. CIRCUMSTANCES: \"Tell me how this happened.\"       Yaa got paw caught in radiator and when Kimmie tried to assist the cat \"freaked out\" and bit multiple times.   8. TETANUS: \"When was your last tetanus booster?\"      2020.     Patient is on doxycycline (100 mg BID) for pneumonia with today and tomorrow left.     CARE ADVICE:   -- Disposition and First Aid  GO TO OFFICE OR VIDEO VISIT NOW:   -- treatment  -- clean the wound  -- antibiotic ointment  -- CALL BACK IF: * Fever occurs * Wound begins to look infected (pus, redness, red streaks) * Doesn't heal within 14 days * You become worse     Reason for Disposition   Puncture wound (hole through the skin) " from cat (teeth or claws)    Additional Information   Negative: Major bleeding (actively dripping or spurting) that can't be stopped   Negative: Sounds like a life-threatening emergency to the triager   Negative: Human bite   Negative: Any break in skin from BITE (e.g., cut, puncture, or scratch) and WILD animal at risk for RABIES (e.g., bat, raccoon, karimi, skunk, coyote, other carnivores; see Background for list)   Negative: Any break in skin from BITE (e.g., cut, puncture, or scratch) and PET animal (e.g., dog, cat, or ferret) at risk for RABIES (e.g., sick, stray, unprovoked bite, developing country)   Negative: Any break in skin from BITE (e.g., cut, puncture, or scratch) and monkey   Negative: Cut (length > 1/8 inch or 3 mm) or skin tear and any animal  (Exception: Superficial scratch that doesn't go through dermis.)   Negative: Bleeding won't stop after 10 minutes of direct pressure (using correct technique)   Negative: EXPOSURE of non-intact skin (e.g., exposed person has dermatitis, abrasion, wound) with animal BODY FLUID (e.g., licking, blood, brain, saliva) and animal at high-risk for RABIES (e.g., bat, raccoon, karimi, skunk, coyote, other carnivores)   Negative: Sounds like a serious bite injury to the triager    Protocols used: Animal Bite-A-[place]    ABIOLA HernándezN [facility]

## 2025-06-02 ENCOUNTER — PATIENT OUTREACH (OUTPATIENT)
Dept: CARE COORDINATION | Facility: CLINIC | Age: 60
End: 2025-06-02
Payer: COMMERCIAL

## 2025-06-03 NOTE — TELEPHONE ENCOUNTER
PT seen in UC and ED for same concerns, swabs done.    Dorcas Prakash MA on 6/3/2025 at 12:09 PM

## 2025-06-05 ENCOUNTER — OFFICE VISIT (OUTPATIENT)
Dept: NEUROLOGY | Facility: CLINIC | Age: 60
End: 2025-06-05
Payer: COMMERCIAL

## 2025-06-05 DIAGNOSIS — G43.709 CHRONIC MIGRAINE WITHOUT AURA WITHOUT STATUS MIGRAINOSUS, NOT INTRACTABLE: Primary | ICD-10-CM

## 2025-06-05 NOTE — LETTER
"6/5/2025      Catie Nuñez  [address] [place] [address]      Dear Colleague,    Thank you for referring your patient, Catie Nueñz, to the [facility]. Please see a copy of my visit note below.    [facility]  Botulinum Toxin Procedure    Deandra Vera PA-C  Headache Neurology    June 5, 2025    Procedure: OnabotulinumtoxinA injections for chronic migraine  Indication: Chronic migraine    Catie \"Kimmie\" JOE Nuñez suffers from severe intractable headaches. She was referred by Deandra Vera PA-C for onabotulinumtoxinA injections for headache.       Headache description from initial consult: \"She will have right-sided neck pain and throbbing head pain throughout the vertex or behind her eyes. Typical headaches are a 3-5/10 and severe headaches are a 10/10. Without treatment, headaches will last 3 days in duration.   She will have associated nausea without vomiting and decreased appetite. She will have photophobia, phonophobia, osmophobia\"     Prior to initiation of botulinum toxin injections, Kimmie reported 30/30 headache days per month, with 4-8/30 severe headache days per month.  Her headaches are quite disabling and often interfere with her ability to function normally.    Date of last injections: 3/13/2025  Date of last office visit: 2/18/2025 - Deandra Vera PA-C     Kimmie reports 15-25/30 headache days per month with 5/30 severe headache days per month. She has noticed a wearing off phenomenon prior to this round of botulinum toxin injections, lasting 1 weeks.     Kimmie reports the following benefits of botulinum toxin injections from their last round: Less muscle tension which has led to improved headache frequency and severity. Headaches are not going into a 2nd day as often.     She has attempted other migraine prophylactic treatments in the past, which have included: propranolol (lightheaded, orthostasis), gabapentin (too sedating), topiramate (not effective), " pregabalin, tizanidine (urinary frequency), baclofen, Nurtec (not effective), dietary supplements, occipital nerve block (7/2/2024, helpful), median branch block (6/10/2024) and cervical ablation (worsened pain), venlafaxine. Needle phobia precludes the use of self-administered injections.       She currently takes no other medications for headache prevention.    Patient's pain was assessed prior to the procedure. She rated her pain today as 0 out of 10.      The procedure was explained to the patient. Benefits of the treatment were discussed including headache and migraine reduction. Risks of the procedure were reviewed including but not limited to pain, bruising, bleeding, infection, and weakness of muscles injected or those distal to injection sites. Alternatives were discussed. The patient voiced understanding of the risks and benefits. All questions answered and patient consented to proceed.    Procedural Pause: Procedural pause was conducted to verify correct patient identity, procedure to be performed, correct side and site, correct patient position, and special requirements. Appropriate hand hygiene was utilized, and each injection site was prepped with alcohol wipes or Chloraprep swab.     Procedure Details:   200 units of onabotulinumtoxinA was diluted in 4 mL 0.9% normal saline.   A total of 150 units of onabotulinumtoxinA were injected using 30 gauge 0.5 inch needles into the muscles listed below. 50 units of onabotulinumtoxinA were wasted.     Injection Sites: Total = 150 units onabotulinumtoxinA     Procerus muscles - 5 units into the procerus muscle (5 units total)     muscles - 5 units into the left  muscle and 5 units into the right  muscle (1 injection site per muscle) (10 units total)    Frontalis muscles - 5 units into the left superior frontalis muscle and 5 units into the right superior frontalis muscle (2 injection sites per muscle) (10 units total)    Temporalis  muscles - 12.5 units into the left temporalis muscle and 12.5 units into the right temporalis muscle (2 injection sites per muscle) (25 units total)    Occipitalis muscles - 12.5 units into the left occipitalis muscle and 12.5 units into the right occipitalis muscle (2 injection sites per muscle) (25 units total)    Splenius Capitis muscles - 12.5 units into the left splenius capitis muscle and 12.5 units into the right splenius capitis muscle (2 injection sites per muscle, divided into 2/3 anteriorly and 1/3 posteriorly) (25 units total)      Trapezius muscles - 25 units into the left trapezius muscle and 25 units into the right trapezius muscle (3 injection sites per muscle, divided into 5 units, 10 units, 10 units, medial to lateral) (50 units total)      Patient tolerated the procedure well without immediate complications. She will follow up in clinic for assessment of the effectiveness of treatment. She did not report any change in her pain level after the botulinumtoxinA injection procedure.      Deandra Vera PA-C  Headache Neurology  [facility] Neurology [facility]      Again, thank you for allowing me to participate in the care of your patient.        Sincerely,        Deandra Vera PA-C    Electronically signed

## 2025-06-05 NOTE — PROGRESS NOTES
"[facility]  Botulinum Toxin Procedure    Deandra Vera PA-C  Headache Neurology    June 5, 2025    Procedure: OnabotulinumtoxinA injections for chronic migraine  Indication: Chronic migraine    Catie \"Kimmie\" JOE Nuñez suffers from severe intractable headaches. She was referred by Deandra Vera PA-C for onabotulinumtoxinA injections for headache.       Headache description from initial consult: \"She will have right-sided neck pain and throbbing head pain throughout the vertex or behind her eyes. Typical headaches are a 3-5/10 and severe headaches are a 10/10. Without treatment, headaches will last 3 days in duration.   She will have associated nausea without vomiting and decreased appetite. She will have photophobia, phonophobia, osmophobia\"     Prior to initiation of botulinum toxin injections, Kimmie reported 30/30 headache days per month, with 4-8/30 severe headache days per month.  Her headaches are quite disabling and often interfere with her ability to function normally.    Date of last injections: 3/13/2025  Date of last office visit: 2/18/2025 - Deandra Vera PA-C     Kimmie reports 15-25/30 headache days per month with 5/30 severe headache days per month. She has noticed a wearing off phenomenon prior to this round of botulinum toxin injections, lasting 1 weeks.     Kimmie reports the following benefits of botulinum toxin injections from their last round: Less muscle tension which has led to improved headache frequency and severity. Headaches are not going into a 2nd day as often.     She has attempted other migraine prophylactic treatments in the past, which have included: propranolol (lightheaded, orthostasis), gabapentin (too sedating), topiramate (not effective), pregabalin, tizanidine (urinary frequency), baclofen, Nurtec (not effective), dietary supplements, occipital nerve block (7/2/2024, helpful), median branch block (6/10/2024) and cervical ablation (worsened pain), venlafaxine. Needle phobia precludes " the use of self-administered injections.       She currently takes no other medications for headache prevention.    Patient's pain was assessed prior to the procedure. She rated her pain today as 0 out of 10.      The procedure was explained to the patient. Benefits of the treatment were discussed including headache and migraine reduction. Risks of the procedure were reviewed including but not limited to pain, bruising, bleeding, infection, and weakness of muscles injected or those distal to injection sites. Alternatives were discussed. The patient voiced understanding of the risks and benefits. All questions answered and patient consented to proceed.    Procedural Pause: Procedural pause was conducted to verify correct patient identity, procedure to be performed, correct side and site, correct patient position, and special requirements. Appropriate hand hygiene was utilized, and each injection site was prepped with alcohol wipes or Chloraprep swab.     Procedure Details:   200 units of onabotulinumtoxinA was diluted in 4 mL 0.9% normal saline.   A total of 150 units of onabotulinumtoxinA were injected using 30 gauge 0.5 inch needles into the muscles listed below. 50 units of onabotulinumtoxinA were wasted.     Injection Sites: Total = 150 units onabotulinumtoxinA     Procerus muscles - 5 units into the procerus muscle (5 units total)     muscles - 5 units into the left  muscle and 5 units into the right  muscle (1 injection site per muscle) (10 units total)    Frontalis muscles - 5 units into the left superior frontalis muscle and 5 units into the right superior frontalis muscle (2 injection sites per muscle) (10 units total)    Temporalis muscles - 12.5 units into the left temporalis muscle and 12.5 units into the right temporalis muscle (2 injection sites per muscle) (25 units total)    Occipitalis muscles - 12.5 units into the left occipitalis muscle and 12.5 units into the right  occipitalis muscle (2 injection sites per muscle) (25 units total)    Splenius Capitis muscles - 12.5 units into the left splenius capitis muscle and 12.5 units into the right splenius capitis muscle (2 injection sites per muscle, divided into 2/3 anteriorly and 1/3 posteriorly) (25 units total)      Trapezius muscles - 25 units into the left trapezius muscle and 25 units into the right trapezius muscle (3 injection sites per muscle, divided into 5 units, 10 units, 10 units, medial to lateral) (50 units total)      Patient tolerated the procedure well without immediate complications. She will follow up in clinic for assessment of the effectiveness of treatment. She did not report any change in her pain level after the botulinumtoxinA injection procedure.      Deandra Vera PA-C  Headache Neurology  [facility] Neurology [facility]

## 2025-06-09 DIAGNOSIS — N95.1 SYMPTOMATIC MENOPAUSAL OR FEMALE CLIMACTERIC STATES: ICD-10-CM

## 2025-06-10 ENCOUNTER — PATIENT OUTREACH (OUTPATIENT)
Dept: CARE COORDINATION | Facility: CLINIC | Age: 60
End: 2025-06-10
Payer: COMMERCIAL

## 2025-06-10 RX ORDER — MEDROXYPROGESTERONE ACETATE 2.5 MG/1
2.5 TABLET ORAL DAILY
Qty: 90 TABLET | Refills: 1 | Status: SHIPPED | OUTPATIENT
Start: 2025-06-10

## 2025-06-12 ENCOUNTER — MYC REFILL (OUTPATIENT)
Dept: FAMILY MEDICINE | Facility: CLINIC | Age: 60
End: 2025-06-12
Payer: COMMERCIAL

## 2025-06-12 DIAGNOSIS — M54.2 CERVICALGIA: ICD-10-CM

## 2025-06-12 DIAGNOSIS — G89.4 CHRONIC PAIN SYNDROME: ICD-10-CM

## 2025-06-12 DIAGNOSIS — F11.90 CHRONIC, CONTINUOUS USE OF OPIOIDS: ICD-10-CM

## 2025-06-12 RX ORDER — TRAMADOL HYDROCHLORIDE 50 MG/1
50 TABLET ORAL 2 TIMES DAILY PRN
Qty: 30 TABLET | Refills: 0 | Status: SHIPPED | OUTPATIENT
Start: 2025-06-12

## 2025-06-23 ENCOUNTER — OFFICE VISIT (OUTPATIENT)
Dept: OBGYN | Facility: CLINIC | Age: 60
End: 2025-06-23
Payer: COMMERCIAL

## 2025-06-23 VITALS
BODY MASS INDEX: 18.72 KG/M2 | SYSTOLIC BLOOD PRESSURE: 144 MMHG | OXYGEN SATURATION: 98 % | HEART RATE: 92 BPM | WEIGHT: 106 LBS | DIASTOLIC BLOOD PRESSURE: 93 MMHG

## 2025-06-23 DIAGNOSIS — R87.810 CERVICAL HIGH RISK HPV (HUMAN PAPILLOMAVIRUS) TEST POSITIVE: Primary | ICD-10-CM

## 2025-06-23 PROCEDURE — G0145 SCR C/V CYTO,THINLAYER,RESCR: HCPCS | Performed by: OBSTETRICS & GYNECOLOGY

## 2025-06-23 PROCEDURE — 3080F DIAST BP >= 90 MM HG: CPT | Performed by: OBSTETRICS & GYNECOLOGY

## 2025-06-23 PROCEDURE — 3077F SYST BP >= 140 MM HG: CPT | Performed by: OBSTETRICS & GYNECOLOGY

## 2025-06-23 PROCEDURE — 87624 HPV HI-RISK TYP POOLED RSLT: CPT | Performed by: OBSTETRICS & GYNECOLOGY

## 2025-06-23 PROCEDURE — 99212 OFFICE O/P EST SF 10 MIN: CPT | Performed by: OBSTETRICS & GYNECOLOGY

## 2025-06-23 PROCEDURE — 99459 PELVIC EXAMINATION: CPT | Performed by: OBSTETRICS & GYNECOLOGY

## 2025-06-23 NOTE — PROGRESS NOTES
Routine Gyn Exam                                                 CC:  gyn exam, routine pap    HPI:  Catie Nuñez is a 60 year old female who presents to clinic today for pap smear.    Had colposcopy in March 2024.  [place] really uncomfortable for a couple weeks after.  Cramping, some burning.  Reports she did have some sedation meds for it, per her recollection.  Doesn't remember the procedure itself, but was pretty miserable after.    BP elevated today likely due to anxiety regarding this visit.  Reviewed flowsheets and BP otherwise has been normal in recent visits.    6/2015: ASCUS, + HPV 16 & other HR HPV with unexplained endometrial cells. Needs colp and endo bx, US also planned.  7/7/15: [place] - atypia, no endo bx needed as US showed thin endometrium. Plan cotest pap & HPV in 1 year  09/14/16: ASCUS + HR HPV 16 and other. Plan [place] per provider  10/06/16: [place] Bx NIL. Plan Cotest in 1 year per provider.  12/19/17 LSIL, +HPV 16 & other HR type. Plan: [place] per guidelines.  01/05/18: [place] ECC benign neg for dysplasia. Plan cotest in 1 year.   2/12/19 NIL pap, + HR HPV 16. Plan: colpo  04/3/19: [place] ECC benign. Plan pap in 1 year.   11/07/19: LSIL Pap, + HR HPV 16 result. Plan cotest in 1 year, if next one is abnormal [place] at that time.   12/28/20 Lost to follow-up for pap tracking  3/11/21 HSIL pap, LSIL also present, Neg HPV. Plan colp due bef 6/11/21. If pt prefers immediate LEEP without colpo first, provider is okay with that plan. Pt changed mind to    3/18/21 Pt notified and wants to schedule colp first >> 03/19/21 Pt called and is concerned about colpo being 1 month out - pt notified of recommendation for colp within 3 months but also offered to send message to Dr. Alarcon about possibly working her in sooner - Tele enc sent to provider  03/22/21 [place] Bx and ECC Neg for Dysplasia, Plan LEEP now per pt's preference due bef 06/22/21.    03/29/21 Phone call to the pt, new plan see pt's preference above.    21 Per clinic RN, Pt canceled her LEEP and will now come back for a cotest due by 21.   11/15/21 NIL Pap, + HR HPV type 16. Plan [place] per provider's office note, due bef 02/15/22.  22 [place] Bx VLDAIMIR 1, ECC scant small fragments of ectocervical squamous mucosa with non gradable dysplasia, area worrisome for high-grade dysplasia. Plan LEEP due bef 22.   2/3/22 LEEP - VLADIMIR 1. ECC - Negative. Margins negative. Plan 6 month co-test / notified per MyChart  10/5/22 NIL, Neg HPV. Plan 1 yr co-test  23 NIL pap, +HPV 16. Plan [place] bef 2/22/24  3/18/24 [place]: ECC, negative. Plan 1 yr co-test    EXAM:  Blood pressure (!) 144/93, pulse 92, weight 48.1 kg (106 lb), last menstrual period 2016, SpO2 98%, not currently breastfeeding.   BMI= Body mass index is 18.72 kg/m .  General - pleasant female in no acute distress.  Pelvic - external genitalia: normal adult female without lesions or abnormalities; BUS: within normal limits; Vagina: well rugated, no discharge, no lesions; Cervix: no lesions or CMT; pap collected  Musculoskeletal - no gross deformities.  Neurological - normal strength, sensation, and mental status.      Assessment:    Catie Nuñez is a 60 year old  who presents today for pap smear.    Plan:  1. Cervical high risk HPV (human papillomavirus) test positive (Primary)  Discussed if colposcopy were recommended again, may be helpful to revisit past experience and brainstorm what could be done to hopefully make it a less negative experience for her.  - HPV and Gynecologic Cytology Panel - Recommended Age 30-65 Years          Chio Lizama MD

## 2025-06-24 LAB
HPV HR 12 DNA CVX QL NAA+PROBE: NEGATIVE
HPV16 DNA CVX QL NAA+PROBE: NEGATIVE
HPV18 DNA CVX QL NAA+PROBE: NEGATIVE
HUMAN PAPILLOMA VIRUS FINAL DIAGNOSIS: NORMAL

## 2025-06-25 ENCOUNTER — RESULTS FOLLOW-UP (OUTPATIENT)
Dept: OBGYN | Facility: CLINIC | Age: 60
End: 2025-06-25

## 2025-06-26 ENCOUNTER — PATIENT OUTREACH (OUTPATIENT)
Dept: OBGYN | Facility: CLINIC | Age: 60
End: 2025-06-26
Payer: COMMERCIAL

## 2025-06-26 DIAGNOSIS — R87.613 HSIL (HIGH GRADE SQUAMOUS INTRAEPITHELIAL LESION) ON PAP SMEAR OF CERVIX: Primary | ICD-10-CM

## 2025-06-26 LAB
BKR AP ASSOCIATED HPV REPORT: NORMAL
BKR LAB AP GYN ADEQUACY: NORMAL
BKR LAB AP GYN INTERPRETATION: NORMAL
BKR LAB AP PREVIOUS ABNL DX: NORMAL
BKR LAB AP PREVIOUS ABNORMAL: NORMAL
PATH REPORT.COMMENTS IMP SPEC: NORMAL
PATH REPORT.COMMENTS IMP SPEC: NORMAL
PATH REPORT.RELEVANT HX SPEC: NORMAL

## 2025-07-17 ENCOUNTER — MYC REFILL (OUTPATIENT)
Dept: FAMILY MEDICINE | Facility: CLINIC | Age: 60
End: 2025-07-17
Payer: COMMERCIAL

## 2025-07-17 DIAGNOSIS — F11.90 CHRONIC, CONTINUOUS USE OF OPIOIDS: ICD-10-CM

## 2025-07-17 DIAGNOSIS — M54.2 CERVICALGIA: ICD-10-CM

## 2025-07-17 DIAGNOSIS — G89.4 CHRONIC PAIN SYNDROME: ICD-10-CM

## 2025-07-17 RX ORDER — TRAMADOL HYDROCHLORIDE 50 MG/1
50 TABLET ORAL 2 TIMES DAILY PRN
Qty: 30 TABLET | Refills: 0 | Status: SHIPPED | OUTPATIENT
Start: 2025-07-17

## 2025-07-22 ENCOUNTER — ORDERS ONLY (AUTO-RELEASED) (OUTPATIENT)
Dept: FAMILY MEDICINE | Facility: CLINIC | Age: 60
End: 2025-07-22

## 2025-07-22 ENCOUNTER — OFFICE VISIT (OUTPATIENT)
Dept: FAMILY MEDICINE | Facility: CLINIC | Age: 60
End: 2025-07-22
Payer: COMMERCIAL

## 2025-07-22 VITALS
HEART RATE: 89 BPM | SYSTOLIC BLOOD PRESSURE: 132 MMHG | BODY MASS INDEX: 18.52 KG/M2 | RESPIRATION RATE: 18 BRPM | HEIGHT: 63 IN | TEMPERATURE: 99.8 F | DIASTOLIC BLOOD PRESSURE: 84 MMHG | OXYGEN SATURATION: 99 % | WEIGHT: 104.5 LBS

## 2025-07-22 DIAGNOSIS — Z00.00 ROUTINE GENERAL MEDICAL EXAMINATION AT A HEALTH CARE FACILITY: Primary | ICD-10-CM

## 2025-07-22 DIAGNOSIS — F11.90 CHRONIC, CONTINUOUS USE OF OPIOIDS: ICD-10-CM

## 2025-07-22 DIAGNOSIS — R00.0 TACHYCARDIA: ICD-10-CM

## 2025-07-22 DIAGNOSIS — N95.1 SYMPTOMATIC MENOPAUSAL OR FEMALE CLIMACTERIC STATES: ICD-10-CM

## 2025-07-22 LAB
ANION GAP SERPL CALCULATED.3IONS-SCNC: 12 MMOL/L (ref 7–15)
BUN SERPL-MCNC: 7.2 MG/DL (ref 8–23)
CALCIUM SERPL-MCNC: 9.4 MG/DL (ref 8.8–10.4)
CHLORIDE SERPL-SCNC: 103 MMOL/L (ref 98–107)
CHOLEST SERPL-MCNC: 186 MG/DL
CREAT SERPL-MCNC: 0.6 MG/DL (ref 0.51–0.95)
EGFRCR SERPLBLD CKD-EPI 2021: >90 ML/MIN/1.73M2
ERYTHROCYTE [DISTWIDTH] IN BLOOD BY AUTOMATED COUNT: 12.8 % (ref 10–15)
FASTING STATUS PATIENT QL REPORTED: YES
FASTING STATUS PATIENT QL REPORTED: YES
GLUCOSE SERPL-MCNC: 86 MG/DL (ref 70–99)
HCO3 SERPL-SCNC: 24 MMOL/L (ref 22–29)
HCT VFR BLD AUTO: 40.4 % (ref 35–47)
HDLC SERPL-MCNC: 66 MG/DL
HGB BLD-MCNC: 13.3 G/DL (ref 11.7–15.7)
LDLC SERPL CALC-MCNC: 111 MG/DL
MCH RBC QN AUTO: 30.8 PG (ref 26.5–33)
MCHC RBC AUTO-ENTMCNC: 32.9 G/DL (ref 31.5–36.5)
MCV RBC AUTO: 94 FL (ref 78–100)
NONHDLC SERPL-MCNC: 120 MG/DL
PLATELET # BLD AUTO: 270 10E3/UL (ref 150–450)
POTASSIUM SERPL-SCNC: 4 MMOL/L (ref 3.4–5.3)
RBC # BLD AUTO: 4.32 10E6/UL (ref 3.8–5.2)
SODIUM SERPL-SCNC: 139 MMOL/L (ref 135–145)
TRIGL SERPL-MCNC: 46 MG/DL
WBC # BLD AUTO: 8.6 10E3/UL (ref 4–11)

## 2025-07-22 PROCEDURE — 3049F LDL-C 100-129 MG/DL: CPT | Performed by: PHYSICIAN ASSISTANT

## 2025-07-22 PROCEDURE — 1125F AMNT PAIN NOTED PAIN PRSNT: CPT | Performed by: PHYSICIAN ASSISTANT

## 2025-07-22 PROCEDURE — 36415 COLL VENOUS BLD VENIPUNCTURE: CPT | Performed by: PHYSICIAN ASSISTANT

## 2025-07-22 PROCEDURE — 80048 BASIC METABOLIC PNL TOTAL CA: CPT | Performed by: PHYSICIAN ASSISTANT

## 2025-07-22 PROCEDURE — 99214 OFFICE O/P EST MOD 30 MIN: CPT | Mod: 25 | Performed by: PHYSICIAN ASSISTANT

## 2025-07-22 PROCEDURE — 3079F DIAST BP 80-89 MM HG: CPT | Performed by: PHYSICIAN ASSISTANT

## 2025-07-22 PROCEDURE — 3075F SYST BP GE 130 - 139MM HG: CPT | Performed by: PHYSICIAN ASSISTANT

## 2025-07-22 PROCEDURE — 99396 PREV VISIT EST AGE 40-64: CPT | Performed by: PHYSICIAN ASSISTANT

## 2025-07-22 PROCEDURE — G2211 COMPLEX E/M VISIT ADD ON: HCPCS | Performed by: PHYSICIAN ASSISTANT

## 2025-07-22 PROCEDURE — 80061 LIPID PANEL: CPT | Performed by: PHYSICIAN ASSISTANT

## 2025-07-22 PROCEDURE — 85027 COMPLETE CBC AUTOMATED: CPT | Performed by: PHYSICIAN ASSISTANT

## 2025-07-22 RX ORDER — MEDROXYPROGESTERONE ACETATE 2.5 MG/1
2.5 TABLET ORAL DAILY
Qty: 90 TABLET | Refills: 3 | Status: SHIPPED | OUTPATIENT
Start: 2025-07-22

## 2025-07-22 RX ORDER — ESTRADIOL 1 MG/1
0.5 TABLET ORAL DAILY
Qty: 90 TABLET | Refills: 1 | Status: SHIPPED | OUTPATIENT
Start: 2025-07-22

## 2025-07-22 SDOH — HEALTH STABILITY: PHYSICAL HEALTH: ON AVERAGE, HOW MANY MINUTES DO YOU ENGAGE IN EXERCISE AT THIS LEVEL?: 20 MIN

## 2025-07-22 SDOH — HEALTH STABILITY: PHYSICAL HEALTH: ON AVERAGE, HOW MANY DAYS PER WEEK DO YOU ENGAGE IN MODERATE TO STRENUOUS EXERCISE (LIKE A BRISK WALK)?: 2 DAYS

## 2025-07-22 ASSESSMENT — SOCIAL DETERMINANTS OF HEALTH (SDOH): HOW OFTEN DO YOU GET TOGETHER WITH FRIENDS OR RELATIVES?: NEVER

## 2025-07-22 ASSESSMENT — PATIENT HEALTH QUESTIONNAIRE - PHQ9
SUM OF ALL RESPONSES TO PHQ QUESTIONS 1-9: 3
10. IF YOU CHECKED OFF ANY PROBLEMS, HOW DIFFICULT HAVE THESE PROBLEMS MADE IT FOR YOU TO DO YOUR WORK, TAKE CARE OF THINGS AT HOME, OR GET ALONG WITH OTHER PEOPLE: SOMEWHAT DIFFICULT
SUM OF ALL RESPONSES TO PHQ QUESTIONS 1-9: 3

## 2025-07-22 ASSESSMENT — PAIN SCALES - GENERAL: PAINLEVEL_OUTOF10: MODERATE PAIN (6)

## 2025-07-22 NOTE — PROGRESS NOTES
Preventive Care Visit  [facility]  Kelsi Logan PA-C, Physician Assistant - Medical  Jul 22, 2025      Assessment & Plan     Routine general medical examination at a health care facility  - CBC with platelets; Future  - Basic metabolic panel; Future  - Lipid panel reflex to direct LDL Non-fasting; Future  - CBC with platelets  - Basic metabolic panel  - Lipid panel reflex to direct LDL Non-fasting    Tachycardia - will repeat zio monitor given ongoing symptoms without any improvement. Await results.  - ZIO PATCH MAIL OUT; Future    Chronic, continuous use of opioids - stable on tramadol 50mg #30/month. Will request refills monthly and follow up in person in 3 months.    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  - estradiol (ESTRACE) 1 MG tablet; Take 0.5 tablets (0.5 mg) by mouth daily.  - medroxyPROGESTERone (PROVERA) 2.5 MG tablet; Take 1 tablet (2.5 mg) by mouth daily.    Counseling  Appropriate preventive services were addressed with this patient via screening, questionnaire, or discussion as appropriate for fall prevention, nutrition, physical activity, Tobacco-use cessation, social engagement, weight loss and cognition.  Checklist reviewing preventive services available has been given to the patient.  Reviewed patient's diet, addressing concerns and/or questions.   She is at risk for lack of exercise and has been provided with information to increase physical activity for the benefit of her well-being.   Patient is at risk for social isolation and has been provided with information about the benefit of social connection.   The patient was instructed to see the dentist every 6 months.   She is at risk for psychosocial distress and has been provided with information to reduce risk.     The longitudinal plan of care for the diagnosis(es)/condition(s) as documented were addressed during this visit. Due to the added complexity in care, I  will continue to support Kimmie in the subsequent management and with ongoing continuity of care.      Subjective   Kimmie is a 60 year old, presenting for the following:  Physical and Recheck Medication        2025     3:03 PM   Additional Questions   Roomed by Ludy Burks here today for RHM, tramadol follow up    Ongoing tachycardia episodes  Happening daily or more often  Did luceroo a few years ago but didn't press button for trigger  Ongoing symptoms without any improvement   No chest pain/pressure, SOB    Has cologuard at home, not     Cut back on tramadol use - previously had been getting #50/month, now down to #30/month  Has been tolerating this better    HPI   Advance Care Planning    Discussed advance care planning with patient; however, patient declined at this time.        2025   General Health   How would you rate your overall physical health? (!) FAIR   Feel stress (tense, anxious, or unable to sleep) To some extent   (!) STRESS CONCERN      2025   Nutrition   Three or more servings of calcium each day? Yes   Diet: Regular (no restrictions)   How many servings of fruit and vegetables per day? (!) 2-3   How many sweetened beverages each day? 0-1         2025   Exercise   Days per week of moderate/strenous exercise 2 days   Average minutes spent exercising at this level 20 min   (!) EXERCISE CONCERN      2025   Social Factors   Frequency of gathering with friends or relatives Never   Worry food won't last until get money to buy more No   Food not last or not have enough money for food? No   Do you have housing? (Housing is defined as stable permanent housing and does not include staying outside in a car, in a tent, in an abandoned building, in an overnight shelter, or couch-surfing.) Yes   Are you worried about losing your housing? No   Lack of transportation? No   Unable to get utilities (heat,electricity)? No   (!) SOCIAL CONNECTIONS CONCERN      2025   Fall Risk    Fallen 2 or more times in the past year? No   Trouble with walking or balance? No          7/22/2025   Dental   Dentist two times every year? (!) NO       Today's PHQ-9 Score:       7/22/2025     2:53 PM   PHQ-9 SCORE   PHQ-9 Total Score MyChart 3 (Minimal depression)   PHQ-9 Total Score 3        Patient-reported         7/22/2025   Substance Use   Alcohol more than 3/day or more than 7/wk Not Applicable   Do you use any other substances recreationally? No     Social History     Tobacco Use    Smoking status: Never     Passive exposure: Never    Smokeless tobacco: Never   Vaping Use    Vaping status: Never Used   Substance Use Topics    Alcohol use: Not Currently     Comment: occ, rare     Drug use: Never           12/18/2024   LAST FHS-7 RESULTS   1st degree relative breast or ovarian cancer No   Any relative bilateral breast cancer No   Any male have breast cancer No   Any ONE woman have BOTH breast AND ovarian cancer No   Any woman with breast cancer before 50yrs No   2 or more relatives with breast AND/OR ovarian cancer No   2 or more relatives with breast AND/OR bowel cancer No        Mammogram Screening - Mammogram every 1-2 years updated in Health Maintenance based on mutual decision making        7/22/2025   STI Screening   New sexual partner(s) since last STI/HIV test? No     History of abnormal Pap smear: No - age 30-64 HPV with reflex Pap every 5 years recommended        Latest Ref Rng & Units 6/23/2025     3:03 PM 11/22/2023     1:23 PM 10/5/2022    11:06 AM   PAP / HPV   PAP  Negative for Intraepithelial Lesion or Malignancy (NILM)  Negative for Intraepithelial Lesion or Malignancy (NILM)  Negative for Intraepithelial Lesion or Malignancy (NILM)    HPV 16 DNA Negative Negative  Positive  Negative    HPV 18 DNA Negative Negative  Negative  Negative    Other HR HPV Negative Negative  Negative  Negative      ASCVD Risk   The 10-year ASCVD risk score (Juana STEARNS, et al., 2019) is: 3.3%    Values  "used to calculate the score:      Age: 60 years      Sex: Female      Is Non- : No      Diabetic: No      Tobacco smoker: No      Systolic Blood Pressure: 132 mmHg      Is BP treated: No      HDL Cholesterol: 58 mg/dL      Total Cholesterol: 192 mg/dL       Reviewed and updated as needed this visit by Provider                       Objective    Exam  /84 (BP Location: Right arm, Patient Position: Sitting, Cuff Size: Adult Small)   Pulse 89   Temp 99.8  F (37.7  C) (Temporal)   Resp 18   Ht 1.6 m (5' 3\")   Wt 47.4 kg (104 lb 8 oz)   LMP 09/01/2016 (LMP Unknown)   SpO2 99%   BMI 18.51 kg/m     Estimated body mass index is 18.51 kg/m  as calculated from the following:    Height as of this encounter: 1.6 m (5' 3\").    Weight as of this encounter: 47.4 kg (104 lb 8 oz).    Physical Exam  GENERAL: alert and no distress  EYES: Eyes grossly normal to inspection, PERRL and conjunctivae and sclerae normal  HENT: ear canals and TM's normal, nose and mouth without ulcers or lesions  NECK: no adenopathy, no asymmetry, masses, or scars  RESP: lungs clear to auscultation - no rales, rhonchi or wheezes  CV: regular rate and rhythm, normal S1 S2, no S3 or S4, no murmur, click or rub, no peripheral edema  ABDOMEN: soft, nontender, no hepatosplenomegaly, no masses and bowel sounds normal  MS: no gross musculoskeletal defects noted, no edema  SKIN: no suspicious lesions or rashes  NEURO: Normal strength and tone, mentation intact and speech normal  PSYCH: mentation appears normal, affect normal/bright        Signed Electronically by: Kelsi Logan PA-C    Answers submitted by the patient for this visit:  Patient Health Questionnaire (Submitted on 7/22/2025)  If you checked off any problems, how difficult have these problems made it for you to do your work, take care of things at home, or get along with other people?: Somewhat difficult  PHQ9 TOTAL SCORE: 3    "

## 2025-07-22 NOTE — PATIENT INSTRUCTIONS
Patient Education   Preventive Care Advice   This is general advice given by our system to help you stay healthy. However, your care team may have specific advice just for you. Please talk to your care team about your preventive care needs.  Nutrition  Eat 5 or more servings of fruits and vegetables each day.  Try wheat bread, brown rice and whole grain pasta (instead of white bread, rice, and pasta).  Get enough calcium and vitamin D. Check the label on foods and aim for 100% of the RDA (recommended daily allowance).  Lifestyle  Exercise at least 150 minutes each week  (30 minutes a day, 5 days a week).  Do muscle strengthening activities 2 days a week. These help control your weight and prevent disease.  No smoking.  Wear sunscreen to prevent skin cancer.  Have a dental exam and cleaning every 6 months.  Yearly exams  See your health care team every year to talk about:  Any changes in your health.  Any medicines your care team has prescribed.  Preventive care, family planning, and ways to prevent chronic diseases.  Shots (vaccines)   HPV shots (up to age 26), if you've never had them before.  Hepatitis B shots (up to age 59), if you've never had them before.  COVID-19 shot: Get this shot when it's due.  Flu shot: Get a flu shot every year.  Tetanus shot: Get a tetanus shot every 10 years.  Pneumococcal, hepatitis A, and RSV shots: Ask your care team if you need these based on your risk.  Shingles shot (for age 50 and up)  General health tests  Diabetes screening:  Starting at age 35, Get screened for diabetes at least every 3 years.  If you are younger than age 35, ask your care team if you should be screened for diabetes.  Cholesterol test: At age 39, start having a cholesterol test every 5 years, or more often if advised.  Bone density scan (DEXA): At age 50, ask your care team if you should have this scan for osteoporosis (brittle bones).  Hepatitis C: Get tested at least once in your life.  STIs (sexually  transmitted infections)  Before age 24: Ask your care team if you should be screened for STIs.  After age 24: Get screened for STIs if you're at risk. You are at risk for STIs (including HIV) if:  You are sexually active with more than one person.  You don't use condoms every time.  You or a partner was diagnosed with a sexually transmitted infection.  If you are at risk for HIV, ask about PrEP medicine to prevent HIV.  Get tested for HIV at least once in your life, whether you are at risk for HIV or not.  Cancer screening tests  Cervical cancer screening: If you have a cervix, begin getting regular cervical cancer screening tests starting at age 21.  Breast cancer scan (mammogram): If you've ever had breasts, begin having regular mammograms starting at age 40. This is a scan to check for breast cancer.  Colon cancer screening: It is important to start screening for colon cancer at age 45.  Have a colonoscopy test every 10 years (or more often if you're at risk) Or, ask your provider about stool tests like a FIT test every year or Cologuard test every 3 years.  To learn more about your testing options, visit:   .  For help making a decision, visit:   https://bit.ly/og52314.  Prostate cancer screening test: If you have a prostate, ask your care team if a prostate cancer screening test (PSA) at age 55 is right for you.  Lung cancer screening: If you are a current or former smoker ages 50 to 80, ask your care team if ongoing lung cancer screenings are right for you.  For informational purposes only. Not to replace the advice of your health care provider. Copyright   2023 [facility]. All rights reserved. Clinically reviewed by the [facility]. [employer] 550511 - REV 01/24.  Relationships for Good Health  Relationships are important for our health and happiness. Social isolation, loneliness and lack of support are bad for your health. Studies show that loneliness can harm health  and limit your life span as much as high blood pressure and smoking.   Take some time to reflect on your relationships. Then answer these questions:  Are there people in your life that cause you stress or drain your energy? What can you do to set limits?  ________________________________________________________________________________________________________________________________________________________________________________________________________________________________________________________________________________________________________________________________________________  Who do you enjoy spending time with? Who can you go to for support?  ________________________________________________________________________________________________________________________________________________________________________________________________________________________________________________________________________________________________________________________________________________  What can you do to improve your relationships with others?  __________________________________________________________________________________________________________________________________________________________________________________________________________________  ______________________________________________________________________________________________________________________________  What do you like most about your relationships with others?  ________________________________________________________________________________________________________________________________________________________________________________________________________________________________________________________________________________________________________________________________________________  My goal: ______________________________________________________________________  I will:  ______________________________________________________________________________________________________________________________________________________________________________________________    For informational purposes only. Not to replace the advice of your health care provider. Copyright   2018 [facility]. All rights reserved. Clinically reviewed by [facility]  Team. [employer] 196799 - Rev 06/24.  Learning About Stress  What is stress?     Stress is your body's response to a hard situation. Your body can have a physical, emotional, or mental response. Stress is a fact of life for most people, and it affects everyone differently. What causes stress for you may not be stressful for someone else.  A lot of things can cause stress. You may feel stress when you go on a job interview, take a test, or run a race. This kind of short-term stress is normal and even useful. It can help you if you need to work hard or react quickly. For example, stress can help you finish an important job on time.  Long-term stress is caused by ongoing stressful situations or events. Examples of long-term stress include long-term health problems, ongoing problems at work, or conflicts in your family. Long-term stress can harm your health.  How does stress affect your health?  When you are stressed, your body responds as though you are in danger. It makes hormones that speed up your heart, make you breathe faster, and give you a burst of energy. This is called the fight-or-flight stress response. If the stress is over quickly, your body goes back to normal and no harm is done.  But if stress happens too often or lasts too long, it can have bad effects. Long-term stress can make you more likely to get sick, and it can make symptoms of some diseases worse. If you tense up when you are stressed, you may develop neck, shoulder, or low back pain. Stress is linked to high blood pressure and heart disease.  Stress also  harms your emotional health. It can make you calderon, tense, or depressed. Your relationships may suffer, and you may not do well at work or school.  What can you do to manage stress?  You can try these things to help manage stress:   Do something active. Exercise or activity can help reduce stress. Walking is a great way to get started. Even everyday activities such as housecleaning or yard work can help.  Try yoga or iliana chi. These techniques combine exercise and meditation. You may need some training at first to learn them.  Do something you enjoy. For example, listen to music or go to a movie. Practice your hobby or do volunteer work.  Meditate. This can help you relax, because you are not worrying about what happened before or what may happen in the future.  Do guided imagery. Imagine yourself in any setting that helps you feel calm. You can use online videos, books, or a teacher to guide you.  Do breathing exercises. For example:  From a standing position, bend forward from the waist with your knees slightly bent. Let your arms dangle close to the floor.  Breathe in slowly and deeply as you return to a standing position. Roll up slowly and lift your head last.  Hold your breath for just a few seconds in the standing position.  Breathe out slowly and bend forward from the waist.  Let your feelings out. Talk, laugh, cry, and express anger when you need to. Talking with supportive friends or family, a counselor, or a carlos leader about your feelings is a healthy way to relieve stress. Avoid discussing your feelings with people who make you feel worse.  Write. It may help to write about things that are bothering you. This helps you find out how much stress you feel and what is causing it. When you know this, you can find better ways to cope.  What can you do to prevent stress?  You might try some of these things to help prevent stress:  Manage your time. This helps you find time to do the things you want and need to  "do.  Get enough sleep. Your body recovers from the stresses of the day while you are sleeping.  Get support. Your family, friends, and community can make a difference in how you experience stress.  Limit your news feed. Avoid or limit time on social media or news that may make you feel stressed.  Do something active. Exercise or activity can help reduce stress. Walking is a great way to get started.  Where can you learn more?  Go to https://www.[employer].net/patiented  Enter N032 in the search box to learn more about \"Learning About Stress.\"  Current as of: October 24, 2024  Content Version: 14.5    9812-5341 [employer].   Care instructions adapted under license by your healthcare professional. If you have questions about a medical condition or this instruction, always ask your healthcare professional. [employer] disclaims any warranty or liability for your use of this information.       "

## 2025-08-14 ENCOUNTER — MYC REFILL (OUTPATIENT)
Dept: FAMILY MEDICINE | Facility: CLINIC | Age: 60
End: 2025-08-14
Payer: COMMERCIAL

## 2025-08-14 DIAGNOSIS — M54.2 CERVICALGIA: ICD-10-CM

## 2025-08-14 DIAGNOSIS — F11.90 CHRONIC, CONTINUOUS USE OF OPIOIDS: ICD-10-CM

## 2025-08-14 DIAGNOSIS — G89.4 CHRONIC PAIN SYNDROME: ICD-10-CM

## 2025-08-16 RX ORDER — TRAMADOL HYDROCHLORIDE 50 MG/1
50 TABLET ORAL 2 TIMES DAILY PRN
Qty: 30 TABLET | Refills: 0 | Status: SHIPPED | OUTPATIENT
Start: 2025-08-16

## 2025-08-28 ENCOUNTER — OFFICE VISIT (OUTPATIENT)
Dept: NEUROLOGY | Facility: CLINIC | Age: 60
End: 2025-08-28
Payer: COMMERCIAL

## 2025-08-28 DIAGNOSIS — G43.709 CHRONIC MIGRAINE WITHOUT AURA WITHOUT STATUS MIGRAINOSUS, NOT INTRACTABLE: Primary | ICD-10-CM

## (undated) DEVICE — PREP SCRUB SOL EXIDINE 4% CHG 4OZ 29002-404

## (undated) DEVICE — Device

## (undated) DEVICE — SYR 01ML 27GA 0.5" NDL TBC 309623

## (undated) DEVICE — GLOVE BIOGEL PI ULTRATOUCH SZ 7.0 41170

## (undated) DEVICE — PAD PERI INDIV WRAP 11" 2022A

## (undated) DEVICE — GLOVE BIOGEL PI MICRO SZ 7.0 48570

## (undated) DEVICE — SU VICRYL 0 CT-2 27" J334H

## (undated) DEVICE — JELLY LUBRICATING SURGILUBE 2OZ TUBE 281020502

## (undated) DEVICE — DRSG PRIMAPORE 03 1/8X6" 66000318

## (undated) DEVICE — SUCTION MANIFOLD NEPTUNE 2 SYS 4 PORT 0702-020-000

## (undated) DEVICE — GLOVE PROTEXIS W/NEU-THERA 6.5  2D73TE65

## (undated) DEVICE — PREP CHLORAPREP W/ORANGE TINT 10.5ML 260715

## (undated) DEVICE — DRAPE UNDER BUTTOCK 8483

## (undated) DEVICE — PAD CHUX UNDERPAD 30X36" P3036C

## (undated) DEVICE — SWAB PROCTO 16" 2/PK 32-046

## (undated) DEVICE — SOL NACL 0.9% INJ 250ML BAG 2B1322Q

## (undated) DEVICE — ESU ELEC LEEP BALL 5MM

## (undated) DEVICE — TRAY PAIN INJECTION 97A 640

## (undated) DEVICE — LINEN GOWN X4 5410

## (undated) DEVICE — LINEN TOWEL PACK X5 5464

## (undated) DEVICE — SOL NACL 0.9% IRRIG 1000ML BOTTLE 2F7124

## (undated) DEVICE — DRSG TELFA 3X8" 1238

## (undated) DEVICE — TUBING

## (undated) DEVICE — NDL SPINAL 22GA 3.5" QUINCKE 405181

## (undated) DEVICE — NDL SPINAL 25GA 3.5" QUINCKE 405180

## (undated) DEVICE — PANTIES MESH LG/XLG 2PK 706M2

## (undated) DEVICE — CANNULA MONOPOLAR CVD 100ML 20GA 0406-630-125

## (undated) DEVICE — SOL WATER IRRIG 1000ML BOTTLE 2F7114

## (undated) DEVICE — STRAP KNEE/BODY 31143004

## (undated) DEVICE — GLOVE PROTEXIS BLUE W/NEU-THERA 7.0  2D73EB70

## (undated) DEVICE — SU SILK 2-0 PS 18" 1588H

## (undated) DEVICE — TONGUE DEPRESSOR STERILE 25-705-ALL

## (undated) RX ORDER — ACETAMINOPHEN 325 MG/1
TABLET ORAL
Status: DISPENSED
Start: 2022-02-03

## (undated) RX ORDER — FENTANYL CITRATE 50 UG/ML
INJECTION, SOLUTION INTRAMUSCULAR; INTRAVENOUS
Status: DISPENSED
Start: 2024-06-24

## (undated) RX ORDER — LIDOCAINE HYDROCHLORIDE 20 MG/ML
INJECTION, SOLUTION EPIDURAL; INFILTRATION; INTRACAUDAL; PERINEURAL
Status: DISPENSED
Start: 2022-02-03

## (undated) RX ORDER — PROPOFOL 10 MG/ML
INJECTION, EMULSION INTRAVENOUS
Status: DISPENSED
Start: 2022-02-03

## (undated) RX ORDER — FENTANYL CITRATE 50 UG/ML
INJECTION, SOLUTION INTRAMUSCULAR; INTRAVENOUS
Status: DISPENSED
Start: 2022-02-03

## (undated) RX ORDER — HYDROMORPHONE HYDROCHLORIDE 1 MG/ML
INJECTION, SOLUTION INTRAMUSCULAR; INTRAVENOUS; SUBCUTANEOUS
Status: DISPENSED
Start: 2022-02-03

## (undated) RX ORDER — EPHEDRINE SULFATE 50 MG/ML
INJECTION, SOLUTION INTRAMUSCULAR; INTRAVENOUS; SUBCUTANEOUS
Status: DISPENSED
Start: 2022-02-03

## (undated) RX ORDER — KETOROLAC TROMETHAMINE 30 MG/ML
INJECTION, SOLUTION INTRAMUSCULAR; INTRAVENOUS
Status: DISPENSED
Start: 2022-02-03

## (undated) RX ORDER — OXYCODONE HYDROCHLORIDE 5 MG/1
TABLET ORAL
Status: DISPENSED
Start: 2022-02-03

## (undated) RX ORDER — LIDOCAINE HYDROCHLORIDE AND EPINEPHRINE 10; 10 MG/ML; UG/ML
INJECTION, SOLUTION INFILTRATION; PERINEURAL
Status: DISPENSED
Start: 2022-02-03